# Patient Record
Sex: MALE | Race: WHITE | NOT HISPANIC OR LATINO | Employment: OTHER | URBAN - METROPOLITAN AREA
[De-identification: names, ages, dates, MRNs, and addresses within clinical notes are randomized per-mention and may not be internally consistent; named-entity substitution may affect disease eponyms.]

---

## 2017-01-03 ENCOUNTER — TELEPHONE (OUTPATIENT)
Dept: ENDOSCOPY | Facility: HOSPITAL | Age: 72
End: 2017-01-03

## 2017-01-25 DIAGNOSIS — E11.9 DIABETES MELLITUS TYPE II, NON INSULIN DEPENDENT: ICD-10-CM

## 2017-01-25 RX ORDER — METFORMIN HYDROCHLORIDE 1000 MG/1
TABLET ORAL
Qty: 180 TABLET | Refills: 0 | Status: SHIPPED | OUTPATIENT
Start: 2017-01-25 | End: 2017-04-25 | Stop reason: SDUPTHER

## 2017-01-25 NOTE — TELEPHONE ENCOUNTER
----- Message from Marcy CYDNEY Kent sent at 1/25/2017 11:56 AM CST -----  Contact: self  Medication Refill Request: 90 days supply. Please contact the pt whether the doctor be able to fill his medication or not at [phone]. Thanks!    metformin (GLUCOPHAGE) 1000 MG tablet    Thanks!

## 2017-02-01 ENCOUNTER — CLINICAL SUPPORT (OUTPATIENT)
Dept: CARDIOLOGY | Facility: CLINIC | Age: 72
End: 2017-02-01
Payer: MEDICARE

## 2017-02-01 DIAGNOSIS — I25.10 CORONARY ARTERY DISEASE INVOLVING NATIVE CORONARY ARTERY OF NATIVE HEART WITHOUT ANGINA PECTORIS: Chronic | ICD-10-CM

## 2017-02-01 DIAGNOSIS — Z95.1 S/P CABG X 4: Chronic | ICD-10-CM

## 2017-02-01 PROCEDURE — 93016 CV STRESS TEST SUPVJ ONLY: CPT | Mod: S$PBB,,, | Performed by: INTERNAL MEDICINE

## 2017-02-01 PROCEDURE — 93018 CV STRESS TEST I&R ONLY: CPT | Mod: S$PBB,,, | Performed by: INTERNAL MEDICINE

## 2017-02-01 PROCEDURE — 93017 CV STRESS TEST TRACING ONLY: CPT | Mod: PBBFAC | Performed by: INTERNAL MEDICINE

## 2017-02-01 PROCEDURE — 78492 MYOCRD IMG PET MLT RST&STRS: CPT | Mod: 26,S$PBB,, | Performed by: INTERNAL MEDICINE

## 2017-02-02 NOTE — PROGRESS NOTES
Please contact and inform pt that PET stress test showed a very small abnormality that I am not concerned about.  Looks very good for 10 years after CABG.  No need for further testing at this time.  maria esther mcdonald

## 2017-02-03 ENCOUNTER — TELEPHONE (OUTPATIENT)
Dept: CARDIOLOGY | Facility: CLINIC | Age: 72
End: 2017-02-03

## 2017-02-03 NOTE — TELEPHONE ENCOUNTER
----- Message from Max Oliveira MD sent at 2/2/2017  5:16 PM CST -----  Please contact and inform pt that PET stress test showed a very small abnormality that I am not concerned about.  Looks very good for 10 years after CABG.  No need for further testing at this time.  thx  rb

## 2017-02-20 ENCOUNTER — LAB VISIT (OUTPATIENT)
Dept: LAB | Facility: HOSPITAL | Age: 72
End: 2017-02-20
Attending: FAMILY MEDICINE
Payer: MEDICARE

## 2017-02-20 ENCOUNTER — OFFICE VISIT (OUTPATIENT)
Dept: FAMILY MEDICINE | Facility: CLINIC | Age: 72
End: 2017-02-20
Payer: MEDICARE

## 2017-02-20 VITALS
WEIGHT: 271.38 LBS | BODY MASS INDEX: 38.85 KG/M2 | OXYGEN SATURATION: 93 % | DIASTOLIC BLOOD PRESSURE: 100 MMHG | SYSTOLIC BLOOD PRESSURE: 130 MMHG | HEIGHT: 70 IN | TEMPERATURE: 98 F | HEART RATE: 76 BPM

## 2017-02-20 DIAGNOSIS — E11.9 DIABETES MELLITUS TYPE II, NON INSULIN DEPENDENT: Primary | ICD-10-CM

## 2017-02-20 DIAGNOSIS — E11.9 DIABETES MELLITUS TYPE II, NON INSULIN DEPENDENT: ICD-10-CM

## 2017-02-20 DIAGNOSIS — B35.6 TINEA CRURIS: ICD-10-CM

## 2017-02-20 DIAGNOSIS — I71.40 ABDOMINAL AORTIC ANEURYSM (AAA) WITHOUT RUPTURE: ICD-10-CM

## 2017-02-20 DIAGNOSIS — I10 ESSENTIAL HYPERTENSION: ICD-10-CM

## 2017-02-20 DIAGNOSIS — Z11.59 NEED FOR HEPATITIS C SCREENING TEST: ICD-10-CM

## 2017-02-20 DIAGNOSIS — I25.10 CORONARY ARTERY DISEASE INVOLVING NATIVE CORONARY ARTERY OF NATIVE HEART WITHOUT ANGINA PECTORIS: ICD-10-CM

## 2017-02-20 DIAGNOSIS — J06.9 UPPER RESPIRATORY TRACT INFECTION, UNSPECIFIED TYPE: ICD-10-CM

## 2017-02-20 DIAGNOSIS — Z23 NEED FOR PROPHYLACTIC VACCINATION WITH TETANUS-DIPHTHERIA (TD): ICD-10-CM

## 2017-02-20 DIAGNOSIS — E11.40 TYPE 2 DIABETES MELLITUS WITH DIABETIC NEUROPATHY, WITHOUT LONG-TERM CURRENT USE OF INSULIN: Chronic | ICD-10-CM

## 2017-02-20 DIAGNOSIS — K21.9 GASTROESOPHAGEAL REFLUX DISEASE, ESOPHAGITIS PRESENCE NOT SPECIFIED: ICD-10-CM

## 2017-02-20 DIAGNOSIS — D36.9 DERMOID CYST: ICD-10-CM

## 2017-02-20 LAB — HCV AB SERPL QL IA: NEGATIVE

## 2017-02-20 PROCEDURE — 36415 COLL VENOUS BLD VENIPUNCTURE: CPT | Mod: PO

## 2017-02-20 PROCEDURE — 99999 PR PBB SHADOW E&M-EST. PATIENT-LVL IV: CPT | Mod: PBBFAC,,, | Performed by: FAMILY MEDICINE

## 2017-02-20 PROCEDURE — 99214 OFFICE O/P EST MOD 30 MIN: CPT | Mod: S$PBB,,, | Performed by: FAMILY MEDICINE

## 2017-02-20 PROCEDURE — 86803 HEPATITIS C AB TEST: CPT

## 2017-02-20 PROCEDURE — 83036 HEMOGLOBIN GLYCOSYLATED A1C: CPT

## 2017-02-20 RX ORDER — PANTOPRAZOLE SODIUM 40 MG/1
40 TABLET, DELAYED RELEASE ORAL DAILY
Qty: 90 TABLET | Refills: 3 | Status: SHIPPED | OUTPATIENT
Start: 2017-02-20 | End: 2018-02-07 | Stop reason: SDUPTHER

## 2017-02-20 RX ORDER — ECONAZOLE NITRATE 10 MG/G
CREAM TOPICAL DAILY
Qty: 30 G | Refills: 1 | Status: SHIPPED | OUTPATIENT
Start: 2017-02-20 | End: 2017-11-27 | Stop reason: SDUPTHER

## 2017-02-20 RX ORDER — METOPROLOL SUCCINATE 25 MG/1
25 TABLET, EXTENDED RELEASE ORAL DAILY
Qty: 90 TABLET | Refills: 3 | Status: SHIPPED | OUTPATIENT
Start: 2017-02-20 | End: 2018-01-03 | Stop reason: ALTCHOICE

## 2017-02-20 RX ORDER — GLIPIZIDE 5 MG/1
5 TABLET, FILM COATED, EXTENDED RELEASE ORAL
Qty: 90 TABLET | Refills: 3 | Status: SHIPPED | OUTPATIENT
Start: 2017-02-20 | End: 2018-02-07 | Stop reason: SDUPTHER

## 2017-02-20 RX ORDER — LOSARTAN POTASSIUM 100 MG/1
100 TABLET ORAL DAILY
Qty: 90 TABLET | Refills: 3 | Status: SHIPPED | OUTPATIENT
Start: 2017-02-20 | End: 2018-02-07 | Stop reason: SDUPTHER

## 2017-02-20 RX ORDER — CODEINE PHOSPHATE AND GUAIFENESIN 10; 100 MG/5ML; MG/5ML
5 SOLUTION ORAL EVERY 6 HOURS PRN
Qty: 120 ML | Refills: 0 | Status: SHIPPED | OUTPATIENT
Start: 2017-02-20 | End: 2017-03-02

## 2017-02-20 NOTE — MR AVS SNAPSHOT
[facility]  [address]  [place] [address]  Phone: [phone]  Fax: [phone]                  Jani Cha   2017 8:30 AM   Office Visit    Description:  Male : 1945   Provider:  Laila Bains MD   Department:  [facility]           Reason for Visit     Cough     Sore Throat           Diagnoses this Visit        Comments    Need for prophylactic vaccination with tetanus-diphtheria (TD)    -  Primary     Gastroesophageal reflux disease, esophagitis presence not specified         Diabetes mellitus type II, non insulin dependent         Essential hypertension         Type 2 diabetes mellitus with diabetic neuropathy, without long-term current use of insulin         Dermoid cyst         Tinea cruris         Upper respiratory tract infection, unspecified type                To Do List           Future Appointments        Provider Department Dept Phone    3/7/2017 3:30 PM Max Marques OD [facility] [phone]      Goals (5 Years of Data)     None       These Medications        Disp Refills Start End    pantoprazole (PROTONIX) 40 MG tablet 90 tablet 3 2017    Take 1 tablet (40 mg total) by mouth once daily. - Oral    Pharmacy: [facility] - [place] ([place], [place] - [address] Ph #: [phone]       glipiZIDE (GLUCOTROL) 5 MG TR24 90 tablet 3 2017    Take 1 tablet (5 mg total) by mouth daily with breakfast. - Oral    Pharmacy: [facility] - [place] ([place] - [address] Ph #: [phone]       losartan (COZAAR) 100 MG tablet 90 tablet 3 2017     Take 1 tablet (100 mg total) by mouth once daily. - Oral    Pharmacy: [facility] - [place] ([place], [place] - [address] Ph #: [phone]       metoprolol succinate (TOPROL-XL) 25 MG 24 hr tablet 90 tablet 3 2017     Take 1 tablet (25 mg total) by mouth once daily. - Oral    Pharmacy: [facility]  Pharmacy [address] [place] [address] ([place], [place] - [address] [place] [address] Ph #: [phone]       econazole nitrate 1 % cream 30 g 1 2/20/2017     Apply topically once daily. - Topical (Top)    Pharmacy: [facility] - [place] ([place], [place] - [address] [place] [address] Ph #: [phone]       guaifenesin-codeine 100-10 mg/5 ml (TUSSI-ORGANIDIN NR)  mg/5 mL syrup 120 mL 0 2/20/2017 3/2/2017    Take 5 mLs by mouth every 6 (six) hours as needed for Cough. - Oral    Pharmacy: [facility] [address] [place] [address] ([facility] [address] [place] [address] Ph #: [phone]         [facility]     [facility] Care Line - 24/7 Assistance  Registered nurses in the [facility] provide clinical advisement, health education, appointment booking, and other advisory services.  Call for this free service at [phone].             Medications           Message regarding Medications     Verify the changes and/or additions to your medication regime listed below are the same as discussed with your clinician today.  If any of these changes or additions are incorrect, please notify your healthcare provider.        START taking these NEW medications        Refills    econazole nitrate 1 % cream 1    Sig: Apply topically once daily.    Class: Normal    Route: Topical (Top)    guaifenesin-codeine 100-10 mg/5 ml (TUSSI-ORGANIDIN NR)  mg/5 mL syrup 0    Sig: Take 5 mLs by mouth every 6 (six) hours as needed for Cough.    Class: Print    Route: Oral      STOP taking these medications     loratadine (CLARITIN) 10 mg tablet Take 1 tablet (10 mg total) by mouth daily as needed for Allergies (or runny nose).           Verify that the below list of medications is an accurate representation of the medications you are currently taking.  If none reported, the list may be blank. If incorrect, please contact your healthcare provider. Carry this list with you in case of emergency.           Current Medications     aspirin (ECOTRIN) 81  "MG EC tablet Take 81 mg by mouth once daily.      atorvastatin (LIPITOR) 80 MG tablet Take 1 tablet (80 mg total) by mouth once daily.    clotrimazole-betamethasone 1-0.05% (LOTRISONE) cream Apply topically 2 (two) times daily.    glipiZIDE (GLUCOTROL) 5 MG TR24 Take 1 tablet (5 mg total) by mouth daily with breakfast.    losartan (COZAAR) 100 MG tablet Take 1 tablet (100 mg total) by mouth once daily.    metformin (GLUCOPHAGE) 1000 MG tablet TAKE ONE TABLET BY MOUTH TWICE DAILY WITH MEALS    metoprolol succinate (TOPROL-XL) 25 MG 24 hr tablet Take 1 tablet (25 mg total) by mouth once daily.    nitroGLYCERIN (NITROSTAT) 0.4 MG SL tablet Place 1 tablet (0.4 mg total) under the tongue every 5 (five) minutes as needed.    pantoprazole (PROTONIX) 40 MG tablet Take 1 tablet (40 mg total) by mouth once daily.    econazole nitrate 1 % cream Apply topically once daily.    guaifenesin-codeine 100-10 mg/5 ml (TUSSI-ORGANIDIN NR)  mg/5 mL syrup Take 5 mLs by mouth every 6 (six) hours as needed for Cough.    hydrocodone-acetaminophen 7.5-325mg (NORCO) 7.5-325 mg per tablet Take 1 tablet by mouth every 4 (four) hours as needed for Pain.           Clinical Reference Information           Your Vitals Were     BP Pulse Temp Height    130/100 (BP Location: Right arm, Patient Position: Sitting, BP Method: Manual) 76 97.7 °F (36.5 °C) (Oral) 5' 10" (1.778 m)    Weight SpO2 BMI    123.1 kg (271 lb 6.2 oz) 93% 38.94 kg/m2      Blood Pressure          Most Recent Value    BP  (!)  130/100      Allergies as of 2/20/2017     Penicillins      Immunizations Administered on Date of Encounter - 2/20/2017     Name Date Dose VIS Date Route    TD  Incomplete 0.5 mL 2/24/2015 Intramuscular      Orders Placed During Today's Visit      Normal Orders This Visit    Ambulatory consult to Dermatology     Ambulatory referral to Podiatry     Td Vaccine (Adult) - Preservative Free     Future Labs/Procedures Expected by Expires    Hemoglobin A1c  " 2/20/2017 4/21/2018      Language Assistance Services     ATTENTION: Language assistance services are available, free of charge. Please call [phone].      ATENCIÓN: Si habla silas, tiene a denis disposición servicios gratuitos de asistencia lingüística. Llame al [phone].     CHÚ Ý: N?u b?n nói Ti?ng Vi?t, có các d?ch v? h? tr? ngôn ng? mi?n phí dành cho b?n. G?i s? [phone].         [facility] complies with applicable Federal civil rights laws and does not discriminate on the basis of race, color, national origin, age, disability, or sex.

## 2017-02-20 NOTE — MEDICAL/APP STUDENT
"Subjective:       Patient ID: Jani Cha is a 71 y.o. male.    Chief Complaint: Cough and Sore Throat    HPI     1. Pt c/o sore throat and cough x 4 days. Sore throat began 1 week ago, lasted one day, then recurred with cough, nasal congestion, and rhinorrhea 4 days ago.  He says cough "feels productive" but has not been able to cough up any phlegm.  Cough has been constant, both during day and nighttime. Reports his head feels "floaty," as though there is fluid throughout his sinuses.  Pt denies headache, fever, chills, or body aches.  Pt has not tried any OTC meds or other remedies except for a couple Tylenol last night.  Pt reports history of allergies and sinus issues, though was unable to identify specific allergic irritants.  Denies any sick contacts.  Says he received his flu shot this year.      Review of Systems   Constitutional: Negative for chills, fatigue and fever.   HENT: Positive for congestion, rhinorrhea and sore throat. Negative for ear pain, postnasal drip, sneezing and trouble swallowing.    Eyes: Negative for redness and itching.   Respiratory: Positive for cough. Negative for apnea and shortness of breath.    Cardiovascular: Negative for chest pain and palpitations.   Gastrointestinal: Negative.    Genitourinary: Negative.    Musculoskeletal: Negative for arthralgias and myalgias.   Skin: Negative.    Allergic/Immunologic: Positive for environmental allergies.   Neurological: Negative for dizziness and headaches.       Objective:      Physical Exam   Constitutional: He appears well-developed and well-nourished.   HENT:   Head: Normocephalic and atraumatic.   Nose: Mucosal edema (enlarged erythematous turbinates) present.   Mouth/Throat: Posterior oropharyngeal erythema present. No oropharyngeal exudate.   Eyes: Conjunctivae and EOM are normal. Pupils are equal, round, and reactive to light. Right eye exhibits no discharge. Left eye exhibits no discharge.   Neck: Normal range of motion. " Neck supple.   Cardiovascular: Normal rate, regular rhythm and normal heart sounds.    Pulmonary/Chest: Effort normal and breath sounds normal.   Abdominal: Soft. Bowel sounds are normal.   Feet:   Right Foot:   Protective Sensation: 5 sites tested. 0 sites sensed.   Skin Integrity: Positive for callus.   Left Foot:   Protective Sensation: 5 sites tested. 5 sites sensed.   Lymphadenopathy:     He has no cervical adenopathy.   Skin: Skin is warm and dry.       Assessment:   1. Viral URI vs. Allergic rhinosinusitis  2. Hypertension  3. Diabetic neuropathy of right foot  4. Dermoid cyst  Plan:        1. Viral URI vs. Allergic rhinosinusitis  -antihistamine (Claritin, Allegra)  -nasal saline, decongestant  -fluids  -guaifenacin with codeine  -RTC if not improved in 5-7 days or if sx's worsen and develop fever    2. Hypertension  -refill BP meds: Losartan, Metoprolol  -recommend home BP machine   -consider increasing dose if no change in next 2-3 months    3. Diabetic neuropathy of right foot  -referral to podiatry  -HbA1c check today    4. Dermoid cyst  -referral to dermatology    Nicola Rentschler [facility] MS4

## 2017-02-21 ENCOUNTER — TELEPHONE (OUTPATIENT)
Dept: FAMILY MEDICINE | Facility: CLINIC | Age: 72
End: 2017-02-21

## 2017-02-21 PROBLEM — I71.40 ABDOMINAL AORTIC ANEURYSM (AAA) WITHOUT RUPTURE: Status: ACTIVE | Noted: 2017-02-21

## 2017-02-21 LAB
ESTIMATED AVG GLUCOSE: 163 MG/DL
HBA1C MFR BLD HPLC: 7.3 %

## 2017-02-21 NOTE — PROGRESS NOTES
"Routine Office Visit    Patient Name: Jani Cha    : 1945  MRN: [id number]    Subjective:  Jani is a 71 y.o. male who presents today for     1.  1. Pt c/o sore throat and cough x 4 days. Sore throat began 1 week ago, lasted one day, then recurred with cough, nasal congestion, and rhinorrhea 4 days ago. He says cough "feels productive" but has not been able to cough up any phlegm. Cough has been constant, both during day and nighttime. Reports his head feels "floaty," as though there is fluid throughout his sinuses. Pt denies headache, fever, chills, or body aches. Pt has not tried any OTC meds or other remedies except for a couple Tylenol last night. Pt reports history of allergies and sinus issues, though was unable to identify specific allergic irritants. Denies any sick contacts. Says he received his flu shot this year.    2. Dermoid cyst - forehead - pt states it is bothersome to him especially when he is combing his hair- he would like this removed  3. diabetes - pt has neuropathy; he would like referral to see podiatry and needs his rx refilled     Review of Systems   Constitutional: Negative for chills and fever.   HENT: Positive for congestion and sore throat.    Eyes: Negative for blurred vision.   Respiratory: Positive for cough.    Cardiovascular: Negative for chest pain.   Gastrointestinal: Negative for abdominal pain, constipation, diarrhea, heartburn, nausea and vomiting.   Genitourinary: Negative for dysuria.   Musculoskeletal: Negative for myalgias.   Skin: Positive for itching and rash.   Neurological: Negative for dizziness and headaches.   Psychiatric/Behavioral: Negative for depression.       Active Problem List  Patient Active Problem List   Diagnosis    Hyperlipidemia    Hypertension    Coronary artery disease involving native coronary artery of native heart without angina pectoris    Sleep apnea    S/P CABG x 4    Diabetes mellitus, type II    GERD (gastroesophageal reflux " disease)    Personal history of colonic polyps    Obesity, Class II, BMI 35-39.9    Abdominal aortic aneurysm (AAA) without rupture       Past Surgical History  Past Surgical History   Procedure Laterality Date    Coronary artery bypass graft  05/26/2006      4 vessel    Appendectomy      Colonoscopy N/A 12/27/2016     Procedure: COLONOSCOPY;  Surgeon: Merritt García MD;  Location: [facility] ([address] [place] [address]);  Service: Endoscopy;  Laterality: N/A;       Family History  Family History   Problem Relation Age of Onset    Stroke Father     Colon cancer Brother     No Known Problems Mother     No Known Problems Sister     No Known Problems Maternal Aunt     No Known Problems Maternal Uncle     No Known Problems Paternal Aunt     No Known Problems Paternal Uncle     No Known Problems Maternal Grandmother     No Known Problems Maternal Grandfather     No Known Problems Paternal Grandmother     No Known Problems Paternal Grandfather     Amblyopia Neg Hx     Blindness Neg Hx     Cataracts Neg Hx     Diabetes Neg Hx     Glaucoma Neg Hx     Hypertension Neg Hx     Macular degeneration Neg Hx     Retinal detachment Neg Hx     Strabismus Neg Hx     Thyroid disease Neg Hx     Cancer Neg Hx        Social History  Social History     Social History    Marital status:      Spouse name: N/A    Number of children: N/A    Years of education: N/A     Occupational History    Not on file.     Social History Main Topics    Smoking status: Former Smoker     Types: Cigarettes    Smokeless tobacco: Never Used    Alcohol use 0.0 oz/week     0 Standard drinks or equivalent per week      Comment: once rarely    Drug use: No    Sexual activity: Not on file     Other Topics Concern    Not on file     Social History Narrative       Medications and Allergies  Reviewed and updated.       Physical Exam  Visit Vitals    BP (!) 130/100 (BP Location: Right arm, Patient Position: Sitting, BP Method: Manual)    Pulse 76  "   Temp 97.7 °F (36.5 °C) (Oral)    Ht 5' 10" (1.778 m)    Wt 123.1 kg (271 lb 6.2 oz)    SpO2 (!) 93%    BMI 38.94 kg/m2     Physical Exam   Constitutional: He is oriented to person, place, and time. He appears well-developed and well-nourished.   HENT:   Head: Normocephalic and atraumatic.   Eyes: Conjunctivae and EOM are normal. Pupils are equal, round, and reactive to light.   Neck: Normal range of motion. Neck supple. No JVD present. No thyromegaly present.   Cardiovascular: Normal rate, regular rhythm and normal heart sounds.    Pulmonary/Chest: Effort normal and breath sounds normal. He has no wheezes.   Abdominal: Soft. Bowel sounds are normal. He exhibits no distension. There is no tenderness. There is no guarding.   Genitourinary:   Genitourinary Comments: Dry red irritated scrotal skin   Musculoskeletal: Normal range of motion.   Lymphadenopathy:     He has no cervical adenopathy.   Neurological: He is alert and oriented to person, place, and time.   Skin: Skin is warm and dry.   Dermoid cyst on forehead   Psychiatric: He has a normal mood and affect. His behavior is normal.     Protective Sensation (w/ 10 gram monofilament):  Right: Decreased  Left: Decreased    Visual Inspection:  Normal -  Bilateral    Pedal Pulses:   Right: Present  Left: Present    Posterior tibialis:   Right:Present  Left: Present        Assessment/Plan:  Jani Cha is a 71 y.o. male who presents today for :    Diabetes mellitus type II, non insulin dependent / Type 2 diabetes mellitus with diabetic neuropathy, without long-term current use of insulin  -     glipiZIDE (GLUCOTROL) 5 MG TR24; Take 1 tablet (5 mg total) by mouth daily with breakfast.  Dispense: 90 tablet; Refill: 3  -     Hemoglobin A1c; Future; Expected date: 2/20/17  -     Ambulatory referral to Podiatry  Pt requesting referral to dermatology     Gastroesophageal reflux disease, esophagitis presence not specified  -     pantoprazole (PROTONIX) 40 MG " tablet; Take 1 tablet (40 mg total) by mouth once daily.  Dispense: 90 tablet; Refill: 3  The current medical regimen is effective;  continue present plan and medications.    Essential hypertension / Coronary artery disease involving native coronary artery of native heart without angina pectoris / abdominal aortic aneurysm without rupture  - Cardiology Dr. Oliveira  -     losartan (COZAAR) 100 MG tablet; Take 1 tablet (100 mg total) by mouth once daily.  Dispense: 90 tablet; Refill: 3  -     metoprolol succinate (TOPROL-XL) 25 MG 24 hr tablet; Take 1 tablet (25 mg total) by mouth once daily.  Dispense: 90 tablet; Refill: 3  The current medical regimen is effective;  continue present plan and medications.  Abdominal aneurysm noted on CT 3.5cm no rupture  Continue to monitor     Need for prophylactic vaccination with tetanus-diphtheria (TD)  -     Td Vaccine (Adult) - Preservative Free    Dermoid cyst  -     Ambulatory consult to Dermatology    Tinea cruris  -     econazole nitrate 1 % cream; Apply topically once daily.  Dispense: 30 g; Refill: 1    Upper respiratory tract infection, unspecified type  -     guaifenesin-codeine 100-10 mg/5 ml (TUSSI-ORGANIDIN NR)  mg/5 mL syrup; Take 5 mLs by mouth every 6 (six) hours as needed for Cough.  Dispense: 120 mL; Refill: 0  - Antibiotics are not needed at this time  - Usual course of cold symptom is 10-14 days  - Symptomatic treatment with over the counter Tylenol / Ibuprofen  - Use salt water gargle for sore throat  - Drink plenty of fluids          Return in about 6 months (around 8/20/2017). or as needed

## 2017-02-21 NOTE — TELEPHONE ENCOUNTER
Patient has an appointment 3/2/17 9:45am with Dr. Benavidez and 3/8/17 10am with Dr. Sprague at the [facility], patient was notified.

## 2017-03-07 ENCOUNTER — OFFICE VISIT (OUTPATIENT)
Dept: OPTOMETRY | Facility: CLINIC | Age: 72
End: 2017-03-07
Payer: MEDICARE

## 2017-03-07 DIAGNOSIS — H25.13 NUCLEAR SCLEROSIS, BILATERAL: ICD-10-CM

## 2017-03-07 DIAGNOSIS — Z13.5 GLAUCOMA SCREENING: ICD-10-CM

## 2017-03-07 DIAGNOSIS — E11.9 DIABETES MELLITUS TYPE 2 WITHOUT RETINOPATHY: Primary | ICD-10-CM

## 2017-03-07 PROCEDURE — 99212 OFFICE O/P EST SF 10 MIN: CPT | Mod: PBBFAC,PO | Performed by: OPTOMETRIST

## 2017-03-07 PROCEDURE — 92014 COMPRE OPH EXAM EST PT 1/>: CPT | Mod: S$PBB,,, | Performed by: OPTOMETRIST

## 2017-03-07 PROCEDURE — 99999 PR PBB SHADOW E&M-EST. PATIENT-LVL II: CPT | Mod: PBBFAC,,, | Performed by: OPTOMETRIST

## 2017-03-07 NOTE — PROGRESS NOTES
HPI     DLS:  3/2/16    Pt here for diabetic check of ocular health. Pt without visual complaints   OU.  Pt denies flashes or floaters.  Pt denies eye pain.     No eyedrops    Hemoglobin A1C       Date                     Value               Ref Range             Status                02/20/2017               7.3 (H)             4.5 - 6.2 %           Final             02/17/2016               7.3 (H)             4.5 - 6.2 %           Final                 02/16/2015               7.2 (H)             4.5 - 6.2 %           Final            ----------       Last edited by Max Marques, OD on 3/7/2017  3:53 PM.         Assessment /Plan     For exam results, see Encounter Report.    Diabetes mellitus type 2 without retinopathy  -No retinopathy noted today.  Continued control with primary care physician and annual comprehensive eye exam.    Nuclear sclerosis, bilateral  -Educated patient on presence of cataracts at today's exam, monitor at annual dilated fundus exam. 2-5 years surgical estimate.    Glaucoma screening  -Monitor with annual eye exam and IOP check      RTC 1 yr

## 2017-03-08 ENCOUNTER — OFFICE VISIT (OUTPATIENT)
Dept: PODIATRY | Facility: CLINIC | Age: 72
End: 2017-03-08
Payer: MEDICARE

## 2017-03-08 VITALS — WEIGHT: 271 LBS | HEIGHT: 70 IN | BODY MASS INDEX: 38.8 KG/M2

## 2017-03-08 DIAGNOSIS — L85.3 XEROSIS CUTIS: ICD-10-CM

## 2017-03-08 DIAGNOSIS — L84 CORN OR CALLUS: ICD-10-CM

## 2017-03-08 DIAGNOSIS — E66.9 OBESITY, CLASS II, BMI 35-39.9: Chronic | ICD-10-CM

## 2017-03-08 DIAGNOSIS — E11.49 TYPE II DIABETES MELLITUS WITH NEUROLOGICAL MANIFESTATIONS: Primary | ICD-10-CM

## 2017-03-08 DIAGNOSIS — B35.1 ONYCHOMYCOSIS DUE TO DERMATOPHYTE: ICD-10-CM

## 2017-03-08 PROCEDURE — 11055 PARING/CUTG B9 HYPRKER LES 1: CPT | Mod: Q9,S$PBB,, | Performed by: PODIATRIST

## 2017-03-08 PROCEDURE — 99203 OFFICE O/P NEW LOW 30 MIN: CPT | Mod: 25,S$PBB,, | Performed by: PODIATRIST

## 2017-03-08 PROCEDURE — 11721 DEBRIDE NAIL 6 OR MORE: CPT | Mod: Q9,PBBFAC,PO | Performed by: PODIATRIST

## 2017-03-08 PROCEDURE — 99212 OFFICE O/P EST SF 10 MIN: CPT | Mod: PBBFAC,PO | Performed by: PODIATRIST

## 2017-03-08 PROCEDURE — 11055 PARING/CUTG B9 HYPRKER LES 1: CPT | Mod: Q9,PBBFAC,PO | Performed by: PODIATRIST

## 2017-03-08 PROCEDURE — 99999 PR PBB SHADOW E&M-EST. PATIENT-LVL II: CPT | Mod: PBBFAC,,, | Performed by: PODIATRIST

## 2017-03-08 PROCEDURE — 11721 DEBRIDE NAIL 6 OR MORE: CPT | Mod: 59,Q9,S$PBB, | Performed by: PODIATRIST

## 2017-03-08 RX ORDER — AMMONIUM LACTATE 12 G/100G
2 CREAM TOPICAL DAILY
Qty: 140 G | Refills: 11 | Status: SHIPPED | OUTPATIENT
Start: 2017-03-08 | End: 2021-06-11

## 2017-03-08 NOTE — LETTER
March 8, 2017      Laila Bains MD  [address] [facility] [place] [address]           [facility]  [address] [facility] [place] [address]  Phone: [phone]          Patient: Jani Cha   MR Number: [id number]   YOB: 1945   Date of Visit: 3/8/2017       Dear Dr. Laila Bains:    Thank you for referring Jani Cha to me for evaluation. Attached you will find relevant portions of my assessment and plan of care.    If you have questions, please do not hesitate to call me. I look forward to following Jani Cha along with you.    Sincerely,    Kyle Sprague DPM    Enclosure  CC:  No Recipients    If you would like to receive this communication electronically, please contact [email or web address] or [phone] to request more information on [facility] access.    For providers and/or their staff who would like to refer a patient to [facility], please contact us through our one-stop-shop provider referral line, [facility] , at [phone].    If you feel you have received this communication in error or would no longer like to receive these types of communications, please e-mail [email or web address]
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
## 2017-03-08 NOTE — MR AVS SNAPSHOT
[facility]  [address]  [place] [address]  Phone: [phone]                  Jani Elizabethharoldo   3/8/2017 10:00 AM   Office Visit    Description:  Male : 1945   Provider:  Kyle Sprague DPM   Department:  [facility]           Reason for Visit     Diabetes Mellitus     Diabetic Foot Exam     Nail Care           Diagnoses this Visit        Comments    Type II diabetes mellitus with neurological manifestations    -  Primary     Obesity, Class II, BMI 35-39.9         Onychomycosis due to dermatophyte         Xerosis cutis         Corn or callus                To Do List           Future Appointments        Provider Department Dept Phone    2017 10:15 AM Kyle Sprague DPM [facility] [phone]      Goals (5 Years of Data)     None       These Medications        Disp Refills Start End    ammonium lactate 12 % Crea 140 g 11 3/8/2017     Apply 2 g topically once daily. - Topical (Top)    Pharmacy: [facility] [address] [place], [place] [address] [address] Ph #: [phone]         [facility] On Call     [facility] On Call Nurse Care Line -  Assistance  Registered nurses in the [facility] On Call Center provide clinical advisement, health education, appointment booking, and other advisory services.  Call for this free service at [phone].             Medications           Message regarding Medications     Verify the changes and/or additions to your medication regime listed below are the same as discussed with your clinician today.  If any of these changes or additions are incorrect, please notify your healthcare provider.        START taking these NEW medications        Refills    ammonium lactate 12 % Crea 11    Sig: Apply 2 g topically once daily.    Class: Normal    Route: Topical (Top)           Verify that the below list of medications is an accurate representation of the medications you are currently taking.  If none reported, the list may be  "blank. If incorrect, please contact your healthcare provider. Carry this list with you in case of emergency.           Current Medications     aspirin (ECOTRIN) 81 MG EC tablet Take 81 mg by mouth once daily.      atorvastatin (LIPITOR) 80 MG tablet Take 1 tablet (80 mg total) by mouth once daily.    clotrimazole-betamethasone 1-0.05% (LOTRISONE) cream Apply topically 2 (two) times daily.    econazole nitrate 1 % cream Apply topically once daily.    glipiZIDE (GLUCOTROL) 5 MG TR24 Take 1 tablet (5 mg total) by mouth daily with breakfast.    hydrocodone-acetaminophen 7.5-325mg (NORCO) 7.5-325 mg per tablet Take 1 tablet by mouth every 4 (four) hours as needed for Pain.    losartan (COZAAR) 100 MG tablet Take 1 tablet (100 mg total) by mouth once daily.    metformin (GLUCOPHAGE) 1000 MG tablet TAKE ONE TABLET BY MOUTH TWICE DAILY WITH MEALS    metoprolol succinate (TOPROL-XL) 25 MG 24 hr tablet Take 1 tablet (25 mg total) by mouth once daily.    nitroGLYCERIN (NITROSTAT) 0.4 MG SL tablet Place 1 tablet (0.4 mg total) under the tongue every 5 (five) minutes as needed.    pantoprazole (PROTONIX) 40 MG tablet Take 1 tablet (40 mg total) by mouth once daily.    ammonium lactate 12 % Crea Apply 2 g topically once daily.           Clinical Reference Information           Your Vitals Were     Height Weight BMI          5' 10" (1.778 m) 122.9 kg (271 lb) 38.88 kg/m2        Allergies as of 3/8/2017     Penicillins      Immunizations Administered on Date of Encounter - 3/8/2017     None      Language Assistance Services     ATTENTION: Language assistance services are available, free of charge. Please call [phone].      ATENCIÓN: Si habla español, tiene a denis disposición servicios gratuitos de asistencia lingüística. Llame al [phone].     CHÚ Ý: N?u b?n nói Ti?ng Vi?t, có các d?ch v? h? tr? ngôn ng? mi?n phí dành cho b?n. G?i s? [phone].         [facility] complies with applicable Federal civil rights " laws and does not discriminate on the basis of race, color, national origin, age, disability, or sex.

## 2017-04-21 RX ORDER — ATORVASTATIN CALCIUM 80 MG/1
80 TABLET, FILM COATED ORAL DAILY
Qty: 90 TABLET | Refills: 3 | Status: SHIPPED | OUTPATIENT
Start: 2017-04-21 | End: 2018-05-03 | Stop reason: SDUPTHER

## 2017-04-25 DIAGNOSIS — E11.9 DIABETES MELLITUS TYPE II, NON INSULIN DEPENDENT: ICD-10-CM

## 2017-04-25 RX ORDER — METFORMIN HYDROCHLORIDE 1000 MG/1
1000 TABLET ORAL 2 TIMES DAILY WITH MEALS
Qty: 180 TABLET | Refills: 1 | Status: SHIPPED | OUTPATIENT
Start: 2017-04-25 | End: 2017-10-29 | Stop reason: SDUPTHER

## 2017-04-25 NOTE — TELEPHONE ENCOUNTER
----- Message from Charisse Newton sent at 4/25/2017 12:22 PM CDT -----  Contact: self  Pt is calling to speak with Alka in regards to getting metformin (GLUCOPHAGE) 1000 MG tablet refilled. Please call [phone]. Thanks

## 2017-06-08 DIAGNOSIS — E11.9 TYPE 2 DIABETES MELLITUS WITHOUT COMPLICATION: ICD-10-CM

## 2017-06-23 ENCOUNTER — OFFICE VISIT (OUTPATIENT)
Dept: PODIATRY | Facility: CLINIC | Age: 72
End: 2017-06-23
Payer: MEDICARE

## 2017-06-23 ENCOUNTER — OFFICE VISIT (OUTPATIENT)
Dept: FAMILY MEDICINE | Facility: CLINIC | Age: 72
End: 2017-06-23
Payer: MEDICARE

## 2017-06-23 VITALS
HEIGHT: 70 IN | SYSTOLIC BLOOD PRESSURE: 124 MMHG | BODY MASS INDEX: 38.8 KG/M2 | WEIGHT: 271 LBS | DIASTOLIC BLOOD PRESSURE: 76 MMHG

## 2017-06-23 VITALS
HEART RATE: 66 BPM | DIASTOLIC BLOOD PRESSURE: 76 MMHG | OXYGEN SATURATION: 97 % | HEIGHT: 70 IN | SYSTOLIC BLOOD PRESSURE: 124 MMHG | BODY MASS INDEX: 39.65 KG/M2 | TEMPERATURE: 98 F | WEIGHT: 277 LBS

## 2017-06-23 DIAGNOSIS — E11.40 TYPE 2 DIABETES MELLITUS WITH DIABETIC NEUROPATHY, WITHOUT LONG-TERM CURRENT USE OF INSULIN: Chronic | ICD-10-CM

## 2017-06-23 DIAGNOSIS — B35.1 ONYCHOMYCOSIS DUE TO DERMATOPHYTE: ICD-10-CM

## 2017-06-23 DIAGNOSIS — I25.10 CORONARY ARTERY DISEASE INVOLVING NATIVE CORONARY ARTERY OF NATIVE HEART WITHOUT ANGINA PECTORIS: ICD-10-CM

## 2017-06-23 DIAGNOSIS — E66.01 SEVERE OBESITY (BMI 35.0-39.9) WITH COMORBIDITY: ICD-10-CM

## 2017-06-23 DIAGNOSIS — L84 CORN OR CALLUS: ICD-10-CM

## 2017-06-23 DIAGNOSIS — I70.0 THORACIC AORTA ATHEROSCLEROSIS: ICD-10-CM

## 2017-06-23 DIAGNOSIS — R20.2 PARESTHESIAS: ICD-10-CM

## 2017-06-23 DIAGNOSIS — L85.3 XEROSIS CUTIS: ICD-10-CM

## 2017-06-23 DIAGNOSIS — Z00.00 ENCOUNTER FOR PREVENTIVE HEALTH EXAMINATION: Primary | ICD-10-CM

## 2017-06-23 DIAGNOSIS — R60.0 BILATERAL LOWER EXTREMITY EDEMA: ICD-10-CM

## 2017-06-23 DIAGNOSIS — G47.30 SLEEP APNEA, UNSPECIFIED TYPE: ICD-10-CM

## 2017-06-23 DIAGNOSIS — K21.9 GASTROESOPHAGEAL REFLUX DISEASE, ESOPHAGITIS PRESENCE NOT SPECIFIED: ICD-10-CM

## 2017-06-23 DIAGNOSIS — E78.00 PURE HYPERCHOLESTEROLEMIA: Chronic | ICD-10-CM

## 2017-06-23 DIAGNOSIS — E11.49 TYPE II DIABETES MELLITUS WITH NEUROLOGICAL MANIFESTATIONS: Primary | ICD-10-CM

## 2017-06-23 DIAGNOSIS — I10 ESSENTIAL HYPERTENSION: Chronic | ICD-10-CM

## 2017-06-23 DIAGNOSIS — I71.40 ABDOMINAL AORTIC ANEURYSM (AAA) WITHOUT RUPTURE: ICD-10-CM

## 2017-06-23 PROCEDURE — 99499 UNLISTED E&M SERVICE: CPT | Mod: S$PBB,,, | Performed by: PODIATRIST

## 2017-06-23 PROCEDURE — 99999 PR PBB SHADOW E&M-EST. PATIENT-LVL III: CPT | Mod: PBBFAC,,, | Performed by: PODIATRIST

## 2017-06-23 PROCEDURE — 11055 PARING/CUTG B9 HYPRKER LES 1: CPT | Mod: Q9,S$PBB,, | Performed by: PODIATRIST

## 2017-06-23 PROCEDURE — 11721 DEBRIDE NAIL 6 OR MORE: CPT | Mod: 59,Q9,S$PBB, | Performed by: PODIATRIST

## 2017-06-23 PROCEDURE — 99214 OFFICE O/P EST MOD 30 MIN: CPT | Mod: PBBFAC,27,PO | Performed by: NURSE PRACTITIONER

## 2017-06-23 PROCEDURE — G0438 PPPS, INITIAL VISIT: HCPCS | Mod: ,,, | Performed by: NURSE PRACTITIONER

## 2017-06-23 PROCEDURE — 99999 PR PBB SHADOW E&M-EST. PATIENT-LVL IV: CPT | Mod: PBBFAC,,, | Performed by: NURSE PRACTITIONER

## 2017-06-23 RX ORDER — PANTOPRAZOLE SODIUM 40 MG
TABLET, DELAYED RELEASE (ENTERIC COATED) ORAL
COMMUNITY
Start: 2017-05-20 | End: 2018-01-03

## 2017-06-23 NOTE — PATIENT INSTRUCTIONS
Recommend lotions: eucerin, aquaphor, A&D ointment, gold bond for diabetics        Shoe recommendations: (try 6pm.com, zappos.com , nordstromrack.com, or shoes.com for discounted prices) you can visit DSW shoes in [place] as well    Asics (GT 1000 or gel foundations), new balance, saucony (stabil c3),  Akbar (transcend), vionic, propet (tennis shoe)    soft brand, clarks, crocs, aerosoles, naturalizers, SAS, ecco, morgan, cande quan, rockports (dress shoes)    Vionic, volitiles, burkenstocks, fitflops, propet (sandals)    Nike comfort thong sandals, crocs (house shoes)      Nail Home remedy:  Vicks Vapor rub to cuticles  Listerine and apple cider vinegar in a spray bottle to nails          Diabetes: Inspecting Your Feet    Diabetes increases your chances of developing foot problems. So inspect your feet every day. This helps you find small skin irritations before they become serious ulcers or infections. If you have trouble seeing the bottoms of your feet, use a mirror or ask a family member or friend to help.  How to check your feet  Below are tips to help you look for foot problems. Try to check your feet at the same time each day, such as when you get out of bed in the morning:  · Check the top of each foot. The tops of toes, back of the heel, and outer edge of the foot can get a lot of rubbing from poor-fitting shoes.  · Check the bottom of each foot. Daily wear and tear often leads to problems at pressure spots.  · Check the toes and nails. Fungal infections often occur between toes. Toenail problems can also be a sign of fungal infections or lead to breaks in the skin.  · Check your shoes, too. Loose objects inside a shoe can injure the foot. Use your hand to feel inside your shoes for things like pho[place], loose stitching, or rough areas that could irritate your skin.  Warning signs  Look for any color changes in the foot. Redness with streaks can signal a severe infection, which needs immediate medical  attention. Tell your healthcare provider right away if you have any of these problems:  · Swelling, sometimes with color changes, may be a sign of poor blood flow or infection. Symptoms include tenderness and an increase in the size of your foot.  · Warm or hot areas on your feet may be signs of infection. A foot that is cold may not be getting enough blood.  · Sensations such as burning, tingling, or pins and needles can be signs of a problem. Also check for areas that may be numb.  · Hot spots are caused by friction or pressure. Look for hot spots in areas that get a lot of rubbing. Hot spots can turn into blisters, calluses, or sores.  · Cracks and sores are caused by dry or irritated skin. They are a sign that the skin is breaking down, which can lead to infection.  · Toenail problems to watch for include nails growing into the skin (ingrown toenail) and causing redness or pain. Thick, yellow, or discolored nails can signal a fungal infection.  · Drainage and odor can develop from untreated sores and ulcers. Call your healthcare provider right away if you notice white or yellow drainage, bleeding, or unpleasant odor.   Date Last Reviewed: 6/1/2016  © 2138-7746 [employer]. [address] [place] [address], [place] [address]. All rights reserved. This information is not intended as a substitute for professional medical care. Always follow your healthcare professional's instructions.
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
## 2017-06-23 NOTE — PATIENT INSTRUCTIONS
Counseling and Referral of Other Preventative  (Italic type indicates deductible and co-insurance are waived)    Patient Name: Jani Cha  Today's Date: 6/23/2017      SERVICE LIMITATIONS RECOMMENDATION    Vaccines    · Pneumococcal (once after 65)    · Influenza (annually)    · Hepatitis B (if medium/high risk)    · Prevnar 13      Hepatitis B medium/high risk factors:       - End-stage renal disease       - Hemophiliacs who received Factor VII or         IX concentrates       - Clients of institutions for the mentally             retarded       - Persons who live in the same house as          a HepB carrier       - Homosexual men       - Illicit injectable drug abusers     Pneumococcal: Done, no repeat necessary     Influenza: Done, repeat in one year     Hepatitis B: N/A     Prevnar 13: Done, no repeat necessary    Prostate cancer screening (annually to age 75)     Prostate specific antigen (PSA) Shared decision making with Provider. Sometimes a co-pay may be required if the patient decides to have this test. The USPSTF no longer recommends prostate cancer screening routinely in medicine: completed PSA February 2013    Colorectal cancer screening (to age 75)    · Fecal occult blood test (annual)  · Flexible sigmoidoscopy (5y)  · Screening colonoscopy (10y)  · Barium enema   Last done 12/2016, recommend to repeat every 10  years    Diabetes self-management training (no USPSTF recommendations)  Requires referral by treating physician for patient with diabetes or renal disease. 10 hours of initial DSMT sessions of no less than 30 minutes each in a continuous 12-month period. 2 hours of follow-up DSMT in subsequent years. Requires referral by treating physician for patient with diabetes or renal disease    Glaucoma screening (no USPSTF recommendation)  Diabetes mellitus, family history   , age 50 or over    American, age 65 or over  Last done 3/2017, recommend to repeat every 1  years     Medical nutrition therapy for diabetes or renal disease (no recommended schedule)  Requires referral by treating physician for patient with diabetes or renal disease or kidney transplant within the past 3 years.  Can be provided in same year as diabetes self-management training (DSMT), and CMS recommends medical nutrition therapy take place after DSMT. Up to 3 hours for initial year and 2 hours in subsequent years.  Requires referral by treating physician for patient with diabetes or renal disease    Cardiovascular screening blood tests (every 5 years)  · Fasting lipid panel  Order as a panel if possible  Last done 5/2016, recommend to repeat every 1  years    Diabetes screening tests (at least every 3 years, Medicare covers annually or at 6-month intervals for prediabetic patients)  · Fasting blood sugar (FBS) or glucose tolerance test (GTT)  Patient must be diagnosed with one of the following:       - Hypertension       - Dyslipidemia       - Obesity (BMI 30kg/m2)       - Previous elevated impaired FBS or GTT       ... or any two of the following:       - Overweight (BMI 25 but <30)       - Family history of diabetes       - Age 65 or older       - History of gestational diabetes or birth of baby weighing more than 9 pounds  Done this year, repeat every 6 months     Abdominal aortic aneurysm screening (once)  · Sonogram   Limited to patients who meet one of the following criteria:       - Men who are 65-75 years old and have smoked more than 100 cigarette in their lifetime       - Anyone with a family history of abdominal aortic aneurysm       - Anyone recommended for screening by the USPSTF  discussed with patient      HIV screening (annually for increased risk patients)  · HIV-1 and HIV-2 by EIA, or MICKIE, rapid antibody test or oral mucosa transudate  Patients must be at increased risk for HIV infection per USPSTF guidelines or pregnant. Tests covered annually for patient at increased risk or as requested by  the patient. Pregnant patients may receive up to 3 tests during pregnancy.  no clinical risk factors      Smoking cessation counseling (up to 8 sessions per year)  Patients must be asymptomatic of tobacco-related conditions to receive as a preventative service.  Non-smoker    Subsequent annual wellness visit  At least 12 months since last AWV  Return in one year     The following information is provided to all patients.  This information is to help you find resources for any of the problems found today that may be affecting your health:                Living healthy guide: www.[facility].[place].[facility]      Understanding Diabetes: www.diabetes.org      Eating healthy: www.cdc.gov/healthyweight      [facility] home safety checklist: www.cdc.gov/steadi/patient.html      Agency on Aging: www.goea.[place].[facility]      Alcoholics anonymous (AA): www.aa.org      Physical Activity: www.wing.nih.gov/ky4vavu      Tobacco use: www.quitwithusla.org

## 2017-06-23 NOTE — Clinical Note
"Patient inquired about PSA.  He was evaluated by urology but deferred testing until he saw his PCP.  I'm unable to link the PSA to any diagnoses because he may "incur" a charge.  He would like a PSA ordered.  Additionally, the risk assessment wants to order an ultrasound. It is noted he has an aneurysm.   "

## 2017-06-24 NOTE — PROGRESS NOTES
"Jani Cha presented for a  Medicare AWV and comprehensive Health Risk Assessment today. The following components were reviewed and updated:    · Medical history  · Family History  · Social history  · Allergies and Current Medications  · Health Risk Assessment  · Health Maintenance  · Care Team     ** See Completed Assessments for Annual Wellness Visit within the encounter summary.**       The following assessments were completed:  · Living Situation  · CAGE  · Depression Screening  · Timed Get Up and Go  · Whisper Test  · Cognitive Function Screening  · Nutrition Screening  · ADL Screening  · PAQ Screening    Vitals:    06/23/17 1102   BP: 124/76   BP Location: Left arm   Patient Position: Sitting   BP Method: Manual   Pulse: 66   Temp: 98.4 °F (36.9 °C)   TempSrc: Oral   SpO2: 97%   Weight: 125.7 kg (277 lb 0.1 oz)   Height: 5' 10" (1.778 m)     Body mass index is 39.75 kg/m².  Physical Exam   Constitutional: He is oriented to person, place, and time.   Cardiovascular: Normal rate, regular rhythm and normal heart sounds.    Pulmonary/Chest: Effort normal and breath sounds normal. He has no wheezes.   Musculoskeletal: Normal range of motion.   Neurological: He is alert and oriented to person, place, and time.   Skin: Skin is warm. Capillary refill takes less than 2 seconds.   Psychiatric: He has a normal mood and affect. His behavior is normal. Thought content normal.   Vitals reviewed.        Diagnoses and health risks identified today and associated recommendations/orders:    1. Encounter for preventive health examination  Education provided about preventive health examinations and procedures; addressed and discussed patient's health concerns. Additionally, reviewed medical record for risk factors and documented the results during this encounter.    2. Severe obesity (BMI 35.0-39.9) with comorbidity  Reminded patient to engage in structured fitness activities. .   We discussed diet and exercise for weight " "loss.  Educated about diet, activities, and ways to avoid sedentary lifestyle.     3. Type 2 diabetes mellitus with diabetic neuropathy, without long-term current use of insulin  Education provided about diabetes, management of blood glucose with diet and activities, monitoring for worsening effects of diabetes.  Reviewed most recent Ha1c, complications associated with uncontrolled diabetes.     4. Abdominal aortic aneurysm (AAA) without rupture  Stable, asymptomatic; monitor.     5. Coronary artery disease involving native coronary artery of native heart without angina pectoris  Stable, asymptomatic; monitor.     6. Essential hypertension  At goal, continue as advised.     7. Thoracic aorta atherosclerosis  Stable, asymptomatic on ASA, cholesterol managing STATIN, and antihypertensive medication; monitor    8. Pure hypercholesterolemia  Stable, asymptomatic on ASA, cholesterol managing STATIN, and antihypertensive medication; monitor    9. Sleep apnea, unspecified type  Reports he's not using the cpap device due to "It's not comfortable."  Educated about health risks associated with sleep apnea.  Patient verbalized an understanding.     10. Gastroesophageal reflux disease, esophagitis presence not specified  Symptoms controlled. Education provided about reflux. Continue as advised.     Reviewed health maintenance with patient, educated about recommended examinations, procedures (labs & images), and immunizations.     Provided Jani with a 5-10 year written screening schedule and personal prevention plan. Recommendations were developed using the USPSTF age appropriate recommendations. Education, counseling, and referrals were provided as needed. After Visit Summary printed and given to patient which includes a list of additional screenings\tests needed.    No Follow-up on file.    Main Lopez Jr, NP  "

## 2017-07-24 ENCOUNTER — LAB VISIT (OUTPATIENT)
Dept: LAB | Facility: HOSPITAL | Age: 72
End: 2017-07-24
Attending: FAMILY MEDICINE
Payer: MEDICARE

## 2017-07-24 ENCOUNTER — OFFICE VISIT (OUTPATIENT)
Dept: FAMILY MEDICINE | Facility: CLINIC | Age: 72
End: 2017-07-24
Payer: MEDICARE

## 2017-07-24 VITALS
WEIGHT: 278.25 LBS | DIASTOLIC BLOOD PRESSURE: 82 MMHG | OXYGEN SATURATION: 95 % | HEART RATE: 66 BPM | SYSTOLIC BLOOD PRESSURE: 138 MMHG | HEIGHT: 70 IN | BODY MASS INDEX: 39.83 KG/M2 | TEMPERATURE: 98 F

## 2017-07-24 DIAGNOSIS — E11.40 TYPE 2 DIABETES MELLITUS WITH DIABETIC NEUROPATHY, WITHOUT LONG-TERM CURRENT USE OF INSULIN: Chronic | ICD-10-CM

## 2017-07-24 DIAGNOSIS — Z00.00 ANNUAL PHYSICAL EXAM: ICD-10-CM

## 2017-07-24 DIAGNOSIS — E66.01 SEVERE OBESITY (BMI 35.0-39.9) WITH COMORBIDITY: ICD-10-CM

## 2017-07-24 DIAGNOSIS — Z12.5 ENCOUNTER FOR SCREENING FOR MALIGNANT NEOPLASM OF PROSTATE: ICD-10-CM

## 2017-07-24 DIAGNOSIS — Z00.00 ANNUAL PHYSICAL EXAM: Primary | ICD-10-CM

## 2017-07-24 DIAGNOSIS — I71.40 ABDOMINAL AORTIC ANEURYSM (AAA) WITHOUT RUPTURE: ICD-10-CM

## 2017-07-24 DIAGNOSIS — E78.00 PURE HYPERCHOLESTEROLEMIA: Chronic | ICD-10-CM

## 2017-07-24 DIAGNOSIS — I10 ESSENTIAL HYPERTENSION: Chronic | ICD-10-CM

## 2017-07-24 DIAGNOSIS — I70.0 THORACIC AORTA ATHEROSCLEROSIS: ICD-10-CM

## 2017-07-24 LAB
ALBUMIN SERPL BCP-MCNC: 3.3 G/DL
ALP SERPL-CCNC: 78 U/L
ALT SERPL W/O P-5'-P-CCNC: 18 U/L
ANION GAP SERPL CALC-SCNC: 6 MMOL/L
AST SERPL-CCNC: 18 U/L
BASOPHILS # BLD AUTO: 0.02 K/UL
BASOPHILS NFR BLD: 0.2 %
BILIRUB SERPL-MCNC: 0.6 MG/DL
BUN SERPL-MCNC: 17 MG/DL
CALCIUM SERPL-MCNC: 9.9 MG/DL
CHLORIDE SERPL-SCNC: 107 MMOL/L
CHOLEST/HDLC SERPL: 3.4 {RATIO}
CO2 SERPL-SCNC: 29 MMOL/L
COMPLEXED PSA SERPL-MCNC: 1 NG/ML
CREAT SERPL-MCNC: 1.2 MG/DL
DIFFERENTIAL METHOD: ABNORMAL
EOSINOPHIL # BLD AUTO: 0.3 K/UL
EOSINOPHIL NFR BLD: 3.3 %
ERYTHROCYTE [DISTWIDTH] IN BLOOD BY AUTOMATED COUNT: 13.4 %
EST. GFR  (AFRICAN AMERICAN): >60 ML/MIN/1.73 M^2
EST. GFR  (NON AFRICAN AMERICAN): >60 ML/MIN/1.73 M^2
ESTIMATED AVG GLUCOSE: 166 MG/DL
GLUCOSE SERPL-MCNC: 167 MG/DL
HBA1C MFR BLD HPLC: 7.4 %
HCT VFR BLD AUTO: 47.8 %
HDL/CHOLESTEROL RATIO: 29.4 %
HDLC SERPL-MCNC: 119 MG/DL
HDLC SERPL-MCNC: 35 MG/DL
HGB BLD-MCNC: 15.9 G/DL
LDLC SERPL CALC-MCNC: 66 MG/DL
LYMPHOCYTES # BLD AUTO: 1.5 K/UL
LYMPHOCYTES NFR BLD: 16 %
MCH RBC QN AUTO: 30.1 PG
MCHC RBC AUTO-ENTMCNC: 33.3 G/DL
MCV RBC AUTO: 91 FL
MONOCYTES # BLD AUTO: 0.5 K/UL
MONOCYTES NFR BLD: 5.1 %
NEUTROPHILS # BLD AUTO: 7.2 K/UL
NEUTROPHILS NFR BLD: 75.1 %
NONHDLC SERPL-MCNC: 84 MG/DL
PLATELET # BLD AUTO: 215 K/UL
PMV BLD AUTO: 10.6 FL
POTASSIUM SERPL-SCNC: 5.2 MMOL/L
PROT SERPL-MCNC: 6.5 G/DL
RBC # BLD AUTO: 5.28 M/UL
SODIUM SERPL-SCNC: 142 MMOL/L
TRIGL SERPL-MCNC: 90 MG/DL
TSH SERPL DL<=0.005 MIU/L-ACNC: 0.98 UIU/ML
WBC # BLD AUTO: 9.63 K/UL

## 2017-07-24 PROCEDURE — 85025 COMPLETE CBC W/AUTO DIFF WBC: CPT

## 2017-07-24 PROCEDURE — 80061 LIPID PANEL: CPT

## 2017-07-24 PROCEDURE — 99999 PR PBB SHADOW E&M-EST. PATIENT-LVL IV: CPT | Mod: PBBFAC,,, | Performed by: FAMILY MEDICINE

## 2017-07-24 PROCEDURE — 80053 COMPREHEN METABOLIC PANEL: CPT

## 2017-07-24 PROCEDURE — 83036 HEMOGLOBIN GLYCOSYLATED A1C: CPT

## 2017-07-24 PROCEDURE — 99214 OFFICE O/P EST MOD 30 MIN: CPT | Mod: S$PBB,,, | Performed by: FAMILY MEDICINE

## 2017-07-24 PROCEDURE — 36415 COLL VENOUS BLD VENIPUNCTURE: CPT | Mod: PO

## 2017-07-24 PROCEDURE — 84443 ASSAY THYROID STIM HORMONE: CPT

## 2017-07-24 PROCEDURE — 84153 ASSAY OF PSA TOTAL: CPT

## 2017-07-24 NOTE — PATIENT INSTRUCTIONS
Healthy Meals for Diabetes     A healthcare provider will help you develop a meal plan that fits your needs.   Ask your healthcare team to help you make a meal plan that fits your needs. Your meal plan tells you when to eat your meals and snacks, what kinds of foods to eat, and how much of each food to eat. You dont have to give up all the foods you like. But you do need to follow some guidelines.  Choose healthy carbohydrates  Starches, sugars, and fiber are all types of carbohydrates. Fiber can help lower your cholesterol and triglycerides. Fiber is also healthy for your heart. You should have 20 to 35 grams of total fiber each day. Fiber-rich foods include:  · Whole-grain breads and cereals  · Bulgur wheat  · Brown rice     · Whole-wheat pasta  · Fruits and vegetables  · Dry beans, and peas   Keep track of the amount of carbohydrates you eat. This can help you keep the right balance of physical activity and medicine. The amount of carbohydrates needed will vary for each person. It depends on many things such as your health, the medicines you take, and how active you are. Your healthcare team will help you figure out the right amount of carbohydrates for you. You may start with around 45 to 60 grams of carbohydrates per meal, depending on your situation.   Here are some examples of foods containing about 15 grams of carbohydrates (1 serving of carbohydrates):  · 1/2 cup of canned or frozen fruit  · A small piece of fresh fruit (4 ounces)  · 1 slice of bread  · 1/2 cup of oatmeal  · 1/3 cup of rice  · 4 to 6 crackers  · 1/2 English muffin  · 1/2 cup of black beans  · 1/4 of a large baked potato (3 ounces)  · 2/3 cup of plain fat-free yogurt  · 1 cup of soup  · 1/2 cup of casserole  · 6 chicken nuggets  · 2-inch-square brownie or cake without frosting  · 2 small cookies  · 1/2 cup of ice cream or sherbet  Choose healthy protein foods  Eating protein that is low in fat can help you control your weight. It also  helps keep your heart healthy. Low-fat protein foods include:  · Fish  · Plant proteins, such as dry beans and peas, nuts, and soy products like tofu and soymilk  · Lean meat with all visible fat removed  · Poultry with the skin removed  · Low-fat or nonfat milk, cheese, and yogurt  Limit unhealthy fats and sugar  Saturated and trans fats are unhealthy for your heart. They raise LDL (bad) cholesterol. Fat is also high in calories, so it can make you gain weight. To cut down on unhealthy fats and sugar, limit these foods:  · Butter or margarine  · Palm and palm kernel oils and coconut oil  · Cream  · Cheese  · Luna  · Lunch meats     · Ice cream  · Sweet bakery goods such as pies, muffins, and donuts  · Jams and jellies  · Candy bars  · Regular sodas   How much to eat  The amount of food you eat affects your blood sugar. It also affects your weight. Your healthcare team will tell you how much of each type of food you should eat.  · Use measuring cups and spoons and a food scale to measure serving sizes.  · Learn what a correct serving size looks like on your plate. This will help when you are away from home and cant measure your servings.  · Eat only the number of servings given on your meal plan for each food. Dont take seconds.  · Learn to read food labels. Be sure to look at serving size, total carbohydrates, fiber, calories, sugar, and saturated and trans fats. Look for healthier alternatives to foods that have added sugar.  · Plan ahead for parties so you can still have a good time without going overboard with unhealthy food choices. Set a good example yourself by bringing a healthy dish to pot lucks.   Choose healthy snacks  When it comes to snacks, we usually think about foods with added sugar and fats. But there are many other options for healthier snack choices. Here are a few snack ideas to choose from:  Snacks with less than 5 grams of carbohydrates  · 1 piece of string cheese  · 3 celery sticks plus 1  tablespoon of peanut butter  · 5 cherry tomatoes plus 1 tablespoon of ranch dressing  · 1 hard-boiled egg  · 1/4 cup of fresh blueberries  ·  5 baby carrots  · 1 cup of light popcorn  · 1/2 cup of sugar-free gelatin  · 15 almonds  Snacks with about 10 to 20 grams of carbohydrates  · 1/3 cup of hummus plus 1 cup of fresh cut nonstarchy vegetables (carrots, green peppers, broccoli, celery, or a combination)  · 1/2 cup of fresh or canned fruit plus 1/4 cup of cottage cheese  · 1/2 cup of tuna salad with 4 crackers  · 2 rice cakes and a tablespoon of peanut butter  · 1 small apple or orange  · 3 cups light popcorn  · 1/2 of a turkey sandwich (1 slice of whole-wheat bread, 2 ounces of turkey, and mustard)  Portion sizes are important to controlling your blood sugar and staying at a healthy weight. Stock up on healthy snack items so you always have them on hand.  When to eat  Your meal plan will likely include breakfast, lunch, dinner, and some snacks.  · Try to eat your meals and snacks at about the same times each day.  · Eat all your meals and snacks. Skipping a meal or snack can make your blood sugar drop too low. It can also cause you to eat too much at the next meal or snack. Then your blood sugar could get too high.  Date Last Reviewed: 7/1/2016  © 0628-2905 [employer]. [address] [place] [address], [place] [address]. All rights reserved. This information is not intended as a substitute for professional medical care. Always follow your healthcare professional's instructions.

## 2017-07-24 NOTE — PROGRESS NOTES
Routine Office Visit    Patient Name: Jani Cha    : 1945  MRN: [id number]    Subjective:  Jani is a 72 y.o. male who presents today for     1. Annual physical   2. Left side low back pain - pain is described as intermittent. Pain is worse in some positions and alleviated with stretching or moving from position that causes him pain. He has no pain at this time. He remains active and does do yard work.     Review of Systems   Constitutional: Negative for chills and fever.   HENT: Negative for congestion.    Eyes: Negative for blurred vision.   Respiratory: Negative for cough.    Cardiovascular: Negative for chest pain.   Gastrointestinal: Negative for abdominal pain, constipation, diarrhea, heartburn, nausea and vomiting.   Genitourinary: Negative for dysuria.   Musculoskeletal: Negative for myalgias.   Skin: Negative for itching and rash.   Neurological: Negative for dizziness and headaches.   Psychiatric/Behavioral: Negative for depression.       Active Problem List  Patient Active Problem List   Diagnosis    Hyperlipidemia    Hypertension    Coronary artery disease involving native coronary artery of native heart without angina pectoris    Sleep apnea    S/P CABG x 4    Diabetes mellitus, type II    GERD (gastroesophageal reflux disease)    Personal history of colonic polyps    Severe obesity (BMI 35.0-39.9) with comorbidity    Abdominal aortic aneurysm (AAA) without rupture    Thoracic aorta atherosclerosis       Past Surgical History  Past Surgical History:   Procedure Laterality Date    APPENDECTOMY      COLONOSCOPY N/A 2016    Procedure: COLONOSCOPY;  Surgeon: Merritt García MD;  Location: [address];  Service: Endoscopy;  Laterality: N/A;    CORONARY ARTERY BYPASS GRAFT  2006     4 vessel       Family History  Family History   Problem Relation Age of Onset    Stroke Father     Colon cancer Brother     Cancer Brother      colon and skin CA    No Known Problems  Mother     Cancer Sister     No Known Problems Maternal Aunt     No Known Problems Maternal Uncle     No Known Problems Paternal Aunt     No Known Problems Paternal Uncle     No Known Problems Maternal Grandmother     No Known Problems Maternal Grandfather     No Known Problems Paternal Grandmother     No Known Problems Paternal Grandfather     Cancer Sister     Amblyopia Neg Hx     Blindness Neg Hx     Cataracts Neg Hx     Diabetes Neg Hx     Glaucoma Neg Hx     Hypertension Neg Hx     Macular degeneration Neg Hx     Retinal detachment Neg Hx     Strabismus Neg Hx     Thyroid disease Neg Hx        Social History  Social History     Social History    Marital status:      Spouse name: N/A    Number of children: N/A    Years of education: N/A     Occupational History    Not on file.     Social History Main Topics    Smoking status: Former Smoker     Types: Cigarettes    Smokeless tobacco: Never Used    Alcohol use 0.0 oz/week      Comment: once rarely    Drug use: No    Sexual activity: Not on file     Other Topics Concern    Not on file     Social History Narrative    No narrative on file       Medications and Allergies  Reviewed and updated.   Current Outpatient Prescriptions   Medication Sig    ammonium lactate 12 % Crea Apply 2 g topically once daily.    aspirin (ECOTRIN) 81 MG EC tablet Take 81 mg by mouth once daily.      atorvastatin (LIPITOR) 80 MG tablet Take 1 tablet (80 mg total) by mouth once daily.    clotrimazole-betamethasone 1-0.05% (LOTRISONE) cream Apply topically 2 (two) times daily.    econazole nitrate 1 % cream Apply topically once daily.    glipiZIDE (GLUCOTROL) 5 MG TR24 Take 1 tablet (5 mg total) by mouth daily with breakfast.    losartan (COZAAR) 100 MG tablet Take 1 tablet (100 mg total) by mouth once daily.    metformin (GLUCOPHAGE) 1000 MG tablet Take 1 tablet (1,000 mg total) by mouth 2 (two) times daily with meals.    metoprolol succinate  "(TOPROL-XL) 25 MG 24 hr tablet Take 1 tablet (25 mg total) by mouth once daily.    nitroGLYCERIN (NITROSTAT) 0.4 MG SL tablet Place 1 tablet (0.4 mg total) under the tongue every 5 (five) minutes as needed.    pantoprazole (PROTONIX) 40 MG tablet Take 1 tablet (40 mg total) by mouth once daily.    PROTONIX 40 mg tablet      No current facility-administered medications for this visit.        Physical Exam  /82 (BP Location: Right arm, Patient Position: Sitting)   Pulse 66   Temp 98 °F (36.7 °C) (Oral)   Ht 5' 10" (1.778 m)   Wt 126.2 kg (278 lb 3.5 oz)   SpO2 95%   BMI 39.92 kg/m²   Physical Exam   Constitutional: He is oriented to person, place, and time. He appears well-developed and well-nourished.   HENT:   Head: Normocephalic and atraumatic.   Eyes: Conjunctivae and EOM are normal. Pupils are equal, round, and reactive to light.   Neck: Normal range of motion. Neck supple. No JVD present. No thyromegaly present.   Cardiovascular: Normal rate, regular rhythm and normal heart sounds.    Pulmonary/Chest: Effort normal and breath sounds normal. He has no wheezes.   Abdominal: Soft. Bowel sounds are normal. He exhibits no distension. There is no tenderness. There is no guarding.   Musculoskeletal: Normal range of motion.   Lymphadenopathy:     He has no cervical adenopathy.   Neurological: He is alert and oriented to person, place, and time.   Skin: Skin is warm and dry.   Psychiatric: He has a normal mood and affect. His behavior is normal.         Assessment/Plan:  Jani Cha is a 72 y.o. male who presents today for :    Annual physical exam  -     Lipid panel; Future; Expected date: 07/24/2017  -     TSH; Future; Expected date: 07/24/2017  -     Hemoglobin A1c; Future; Expected date: 07/24/2017  -     Comprehensive metabolic panel; Future; Expected date: 07/24/2017  -     CBC auto differential; Future; Expected date: 07/24/2017  -     PSA, Screening; Future; Expected date: " 07/24/2017    Essential hypertension  -     Lipid panel; Future; Expected date: 07/24/2017  -     TSH; Future; Expected date: 07/24/2017  -     Comprehensive metabolic panel; Future; Expected date: 07/24/2017  -     CBC auto differential; Future; Expected date: 07/24/2017  The current medical regimen is effective;  continue present plan and medications.    Type 2 diabetes mellitus with diabetic neuropathy, without long-term current use of insulin  -     Hemoglobin A1c; Future; Expected date: 07/24/2017  The current medical regimen is effective;  continue present plan and medications.    Encounter for screening for malignant neoplasm of prostate   -     PSA, Screening; Future; Expected date: 07/24/2017    Abdominal aortic aneurysm (AAA) without rupture / Thoracic aorta atherosclerosis  Patient with Atherosclerosis of the Aorta.  Stable/asymptomatic. Currently stable on lipid lowering treatment and b/p monitoring.  On aspirin / blood pressure medication and statin     Severe obesity (BMI 35.0-39.9) with comorbidity  BMI noted    Pure hypercholesterolemia  The current medical regimen is effective;  continue present plan and medications.    Left side low back pain  Recommend conservative treatment  Recommend using heating pad to decrease pain   Recommend regular stretching exercises to do especially after yard work / mowing lawn.       Return in about 6 months (around 1/24/2018).

## 2017-10-29 DIAGNOSIS — E11.9 DIABETES MELLITUS TYPE II, NON INSULIN DEPENDENT: ICD-10-CM

## 2017-10-30 RX ORDER — METFORMIN HYDROCHLORIDE 1000 MG/1
1000 TABLET ORAL 2 TIMES DAILY WITH MEALS
Qty: 180 TABLET | Refills: 1 | Status: SHIPPED | OUTPATIENT
Start: 2017-10-30 | End: 2018-05-02 | Stop reason: SDUPTHER

## 2017-11-10 ENCOUNTER — OFFICE VISIT (OUTPATIENT)
Dept: PODIATRY | Facility: CLINIC | Age: 72
End: 2017-11-10
Payer: MEDICARE

## 2017-11-10 VITALS
DIASTOLIC BLOOD PRESSURE: 100 MMHG | WEIGHT: 278.25 LBS | HEIGHT: 70 IN | SYSTOLIC BLOOD PRESSURE: 162 MMHG | BODY MASS INDEX: 39.83 KG/M2

## 2017-11-10 DIAGNOSIS — R60.0 BILATERAL LOWER EXTREMITY EDEMA: ICD-10-CM

## 2017-11-10 DIAGNOSIS — B35.1 ONYCHOMYCOSIS DUE TO DERMATOPHYTE: ICD-10-CM

## 2017-11-10 DIAGNOSIS — L84 CORN OR CALLUS: ICD-10-CM

## 2017-11-10 DIAGNOSIS — E11.49 TYPE II DIABETES MELLITUS WITH NEUROLOGICAL MANIFESTATIONS: Primary | ICD-10-CM

## 2017-11-10 DIAGNOSIS — R20.2 PARESTHESIAS: ICD-10-CM

## 2017-11-10 DIAGNOSIS — L85.3 XEROSIS CUTIS: ICD-10-CM

## 2017-11-10 PROCEDURE — 11721 DEBRIDE NAIL 6 OR MORE: CPT | Mod: Q9,PBBFAC,PO,59 | Performed by: PODIATRIST

## 2017-11-10 PROCEDURE — 11056 PARNG/CUTG B9 HYPRKR LES 2-4: CPT | Mod: Q9,S$PBB,, | Performed by: PODIATRIST

## 2017-11-10 PROCEDURE — 11721 DEBRIDE NAIL 6 OR MORE: CPT | Mod: 59,Q9,S$PBB, | Performed by: PODIATRIST

## 2017-11-10 PROCEDURE — 99499 UNLISTED E&M SERVICE: CPT | Mod: S$PBB,,, | Performed by: PODIATRIST

## 2017-11-10 PROCEDURE — 99213 OFFICE O/P EST LOW 20 MIN: CPT | Mod: PBBFAC,PO | Performed by: PODIATRIST

## 2017-11-10 PROCEDURE — 11056 PARNG/CUTG B9 HYPRKR LES 2-4: CPT | Mod: Q9,PBBFAC,PO | Performed by: PODIATRIST

## 2017-11-10 PROCEDURE — 99999 PR PBB SHADOW E&M-EST. PATIENT-LVL III: CPT | Mod: PBBFAC,,, | Performed by: PODIATRIST
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
## 2017-11-14 ENCOUNTER — CLINICAL SUPPORT (OUTPATIENT)
Dept: FAMILY MEDICINE | Facility: CLINIC | Age: 72
End: 2017-11-14
Payer: MEDICARE

## 2017-11-14 DIAGNOSIS — Z23 NEED FOR PROPHYLACTIC VACCINATION AND INOCULATION AGAINST INFLUENZA: Primary | ICD-10-CM

## 2017-11-14 PROCEDURE — G0008 ADMIN INFLUENZA VIRUS VAC: HCPCS | Mod: PBBFAC,PO | Performed by: FAMILY MEDICINE

## 2017-11-14 PROCEDURE — 99499 UNLISTED E&M SERVICE: CPT | Mod: S$PBB,,, | Performed by: FAMILY MEDICINE

## 2017-11-14 NOTE — PROGRESS NOTES
Flu given &.VIS given. Tolerated injection well.Patient instructed to wait 15 minutes for monitoring.

## 2017-11-27 DIAGNOSIS — B35.6 TINEA CRURIS: ICD-10-CM

## 2017-11-27 RX ORDER — ECONAZOLE NITRATE 10 MG/G
CREAM TOPICAL DAILY
Qty: 30 G | Refills: 1 | Status: SHIPPED | OUTPATIENT
Start: 2017-11-27 | End: 2019-02-01 | Stop reason: SDUPTHER

## 2018-01-02 NOTE — PROGRESS NOTES
"Subjective:   Patient ID:  Jani Cha is a 72 y.o. male who presents for follow-up of Coronary Artery Disease (1 yr f/u ) and Leg Problem (left leg cramping)      HPI:   Pt has CAD s/p CABG in 5-2006, DM, HTN and dyslipidemia. Mr. Cha denies any CP, BUCK, palpitations, TIA's, syncope or presyncope. He has c/o is L hip pain that occurs when he stands on the hip. Also c/o neck pain that occurs when he lays down for sleep.  He also has JAYSON and did have ENT surgery.  Mr. Cha does not have a regimented exercise program, however does lead an active lifestyle and is not limited by any cardiac symptoms.  Echo from 11-10 was stable demonstrated preserved LV function (low normal) and diastolic dysfunction. PET from 2-2017 demonstrated a small amount of ischemia in the infbasilar wall.   He remains obese despite many conversations on the topic of weight loss BM39. No persistent weight loss.     CT scan for renal stones in  demonstrated Infrarenal abdominal aortic aneurysm measuring 3.5-cm and diffuse dilatation of the descending thoracic aorta measuring 3.9-cm.    Vitals:    01/03/18 0853   BP: (!) 167/84   BP Location: Left arm   Patient Position: Sitting   BP Method: Large (Automatic)   Pulse: 60   Weight: 124.6 kg (274 lb 11.1 oz)   Height: 5' 10" (1.778 m)     Body mass index is 39.41 kg/m².  CrCl cannot be calculated (Patient's most recent lab result is older than the maximum 7 days allowed.).    Lab Results   Component Value Date     07/24/2017    K 5.2 (H) 07/24/2017     07/24/2017    CO2 29 07/24/2017    BUN 17 07/24/2017    CREATININE 1.2 07/24/2017     (H) 07/24/2017    HGBA1C 7.4 (H) 07/24/2017    MG 2.3 06/18/2006    AST 18 07/24/2017    ALT 18 07/24/2017    ALBUMIN 3.3 (L) 07/24/2017    PROT 6.5 07/24/2017    BILITOT 0.6 07/24/2017    WBC 9.63 07/24/2017    HGB 15.9 07/24/2017    HCT 47.8 07/24/2017    MCV 91 07/24/2017     07/24/2017    INR 1.4 (H) 02/16/2009    " PSA 1.0 07/24/2017    TSH 0.983 07/24/2017    CHOL 119 (L) 07/24/2017    HDL 35 (L) 07/24/2017    LDLCALC 66.0 07/24/2017    TRIG 90 07/24/2017       Current Outpatient Prescriptions   Medication Sig    ammonium lactate 12 % Crea Apply 2 g topically once daily.    aspirin (ECOTRIN) 81 MG EC tablet Take 81 mg by mouth once daily.      atorvastatin (LIPITOR) 80 MG tablet Take 1 tablet (80 mg total) by mouth once daily.    clotrimazole-betamethasone 1-0.05% (LOTRISONE) cream Apply topically 2 (two) times daily.    econazole nitrate 1 % cream Apply topically once daily.    glipiZIDE (GLUCOTROL) 5 MG TR24 Take 1 tablet (5 mg total) by mouth daily with breakfast.    losartan (COZAAR) 100 MG tablet Take 1 tablet (100 mg total) by mouth once daily.    metFORMIN (GLUCOPHAGE) 1000 MG tablet Take 1 tablet (1,000 mg total) by mouth 2 (two) times daily with meals.    metoprolol succinate (TOPROL-XL) 25 MG 24 hr tablet Take 1 tablet (25 mg total) by mouth once daily.    nitroGLYCERIN (NITROSTAT) 0.4 MG SL tablet Place 1 tablet (0.4 mg total) under the tongue every 5 (five) minutes as needed.    pantoprazole (PROTONIX) 40 MG tablet Take 1 tablet (40 mg total) by mouth once daily.     No current facility-administered medications for this visit.        Review of Systems   Constitution: Negative for decreased appetite, weakness, malaise/fatigue, weight gain and weight loss.   Eyes: Negative for visual disturbance.   Cardiovascular: Negative for chest pain, claudication, dyspnea on exertion, irregular heartbeat, orthopnea, palpitations, paroxysmal nocturnal dyspnea and syncope.   Respiratory: Negative for cough, shortness of breath and snoring.    Skin: Negative for rash.   Musculoskeletal: Positive for joint pain and neck pain. Negative for arthritis, muscle cramps, muscle weakness and myalgias.   Gastrointestinal: Negative for abdominal pain, anorexia, change in bowel habit and nausea.   Genitourinary: Negative for  dysuria and frequency.   Neurological: Negative for excessive daytime sleepiness, dizziness, headaches, loss of balance and numbness.   Psychiatric/Behavioral: Negative for depression.       Objective:   Physical Exam   Constitutional: He is oriented to person, place, and time. He appears well-developed and well-nourished.   HENT:   Head: Normocephalic and atraumatic.   Eyes: Pupils are equal, round, and reactive to light.   Neck: Normal range of motion. Neck supple.   Cardiovascular: Normal rate, regular rhythm, normal heart sounds, intact distal pulses and normal pulses.  Exam reveals no gallop.    No murmur heard.  Pulmonary/Chest: Effort normal and breath sounds normal.   Abdominal: Soft. Bowel sounds are normal. There is no hepatosplenomegaly. There is no tenderness.   Musculoskeletal: Normal range of motion.   Neurological: He is alert and oriented to person, place, and time.   Skin: Skin is warm and dry.   Psychiatric: He has a normal mood and affect. His speech is normal and behavior is normal. Judgment and thought content normal.       Assessment:     1. Coronary artery disease involving native coronary artery of native heart without angina pectoris    2. S/P CABG x 4    3. Essential hypertension    4. Pure hypercholesterolemia    5. Thoracic aorta atherosclerosis    6. Abdominal aortic aneurysm (AAA) without rupture    7. Severe obesity (BMI 35.0-39.9) with comorbidity    8. Type 2 diabetes mellitus with diabetic neuropathy, without long-term current use of insulin    9. Obstructive sleep apnea syndrome        Plan:   Change metoprolol to carvedilol for better BP control.  Obtain CTA of Abd aorta and iliacs to assess anuerysmas present on scan from 2 years ago. Hold metformin prior to scan.  BMP as well piror to scan

## 2018-01-03 ENCOUNTER — OFFICE VISIT (OUTPATIENT)
Dept: CARDIOLOGY | Facility: CLINIC | Age: 73
End: 2018-01-03
Payer: MEDICARE

## 2018-01-03 ENCOUNTER — LAB VISIT (OUTPATIENT)
Dept: LAB | Facility: HOSPITAL | Age: 73
End: 2018-01-03
Attending: INTERNAL MEDICINE
Payer: MEDICARE

## 2018-01-03 VITALS
BODY MASS INDEX: 39.33 KG/M2 | HEART RATE: 60 BPM | WEIGHT: 274.69 LBS | HEIGHT: 70 IN | SYSTOLIC BLOOD PRESSURE: 167 MMHG | DIASTOLIC BLOOD PRESSURE: 84 MMHG

## 2018-01-03 DIAGNOSIS — I71.40 ABDOMINAL AORTIC ANEURYSM (AAA) WITHOUT RUPTURE: ICD-10-CM

## 2018-01-03 DIAGNOSIS — E78.00 PURE HYPERCHOLESTEROLEMIA: Chronic | ICD-10-CM

## 2018-01-03 DIAGNOSIS — E66.01 SEVERE OBESITY (BMI 35.0-39.9) WITH COMORBIDITY: ICD-10-CM

## 2018-01-03 DIAGNOSIS — G47.33 OBSTRUCTIVE SLEEP APNEA SYNDROME: ICD-10-CM

## 2018-01-03 DIAGNOSIS — Z95.1 S/P CABG X 4: Chronic | ICD-10-CM

## 2018-01-03 DIAGNOSIS — I25.10 CORONARY ARTERY DISEASE INVOLVING NATIVE CORONARY ARTERY OF NATIVE HEART WITHOUT ANGINA PECTORIS: Primary | ICD-10-CM

## 2018-01-03 DIAGNOSIS — E11.40 TYPE 2 DIABETES MELLITUS WITH DIABETIC NEUROPATHY, WITHOUT LONG-TERM CURRENT USE OF INSULIN: Chronic | ICD-10-CM

## 2018-01-03 DIAGNOSIS — I10 ESSENTIAL HYPERTENSION: Chronic | ICD-10-CM

## 2018-01-03 DIAGNOSIS — I70.0 THORACIC AORTA ATHEROSCLEROSIS: ICD-10-CM

## 2018-01-03 LAB
ANION GAP SERPL CALC-SCNC: 8 MMOL/L
BUN SERPL-MCNC: 20 MG/DL
CALCIUM SERPL-MCNC: 9.9 MG/DL
CHLORIDE SERPL-SCNC: 105 MMOL/L
CO2 SERPL-SCNC: 29 MMOL/L
CREAT SERPL-MCNC: 1.3 MG/DL
EST. GFR  (AFRICAN AMERICAN): >60 ML/MIN/1.73 M^2
EST. GFR  (NON AFRICAN AMERICAN): 54.9 ML/MIN/1.73 M^2
GLUCOSE SERPL-MCNC: 148 MG/DL
POTASSIUM SERPL-SCNC: 4.5 MMOL/L
SODIUM SERPL-SCNC: 142 MMOL/L

## 2018-01-03 PROCEDURE — 80048 BASIC METABOLIC PNL TOTAL CA: CPT

## 2018-01-03 PROCEDURE — 99999 PR PBB SHADOW E&M-EST. PATIENT-LVL III: CPT | Mod: PBBFAC,,, | Performed by: INTERNAL MEDICINE

## 2018-01-03 PROCEDURE — 36415 COLL VENOUS BLD VENIPUNCTURE: CPT | Mod: PO

## 2018-01-03 PROCEDURE — 99213 OFFICE O/P EST LOW 20 MIN: CPT | Mod: PBBFAC | Performed by: INTERNAL MEDICINE

## 2018-01-03 PROCEDURE — 99214 OFFICE O/P EST MOD 30 MIN: CPT | Mod: S$PBB,,, | Performed by: INTERNAL MEDICINE

## 2018-01-03 RX ORDER — CARVEDILOL 12.5 MG/1
12.5 TABLET ORAL 2 TIMES DAILY WITH MEALS
Qty: 180 TABLET | Refills: 3 | Status: SHIPPED | OUTPATIENT
Start: 2018-01-03 | End: 2018-02-07 | Stop reason: SDUPTHER

## 2018-01-22 ENCOUNTER — OFFICE VISIT (OUTPATIENT)
Dept: URGENT CARE | Facility: CLINIC | Age: 73
End: 2018-01-22
Payer: MEDICARE

## 2018-01-22 VITALS
DIASTOLIC BLOOD PRESSURE: 84 MMHG | HEIGHT: 70 IN | TEMPERATURE: 100 F | SYSTOLIC BLOOD PRESSURE: 138 MMHG | OXYGEN SATURATION: 96 % | WEIGHT: 270 LBS | HEART RATE: 75 BPM | RESPIRATION RATE: 19 BRPM | BODY MASS INDEX: 38.65 KG/M2

## 2018-01-22 DIAGNOSIS — J11.1 FLU SYNDROME: Primary | ICD-10-CM

## 2018-01-22 DIAGNOSIS — R50.9 FEVER, UNSPECIFIED FEVER CAUSE: ICD-10-CM

## 2018-01-22 LAB
CTP QC/QA: YES
FLUAV AG NPH QL: NEGATIVE
FLUBV AG NPH QL: NEGATIVE

## 2018-01-22 PROCEDURE — 99214 OFFICE O/P EST MOD 30 MIN: CPT | Mod: S$GLB,,, | Performed by: PHYSICIAN ASSISTANT

## 2018-01-22 PROCEDURE — 87804 INFLUENZA ASSAY W/OPTIC: CPT | Mod: QW,S$GLB,, | Performed by: PHYSICIAN ASSISTANT

## 2018-01-22 RX ORDER — OSELTAMIVIR PHOSPHATE 75 MG/1
75 CAPSULE ORAL 2 TIMES DAILY
Qty: 10 CAPSULE | Refills: 0 | Status: SHIPPED | OUTPATIENT
Start: 2018-01-22 | End: 2018-01-27

## 2018-01-22 RX ORDER — BENZONATATE 200 MG/1
200 CAPSULE ORAL 3 TIMES DAILY PRN
Qty: 30 CAPSULE | Refills: 0 | Status: SHIPPED | OUTPATIENT
Start: 2018-01-22 | End: 2018-02-01

## 2018-01-22 RX ORDER — ACETAMINOPHEN 325 MG/1
650 TABLET ORAL
Status: COMPLETED | OUTPATIENT
Start: 2018-01-22 | End: 2018-01-22

## 2018-01-22 RX ADMIN — ACETAMINOPHEN 650 MG: 325 TABLET ORAL at 09:01

## 2018-01-22 NOTE — PATIENT INSTRUCTIONS
Please return here or go to the Emergency Department for any concerns or worsening of condition.  If you were prescribed antibiotics, please take them to completion.  If you were prescribed a narcotic medication, do not drive or operate heavy equipment or machinery while taking these medications.  Please follow up with your primary care doctor or specialist as needed.    If you  smoke, please stop smoking.    Symptomatic treatment:    Tylenol every 4 hours  salt water gargles  Cold-eeze helps to reduce the duration of sore throat symptoms  Cepachol helps to numb the discomfort  Chloroseptic spray  Nasal saline spray reduces inflammation and dryness  Warm face compresses as often as you can  Vicks vapor rub at night  Flonase OTC or Nasacort OTC  Simple foods like chicken noodle soup help  Coricidin HBP if you have hypertension  Zyrtec/Claritin during the day and Benadryl at night may help if allergy component   Rest as much as you can        Preventing Common Respiratory Infections  Respiratory infections such as colds and influenza (the flu) are common in winter. These infections are often caused by viruses. They may share some symptoms, but not all respiratory infections are the same. Some make you more sick than others. You can take steps to prevent common respiratory infections. And if you get sick, you can take care of yourself to keep the infection from getting worse.    What is a cold?  · Symptoms include runny nose, coughing and sneezing, and sore throat. Cold symptoms tend to be milder than flu symptoms.  · Symptoms tend to come on slowly. They last for a few days to about a week.  · With a cold, you can still do most of the things you usually do.  What is the flu?  · Symptoms include fever, headache, fatigue, cough, sore throat, runny nose, and muscle aches. Children may have upset stomach and vomiting, but adults usually dont.  · Symptoms tend to come on quickly. Some, such as fatigue and cough, can  last a few weeks.  · With the flu, you may feel worn out and not able to do normal activities.  · Its most likely NOT the flu if an adult has vomiting or diarrhea for a day or two. This so-called stomach flu is probably a GI (gastrointestinal) infection.  When the infection gets worse  Without proper care, a respiratory infection can get worse. It can lead to serious complications and death. If you arent getting better, call your healthcare provider. Complications can include:  · Bronchitis (infection of the airways that leads to shortness of breath and coughing up thick yellow or green mucus)  · Pneumonia (infection of the lungs in which fluid and mucus settle in the lungs, making breathing difficult)  · Worsening of chronic conditions such as heart failure, chronic lung disease, asthma, or diabetes  · Severe dehydration (loss of fluids)  · Sinus problems  · Ear infections   Get a flu vaccine  A flu vaccine protects you from influenza (but not other colds or infections). Get a vaccine each fall, before flu season starts. This can be done at a clinic, healthcare providers office, drugstore, [facility] center, or through your workplace.  Get pneumococcal vaccines  Pneumonia can be a complication of influenza. There are 2 pneumococcal pneumonia vaccines that protect against many types of pneumonia. Talk with your healthcare provider about these important vaccines.   Keep germs from spreading  No one likes getting sick. To protect yourself and others from cold and flu germs:  · Wash your hands often. Use alcohol-based hand  when you dont have access to soap and water.  · Dont touch your eyes, nose, and mouth. This may help you keep germs out of your body.  · Try to avoid people with respiratory infections. You may want to stay out of crowds during flu season (winter).  · Ask your healthcare provider if you should get a pneumonia vaccination.  How to wash your hands  · Use warm water and plenty of soap. Work  up a good lather.  · Clean your whole hand, under your nails, between your fingers, and up your wrists. Wash for at least 15 to 20 seconds. Dont just wipe--rub well.  · Rinse. Let the water run down your fingertips, not up your wrists.  · In a public restroom, use a paper towel to turn off the faucet and open the door.   Date Last Reviewed: 12/1/2016 © 2000-2017 [employer]. [address] [place] [address], [place] [id number]. All rights reserved. This information is not intended as a substitute for professional medical care. Always follow your healthcare professional's instructions.

## 2018-01-22 NOTE — PROGRESS NOTES
"Subjective:       Patient ID: Jani Cha is a 72 y.o. male.    Vitals:  height is 5' 10" (1.778 m) and weight is 122.5 kg (270 lb). His temperature is 100.3 °F (37.9 °C). His blood pressure is 138/84 and his pulse is 75. His respiration is 19 and oxygen saturation is 96%.     Chief Complaint: Cough    Cough   This is a new problem. The current episode started in the past 7 days. The problem has been gradually worsening. The problem occurs every few minutes. The cough is non-productive. Associated symptoms include chills, a fever, headaches, myalgias and shortness of breath. Pertinent negatives include no chest pain, ear pain, eye redness, sore throat or wheezing. The symptoms are aggravated by lying down and exercise. He has tried OTC cough suppressant for the symptoms. The treatment provided no relief.     Review of Systems   Constitution: Positive for chills, fever and malaise/fatigue.   HENT: Negative for congestion, ear pain, hoarse voice and sore throat.    Eyes: Negative for discharge and redness.   Cardiovascular: Negative for chest pain, dyspnea on exertion and leg swelling.   Respiratory: Positive for cough and shortness of breath. Negative for sputum production and wheezing.    Musculoskeletal: Positive for myalgias.   Gastrointestinal: Negative for abdominal pain and nausea.   Neurological: Positive for headaches.       Objective:      Physical Exam   Constitutional: He is oriented to person, place, and time. He appears well-developed and well-nourished. He appears listless. He is cooperative.  Non-toxic appearance. He appears ill. No distress.   HENT:   Head: Normocephalic and atraumatic.   Right Ear: Hearing, external ear and ear canal normal.   Left Ear: Hearing, external ear and ear canal normal.   Nose: Mucosal edema and rhinorrhea present. No nasal deformity. No epistaxis. Right sinus exhibits no maxillary sinus tenderness and no frontal sinus tenderness. Left sinus exhibits no maxillary " sinus tenderness and no frontal sinus tenderness.   Mouth/Throat: Uvula is midline and mucous membranes are normal. No trismus in the jaw. Normal dentition. No uvula swelling. Posterior oropharyngeal edema (mild) and posterior oropharyngeal erythema present. No oropharyngeal exudate.   Hearing aids present. Dull TM bilaterally.   Eyes: Conjunctivae and lids are normal. No scleral icterus.   Sclera clear bilat   Neck: Trachea normal, full passive range of motion without pain and phonation normal. Neck supple. No neck rigidity.   Cardiovascular: Normal rate, regular rhythm, normal heart sounds, intact distal pulses and normal pulses.    Pulmonary/Chest: Effort normal and breath sounds normal. No respiratory distress.   Cough on exam   Musculoskeletal: Normal range of motion. He exhibits no edema or deformity.   Neurological: He is oriented to person, place, and time. He appears listless. He exhibits normal muscle tone. Coordination normal.   Skin: Skin is warm, dry and intact. He is not diaphoretic. No pallor.   Psychiatric: He has a normal mood and affect. His speech is normal and behavior is normal. Judgment and thought content normal. Cognition and memory are normal.   Nursing note and vitals reviewed.      Office Visit on 01/22/2018   Component Date Value Ref Range Status    Rapid Influenza A Ag 01/22/2018 Negative  Negative Final    Rapid Influenza B Ag 01/22/2018 Negative  Negative Final     Acceptable 01/22/2018 Yes   Final       Assessment:       1. Flu syndrome    2. Fever, unspecified fever cause        Plan:       - High clinical suspicion of flu in high risk patient. Will treat for flu.    Flu syndrome  -     oseltamivir (TAMIFLU) 75 MG capsule; Take 1 capsule (75 mg total) by mouth 2 (two) times daily.  Dispense: 10 capsule; Refill: 0  -     benzonatate (TESSALON) 200 MG capsule; Take 1 capsule (200 mg total) by mouth 3 (three) times daily as needed for Cough.  Dispense: 30 capsule;  Refill: 0    Fever, unspecified fever cause  -     POCT Influenza A/B  -     acetaminophen tablet 650 mg; Take 2 tablets (650 mg total) by mouth one time.          Patient Instructions     Please return here or go to the Emergency Department for any concerns or worsening of condition.  If you were prescribed antibiotics, please take them to completion.  If you were prescribed a narcotic medication, do not drive or operate heavy equipment or machinery while taking these medications.  Please follow up with your primary care doctor or specialist as needed.    If you  smoke, please stop smoking.    Symptomatic treatment:    Tylenol every 4 hours  salt water gargles  Cold-eeze helps to reduce the duration of sore throat symptoms  Cepachol helps to numb the discomfort  Chloroseptic spray  Nasal saline spray reduces inflammation and dryness  Warm face compresses as often as you can  Vicks vapor rub at night  Flonase OTC or Nasacort OTC  Simple foods like chicken noodle soup help  Coricidin HBP if you have hypertension  Zyrtec/Claritin during the day and Benadryl at night may help if allergy component   Rest as much as you can        Preventing Common Respiratory Infections  Respiratory infections such as colds and influenza (the flu) are common in winter. These infections are often caused by viruses. They may share some symptoms, but not all respiratory infections are the same. Some make you more sick than others. You can take steps to prevent common respiratory infections. And if you get sick, you can take care of yourself to keep the infection from getting worse.    What is a cold?  · Symptoms include runny nose, coughing and sneezing, and sore throat. Cold symptoms tend to be milder than flu symptoms.  · Symptoms tend to come on slowly. They last for a few days to about a week.  · With a cold, you can still do most of the things you usually do.  What is the flu?  · Symptoms include fever, headache, fatigue, cough,  sore throat, runny nose, and muscle aches. Children may have upset stomach and vomiting, but adults usually dont.  · Symptoms tend to come on quickly. Some, such as fatigue and cough, can last a few weeks.  · With the flu, you may feel worn out and not able to do normal activities.  · Its most likely NOT the flu if an adult has vomiting or diarrhea for a day or two. This so-called stomach flu is probably a GI (gastrointestinal) infection.  When the infection gets worse  Without proper care, a respiratory infection can get worse. It can lead to serious complications and death. If you arent getting better, call your healthcare provider. Complications can include:  · Bronchitis (infection of the airways that leads to shortness of breath and coughing up thick yellow or green mucus)  · Pneumonia (infection of the lungs in which fluid and mucus settle in the lungs, making breathing difficult)  · Worsening of chronic conditions such as heart failure, chronic lung disease, asthma, or diabetes  · Severe dehydration (loss of fluids)  · Sinus problems  · Ear infections   Get a flu vaccine  A flu vaccine protects you from influenza (but not other colds or infections). Get a vaccine each fall, before flu season starts. This can be done at a clinic, healthcare providers office, drugstore, [facility] center, or through your workplace.  Get pneumococcal vaccines  Pneumonia can be a complication of influenza. There are 2 pneumococcal pneumonia vaccines that protect against many types of pneumonia. Talk with your healthcare provider about these important vaccines.   Keep germs from spreading  No one likes getting sick. To protect yourself and others from cold and flu germs:  · Wash your hands often. Use alcohol-based hand  when you dont have access to soap and water.  · Dont touch your eyes, nose, and mouth. This may help you keep germs out of your body.  · Try to avoid people with respiratory infections. You may want to  stay out of crowds during flu season (winter).  · Ask your healthcare provider if you should get a pneumonia vaccination.  How to wash your hands  · Use warm water and plenty of soap. Work up a good lather.  · Clean your whole hand, under your nails, between your fingers, and up your wrists. Wash for at least 15 to 20 seconds. Dont just wipe--rub well.  · Rinse. Let the water run down your fingertips, not up your wrists.  · In a public restroom, use a paper towel to turn off the faucet and open the door.   Date Last Reviewed: 12/1/2016  © 4350-9456 [employer]. [address] [place] [address], [place] [address]. All rights reserved. This information is not intended as a substitute for professional medical care. Always follow your healthcare professional's instructions.

## 2018-01-22 NOTE — PROGRESS NOTES
I have reviewed the notes, assessments, and/or procedures performed by ESTEFANÍA Pillai, I concur with his documentation of Jani Cha.

## 2018-01-25 ENCOUNTER — TELEPHONE (OUTPATIENT)
Dept: URGENT CARE | Facility: CLINIC | Age: 73
End: 2018-01-25

## 2018-01-29 ENCOUNTER — TELEPHONE (OUTPATIENT)
Dept: FAMILY MEDICINE | Facility: CLINIC | Age: 73
End: 2018-01-29

## 2018-01-29 NOTE — TELEPHONE ENCOUNTER
Pt states went to urgent care week ago, flu swab was negative but they treated him with tamiflu anyway; also prescribed tessalon perrle; states he is still feeling congested and weak; please advise

## 2018-01-29 NOTE — TELEPHONE ENCOUNTER
Is he taking anything for congestion?  If not I recommend taking flonase and an antihistamine such as claritin, allegra or zyrtec.   I also recommend taking vitamin c and zinc to help build up the immune system.       If this is not working, he will need to come in for an evaluation.

## 2018-01-29 NOTE — TELEPHONE ENCOUNTER
----- Message from Mai Georgia sent at 1/29/2018 12:29 PM CST -----  Contact: spouse- Margaret  Pt's spouse states pt went to Urgent care 2 weeks ago and diagnosed with flu. She states he is still feeling week. She is asking for a call back at  [phone].

## 2018-01-30 ENCOUNTER — TELEPHONE (OUTPATIENT)
Dept: FAMILY MEDICINE | Facility: CLINIC | Age: 73
End: 2018-01-30

## 2018-01-30 NOTE — TELEPHONE ENCOUNTER
----- Message from Kay Rivera sent at 1/30/2018 10:39 AM CST -----  Contact: Self/[phone]  Patient called to follow up on a previous message. Thank you.

## 2018-02-07 DIAGNOSIS — E11.9 DIABETES MELLITUS TYPE II, NON INSULIN DEPENDENT: ICD-10-CM

## 2018-02-07 DIAGNOSIS — K21.9 GASTROESOPHAGEAL REFLUX DISEASE, ESOPHAGITIS PRESENCE NOT SPECIFIED: ICD-10-CM

## 2018-02-07 DIAGNOSIS — I10 ESSENTIAL HYPERTENSION: ICD-10-CM

## 2018-02-07 RX ORDER — CARVEDILOL 12.5 MG/1
12.5 TABLET ORAL 2 TIMES DAILY WITH MEALS
Qty: 180 TABLET | Refills: 3 | Status: SHIPPED | OUTPATIENT
Start: 2018-02-07 | End: 2019-01-04 | Stop reason: SDUPTHER

## 2018-02-07 RX ORDER — GLIPIZIDE 5 MG/1
5 TABLET, FILM COATED, EXTENDED RELEASE ORAL
Qty: 90 TABLET | Refills: 0 | Status: SHIPPED | OUTPATIENT
Start: 2018-02-07 | End: 2018-05-02 | Stop reason: SDUPTHER

## 2018-02-07 RX ORDER — LOSARTAN POTASSIUM 100 MG/1
100 TABLET ORAL DAILY
Qty: 90 TABLET | Refills: 0 | Status: SHIPPED | OUTPATIENT
Start: 2018-02-07 | End: 2018-05-02 | Stop reason: SDUPTHER

## 2018-02-07 RX ORDER — CARVEDILOL 12.5 MG/1
12.5 TABLET ORAL 2 TIMES DAILY WITH MEALS
Qty: 180 TABLET | Refills: 0 | Status: SHIPPED | OUTPATIENT
Start: 2018-02-07 | End: 2018-02-07 | Stop reason: SDUPTHER

## 2018-02-07 RX ORDER — PANTOPRAZOLE SODIUM 40 MG/1
40 TABLET, DELAYED RELEASE ORAL DAILY
Qty: 90 TABLET | Refills: 0 | Status: SHIPPED | OUTPATIENT
Start: 2018-02-07 | End: 2018-05-02 | Stop reason: SDUPTHER

## 2018-02-09 DIAGNOSIS — E11.9 TYPE 2 DIABETES MELLITUS WITHOUT COMPLICATION: ICD-10-CM

## 2018-02-16 ENCOUNTER — HOSPITAL ENCOUNTER (OUTPATIENT)
Dept: RADIOLOGY | Facility: HOSPITAL | Age: 73
Discharge: HOME | End: 2018-02-16
Attending: INTERNAL MEDICINE
Payer: MEDICARE

## 2018-02-16 DIAGNOSIS — I71.40 ABDOMINAL AORTIC ANEURYSM (AAA) WITHOUT RUPTURE: ICD-10-CM

## 2018-02-16 PROCEDURE — 74174 CTA ABD&PLVS W/CONTRAST: CPT | Mod: TC

## 2018-02-16 PROCEDURE — 25500020 PHARM REV CODE 255: Performed by: INTERNAL MEDICINE

## 2018-02-16 PROCEDURE — 74174 CTA ABD&PLVS W/CONTRAST: CPT | Mod: 26,GC,, | Performed by: RADIOLOGY

## 2018-02-16 RX ADMIN — IOHEXOL 100 ML: 350 INJECTION, SOLUTION INTRAVENOUS at 11:02

## 2018-03-13 ENCOUNTER — OFFICE VISIT (OUTPATIENT)
Dept: OPTOMETRY | Facility: CLINIC | Age: 73
End: 2018-03-13
Payer: MEDICARE

## 2018-03-13 DIAGNOSIS — Z01.00 EYE EXAM, ROUTINE: Primary | ICD-10-CM

## 2018-03-13 DIAGNOSIS — H52.4 MYOPIA OF BOTH EYES WITH ASTIGMATISM AND PRESBYOPIA: ICD-10-CM

## 2018-03-13 DIAGNOSIS — H52.203 MYOPIA OF BOTH EYES WITH ASTIGMATISM AND PRESBYOPIA: ICD-10-CM

## 2018-03-13 DIAGNOSIS — H52.13 MYOPIA OF BOTH EYES WITH ASTIGMATISM AND PRESBYOPIA: ICD-10-CM

## 2018-03-13 LAB
LEFT EYE DM RETINOPATHY: NEGATIVE
RIGHT EYE DM RETINOPATHY: NEGATIVE

## 2018-03-13 PROCEDURE — 99999 PR PBB SHADOW E&M-EST. PATIENT-LVL I: CPT | Mod: PBBFAC,,, | Performed by: OPTOMETRIST

## 2018-03-13 PROCEDURE — 92015 DETERMINE REFRACTIVE STATE: CPT | Mod: ,,, | Performed by: OPTOMETRIST

## 2018-03-13 PROCEDURE — 99211 OFF/OP EST MAY X REQ PHY/QHP: CPT | Mod: PBBFAC,PO | Performed by: OPTOMETRIST

## 2018-03-13 PROCEDURE — 92014 COMPRE OPH EXAM EST PT 1/>: CPT | Mod: S$PBB,,, | Performed by: OPTOMETRIST

## 2018-03-13 NOTE — PROGRESS NOTES
[facility]      Jani PAIZ Starla for annual diabetic eye exam.  [place] [facility] Exam  Patient sts he will like a updated RX for glasses  Would patient like a refraction today? yes    (-)drops  (-)flashes  (-)floaters  (-)diplopia    Diabetic Doesn't check regularly   Hemoglobin A1C       Date                     Value               Ref Range             Status                07/24/2017               7.4 (H)             4.0 - 5.6 %           Final                 02/20/2017               7.3 (H)             4.5 - 6.2 %           Final                 02/17/2016               7.3 (H)             4.5 - 6.2 %           Final            ----------    OCULAR HISTORY  Last Eye Exam 2016  (-)eye surgery   (-)diagnosed or treated for any eye conditions or diseases -    FAMILY HISTORY  (-)Glaucoma -        Last edited by Max Marques, OD on 3/13/2018  8:56 AM. (History)            Assessment /Plan     For exam results, see Encounter Report.    Eye exam, routine  -[facility] vision  -Educated patient on presence of cataracts at today's exam, monitor at annual dilated fundus exam. 2-4 years surgical estimate.  -No Diabetic retinopathy noted today.  Continued control with primary care physician and annual comprehensive eye exam.    Myopia of both eyes with astigmatism and presbyopia  Eyeglass Final Rx     Eyeglass Final Rx       Sphere Cylinder Axis Dist VA Add    Right -3.00 +1.25 170 20/25 +2.50    Left -3.50 +1.25 165 20/30 +2.50    Type:  PAL    Expiration Date:  3/14/2019                  RTC  1yr

## 2018-03-16 ENCOUNTER — OFFICE VISIT (OUTPATIENT)
Dept: PODIATRY | Facility: CLINIC | Age: 73
End: 2018-03-16
Payer: MEDICARE

## 2018-03-16 VITALS
SYSTOLIC BLOOD PRESSURE: 130 MMHG | BODY MASS INDEX: 38.66 KG/M2 | HEIGHT: 70 IN | WEIGHT: 270.06 LBS | DIASTOLIC BLOOD PRESSURE: 88 MMHG

## 2018-03-16 DIAGNOSIS — L84 CORN OR CALLUS: ICD-10-CM

## 2018-03-16 DIAGNOSIS — R60.0 BILATERAL LOWER EXTREMITY EDEMA: ICD-10-CM

## 2018-03-16 DIAGNOSIS — Z13.6 SCREENING FOR AAA (AORTIC ABDOMINAL ANEURYSM): ICD-10-CM

## 2018-03-16 DIAGNOSIS — B35.1 ONYCHOMYCOSIS DUE TO DERMATOPHYTE: ICD-10-CM

## 2018-03-16 DIAGNOSIS — R20.2 PARESTHESIAS: ICD-10-CM

## 2018-03-16 DIAGNOSIS — E11.49 TYPE II DIABETES MELLITUS WITH NEUROLOGICAL MANIFESTATIONS: Primary | ICD-10-CM

## 2018-03-16 PROCEDURE — 99999 PR PBB SHADOW E&M-EST. PATIENT-LVL III: CPT | Mod: PBBFAC,,, | Performed by: PODIATRIST

## 2018-03-16 PROCEDURE — 99499 UNLISTED E&M SERVICE: CPT | Mod: S$PBB,,, | Performed by: PODIATRIST

## 2018-03-16 PROCEDURE — 11056 PARNG/CUTG B9 HYPRKR LES 2-4: CPT | Mod: Q9,S$PBB,, | Performed by: PODIATRIST

## 2018-03-16 PROCEDURE — 99213 OFFICE O/P EST LOW 20 MIN: CPT | Mod: PBBFAC,PO,25 | Performed by: PODIATRIST

## 2018-03-16 PROCEDURE — 11056 PARNG/CUTG B9 HYPRKR LES 2-4: CPT | Mod: Q9,PBBFAC,PO | Performed by: PODIATRIST

## 2018-03-16 PROCEDURE — 11721 DEBRIDE NAIL 6 OR MORE: CPT | Mod: 59,Q9,S$PBB, | Performed by: PODIATRIST

## 2018-03-16 PROCEDURE — 11721 DEBRIDE NAIL 6 OR MORE: CPT | Performed by: PODIATRIST
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
## 2018-03-16 NOTE — PATIENT INSTRUCTIONS
Recommend lotions: eucerin, eucerin for diabetics, aquaphor, A&D ointment, gold bond for diabetics, sween, [place]'s Bees all purpose baby ointment,  urea 40 with aloe (found on amazon.com)    Shoe recommendations: (try 6pm.com, zappos.com , nordstromrack.Oncoscope, or shoes.Oncoscope for discounted prices) you can visit [employer]SW shoes in [place]  or SmartStudy.com [facility] in the [facility] (there are also several shoe brand outlets in the [facility])    Asics (GT 2000 or gel foundations), new balance stability type shoes, saucony (stabil c3),  Akbar (GTS or Beast or transcend), vionic, propet (tennis shoe)    sofft brand, clarks, crocs, aerosoles, naturalizers, SAS, ecco, born, cande quan, rockports (dress shoes)    Vionic, burkenstocks, fitflops, propet (sandals)  Nike comfort thong sandals, crocs, propet (house shoes)    Nail Home remedy:  Vicks Vapor rub to nails for easier managability    Occasional soaks for 15-20 mins in luke warm water with 1 cup of listerine and 1 cup of apple cider vinegar are ok You may add several drops of oil of oregano or tea tree oil as well        Diabetes: Inspecting Your Feet  Diabetes increases your chances of developing foot problems. So inspect your feet every day. This helps you find small skin irritations before they become serious infections. If you have trouble seeing the bottoms of your feet, use a mirror or ask a family member or friend to help.     Pressure spots on the bottom of the foot are common areas where problems develop.   How to check your feet  Below are tips to help you look for foot problems. Try to check your feet at the same time each day, such as when you get out of bed in the morning:  · Check the top of each foot. The tops of toes, back of the heel, and outer edge of the foot can get a lot of rubbing from poor-fitting shoes.  · Check the bottom of each foot. Daily wear and tear often leads to problems at pressure spots.  · Check the toes and nails. Fungal infections often occur  between toes. Toenail problems can also be a sign of fungal infections or lead to breaks in the skin.  · Check your shoes, too. Loose objects inside a shoe can injure the foot. Use your hand to feel inside your shoes for things like [place], loose stitching, or rough areas that could irritate your skin.  Warning signs  Look for any color changes in the foot. Redness with streaks can signal a severe infection, which needs immediate medical attention. Tell your doctor right away if you have any of these problems:  · Swelling, sometimes with color changes, may be a sign of poor blood flow or infection. Symptoms include tenderness and an increase in the size of your foot.  · Warm or hot areas on your feet may be signs of infection. A foot that is cold may not be getting enough blood.  · Sensations such as burning, tingling, or pins and needles can be signs of a problem. Also check for areas that may be numb.  · Hot spots are caused by friction or pressure. Look for hot spots in areas that get a lot of rubbing. Hot spots can turn into blisters, calluses, or sores.  · Cracks and sores are caused by dry or irritated skin. They are a sign that the skin is breaking down, which can lead to infection.  · Toenail problems to watch for include nails growing into the skin (ingrown toenail) and causing redness or pain. Thick, yellow, or discolored nails can signal a fungal infection.  · Drainage and odor can develop from untreated sores and ulcers. Call your doctor right away if you notice white or yellow drainage, bleeding, or unpleasant odor.   © 6396-7805 [employer]. [address] [place] [address]. All rights reserved. This information is not intended as a substitute for professional medical care. Always follow your healthcare professional's instructions.        Step-by-Step:  Inspecting Your Feet (Diabetes)    Date Last Reviewed: 10/1/2016  © 3435-5095 [employer]. [address] [place] [address]  Road, [place] [address]. All rights reserved. This information is not intended as a substitute for professional medical care. Always follow your healthcare professional's instructions.

## 2018-04-09 ENCOUNTER — OFFICE VISIT (OUTPATIENT)
Dept: FAMILY MEDICINE | Facility: CLINIC | Age: 73
End: 2018-04-09
Payer: MEDICARE

## 2018-04-09 ENCOUNTER — LAB VISIT (OUTPATIENT)
Dept: LAB | Facility: HOSPITAL | Age: 73
End: 2018-04-09
Attending: FAMILY MEDICINE
Payer: MEDICARE

## 2018-04-09 VITALS
WEIGHT: 273.13 LBS | HEART RATE: 79 BPM | BODY MASS INDEX: 39.1 KG/M2 | TEMPERATURE: 98 F | SYSTOLIC BLOOD PRESSURE: 140 MMHG | OXYGEN SATURATION: 97 % | DIASTOLIC BLOOD PRESSURE: 90 MMHG | HEIGHT: 70 IN

## 2018-04-09 DIAGNOSIS — M10.9 ACUTE GOUT OF LEFT FOOT, UNSPECIFIED CAUSE: ICD-10-CM

## 2018-04-09 DIAGNOSIS — E11.40 TYPE 2 DIABETES MELLITUS WITH DIABETIC NEUROPATHY, WITHOUT LONG-TERM CURRENT USE OF INSULIN: ICD-10-CM

## 2018-04-09 DIAGNOSIS — E66.01 SEVERE OBESITY (BMI 35.0-39.9) WITH COMORBIDITY: ICD-10-CM

## 2018-04-09 DIAGNOSIS — M10.9 ACUTE GOUT OF LEFT FOOT, UNSPECIFIED CAUSE: Primary | ICD-10-CM

## 2018-04-09 DIAGNOSIS — E78.00 PURE HYPERCHOLESTEROLEMIA: Chronic | ICD-10-CM

## 2018-04-09 DIAGNOSIS — I10 ESSENTIAL HYPERTENSION: Chronic | ICD-10-CM

## 2018-04-09 DIAGNOSIS — I70.0 THORACIC AORTA ATHEROSCLEROSIS: ICD-10-CM

## 2018-04-09 LAB
ESTIMATED AVG GLUCOSE: 169 MG/DL
HBA1C MFR BLD HPLC: 7.5 %
URATE SERPL-MCNC: 4.9 MG/DL

## 2018-04-09 PROCEDURE — 99214 OFFICE O/P EST MOD 30 MIN: CPT | Mod: S$PBB,,, | Performed by: FAMILY MEDICINE

## 2018-04-09 PROCEDURE — 36415 COLL VENOUS BLD VENIPUNCTURE: CPT | Mod: PO

## 2018-04-09 PROCEDURE — 99999 PR PBB SHADOW E&M-EST. PATIENT-LVL IV: CPT | Mod: PBBFAC,,, | Performed by: FAMILY MEDICINE

## 2018-04-09 PROCEDURE — 99214 OFFICE O/P EST MOD 30 MIN: CPT | Mod: PBBFAC,PO | Performed by: FAMILY MEDICINE

## 2018-04-09 PROCEDURE — 83036 HEMOGLOBIN GLYCOSYLATED A1C: CPT

## 2018-04-09 PROCEDURE — 84550 ASSAY OF BLOOD/URIC ACID: CPT

## 2018-04-09 RX ORDER — INDOMETHACIN 50 MG/1
50 CAPSULE ORAL
Qty: 40 CAPSULE | Refills: 0 | Status: SHIPPED | OUTPATIENT
Start: 2018-04-09 | End: 2018-06-21

## 2018-04-09 NOTE — PATIENT INSTRUCTIONS

## 2018-04-09 NOTE — PROGRESS NOTES
Routine Office Visit    Patient Name: Jani Cha    : 1945  MRN: [id number]    Subjective:  Jani is a 72 y.o. male who presents today for     1.  Left foot and ankle swelling - started last week with swelling - pain started 5 days ago. Pain is described as severe stabbing pain that is non-radiating. Pt has not tried any otc medications for his pain. He did not eat anything out of the ordinary but does state he eats red meats and shellfish. No history of gout in the past. No trauma / injury. Pain is located on dorsal aspect of his foot. It is associated with swelling and warmth.     Review of Systems   Constitutional: Negative for chills and fever.   HENT: Negative for congestion.    Eyes: Negative for blurred vision.   Respiratory: Negative for cough.    Cardiovascular: Positive for leg swelling. Negative for chest pain.   Gastrointestinal: Negative for abdominal pain, constipation, diarrhea, heartburn, nausea and vomiting.   Genitourinary: Negative for dysuria.   Musculoskeletal: Positive for joint pain. Negative for myalgias.   Skin: Negative for itching and rash.   Neurological: Negative for dizziness and headaches.   Psychiatric/Behavioral: Negative for depression.       Active Problem List  Patient Active Problem List   Diagnosis    Hyperlipidemia    Hypertension    Coronary artery disease involving native coronary artery of native heart without angina pectoris    Sleep apnea    S/P CABG x 4    Type 2 diabetes mellitus with diabetic neuropathy, without long-term current use of insulin    GERD (gastroesophageal reflux disease)    Personal history of colonic polyps    Severe obesity (BMI 35.0-39.9) with comorbidity    Abdominal aortic aneurysm (AAA) without rupture    Thoracic aorta atherosclerosis       Past Surgical History  Past Surgical History:   Procedure Laterality Date    APPENDECTOMY      COLONOSCOPY N/A 2016    Procedure: COLONOSCOPY;  Surgeon: Merritt García MD;  Location:  [facility] (4TH FLR);  Service: Endoscopy;  Laterality: N/A;    CORONARY ARTERY BYPASS GRAFT  05/26/2006     4 vessel       Family History  Family History   Problem Relation Age of Onset    Stroke Father     Colon cancer Brother     Cancer Brother      colon and skin CA    No Known Problems Mother     Cancer Sister     No Known Problems Maternal Aunt     No Known Problems Maternal Uncle     No Known Problems Paternal Aunt     No Known Problems Paternal Uncle     No Known Problems Maternal Grandmother     No Known Problems Maternal Grandfather     No Known Problems Paternal Grandmother     No Known Problems Paternal Grandfather     Cancer Sister     Amblyopia Neg Hx     Blindness Neg Hx     Cataracts Neg Hx     Diabetes Neg Hx     Glaucoma Neg Hx     Hypertension Neg Hx     Macular degeneration Neg Hx     Retinal detachment Neg Hx     Strabismus Neg Hx     Thyroid disease Neg Hx        Social History  Social History     Social History    Marital status:      Spouse name: N/A    Number of children: N/A    Years of education: N/A     Occupational History    Not on file.     Social History Main Topics    Smoking status: Former Smoker     Types: Cigarettes    Smokeless tobacco: Never Used    Alcohol use 0.0 oz/week      Comment: once rarely    Drug use: No    Sexual activity: Not on file     Other Topics Concern    Not on file     Social History Narrative    No narrative on file       Medications and Allergies  Reviewed and updated.   Current Outpatient Prescriptions   Medication Sig    ammonium lactate 12 % Crea Apply 2 g topically once daily.    aspirin (ECOTRIN) 81 MG EC tablet Take 81 mg by mouth once daily.      atorvastatin (LIPITOR) 80 MG tablet Take 1 tablet (80 mg total) by mouth once daily.    carvedilol (COREG) 12.5 MG tablet Take 1 tablet (12.5 mg total) by mouth 2 (two) times daily with meals. Due for an appt with PCP    clotrimazole-betamethasone 1-0.05%  "(LOTRISONE) cream Apply topically 2 (two) times daily.    econazole nitrate 1 % cream Apply topically once daily.    glipiZIDE (GLUCOTROL) 5 MG TR24 Take 1 tablet (5 mg total) by mouth daily with breakfast. Due for an appt with PCP    losartan (COZAAR) 100 MG tablet Take 1 tablet (100 mg total) by mouth once daily. Due for an appt with PCP    metFORMIN (GLUCOPHAGE) 1000 MG tablet Take 1 tablet (1,000 mg total) by mouth 2 (two) times daily with meals.    nitroGLYCERIN (NITROSTAT) 0.4 MG SL tablet Place 1 tablet (0.4 mg total) under the tongue every 5 (five) minutes as needed.    pantoprazole (PROTONIX) 40 MG tablet Take 1 tablet (40 mg total) by mouth once daily. Due for an appt with PCP    indomethacin (INDOCIN) 50 MG capsule Take 1 capsule (50 mg total) by mouth 3 (three) times daily with meals.     No current facility-administered medications for this visit.        Physical Exam  BP (!) 140/90 (BP Location: Left arm, Patient Position: Sitting, BP Method: Large (Manual))   Pulse 79   Temp 98.3 °F (36.8 °C) (Oral)   Ht 5' 10" (1.778 m)   Wt 123.9 kg (273 lb 2.4 oz)   SpO2 97%   BMI 39.19 kg/m²   Physical Exam   Constitutional: He is oriented to person, place, and time. He appears well-developed and well-nourished.   HENT:   Head: Normocephalic and atraumatic.   Eyes: Conjunctivae and EOM are normal. Pupils are equal, round, and reactive to light.   Neck: Normal range of motion. Neck supple. No JVD present. No thyromegaly present.   Cardiovascular: Normal rate, regular rhythm and normal heart sounds.    Pulmonary/Chest: Effort normal and breath sounds normal. He has no wheezes.   Abdominal: Soft. Bowel sounds are normal. He exhibits no distension. There is no tenderness. There is no guarding.   Musculoskeletal: Normal range of motion.        Feet:    Lymphadenopathy:     He has no cervical adenopathy.   Neurological: He is alert and oriented to person, place, and time.   Skin: Skin is warm and dry. " "  Psychiatric: He has a normal mood and affect. His behavior is normal.         Assessment/Plan:  Jani Cha is a 72 y.o. male who presents today for :    Problem List Items Addressed This Visit        Cardiac/Vascular    Hyperlipidemia (Chronic)  The current medical regimen is effective;  continue present plan and medications.      Hypertension (Chronic)  The current medical regimen is effective;  continue present plan and medications.      Thoracic aorta atherosclerosis    Overview     "The thoracic aorta maintains normal caliber, contour, and course with moderate atherosclerotic calcification" - CT 12/19/2011  Patient with Atherosclerosis of the Aorta.  Stable/asymptomatic. Currently stable on lipid lowering treatment and b/p monitoring.              Endocrine    Severe obesity (BMI 35.0-39.9) with comorbidity (Chronic)  BMI notd      Type 2 diabetes mellitus with diabetic neuropathy, without long-term current use of insulin (Chronic)    Relevant Orders    Hemoglobin A1c (Completed)  The current medical regimen is effective;  continue present plan and medications.        Other Visit Diagnoses     Acute gout of left foot, unspecified cause    -  Primary    Relevant Medications    indomethacin (INDOCIN) 50 MG capsule  Common side effects of this medication were discussed with the patient. Questions regarding medications were discussed during this visit.   Recommend diet to decrease red meats, wine, shellfish. Printout given   RICE      Other Relevant Orders    Uric acid (Completed)            Follow-up in about 6 months (around 10/9/2018), or if symptoms worsen or fail to improve.    "

## 2018-04-10 ENCOUNTER — TELEPHONE (OUTPATIENT)
Dept: FAMILY MEDICINE | Facility: CLINIC | Age: 73
End: 2018-04-10

## 2018-04-10 NOTE — TELEPHONE ENCOUNTER
----- Message from Laila Bains MD sent at 4/10/2018 11:14 AM CDT -----  Please contact patient.  a1c is in good range at 7.5. Pt needs to continue his current medication regimen   Number for gout is not high. How is pain after taking the indocin? Has this improved?

## 2018-04-13 DIAGNOSIS — E11.9 DIABETES MELLITUS WITHOUT COMPLICATION: ICD-10-CM

## 2018-05-02 DIAGNOSIS — K21.9 GASTROESOPHAGEAL REFLUX DISEASE, ESOPHAGITIS PRESENCE NOT SPECIFIED: ICD-10-CM

## 2018-05-02 DIAGNOSIS — E11.9 DIABETES MELLITUS TYPE II, NON INSULIN DEPENDENT: ICD-10-CM

## 2018-05-02 DIAGNOSIS — I10 ESSENTIAL HYPERTENSION: ICD-10-CM

## 2018-05-02 RX ORDER — LOSARTAN POTASSIUM 100 MG/1
100 TABLET ORAL DAILY
Qty: 90 TABLET | Refills: 0 | Status: SHIPPED | OUTPATIENT
Start: 2018-05-02 | End: 2018-07-30 | Stop reason: SDUPTHER

## 2018-05-02 RX ORDER — METFORMIN HYDROCHLORIDE 1000 MG/1
1000 TABLET ORAL 2 TIMES DAILY WITH MEALS
Qty: 180 TABLET | Refills: 1 | Status: SHIPPED | OUTPATIENT
Start: 2018-05-02 | End: 2018-10-17 | Stop reason: SDUPTHER

## 2018-05-02 RX ORDER — PANTOPRAZOLE SODIUM 40 MG/1
40 TABLET, DELAYED RELEASE ORAL DAILY
Qty: 90 TABLET | Refills: 0 | Status: SHIPPED | OUTPATIENT
Start: 2018-05-02 | End: 2018-07-30 | Stop reason: SDUPTHER

## 2018-05-02 RX ORDER — GLIPIZIDE 5 MG/1
5 TABLET, FILM COATED, EXTENDED RELEASE ORAL
Qty: 90 TABLET | Refills: 0 | Status: SHIPPED | OUTPATIENT
Start: 2018-05-02 | End: 2018-07-30 | Stop reason: SDUPTHER

## 2018-05-03 RX ORDER — ATORVASTATIN CALCIUM 80 MG/1
80 TABLET, FILM COATED ORAL DAILY
Qty: 90 TABLET | Refills: 3 | Status: SHIPPED | OUTPATIENT
Start: 2018-05-03 | End: 2019-05-14 | Stop reason: SDUPTHER

## 2018-05-04 ENCOUNTER — PES CALL (OUTPATIENT)
Dept: ADMINISTRATIVE | Facility: CLINIC | Age: 73
End: 2018-05-04

## 2018-06-21 ENCOUNTER — OFFICE VISIT (OUTPATIENT)
Dept: FAMILY MEDICINE | Facility: CLINIC | Age: 73
End: 2018-06-21
Payer: MEDICARE

## 2018-06-21 ENCOUNTER — TELEPHONE (OUTPATIENT)
Dept: FAMILY MEDICINE | Facility: CLINIC | Age: 73
End: 2018-06-21

## 2018-06-21 VITALS
WEIGHT: 268.75 LBS | HEIGHT: 70 IN | BODY MASS INDEX: 38.47 KG/M2 | SYSTOLIC BLOOD PRESSURE: 138 MMHG | HEART RATE: 68 BPM | TEMPERATURE: 99 F | DIASTOLIC BLOOD PRESSURE: 90 MMHG | OXYGEN SATURATION: 96 %

## 2018-06-21 DIAGNOSIS — E66.01 SEVERE OBESITY (BMI 35.0-39.9) WITH COMORBIDITY: Chronic | ICD-10-CM

## 2018-06-21 DIAGNOSIS — E11.40 TYPE 2 DIABETES MELLITUS WITH DIABETIC NEUROPATHY, WITHOUT LONG-TERM CURRENT USE OF INSULIN: Primary | Chronic | ICD-10-CM

## 2018-06-21 DIAGNOSIS — I71.40 ABDOMINAL AORTIC ANEURYSM (AAA) WITHOUT RUPTURE: ICD-10-CM

## 2018-06-21 DIAGNOSIS — I70.0 THORACIC AORTA ATHEROSCLEROSIS: ICD-10-CM

## 2018-06-21 DIAGNOSIS — Z00.00 ENCOUNTER FOR PREVENTIVE HEALTH EXAMINATION: ICD-10-CM

## 2018-06-21 PROCEDURE — 99214 OFFICE O/P EST MOD 30 MIN: CPT | Mod: PBBFAC,PO | Performed by: NURSE PRACTITIONER

## 2018-06-21 PROCEDURE — 99214 OFFICE O/P EST MOD 30 MIN: CPT | Mod: ,,, | Performed by: NURSE PRACTITIONER

## 2018-06-21 PROCEDURE — 99999 PR PBB SHADOW E&M-EST. PATIENT-LVL IV: CPT | Mod: PBBFAC,,, | Performed by: NURSE PRACTITIONER

## 2018-06-21 NOTE — TELEPHONE ENCOUNTER
Pt was here for HRA.  He had a letter stating due for follow up in July. You did his physical last July, but saw him in April. His A1c was 7.5. You indicated 6 month follow up in your note.   So, he is confused now. Do you want July or October.

## 2018-06-21 NOTE — PROGRESS NOTES
"Jani Cha presented for a  Medicare AWV and comprehensive Health Risk Assessment today. The following components were reviewed and updated:    · Medical history  · Family History  · Social history  · Allergies and Current Medications  · Health Risk Assessment  · Health Maintenance  · Care Team     ** See Completed Assessments for Annual Wellness Visit within the encounter summary.**       The following assessments were completed:  · Living Situation  · CAGE  · Depression Screening  · Timed Get Up and Go  · Whisper Test  · Cognitive Function Screening  · Nutrition Screening  · ADL Screening  · PAQ Screening    Vitals:    06/21/18 0856   BP: (!) 138/90   BP Location: Left arm   Patient Position: Sitting   BP Method: Large (Manual)   Pulse: 68   Temp: 98.6 °F (37 °C)   TempSrc: Oral   SpO2: 96%   Weight: 121.9 kg (268 lb 11.9 oz)   Height: 5' 10" (1.778 m)     Body mass index is 38.56 kg/m².  Physical Exam   Constitutional: He is oriented to person, place, and time. He appears well-developed and well-nourished.   HENT:   Head: Normocephalic.   Neck: Normal range of motion. Neck supple.   Cardiovascular: Normal rate.    Pulmonary/Chest: Effort normal.   Abdominal: Soft.   Musculoskeletal: Normal range of motion.        Left hip: He exhibits normal range of motion, no tenderness, no swelling, no crepitus and no deformity.   Neurological: He is alert and oriented to person, place, and time.   Skin: Skin is warm and dry.   Psychiatric: He has a normal mood and affect. His behavior is normal. Judgment and thought content normal.   Vitals reviewed.            Diagnoses and health risks identified today and associated recommendations/orders:    1. Type 2 diabetes mellitus with diabetic neuropathy, without long-term current use of insulin  Stable and controlled. Continue current treatment plan as previously prescribed with your PCP.        2. Severe obesity (BMI 35.0-39.9) with comorbidity  Stable and controlled. " Continue current treatment plan as previously prescribed with your PCP.    discussed diet and exercise    3. Abdominal aortic aneurysm (AAA) without rupture  Stable and controlled. Continue current treatment plan as previously prescribed with your PCP.    on meds to control risk factors. Recent ct unchanged.     4. Thoracic aorta atherosclerosis  Stable and controlled. Continue current treatment plan as previously prescribed with your PCP.        5. Encounter for preventive health examination  Provided Jani with a 5-10 year written screening schedule and personal prevention plan. Recommendations were developed using the USPSTF age appropriate recommendations. Education, counseling, and referrals were provided as needed. After Visit Summary printed and given to patient which includes a list of additional screenings\tests needed.    Follow-up if symptoms worsen or fail to improve.    Fabiano Suarez, NAMRATA

## 2018-06-21 NOTE — PATIENT INSTRUCTIONS
Counseling and Referral of Other Preventative  (Italic type indicates deductible and co-insurance are waived)    Patient Name: Jani Cha  Today's Date: 6/21/2018    Health Maintenance       Date Due Completion Date    Abdominal Aortic Aneurysm Screening See CT of abd ---    Lipid Panel 07/24/2018 7/24/2017    Influenza Vaccine 08/01/2018 11/14/2017    Hemoglobin A1c 10/09/2018 4/9/2018    Eye Exam 03/13/2019 3/13/2018 (Done)    Override on 3/13/2018: Done    Override on 3/13/2018: Done    Override on 3/7/2017: Done    Foot Exam 03/16/2019 3/16/2018 (Done)    Override on 3/16/2018: Done    Override on 11/10/2017: Done    Override on 6/23/2017: Done    Override on 3/8/2017: Done    High Dose Statin 06/21/2019 6/21/2018    Colonoscopy 12/27/2026 12/27/2016    TETANUS VACCINE 02/20/2027 2/20/2017        No orders of the defined types were placed in this encounter.    The following information is provided to all patients.  This information is to help you find resources for any of the problems found today that may be affecting your health:                Living healthy guide: www.[facility].[place].gov      Understanding Diabetes: www.diabetes.org      Eating healthy: www.cdc.gov/healthyweight      CDC home safety checklist: www.cdc.gov/steadi/patient.html      Agency on Aging: www.goea.[place].[facility]      Alcoholics anonymous (AA): www.aa.org      Physical Activity: www.wing.nih.gov/qd1lwyh      Tobacco use: www.quitwithusla.org

## 2018-07-20 ENCOUNTER — OFFICE VISIT (OUTPATIENT)
Dept: PODIATRY | Facility: CLINIC | Age: 73
End: 2018-07-20
Payer: MEDICARE

## 2018-07-20 VITALS
DIASTOLIC BLOOD PRESSURE: 88 MMHG | BODY MASS INDEX: 38.37 KG/M2 | SYSTOLIC BLOOD PRESSURE: 148 MMHG | HEIGHT: 70 IN | WEIGHT: 268 LBS

## 2018-07-20 DIAGNOSIS — R60.0 BILATERAL LOWER EXTREMITY EDEMA: ICD-10-CM

## 2018-07-20 DIAGNOSIS — E11.49 TYPE II DIABETES MELLITUS WITH NEUROLOGICAL MANIFESTATIONS: Primary | ICD-10-CM

## 2018-07-20 DIAGNOSIS — R20.2 PARESTHESIA OF BOTH LOWER EXTREMITIES: ICD-10-CM

## 2018-07-20 DIAGNOSIS — B35.1 ONYCHOMYCOSIS DUE TO DERMATOPHYTE: ICD-10-CM

## 2018-07-20 DIAGNOSIS — L84 CORN OR CALLUS: ICD-10-CM

## 2018-07-20 PROCEDURE — 99213 OFFICE O/P EST LOW 20 MIN: CPT | Mod: PBBFAC,PO | Performed by: PODIATRIST

## 2018-07-20 PROCEDURE — 99999 PR PBB SHADOW E&M-EST. PATIENT-LVL III: CPT | Mod: PBBFAC,,, | Performed by: PODIATRIST

## 2018-07-20 PROCEDURE — 11056 PARNG/CUTG B9 HYPRKR LES 2-4: CPT | Mod: Q9,S$PBB,, | Performed by: PODIATRIST

## 2018-07-20 PROCEDURE — 11721 DEBRIDE NAIL 6 OR MORE: CPT | Mod: 59,Q9,S$PBB, | Performed by: PODIATRIST

## 2018-07-20 PROCEDURE — 11056 PARNG/CUTG B9 HYPRKR LES 2-4: CPT | Mod: Q9,PBBFAC,PO | Performed by: PODIATRIST

## 2018-07-20 PROCEDURE — 11721 DEBRIDE NAIL 6 OR MORE: CPT | Mod: Q9,PBBFAC,PO | Performed by: PODIATRIST

## 2018-07-20 PROCEDURE — 99499 UNLISTED E&M SERVICE: CPT | Mod: S$PBB,,, | Performed by: PODIATRIST
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
## 2018-07-30 DIAGNOSIS — I10 ESSENTIAL HYPERTENSION: ICD-10-CM

## 2018-07-30 DIAGNOSIS — E11.9 DIABETES MELLITUS TYPE II, NON INSULIN DEPENDENT: ICD-10-CM

## 2018-07-30 DIAGNOSIS — K21.9 GASTROESOPHAGEAL REFLUX DISEASE, ESOPHAGITIS PRESENCE NOT SPECIFIED: ICD-10-CM

## 2018-07-30 RX ORDER — LOSARTAN POTASSIUM 100 MG/1
100 TABLET ORAL DAILY
Qty: 90 TABLET | Refills: 0 | Status: SHIPPED | OUTPATIENT
Start: 2018-07-30 | End: 2018-10-17 | Stop reason: SDUPTHER

## 2018-07-30 RX ORDER — GLIPIZIDE 5 MG/1
5 TABLET, FILM COATED, EXTENDED RELEASE ORAL
Qty: 90 TABLET | Refills: 0 | Status: SHIPPED | OUTPATIENT
Start: 2018-07-30 | End: 2018-10-17 | Stop reason: SDUPTHER

## 2018-07-30 RX ORDER — PANTOPRAZOLE SODIUM 40 MG/1
40 TABLET, DELAYED RELEASE ORAL DAILY
Qty: 90 TABLET | Refills: 0 | Status: SHIPPED | OUTPATIENT
Start: 2018-07-30 | End: 2018-10-17 | Stop reason: SDUPTHER

## 2018-08-03 DIAGNOSIS — E11.9 TYPE 2 DIABETES MELLITUS WITHOUT COMPLICATION: ICD-10-CM

## 2018-08-23 ENCOUNTER — TELEPHONE (OUTPATIENT)
Dept: FAMILY MEDICINE | Facility: CLINIC | Age: 73
End: 2018-08-23

## 2018-08-23 DIAGNOSIS — I10 HYPERTENSION, ESSENTIAL: Primary | ICD-10-CM

## 2018-08-23 DIAGNOSIS — E11.9 DIABETES MELLITUS WITHOUT COMPLICATION: ICD-10-CM

## 2018-08-23 NOTE — TELEPHONE ENCOUNTER
Left message for patient in regards to overdue Health Maintenance.    Fasting Labs w/ Urine-Please schedule the patient for fasting blood work w/ urine prior to office visit.      PS: Dr. Bains I have ordered a cmp, urine microalbumin, and lipid panel. Please order any additional labs and I will link them to the appointment.    Thanks,    Veronica

## 2018-08-28 ENCOUNTER — LAB VISIT (OUTPATIENT)
Dept: LAB | Facility: HOSPITAL | Age: 73
End: 2018-08-28
Attending: FAMILY MEDICINE
Payer: MEDICARE

## 2018-08-28 DIAGNOSIS — E11.9 TYPE 2 DIABETES MELLITUS WITHOUT COMPLICATION: ICD-10-CM

## 2018-08-28 DIAGNOSIS — I10 HYPERTENSION, ESSENTIAL: ICD-10-CM

## 2018-08-28 LAB
ALBUMIN SERPL BCP-MCNC: 3.5 G/DL
ALP SERPL-CCNC: 84 U/L
ALT SERPL W/O P-5'-P-CCNC: 17 U/L
ANION GAP SERPL CALC-SCNC: 8 MMOL/L
AST SERPL-CCNC: 14 U/L
BILIRUB SERPL-MCNC: 0.5 MG/DL
BUN SERPL-MCNC: 20 MG/DL
CALCIUM SERPL-MCNC: 10.1 MG/DL
CHLORIDE SERPL-SCNC: 106 MMOL/L
CHOLEST SERPL-MCNC: 130 MG/DL
CHOLEST/HDLC SERPL: 3.9 {RATIO}
CO2 SERPL-SCNC: 28 MMOL/L
CREAT SERPL-MCNC: 1.4 MG/DL
EST. GFR  (AFRICAN AMERICAN): 57.6 ML/MIN/1.73 M^2
EST. GFR  (NON AFRICAN AMERICAN): 49.8 ML/MIN/1.73 M^2
ESTIMATED AVG GLUCOSE: 174 MG/DL
GLUCOSE SERPL-MCNC: 175 MG/DL
HBA1C MFR BLD HPLC: 7.7 %
HDLC SERPL-MCNC: 33 MG/DL
HDLC SERPL: 25.4 %
LDLC SERPL CALC-MCNC: 73.2 MG/DL
NONHDLC SERPL-MCNC: 97 MG/DL
POTASSIUM SERPL-SCNC: 4.2 MMOL/L
PROT SERPL-MCNC: 6.4 G/DL
SODIUM SERPL-SCNC: 142 MMOL/L
TRIGL SERPL-MCNC: 119 MG/DL

## 2018-08-28 PROCEDURE — 83036 HEMOGLOBIN GLYCOSYLATED A1C: CPT

## 2018-08-28 PROCEDURE — 36415 COLL VENOUS BLD VENIPUNCTURE: CPT | Mod: PO

## 2018-08-28 PROCEDURE — 80053 COMPREHEN METABOLIC PANEL: CPT

## 2018-08-28 PROCEDURE — 80061 LIPID PANEL: CPT

## 2018-09-06 ENCOUNTER — OFFICE VISIT (OUTPATIENT)
Dept: FAMILY MEDICINE | Facility: CLINIC | Age: 73
End: 2018-09-06
Payer: MEDICARE

## 2018-09-06 VITALS
DIASTOLIC BLOOD PRESSURE: 80 MMHG | SYSTOLIC BLOOD PRESSURE: 140 MMHG | OXYGEN SATURATION: 93 % | HEIGHT: 70 IN | BODY MASS INDEX: 39.34 KG/M2 | HEART RATE: 72 BPM | WEIGHT: 274.81 LBS | TEMPERATURE: 98 F

## 2018-09-06 DIAGNOSIS — E11.40 TYPE 2 DIABETES MELLITUS WITH DIABETIC NEUROPATHY, WITHOUT LONG-TERM CURRENT USE OF INSULIN: Chronic | ICD-10-CM

## 2018-09-06 DIAGNOSIS — K21.9 GASTROESOPHAGEAL REFLUX DISEASE, ESOPHAGITIS PRESENCE NOT SPECIFIED: ICD-10-CM

## 2018-09-06 DIAGNOSIS — I25.10 CORONARY ARTERY DISEASE INVOLVING NATIVE CORONARY ARTERY OF NATIVE HEART WITHOUT ANGINA PECTORIS: ICD-10-CM

## 2018-09-06 DIAGNOSIS — Z95.1 S/P CABG X 4: Chronic | ICD-10-CM

## 2018-09-06 DIAGNOSIS — I10 ESSENTIAL HYPERTENSION: Chronic | ICD-10-CM

## 2018-09-06 DIAGNOSIS — Z12.5 SCREENING PSA (PROSTATE SPECIFIC ANTIGEN): ICD-10-CM

## 2018-09-06 DIAGNOSIS — E66.01 SEVERE OBESITY (BMI 35.0-39.9) WITH COMORBIDITY: Primary | Chronic | ICD-10-CM

## 2018-09-06 DIAGNOSIS — M10.9 GOUT, UNSPECIFIED CAUSE, UNSPECIFIED CHRONICITY, UNSPECIFIED SITE: ICD-10-CM

## 2018-09-06 DIAGNOSIS — E78.00 PURE HYPERCHOLESTEROLEMIA: Chronic | ICD-10-CM

## 2018-09-06 PROCEDURE — 99999 PR PBB SHADOW E&M-EST. PATIENT-LVL III: CPT | Mod: PBBFAC,,, | Performed by: FAMILY MEDICINE

## 2018-09-06 PROCEDURE — 99214 OFFICE O/P EST MOD 30 MIN: CPT | Mod: S$PBB,,, | Performed by: FAMILY MEDICINE

## 2018-09-06 PROCEDURE — 99213 OFFICE O/P EST LOW 20 MIN: CPT | Mod: PBBFAC,PO | Performed by: FAMILY MEDICINE

## 2018-09-06 NOTE — PATIENT INSTRUCTIONS
Diet: Diabetes  Food is an important tool that you can use to control diabetes and stay healthy. Eating well-balanced meals in the correct amounts will help you control your blood glucose levels and prevent low blood sugar reactions. It will also help you reduce the health risks of diabetes. There is no one specific diet that is right for everyone with diabetes. But there are general guidelines to follow. A registered dietitian (RD) will create a tailored diet approach thats just right for you. He or she will also help you plan healthy meals and snacks. If you have any questions, call your dietitian for advice.     Guidelines for success  Talk with your healthcare provider before starting a diabetes diet or weight loss program. If you haven't talked with a dietitian yet, ask your provider for a referral. The following guidelines can help you succeed:  · Select foods from the 6 food groups below. Your dietitian will help you find food choices within each group. He or she will also show you serving sizes and how many servings you can have at each meal.  ¨ Grains, beans, and starchy vegetables  ¨ Vegetables  ¨ Fruit  ¨ Milk or yogurt  ¨ Meat, poultry, fish, or tofu  ¨ Healthy fats  · Check your blood sugar levels as directed by your provider. Take any medicine as prescribed by your provider.  · Learn to read food labels and pick the right portion sizes.  · Eat only the amount of food in your meal plan. Eat about the same amount of food at regular times each day. Dont skip meals. Eat meals 4 to 5 hours apart, with snacks in between.  · Limit alcohol. It raises blood sugar levels. Drink water or calorie-free diet drinks that use safe sweeteners.  · Eat less fat to help lower your risk of heart disease. Use nonfat or low-fat dairy products and lean meats. Avoid fried foods. Use cooking oils that are unsaturated, such as olive, canola, or peanut oil.  · Talk with your dietitian about safe sugar substitutes.  · Avoid  added salt. It can contribute to high blood pressure, which can cause heart disease. People with diabetes already have a risk of high blood pressure and heart disease.  · Stay at a healthy weight. If you need to lose weight, cut down on your portion sizes. But dont skip meals. Exercise is an important part of any weight management program. Talk with your provider about an exercise program thats right for you.  · For more information about the best diet plan for you, talk with a registered dietitian (RD). To find an RD in your area, contact:  ¨ Academy of Nutrition and Dietetics www.eatright.org  ¨ The American Diabetes Association 303-197-1577 www.diabetes.org  Date Last Reviewed: 8/1/2016 © 2000-2017 [employer] [address] [place] [address], [place] [address]. All rights reserved. This information is not intended as a substitute for professional medical care. Always follow your healthcare professional's instructions.

## 2018-09-06 NOTE — PROGRESS NOTES
Routine Office Visit    Patient Name: Jani Cha    : 1945  MRN: [id number]    Subjective:  Jani is a 73 y.o. male who presents today for     1. GERD - pt has noticed a flare up of symptoms. He has had epigastric pain. Pain improved after taking pantoprazole. Symptoms have improved. Pt is trying to improve his eating habits.   2. Left foot swelling from gout - pt had gout flare up the last time he came in. He states pain has improved, but he has residual swelling of his left foot. There is no pain, recent injuries or redness. Pain has resolved. He is concerned he has some swelling. He does mention he has some left hip arthritis.   3. Right ear - feels like there is a bump in his ear. He states it feels like a pimple. There is no pain unless pressure is applied. He feels the pain when he first inserts his hearing aid. It does not impede his hearing. No drainage.   4. Lab results    Review of Systems   Constitutional: Negative for chills and fever.   HENT: Positive for ear pain. Negative for congestion.    Eyes: Negative for blurred vision.   Respiratory: Negative for cough.    Cardiovascular: Negative for chest pain.   Gastrointestinal: Negative for abdominal pain, constipation, diarrhea, heartburn, nausea and vomiting.   Genitourinary: Negative for dysuria.   Musculoskeletal: Positive for joint pain. Negative for myalgias.   Skin: Negative for itching and rash.   Neurological: Negative for dizziness and headaches.   Psychiatric/Behavioral: Negative for depression.       Active Problem List  Patient Active Problem List   Diagnosis    Hyperlipidemia    Hypertension    Coronary artery disease involving native coronary artery of native heart without angina pectoris    Sleep apnea    S/P CABG x 4    Type 2 diabetes mellitus with diabetic neuropathy, without long-term current use of insulin    GERD (gastroesophageal reflux disease)    Personal history of colonic polyps    Severe obesity (BMI  35.0-39.9) with comorbidity    Abdominal aortic aneurysm (AAA) without rupture    Thoracic aorta atherosclerosis       Past Surgical History  Past Surgical History:   Procedure Laterality Date    APPENDECTOMY      COLONOSCOPY N/A 12/27/2016    Procedure: COLONOSCOPY;  Surgeon: Merritt García MD;  Location: [facility] (4TH FLR);  Service: Endoscopy;  Laterality: N/A;    COLONOSCOPY N/A 12/27/2016    Performed by Merritt García MD at [facility] (4TH FLR)    COLONOSCOPY N/A 8/26/2013    Performed by Merritt García MD at [facility] (4TH FLR)    CORONARY ARTERY BYPASS GRAFT  05/26/2006     4 vessel       Family History  Family History   Problem Relation Age of Onset    Stroke Father     Colon cancer Brother     Cancer Brother         colon and skin CA    No Known Problems Mother     Cancer Sister     No Known Problems Maternal Aunt     No Known Problems Maternal Uncle     No Known Problems Paternal Aunt     No Known Problems Paternal Uncle     No Known Problems Maternal Grandmother     No Known Problems Maternal Grandfather     No Known Problems Paternal Grandmother     No Known Problems Paternal Grandfather     Cancer Sister     Amblyopia Neg Hx     Blindness Neg Hx     Cataracts Neg Hx     Diabetes Neg Hx     Glaucoma Neg Hx     Hypertension Neg Hx     Macular degeneration Neg Hx     Retinal detachment Neg Hx     Strabismus Neg Hx     Thyroid disease Neg Hx        Social History  Social History     Socioeconomic History    Marital status:      Spouse name: Not on file    Number of children: Not on file    Years of education: Not on file    Highest education level: Not on file   Social Needs    Financial resource strain: Not on file    Food insecurity - worry: Not on file    Food insecurity - inability: Not on file    Transportation needs - medical: Not on file    Transportation needs - non-medical: Not on file   Occupational History    Not on file   Tobacco Use    Smoking status:  "Former Smoker     Types: Cigarettes    Smokeless tobacco: Never Used   Substance and Sexual Activity    Alcohol use: Yes     Alcohol/week: 0.0 oz     Comment: once rarely    Drug use: No    Sexual activity: Not on file   Other Topics Concern    Not on file   Social History Narrative    Not on file       Medications and Allergies  Reviewed and updated.   Current Outpatient Medications   Medication Sig    ammonium lactate 12 % Crea Apply 2 g topically once daily.    aspirin (ECOTRIN) 81 MG EC tablet Take 81 mg by mouth once daily.      atorvastatin (LIPITOR) 80 MG tablet Take 1 tablet (80 mg total) by mouth once daily.    carvedilol (COREG) 12.5 MG tablet Take 1 tablet (12.5 mg total) by mouth 2 (two) times daily with meals. Due for an appt with PCP    clotrimazole-betamethasone 1-0.05% (LOTRISONE) cream Apply topically 2 (two) times daily.    econazole nitrate 1 % cream Apply topically once daily.    glipiZIDE (GLUCOTROL) 5 MG TR24 Take 1 tablet (5 mg total) by mouth daily with breakfast. Due for an appt with PCP    losartan (COZAAR) 100 MG tablet Take 1 tablet (100 mg total) by mouth once daily. Due for an appt with PCP    metFORMIN (GLUCOPHAGE) 1000 MG tablet Take 1 tablet (1,000 mg total) by mouth 2 (two) times daily with meals.    nitroGLYCERIN (NITROSTAT) 0.4 MG SL tablet Place 1 tablet (0.4 mg total) under the tongue every 5 (five) minutes as needed.    pantoprazole (PROTONIX) 40 MG tablet Take 1 tablet (40 mg total) by mouth once daily. Due for an appt with PCP     No current facility-administered medications for this visit.        Physical Exam  BP (!) 140/80 (BP Location: Left arm, Patient Position: Sitting, BP Method: Large (Manual))   Pulse 72   Temp 97.7 °F (36.5 °C) (Oral)   Ht 5' 10" (1.778 m)   Wt 124.6 kg (274 lb 12.9 oz)   SpO2 (!) 93%   BMI 39.43 kg/m²   Physical Exam   Constitutional: He is oriented to person, place, and time. He appears well-developed and well-nourished. "   HENT:   Head: Normocephalic and atraumatic.   Right Ear: Hearing, tympanic membrane, external ear and ear canal normal.   Left Ear: Hearing, tympanic membrane, external ear and ear canal normal.   Small bump on posterior ear canal. No drainage/redness   Eyes: Conjunctivae and EOM are normal. Pupils are equal, round, and reactive to light.   Neck: Normal range of motion. Neck supple. No JVD present. No thyromegaly present.   Cardiovascular: Normal rate, regular rhythm and normal heart sounds.   Pulmonary/Chest: Effort normal and breath sounds normal. He has no wheezes.   Abdominal: Soft. Bowel sounds are normal. He exhibits no distension. There is no tenderness. There is no guarding.   Musculoskeletal: Normal range of motion.   Lymphadenopathy:     He has no cervical adenopathy.   Neurological: He is alert and oriented to person, place, and time.   Skin: Skin is warm and dry.   Psychiatric: He has a normal mood and affect. His behavior is normal.         Assessment/Plan:  Jani Cha is a 73 y.o. male who presents today for :    Problem List Items Addressed This Visit        Cardiac/Vascular    Coronary artery disease involving native coronary artery of native heart without angina pectoris / S/P CABG x 4 (Chronic)    Overview     s/p 4 V CABG  Cardiologist - Dr. Oliveira  The current medical regimen is effective;  continue present plan and medications.           Hyperlipidemia (Chronic)  The current medical regimen is effective;  continue present plan and medications.      Hypertension (Chronic)    The current medical regimen is effective;  continue present plan and medications.         Endocrine    Severe obesity (BMI 35.0-39.9) with comorbidity - Primary (Chronic)  BMI noted      Type 2 diabetes mellitus with diabetic neuropathy, without long-term current use of insulin (Chronic)    Relevant Orders    CBC auto differential    Comprehensive metabolic panel    Lipid panel    Hemoglobin A1c    TSH  Order labs for  recheck in 6-12 months       GI    GERD (gastroesophageal reflux disease)  The current medical regimen is effective;  continue present plan and medications.          Other Visit Diagnoses     Gout, unspecified cause, unspecified chronicity, unspecified site        Relevant Orders    Uric acid  Avoid foods high in purines       Screening PSA (prostate specific antigen)        Relevant Orders    PSA, Screening            Follow-up in about 6 months (around 3/6/2019).

## 2018-10-03 ENCOUNTER — CLINICAL SUPPORT (OUTPATIENT)
Dept: FAMILY MEDICINE | Facility: CLINIC | Age: 73
End: 2018-10-03
Payer: MEDICARE

## 2018-10-03 DIAGNOSIS — Z23 NEED FOR PROPHYLACTIC VACCINATION AND INOCULATION AGAINST INFLUENZA: Primary | ICD-10-CM

## 2018-10-03 PROCEDURE — 99499 UNLISTED E&M SERVICE: CPT | Mod: S$PBB,,, | Performed by: FAMILY MEDICINE

## 2018-10-03 PROCEDURE — 90662 IIV NO PRSV INCREASED AG IM: CPT | Mod: PBBFAC,PO

## 2018-10-03 PROCEDURE — G0008 ADMIN INFLUENZA VIRUS VAC: HCPCS | Mod: PBBFAC

## 2018-10-17 DIAGNOSIS — E11.9 DIABETES MELLITUS TYPE II, NON INSULIN DEPENDENT: ICD-10-CM

## 2018-10-17 DIAGNOSIS — K21.9 GASTROESOPHAGEAL REFLUX DISEASE, ESOPHAGITIS PRESENCE NOT SPECIFIED: ICD-10-CM

## 2018-10-17 DIAGNOSIS — I25.10 CORONARY ARTERY DISEASE: Chronic | ICD-10-CM

## 2018-10-17 DIAGNOSIS — I10 ESSENTIAL HYPERTENSION: ICD-10-CM

## 2018-10-17 RX ORDER — LOSARTAN POTASSIUM 100 MG/1
100 TABLET ORAL DAILY
Qty: 90 TABLET | Refills: 0 | Status: SHIPPED | OUTPATIENT
Start: 2018-10-17 | End: 2019-01-04 | Stop reason: SDUPTHER

## 2018-10-17 RX ORDER — PANTOPRAZOLE SODIUM 40 MG/1
40 TABLET, DELAYED RELEASE ORAL DAILY
Qty: 90 TABLET | Refills: 0 | Status: SHIPPED | OUTPATIENT
Start: 2018-10-17 | End: 2019-01-04 | Stop reason: SDUPTHER

## 2018-10-17 RX ORDER — NITROGLYCERIN 0.4 MG/1
0.4 TABLET SUBLINGUAL EVERY 5 MIN PRN
Qty: 30 TABLET | Refills: 0 | Status: SHIPPED | OUTPATIENT
Start: 2018-10-17 | End: 2021-06-11

## 2018-10-17 RX ORDER — GLIPIZIDE 5 MG/1
5 TABLET, FILM COATED, EXTENDED RELEASE ORAL
Qty: 90 TABLET | Refills: 0 | Status: SHIPPED | OUTPATIENT
Start: 2018-10-17 | End: 2019-01-04 | Stop reason: SDUPTHER

## 2018-10-17 RX ORDER — NITROGLYCERIN 0.4 MG/1
0.4 TABLET SUBLINGUAL EVERY 5 MIN PRN
Qty: 30 TABLET | Refills: 11 | Status: SHIPPED | OUTPATIENT
Start: 2018-10-17 | End: 2019-01-31

## 2018-10-17 RX ORDER — METFORMIN HYDROCHLORIDE 1000 MG/1
1000 TABLET ORAL 2 TIMES DAILY WITH MEALS
Qty: 180 TABLET | Refills: 0 | Status: SHIPPED | OUTPATIENT
Start: 2018-10-17 | End: 2019-01-04 | Stop reason: SDUPTHER

## 2018-11-23 ENCOUNTER — OFFICE VISIT (OUTPATIENT)
Dept: PODIATRY | Facility: CLINIC | Age: 73
End: 2018-11-23
Payer: MEDICARE

## 2018-11-23 VITALS
DIASTOLIC BLOOD PRESSURE: 100 MMHG | HEIGHT: 70 IN | SYSTOLIC BLOOD PRESSURE: 160 MMHG | WEIGHT: 274 LBS | BODY MASS INDEX: 39.22 KG/M2

## 2018-11-23 DIAGNOSIS — B35.1 ONYCHOMYCOSIS DUE TO DERMATOPHYTE: ICD-10-CM

## 2018-11-23 DIAGNOSIS — E11.49 TYPE II DIABETES MELLITUS WITH NEUROLOGICAL MANIFESTATIONS: Primary | ICD-10-CM

## 2018-11-23 PROCEDURE — 11721 DEBRIDE NAIL 6 OR MORE: CPT | Mod: PBBFAC,PO | Performed by: PODIATRIST

## 2018-11-23 PROCEDURE — 11721 DEBRIDE NAIL 6 OR MORE: CPT | Mod: S$PBB,Q9,, | Performed by: PODIATRIST

## 2018-11-23 PROCEDURE — 99213 OFFICE O/P EST LOW 20 MIN: CPT | Mod: PBBFAC,PO | Performed by: PODIATRIST

## 2018-11-23 PROCEDURE — 99999 PR PBB SHADOW E&M-EST. PATIENT-LVL III: CPT | Mod: PBBFAC,,, | Performed by: PODIATRIST

## 2018-11-23 PROCEDURE — 99499 UNLISTED E&M SERVICE: CPT | Mod: S$PBB,,, | Performed by: PODIATRIST
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
## 2019-01-04 ENCOUNTER — OFFICE VISIT (OUTPATIENT)
Dept: FAMILY MEDICINE | Facility: CLINIC | Age: 74
End: 2019-01-04
Payer: MEDICARE

## 2019-01-04 VITALS
SYSTOLIC BLOOD PRESSURE: 142 MMHG | BODY MASS INDEX: 39.04 KG/M2 | OXYGEN SATURATION: 97 % | TEMPERATURE: 98 F | DIASTOLIC BLOOD PRESSURE: 86 MMHG | HEART RATE: 65 BPM | HEIGHT: 70 IN | WEIGHT: 272.69 LBS

## 2019-01-04 DIAGNOSIS — Z13.6 SCREENING FOR AAA (ABDOMINAL AORTIC ANEURYSM): ICD-10-CM

## 2019-01-04 DIAGNOSIS — E11.9 DIABETES MELLITUS TYPE II, NON INSULIN DEPENDENT: ICD-10-CM

## 2019-01-04 DIAGNOSIS — I70.0 THORACIC AORTA ATHEROSCLEROSIS: ICD-10-CM

## 2019-01-04 DIAGNOSIS — I25.10 CORONARY ARTERY DISEASE INVOLVING NATIVE CORONARY ARTERY OF NATIVE HEART WITHOUT ANGINA PECTORIS: Chronic | ICD-10-CM

## 2019-01-04 DIAGNOSIS — K21.9 GASTROESOPHAGEAL REFLUX DISEASE, ESOPHAGITIS PRESENCE NOT SPECIFIED: ICD-10-CM

## 2019-01-04 DIAGNOSIS — M10.9 ACUTE GOUT OF LEFT FOOT, UNSPECIFIED CAUSE: ICD-10-CM

## 2019-01-04 DIAGNOSIS — E11.40 TYPE 2 DIABETES MELLITUS WITH DIABETIC NEUROPATHY, WITHOUT LONG-TERM CURRENT USE OF INSULIN: Chronic | ICD-10-CM

## 2019-01-04 DIAGNOSIS — Z95.1 S/P CABG X 4: Chronic | ICD-10-CM

## 2019-01-04 DIAGNOSIS — E78.00 PURE HYPERCHOLESTEROLEMIA: Chronic | ICD-10-CM

## 2019-01-04 DIAGNOSIS — I10 ESSENTIAL HYPERTENSION: Primary | ICD-10-CM

## 2019-01-04 DIAGNOSIS — E66.01 SEVERE OBESITY (BMI 35.0-39.9) WITH COMORBIDITY: Chronic | ICD-10-CM

## 2019-01-04 PROCEDURE — 99214 OFFICE O/P EST MOD 30 MIN: CPT | Mod: PBBFAC,PO | Performed by: FAMILY MEDICINE

## 2019-01-04 PROCEDURE — 99214 PR OFFICE/OUTPT VISIT, EST, LEVL IV, 30-39 MIN: ICD-10-PCS | Mod: S$PBB,,, | Performed by: FAMILY MEDICINE

## 2019-01-04 PROCEDURE — 99999 PR PBB SHADOW E&M-EST. PATIENT-LVL IV: CPT | Mod: PBBFAC,,, | Performed by: FAMILY MEDICINE

## 2019-01-04 PROCEDURE — 99999 PR PBB SHADOW E&M-EST. PATIENT-LVL IV: ICD-10-PCS | Mod: PBBFAC,,, | Performed by: FAMILY MEDICINE

## 2019-01-04 PROCEDURE — 99214 OFFICE O/P EST MOD 30 MIN: CPT | Mod: S$PBB,,, | Performed by: FAMILY MEDICINE

## 2019-01-04 RX ORDER — LOSARTAN POTASSIUM 100 MG/1
100 TABLET ORAL DAILY
Qty: 90 TABLET | Refills: 3 | Status: SHIPPED | OUTPATIENT
Start: 2019-01-04 | End: 2020-01-24 | Stop reason: ALTCHOICE

## 2019-01-04 RX ORDER — CARVEDILOL 12.5 MG/1
12.5 TABLET ORAL 2 TIMES DAILY WITH MEALS
Qty: 180 TABLET | Refills: 3 | Status: SHIPPED | OUTPATIENT
Start: 2019-01-04 | End: 2019-01-24

## 2019-01-04 RX ORDER — METFORMIN HYDROCHLORIDE 1000 MG/1
1000 TABLET ORAL 2 TIMES DAILY WITH MEALS
Qty: 180 TABLET | Refills: 1 | Status: SHIPPED | OUTPATIENT
Start: 2019-01-04 | End: 2019-08-14 | Stop reason: SDUPTHER

## 2019-01-04 RX ORDER — GLIPIZIDE 5 MG/1
5 TABLET, FILM COATED, EXTENDED RELEASE ORAL
Qty: 90 TABLET | Refills: 1 | Status: SHIPPED | OUTPATIENT
Start: 2019-01-04 | End: 2019-08-14 | Stop reason: SDUPTHER

## 2019-01-04 RX ORDER — INDOMETHACIN 50 MG/1
50 CAPSULE ORAL
Qty: 40 CAPSULE | Refills: 0 | Status: SHIPPED | OUTPATIENT
Start: 2019-01-04 | End: 2020-10-29

## 2019-01-04 RX ORDER — PANTOPRAZOLE SODIUM 40 MG/1
40 TABLET, DELAYED RELEASE ORAL DAILY
Qty: 90 TABLET | Refills: 3 | Status: SHIPPED | OUTPATIENT
Start: 2019-01-04 | End: 2019-08-15 | Stop reason: SDUPTHER

## 2019-01-04 NOTE — PATIENT INSTRUCTIONS
Eating to Prevent Gout  Gout is a painful form of arthritis caused by an excess of uric acid. This is a waste product made by the body. It builds up in the body and forms crystals that collect in the joints, bringing on a gout attack. Alcohol and certain foods can trigger a gout attack. Below are some guidelines for changing your diet to help you manage gout. Your healthcare provider can work with you to determine the best eating plan for you. Know that diet is only one part of managing gout. Take your medicines as prescribed and follow the other guidelines your healthcare provider has given you.  Foods to limit  Eating too many foods containing purines may increase the levels of uric acid in your body and increase your risk for a gout attack. It may be best to limit these high-purine foods:  · Alcohol (beer, red wine). You may be told to avoid alcohol completely.  · Certain fish (anchovies, sardines, fish roes, herring, tuna, mussels, codfish, scallops, trout, and bran)  · Certain meats (red meat, processed meat, simon, turkey, wild game, and goose)  · Sauces and gravies made with meat  · Organ meats (such as liver, kidneys, sweetbreads, and tripe)  · Legumes (such as dried beans, peas)  · Mushrooms, spinach, asparagus, and cauliflower  · Yeast and yeast extract supplements  Foods to try  Some foods may be helpful for people with gout. You may want to try adding some of the following foods to your diet:  · Dark berries: These include blueberries, blackberries, and cherries. These berries contain chemicals that may lower uric acid.  · Tofu: Tofu, which is made from soy, is a good source of protein. Studies have shown that it may be a better choice than meat for people with gout.  · Omega fatty acids: These acids are found in fatty fish (such as salmon), certain oils (such as flax, olive, or nut oils), or nuts. They may help prevent inflammation due to gout.  The following guidelines are recommended by the  American Medical Association for people with gout. Your diet should be:  · High in fiber, whole grains, fruits, and vegetables.  · Low in protein (15% of calories should come from protein. Choose lean sources such as soy, lean meats, and poultry).  · Low in fat (no more than 30% of calories should come from fat, with only 10% coming from animal fat).   Date Last Reviewed: 6/17/2015  © 4387-4213 [employer]. [address] [place] [address], [place] [address]. All rights reserved. This information is not intended as a substitute for professional medical care. Always follow your healthcare professional's instructions.

## 2019-01-04 NOTE — PROGRESS NOTES
Routine Office Visit    Patient Name: Jani Cha    : 1945  MRN: [id number]    Subjective:  Jani is a 73 y.o. male who presents today for     1. Left wrist pain, left hip and left ankle pain - from gout - started approximately 1-2 weeks ago - symptoms have progressed. Pt feels that the recent weather changes have caused his joints to act up, swell and cause pain. Pt states pain is severe. He does not eat a lot of red meats / shellfish but does state that he lives here and does eat those types of food from time to time. He does not recall any other triggers. He is requesting indomethacin to be prescribed as it did help him with his pain on last visit. He does not take this daily. He only takes as needed for severe pain.   2. Diabetes - pt is requesting medication refill. He is doing well on current regimen. No reported side effects.     Review of Systems   Constitutional: Negative for chills and fever.   HENT: Negative for congestion.    Eyes: Negative for blurred vision.   Respiratory: Negative for cough.    Cardiovascular: Negative for chest pain.   Gastrointestinal: Negative for abdominal pain, constipation, diarrhea, heartburn, nausea and vomiting.   Genitourinary: Negative for dysuria.   Musculoskeletal: Negative for myalgias.   Skin: Negative for itching and rash.   Neurological: Negative for dizziness and headaches.   Psychiatric/Behavioral: Negative for depression.       Active Problem List  Patient Active Problem List   Diagnosis    Hyperlipidemia    Hypertension    Coronary artery disease involving native coronary artery of native heart without angina pectoris    Sleep apnea    S/P CABG x 4    Type 2 diabetes mellitus with diabetic neuropathy, without long-term current use of insulin    GERD (gastroesophageal reflux disease)    Personal history of colonic polyps    Severe obesity (BMI 35.0-39.9) with comorbidity    Abdominal aortic aneurysm (AAA) without rupture    Thoracic aorta  atherosclerosis       Past Surgical History  Past Surgical History:   Procedure Laterality Date    APPENDECTOMY      COLONOSCOPY N/A 12/27/2016    Performed by Merritt [facility] at [facility] (4TH FLR)    COLONOSCOPY N/A 8/26/2013    Performed by Merritt García [facility] at [facility] (4TH FLR)    CORONARY ARTERY BYPASS GRAFT  05/26/2006     4 vessel       Family History  Family History   Problem Relation Age of Onset    Stroke Father     Colon cancer Brother     Cancer Brother         colon and skin CA    No Known Problems Mother     Cancer Sister     No Known Problems Maternal Aunt     No Known Problems Maternal Uncle     No Known Problems Paternal Aunt     No Known Problems Paternal Uncle     No Known Problems Maternal Grandmother     No Known Problems Maternal Grandfather     No Known Problems Paternal Grandmother     No Known Problems Paternal Grandfather     Cancer Sister     Amblyopia Neg Hx     Blindness Neg Hx     Cataracts Neg Hx     Diabetes Neg Hx     Glaucoma Neg Hx     Hypertension Neg Hx     Macular degeneration Neg Hx     Retinal detachment Neg Hx     Strabismus Neg Hx     Thyroid disease Neg Hx        Social History  Social History     Socioeconomic History    Marital status:      Spouse name: Not on file    Number of children: Not on file    Years of education: Not on file    Highest education level: Not on file   Social Needs    Financial resource strain: Not on file    Food insecurity - worry: Not on file    Food insecurity - inability: Not on file    Transportation needs - medical: Not on file    Transportation needs - non-medical: Not on file   Occupational History    Not on file   Tobacco Use    Smoking status: Former Smoker     Types: Cigarettes    Smokeless tobacco: Never Used   Substance and Sexual Activity    Alcohol use: Yes     Alcohol/week: 0.0 oz     Comment: once rarely    Drug use: No    Sexual activity: Not on file   Other Topics Concern    Not on  "file   Social History Narrative    Not on file       Medications and Allergies  Reviewed and updated.   Current Outpatient Medications   Medication Sig    ammonium lactate 12 % Crea Apply 2 g topically once daily.    aspirin (ECOTRIN) 81 MG EC tablet Take 81 mg by mouth once daily.      atorvastatin (LIPITOR) 80 MG tablet Take 1 tablet (80 mg total) by mouth once daily.    carvedilol (COREG) 12.5 MG tablet Take 1 tablet (12.5 mg total) by mouth 2 (two) times daily with meals.    clotrimazole-betamethasone 1-0.05% (LOTRISONE) cream Apply topically 2 (two) times daily.    econazole nitrate 1 % cream Apply topically once daily.    glipiZIDE (GLUCOTROL) 5 MG TR24 Take 1 tablet (5 mg total) by mouth daily with breakfast.    losartan (COZAAR) 100 MG tablet Take 1 tablet (100 mg total) by mouth once daily.    metFORMIN (GLUCOPHAGE) 1000 MG tablet Take 1 tablet (1,000 mg total) by mouth 2 (two) times daily with meals.    nitroGLYCERIN (NITROSTAT) 0.4 MG SL tablet Place 1 tablet (0.4 mg total) under the tongue every 5 (five) minutes as needed.    nitroGLYCERIN (NITROSTAT) 0.4 MG SL tablet Place 1 tablet (0.4 mg total) under the tongue every 5 (five) minutes as needed.    pantoprazole (PROTONIX) 40 MG tablet Take 1 tablet (40 mg total) by mouth once daily.    indomethacin (INDOCIN) 50 MG capsule Take 1 capsule (50 mg total) by mouth 3 (three) times daily with meals.     No current facility-administered medications for this visit.        Physical Exam  BP (!) 142/86   Pulse 65   Temp 98 °F (36.7 °C) (Oral)   Ht 5' 10" (1.778 m)   Wt 123.7 kg (272 lb 11.3 oz)   SpO2 97%   BMI 39.13 kg/m²   Physical Exam   Constitutional: He is oriented to person, place, and time. He appears well-developed and well-nourished.   HENT:   Head: Normocephalic and atraumatic.   Eyes: Conjunctivae and EOM are normal. Pupils are equal, round, and reactive to light.   Neck: Normal range of motion. Neck supple. No JVD present. No " "thyromegaly present.   Cardiovascular: Normal rate, regular rhythm and normal heart sounds.   Pulmonary/Chest: Effort normal and breath sounds normal. He has no wheezes.   Abdominal: Soft. Bowel sounds are normal. He exhibits no distension. There is no tenderness. There is no guarding.   Musculoskeletal: Normal range of motion.        Left wrist: He exhibits tenderness and swelling.   Lymphadenopathy:     He has no cervical adenopathy.   Neurological: He is alert and oriented to person, place, and time.   Skin: Skin is warm and dry.   Psychiatric: He has a normal mood and affect. His behavior is normal.         Assessment/Plan:  Jani Cha is a 73 y.o. male who presents today for :    Problem List Items Addressed This Visit        Cardiac/Vascular    Coronary artery disease involving native coronary artery of native heart without angina pectoris (Chronic) / S/P CABG x 4 (Chronic)    Overview     s/p 4 V CABG  Cardiologist - Dr. Oliveira         Relevant Orders    Ambulatory referral to Cardiology  Recommend f/u with Cardiology yearly   The current medical regimen is effective;  continue present plan and medications.      Hyperlipidemia (Chronic)  The current medical regimen is effective;  continue present plan and medications.      Hypertension - Primary (Chronic)    Relevant Medications    carvedilol (COREG) 12.5 MG tablet    losartan (COZAAR) 100 MG tablet  The current medical regimen is effective;  continue present plan and medications.      Thoracic aorta atherosclerosis    Overview     "The thoracic aorta maintains normal caliber, contour, and course with moderate atherosclerotic calcification" - CT 12/19/2011  Patient with Atherosclerosis of the Aorta.  Stable/asymptomatic. Currently stable on lipid lowering treatment and b/p monitoring.              Endocrine    Severe obesity (BMI 35.0-39.9) with comorbidity (Chronic)  The patient's BMI has been recorded in the chart. The patient has been provided " educational materials regarding the benefits of attaining and maintaining a normal weight. We will continue to address and follow this issue during follow up visits.      Type 2 diabetes mellitus with diabetic neuropathy, without long-term current use of insulin (Chronic)    Relevant Medications    metFORMIN (GLUCOPHAGE) 1000 MG tablet    glipiZIDE (GLUCOTROL) 5 MG TR24  The current medical regimen is effective;  continue present plan and medications.         GI    GERD (gastroesophageal reflux disease)    Relevant Medications    pantoprazole (PROTONIX) 40 MG tablet  The current medical regimen is effective;  continue present plan and medications.        Other Visit Diagnoses     Diabetes mellitus type II, non insulin dependent        Relevant Medications    metFORMIN (GLUCOPHAGE) 1000 MG tablet    glipiZIDE (GLUCOTROL) 5 MG TR24  The current medical regimen is effective;  continue present plan and medications.      Screening for AAA (abdominal aortic aneurysm)        Relevant Orders    US Abdomen Complete    Acute gout of left foot, unspecified cause        Relevant Medications    indomethacin (INDOCIN) 50 MG capsule  Continue to monitor kidney fx  Recommend not to take daily  Recommend to decrease foods high in purines.             Follow-up in about 6 months (around 7/4/2019), or if symptoms worsen or fail to improve.

## 2019-01-12 ENCOUNTER — HOSPITAL ENCOUNTER (OUTPATIENT)
Dept: RADIOLOGY | Facility: HOSPITAL | Age: 74
Discharge: HOME | End: 2019-01-12
Attending: FAMILY MEDICINE
Payer: MEDICARE

## 2019-01-12 DIAGNOSIS — Z13.6 SCREENING FOR AAA (AORTIC ABDOMINAL ANEURYSM): ICD-10-CM

## 2019-01-12 PROCEDURE — 76775 US ABDOMINAL AORTA: ICD-10-PCS | Mod: 26,,, | Performed by: RADIOLOGY

## 2019-01-12 PROCEDURE — 76775 US EXAM ABDO BACK WALL LIM: CPT | Mod: TC

## 2019-01-12 PROCEDURE — 76775 US EXAM ABDO BACK WALL LIM: CPT | Mod: 26,,, | Performed by: RADIOLOGY

## 2019-01-14 DIAGNOSIS — I71.40 ABDOMINAL ANEURYSM: Primary | ICD-10-CM

## 2019-01-21 ENCOUNTER — PES CALL (OUTPATIENT)
Dept: ADMINISTRATIVE | Facility: CLINIC | Age: 74
End: 2019-01-21

## 2019-01-24 ENCOUNTER — OFFICE VISIT (OUTPATIENT)
Dept: CARDIOLOGY | Facility: CLINIC | Age: 74
End: 2019-01-24
Payer: MEDICARE

## 2019-01-24 VITALS
OXYGEN SATURATION: 93 % | HEIGHT: 70 IN | DIASTOLIC BLOOD PRESSURE: 98 MMHG | WEIGHT: 271.19 LBS | SYSTOLIC BLOOD PRESSURE: 138 MMHG | BODY MASS INDEX: 38.82 KG/M2 | HEART RATE: 68 BPM

## 2019-01-24 DIAGNOSIS — Z95.1 S/P CABG X 4: Chronic | ICD-10-CM

## 2019-01-24 DIAGNOSIS — E11.40 TYPE 2 DIABETES MELLITUS WITH DIABETIC NEUROPATHY, WITHOUT LONG-TERM CURRENT USE OF INSULIN: Chronic | ICD-10-CM

## 2019-01-24 DIAGNOSIS — I71.40 ABDOMINAL AORTIC ANEURYSM (AAA) WITHOUT RUPTURE: ICD-10-CM

## 2019-01-24 DIAGNOSIS — E78.00 PURE HYPERCHOLESTEROLEMIA: Chronic | ICD-10-CM

## 2019-01-24 DIAGNOSIS — I25.10 CORONARY ARTERY DISEASE INVOLVING NATIVE CORONARY ARTERY OF NATIVE HEART WITHOUT ANGINA PECTORIS: Primary | Chronic | ICD-10-CM

## 2019-01-24 DIAGNOSIS — E66.01 SEVERE OBESITY (BMI 35.0-39.9) WITH COMORBIDITY: Chronic | ICD-10-CM

## 2019-01-24 DIAGNOSIS — I10 ESSENTIAL HYPERTENSION: Chronic | ICD-10-CM

## 2019-01-24 PROCEDURE — 99214 OFFICE O/P EST MOD 30 MIN: CPT | Mod: PBBFAC | Performed by: INTERNAL MEDICINE

## 2019-01-24 PROCEDURE — 99999 PR PBB SHADOW E&M-EST. PATIENT-LVL IV: ICD-10-PCS | Mod: PBBFAC,,, | Performed by: INTERNAL MEDICINE

## 2019-01-24 PROCEDURE — 99214 OFFICE O/P EST MOD 30 MIN: CPT | Mod: S$PBB,,, | Performed by: INTERNAL MEDICINE

## 2019-01-24 PROCEDURE — 99999 PR PBB SHADOW E&M-EST. PATIENT-LVL IV: CPT | Mod: PBBFAC,,, | Performed by: INTERNAL MEDICINE

## 2019-01-24 PROCEDURE — 99214 PR OFFICE/OUTPT VISIT, EST, LEVL IV, 30-39 MIN: ICD-10-PCS | Mod: S$PBB,,, | Performed by: INTERNAL MEDICINE

## 2019-01-24 RX ORDER — CARVEDILOL 25 MG/1
25 TABLET ORAL 2 TIMES DAILY WITH MEALS
Qty: 180 TABLET | Refills: 3 | Status: SHIPPED | OUTPATIENT
Start: 2019-01-24 | End: 2020-02-06

## 2019-01-24 NOTE — LETTER
January 24, 2019      Laila Bains MD  [address]  [place] [address]           [facility] - Cardiology  [address]  [place] [address]  Phone: [phone]          Patient: Jani Cha   MR Number: [id number]   YOB: 1945   Date of Visit: 1/24/2019       Dear Dr. Laila Bains:    Thank you for referring Jani Cha to me for evaluation. Attached you will find relevant portions of my assessment and plan of care.    If you have questions, please do not hesitate to call me. I look forward to following Jani Cha along with you.    Sincerely,    Max Oliveira MD    Enclosure  CC:  No Recipients    If you would like to receive this communication electronically, please contact [email or web address] or [phone] to request more information on [facility] access.    For providers and/or their staff who would like to refer a patient to [facility], please contact us through our one-stop-shop provider referral line, [facility] , at [phone].    If you feel you have received this communication in error or would no longer like to receive these types of communications, please e-mail [email or web address]

## 2019-01-24 NOTE — PROGRESS NOTES
"Subjective:   Patient ID:  Jani Cha is a 73 y.o. male who presents for follow-up of Coronary Artery Disease      HPI:   Pt has CAD s/p CABG in 5-2006, abd AAA, DM, HTN and dyslipidemia.     Mr. Cha denies any CP, BUCK, palpitations, TIA's, syncope or presyncope. He has c/o is L hip pain that occurs when he stands on the hip. He also has JAYSON and did have ENT surgery.      Mr. Cha does not have a regimented exercise program, however does lead an active lifestyle and is not limited by any cardiac symptoms.      Echo from 11-10 was stable demonstrated preserved LV function (low normal) and diastolic dysfunction.   PET from 2-2017 demonstrated a small amount of ischemia in the inferobasilar wall.     He remains obese despite many conversations on the topic of weight loss BM39. No persistent weight loss.     CT scan for renal stones in  demonstrated Infrarenal abdominal aortic aneurysm measuring 3.5-cm and diffuse dilatation of the descending thoracic aorta measuring 3.9-cm.  CTA 2018 The distal descending thoracic aorta remains slightly dilated at 4.3 cm, previously 3.9 cm. There is a persistent, infrarenal abdominal aortic aneurysm measuring approximately 3.6 cm, partially thrombosed, previously 3.5 cm. There is significant extensive atherosclerotic plaque throughout the aorta. Celiac, SMA, and ROXANNE are patent.        Vitals:    01/24/19 0755   BP: (!) 138/98   Pulse: 68   SpO2: (!) 93%   Weight: 123 kg (271 lb 2.7 oz)   Height: 5' 10" (1.778 m)     Body mass index is 38.91 kg/m².  CrCl cannot be calculated (Patient's most recent lab result is older than the maximum 7 days allowed.).    Lab Results   Component Value Date     08/28/2018    K 4.2 08/28/2018     08/28/2018    CO2 28 08/28/2018    BUN 20 08/28/2018    CREATININE 1.4 08/28/2018     (H) 08/28/2018    HGBA1C 7.7 (H) 08/28/2018    MG 2.3 06/18/2006    AST 14 08/28/2018    ALT 17 08/28/2018    ALBUMIN 3.5 08/28/2018 "    PROT 6.4 08/28/2018    BILITOT 0.5 08/28/2018    WBC 9.63 07/24/2017    HGB 15.9 07/24/2017    HCT 47.8 07/24/2017    MCV 91 07/24/2017     07/24/2017    INR 1.4 (H) 02/16/2009    PSA 1.0 07/24/2017    TSH 0.983 07/24/2017    CHOL 130 08/28/2018    HDL 33 (L) 08/28/2018    LDLCALC 73.2 08/28/2018    TRIG 119 08/28/2018       Current Outpatient Medications   Medication Sig    ammonium lactate 12 % Crea Apply 2 g topically once daily.    aspirin (ECOTRIN) 81 MG EC tablet Take 81 mg by mouth once daily.      atorvastatin (LIPITOR) 80 MG tablet Take 1 tablet (80 mg total) by mouth once daily.    carvedilol (COREG) 25 MG tablet Take 1 tablet (25 mg total) by mouth 2 (two) times daily with meals.    clotrimazole-betamethasone 1-0.05% (LOTRISONE) cream Apply topically 2 (two) times daily.    glipiZIDE (GLUCOTROL) 5 MG TR24 Take 1 tablet (5 mg total) by mouth daily with breakfast.    losartan (COZAAR) 100 MG tablet Take 1 tablet (100 mg total) by mouth once daily.    metFORMIN (GLUCOPHAGE) 1000 MG tablet Take 1 tablet (1,000 mg total) by mouth 2 (two) times daily with meals.    nitroGLYCERIN (NITROSTAT) 0.4 MG SL tablet Place 1 tablet (0.4 mg total) under the tongue every 5 (five) minutes as needed.    pantoprazole (PROTONIX) 40 MG tablet Take 1 tablet (40 mg total) by mouth once daily.    econazole nitrate 1 % cream Apply topically once daily.    indomethacin (INDOCIN) 50 MG capsule Take 1 capsule (50 mg total) by mouth 3 (three) times daily with meals.    nitroGLYCERIN (NITROSTAT) 0.4 MG SL tablet Place 1 tablet (0.4 mg total) under the tongue every 5 (five) minutes as needed.     No current facility-administered medications for this visit.        Review of Systems   Constitution: Negative for decreased appetite, weakness, malaise/fatigue, weight gain and weight loss.   Eyes: Negative for visual disturbance.   Cardiovascular: Negative for chest pain, claudication, dyspnea on exertion, irregular  heartbeat, orthopnea, palpitations, paroxysmal nocturnal dyspnea and syncope.   Respiratory: Negative for cough, shortness of breath and snoring.    Skin: Negative for rash.   Musculoskeletal: Positive for arthritis and gout. Negative for muscle cramps, muscle weakness and myalgias.   Gastrointestinal: Negative for abdominal pain, anorexia, change in bowel habit and nausea.   Genitourinary: Negative for dysuria and frequency.   Neurological: Negative for excessive daytime sleepiness, dizziness, headaches, loss of balance and numbness.   Psychiatric/Behavioral: Negative for depression.       Objective:   Physical Exam   Constitutional: He is oriented to person, place, and time. He appears well-developed and well-nourished.   HENT:   Head: Normocephalic and atraumatic.   Eyes: Pupils are equal, round, and reactive to light.   Neck: Normal range of motion. Neck supple.   Cardiovascular: Normal rate, regular rhythm, normal heart sounds, intact distal pulses and normal pulses. Exam reveals no gallop.   No murmur heard.  Pulmonary/Chest: Effort normal and breath sounds normal.   Abdominal: Soft. Bowel sounds are normal. There is no hepatosplenomegaly. There is no tenderness.   Musculoskeletal: Normal range of motion.   Neurological: He is alert and oriented to person, place, and time.   Skin: Skin is warm and dry.   Psychiatric: He has a normal mood and affect. His speech is normal and behavior is normal. Judgment and thought content normal.       Assessment:     1. Coronary artery disease involving native coronary artery of native heart without angina pectoris    2. S/P CABG x 4    3. Essential hypertension    4. Pure hypercholesterolemia    5. Abdominal aortic aneurysm (AAA) without rupture    6. Type 2 diabetes mellitus with diabetic neuropathy, without long-term current use of insulin    7. Severe obesity (BMI 35.0-39.9) with comorbidity        Plan:   BPs suboptimal.  Increase carvediolol to 25mg BID  CTA of  abd/pelvis to assess AAA.  Pt has appt with Vasc surgery but likely aneurysm too small to pursue treatment at this time.  However, if expanding, pt is ok from a CV standpoint. to pursue surgery/repair if needed.      Orders Placed This Encounter   Procedures    CTA Abdomen and Pelvis

## 2019-01-28 ENCOUNTER — HOSPITAL ENCOUNTER (OUTPATIENT)
Dept: RADIOLOGY | Facility: HOSPITAL | Age: 74
Discharge: HOME | End: 2019-01-28
Attending: INTERNAL MEDICINE
Payer: MEDICARE

## 2019-01-28 DIAGNOSIS — I71.40 ABDOMINAL AORTIC ANEURYSM (AAA) WITHOUT RUPTURE: ICD-10-CM

## 2019-01-28 LAB
CREAT SERPL-MCNC: 1.2 MG/DL (ref 0.5–1.4)
SAMPLE: NORMAL

## 2019-01-28 PROCEDURE — 74174 CTA ABDOMEN AND PELVIS: ICD-10-PCS | Mod: 26,,, | Performed by: RADIOLOGY

## 2019-01-28 PROCEDURE — 25500020 PHARM REV CODE 255: Performed by: INTERNAL MEDICINE

## 2019-01-28 PROCEDURE — 74174 CTA ABD&PLVS W/CONTRAST: CPT | Mod: 26,,, | Performed by: RADIOLOGY

## 2019-01-28 PROCEDURE — 74174 CTA ABD&PLVS W/CONTRAST: CPT | Mod: TC

## 2019-01-28 RX ADMIN — IOHEXOL 100 ML: 350 INJECTION, SOLUTION INTRAVENOUS at 08:01

## 2019-01-31 ENCOUNTER — OFFICE VISIT (OUTPATIENT)
Dept: VASCULAR SURGERY | Facility: CLINIC | Age: 74
End: 2019-01-31
Payer: MEDICARE

## 2019-01-31 VITALS
SYSTOLIC BLOOD PRESSURE: 140 MMHG | DIASTOLIC BLOOD PRESSURE: 84 MMHG | WEIGHT: 271.69 LBS | BODY MASS INDEX: 38.9 KG/M2 | HEIGHT: 70 IN

## 2019-01-31 DIAGNOSIS — I77.9 AORTIC DISEASE: Primary | ICD-10-CM

## 2019-01-31 PROCEDURE — 99204 PR OFFICE/OUTPT VISIT, NEW, LEVL IV, 45-59 MIN: ICD-10-PCS | Mod: S$PBB,,, | Performed by: SURGERY

## 2019-01-31 PROCEDURE — 99999 PR PBB SHADOW E&M-EST. PATIENT-LVL III: ICD-10-PCS | Mod: PBBFAC,,, | Performed by: SURGERY

## 2019-01-31 PROCEDURE — 99213 OFFICE O/P EST LOW 20 MIN: CPT | Mod: PBBFAC | Performed by: SURGERY

## 2019-01-31 PROCEDURE — 99999 PR PBB SHADOW E&M-EST. PATIENT-LVL III: CPT | Mod: PBBFAC,,, | Performed by: SURGERY

## 2019-01-31 PROCEDURE — 99204 OFFICE O/P NEW MOD 45 MIN: CPT | Mod: S$PBB,,, | Performed by: SURGERY

## 2019-01-31 NOTE — PATIENT INSTRUCTIONS
Abdominal Aortic Aneurysm (Stable)    The aorta is the bodys main artery that carries oxygen-rich blood from the heart. It travels from the heart down to the lower abdomen, where it divides into smaller blood vessels. An aneurysm is a bulge or dilatation in the wall of an artery, in this example, the aorta. This happens because there is a weakened spot in the artery wall causing that area to begin to deteriorate. This allows the artery to bulge or balloon out creating the aneurysm. It may remain stable and cause no problems, or it can expand and lengthen. If this happens, it can affect the blood flow to different organs. It may also leak or rupture (break open) and cause internal bleeding and even death.    A number of things can cause aortic aneurysms including:  · Atherosclerosis  · Hypertension  · Injury  · Infection  · Marfan syndrome (An inherited condition that most commonly affects the heart, eyes, blood vessels, and skeleton.)  Risk factors that have been associated with aortic aneurysms include:  · Atherosclerosis  · Hypertension  · Older age  · Family history  · Elevated cholesterol  · Obesity  · Tobacco use  · Men more than women  Most aortic aneurysms do not cause any symptoms until they begin to expand rapidly or rupture. Therefore, most aneurysms are discovered on exams or tests done for other reasons (like an X-ray, ultrasound or CT scan). When there are symptoms, they may be vague, or they can include:  · Deep, steady pain in the abdomen and back  · Pulsating feeling in your abdomen  · Weakness  · Dizziness, fainting  · Low blood pressure  Once there are symptoms, it is important that it be taken care of. An expanding aneurysm causes symptoms of abdomen, back, flank or groin pain, which may come and go at first, or become constant. When an aneurysm ruptures, there can be sudden abdominal, back or groin pain, followed by weakness, dizziness and loss of consciousness as blood pressure drops and a  shock state occurs. This is a fatal condition unless immediate surgery is performed.  Small aneurysms rarely rupture and can often be treated with medicines to lower blood pressure and reduce stress on the aortic wall. Routine ultrasound or CT scans can determine if the aneurysm is growing. Larger or expanding aneurysms will require surgery. Surgical treatment involves removing the section of aorta where the aneurysm is and replacing it with an aortic graft (artificial blood vessel). A newer alternative to surgery, which can be used in certain cases, involves the placement of a stent (tubular wire mesh) inside the aorta to support the wall and reduce stress on the aneurysm. Rarely, a blood clot can form inside of an aortic aneurysm with no symptoms. A piece of the clot can break off and pass to smaller blood vessels in the intestines or legs and cause pain and loss of blood flow to that part.  If a small aneurysm has been identified, which does not require surgery, you should still change any lifestyle factors that may improve your overall cardiovascular health. This includes such things as following a healthy diet, losing weight, stopping smoking, and lowering your cholesterol.  Home care  · Your aneurysm is small and does not require surgery; you will be followed along as an outpatient with routine ultrasound screening exams to measure the size of the aneurysm every 6 months.  · You may return to your usual level of activity.  · Follow these guidelines to improve your cardiac health:  ¨ If you are overweight, begin a weight loss program.  ¨ If you have hypertension, reduce your salt intake. Avoid high salt foods and dont add salt when cooking.  ¨ Begin an exercise program. Discuss with your doctor what type of exercise program would be best for you. It doesn't have to be difficult. Even brisk walking for 20 minutes three times a week is a good form of exercise.  ¨ Avoid medicines containing stimulants. This  includes many cold and sinus decongestant pills and sprays as well as diet pills. Check the warnings about hypertension on the label. Stimulants such as amphetamine or cocaine could be lethal for someone with hypertension. Never take these.  ¨ Limit your caffeine intake or switch to caffeine-free products.  ¨ Stop smoking. No matter how long you've smoked, quitting can be hard. Enroll in a stop-smoking program to improve your chance of success.  · Learning how to handle stress better is an important part of any program to lower blood pressure. Learn about relaxation methods such as meditation, yoga, or biofeedback.  · If medicines were prescribed for hypertension, take them exactly as directed. Missing doses may cause your blood pressure get out of control.  · Consider buying an automatic blood pressure machine (available at most pharmacies). Use this to monitor your blood pressure at home and report the results to your doctor.  Follow up care  Regular visits to your healthcare provider for blood pressure checks and periodic ultrasounds of the aorta are an important part of your care. Make a follow-up appointment with your doctor, or as directed by our staff.  When to seek medical advice   Call your healthcare provider if any of the following occur:  · Sudden severe abdominal, back, flank or groin pain  · Blood in your stools  · Weakness or dizziness  · Weakness, numbness, pain or coolness of one leg  Call 911  The symptoms of a heart attack or stroke can be life threatening. If you see or have any of the following symptoms, call 911 right away:   · Trouble breathing  · Confused or difficulty arousing  · Fainting or loss of consciousness  · Rapid heart rate  · New chest, arm, shoulder, neck or upper back pain  · Difficulty with speech or vision, weakness of an arm or leg  · Difficulty walking or talking, loss of balance, numbness or weakness in one side of you body, facial droop  Date Last Reviewed: 9/25/2015  ©  5295-7647 [employer]. [address] [place] [address]. All rights reserved. This information is not intended as a substitute for professional medical care. Always follow your healthcare professional's instructions.        Understanding Abdominal Aortic Aneurysm  You may have been told that you have an aneurysm. This is when a weakened part of a blood vessel expands like a balloon. An aneurysm in the main blood vessel in your stomach area is called an abdominal aortic aneurysm (AAA).  What is AAA?     An aneurysm happens when a weakened part of the aorta wall stretches and expands.     The aorta is the large artery that carries blood from the heart to the rest of the body. With AAA, part of the aorta weakens and expands. If an aneurysm gets large enough, it may burst. This is very serious, and usually fatal.  How is an aneurysm found?  AAA usually causes no symptoms. It is often found when tests (such as an X-ray, MRI, or CT scan) are done for an unrelated problem. Or your healthcare provider may find it while feeling your stomach during a routine exam.  Who develops AAA?  These things increase your chances of having AAA:  · AAA runs in your family  · Your age--AAA is more likely as you get older  · Men are more likely than women to have AAA  · Smoking  · High blood pressure  · High cholesterol level (a buildup of fat and other materials in the blood)  · Injury (such as a car accident  What can be done?  Surgery can be done to remove an aneurysm. Your healthcare provider will weigh the chances that the aneurysm will burst against the risks of treatment. Because a small aneurysm is not likely to burst, it may be watched for a while. When it reaches a certain size, you may have surgery to replace that section of your aorta.  Date Last Reviewed: 4/26/2016  © 2577-0511 [employer]. [address] [place] [address]. All rights reserved. This information is not intended as a  substitute for professional medical care. Always follow your healthcare professional's instructions.
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
## 2019-02-01 DIAGNOSIS — B35.6 TINEA CRURIS: ICD-10-CM

## 2019-02-01 RX ORDER — ECONAZOLE NITRATE 10 MG/G
CREAM TOPICAL DAILY
Qty: 30 G | Refills: 1 | Status: SHIPPED | OUTPATIENT
Start: 2019-02-01 | End: 2021-06-14

## 2019-02-01 NOTE — TELEPHONE ENCOUNTER
----- Message from Mai kayeiris sent at 2/1/2019  8:51 AM CST -----  Contact: self - [phone]  Refill request for ---econazole nitrate 1 % cream    ...  [facility] - [place] ([place], [place] - [address]  [address] ([place] [address]  Phone: [phone] Fax: [phone]

## 2019-02-04 ENCOUNTER — TELEPHONE (OUTPATIENT)
Dept: FAMILY MEDICINE | Facility: CLINIC | Age: 74
End: 2019-02-04

## 2019-02-04 RX ORDER — CLOTRIMAZOLE 1 %
CREAM (GRAM) TOPICAL 2 TIMES DAILY
Qty: 45 G | Refills: 1 | Status: SHIPPED | OUTPATIENT
Start: 2019-02-04 | End: 2019-06-04 | Stop reason: SDUPTHER

## 2019-02-04 NOTE — TELEPHONE ENCOUNTER
----- Message from Milton Cristobal sent at 2/4/2019  1:34 PM CST -----  Contact: Self/[phone]  The patient would like to speak to the staff regarding the medication econazole nitrate 1 % cream.        Thank you

## 2019-02-04 NOTE — TELEPHONE ENCOUNTER
Patient states fax sent over from pharmacy to select different medication, patient informed once fax received and reviewed by provider he will be notified of  Of orders.

## 2019-02-04 NOTE — TELEPHONE ENCOUNTER
The  Prescription for Econazole is not covered on the insurance. Pharmacy is stating the alternative is;  Clotimazole or ketoconazole.

## 2019-02-04 NOTE — TELEPHONE ENCOUNTER
----- Message from Anna Roper sent at 2/4/2019  2:55 PM CST -----  Contact: Jani [phone]  The patient is calling to give a substitute medication. The name of the medication is: clotrimazole. The patient reports that his insurance will cover it. Please call at your earliest convenience.

## 2019-02-15 ENCOUNTER — HOSPITAL ENCOUNTER (OUTPATIENT)
Dept: CARDIOLOGY | Facility: HOSPITAL | Age: 74
Discharge: HOME | End: 2019-02-15
Attending: SURGERY
Payer: MEDICARE

## 2019-02-15 DIAGNOSIS — I77.9 AORTIC DISEASE: ICD-10-CM

## 2019-02-15 LAB
LEFT ANT TIBIAL SYS PSV: 99 CM/S
LEFT CFA PSV: 66 CM/S
LEFT EXTERNAL ILIAC PSV: 92 CM/S
LEFT PERONEAL SYS PSV: 82 CM/S
LEFT POPLITEAL PSV: 47 CM/S
LEFT POST TIBIAL SYS PSV: 122 CM/S
LEFT PROFUNDA SYS PSV: 75 CM/S
LEFT SUPER FEMORAL DIST SYS PSV: 52 CM/S
LEFT SUPER FEMORAL MID SYS PSV: 82 CM/S
LEFT SUPER FEMORAL OSTIAL SYS PSV: 109 CM/S
LEFT SUPER FEMORAL PROX SYS PSV: 87 CM/S
LEFT TIB/PER TRUNK SYS PSV: 48 CM/S
OHS CV LEFT LOWER EXTREMITY ABI (NO CALC): 1.19
OHS CV RIGHT ABI LOWER EXTREMITY (NO CALC): 1.37
RIGHT ANT TIBIAL SYS PSV: 58 CM/S
RIGHT CFA PSV: 78 CM/S
RIGHT EXTERNAL ILLIAC PSV: 76 CM/S
RIGHT PERONEAL SYS PSV: 65 CM/S
RIGHT POPLITEAL PSV: 39 CM/S
RIGHT POST TIBIAL SYS PSV: 102 CM/S
RIGHT PROFUNDA SYS PSV: 109 CM/S
RIGHT SUPER FEMORAL DIST SYS PSV: 79 CM/S
RIGHT SUPER FEMORAL MID SYS PSV: 74 CM/S
RIGHT SUPER FEMORAL OSTIAL SYS PSV: 121 CM/S
RIGHT SUPER FEMORAL PROX SYS PSV: 74 CM/S
RIGHT TIB/PER TRUNK SYS PSV: 53 CM/S

## 2019-02-15 PROCEDURE — 93925 CV US DOPPLER ARTERIAL LEGS BILATERAL (CUPID ONLY): ICD-10-PCS | Mod: 26,,, | Performed by: SURGERY

## 2019-02-15 PROCEDURE — 93925 LOWER EXTREMITY STUDY: CPT | Mod: 50

## 2019-02-15 PROCEDURE — 93925 LOWER EXTREMITY STUDY: CPT | Mod: 26,,, | Performed by: SURGERY

## 2019-03-19 ENCOUNTER — OFFICE VISIT (OUTPATIENT)
Dept: OPTOMETRY | Facility: CLINIC | Age: 74
End: 2019-03-19
Payer: MEDICARE

## 2019-03-19 DIAGNOSIS — E11.9 DIABETES MELLITUS TYPE 2 WITHOUT RETINOPATHY: Primary | ICD-10-CM

## 2019-03-19 DIAGNOSIS — Z13.5 GLAUCOMA SCREENING: ICD-10-CM

## 2019-03-19 DIAGNOSIS — H25.13 NUCLEAR SCLEROSIS, BILATERAL: ICD-10-CM

## 2019-03-19 LAB
LEFT EYE DM RETINOPATHY: NEGATIVE
RIGHT EYE DM RETINOPATHY: NEGATIVE

## 2019-03-19 PROCEDURE — 92014 COMPRE OPH EXAM EST PT 1/>: CPT | Mod: S$PBB,,, | Performed by: OPTOMETRIST

## 2019-03-19 PROCEDURE — 99999 PR PBB SHADOW E&M-EST. PATIENT-LVL III: ICD-10-PCS | Mod: PBBFAC,,, | Performed by: OPTOMETRIST

## 2019-03-19 PROCEDURE — 99999 PR PBB SHADOW E&M-EST. PATIENT-LVL III: CPT | Mod: PBBFAC,,, | Performed by: OPTOMETRIST

## 2019-03-19 PROCEDURE — 99213 OFFICE O/P EST LOW 20 MIN: CPT | Mod: PBBFAC,PO | Performed by: OPTOMETRIST

## 2019-03-19 PROCEDURE — 92014 PR EYE EXAM, EST PATIENT,COMPREHESV: ICD-10-PCS | Mod: S$PBB,,, | Performed by: OPTOMETRIST

## 2019-03-19 NOTE — PROGRESS NOTES
HPI     Patient is here for annual eye exam.  Hemoglobin A1C       Date                     Value               Ref Range             Status                08/28/2018               7.7 (H)             4.0 - 5.6 %           Final                 04/09/2018               7.5 (H)             4.0 - 5.6 %           Final                 07/24/2017               7.4 (H)             4.0 - 5.6 %           Final            t:    (-)Flashes (-)Floaters  (-)Itch, (+)tear, (+)burn, (-)Dryness. (-) OTC Drops   (-)Photophobia  (-)Glare (-)diplopia (-) headaches          Last edited by ROSITA Hartmann on 3/19/2019  8:33 AM. (History)            Assessment /Plan     For exam results, see Encounter Report.    Diabetes mellitus type 2 without retinopathy  -No retinopathy noted today.  Continued control with primary care physician and annual comprehensive eye exam.    Nuclear sclerosis, bilateral  -Educated patient on presence of cataracts at today's exam, monitor at annual dilated fundus exam. 5+ years surgical estimate.    Glaucoma screening  -Monitor with annual eye exam and IOP check      RTC 1 yr

## 2019-03-25 ENCOUNTER — OFFICE VISIT (OUTPATIENT)
Dept: PODIATRY | Facility: CLINIC | Age: 74
End: 2019-03-25
Payer: MEDICARE

## 2019-03-25 ENCOUNTER — OFFICE VISIT (OUTPATIENT)
Dept: FAMILY MEDICINE | Facility: CLINIC | Age: 74
End: 2019-03-25
Payer: MEDICARE

## 2019-03-25 VITALS — WEIGHT: 271 LBS | BODY MASS INDEX: 38.8 KG/M2 | HEIGHT: 70 IN

## 2019-03-25 DIAGNOSIS — M21.619 BUNION: ICD-10-CM

## 2019-03-25 DIAGNOSIS — Z00.00 ENCOUNTER FOR PREVENTIVE HEALTH EXAMINATION: Primary | ICD-10-CM

## 2019-03-25 DIAGNOSIS — I10 ESSENTIAL HYPERTENSION: Chronic | ICD-10-CM

## 2019-03-25 DIAGNOSIS — I70.0 THORACIC AORTA ATHEROSCLEROSIS: ICD-10-CM

## 2019-03-25 DIAGNOSIS — M20.40 HAMMER TOE, UNSPECIFIED LATERALITY: ICD-10-CM

## 2019-03-25 DIAGNOSIS — B35.1 ONYCHOMYCOSIS DUE TO DERMATOPHYTE: ICD-10-CM

## 2019-03-25 DIAGNOSIS — E11.49 TYPE II DIABETES MELLITUS WITH NEUROLOGICAL MANIFESTATIONS: Primary | ICD-10-CM

## 2019-03-25 DIAGNOSIS — E66.01 SEVERE OBESITY (BMI 35.0-39.9) WITH COMORBIDITY: Chronic | ICD-10-CM

## 2019-03-25 DIAGNOSIS — E11.40 TYPE 2 DIABETES MELLITUS WITH DIABETIC NEUROPATHY, WITHOUT LONG-TERM CURRENT USE OF INSULIN: Chronic | ICD-10-CM

## 2019-03-25 DIAGNOSIS — I71.40 ABDOMINAL AORTIC ANEURYSM (AAA) WITHOUT RUPTURE: ICD-10-CM

## 2019-03-25 DIAGNOSIS — G47.33 OBSTRUCTIVE SLEEP APNEA SYNDROME: ICD-10-CM

## 2019-03-25 PROCEDURE — 99999 PR PBB SHADOW E&M-EST. PATIENT-LVL IV: CPT | Mod: PBBFAC,,, | Performed by: NURSE PRACTITIONER

## 2019-03-25 PROCEDURE — G0439 PR MEDICARE ANNUAL WELLNESS SUBSEQUENT VISIT: ICD-10-PCS | Mod: ,,, | Performed by: NURSE PRACTITIONER

## 2019-03-25 PROCEDURE — 99214 OFFICE O/P EST MOD 30 MIN: CPT | Mod: PBBFAC,PO,25 | Performed by: NURSE PRACTITIONER

## 2019-03-25 PROCEDURE — 99999 PR PBB SHADOW E&M-EST. PATIENT-LVL IV: ICD-10-PCS | Mod: PBBFAC,,, | Performed by: NURSE PRACTITIONER

## 2019-03-25 PROCEDURE — G0439 PPPS, SUBSEQ VISIT: HCPCS | Mod: ,,, | Performed by: NURSE PRACTITIONER

## 2019-03-25 PROCEDURE — 99499 UNLISTED E&M SERVICE: CPT | Mod: S$PBB,,, | Performed by: PODIATRIST

## 2019-03-25 PROCEDURE — 11721 DEBRIDE NAIL 6 OR MORE: CPT | Mod: PBBFAC,PO | Performed by: PODIATRIST

## 2019-03-25 PROCEDURE — 99999 PR PBB SHADOW E&M-EST. PATIENT-LVL III: ICD-10-PCS | Mod: PBBFAC,,, | Performed by: PODIATRIST

## 2019-03-25 PROCEDURE — 99499 NO LOS: ICD-10-PCS | Mod: S$PBB,,, | Performed by: PODIATRIST

## 2019-03-25 PROCEDURE — 99213 OFFICE O/P EST LOW 20 MIN: CPT | Mod: PBBFAC,27,PO,25 | Performed by: PODIATRIST

## 2019-03-25 PROCEDURE — 11721 ROUTINE FOOT CARE: ICD-10-PCS | Mod: S$PBB,Q9,, | Performed by: PODIATRIST

## 2019-03-25 PROCEDURE — 99999 PR PBB SHADOW E&M-EST. PATIENT-LVL III: CPT | Mod: PBBFAC,,, | Performed by: PODIATRIST

## 2019-03-26 VITALS
OXYGEN SATURATION: 98 % | WEIGHT: 271.25 LBS | SYSTOLIC BLOOD PRESSURE: 136 MMHG | BODY MASS INDEX: 38.83 KG/M2 | DIASTOLIC BLOOD PRESSURE: 78 MMHG | HEIGHT: 70 IN | HEART RATE: 67 BPM

## 2019-03-26 NOTE — PROGRESS NOTES
"  I offered to discuss end of life issues, including information on how to make advance directives that the patient could use to name someone who would make medical decisions on their behalf if they became too ill to make themselves.    ___Patient declined  _X_Patient is interested, I provided paper work and offered to discuss.  Jani Cha presented for a  Medicare AWV and comprehensive Health Risk Assessment today. The following components were reviewed and updated:    · Medical history  · Family History  · Social history  · Allergies and Current Medications  · Health Risk Assessment  · Health Maintenance  · Care Team     ** See Completed Assessments for Annual Wellness Visit within the encounter summary.**       The following assessments were completed:  · Living Situation  · CAGE  · Depression Screening  · Timed Get Up and Go  · Whisper Test  · Cognitive Function Screening  ·   ·   · Nutrition Screening  · ADL Screening  · PAQ Screening    Vitals:    03/25/19 1146   BP: 136/78   BP Location: Left arm   Patient Position: Sitting   BP Method: Large (Manual)   Pulse: 67   SpO2: 98%   Weight: 123.1 kg (271 lb 4.4 oz)   Height: 5' 10" (1.778 m)     Body mass index is 38.92 kg/m².  Physical Exam   Constitutional: He is oriented to person, place, and time.   Cardiovascular: Normal rate.   Pulmonary/Chest: Effort normal.   Musculoskeletal: Normal range of motion.   Neurological: He is alert and oriented to person, place, and time.   Skin: Skin is warm. Capillary refill takes less than 2 seconds.   Psychiatric: He has a normal mood and affect. His behavior is normal. Thought content normal.   Vitals reviewed.        Diagnoses and health risks identified today and associated recommendations/orders:    1. Encounter for preventive health examination  Education provided about preventive health examinations and procedures; addressed and discussed patient's health concerns. Additionally, reviewed medical record for risk " factors and documented the results during this encounter.    2. Type 2 diabetes mellitus with diabetic neuropathy, without long-term current use of insulin  Education provided about diabetes, management of blood glucose with diet and activities, monitoring for worsening effects of diabetes.  Reviewed most recent Ha1c and informed patient of complications associated with uncontrolled diabetes.     3. Severe obesity (BMI 35.0-39.9) with comorbidity  Reminded patient to review medical insurance benefits which may include access to fitness centers and health classes.   We discussed diet and exercise for weight loss. Educated about diet, activities, and ways to avoid sedentary lifestyle.     4. Thoracic aorta atherosclerosis  Stable, patient evaluated/monitored by [facility] cardiology dept; continue as advised.     5. Abdominal aortic aneurysm (AAA) without rupture  Stable, patient evaluated/monitored by [facility] vascular surgery dept; continue as advised.     6. Essential hypertension  Presently at goal. Continue as advised regarding dietary and lifestyle modifications.     7. Obstructive sleep apnea syndrome  Stable and controlled. Continue current treatment plan as advised by your provider.     Reviewed health maintenance, educated about recommended examinations, procedures (labs & images), and immunizations. Patient aware of recommendation for updated Ha1c.     Provided Jani with a 5-10 year written screening schedule and personal prevention plan. Recommendations were developed using the USPSTF age appropriate recommendations. Education, counseling, and referrals were provided as needed. After Visit Summary printed and given to patient which includes a list of additional screenings\tests needed.    Follow up in about 1 year (around 3/25/2020) for assessment .    Main Lopez Jr, NP

## 2019-03-26 NOTE — PATIENT INSTRUCTIONS
Counseling and Referral of Other Preventative  (Italic type indicates deductible and co-insurance are waived)    Patient Name: Jani Cha  Today's Date: 3/26/2019    Health Maintenance       Date Due Completion Date    Hemoglobin A1c 02/28/2019 8/28/2018, Patient aware of recommendation for updated Ha1c     Lipid Panel 08/28/2019 8/28/2018    Foot Exam 11/23/2019 11/23/2018 (Done)    Override on 11/23/2018: Done (Karina Vicente, Podiatrist Visit)    Override on 3/16/2018: Done    Override on 11/10/2017: Done    Override on 6/23/2017: Done    Override on 3/8/2017: Done    Eye Exam 03/19/2020 3/19/2019    Override on 3/19/2019: Done    Override on 3/13/2018: Done    Override on 3/13/2018: Done    Override on 3/7/2017: Done    High Dose Statin 03/25/2020 3/25/2019    Aspirin/Antiplatelet Therapy 03/25/2020 3/25/2019    Colonoscopy 12/27/2026 12/27/2016    TETANUS VACCINE 02/20/2027 2/20/2017        No orders of the defined types were placed in this encounter.    The following information is provided to all patients.  This information is to help you find resources for any of the problems found today that may be affecting your health:                Living healthy guide: www.[facility].[place].gov      Understanding Diabetes: www.diabetes.org      Eating healthy: www.cdc.gov/healthyweight      CDC home safety checklist: www.cdc.gov/steadi/patient.html      Agency on Aging: www.goea.[place].gov      Alcoholics anonymous (AA): www.aa.org      Physical Activity: www.wing.nih.gov/tz5wrte      Tobacco use: www.quitwithusla.org

## 2019-03-27 NOTE — PROCEDURES
Routine Foot Care  Date/Time: 3/25/2019 11:00 AM  Performed by: Karina Vicente DPM  Authorized by: Karina Vicente DPM     Consent Done?:  Yes (Verbal)  Hyperkeratotic Skin Lesions?: No      Nail Care Type:  Debride  Location(s): All  (Left 1st Toe, Left 3rd Toe, Left 2nd Toe, Left 4th Toe, Left 5th Toe, Right 1st Toe, Right 2nd Toe, Right 3rd Toe, Right 4th Toe and Right 5th Toe)  Patient tolerance:  Patient tolerated the procedure well with no immediate complications
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
## 2019-03-28 ENCOUNTER — LAB VISIT (OUTPATIENT)
Dept: LAB | Facility: HOSPITAL | Age: 74
End: 2019-03-28
Attending: FAMILY MEDICINE
Payer: MEDICARE

## 2019-03-28 DIAGNOSIS — M10.9 GOUT, UNSPECIFIED CAUSE, UNSPECIFIED CHRONICITY, UNSPECIFIED SITE: ICD-10-CM

## 2019-03-28 DIAGNOSIS — E11.40 TYPE 2 DIABETES MELLITUS WITH DIABETIC NEUROPATHY, WITHOUT LONG-TERM CURRENT USE OF INSULIN: Chronic | ICD-10-CM

## 2019-03-28 DIAGNOSIS — Z12.5 SCREENING PSA (PROSTATE SPECIFIC ANTIGEN): ICD-10-CM

## 2019-03-28 LAB
ALBUMIN SERPL BCP-MCNC: 3.3 G/DL (ref 3.5–5.2)
ALP SERPL-CCNC: 84 U/L (ref 55–135)
ALT SERPL W/O P-5'-P-CCNC: 14 U/L (ref 10–44)
ANION GAP SERPL CALC-SCNC: 7 MMOL/L (ref 8–16)
AST SERPL-CCNC: 12 U/L (ref 10–40)
BASOPHILS # BLD AUTO: 0.06 K/UL (ref 0–0.2)
BASOPHILS NFR BLD: 0.6 % (ref 0–1.9)
BILIRUB SERPL-MCNC: 0.5 MG/DL (ref 0.1–1)
BUN SERPL-MCNC: 27 MG/DL (ref 8–23)
CALCIUM SERPL-MCNC: 10.2 MG/DL (ref 8.7–10.5)
CHLORIDE SERPL-SCNC: 104 MMOL/L (ref 95–110)
CHOLEST SERPL-MCNC: 114 MG/DL (ref 120–199)
CHOLEST/HDLC SERPL: 3.7 {RATIO} (ref 2–5)
CO2 SERPL-SCNC: 29 MMOL/L (ref 23–29)
COMPLEXED PSA SERPL-MCNC: 1.3 NG/ML (ref 0–4)
CREAT SERPL-MCNC: 1.5 MG/DL (ref 0.5–1.4)
DIFFERENTIAL METHOD: ABNORMAL
EOSINOPHIL # BLD AUTO: 0.4 K/UL (ref 0–0.5)
EOSINOPHIL NFR BLD: 4.1 % (ref 0–8)
ERYTHROCYTE [DISTWIDTH] IN BLOOD BY AUTOMATED COUNT: 12.9 % (ref 11.5–14.5)
EST. GFR  (AFRICAN AMERICAN): 53 ML/MIN/1.73 M^2
EST. GFR  (NON AFRICAN AMERICAN): 45.9 ML/MIN/1.73 M^2
ESTIMATED AVG GLUCOSE: 180 MG/DL (ref 68–131)
GLUCOSE SERPL-MCNC: 190 MG/DL (ref 70–110)
HBA1C MFR BLD HPLC: 7.9 % (ref 4–5.6)
HCT VFR BLD AUTO: 47.9 % (ref 40–54)
HDLC SERPL-MCNC: 31 MG/DL (ref 40–75)
HDLC SERPL: 27.2 % (ref 20–50)
HGB BLD-MCNC: 15.6 G/DL (ref 14–18)
IMM GRANULOCYTES # BLD AUTO: 0.03 K/UL (ref 0–0.04)
IMM GRANULOCYTES NFR BLD AUTO: 0.3 % (ref 0–0.5)
LDLC SERPL CALC-MCNC: 51.4 MG/DL (ref 63–159)
LYMPHOCYTES # BLD AUTO: 1.5 K/UL (ref 1–4.8)
LYMPHOCYTES NFR BLD: 15 % (ref 18–48)
MCH RBC QN AUTO: 29.9 PG (ref 27–31)
MCHC RBC AUTO-ENTMCNC: 32.6 G/DL (ref 32–36)
MCV RBC AUTO: 92 FL (ref 82–98)
MONOCYTES # BLD AUTO: 0.7 K/UL (ref 0.3–1)
MONOCYTES NFR BLD: 7 % (ref 4–15)
NEUTROPHILS # BLD AUTO: 7.5 K/UL (ref 1.8–7.7)
NEUTROPHILS NFR BLD: 73 % (ref 38–73)
NONHDLC SERPL-MCNC: 83 MG/DL
NRBC BLD-RTO: 0 /100 WBC
PLATELET # BLD AUTO: 265 K/UL (ref 150–350)
PMV BLD AUTO: 10.3 FL (ref 9.2–12.9)
POTASSIUM SERPL-SCNC: 4.9 MMOL/L (ref 3.5–5.1)
PROT SERPL-MCNC: 6.5 G/DL (ref 6–8.4)
RBC # BLD AUTO: 5.21 M/UL (ref 4.6–6.2)
SODIUM SERPL-SCNC: 140 MMOL/L (ref 136–145)
TRIGL SERPL-MCNC: 158 MG/DL (ref 30–150)
TSH SERPL DL<=0.005 MIU/L-ACNC: 0.92 UIU/ML (ref 0.4–4)
URATE SERPL-MCNC: 6 MG/DL (ref 3.4–7)
WBC # BLD AUTO: 10.3 K/UL (ref 3.9–12.7)

## 2019-03-28 PROCEDURE — 84443 ASSAY THYROID STIM HORMONE: CPT

## 2019-03-28 PROCEDURE — 83036 HEMOGLOBIN GLYCOSYLATED A1C: CPT

## 2019-03-28 PROCEDURE — 84550 ASSAY OF BLOOD/URIC ACID: CPT

## 2019-03-28 PROCEDURE — 85025 COMPLETE CBC W/AUTO DIFF WBC: CPT

## 2019-03-28 PROCEDURE — 84153 ASSAY OF PSA TOTAL: CPT

## 2019-03-28 PROCEDURE — 80061 LIPID PANEL: CPT

## 2019-03-28 PROCEDURE — 80053 COMPREHEN METABOLIC PANEL: CPT

## 2019-03-28 PROCEDURE — 36415 COLL VENOUS BLD VENIPUNCTURE: CPT | Mod: PO

## 2019-03-31 ENCOUNTER — EXTERNAL CHRONIC CARE MANAGEMENT (OUTPATIENT)
Dept: PRIMARY CARE CLINIC | Facility: CLINIC | Age: 74
End: 2019-03-31
Payer: MEDICARE

## 2019-03-31 PROCEDURE — 99490 CHRNC CARE MGMT STAFF 1ST 20: CPT | Mod: PBBFAC,PO | Performed by: FAMILY MEDICINE

## 2019-03-31 PROCEDURE — 99490 CHRNC CARE MGMT STAFF 1ST 20: CPT | Mod: S$PBB,,, | Performed by: FAMILY MEDICINE

## 2019-03-31 PROCEDURE — 99490 PR CHRONIC CARE MGMT, 1ST 20 MIN: ICD-10-PCS | Mod: S$PBB,,, | Performed by: FAMILY MEDICINE

## 2019-04-10 ENCOUNTER — TELEPHONE (OUTPATIENT)
Dept: FAMILY MEDICINE | Facility: CLINIC | Age: 74
End: 2019-04-10

## 2019-04-10 NOTE — PROGRESS NOTES
Please contact patient. Check to see if he still is using [facility], if not, please deactivate. Results were released through [facility].   His a1c increased from 7.7 to 7.9. Goal is to be at 7.5. He needs to continue his current medication regimen and to make changes to his diet to decreased processed carbohydrates to help this number go down. If it continues to go up, I will need to add another medication to his regimen.   Kidney function is decreased but stable   Triglyceride levels have gone up from last visit. Please continue on his statin. I also recommend a low fat diet, regular exercise and counter omega 3 fish oil daily (~1gm/day)

## 2019-04-10 NOTE — TELEPHONE ENCOUNTER
----- Message from Mai kayeiris sent at 4/10/2019 11:51 AM CDT -----  Contact: self  Type:  Patient Returning Call    Who Called: pt    Who Left Message for Patient: Freda    Does the patient know what this is regarding?: No    Would the patient rather a call back or a response via [facility]? Call back     Best Call Back Number:[phone]

## 2019-04-10 NOTE — TELEPHONE ENCOUNTER
----- Message from Laila Bains MD sent at 4/10/2019  9:08 AM CDT -----  Please contact patient. Check to see if he still is using [facility], if not, please deactivate. Results were released through [facility].   His a1c increased from 7.7 to 7.9. Goal is to be at 7.5. He needs to continue his current medication regimen and to make changes to his diet to decreased processed carbohydrates to help this number go down. If it continues to go up, I will need to add another medication to his regimen.   Kidney function is decreased but stable   Triglyceride levels have gone up from last visit. Please continue on his statin. I also recommend a low fat diet, regular exercise and counter omega 3 fish oil daily (~1gm/day)

## 2019-04-30 ENCOUNTER — EXTERNAL CHRONIC CARE MANAGEMENT (OUTPATIENT)
Dept: PRIMARY CARE CLINIC | Facility: CLINIC | Age: 74
End: 2019-04-30
Payer: MEDICARE

## 2019-04-30 PROCEDURE — 99490 PR CHRONIC CARE MGMT, 1ST 20 MIN: ICD-10-PCS | Mod: S$PBB,,, | Performed by: FAMILY MEDICINE

## 2019-04-30 PROCEDURE — 99490 CHRNC CARE MGMT STAFF 1ST 20: CPT | Mod: S$PBB,,, | Performed by: FAMILY MEDICINE

## 2019-04-30 PROCEDURE — 99490 CHRNC CARE MGMT STAFF 1ST 20: CPT | Mod: PBBFAC,PO | Performed by: FAMILY MEDICINE

## 2019-05-14 RX ORDER — ATORVASTATIN CALCIUM 80 MG/1
TABLET, FILM COATED ORAL
Qty: 90 TABLET | Refills: 3 | Status: SHIPPED | OUTPATIENT
Start: 2019-05-14 | End: 2020-05-05

## 2019-05-31 ENCOUNTER — EXTERNAL CHRONIC CARE MANAGEMENT (OUTPATIENT)
Dept: PRIMARY CARE CLINIC | Facility: CLINIC | Age: 74
End: 2019-05-31
Payer: MEDICARE

## 2019-05-31 PROCEDURE — 99490 CHRNC CARE MGMT STAFF 1ST 20: CPT | Mod: S$PBB,,, | Performed by: FAMILY MEDICINE

## 2019-05-31 PROCEDURE — 99490 CHRNC CARE MGMT STAFF 1ST 20: CPT | Mod: PBBFAC,PO | Performed by: FAMILY MEDICINE

## 2019-05-31 PROCEDURE — 99490 PR CHRONIC CARE MGMT, 1ST 20 MIN: ICD-10-PCS | Mod: S$PBB,,, | Performed by: FAMILY MEDICINE

## 2019-06-04 RX ORDER — CLOTRIMAZOLE 1 %
CREAM (GRAM) TOPICAL
Qty: 45 G | Refills: 1 | Status: SHIPPED | OUTPATIENT
Start: 2019-06-04 | End: 2020-06-11 | Stop reason: SDUPTHER

## 2019-06-25 ENCOUNTER — OFFICE VISIT (OUTPATIENT)
Dept: PODIATRY | Facility: CLINIC | Age: 74
End: 2019-06-25
Payer: MEDICARE

## 2019-06-25 VITALS — WEIGHT: 271 LBS | HEIGHT: 70 IN | BODY MASS INDEX: 38.8 KG/M2

## 2019-06-25 DIAGNOSIS — E11.49 TYPE II DIABETES MELLITUS WITH NEUROLOGICAL MANIFESTATIONS: Primary | ICD-10-CM

## 2019-06-25 DIAGNOSIS — M21.619 BUNION: ICD-10-CM

## 2019-06-25 DIAGNOSIS — M20.40 HAMMER TOE, UNSPECIFIED LATERALITY: ICD-10-CM

## 2019-06-25 DIAGNOSIS — B35.1 ONYCHOMYCOSIS DUE TO DERMATOPHYTE: ICD-10-CM

## 2019-06-25 PROCEDURE — 99999 PR PBB SHADOW E&M-EST. PATIENT-LVL III: ICD-10-PCS | Mod: PBBFAC,,, | Performed by: PODIATRIST

## 2019-06-25 PROCEDURE — 99213 OFFICE O/P EST LOW 20 MIN: CPT | Mod: PBBFAC,PO,25 | Performed by: PODIATRIST

## 2019-06-25 PROCEDURE — 11721 DEBRIDE NAIL 6 OR MORE: CPT | Mod: PBBFAC,PO | Performed by: PODIATRIST

## 2019-06-25 PROCEDURE — 99999 PR PBB SHADOW E&M-EST. PATIENT-LVL III: CPT | Mod: PBBFAC,,, | Performed by: PODIATRIST

## 2019-06-25 PROCEDURE — 99499 NO LOS: ICD-10-PCS | Mod: S$PBB,,, | Performed by: PODIATRIST

## 2019-06-25 PROCEDURE — 99499 UNLISTED E&M SERVICE: CPT | Mod: S$PBB,,, | Performed by: PODIATRIST

## 2019-06-25 PROCEDURE — 11721 ROUTINE FOOT CARE: ICD-10-PCS | Mod: Q9,S$PBB,, | Performed by: PODIATRIST

## 2019-06-28 NOTE — PROCEDURES
Routine Foot Care  Date/Time: 6/25/2019 11:30 AM  Performed by: Karina Vicente DPM  Authorized by: Karina Vicente DPM     Consent Done?:  Yes (Verbal)  Hyperkeratotic Skin Lesions?: No      Nail Care Type:  Debride  Location(s): All  (Left 1st Toe, Left 3rd Toe, Left 2nd Toe, Left 4th Toe, Left 5th Toe, Right 1st Toe, Right 2nd Toe, Right 3rd Toe, Right 4th Toe and Right 5th Toe)  Patient tolerance:  Patient tolerated the procedure well with no immediate complications
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
## 2019-06-30 ENCOUNTER — EXTERNAL CHRONIC CARE MANAGEMENT (OUTPATIENT)
Dept: PRIMARY CARE CLINIC | Facility: CLINIC | Age: 74
End: 2019-06-30
Payer: MEDICARE

## 2019-06-30 PROCEDURE — 99490 CHRNC CARE MGMT STAFF 1ST 20: CPT | Mod: PBBFAC,PO | Performed by: FAMILY MEDICINE

## 2019-07-31 ENCOUNTER — EXTERNAL CHRONIC CARE MANAGEMENT (OUTPATIENT)
Dept: PRIMARY CARE CLINIC | Facility: CLINIC | Age: 74
End: 2019-07-31
Payer: MEDICARE

## 2019-07-31 PROCEDURE — 99490 PR CHRONIC CARE MGMT, 1ST 20 MIN: ICD-10-PCS | Mod: S$PBB,,, | Performed by: FAMILY MEDICINE

## 2019-07-31 PROCEDURE — 99490 CHRNC CARE MGMT STAFF 1ST 20: CPT | Mod: PBBFAC,PO | Performed by: FAMILY MEDICINE

## 2019-07-31 PROCEDURE — 99490 CHRNC CARE MGMT STAFF 1ST 20: CPT | Mod: S$PBB,,, | Performed by: FAMILY MEDICINE

## 2019-08-14 DIAGNOSIS — E11.9 DIABETES MELLITUS TYPE II, NON INSULIN DEPENDENT: ICD-10-CM

## 2019-08-14 RX ORDER — GLIPIZIDE 5 MG/1
TABLET, FILM COATED, EXTENDED RELEASE ORAL
Qty: 90 TABLET | Refills: 1 | Status: SHIPPED | OUTPATIENT
Start: 2019-08-14 | End: 2020-02-06

## 2019-08-14 RX ORDER — METFORMIN HYDROCHLORIDE 1000 MG/1
TABLET ORAL
Qty: 180 TABLET | Refills: 1 | Status: SHIPPED | OUTPATIENT
Start: 2019-08-14 | End: 2020-02-06

## 2019-08-15 ENCOUNTER — TELEPHONE (OUTPATIENT)
Dept: FAMILY MEDICINE | Facility: CLINIC | Age: 74
End: 2019-08-15

## 2019-08-15 DIAGNOSIS — K21.9 GASTROESOPHAGEAL REFLUX DISEASE, ESOPHAGITIS PRESENCE NOT SPECIFIED: ICD-10-CM

## 2019-08-15 RX ORDER — PANTOPRAZOLE SODIUM 20 MG/1
40 TABLET, DELAYED RELEASE ORAL DAILY
Qty: 180 TABLET | Refills: 3 | Status: SHIPPED | OUTPATIENT
Start: 2019-08-15 | End: 2020-08-09

## 2019-08-15 NOTE — TELEPHONE ENCOUNTER
Patients pharmacy sent a fax stating that the PANTOPRAZOLE SOD 40MG is on back order and they would like you to send in a new prescription for the 20mg instead.

## 2019-08-31 ENCOUNTER — EXTERNAL CHRONIC CARE MANAGEMENT (OUTPATIENT)
Dept: PRIMARY CARE CLINIC | Facility: CLINIC | Age: 74
End: 2019-08-31
Payer: MEDICARE

## 2019-08-31 PROCEDURE — 99490 PR CHRONIC CARE MGMT, 1ST 20 MIN: ICD-10-PCS | Mod: S$PBB,,, | Performed by: FAMILY MEDICINE

## 2019-08-31 PROCEDURE — 99490 CHRNC CARE MGMT STAFF 1ST 20: CPT | Mod: S$PBB,,, | Performed by: FAMILY MEDICINE

## 2019-08-31 PROCEDURE — 99490 CHRNC CARE MGMT STAFF 1ST 20: CPT | Mod: PBBFAC,PO | Performed by: FAMILY MEDICINE

## 2019-09-27 ENCOUNTER — PATIENT OUTREACH (OUTPATIENT)
Dept: ADMINISTRATIVE | Facility: OTHER | Age: 74
End: 2019-09-27

## 2019-09-27 DIAGNOSIS — E11.9 TYPE 2 DIABETES MELLITUS WITHOUT COMPLICATION, UNSPECIFIED WHETHER LONG TERM INSULIN USE: Primary | ICD-10-CM

## 2019-10-01 ENCOUNTER — OFFICE VISIT (OUTPATIENT)
Dept: PODIATRY | Facility: CLINIC | Age: 74
End: 2019-10-01
Payer: MEDICARE

## 2019-10-01 VITALS
SYSTOLIC BLOOD PRESSURE: 157 MMHG | HEIGHT: 70 IN | HEART RATE: 58 BPM | BODY MASS INDEX: 37.08 KG/M2 | WEIGHT: 259 LBS | DIASTOLIC BLOOD PRESSURE: 91 MMHG

## 2019-10-01 DIAGNOSIS — B35.1 ONYCHOMYCOSIS DUE TO DERMATOPHYTE: Primary | ICD-10-CM

## 2019-10-01 DIAGNOSIS — E11.49 TYPE II DIABETES MELLITUS WITH NEUROLOGICAL MANIFESTATIONS: ICD-10-CM

## 2019-10-01 PROCEDURE — 11721 ROUTINE FOOT CARE: ICD-10-PCS | Mod: S$PBB,Q9,, | Performed by: PODIATRIST

## 2019-10-01 PROCEDURE — 99999 PR PBB SHADOW E&M-EST. PATIENT-LVL II: ICD-10-PCS | Mod: PBBFAC,,, | Performed by: PODIATRIST

## 2019-10-01 PROCEDURE — 99499 NO LOS: ICD-10-PCS | Mod: S$PBB,,, | Performed by: PODIATRIST

## 2019-10-01 PROCEDURE — 99212 OFFICE O/P EST SF 10 MIN: CPT | Mod: PBBFAC,PO,25 | Performed by: PODIATRIST

## 2019-10-01 PROCEDURE — 99999 PR PBB SHADOW E&M-EST. PATIENT-LVL II: CPT | Mod: PBBFAC,,, | Performed by: PODIATRIST

## 2019-10-01 PROCEDURE — 11721 DEBRIDE NAIL 6 OR MORE: CPT | Mod: PBBFAC,PO | Performed by: PODIATRIST

## 2019-10-01 PROCEDURE — 99499 UNLISTED E&M SERVICE: CPT | Mod: S$PBB,,, | Performed by: PODIATRIST
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
## 2019-10-01 NOTE — PROCEDURES
Routine Foot Care  Date/Time: 10/1/2019 9:15 AM  Performed by: Karnia Vicente DPM  Authorized by: Karina Vicente DPM     Consent Done?:  Yes (Verbal)  Hyperkeratotic Skin Lesions?: No      Nail Care Type:  Debride  Location(s): All  (Left 1st Toe, Left 3rd Toe, Left 2nd Toe, Left 4th Toe, Left 5th Toe, Right 1st Toe, Right 2nd Toe, Right 3rd Toe, Right 4th Toe and Right 5th Toe)  Patient tolerance:  Patient tolerated the procedure well with no immediate complications

## 2019-12-09 NOTE — LETTER
January 31, 2019      Laila Bains MD  [address] [facility]  [place] [address]           [facility]  [address] [facility] [address]  [place] [address]  Phone: [phone]  Fax: [phone]          Patient: Jani Cha   MR Number: [id number]   YOB: 1945   Date of Visit: 1/31/2019       Dear Dr. Laila Bains:    Thank you for referring Jani Cha to me for evaluation. Attached you will find relevant portions of my assessment and plan of care.    If you have questions, please do not hesitate to call me. I look forward to following Jani Cha along with you.    Sincerely,    Vladimir Echavarria MD    Enclosure  CC:  No Recipients    If you would like to receive this communication electronically, please contact [email or web address] or [phone] to request more information on [employer] Link access.    For providers and/or their staff who would like to refer a patient to [facility], please contact us through our one-stop-shop provider referral line, [facility] [place], at [phone].    If you feel you have received this communication in error or would no longer like to receive these types of communications, please e-mail [email or web address]         
PAST MEDICAL HISTORY:  Diverticulitis     Dyslipidemia     Endometriosis     H/O basal cell carcinoma excision nose    Migraine headache     Mitral valve prolapse

## 2019-12-16 ENCOUNTER — TELEPHONE (OUTPATIENT)
Dept: VASCULAR SURGERY | Facility: CLINIC | Age: 74
End: 2019-12-16

## 2019-12-16 DIAGNOSIS — I77.9 AORTIC DISEASE: ICD-10-CM

## 2019-12-16 NOTE — TELEPHONE ENCOUNTER
----- Message from Vladimir Clemente sent at 12/16/2019  8:34 AM CST -----  Contact: Pt      The Pt states that he received a letter telling him to schedule an appt with [facility].  Please contact the Pt to schedule.    Phone # [phone] or [phone]

## 2019-12-16 NOTE — TELEPHONE ENCOUNTER
Call and scheduled appointments. Gave patient date and time of appointments. Will mail out appointment slips.

## 2019-12-31 ENCOUNTER — EXTERNAL CHRONIC CARE MANAGEMENT (OUTPATIENT)
Dept: PRIMARY CARE CLINIC | Facility: CLINIC | Age: 74
End: 2019-12-31
Payer: MEDICARE

## 2019-12-31 PROCEDURE — 99490 PR CHRONIC CARE MGMT, 1ST 20 MIN: ICD-10-PCS | Mod: S$PBB,,, | Performed by: FAMILY MEDICINE

## 2019-12-31 PROCEDURE — 99490 CHRNC CARE MGMT STAFF 1ST 20: CPT | Mod: S$PBB,,, | Performed by: FAMILY MEDICINE

## 2019-12-31 PROCEDURE — 99490 CHRNC CARE MGMT STAFF 1ST 20: CPT | Mod: PBBFAC,PO | Performed by: FAMILY MEDICINE

## 2020-01-03 ENCOUNTER — PATIENT OUTREACH (OUTPATIENT)
Dept: ADMINISTRATIVE | Facility: OTHER | Age: 75
End: 2020-01-03

## 2020-01-07 ENCOUNTER — OFFICE VISIT (OUTPATIENT)
Dept: PODIATRY | Facility: CLINIC | Age: 75
End: 2020-01-07
Payer: MEDICARE

## 2020-01-07 VITALS
BODY MASS INDEX: 37.09 KG/M2 | SYSTOLIC BLOOD PRESSURE: 136 MMHG | WEIGHT: 259.06 LBS | HEIGHT: 70 IN | DIASTOLIC BLOOD PRESSURE: 86 MMHG

## 2020-01-07 DIAGNOSIS — B35.1 ONYCHOMYCOSIS DUE TO DERMATOPHYTE: Primary | ICD-10-CM

## 2020-01-07 DIAGNOSIS — L84 CORN OR CALLUS: ICD-10-CM

## 2020-01-07 DIAGNOSIS — E11.49 TYPE II DIABETES MELLITUS WITH NEUROLOGICAL MANIFESTATIONS: ICD-10-CM

## 2020-01-07 PROCEDURE — 99999 PR PBB SHADOW E&M-EST. PATIENT-LVL III: CPT | Mod: PBBFAC,,, | Performed by: PODIATRIST

## 2020-01-07 PROCEDURE — 99499 NO LOS: ICD-10-PCS | Mod: S$PBB,,, | Performed by: PODIATRIST

## 2020-01-07 PROCEDURE — 11721 PR DEBRIDEMENT OF NAILS, 6 OR MORE: ICD-10-PCS | Mod: 59,Q9,S$PBB, | Performed by: PODIATRIST

## 2020-01-07 PROCEDURE — 11056 PR TRIM BENIGN HYPERKERATOTIC SKIN LESION,2-4: ICD-10-PCS | Mod: Q9,S$PBB,, | Performed by: PODIATRIST

## 2020-01-07 PROCEDURE — 11721 DEBRIDE NAIL 6 OR MORE: CPT | Mod: 59,Q9,S$PBB, | Performed by: PODIATRIST

## 2020-01-07 PROCEDURE — 11721 DEBRIDE NAIL 6 OR MORE: CPT | Mod: PBBFAC,PO | Performed by: PODIATRIST

## 2020-01-07 PROCEDURE — 99499 UNLISTED E&M SERVICE: CPT | Mod: S$PBB,,, | Performed by: PODIATRIST

## 2020-01-07 PROCEDURE — 99999 PR PBB SHADOW E&M-EST. PATIENT-LVL III: ICD-10-PCS | Mod: PBBFAC,,, | Performed by: PODIATRIST

## 2020-01-07 PROCEDURE — 11056 PARNG/CUTG B9 HYPRKR LES 2-4: CPT | Mod: PBBFAC,PO | Performed by: PODIATRIST

## 2020-01-07 PROCEDURE — 11056 PARNG/CUTG B9 HYPRKR LES 2-4: CPT | Mod: Q9,S$PBB,, | Performed by: PODIATRIST

## 2020-01-07 PROCEDURE — 99213 OFFICE O/P EST LOW 20 MIN: CPT | Mod: PBBFAC,PO | Performed by: PODIATRIST
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
## 2020-01-21 ENCOUNTER — PATIENT OUTREACH (OUTPATIENT)
Dept: ADMINISTRATIVE | Facility: OTHER | Age: 75
End: 2020-01-21

## 2020-01-24 ENCOUNTER — OFFICE VISIT (OUTPATIENT)
Dept: CARDIOLOGY | Facility: CLINIC | Age: 75
End: 2020-01-24
Payer: MEDICARE

## 2020-01-24 VITALS
DIASTOLIC BLOOD PRESSURE: 80 MMHG | HEART RATE: 65 BPM | BODY MASS INDEX: 37.15 KG/M2 | HEIGHT: 70 IN | SYSTOLIC BLOOD PRESSURE: 154 MMHG | WEIGHT: 259.5 LBS

## 2020-01-24 DIAGNOSIS — N18.30 CKD (CHRONIC KIDNEY DISEASE) STAGE 3, GFR 30-59 ML/MIN: ICD-10-CM

## 2020-01-24 DIAGNOSIS — E78.00 PURE HYPERCHOLESTEROLEMIA: Chronic | ICD-10-CM

## 2020-01-24 DIAGNOSIS — I71.40 ABDOMINAL AORTIC ANEURYSM (AAA) WITHOUT RUPTURE: ICD-10-CM

## 2020-01-24 DIAGNOSIS — I25.10 CORONARY ARTERY DISEASE INVOLVING NATIVE CORONARY ARTERY OF NATIVE HEART WITHOUT ANGINA PECTORIS: Primary | Chronic | ICD-10-CM

## 2020-01-24 DIAGNOSIS — I10 ESSENTIAL HYPERTENSION: Chronic | ICD-10-CM

## 2020-01-24 DIAGNOSIS — E11.40 TYPE 2 DIABETES MELLITUS WITH DIABETIC NEUROPATHY, WITHOUT LONG-TERM CURRENT USE OF INSULIN: Chronic | ICD-10-CM

## 2020-01-24 DIAGNOSIS — E66.01 SEVERE OBESITY (BMI 35.0-39.9) WITH COMORBIDITY: Chronic | ICD-10-CM

## 2020-01-24 DIAGNOSIS — Z95.1 S/P CABG X 4: Chronic | ICD-10-CM

## 2020-01-24 PROCEDURE — 1126F PR PAIN SEVERITY QUANTIFIED, NO PAIN PRESENT: ICD-10-PCS | Mod: ,,, | Performed by: INTERNAL MEDICINE

## 2020-01-24 PROCEDURE — 1126F AMNT PAIN NOTED NONE PRSNT: CPT | Mod: ,,, | Performed by: INTERNAL MEDICINE

## 2020-01-24 PROCEDURE — 1159F PR MEDICATION LIST DOCUMENTED IN MEDICAL RECORD: ICD-10-PCS | Mod: ,,, | Performed by: INTERNAL MEDICINE

## 2020-01-24 PROCEDURE — 99999 PR PBB SHADOW E&M-EST. PATIENT-LVL III: ICD-10-PCS | Mod: PBBFAC,,, | Performed by: INTERNAL MEDICINE

## 2020-01-24 PROCEDURE — 1159F MED LIST DOCD IN RCRD: CPT | Mod: ,,, | Performed by: INTERNAL MEDICINE

## 2020-01-24 PROCEDURE — 99214 PR OFFICE/OUTPT VISIT, EST, LEVL IV, 30-39 MIN: ICD-10-PCS | Mod: S$PBB,,, | Performed by: INTERNAL MEDICINE

## 2020-01-24 PROCEDURE — 99213 OFFICE O/P EST LOW 20 MIN: CPT | Mod: PBBFAC | Performed by: INTERNAL MEDICINE

## 2020-01-24 PROCEDURE — 99999 PR PBB SHADOW E&M-EST. PATIENT-LVL III: CPT | Mod: PBBFAC,,, | Performed by: INTERNAL MEDICINE

## 2020-01-24 PROCEDURE — 99214 OFFICE O/P EST MOD 30 MIN: CPT | Mod: S$PBB,,, | Performed by: INTERNAL MEDICINE

## 2020-01-24 RX ORDER — VALSARTAN AND HYDROCHLOROTHIAZIDE 320; 12.5 MG/1; MG/1
1 TABLET, FILM COATED ORAL DAILY
Qty: 90 TABLET | Refills: 3 | Status: SHIPPED | OUTPATIENT
Start: 2020-01-24 | End: 2021-01-14

## 2020-01-24 NOTE — PROGRESS NOTES
"Subjective:   Patient ID:  Jani Cha is a 74 y.o. male who presents for follow-up of Coronary Artery Disease      HPI:   Pt has CAD s/p CABG in 5-2006, abd AAA, JAYSON on CPAP, DM, HTN and dyslipidemia.     Mr. Cha denies any CP, BUCK, palpitations, TIA's, syncope or presyncope. He has c/o is L hip pain that occurs when he stands on the hip. He also has JAYSON and did have ENT surgery.      Mr. Cha does not have a regimented exercise program, however does lead an active lifestyle and is not limited by any cardiac symptoms.  Doesn't check BPs or BG at home.     Echo from 11-10 was stable demonstrated preserved LV function (low normal) and diastolic dysfunction.  PET from 2-2017 demonstrated a small amount of ischemia in the inferobasilar wall.     He has lost ~ 15 lbs intentionally.     CT scan for renal stones in  demonstrated Infrarenal abdominal aortic aneurysm measuring 3.5-cm and diffuse dilatation of the descending thoracic aorta measuring 3.9-cm.    CTA 2018 The distal descending thoracic aorta remains slightly dilated at 4.3 cm, previously 3.9 cm. There is a persistent, infrarenal abdominal aortic aneurysm measuring approximately 3.6 cm, partially thrombosed, previously 3.5 cm. There is significant extensive atherosclerotic plaque throughout the aorta. Celiac, SMA, and ROXANNE are patent.  Now followed by vasc Surgery.  Next CT and f/u within the next month.         Vitals:    01/24/20 0916   BP: (!) 154/80   Pulse: 65   Weight: 117.7 kg (259 lb 7.7 oz)   Height: 5' 10" (1.778 m)     Body mass index is 37.23 kg/m².  CrCl cannot be calculated (Patient's most recent lab result is older than the maximum 7 days allowed.).    Lab Results   Component Value Date     03/28/2019    K 4.9 03/28/2019     03/28/2019    CO2 29 03/28/2019    BUN 27 (H) 03/28/2019    CREATININE 1.5 (H) 03/28/2019     (H) 03/28/2019    HGBA1C 7.9 (H) 03/28/2019    MG 2.3 06/18/2006    AST 12 03/28/2019    " ALT 14 03/28/2019    ALBUMIN 3.3 (L) 03/28/2019    PROT 6.5 03/28/2019    BILITOT 0.5 03/28/2019    WBC 10.30 03/28/2019    HGB 15.6 03/28/2019    HCT 47.9 03/28/2019    MCV 92 03/28/2019     03/28/2019    INR 1.4 (H) 02/16/2009    PSA 1.3 03/28/2019    TSH 0.918 03/28/2019    CHOL 114 (L) 03/28/2019    HDL 31 (L) 03/28/2019    LDLCALC 51.4 (L) 03/28/2019    TRIG 158 (H) 03/28/2019       Current Outpatient Medications   Medication Sig    ammonium lactate 12 % Crea Apply 2 g topically once daily.    aspirin (ECOTRIN) 81 MG EC tablet Take 81 mg by mouth once daily.      atorvastatin (LIPITOR) 80 MG tablet TAKE 1 TABLET BY MOUTH ONCE DAILY    carvedilol (COREG) 25 MG tablet Take 1 tablet (25 mg total) by mouth 2 (two) times daily with meals.    clotrimazole (LOTRIMIN) 1 % cream APPLY  CREAM TOPICALLY TO AFFECTED AREA TWICE DAILY    clotrimazole-betamethasone 1-0.05% (LOTRISONE) cream Apply topically 2 (two) times daily.    econazole nitrate 1 % cream Apply topically once daily.    glipiZIDE (GLUCOTROL) 5 MG TR24 TAKE 1 TABLET BY MOUTH ONCE DAILY WITH  BREAKFAST    indomethacin (INDOCIN) 50 MG capsule Take 1 capsule (50 mg total) by mouth 3 (three) times daily with meals.    metFORMIN (GLUCOPHAGE) 1000 MG tablet TAKE 1 TABLET BY MOUTH TWICE DAILY WITH MEALS    nitroGLYCERIN (NITROSTAT) 0.4 MG SL tablet Place 1 tablet (0.4 mg total) under the tongue every 5 (five) minutes as needed.    pantoprazole (PROTONIX) 20 MG tablet Take 2 tablets (40 mg total) by mouth once daily.    valsartan-hydrochlorothiazide (DIOVAN HCT) 320-12.5 mg per tablet Take 1 tablet by mouth once daily.     No current facility-administered medications for this visit.        Review of Systems   Constitution: Negative for decreased appetite, malaise/fatigue, weight gain and weight loss.   Eyes: Negative for visual disturbance.   Cardiovascular: Negative for chest pain, claudication, dyspnea on exertion, irregular heartbeat,  orthopnea, palpitations, paroxysmal nocturnal dyspnea and syncope.   Respiratory: Negative for cough, shortness of breath and snoring.    Skin: Negative for rash.   Musculoskeletal: Positive for arthritis. Negative for muscle cramps, muscle weakness and myalgias.   Gastrointestinal: Negative for abdominal pain, anorexia, change in bowel habit and nausea.   Genitourinary: Negative for dysuria and frequency.   Neurological: Negative for excessive daytime sleepiness, dizziness, headaches, loss of balance, numbness and weakness.   Psychiatric/Behavioral: Negative for depression.       Objective:   Physical Exam   Constitutional: He is oriented to person, place, and time. He appears well-developed and well-nourished.   HENT:   Head: Normocephalic and atraumatic.   Cardiovascular: Normal rate, regular rhythm, normal heart sounds and intact distal pulses. Exam reveals no gallop and no friction rub.   No murmur heard.  Pulmonary/Chest: Effort normal and breath sounds normal.   Neurological: He is alert and oriented to person, place, and time.   Psychiatric: He has a normal mood and affect. His behavior is normal. Judgment and thought content normal.       Assessment:     1. Coronary artery disease involving native coronary artery of native heart without angina pectoris    2. S/P CABG x 4    3. Abdominal aortic aneurysm (AAA) without rupture    4. Essential hypertension    5. Pure hypercholesterolemia    6. Type 2 diabetes mellitus with diabetic neuropathy, without long-term current use of insulin    7. Severe obesity (BMI 35.0-39.9) with comorbidity    8. CKD (chronic kidney disease) stage 3, GFR 30-59 ml/min        Plan:   From a cardiac standpoint, pt is doing well and is clinically stable. Pts lipid profile at goal. Pt does not require any cardiac testing at this time.   BPs elevated in clinic persistently (doesn't check at home).  Change losartan to diovan-hct - 320-12.5.  BMP in 1 week  AAA - followed by vascular with  appt next month.      Orders Placed This Encounter   Procedures    Basic metabolic panel

## 2020-01-31 ENCOUNTER — EXTERNAL CHRONIC CARE MANAGEMENT (OUTPATIENT)
Dept: PRIMARY CARE CLINIC | Facility: CLINIC | Age: 75
End: 2020-01-31
Payer: MEDICARE

## 2020-01-31 ENCOUNTER — PATIENT MESSAGE (OUTPATIENT)
Dept: CARDIOLOGY | Facility: CLINIC | Age: 75
End: 2020-01-31

## 2020-01-31 ENCOUNTER — LAB VISIT (OUTPATIENT)
Dept: LAB | Facility: HOSPITAL | Age: 75
End: 2020-01-31
Attending: INTERNAL MEDICINE
Payer: MEDICARE

## 2020-01-31 DIAGNOSIS — I10 ESSENTIAL HYPERTENSION: Chronic | ICD-10-CM

## 2020-01-31 LAB
ANION GAP SERPL CALC-SCNC: 8 MMOL/L (ref 8–16)
BUN SERPL-MCNC: 23 MG/DL (ref 8–23)
CALCIUM SERPL-MCNC: 9.8 MG/DL (ref 8.7–10.5)
CHLORIDE SERPL-SCNC: 104 MMOL/L (ref 95–110)
CO2 SERPL-SCNC: 29 MMOL/L (ref 23–29)
CREAT SERPL-MCNC: 1.5 MG/DL (ref 0.5–1.4)
EST. GFR  (AFRICAN AMERICAN): 52.6 ML/MIN/1.73 M^2
EST. GFR  (NON AFRICAN AMERICAN): 45.5 ML/MIN/1.73 M^2
GLUCOSE SERPL-MCNC: 168 MG/DL (ref 70–110)
POTASSIUM SERPL-SCNC: 4.6 MMOL/L (ref 3.5–5.1)
SODIUM SERPL-SCNC: 141 MMOL/L (ref 136–145)

## 2020-01-31 PROCEDURE — 99490 CHRNC CARE MGMT STAFF 1ST 20: CPT | Mod: S$PBB,,, | Performed by: FAMILY MEDICINE

## 2020-01-31 PROCEDURE — 99490 PR CHRONIC CARE MGMT, 1ST 20 MIN: ICD-10-PCS | Mod: S$PBB,,, | Performed by: FAMILY MEDICINE

## 2020-01-31 PROCEDURE — 99490 CHRNC CARE MGMT STAFF 1ST 20: CPT | Mod: PBBFAC,PO | Performed by: FAMILY MEDICINE

## 2020-01-31 PROCEDURE — 80048 BASIC METABOLIC PNL TOTAL CA: CPT

## 2020-02-04 ENCOUNTER — PATIENT OUTREACH (OUTPATIENT)
Dept: ADMINISTRATIVE | Facility: OTHER | Age: 75
End: 2020-02-04

## 2020-02-04 ENCOUNTER — HOSPITAL ENCOUNTER (OUTPATIENT)
Dept: RADIOLOGY | Facility: HOSPITAL | Age: 75
Discharge: HOME | End: 2020-02-04
Attending: SURGERY
Payer: MEDICARE

## 2020-02-04 DIAGNOSIS — I77.9 AORTIC DISEASE: ICD-10-CM

## 2020-02-04 PROCEDURE — 71250 CT THORAX DX C-: CPT | Mod: TC

## 2020-02-04 PROCEDURE — 74150 CT ABDOMEN W/O CONTRAST: CPT | Mod: 26,,, | Performed by: RADIOLOGY

## 2020-02-04 PROCEDURE — 71250 CT THORAX DX C-: CPT | Mod: 26,,, | Performed by: RADIOLOGY

## 2020-02-04 PROCEDURE — 74150 CT CHEST ABDOMEN WITHOUT CONTRAST (XPD): ICD-10-PCS | Mod: 26,,, | Performed by: RADIOLOGY

## 2020-02-04 PROCEDURE — 71250 CT CHEST ABDOMEN WITHOUT CONTRAST (XPD): ICD-10-PCS | Mod: 26,,, | Performed by: RADIOLOGY

## 2020-02-05 ENCOUNTER — TELEPHONE (OUTPATIENT)
Dept: VASCULAR SURGERY | Facility: CLINIC | Age: 75
End: 2020-02-05

## 2020-02-06 ENCOUNTER — OFFICE VISIT (OUTPATIENT)
Dept: VASCULAR SURGERY | Facility: CLINIC | Age: 75
End: 2020-02-06
Payer: MEDICARE

## 2020-02-06 VITALS
HEIGHT: 70 IN | WEIGHT: 257.69 LBS | SYSTOLIC BLOOD PRESSURE: 134 MMHG | BODY MASS INDEX: 36.89 KG/M2 | HEART RATE: 60 BPM | DIASTOLIC BLOOD PRESSURE: 72 MMHG

## 2020-02-06 DIAGNOSIS — I10 ESSENTIAL HYPERTENSION: Chronic | ICD-10-CM

## 2020-02-06 DIAGNOSIS — I71.40 ABDOMINAL AORTIC ANEURYSM (AAA) WITHOUT RUPTURE: ICD-10-CM

## 2020-02-06 DIAGNOSIS — E11.9 DIABETES MELLITUS TYPE II, NON INSULIN DEPENDENT: ICD-10-CM

## 2020-02-06 PROCEDURE — 99214 PR OFFICE/OUTPT VISIT, EST, LEVL IV, 30-39 MIN: ICD-10-PCS | Mod: S$PBB,,, | Performed by: SURGERY

## 2020-02-06 PROCEDURE — 99999 PR PBB SHADOW E&M-EST. PATIENT-LVL III: CPT | Mod: PBBFAC,,, | Performed by: SURGERY

## 2020-02-06 PROCEDURE — 99999 PR PBB SHADOW E&M-EST. PATIENT-LVL III: ICD-10-PCS | Mod: PBBFAC,,, | Performed by: SURGERY

## 2020-02-06 PROCEDURE — 99214 OFFICE O/P EST MOD 30 MIN: CPT | Mod: S$PBB,,, | Performed by: SURGERY

## 2020-02-06 PROCEDURE — 99213 OFFICE O/P EST LOW 20 MIN: CPT | Mod: PBBFAC | Performed by: SURGERY

## 2020-02-06 RX ORDER — GLIPIZIDE 5 MG/1
TABLET, FILM COATED, EXTENDED RELEASE ORAL
Qty: 90 TABLET | Refills: 0 | Status: SHIPPED | OUTPATIENT
Start: 2020-02-06 | End: 2020-05-05

## 2020-02-06 RX ORDER — METFORMIN HYDROCHLORIDE 1000 MG/1
TABLET ORAL
Qty: 180 TABLET | Refills: 0 | Status: SHIPPED | OUTPATIENT
Start: 2020-02-06 | End: 2020-05-05

## 2020-02-06 RX ORDER — CARVEDILOL 25 MG/1
TABLET ORAL
Qty: 60 TABLET | Refills: 6 | Status: SHIPPED | OUTPATIENT
Start: 2020-02-06 | End: 2020-08-21 | Stop reason: SDUPTHER

## 2020-02-06 NOTE — LETTER
February 6, 2020        Laila Bains MD  [address] [facility]  [place] [address]             [facility]  [address]  [facility] [address]  Phone: [phone]  Fax: [phone]   Patient: Jani Cha   MR Number: [id number]   YOB: 1945   Date of Visit: 2/6/2020       Dear Dr. Bains:    Thank you for referring Jani Cha to me for evaluation. Below are the relevant portions of my assessment and plan of care.            If you have questions, please do not hesitate to call me. I look forward to following Jani along with you.    Sincerely,      Vladimir Echavarria MD           CC  No Recipients
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
## 2020-02-29 ENCOUNTER — EXTERNAL CHRONIC CARE MANAGEMENT (OUTPATIENT)
Dept: PRIMARY CARE CLINIC | Facility: CLINIC | Age: 75
End: 2020-02-29
Payer: MEDICARE

## 2020-02-29 PROCEDURE — 99490 CHRNC CARE MGMT STAFF 1ST 20: CPT | Mod: PBBFAC,PO | Performed by: FAMILY MEDICINE

## 2020-02-29 PROCEDURE — 99490 PR CHRONIC CARE MGMT, 1ST 20 MIN: ICD-10-PCS | Mod: S$PBB,,, | Performed by: FAMILY MEDICINE

## 2020-02-29 PROCEDURE — 99490 CHRNC CARE MGMT STAFF 1ST 20: CPT | Mod: S$PBB,,, | Performed by: FAMILY MEDICINE

## 2020-03-31 ENCOUNTER — EXTERNAL CHRONIC CARE MANAGEMENT (OUTPATIENT)
Dept: PRIMARY CARE CLINIC | Facility: CLINIC | Age: 75
End: 2020-03-31
Payer: MEDICARE

## 2020-03-31 PROCEDURE — 99490 CHRNC CARE MGMT STAFF 1ST 20: CPT | Mod: PBBFAC,PO | Performed by: FAMILY MEDICINE

## 2020-03-31 PROCEDURE — 99490 CHRNC CARE MGMT STAFF 1ST 20: CPT | Mod: S$PBB,,, | Performed by: FAMILY MEDICINE

## 2020-03-31 PROCEDURE — 99490 PR CHRONIC CARE MGMT, 1ST 20 MIN: ICD-10-PCS | Mod: S$PBB,,, | Performed by: FAMILY MEDICINE

## 2020-04-30 ENCOUNTER — EXTERNAL CHRONIC CARE MANAGEMENT (OUTPATIENT)
Dept: PRIMARY CARE CLINIC | Facility: CLINIC | Age: 75
End: 2020-04-30
Payer: MEDICARE

## 2020-04-30 PROCEDURE — 99490 PR CHRONIC CARE MGMT, 1ST 20 MIN: ICD-10-PCS | Mod: S$PBB,,, | Performed by: FAMILY MEDICINE

## 2020-04-30 PROCEDURE — 99490 CHRNC CARE MGMT STAFF 1ST 20: CPT | Mod: S$PBB,,, | Performed by: FAMILY MEDICINE

## 2020-04-30 PROCEDURE — 99490 CHRNC CARE MGMT STAFF 1ST 20: CPT | Mod: PBBFAC,PO | Performed by: FAMILY MEDICINE

## 2020-05-04 DIAGNOSIS — E11.9 DIABETES MELLITUS TYPE II, NON INSULIN DEPENDENT: ICD-10-CM

## 2020-05-05 RX ORDER — ATORVASTATIN CALCIUM 80 MG/1
TABLET, FILM COATED ORAL
Qty: 90 TABLET | Refills: 3 | Status: SHIPPED | OUTPATIENT
Start: 2020-05-05 | End: 2021-06-10

## 2020-05-05 RX ORDER — GLIPIZIDE 5 MG/1
TABLET, FILM COATED, EXTENDED RELEASE ORAL
Qty: 30 TABLET | Refills: 0 | Status: SHIPPED | OUTPATIENT
Start: 2020-05-05 | End: 2020-06-11 | Stop reason: SDUPTHER

## 2020-05-05 RX ORDER — METFORMIN HYDROCHLORIDE 1000 MG/1
TABLET ORAL
Qty: 60 TABLET | Refills: 0 | Status: SHIPPED | OUTPATIENT
Start: 2020-05-05 | End: 2020-06-11 | Stop reason: SDUPTHER

## 2020-05-05 NOTE — TELEPHONE ENCOUNTER
Please contact patient. Patient will need to schedule an appointment for future refills.     30 days prescribed  Please schedule  appt

## 2020-05-28 ENCOUNTER — TELEPHONE (OUTPATIENT)
Dept: FAMILY MEDICINE | Facility: CLINIC | Age: 75
End: 2020-05-28

## 2020-05-28 ENCOUNTER — PATIENT OUTREACH (OUTPATIENT)
Dept: ADMINISTRATIVE | Facility: HOSPITAL | Age: 75
End: 2020-05-28

## 2020-05-28 DIAGNOSIS — E11.40 TYPE 2 DIABETES MELLITUS WITH DIABETIC NEUROPATHY, WITHOUT LONG-TERM CURRENT USE OF INSULIN: Primary | ICD-10-CM

## 2020-05-28 DIAGNOSIS — Z12.11 SPECIAL SCREENING FOR MALIGNANT NEOPLASM OF COLON: ICD-10-CM

## 2020-05-28 DIAGNOSIS — Z12.5 SCREENING PSA (PROSTATE SPECIFIC ANTIGEN): ICD-10-CM

## 2020-05-28 DIAGNOSIS — E11.40 TYPE 2 DIABETES MELLITUS WITH DIABETIC NEUROPATHY, WITHOUT LONG-TERM CURRENT USE OF INSULIN: Chronic | ICD-10-CM

## 2020-05-28 DIAGNOSIS — E78.00 PURE HYPERCHOLESTEROLEMIA: Primary | Chronic | ICD-10-CM

## 2020-05-28 DIAGNOSIS — I10 ESSENTIAL HYPERTENSION: Chronic | ICD-10-CM

## 2020-05-28 NOTE — TELEPHONE ENCOUNTER
----- Message from Veronica Garnett LPN sent at 5/28/2020  8:12 AM CDT -----  Regarding: Additional Labs  Good morning Dr. Bains,    The patient has an appointment scheduled for 06/11/20. I have ordered a cmp and lipid profile. Please order any additional labs and I will link them.    Thanks,    Veronica

## 2020-05-31 ENCOUNTER — EXTERNAL CHRONIC CARE MANAGEMENT (OUTPATIENT)
Dept: PRIMARY CARE CLINIC | Facility: CLINIC | Age: 75
End: 2020-05-31
Payer: MEDICARE

## 2020-05-31 PROCEDURE — 99490 CHRNC CARE MGMT STAFF 1ST 20: CPT | Mod: S$PBB,,, | Performed by: FAMILY MEDICINE

## 2020-05-31 PROCEDURE — 99490 CHRNC CARE MGMT STAFF 1ST 20: CPT | Mod: PBBFAC,PO | Performed by: FAMILY MEDICINE

## 2020-05-31 PROCEDURE — 99490 PR CHRONIC CARE MGMT, 1ST 20 MIN: ICD-10-PCS | Mod: S$PBB,,, | Performed by: FAMILY MEDICINE

## 2020-06-08 ENCOUNTER — PATIENT OUTREACH (OUTPATIENT)
Dept: ADMINISTRATIVE | Facility: OTHER | Age: 75
End: 2020-06-08

## 2020-06-09 ENCOUNTER — LAB VISIT (OUTPATIENT)
Dept: LAB | Facility: HOSPITAL | Age: 75
End: 2020-06-09
Attending: FAMILY MEDICINE
Payer: MEDICARE

## 2020-06-09 ENCOUNTER — OFFICE VISIT (OUTPATIENT)
Dept: PODIATRY | Facility: CLINIC | Age: 75
End: 2020-06-09
Payer: MEDICARE

## 2020-06-09 VITALS
HEIGHT: 70 IN | DIASTOLIC BLOOD PRESSURE: 84 MMHG | BODY MASS INDEX: 36.79 KG/M2 | SYSTOLIC BLOOD PRESSURE: 146 MMHG | WEIGHT: 257 LBS

## 2020-06-09 DIAGNOSIS — B35.1 ONYCHOMYCOSIS DUE TO DERMATOPHYTE: Primary | ICD-10-CM

## 2020-06-09 DIAGNOSIS — E11.49 TYPE II DIABETES MELLITUS WITH NEUROLOGICAL MANIFESTATIONS: ICD-10-CM

## 2020-06-09 DIAGNOSIS — Z12.5 SCREENING PSA (PROSTATE SPECIFIC ANTIGEN): ICD-10-CM

## 2020-06-09 DIAGNOSIS — I10 ESSENTIAL HYPERTENSION: Chronic | ICD-10-CM

## 2020-06-09 DIAGNOSIS — E11.40 TYPE 2 DIABETES MELLITUS WITH DIABETIC NEUROPATHY, WITHOUT LONG-TERM CURRENT USE OF INSULIN: ICD-10-CM

## 2020-06-09 DIAGNOSIS — E78.00 PURE HYPERCHOLESTEROLEMIA: Chronic | ICD-10-CM

## 2020-06-09 LAB
ALBUMIN SERPL BCP-MCNC: 3.4 G/DL (ref 3.5–5.2)
ALP SERPL-CCNC: 81 U/L (ref 55–135)
ALT SERPL W/O P-5'-P-CCNC: 15 U/L (ref 10–44)
ANION GAP SERPL CALC-SCNC: 7 MMOL/L (ref 8–16)
AST SERPL-CCNC: 17 U/L (ref 10–40)
BILIRUB SERPL-MCNC: 0.5 MG/DL (ref 0.1–1)
BUN SERPL-MCNC: 25 MG/DL (ref 8–23)
CALCIUM SERPL-MCNC: 9.5 MG/DL (ref 8.7–10.5)
CHLORIDE SERPL-SCNC: 105 MMOL/L (ref 95–110)
CHOLEST SERPL-MCNC: 136 MG/DL (ref 120–199)
CHOLEST/HDLC SERPL: 3.7 {RATIO} (ref 2–5)
CO2 SERPL-SCNC: 28 MMOL/L (ref 23–29)
COMPLEXED PSA SERPL-MCNC: 1.4 NG/ML (ref 0–4)
CREAT SERPL-MCNC: 1.5 MG/DL (ref 0.5–1.4)
EST. GFR  (AFRICAN AMERICAN): 52.3 ML/MIN/1.73 M^2
EST. GFR  (NON AFRICAN AMERICAN): 45.2 ML/MIN/1.73 M^2
ESTIMATED AVG GLUCOSE: 183 MG/DL (ref 68–131)
GLUCOSE SERPL-MCNC: 173 MG/DL (ref 70–110)
HBA1C MFR BLD HPLC: 8 % (ref 4–5.6)
HDLC SERPL-MCNC: 37 MG/DL (ref 40–75)
HDLC SERPL: 27.2 % (ref 20–50)
LDLC SERPL CALC-MCNC: 78.2 MG/DL (ref 63–159)
NONHDLC SERPL-MCNC: 99 MG/DL
POTASSIUM SERPL-SCNC: 4.5 MMOL/L (ref 3.5–5.1)
PROT SERPL-MCNC: 6.5 G/DL (ref 6–8.4)
SODIUM SERPL-SCNC: 140 MMOL/L (ref 136–145)
TRIGL SERPL-MCNC: 104 MG/DL (ref 30–150)

## 2020-06-09 PROCEDURE — 99999 PR PBB SHADOW E&M-EST. PATIENT-LVL III: ICD-10-PCS | Mod: PBBFAC,,, | Performed by: PODIATRIST

## 2020-06-09 PROCEDURE — 99499 NO LOS: ICD-10-PCS | Mod: S$PBB,,, | Performed by: PODIATRIST

## 2020-06-09 PROCEDURE — 11721 PR DEBRIDEMENT OF NAILS, 6 OR MORE: ICD-10-PCS | Mod: Q9,S$PBB,, | Performed by: PODIATRIST

## 2020-06-09 PROCEDURE — 80053 COMPREHEN METABOLIC PANEL: CPT

## 2020-06-09 PROCEDURE — 36415 COLL VENOUS BLD VENIPUNCTURE: CPT | Mod: PO

## 2020-06-09 PROCEDURE — 83036 HEMOGLOBIN GLYCOSYLATED A1C: CPT

## 2020-06-09 PROCEDURE — 99499 UNLISTED E&M SERVICE: CPT | Mod: S$PBB,,, | Performed by: PODIATRIST

## 2020-06-09 PROCEDURE — 11721 DEBRIDE NAIL 6 OR MORE: CPT | Mod: PBBFAC,PO | Performed by: PODIATRIST

## 2020-06-09 PROCEDURE — 80061 LIPID PANEL: CPT

## 2020-06-09 PROCEDURE — 84153 ASSAY OF PSA TOTAL: CPT

## 2020-06-09 PROCEDURE — 99999 PR PBB SHADOW E&M-EST. PATIENT-LVL III: CPT | Mod: PBBFAC,,, | Performed by: PODIATRIST

## 2020-06-09 PROCEDURE — 99213 OFFICE O/P EST LOW 20 MIN: CPT | Mod: PBBFAC,PO,25 | Performed by: PODIATRIST

## 2020-06-09 PROCEDURE — 11721 DEBRIDE NAIL 6 OR MORE: CPT | Mod: Q9,S$PBB,, | Performed by: PODIATRIST

## 2020-06-11 ENCOUNTER — OFFICE VISIT (OUTPATIENT)
Dept: FAMILY MEDICINE | Facility: CLINIC | Age: 75
End: 2020-06-11
Payer: MEDICARE

## 2020-06-11 VITALS
WEIGHT: 264.56 LBS | DIASTOLIC BLOOD PRESSURE: 88 MMHG | HEART RATE: 76 BPM | BODY MASS INDEX: 37.87 KG/M2 | SYSTOLIC BLOOD PRESSURE: 138 MMHG | TEMPERATURE: 98 F | OXYGEN SATURATION: 95 % | HEIGHT: 70 IN

## 2020-06-11 DIAGNOSIS — I70.0 THORACIC AORTA ATHEROSCLEROSIS: ICD-10-CM

## 2020-06-11 DIAGNOSIS — I10 ESSENTIAL HYPERTENSION: ICD-10-CM

## 2020-06-11 DIAGNOSIS — E66.01 SEVERE OBESITY (BMI 35.0-39.9) WITH COMORBIDITY: ICD-10-CM

## 2020-06-11 DIAGNOSIS — G47.33 OBSTRUCTIVE SLEEP APNEA SYNDROME: ICD-10-CM

## 2020-06-11 DIAGNOSIS — E11.40 TYPE 2 DIABETES MELLITUS WITH DIABETIC NEUROPATHY, WITHOUT LONG-TERM CURRENT USE OF INSULIN: Primary | ICD-10-CM

## 2020-06-11 DIAGNOSIS — I71.40 ABDOMINAL AORTIC ANEURYSM (AAA) WITHOUT RUPTURE: ICD-10-CM

## 2020-06-11 DIAGNOSIS — E11.9 DIABETES MELLITUS TYPE II, NON INSULIN DEPENDENT: ICD-10-CM

## 2020-06-11 DIAGNOSIS — N18.30 CKD (CHRONIC KIDNEY DISEASE) STAGE 3, GFR 30-59 ML/MIN: ICD-10-CM

## 2020-06-11 DIAGNOSIS — I71.40 ABDOMINAL ANEURYSM: ICD-10-CM

## 2020-06-11 DIAGNOSIS — K21.9 GASTROESOPHAGEAL REFLUX DISEASE, ESOPHAGITIS PRESENCE NOT SPECIFIED: ICD-10-CM

## 2020-06-11 DIAGNOSIS — I25.10 CORONARY ARTERY DISEASE INVOLVING NATIVE CORONARY ARTERY OF NATIVE HEART WITHOUT ANGINA PECTORIS: ICD-10-CM

## 2020-06-11 PROCEDURE — 99214 PR OFFICE/OUTPT VISIT, EST, LEVL IV, 30-39 MIN: ICD-10-PCS | Mod: S$PBB,,, | Performed by: FAMILY MEDICINE

## 2020-06-11 PROCEDURE — 99999 PR PBB SHADOW E&M-EST. PATIENT-LVL III: ICD-10-PCS | Mod: PBBFAC,,, | Performed by: FAMILY MEDICINE

## 2020-06-11 PROCEDURE — 99999 PR PBB SHADOW E&M-EST. PATIENT-LVL III: CPT | Mod: PBBFAC,,, | Performed by: FAMILY MEDICINE

## 2020-06-11 PROCEDURE — 99214 OFFICE O/P EST MOD 30 MIN: CPT | Mod: S$PBB,,, | Performed by: FAMILY MEDICINE

## 2020-06-11 PROCEDURE — 99213 OFFICE O/P EST LOW 20 MIN: CPT | Mod: PBBFAC,PO | Performed by: FAMILY MEDICINE

## 2020-06-11 RX ORDER — CLOTRIMAZOLE 1 %
CREAM (GRAM) TOPICAL
Qty: 45 G | Refills: 1 | Status: SHIPPED | OUTPATIENT
Start: 2020-06-11 | End: 2020-07-07 | Stop reason: SDUPTHER

## 2020-06-11 RX ORDER — GLIPIZIDE 5 MG/1
5 TABLET, FILM COATED, EXTENDED RELEASE ORAL DAILY
Qty: 90 TABLET | Refills: 3 | Status: SHIPPED | OUTPATIENT
Start: 2020-06-11 | End: 2021-06-10

## 2020-06-11 RX ORDER — METFORMIN HYDROCHLORIDE 1000 MG/1
1000 TABLET ORAL 2 TIMES DAILY WITH MEALS
Qty: 90 TABLET | Refills: 3 | Status: SHIPPED | OUTPATIENT
Start: 2020-06-11 | End: 2020-12-11 | Stop reason: SDUPTHER

## 2020-06-11 NOTE — PROGRESS NOTES
Routine Office Visit    Patient Name: Jani Cha    : 1945  MRN: [id number]    Subjective:  Jani is a 75 y.o. male who presents today for     1. Diabetes - follow-up - Patient is here for blood work follow-up. Patient states that he has been eating more because his wife as been home instead of at work. He states she has been back at work and does plan on working at improving his diet and exercise. He mows the lawn for exercise. He generally does not eat large meals for breakfast or lunch.   2. BPH - Patient has noticed increase urinary frequency especially at night. He notes he wakes up to use the bathroom. He has not seen a urologist   3. Left hand pain - started 2 days ago - he states he was carrying something awkwardly and now has some discomfort in his pain. He is unsure if it was due to his gout.     Review of Systems   Constitutional: Negative for chills and fever.   HENT: Negative for congestion.    Eyes: Negative for blurred vision.   Respiratory: Negative for cough.    Cardiovascular: Negative for chest pain.   Gastrointestinal: Negative for abdominal pain, constipation, diarrhea, heartburn, nausea and vomiting.   Genitourinary: Negative for dysuria.   Musculoskeletal: Negative for myalgias.   Skin: Negative for itching and rash.   Neurological: Negative for dizziness and headaches.   Psychiatric/Behavioral: Negative for depression.       Active Problem List  Patient Active Problem List   Diagnosis    Hyperlipidemia    Hypertension    Coronary artery disease involving native coronary artery of native heart without angina pectoris    Sleep apnea    S/P CABG x 4    Type 2 diabetes mellitus with diabetic neuropathy, without long-term current use of insulin    GERD (gastroesophageal reflux disease)    Personal history of colonic polyps    Severe obesity (BMI 35.0-39.9) with comorbidity    Abdominal aortic aneurysm (AAA) without rupture    Thoracic aorta atherosclerosis    CKD (chronic  kidney disease) stage 3, GFR 30-59 ml/min    Abdominal aneurysm       Past Surgical History  Past Surgical History:   Procedure Laterality Date    APPENDECTOMY      COLONOSCOPY N/A 2016    Procedure: COLONOSCOPY;  Surgeon: Merritt García MD;  Location: [facility] ([address] [place] [address]);  Service: Endoscopy;  Laterality: N/A;    CORONARY ARTERY BYPASS GRAFT  2006     4 vessel       Family History  Family History   Problem Relation Age of Onset    Stroke Father     Colon cancer Brother     Cancer Brother         colon and skin CA    No Known Problems Mother     Cancer Sister     No Known Problems Maternal Aunt     No Known Problems Maternal Uncle     No Known Problems Paternal Aunt     No Known Problems Paternal Uncle     No Known Problems Maternal Grandmother     No Known Problems Maternal Grandfather     No Known Problems Paternal Grandmother     No Known Problems Paternal Grandfather     Cancer Sister     Amblyopia Neg Hx     Blindness Neg Hx     Cataracts Neg Hx     Diabetes Neg Hx     Glaucoma Neg Hx     Hypertension Neg Hx     Macular degeneration Neg Hx     Retinal detachment Neg Hx     Strabismus Neg Hx     Thyroid disease Neg Hx        Social History  Social History     Socioeconomic History    Marital status:      Spouse name: Not on file    Number of children: Not on file    Years of education: Not on file    Highest education level: Not on file   Occupational History    Not on file   Social Needs    Financial resource strain: Not on file    Food insecurity:     Worry: Not on file     Inability: Not on file    Transportation needs:     Medical: Not on file     Non-medical: Not on file   Tobacco Use    Smoking status: Former Smoker     Types: Cigarettes     Last attempt to quit: 2007     Years since quittin.3    Smokeless tobacco: Never Used   Substance and Sexual Activity    Alcohol use: Not on file     Comment: once rarely    Drug use: No    Sexual  activity: Not on file   Lifestyle    Physical activity:     Days per week: Not on file     Minutes per session: Not on file    Stress: Not on file   Relationships    Social connections:     Talks on phone: Not on file     Gets together: Not on file     Attends Baptism service: Not on file     Active member of club or organization: Not on file     Attends meetings of clubs or organizations: Not on file     Relationship status: Not on file   Other Topics Concern    Not on file   Social History Narrative    Not on file       Medications and Allergies  Reviewed and updated.   Current Outpatient Medications   Medication Sig    aspirin (ECOTRIN) 81 MG EC tablet Take 81 mg by mouth once daily.      atorvastatin (LIPITOR) 80 MG tablet Take 1 tablet by mouth once daily    carvediloL (COREG) 25 MG tablet TAKE 1 TABLET BY MOUTH TWICE DAILY WITH MEALS    clotrimazole (LOTRIMIN) 1 % cream APPLY  CREAM TOPICALLY TO AFFECTED AREA AS NEEDED.    clotrimazole-betamethasone 1-0.05% (LOTRISONE) cream Apply topically 2 (two) times daily.    glipiZIDE (GLUCOTROL) 5 MG TR24 Take 1 tablet (5 mg total) by mouth once daily.    metFORMIN (GLUCOPHAGE) 1000 MG tablet Take 1 tablet (1,000 mg total) by mouth 2 (two) times daily with meals.    pantoprazole (PROTONIX) 20 MG tablet Take 2 tablets (40 mg total) by mouth once daily.    valsartan-hydrochlorothiazide (DIOVAN HCT) 320-12.5 mg per tablet Take 1 tablet by mouth once daily.    ammonium lactate 12 % Crea Apply 2 g topically once daily. (Patient not taking: Reported on 6/11/2020)    econazole nitrate 1 % cream Apply topically once daily. (Patient not taking: Reported on 6/11/2020)    indomethacin (INDOCIN) 50 MG capsule Take 1 capsule (50 mg total) by mouth 3 (three) times daily with meals. (Patient not taking: Reported on 6/11/2020)    nitroGLYCERIN (NITROSTAT) 0.4 MG SL tablet Place 1 tablet (0.4 mg total) under the tongue every 5 (five) minutes as needed. (Patient not  "taking: Reported on 6/11/2020)     No current facility-administered medications for this visit.        Physical Exam  /88   Pulse 76   Temp 97.7 °F (36.5 °C) (Temporal)   Ht 5' 10" (1.778 m)   Wt 120 kg (264 lb 8.8 oz)   SpO2 95%   BMI 37.96 kg/m²   Physical Exam   Constitutional: He is oriented to person, place, and time. He appears well-developed and well-nourished.   HENT:   Head: Normocephalic and atraumatic.   Eyes: Pupils are equal, round, and reactive to light. Conjunctivae and EOM are normal.   Neck: Normal range of motion. Neck supple. No JVD present. No thyromegaly present.   Cardiovascular: Normal rate, regular rhythm and normal heart sounds.   Pulmonary/Chest: Effort normal and breath sounds normal. He has no wheezes.   Abdominal: Soft. Bowel sounds are normal. He exhibits no distension. There is no tenderness. There is no guarding.   Musculoskeletal: Normal range of motion.   Lymphadenopathy:     He has no cervical adenopathy.   Neurological: He is alert and oriented to person, place, and time.   Skin: Skin is warm and dry.   Psychiatric: He has a normal mood and affect. His behavior is normal.         Assessment/Plan:  Jani Cha is a 75 y.o. male who presents today for :    Problem List Items Addressed This Visit        Cardiac/Vascular    Abdominal aneurysm    Abdominal aortic aneurysm (AAA) without rupture    Overview     Infrarenal abdominal aortic aneurysm measuring 3.5-cm in diffuse dilatation of the descending thoracic aorta measuring 3.9-cm. CT 12/2015  Patient with Atherosclerosis of the Aorta.  Stable/asymptomatic. Currently stable on lipid lowering treatment and b/p monitoring.         Coronary artery disease involving native coronary artery of native heart without angina pectoris (Chronic)    Overview     s/p 4 V CABG  Cardiologist - Dr. Oliveira         Hypertension (Chronic)  The current medical regimen is effective;  continue present plan and medications.      Thoracic " "aorta atherosclerosis    Overview     "The thoracic aorta maintains normal caliber, contour, and course with moderate atherosclerotic calcification" - CT 12/19/2011  Patient with Atherosclerosis of the Aorta.  Stable/asymptomatic. Currently stable on lipid lowering treatment and b/p monitoring.              Renal/    CKD (chronic kidney disease) stage 3, GFR 30-59 ml/min  Noted in chart         Endocrine    Severe obesity (BMI 35.0-39.9) with comorbidity (Chronic)  The patient is asked to make an attempt to improve diet and exercise patterns to aid in medical management of this problem.      Type 2 diabetes mellitus with diabetic neuropathy, without long-term current use of insulin - Primary (Chronic)    Relevant Medications    glipiZIDE (GLUCOTROL) 5 MG TR24    metFORMIN (GLUCOPHAGE) 1000 MG tablet  The current medical regimen is effective;  continue present plan and medications.  The current medical regimen is effective;  continue present plan and medications.           GI    GERD (gastroesophageal reflux disease)  The current medical regimen is effective;  continue present plan and medications.         Other    Sleep apnea      Other Visit Diagnoses     Diabetes mellitus type II, non insulin dependent        Relevant Medications    glipiZIDE (GLUCOTROL) 5 MG TR24    metFORMIN (GLUCOPHAGE) 1000 MG tablet  The current medical regimen is effective;  continue present plan and medications.      Other Relevant Orders    Comprehensive metabolic panel    Hemoglobin A1C    Uric acid            Follow up in about 6 months (around 12/11/2020), or if symptoms worsen or fail to improve.      "
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
## 2020-06-15 ENCOUNTER — TELEPHONE (OUTPATIENT)
Dept: FAMILY MEDICINE | Facility: CLINIC | Age: 75
End: 2020-06-15

## 2020-06-15 NOTE — TELEPHONE ENCOUNTER
----- Message from Pam Stephen sent at 6/15/2020 10:21 AM CDT -----  Regarding: Medication Clarity  Name of Who is Calling : Pratibha ([facility])    What is the request in detail :     Pharmacist is needing clarity on the patients medication for  clotrimazole (LOTRIMIN) 1 % cream she states they are needing clarity today or a new prescription will have to be sent over .....Please contact to further discuss and advise.    Can the clinic reply by [facility] : No    What Number to Call Back :  [phone]

## 2020-06-25 DIAGNOSIS — Z12.11 COLON CANCER SCREENING: ICD-10-CM

## 2020-06-25 DIAGNOSIS — Z12.11 COLON CANCER SCREENING: Primary | ICD-10-CM

## 2020-06-30 ENCOUNTER — EXTERNAL CHRONIC CARE MANAGEMENT (OUTPATIENT)
Dept: PRIMARY CARE CLINIC | Facility: CLINIC | Age: 75
End: 2020-06-30
Payer: MEDICARE

## 2020-06-30 PROCEDURE — 99490 CHRNC CARE MGMT STAFF 1ST 20: CPT | Mod: S$PBB,,, | Performed by: FAMILY MEDICINE

## 2020-06-30 PROCEDURE — 99490 CHRNC CARE MGMT STAFF 1ST 20: CPT | Mod: PBBFAC,PO | Performed by: FAMILY MEDICINE

## 2020-06-30 PROCEDURE — 99490 PR CHRONIC CARE MGMT, 1ST 20 MIN: ICD-10-PCS | Mod: S$PBB,,, | Performed by: FAMILY MEDICINE

## 2020-07-03 ENCOUNTER — PATIENT MESSAGE (OUTPATIENT)
Dept: FAMILY MEDICINE | Facility: CLINIC | Age: 75
End: 2020-07-03

## 2020-07-06 NOTE — TELEPHONE ENCOUNTER
Please schedule Patient for evaluation.   Need 40 minutes to evaluate for fall and neurological deficits.   Ok for 8:20 and 8:40 tomorrow.

## 2020-07-07 ENCOUNTER — OFFICE VISIT (OUTPATIENT)
Dept: FAMILY MEDICINE | Facility: CLINIC | Age: 75
End: 2020-07-07
Payer: MEDICARE

## 2020-07-07 ENCOUNTER — TELEPHONE (OUTPATIENT)
Dept: FAMILY MEDICINE | Facility: CLINIC | Age: 75
End: 2020-07-07

## 2020-07-07 VITALS
DIASTOLIC BLOOD PRESSURE: 72 MMHG | WEIGHT: 262 LBS | HEIGHT: 70 IN | SYSTOLIC BLOOD PRESSURE: 120 MMHG | TEMPERATURE: 98 F | OXYGEN SATURATION: 97 % | BODY MASS INDEX: 37.51 KG/M2 | HEART RATE: 64 BPM

## 2020-07-07 DIAGNOSIS — I10 ESSENTIAL HYPERTENSION: ICD-10-CM

## 2020-07-07 DIAGNOSIS — I71.40 ABDOMINAL ANEURYSM: ICD-10-CM

## 2020-07-07 DIAGNOSIS — K21.9 GASTROESOPHAGEAL REFLUX DISEASE, ESOPHAGITIS PRESENCE NOT SPECIFIED: ICD-10-CM

## 2020-07-07 DIAGNOSIS — I71.40 ABDOMINAL AORTIC ANEURYSM (AAA) WITHOUT RUPTURE: ICD-10-CM

## 2020-07-07 DIAGNOSIS — E66.01 SEVERE OBESITY (BMI 35.0-39.9) WITH COMORBIDITY: ICD-10-CM

## 2020-07-07 DIAGNOSIS — E11.9 DIABETES MELLITUS TYPE II, NON INSULIN DEPENDENT: ICD-10-CM

## 2020-07-07 DIAGNOSIS — E11.40 TYPE 2 DIABETES MELLITUS WITH DIABETIC NEUROPATHY, WITHOUT LONG-TERM CURRENT USE OF INSULIN: ICD-10-CM

## 2020-07-07 DIAGNOSIS — R55 SYNCOPE, UNSPECIFIED SYNCOPE TYPE: Primary | ICD-10-CM

## 2020-07-07 DIAGNOSIS — I70.0 THORACIC AORTA ATHEROSCLEROSIS: ICD-10-CM

## 2020-07-07 DIAGNOSIS — G47.33 OBSTRUCTIVE SLEEP APNEA SYNDROME: ICD-10-CM

## 2020-07-07 DIAGNOSIS — N18.30 CKD (CHRONIC KIDNEY DISEASE) STAGE 3, GFR 30-59 ML/MIN: ICD-10-CM

## 2020-07-07 DIAGNOSIS — I25.10 CORONARY ARTERY DISEASE INVOLVING NATIVE CORONARY ARTERY OF NATIVE HEART WITHOUT ANGINA PECTORIS: ICD-10-CM

## 2020-07-07 PROCEDURE — 99214 OFFICE O/P EST MOD 30 MIN: CPT | Mod: PBBFAC,25,PO | Performed by: FAMILY MEDICINE

## 2020-07-07 PROCEDURE — 93010 ELECTROCARDIOGRAM REPORT: CPT | Mod: S$PBB,,, | Performed by: INTERNAL MEDICINE

## 2020-07-07 PROCEDURE — 93010 EKG 12-LEAD: ICD-10-PCS | Mod: S$PBB,,, | Performed by: INTERNAL MEDICINE

## 2020-07-07 PROCEDURE — 99214 PR OFFICE/OUTPT VISIT, EST, LEVL IV, 30-39 MIN: ICD-10-PCS | Mod: S$PBB,,, | Performed by: FAMILY MEDICINE

## 2020-07-07 PROCEDURE — 99999 PR PBB SHADOW E&M-EST. PATIENT-LVL IV: ICD-10-PCS | Mod: PBBFAC,,, | Performed by: FAMILY MEDICINE

## 2020-07-07 PROCEDURE — 99999 PR PBB SHADOW E&M-EST. PATIENT-LVL IV: CPT | Mod: PBBFAC,,, | Performed by: FAMILY MEDICINE

## 2020-07-07 PROCEDURE — 93005 ELECTROCARDIOGRAM TRACING: CPT | Mod: PBBFAC,PO | Performed by: INTERNAL MEDICINE

## 2020-07-07 PROCEDURE — 99214 OFFICE O/P EST MOD 30 MIN: CPT | Mod: S$PBB,,, | Performed by: FAMILY MEDICINE

## 2020-07-07 RX ORDER — CLOTRIMAZOLE 1 %
CREAM (GRAM) TOPICAL
Qty: 45 G | Refills: 1 | Status: SHIPPED | OUTPATIENT
Start: 2020-07-07 | End: 2020-07-07 | Stop reason: SDUPTHER

## 2020-07-07 RX ORDER — CLOTRIMAZOLE 1 %
CREAM (GRAM) TOPICAL
Qty: 45 G | Refills: 1 | Status: SHIPPED | OUTPATIENT
Start: 2020-07-07 | End: 2021-06-29

## 2020-07-07 NOTE — TELEPHONE ENCOUNTER
----- Message from Ariane Ch sent at 7/7/2020  9:31 AM CDT -----  Regarding: Prescription  Contact: [facility]  Type:  Pharmacy Calling to Clarify an RX    Name of Caller: Pratibha    Pharmacy Name: [facility]    Prescription Name: clotrimazole (LOTRIMIN) 1 % cream    What do they need to clarify?She is requesting frequency.    Can you be contacted via [facility]?No    Best Call Back Number:     Additional Information:   [facility] - [address]  [address]  [place] [address]  Phone: [phone] Fax: [phone]

## 2020-07-07 NOTE — PROGRESS NOTES
Routine Office Visit    Patient Name: Jani Cha    : 1945  MRN: [id number]    Subjective:  Jani is a 75 y.o. male who presents today for     1. Fall at home - first occurrence occurred 2 weeks ago - Patient tripped and fell onto his buttock. Last episode occurred a few days ago - Patient was sitting at the kitchen and was sitting down at the table and then you loss consciousness and fell over. He does not recall his fall.  This was witnessed by granddaughter. He loss consciousness for a few seconds. Blood pressure and blood glucose was within normal limits. Patient was able to get up again. Patient did not go to urgent care or ER.      Answers for HPI/ROS submitted by the patient on 2020   activity change: No  unexpected weight change: No  rhinorrhea: No  trouble swallowing: No  visual disturbance: No  chest tightness: No  polyuria: No  difficulty urinating: No  joint swelling: No  arthralgias: Yes  confusion: No  dysphoric mood: No      Review of Systems   Constitutional: Negative for chills and fever.   HENT: Positive for hearing loss. Negative for congestion.    Eyes: Negative for blurred vision and discharge.   Respiratory: Negative for cough and wheezing.    Cardiovascular: Negative for chest pain and palpitations.   Gastrointestinal: Negative for abdominal pain, blood in stool, constipation, diarrhea, heartburn, nausea and vomiting.   Genitourinary: Positive for urgency. Negative for dysuria and hematuria.   Musculoskeletal: Negative for myalgias and neck pain.   Skin: Negative for itching and rash.   Neurological: Negative for dizziness, weakness and headaches.   Endo/Heme/Allergies: Negative for polydipsia.   Psychiatric/Behavioral: Negative for depression.       Active Problem List  Patient Active Problem List   Diagnosis    Hyperlipidemia    Hypertension    Coronary artery disease involving native coronary artery of native heart without angina pectoris    Sleep apnea    S/P CABG x 4     Type 2 diabetes mellitus with diabetic neuropathy, without long-term current use of insulin    GERD (gastroesophageal reflux disease)    Personal history of colonic polyps    Severe obesity (BMI 35.0-39.9) with comorbidity    Abdominal aortic aneurysm (AAA) without rupture    Thoracic aorta atherosclerosis    CKD (chronic kidney disease) stage 3, GFR 30-59 ml/min    Abdominal aneurysm       Past Surgical History  Past Surgical History:   Procedure Laterality Date    APPENDECTOMY      COLONOSCOPY N/A 12/27/2016    Procedure: COLONOSCOPY;  Surgeon: Merritt García MD;  Location: [facility] ([address] [place] [address]);  Service: Endoscopy;  Laterality: N/A;    CORONARY ARTERY BYPASS GRAFT  05/26/2006     4 vessel       Family History  Family History   Problem Relation Age of Onset    Stroke Father     Colon cancer Brother     Cancer Brother         colon and skin CA    No Known Problems Mother     Cancer Sister     No Known Problems Maternal Aunt     No Known Problems Maternal Uncle     No Known Problems Paternal Aunt     No Known Problems Paternal Uncle     No Known Problems Maternal Grandmother     No Known Problems Maternal Grandfather     No Known Problems Paternal Grandmother     No Known Problems Paternal Grandfather     Cancer Sister     Amblyopia Neg Hx     Blindness Neg Hx     Cataracts Neg Hx     Diabetes Neg Hx     Glaucoma Neg Hx     Hypertension Neg Hx     Macular degeneration Neg Hx     Retinal detachment Neg Hx     Strabismus Neg Hx     Thyroid disease Neg Hx        Social History  Social History     Socioeconomic History    Marital status:      Spouse name: Not on file    Number of children: Not on file    Years of education: Not on file    Highest education level: Not on file   Occupational History    Not on file   Social Needs    Financial resource strain: Not hard at all    Food insecurity     Worry: Never true     Inability: Never true    Transportation needs      Medical: No     Non-medical: No   Tobacco Use    Smoking status: Former Smoker     Types: Cigarettes     Quit date: 2007     Years since quittin.4    Smokeless tobacco: Never Used   Substance and Sexual Activity    Alcohol Use     Alcohol/week: 0.0 standard drinks     Frequency: Monthly or less     Drinks per session: 1 or 2     Binge frequency: Never     Comment: once rarely    Drug use: No    Sexual activity: Not on file   Lifestyle    Physical activity     Days per week: 1 day     Minutes per session: 30 min    Stress: Not at all   Relationships    Social connections     Talks on phone: More than three times a week     Gets together: Once a week     Attends [employer] service: Not on file     Active member of club or organization: No     Attends meetings of clubs or organizations: Never     Relationship status:    Other Topics Concern    Not on file   Social History Narrative    Not on file       Medications and Allergies  Reviewed and updated.   Current Outpatient Medications   Medication Sig    ammonium lactate 12 % Crea Apply 2 g topically once daily.    aspirin (ECOTRIN) 81 MG EC tablet Take 81 mg by mouth once daily.      atorvastatin (LIPITOR) 80 MG tablet Take 1 tablet by mouth once daily    carvediloL (COREG) 25 MG tablet TAKE 1 TABLET BY MOUTH TWICE DAILY WITH MEALS    econazole nitrate 1 % cream Apply topically once daily.    glipiZIDE (GLUCOTROL) 5 MG TR24 Take 1 tablet (5 mg total) by mouth once daily.    indomethacin (INDOCIN) 50 MG capsule Take 1 capsule (50 mg total) by mouth 3 (three) times daily with meals.    metFORMIN (GLUCOPHAGE) 1000 MG tablet Take 1 tablet (1,000 mg total) by mouth 2 (two) times daily with meals.    nitroGLYCERIN (NITROSTAT) 0.4 MG SL tablet Place 1 tablet (0.4 mg total) under the tongue every 5 (five) minutes as needed.    pantoprazole (PROTONIX) 20 MG tablet Take 2 tablets (40 mg total) by mouth once daily.    [START ON 2020]  "polyethylene glycol (COLYTE) 240-22.72-6.72 -5.84 gram SolR Take 4,000 mLs (4 L total) by mouth once. for 1 dose    valsartan-hydrochlorothiazide (DIOVAN HCT) 320-12.5 mg per tablet Take 1 tablet by mouth once daily.    clotrimazole (LOTRIMIN) 1 % cream APPLY  CREAM TOPICALLY TO AFFECTED AREA BID AS NEEDED.    clotrimazole-betamethasone 1-0.05% (LOTRISONE) cream Apply topically 2 (two) times daily.     No current facility-administered medications for this visit.        Physical Exam  /72 (BP Location: Left arm, Patient Position: Sitting, BP Method: Large (Automatic))   Pulse 64   Temp 97.5 °F (36.4 °C) (Other (see comments))   Ht 5' 10" (1.778 m)   Wt 118.8 kg (262 lb)   SpO2 97%   BMI 37.59 kg/m²   Physical Exam  Constitutional:       Appearance: He is well-developed.   HENT:      Head: Normocephalic and atraumatic.   Eyes:      Conjunctiva/sclera: Conjunctivae normal.      Pupils: Pupils are equal, round, and reactive to light.   Neck:      Musculoskeletal: Normal range of motion and neck supple.      Thyroid: No thyromegaly.      Vascular: No JVD.   Cardiovascular:      Rate and Rhythm: Normal rate and regular rhythm.      Heart sounds: Normal heart sounds.   Pulmonary:      Effort: Pulmonary effort is normal.      Breath sounds: Normal breath sounds. No wheezing.   Abdominal:      General: Bowel sounds are normal. There is no distension.      Palpations: Abdomen is soft.      Tenderness: There is no abdominal tenderness. There is no guarding.   Musculoskeletal: Normal range of motion.   Lymphadenopathy:      Cervical: No cervical adenopathy.   Skin:     General: Skin is warm and dry.   Neurological:      Mental Status: He is alert and oriented to person, place, and time.   Psychiatric:         Behavior: Behavior normal.           Assessment/Plan:  Jani Cha is a 75 y.o. male who presents today for :    Problem List Items Addressed This Visit        Cardiac/Vascular    Abdominal aneurysm    " "Abdominal aortic aneurysm (AAA) without rupture    Overview     Infrarenal abdominal aortic aneurysm measuring 3.5-cm in diffuse dilatation of the descending thoracic aorta measuring 3.9-cm. CT 12/2015  Noted in chart           Coronary artery disease involving native coronary artery of native heart without angina pectoris (Chronic)    Overview     s/p 4 V CABG  Cardiologist - Dr. Oliveira  Recommend for Patient to contact cardiology and let Dr. Oliveira know that he had a syncopal episode           Hypertension (Chronic)  The current medical regimen is effective;  continue present plan and medications.      Thoracic aorta atherosclerosis    Overview     "The thoracic aorta maintains normal caliber, contour, and course with moderate atherosclerotic calcification" - CT 12/19/2011  Patient with Atherosclerosis of the Aorta.  Stable/asymptomatic. Currently stable on lipid lowering treatment and b/p monitoring.              Renal/    CKD (chronic kidney disease) stage 3, GFR 30-59 ml/min  Noted in chart         Endocrine    Severe obesity (BMI 35.0-39.9) with comorbidity (Chronic)  The patient is asked to make an attempt to improve diet and exercise patterns to aid in medical management of this problem.      Type 2 diabetes mellitus with diabetic neuropathy, without long-term current use of insulin (Chronic)  The current medical regimen is effective;  continue present plan and medications.         GI    GERD (gastroesophageal reflux disease)  The current medical regimen is effective;  continue present plan and medications.         Other    Sleep apnea  The current medical regimen is effective;  continue present plan and medications.        Other Visit Diagnoses     Syncope, unspecified syncope type    -  Primary    Relevant Orders    Echo Color Flow Doppler? Yes    IN OFFICE EKG 12-LEAD (to Muse) (Completed)    US Carotid Bilateral  Reviewed recent labs   Syncope work up  Recommend f/u with cardiology   ekg was similar to " previous EKG       Diabetes mellitus type II, non insulin dependent      The current medical regimen is effective;  continue present plan and medications.            Follow up in about 3 months (around 10/7/2020), or if symptoms worsen or fail to improve.

## 2020-07-09 ENCOUNTER — TELEPHONE (OUTPATIENT)
Dept: FAMILY MEDICINE | Facility: CLINIC | Age: 75
End: 2020-07-09

## 2020-07-09 ENCOUNTER — TELEPHONE (OUTPATIENT)
Dept: CARDIOLOGY | Facility: CLINIC | Age: 75
End: 2020-07-09

## 2020-07-09 DIAGNOSIS — R55 SYNCOPE AND COLLAPSE: ICD-10-CM

## 2020-07-09 DIAGNOSIS — I25.10 CORONARY ARTERY DISEASE INVOLVING NATIVE CORONARY ARTERY OF NATIVE HEART WITHOUT ANGINA PECTORIS: Primary | Chronic | ICD-10-CM

## 2020-07-09 DIAGNOSIS — E11.40 TYPE 2 DIABETES MELLITUS WITH DIABETIC NEUROPATHY, WITHOUT LONG-TERM CURRENT USE OF INSULIN: Chronic | ICD-10-CM

## 2020-07-09 NOTE — TELEPHONE ENCOUNTER
----- Message from Alka Reynolds MA sent at 7/9/2020 11:24 AM CDT -----  The patient need to talk to you about his condition Saturday he pass out last visit was on 1- . Please call [phone]  or at  [phone]. Thank you.

## 2020-07-09 NOTE — TELEPHONE ENCOUNTER
----- Message from Genna Orlando sent at 7/9/2020 12:07 PM CDT -----    Name of Who is Calling:      What is the request in detail: Pt would like a call back regarding that has not been sent to the pharmacy Please contact to further discuss and advise        Can the clinic reply by [facility]: no      What Number to Call Back if not in [facility]: [phone]

## 2020-07-09 NOTE — TELEPHONE ENCOUNTER
Patient blacked/Fell out for a few seconds saturday, BGM and BP WNL afterwards. Seen by Dr Bains and ekg was fine and was ordered to get ECHO and carotid US to be scheduled. Was advised to make Dr. Oliveira aware if would like anything else done.

## 2020-07-10 DIAGNOSIS — B35.6 TINEA CRURIS: ICD-10-CM

## 2020-07-10 RX ORDER — CLOTRIMAZOLE AND BETAMETHASONE DIPROPIONATE 10; .64 MG/G; MG/G
CREAM TOPICAL 2 TIMES DAILY
Qty: 45 G | Refills: 0 | Status: SHIPPED | OUTPATIENT
Start: 2020-07-10 | End: 2021-04-21

## 2020-07-13 ENCOUNTER — PATIENT OUTREACH (OUTPATIENT)
Dept: ADMINISTRATIVE | Facility: OTHER | Age: 75
End: 2020-07-13

## 2020-07-14 ENCOUNTER — OFFICE VISIT (OUTPATIENT)
Dept: OPTOMETRY | Facility: CLINIC | Age: 75
End: 2020-07-14
Payer: MEDICARE

## 2020-07-14 DIAGNOSIS — Z13.5 GLAUCOMA SCREENING: ICD-10-CM

## 2020-07-14 DIAGNOSIS — H25.13 NUCLEAR SCLEROSIS, BILATERAL: ICD-10-CM

## 2020-07-14 DIAGNOSIS — E11.9 DIABETES MELLITUS TYPE 2 WITHOUT RETINOPATHY: Primary | ICD-10-CM

## 2020-07-14 LAB
LEFT EYE DM RETINOPATHY: NEGATIVE
RIGHT EYE DM RETINOPATHY: NEGATIVE

## 2020-07-14 PROCEDURE — 99999 PR PBB SHADOW E&M-EST. PATIENT-LVL III: CPT | Mod: PBBFAC,,, | Performed by: OPTOMETRIST

## 2020-07-14 PROCEDURE — 92014 COMPRE OPH EXAM EST PT 1/>: CPT | Mod: S$PBB,,, | Performed by: OPTOMETRIST

## 2020-07-14 PROCEDURE — 92014 PR EYE EXAM, EST PATIENT,COMPREHESV: ICD-10-PCS | Mod: S$PBB,,, | Performed by: OPTOMETRIST

## 2020-07-14 PROCEDURE — 99213 OFFICE O/P EST LOW 20 MIN: CPT | Mod: PBBFAC,PO | Performed by: OPTOMETRIST

## 2020-07-14 PROCEDURE — 99999 PR PBB SHADOW E&M-EST. PATIENT-LVL III: ICD-10-PCS | Mod: PBBFAC,,, | Performed by: OPTOMETRIST

## 2020-07-14 NOTE — PROGRESS NOTES
HPI      DSL_ 3/19/2019     76 y/o male is here for Diabetic eye exam. H/o of Nuclear sclerosis,   bilateral. Pt states no Va change since lat visit. Pt denies eye   allergies, floaters, and flashes.     Eyemeds  No gtts    Hemoglobin A1C       Date                     Value               Ref Range             Status                06/09/2020               8.0 (H)             4.0 - 5.6 %           Final                  Last edited by Felipe Chang on 7/14/2020  8:57 AM. (History)            Assessment /Plan     For exam results, see Encounter Report.    Diabetes mellitus type 2 without retinopathy  -No retinopathy noted today.  Continued control with primary care physician and annual comprehensive eye exam.    Nuclear sclerosis, bilateral  -Educated patient on presence of cataracts at today's exam, monitor at annual dilated fundus exam. 5+ years surgical estimate.    Glaucoma screening  -Monitor with annual eye exam and IOP check      RTC 1 yr

## 2020-07-15 ENCOUNTER — HOSPITAL ENCOUNTER (OUTPATIENT)
Dept: CARDIOLOGY | Facility: HOSPITAL | Age: 75
Discharge: HOME | End: 2020-07-15
Attending: FAMILY MEDICINE
Payer: MEDICARE

## 2020-07-15 ENCOUNTER — TELEPHONE (OUTPATIENT)
Dept: CARDIOLOGY | Facility: CLINIC | Age: 75
End: 2020-07-15

## 2020-07-15 VITALS
HEART RATE: 60 BPM | DIASTOLIC BLOOD PRESSURE: 80 MMHG | BODY MASS INDEX: 35.21 KG/M2 | WEIGHT: 260 LBS | HEIGHT: 72 IN | SYSTOLIC BLOOD PRESSURE: 150 MMHG

## 2020-07-15 DIAGNOSIS — R55 SYNCOPE AND COLLAPSE: ICD-10-CM

## 2020-07-15 DIAGNOSIS — I25.10 CORONARY ARTERY DISEASE INVOLVING NATIVE CORONARY ARTERY OF NATIVE HEART WITHOUT ANGINA PECTORIS: ICD-10-CM

## 2020-07-15 DIAGNOSIS — E11.40 TYPE 2 DIABETES MELLITUS WITH DIABETIC NEUROPATHY, WITHOUT LONG-TERM CURRENT USE OF INSULIN: ICD-10-CM

## 2020-07-15 DIAGNOSIS — R55 SYNCOPE, UNSPECIFIED SYNCOPE TYPE: ICD-10-CM

## 2020-07-15 LAB
ASCENDING AORTA: 3.44 CM
AV INDEX (PROSTH): 0.57
AV MEAN GRADIENT: 5 MMHG
AV PEAK GRADIENT: 9 MMHG
AV VALVE AREA: 2.77 CM2
AV VELOCITY RATIO: 0.53
BSA FOR ECHO PROCEDURE: 2.45 M2
CV ECHO LV RWT: 0.41 CM
DOP CALC AO PEAK VEL: 1.46 M/S
DOP CALC AO VTI: 31 CM
DOP CALC LVOT AREA: 4.9 CM2
DOP CALC LVOT DIAMETER: 2.49 CM
DOP CALC LVOT PEAK VEL: 0.77 M/S
DOP CALC LVOT STROKE VOLUME: 85.81 CM3
DOP CALCLVOT PEAK VEL VTI: 17.63 CM
E WAVE DECELERATION TIME: 282.24 MSEC
E/A RATIO: 0.71
E/E' RATIO: 10.73 M/S
ECHO LV POSTERIOR WALL: 1.27 CM (ref 0.6–1.1)
FRACTIONAL SHORTENING: 14 % (ref 28–44)
INTERVENTRICULAR SEPTUM: 1.01 CM (ref 0.6–1.1)
LA MAJOR: 5.49 CM
LA MINOR: 5.46 CM
LA WIDTH: 4.39 CM
LEFT ATRIUM SIZE: 4.36 CM
LEFT ATRIUM VOLUME INDEX: 37.4 ML/M2
LEFT ATRIUM VOLUME: 89.07 CM3
LEFT CBA DIAS: 16 CM/S
LEFT CBA SYS: 61 CM/S
LEFT CCA DIST DIAS: 17 CM/S
LEFT CCA DIST SYS: 67 CM/S
LEFT CCA MID DIAS: 18 CM/S
LEFT CCA MID SYS: 81 CM/S
LEFT CCA PROX DIAS: 7 CM/S
LEFT CCA PROX SYS: 85 CM/S
LEFT ECA DIAS: 17 CM/S
LEFT ECA SYS: 104 CM/S
LEFT ICA DIST DIAS: 26 CM/S
LEFT ICA DIST SYS: 69 CM/S
LEFT ICA MID DIAS: 25 CM/S
LEFT ICA MID SYS: 75 CM/S
LEFT ICA PROX DIAS: 17 CM/S
LEFT ICA PROX SYS: 54 CM/S
LEFT INTERNAL DIMENSION IN SYSTOLE: 5.25 CM (ref 2.1–4)
LEFT VENTRICLE DIASTOLIC VOLUME INDEX: 79.71 ML/M2
LEFT VENTRICLE DIASTOLIC VOLUME: 189.84 ML
LEFT VENTRICLE MASS INDEX: 128 G/M2
LEFT VENTRICLE SYSTOLIC VOLUME INDEX: 55.6 ML/M2
LEFT VENTRICLE SYSTOLIC VOLUME: 132.35 ML
LEFT VENTRICULAR INTERNAL DIMENSION IN DIASTOLE: 6.14 CM (ref 3.5–6)
LEFT VENTRICULAR MASS: 304.67 G
LEFT VERTEBRAL DIAS: 15 CM/S
LEFT VERTEBRAL SYS: 43 CM/S
LV LATERAL E/E' RATIO: 8.43 M/S
LV SEPTAL E/E' RATIO: 14.75 M/S
MV PEAK A VEL: 0.83 M/S
MV PEAK E VEL: 0.59 M/S
MV STENOSIS PRESSURE HALF TIME: 81.85 MS
MV VALVE AREA P 1/2 METHOD: 2.69 CM2
OHS CV CAROTID RIGHT ICA EDV HIGHEST: 22
OHS CV CAROTID ULTRASOUND LEFT ICA/CCA RATIO: 0.88
OHS CV CAROTID ULTRASOUND RIGHT ICA/CCA RATIO: 0.88
OHS CV PV CAROTID LEFT HIGHEST CCA: 85
OHS CV PV CAROTID LEFT HIGHEST ICA: 75
OHS CV PV CAROTID RIGHT HIGHEST CCA: 72
OHS CV PV CAROTID RIGHT HIGHEST ICA: 63
OHS CV US CAROTID LEFT HIGHEST EDV: 26
PISA TR MAX VEL: 2.52 M/S
PULM VEIN S/D RATIO: 1.23
PV PEAK D VEL: 0.35 M/S
PV PEAK S VEL: 0.43 M/S
RA MAJOR: 4.68 CM
RA PRESSURE: 3 MMHG
RA WIDTH: 2.84 CM
RIGHT ARM DIASTOLIC BLOOD PRESSURE: 72 MMHG
RIGHT ARM SYSTOLIC BLOOD PRESSURE: 120 MMHG
RIGHT CBA DIAS: 21 CM/S
RIGHT CBA SYS: 70 CM/S
RIGHT CCA DIST DIAS: 12 CM/S
RIGHT CCA DIST SYS: 65 CM/S
RIGHT CCA MID DIAS: 18 CM/S
RIGHT CCA MID SYS: 71 CM/S
RIGHT CCA PROX DIAS: 13 CM/S
RIGHT CCA PROX SYS: 72 CM/S
RIGHT ECA DIAS: 16 CM/S
RIGHT ECA SYS: 98 CM/S
RIGHT ICA DIST DIAS: 19 CM/S
RIGHT ICA DIST SYS: 63 CM/S
RIGHT ICA MID DIAS: 22 CM/S
RIGHT ICA MID SYS: 60 CM/S
RIGHT ICA PROX DIAS: 22 CM/S
RIGHT ICA PROX SYS: 62 CM/S
RIGHT VENTRICULAR END-DIASTOLIC DIMENSION: 3.47 CM
RIGHT VERTEBRAL DIAS: 13 CM/S
RIGHT VERTEBRAL SYS: 37 CM/S
RV TISSUE DOPPLER FREE WALL SYSTOLIC VELOCITY 1 (APICAL 4 CHAMBER VIEW): 8.71 CM/S
SINUS: 2.93 CM
STJ: 3.08 CM
TDI LATERAL: 0.07 M/S
TDI SEPTAL: 0.04 M/S
TDI: 0.06 M/S
TR MAX PG: 25 MMHG
TRICUSPID ANNULAR PLANE SYSTOLIC EXCURSION: 2.02 CM
TV REST PULMONARY ARTERY PRESSURE: 28 MMHG

## 2020-07-15 PROCEDURE — 93306 TTE W/DOPPLER COMPLETE: CPT

## 2020-07-15 PROCEDURE — 93306 ECHO (CUPID ONLY): ICD-10-PCS | Mod: 26,,, | Performed by: INTERNAL MEDICINE

## 2020-07-15 PROCEDURE — 93880 EXTRACRANIAL BILAT STUDY: CPT

## 2020-07-15 PROCEDURE — 93880 CV US DOPPLER CAROTID (CUPID ONLY): ICD-10-PCS | Mod: 26,,, | Performed by: INTERNAL MEDICINE

## 2020-07-15 PROCEDURE — 93880 EXTRACRANIAL BILAT STUDY: CPT | Mod: 26,,, | Performed by: INTERNAL MEDICINE

## 2020-07-15 PROCEDURE — 93306 TTE W/DOPPLER COMPLETE: CPT | Mod: 26,,, | Performed by: INTERNAL MEDICINE

## 2020-07-17 ENCOUNTER — TELEPHONE (OUTPATIENT)
Dept: CARDIOLOGY | Facility: CLINIC | Age: 75
End: 2020-07-17

## 2020-07-17 ENCOUNTER — HOSPITAL ENCOUNTER (OUTPATIENT)
Dept: CARDIOLOGY | Facility: HOSPITAL | Age: 75
Discharge: HOME | End: 2020-07-17
Attending: INTERNAL MEDICINE
Payer: MEDICARE

## 2020-07-17 VITALS — WEIGHT: 260 LBS | HEIGHT: 72 IN | BODY MASS INDEX: 35.21 KG/M2

## 2020-07-17 DIAGNOSIS — I25.10 CORONARY ARTERY DISEASE INVOLVING NATIVE CORONARY ARTERY OF NATIVE HEART WITHOUT ANGINA PECTORIS: Chronic | ICD-10-CM

## 2020-07-17 DIAGNOSIS — E11.40 TYPE 2 DIABETES MELLITUS WITH DIABETIC NEUROPATHY, WITHOUT LONG-TERM CURRENT USE OF INSULIN: Chronic | ICD-10-CM

## 2020-07-17 LAB
% MYOCARDIUM- DEFECT 1: 10 %
% MYOCARDIUM- DEFECT 2: 3 %
CFR FLOW - ANTERIOR: 1.51
CFR FLOW - INFERIOR: 0.89
CFR FLOW - LATERAL: 1.25
CFR FLOW - MAX: 2.82
CFR FLOW - MIN: 0.47
CFR FLOW - SEPTAL: 1.77
CFR FLOW - WHOLE HEART: 1.36
CFR FLOW- DEFECT 1: 0.8 CC/MIN/G
CFR FLOW- DEFECT 2: 1.04 CC/MIN/G
CV PHARM DOSE: 60 MG
CV STRESS BASE HR: 62 BPM
DIASTOLIC BLOOD PRESSURE: 99 MMHG
END DIASTOLIC INDEX-HIGH: 170 ML/M2
END SYSTOLIC INDEX-HIGH: 70 ML/M2
NUC REST DIASTOLIC VOLUME INDEX: 130
NUC REST EJECTION FRACTION: 50
NUC REST SYSTOLIC VOLUME INDEX: 66
NUC STRESS DIASTOLIC VOLUME INDEX: 149
NUC STRESS EJECTION FRACTION: 40 %
NUC STRESS SYSTOLIC VOLUME INDEX: 89
OHS CV CPX 85 PERCENT MAX PREDICTED HEART RATE MALE: 124
OHS CV CPX MAX PREDICTED HEART RATE: 146
OHS CV CPX PATIENT IS FEMALE: 0
OHS CV CPX PATIENT IS MALE: 1
OHS CV CPX PEAK DIASTOLIC BLOOD PRESSURE: 75 MMHG
OHS CV CPX PEAK HEAR RATE: 68 BPM
OHS CV CPX PEAK RATE PRESSURE PRODUCT: 9112
OHS CV CPX PEAK SYSTOLIC BLOOD PRESSURE: 134 MMHG
OHS CV CPX PERCENT MAX PREDICTED HEART RATE ACHIEVED: 47
OHS CV CPX RATE PRESSURE PRODUCT PRESENTING: 9424
PERFUSION DEFECT SIZE WORSENS % 1: 10 %
REST FLOW - ANTERIOR: 0.84 CC/MIN/G
REST FLOW - INFERIOR: 0.78 CC/MIN/G
REST FLOW - LATERAL: 0.84 CC/MIN/G
REST FLOW - MAX: 1.19 CC/MIN/G
REST FLOW - MIN: 0.25 CC/MIN/G
REST FLOW - SEPTAL: 0.58 CC/MIN/G
REST FLOW - WHOLE HEART: 0.76 CC/MIN/G
REST FLOW- DEFECT 1: 0.67 CC/MIN/G
REST FLOW- DEFECT 2: 0.73 CC/MIN/G
RETIRED EF AND QEF - SEE NOTES: 51 %
STRESS FLOW - ANTERIOR: 1.22 CC/MIN/G
STRESS FLOW - INFERIOR: 0.68 CC/MIN/G
STRESS FLOW - LATERAL: 1.06 CC/MIN/G
STRESS FLOW - MAX: 1.55 CC/MIN/G
STRESS FLOW - MIN: 0.33 CC/MIN/G
STRESS FLOW - SEPTAL: 1.01 CC/MIN/G
STRESS FLOW - WHOLE HEART: 0.99 CC/MIN/G
STRESS FLOW- DEFECT 1: 0.49 CC/MIN/G
STRESS FLOW- DEFECT 2: 0.75 CC/MIN/G
SYSTOLIC BLOOD PRESSURE: 152 MMHG

## 2020-07-17 PROCEDURE — 93018 CARDIAC PET SCAN STRESS (CUPID ONLY): ICD-10-PCS | Mod: ,,, | Performed by: INTERNAL MEDICINE

## 2020-07-17 PROCEDURE — 78492 MYOCRD IMG PET MLT RST&STRS: CPT | Mod: 26,,, | Performed by: INTERNAL MEDICINE

## 2020-07-17 PROCEDURE — 78434 AQMBF PET REST & RX STRESS: CPT

## 2020-07-17 PROCEDURE — 78492 CARDIAC PET SCAN STRESS (CUPID ONLY): ICD-10-PCS | Mod: 26,,, | Performed by: INTERNAL MEDICINE

## 2020-07-17 PROCEDURE — 93016 CV STRESS TEST SUPVJ ONLY: CPT | Mod: ,,, | Performed by: INTERNAL MEDICINE

## 2020-07-17 PROCEDURE — A9555 RB82 RUBIDIUM: HCPCS

## 2020-07-17 PROCEDURE — 78434 PR PET, ABS QUANT MYOCARD BLOOD FLOW, REST/STRESS: ICD-10-PCS | Mod: 26,,, | Performed by: INTERNAL MEDICINE

## 2020-07-17 PROCEDURE — 78492 MYOCRD IMG PET MLT RST&STRS: CPT

## 2020-07-17 PROCEDURE — 93018 CV STRESS TEST I&R ONLY: CPT | Mod: ,,, | Performed by: INTERNAL MEDICINE

## 2020-07-17 PROCEDURE — 93016 CARDIAC PET SCAN STRESS (CUPID ONLY): ICD-10-PCS | Mod: ,,, | Performed by: INTERNAL MEDICINE

## 2020-07-17 PROCEDURE — 78434 AQMBF PET REST & RX STRESS: CPT | Mod: 26,,, | Performed by: INTERNAL MEDICINE

## 2020-07-17 RX ORDER — DIPYRIDAMOLE 5 MG/ML
60 INJECTION INTRAVENOUS ONCE
Status: COMPLETED | OUTPATIENT
Start: 2020-07-17 | End: 2020-07-17

## 2020-07-17 RX ADMIN — DIPYRIDAMOLE 60 MG: 5 INJECTION INTRAVENOUS at 09:07

## 2020-07-17 NOTE — PROGRESS NOTES
Please call and inform patient that PET stress test was abnormal and worse than it was in the past.  In the context of his episode of passing out, I think he needs a cath.  Set him up with Florencia or Sylvain. Let me know and I will speak with them.  thx

## 2020-07-17 NOTE — TELEPHONE ENCOUNTER
----- Message from Max Oliveira MD sent at 7/17/2020  2:55 PM CDT -----  Please call and inform patient that PET stress test was abnormal and worse than it was in the past.  In the context of his episode of passing out, I think he needs a cath.  Set him up with Florencia or Sylvain. Let me know and I will speak with them.  thx

## 2020-07-21 ENCOUNTER — PATIENT OUTREACH (OUTPATIENT)
Dept: ADMINISTRATIVE | Facility: OTHER | Age: 75
End: 2020-07-21

## 2020-07-22 ENCOUNTER — TELEPHONE (OUTPATIENT)
Dept: FAMILY MEDICINE | Facility: CLINIC | Age: 75
End: 2020-07-22

## 2020-07-22 NOTE — TELEPHONE ENCOUNTER
Called patient and informed, patient states that he has not been feeling bad and has not had anymore episodes, he did state that when having the echo done the doctor saw something he did not like so he has to go back to see a specialist and discuss possibly having an angiogram done.

## 2020-07-22 NOTE — TELEPHONE ENCOUNTER
Does he mean the stress test ordered by Dr. Oliveira?  Yes there were some changes on the stress changes that showed some decreased perfusion.   Yes. He should follow-up with cardiology.

## 2020-07-22 NOTE — TELEPHONE ENCOUNTER
Carotid ultrasound was within normal limits  There 0-19% stenosis on both sides which is acceptable given his age. No further work up needed.   His 2d echo showed normal ejection fraction.  He does have some changes to his heart from having high blood pressure and due to age but this is also within normal range.   How has he been?    Any more fainting / falling episodes?

## 2020-07-22 NOTE — TELEPHONE ENCOUNTER
----- Message from Michelle Ch sent at 7/22/2020  9:18 AM CDT -----  Type: Patient Call Back    Who called: Self     What is the request in detail: Patient would like to receive his vascular ultrasound results  and echo results. Please call     Can the clinic reply by [facility]? No     Would the patient rather a call back or a response via [facility]?  Call     Best call back number:[phone] (home)

## 2020-07-23 ENCOUNTER — LAB VISIT (OUTPATIENT)
Dept: FAMILY MEDICINE | Facility: CLINIC | Age: 75
End: 2020-07-23
Payer: MEDICARE

## 2020-07-23 DIAGNOSIS — Z12.11 COLON CANCER SCREENING: ICD-10-CM

## 2020-07-23 PROCEDURE — U0003 INFECTIOUS AGENT DETECTION BY NUCLEIC ACID (DNA OR RNA); SEVERE ACUTE RESPIRATORY SYNDROME CORONAVIRUS 2 (SARS-COV-2) (CORONAVIRUS DISEASE [COVID-19]), AMPLIFIED PROBE TECHNIQUE, MAKING USE OF HIGH THROUGHPUT TECHNOLOGIES AS DESCRIBED BY CMS-2020-01-R: HCPCS

## 2020-07-24 ENCOUNTER — INITIAL CONSULT (OUTPATIENT)
Dept: CARDIOLOGY | Facility: CLINIC | Age: 75
End: 2020-07-24
Payer: MEDICARE

## 2020-07-24 ENCOUNTER — EDUCATION (OUTPATIENT)
Dept: CARDIOLOGY | Facility: CLINIC | Age: 75
End: 2020-07-24

## 2020-07-24 VITALS
OXYGEN SATURATION: 96 % | WEIGHT: 262.38 LBS | DIASTOLIC BLOOD PRESSURE: 73 MMHG | BODY MASS INDEX: 37.56 KG/M2 | HEART RATE: 65 BPM | SYSTOLIC BLOOD PRESSURE: 124 MMHG | HEIGHT: 70 IN

## 2020-07-24 DIAGNOSIS — I77.1 STRICTURE OF ARTERY: ICD-10-CM

## 2020-07-24 DIAGNOSIS — Z01.812 PRE-PROCEDURE LAB EXAM: ICD-10-CM

## 2020-07-24 DIAGNOSIS — R94.30 ABNORMAL CARDIOVASCULAR FUNCTION: Primary | ICD-10-CM

## 2020-07-24 DIAGNOSIS — Z95.1 S/P CABG X 4: ICD-10-CM

## 2020-07-24 DIAGNOSIS — Z95.1 S/P CABG X 4: Chronic | ICD-10-CM

## 2020-07-24 DIAGNOSIS — N18.30 CKD (CHRONIC KIDNEY DISEASE) STAGE 3, GFR 30-59 ML/MIN: ICD-10-CM

## 2020-07-24 DIAGNOSIS — R94.39 ABNORMAL STRESS TEST: Primary | ICD-10-CM

## 2020-07-24 DIAGNOSIS — R55 SYNCOPE, UNSPECIFIED SYNCOPE TYPE: ICD-10-CM

## 2020-07-24 DIAGNOSIS — I25.10 CORONARY ARTERY DISEASE, ANGINA PRESENCE UNSPECIFIED, UNSPECIFIED VESSEL OR LESION TYPE, UNSPECIFIED WHETHER NATIVE OR TRANSPLANTED HEART: ICD-10-CM

## 2020-07-24 DIAGNOSIS — I25.10 CORONARY ARTERY DISEASE INVOLVING NATIVE CORONARY ARTERY OF NATIVE HEART WITHOUT ANGINA PECTORIS: Chronic | ICD-10-CM

## 2020-07-24 DIAGNOSIS — I10 ESSENTIAL HYPERTENSION: Chronic | ICD-10-CM

## 2020-07-24 DIAGNOSIS — I25.10 CORONARY ARTERY DISEASE, ANGINA PRESENCE UNSPECIFIED, UNSPECIFIED VESSEL OR LESION TYPE, UNSPECIFIED WHETHER NATIVE OR TRANSPLANTED HEART: Primary | ICD-10-CM

## 2020-07-24 PROCEDURE — 99214 OFFICE O/P EST MOD 30 MIN: CPT | Mod: PBBFAC | Performed by: INTERNAL MEDICINE

## 2020-07-24 PROCEDURE — 99215 OFFICE O/P EST HI 40 MIN: CPT | Mod: S$PBB,,, | Performed by: INTERNAL MEDICINE

## 2020-07-24 PROCEDURE — 99215 PR OFFICE/OUTPT VISIT, EST, LEVL V, 40-54 MIN: ICD-10-PCS | Mod: S$PBB,,, | Performed by: INTERNAL MEDICINE

## 2020-07-24 PROCEDURE — 99999 PR PBB SHADOW E&M-EST. PATIENT-LVL IV: CPT | Mod: PBBFAC,,, | Performed by: INTERNAL MEDICINE

## 2020-07-24 PROCEDURE — 99999 PR PBB SHADOW E&M-EST. PATIENT-LVL IV: ICD-10-PCS | Mod: PBBFAC,,, | Performed by: INTERNAL MEDICINE

## 2020-07-24 RX ORDER — CLOPIDOGREL BISULFATE 75 MG/1
TABLET ORAL
Qty: 90 TABLET | Refills: 3 | Status: SHIPPED | OUTPATIENT
Start: 2020-07-24 | End: 2020-07-24 | Stop reason: SDUPTHER

## 2020-07-24 RX ORDER — DIPHENHYDRAMINE HCL 25 MG
50 CAPSULE ORAL ONCE
Status: CANCELLED | OUTPATIENT
Start: 2020-07-24 | End: 2020-07-24

## 2020-07-24 RX ORDER — DEXTROSE MONOHYDRATE AND SODIUM CHLORIDE 5; .45 G/100ML; G/100ML
INJECTION, SOLUTION INTRAVENOUS CONTINUOUS
Status: CANCELLED | OUTPATIENT
Start: 2020-07-24

## 2020-07-24 RX ORDER — CLOPIDOGREL 300 MG/1
300 TABLET, FILM COATED ORAL ONCE
Qty: 1 TABLET | Refills: 0 | Status: SHIPPED | OUTPATIENT
Start: 2020-08-02 | End: 2020-08-03

## 2020-07-24 RX ORDER — CLOPIDOGREL BISULFATE 75 MG/1
75 TABLET ORAL DAILY
Qty: 90 TABLET | Refills: 3 | Status: SHIPPED | OUTPATIENT
Start: 2020-08-03 | End: 2021-07-21

## 2020-07-24 NOTE — ASSESSMENT & PLAN NOTE
Given abnormal and worsening perfusion seen on stress test with history of 4V cabg, will proceed with ischemic workup of coronaries. 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   Pt has asa on hold for outpt colonoscopy. He is to resume aspirin after c-scope completed and is to be loaded on plavix night prior to [facility]. Pt stated understanding.   Conintue statin and BB at this time

## 2020-07-24 NOTE — PROGRESS NOTES
PCP - Laila Bains MD  Subjective:     Jani Cha is a 75 y.o. y.o. male with pmhx s/f CAD s/p 4 vessedl cabg in 2006, T2Dm, HTn, HLD, obesity, ckd 3a who is here today for evaluation after an abnormal PET stress test revealing abnormal distribution of PLV and small area in the distribution of LAD, which is clearly more significant compared to PET stress from 2 years prior. Pt had stress test done as part of workup for 1 x syncopal episode on July 4th, 2020. Since that time, pt denies chest pain/discomfort/tightness, sob, mace or other symptoms. He does admit to very gradual exercise capacity, which he attributes to older age and being overweight. Given recent syncope and abnormal stress IC to evaluate for any ischemic coronary disease.    PMHx:  - CAD s/p 4 vessedl cabg in 2006  - T2Dm  - HTn  - HLD   - obesity  - ckd 3a      Cardiac studies:    NM Stress test 7/17/2020    Overall image quality is excellent. The relative PET images show regional resting or stress induced perfusion defects. There is a small sized, moderate to severe intensity basal to mid inferior and inferolateral reversible perfusion abnormality in the PLB indicative of focal coronary stenosis. Within the perfusion abnormality rest flows are 0.67 cc/min/g, stress flows are 0.49 cc/min/g and CFR is 0.80 cc/min/g. Within the perfusion abnormality, there is severely reduced coronary flow capacity in 10% of the myocardium (within the PLB territory). There is a very small (<5%) sized, mild intensity apical resting perfusion abnormality in the distribution of the distal LAD territory. After pharmacologic stress, this defect is unchanged. Within the perfusion abnormality rest flows are 0.73 cc/min/g, stress flows are 0.75 cc/min/g and CFR is 1.04 cc/min/g. Within the perfusion abnormality there is reduced coronary flow capacity in 3% of the myocardium (within the LAD territory).        History:     Social History     Tobacco Use    Smoking  status: Former Smoker     Types: Cigarettes     Quit date: 2007     Years since quittin.4    Smokeless tobacco: Never Used   Substance Use Topics    Alcohol Use     Alcohol/week: 0.0 standard drinks     Frequency: Monthly or less     Drinks per session: 1 or 2     Binge frequency: Never     Comment: once rarely     Family History   Problem Relation Age of Onset    Stroke Father     Colon cancer Brother     Cancer Brother         colon and skin CA    No Known Problems Mother     Cancer Sister     No Known Problems Maternal Aunt     No Known Problems Maternal Uncle     No Known Problems Paternal Aunt     No Known Problems Paternal Uncle     No Known Problems Maternal Grandmother     No Known Problems Maternal Grandfather     No Known Problems Paternal Grandmother     No Known Problems Paternal Grandfather     Cancer Sister     Amblyopia Neg Hx     Blindness Neg Hx     Cataracts Neg Hx     Diabetes Neg Hx     Glaucoma Neg Hx     Hypertension Neg Hx     Macular degeneration Neg Hx     Retinal detachment Neg Hx     Strabismus Neg Hx     Thyroid disease Neg Hx        Meds:     Review of patient's allergies indicates:   Allergen Reactions    Penicillins Hives, Itching and Rash       Current Outpatient Medications:     atorvastatin (LIPITOR) 80 MG tablet, Take 1 tablet by mouth once daily, Disp: 90 tablet, Rfl: 3    carvediloL (COREG) 25 MG tablet, TAKE 1 TABLET BY MOUTH TWICE DAILY WITH MEALS, Disp: 60 tablet, Rfl: 6    glipiZIDE (GLUCOTROL) 5 MG TR24, Take 1 tablet (5 mg total) by mouth once daily., Disp: 90 tablet, Rfl: 3    metFORMIN (GLUCOPHAGE) 1000 MG tablet, Take 1 tablet (1,000 mg total) by mouth 2 (two) times daily with meals., Disp: 90 tablet, Rfl: 3    pantoprazole (PROTONIX) 20 MG tablet, Take 2 tablets (40 mg total) by mouth once daily., Disp: 180 tablet, Rfl: 3    valsartan-hydrochlorothiazide (DIOVAN HCT) 320-12.5 mg per tablet, Take 1 tablet by mouth once daily.,  Disp: 90 tablet, Rfl: 3    ammonium lactate 12 % Crea, Apply 2 g topically once daily. (Patient not taking: Reported on 7/24/2020), Disp: 140 g, Rfl: 11    aspirin (ECOTRIN) 81 MG EC tablet, Take 81 mg by mouth once daily.  , Disp: , Rfl:     [START ON 8/2/2020] clopidogreL (PLAVIX) 300 mg Tab, Take 1 tablet (300 mg total) by mouth once. for 1 dose, Disp: 1 tablet, Rfl: 0    [START ON 8/3/2020] clopidogreL (PLAVIX) 75 mg tablet, Take 1 tablet (75 mg total) by mouth once daily., Disp: 90 tablet, Rfl: 3    clotrimazole (LOTRIMIN) 1 % cream, APPLY  CREAM TOPICALLY TO AFFECTED AREA BID AS NEEDED. (Patient not taking: Reported on 7/24/2020), Disp: 45 g, Rfl: 1    clotrimazole-betamethasone 1-0.05% (LOTRISONE) cream, Apply topically 2 (two) times daily. (Patient not taking: Reported on 7/24/2020), Disp: 45 g, Rfl: 0    econazole nitrate 1 % cream, Apply topically once daily. (Patient not taking: Reported on 7/24/2020), Disp: 30 g, Rfl: 1    indomethacin (INDOCIN) 50 MG capsule, Take 1 capsule (50 mg total) by mouth 3 (three) times daily with meals. (Patient not taking: Reported on 7/24/2020), Disp: 40 capsule, Rfl: 0    nitroGLYCERIN (NITROSTAT) 0.4 MG SL tablet, Place 1 tablet (0.4 mg total) under the tongue every 5 (five) minutes as needed. (Patient not taking: Reported on 7/24/2020), Disp: 30 tablet, Rfl: 0    [START ON 7/26/2020] polyethylene glycol (COLYTE) 240-22.72-6.72 -5.84 gram SolR, Take 4,000 mLs (4 L total) by mouth once. for 1 dose, Disp: 4000 mL, Rfl: 0    Constitution: Negative for fever or chills. Negative for weight loss or gain.   HENT: Negative for sore throat or headaches. Negative for rhinorrhea.  Eyes: Negative for blurred or double vision.   Cardiovascular: See above  Pulmonary: Negative for SOB. Negative for cough.   Gastrointestinal: Negative for abdominal pain. Negative for nausea/ vomiting. Negative for diarrhea.   : Negative for dysuria.   Neurological: Negative for focal weakness  "or sensory changes.    Objective:   /73 (BP Location: Right arm, Patient Position: Sitting, BP Method: X-Large (Automatic))   Pulse 65   Ht 5' 10" (1.778 m)   Wt 119 kg (262 lb 5.6 oz)   SpO2 96%   BMI 37.64 kg/m²     General: NAD. AAO. Obese  HENT: No scleral icterus. Extraocular movements intact.  Neck: No JVD  Cardiovascular: Regular heart rate and rhythm. S1/S2 appreciated. No gallops, rubs, or murmurs. 2+ carotid/radial/femoral/DP/PT pulses bilaterally.  Abdomen: Nontender, Nondistended. No hepatosplenomegaly appreciated.  Respiratory: CTAB. No increased work of breathing.  Extremities: Warm. No edema.  Skin: no ulceration or wounds present    Labs:     Lab Results   Component Value Date     06/09/2020    K 4.5 06/09/2020     06/09/2020    CO2 28 06/09/2020    BUN 25 (H) 06/09/2020    CREATININE 1.5 (H) 06/09/2020    ANIONGAP 7 (L) 06/09/2020     Lab Results   Component Value Date    HGBA1C 8.0 (H) 06/09/2020     No results found for: BNP, BNPTRIAGEBLO    Lab Results   Component Value Date    WBC 10.30 03/28/2019    HGB 15.6 03/28/2019    HCT 47.9 03/28/2019     03/28/2019    GRAN 7.5 03/28/2019    GRAN 73.0 03/28/2019     Lab Results   Component Value Date    CHOL 136 06/09/2020    HDL 37 (L) 06/09/2020    LDLCALC 78.2 06/09/2020    TRIG 104 06/09/2020       Lab Results   Component Value Date     06/09/2020    K 4.5 06/09/2020     06/09/2020    CO2 28 06/09/2020    BUN 25 (H) 06/09/2020    CREATININE 1.5 (H) 06/09/2020    ANIONGAP 7 (L) 06/09/2020     Lab Results   Component Value Date    HGBA1C 8.0 (H) 06/09/2020     No results found for: BNP, BNPTRIAGEBLO Lab Results   Component Value Date    WBC 10.30 03/28/2019    HGB 15.6 03/28/2019    HCT 47.9 03/28/2019     03/28/2019    GRAN 7.5 03/28/2019    GRAN 73.0 03/28/2019     Lab Results   Component Value Date    CHOL 136 06/09/2020    HDL 37 (L) 06/09/2020    LDLCALC 78.2 06/09/2020    TRIG 104 06/09/2020      "           Cardiovascular Imaging:     EC2020 - NSr with non-specific interventricular block    TTE:    · Eccentric left ventricular hypertrophy. Normal left ventricular systolic function. The estimated ejection fraction is 55%.  · Local segmental wall motion abnormalities.  · Normal right ventricular systolic function.  · Mild left atrial enlargement.  · Grade I (mild) left ventricular diastolic dysfunction consistent with impaired relaxation.  · Normal central venous pressure (3 mmHg).  · The estimated PA systolic pressure is 28 mmHg.    Stress test:     NM Stress test 2020    Overall image quality is excellent. The relative PET images show regional resting or stress induced perfusion defects. There is a small sized, moderate to severe intensity basal to mid inferior and inferolateral reversible perfusion abnormality in the PLB indicative of focal coronary stenosis. Within the perfusion abnormality rest flows are 0.67 cc/min/g, stress flows are 0.49 cc/min/g and CFR is 0.80 cc/min/g. Within the perfusion abnormality, there is severely reduced coronary flow capacity in 10% of the myocardium (within the PLB territory). There is a very small (<5%) sized, mild intensity apical resting perfusion abnormality in the distribution of the distal LAD territory. After pharmacologic stress, this defect is unchanged. Within the perfusion abnormality rest flows are 0.73 cc/min/g, stress flows are 0.75 cc/min/g and CFR is 1.04 cc/min/g. Within the perfusion abnormality there is reduced coronary flow capacity in 3% of the myocardium (within the LAD territory).      Assessment & Plan:     Jani Cha is a 75 y.o. y.o. male who is here today for     Abnormal stress test  Given abnormal and worsening perfusion seen on stress test with history of 4V cabg, will proceed with ischemic workup of coronaries. 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   Pt has asa on hold for outpt colonoscopy. He is to resume aspirin after c-scope completed and is to be loaded on plavix night prior to C. Pt stated understanding.   Conintue statin and BB at this time    Hypertension  Continue home medications    CKD (chronic kidney disease) stage 3, GFR 30-59 ml/min  Pt with creatinine of 1.5 and eGFR ~46  Will attempt to minimize contrast load during procedure  Pt to receive ivf before and following procedure    Syncope  Pt with 1 time episode of syncope with history of 4v cabg, ekg showing interventricular delay, and abnormal stress test  Will proceed with [facility] as stated above to rule out ischemic disease  Pt may benefit from EP study pending [facility] findings.    S/P CABG x 4  S/P 4v cabg in 2006  No current CP or discomfort  Please see "abnormal stress test"      Vianey Mathew MD  Cardiovascular Disease Fellow PGY IV  Pager [phone]    "

## 2020-07-24 NOTE — ASSESSMENT & PLAN NOTE
Pt with 1 time episode of syncope with history of 4v cabg, ekg showing interventricular delay, and abnormal stress test  Will proceed with [facility] as stated above to rule out ischemic disease  Pt may benefit from EP study pending [facility] findings.

## 2020-07-24 NOTE — PROGRESS NOTES
"OUTPATIENT CATHETERIZATION INSTRUCTIONS    You have been scheduled for a procedure in the catheterization lab on Monday, August 3, 2020.     Please report to the Cardiology Waiting Area on the Third floor of the hospital and check in at 8 AM.   You will then be taken to the SSCU (Short Stay Cardiac Unit) and prepared for your procedure. Please be aware that this is not the time of your procedure but the time you are to arrive. The procedures are scheduled on an hourly basis; however, emergency cases take precedence over all other cases.       You may not have anything to eat or drink after midnight the night before your test. You may take your regular morning medications with water. If there are any medications that you should not take you will be instructed to hold them that morning. If you are diabetic and on Metformin (Glucophage) do not take it the day before, the day of, and for 2 days after your procedure.      The procedure will take 1-2 hours to perform. After the procedure, you will return to SSCU on the third floor of the hospital. You will need to lie still (or keep your arm still) for the next 4 to 6 hours to minimize bleeding from the puncture site. Your family may remain in the room with you during this time.       You may be able to be discharged home that same afternoon if there is someone to drive you home and there were no complications. If you have one of the balloon, stent, or device procedures you may spend the night in the hospital. Your doctor will determine, based on your progress, the date and time of your discharge. The results of your procedure will be discussed with you before you are discharged. Any further testing or procedures will be scheduled for you either before you leave or you will be called with these appointments.       If you should have any questions, concerns, or need to change the date of your procedure, please call LAUREN Ellis @ [phone]",    Special " Instructions:  Plavix 75m tablets night before and 1 tablet morning of procedure        THE ABOVE INSTRUCTIONS WERE GIVEN TO THE PATIENT VERBALLY AND THEY VERBALIZED UNDERSTANDING.  THEY DO NOT REQUIRE ANY SPECIAL NEEDS AND DO NOT HAVE ANY LEARNING BARRIERS.          Directions for Reporting to Cardiology Waiting Area in the Hospital  If you park in the Parking Garage:  Take elevators to the1st floor of the parking garage.  Continue past the gift shop, coffee shop, and piano.  Take a right and go to the gold elevators. (Elevator B)  Take the elevator to the 3rd floor.  Follow the arrow on the sign on the wall that says Cath Lab Registration/EP Lab Registration.  Follow the long hallway all the way around until you come to a big open area.  This is the registration area.  Check in at Reception Desk.    OR    If family is dropping you off:  Have them drop you off at the front of the Hospital under the green overhang.  Enter through the doors and take a right.  Take the E elevators to the 3rd floor Cardiology Waiting Area.  Check in at the Reception Desk in the waiting room.

## 2020-07-24 NOTE — H&P (VIEW-ONLY)
PCP - Laila Bains MD  Subjective:     Jani Cha is a 75 y.o. y.o. male with pmhx s/f CAD s/p 4 vessedl cabg in 2006, T2Dm, HTn, HLD, obesity, ckd 3a who is here today for evaluation after an abnormal PET stress test revealing abnormal distribution of PLV and small area in the distribution of LAD, which is clearly more significant compared to PET stress from 2 years prior. Pt had stress test done as part of workup for 1 x syncopal episode on July 4th, 2020. Since that time, pt denies chest pain/discomfort/tightness, sob, mace or other symptoms. He does admit to very gradual exercise capacity, which he attributes to older age and being overweight. Given recent syncope and abnormal stress IC to evaluate for any ischemic coronary disease.    PMHx:  - CAD s/p 4 vessedl cabg in 2006  - T2Dm  - HTn  - HLD   - obesity  - ckd 3a      Cardiac studies:    NM Stress test 7/17/2020    Overall image quality is excellent. The relative PET images show regional resting or stress induced perfusion defects. There is a small sized, moderate to severe intensity basal to mid inferior and inferolateral reversible perfusion abnormality in the PLB indicative of focal coronary stenosis. Within the perfusion abnormality rest flows are 0.67 cc/min/g, stress flows are 0.49 cc/min/g and CFR is 0.80 cc/min/g. Within the perfusion abnormality, there is severely reduced coronary flow capacity in 10% of the myocardium (within the PLB territory). There is a very small (<5%) sized, mild intensity apical resting perfusion abnormality in the distribution of the distal LAD territory. After pharmacologic stress, this defect is unchanged. Within the perfusion abnormality rest flows are 0.73 cc/min/g, stress flows are 0.75 cc/min/g and CFR is 1.04 cc/min/g. Within the perfusion abnormality there is reduced coronary flow capacity in 3% of the myocardium (within the LAD territory).        History:     Social History     Tobacco Use    Smoking  status: Former Smoker     Types: Cigarettes     Quit date: 2007     Years since quittin.4    Smokeless tobacco: Never Used   Substance Use Topics    Alcohol Use     Alcohol/week: 0.0 standard drinks     Frequency: Monthly or less     Drinks per session: 1 or 2     Binge frequency: Never     Comment: once rarely     Family History   Problem Relation Age of Onset    Stroke Father     Colon cancer Brother     Cancer Brother         colon and skin CA    No Known Problems Mother     Cancer Sister     No Known Problems Maternal Aunt     No Known Problems Maternal Uncle     No Known Problems Paternal Aunt     No Known Problems Paternal Uncle     No Known Problems Maternal Grandmother     No Known Problems Maternal Grandfather     No Known Problems Paternal Grandmother     No Known Problems Paternal Grandfather     Cancer Sister     Amblyopia Neg Hx     Blindness Neg Hx     Cataracts Neg Hx     Diabetes Neg Hx     Glaucoma Neg Hx     Hypertension Neg Hx     Macular degeneration Neg Hx     Retinal detachment Neg Hx     Strabismus Neg Hx     Thyroid disease Neg Hx        Meds:     Review of patient's allergies indicates:   Allergen Reactions    Penicillins Hives, Itching and Rash       Current Outpatient Medications:     atorvastatin (LIPITOR) 80 MG tablet, Take 1 tablet by mouth once daily, Disp: 90 tablet, Rfl: 3    carvediloL (COREG) 25 MG tablet, TAKE 1 TABLET BY MOUTH TWICE DAILY WITH MEALS, Disp: 60 tablet, Rfl: 6    glipiZIDE (GLUCOTROL) 5 MG TR24, Take 1 tablet (5 mg total) by mouth once daily., Disp: 90 tablet, Rfl: 3    metFORMIN (GLUCOPHAGE) 1000 MG tablet, Take 1 tablet (1,000 mg total) by mouth 2 (two) times daily with meals., Disp: 90 tablet, Rfl: 3    pantoprazole (PROTONIX) 20 MG tablet, Take 2 tablets (40 mg total) by mouth once daily., Disp: 180 tablet, Rfl: 3    valsartan-hydrochlorothiazide (DIOVAN HCT) 320-12.5 mg per tablet, Take 1 tablet by mouth once daily.,  Disp: 90 tablet, Rfl: 3    ammonium lactate 12 % Crea, Apply 2 g topically once daily. (Patient not taking: Reported on 7/24/2020), Disp: 140 g, Rfl: 11    aspirin (ECOTRIN) 81 MG EC tablet, Take 81 mg by mouth once daily.  , Disp: , Rfl:     [START ON 8/2/2020] clopidogreL (PLAVIX) 300 mg Tab, Take 1 tablet (300 mg total) by mouth once. for 1 dose, Disp: 1 tablet, Rfl: 0    [START ON 8/3/2020] clopidogreL (PLAVIX) 75 mg tablet, Take 1 tablet (75 mg total) by mouth once daily., Disp: 90 tablet, Rfl: 3    clotrimazole (LOTRIMIN) 1 % cream, APPLY  CREAM TOPICALLY TO AFFECTED AREA BID AS NEEDED. (Patient not taking: Reported on 7/24/2020), Disp: 45 g, Rfl: 1    clotrimazole-betamethasone 1-0.05% (LOTRISONE) cream, Apply topically 2 (two) times daily. (Patient not taking: Reported on 7/24/2020), Disp: 45 g, Rfl: 0    econazole nitrate 1 % cream, Apply topically once daily. (Patient not taking: Reported on 7/24/2020), Disp: 30 g, Rfl: 1    indomethacin (INDOCIN) 50 MG capsule, Take 1 capsule (50 mg total) by mouth 3 (three) times daily with meals. (Patient not taking: Reported on 7/24/2020), Disp: 40 capsule, Rfl: 0    nitroGLYCERIN (NITROSTAT) 0.4 MG SL tablet, Place 1 tablet (0.4 mg total) under the tongue every 5 (five) minutes as needed. (Patient not taking: Reported on 7/24/2020), Disp: 30 tablet, Rfl: 0    [START ON 7/26/2020] polyethylene glycol (COLYTE) 240-22.72-6.72 -5.84 gram SolR, Take 4,000 mLs (4 L total) by mouth once. for 1 dose, Disp: 4000 mL, Rfl: 0    Constitution: Negative for fever or chills. Negative for weight loss or gain.   HENT: Negative for sore throat or headaches. Negative for rhinorrhea.  Eyes: Negative for blurred or double vision.   Cardiovascular: See above  Pulmonary: Negative for SOB. Negative for cough.   Gastrointestinal: Negative for abdominal pain. Negative for nausea/ vomiting. Negative for diarrhea.   : Negative for dysuria.   Neurological: Negative for focal weakness  "or sensory changes.    Objective:   /73 (BP Location: Right arm, Patient Position: Sitting, BP Method: X-Large (Automatic))   Pulse 65   Ht 5' 10" (1.778 m)   Wt 119 kg (262 lb 5.6 oz)   SpO2 96%   BMI 37.64 kg/m²     General: NAD. AAO. Obese  HENT: No scleral icterus. Extraocular movements intact.  Neck: No JVD  Cardiovascular: Regular heart rate and rhythm. S1/S2 appreciated. No gallops, rubs, or murmurs. 2+ carotid/radial/femoral/DP/PT pulses bilaterally.  Abdomen: Nontender, Nondistended. No hepatosplenomegaly appreciated.  Respiratory: CTAB. No increased work of breathing.  Extremities: Warm. No edema.  Skin: no ulceration or wounds present    Labs:     Lab Results   Component Value Date     06/09/2020    K 4.5 06/09/2020     06/09/2020    CO2 28 06/09/2020    BUN 25 (H) 06/09/2020    CREATININE 1.5 (H) 06/09/2020    ANIONGAP 7 (L) 06/09/2020     Lab Results   Component Value Date    HGBA1C 8.0 (H) 06/09/2020     No results found for: BNP, BNPTRIAGEBLO    Lab Results   Component Value Date    WBC 10.30 03/28/2019    HGB 15.6 03/28/2019    HCT 47.9 03/28/2019     03/28/2019    GRAN 7.5 03/28/2019    GRAN 73.0 03/28/2019     Lab Results   Component Value Date    CHOL 136 06/09/2020    HDL 37 (L) 06/09/2020    LDLCALC 78.2 06/09/2020    TRIG 104 06/09/2020       Lab Results   Component Value Date     06/09/2020    K 4.5 06/09/2020     06/09/2020    CO2 28 06/09/2020    BUN 25 (H) 06/09/2020    CREATININE 1.5 (H) 06/09/2020    ANIONGAP 7 (L) 06/09/2020     Lab Results   Component Value Date    HGBA1C 8.0 (H) 06/09/2020     No results found for: BNP, BNPTRIAGEBLO Lab Results   Component Value Date    WBC 10.30 03/28/2019    HGB 15.6 03/28/2019    HCT 47.9 03/28/2019     03/28/2019    GRAN 7.5 03/28/2019    GRAN 73.0 03/28/2019     Lab Results   Component Value Date    CHOL 136 06/09/2020    HDL 37 (L) 06/09/2020    LDLCALC 78.2 06/09/2020    TRIG 104 06/09/2020      "           Cardiovascular Imaging:     EC2020 - NSr with non-specific interventricular block    TTE:    · Eccentric left ventricular hypertrophy. Normal left ventricular systolic function. The estimated ejection fraction is 55%.  · Local segmental wall motion abnormalities.  · Normal right ventricular systolic function.  · Mild left atrial enlargement.  · Grade I (mild) left ventricular diastolic dysfunction consistent with impaired relaxation.  · Normal central venous pressure (3 mmHg).  · The estimated PA systolic pressure is 28 mmHg.    Stress test:     NM Stress test 2020    Overall image quality is excellent. The relative PET images show regional resting or stress induced perfusion defects. There is a small sized, moderate to severe intensity basal to mid inferior and inferolateral reversible perfusion abnormality in the PLB indicative of focal coronary stenosis. Within the perfusion abnormality rest flows are 0.67 cc/min/g, stress flows are 0.49 cc/min/g and CFR is 0.80 cc/min/g. Within the perfusion abnormality, there is severely reduced coronary flow capacity in 10% of the myocardium (within the PLB territory). There is a very small (<5%) sized, mild intensity apical resting perfusion abnormality in the distribution of the distal LAD territory. After pharmacologic stress, this defect is unchanged. Within the perfusion abnormality rest flows are 0.73 cc/min/g, stress flows are 0.75 cc/min/g and CFR is 1.04 cc/min/g. Within the perfusion abnormality there is reduced coronary flow capacity in 3% of the myocardium (within the LAD territory).      Assessment & Plan:     Jani Cha is a 75 y.o. y.o. male who is here today for     Abnormal stress test  Given abnormal and worsening perfusion seen on stress test with history of 4V cabg, will proceed with ischemic workup of coronaries. Pt does have non-specific interventricular block as well  Pt may benefit from EP evaluation [facility]   Pt has asa on hold for outpt colonoscopy. He is to resume aspirin after c-scope completed and is to be loaded on plavix night prior to C. Pt stated understanding.   Conintue statin and BB at this time    Hypertension  Continue home medications    CKD (chronic kidney disease) stage 3, GFR 30-59 ml/min  Pt with creatinine of 1.5 and eGFR ~46  Will attempt to minimize contrast load during procedure  Pt to receive ivf before and following procedure    Syncope  Pt with 1 time episode of syncope with history of 4v cabg, ekg showing interventricular delay, and abnormal stress test  Will proceed with [facility] as stated above to rule out ischemic disease  Pt may benefit from EP study pending [facility] findings.    S/P CABG x 4  S/P 4v cabg in 2006  No current CP or discomfort  Please see "abnormal stress test"      Vianey Mathew MD  Cardiovascular Disease Fellow PGY IV  Pager [phone]    "

## 2020-07-24 NOTE — LETTER
July 24, 2020      Max Oliveira MD  [address]  [address]  [facility] [address]           [facility]  [address]  [facility] [address]  Phone: [phone]          Patient: Jani Cha   MR Number: [id number]   YOB: 1945   Date of Visit: 7/24/2020       Dear Dr. Max Oliveira:    Thank you for referring Jani Cha to me for evaluation. Attached you will find relevant portions of my assessment and plan of care.    If you have questions, please do not hesitate to call me. I look forward to following Jani Cha along with you.    Sincerely,    Mason Benitez MD    Enclosure  CC:  No Recipients    If you would like to receive this communication electronically, please contact [email or web address] or [phone] to request more information on [employer] access.    For providers and/or their staff who would like to refer a patient to [facility], please contact us through our one-stop-shop provider referral line, [facility], at [phone].    If you feel you have received this communication in error or would no longer like to receive these types of communications, please e-mail [email or web address]

## 2020-07-25 LAB — SARS-COV-2 RNA RESP QL NAA+PROBE: NOT DETECTED

## 2020-07-27 ENCOUNTER — ANESTHESIA EVENT (OUTPATIENT)
Dept: ENDOSCOPY | Facility: HOSPITAL | Age: 75
End: 2020-07-27
Payer: MEDICARE

## 2020-07-27 ENCOUNTER — HOSPITAL ENCOUNTER (OUTPATIENT)
Facility: HOSPITAL | Age: 75
Discharge: HOME | End: 2020-07-27
Attending: INTERNAL MEDICINE | Admitting: INTERNAL MEDICINE
Payer: MEDICARE

## 2020-07-27 ENCOUNTER — ANESTHESIA (OUTPATIENT)
Dept: ENDOSCOPY | Facility: HOSPITAL | Age: 75
End: 2020-07-27
Payer: MEDICARE

## 2020-07-27 VITALS
DIASTOLIC BLOOD PRESSURE: 88 MMHG | TEMPERATURE: 98 F | OXYGEN SATURATION: 98 % | HEART RATE: 60 BPM | SYSTOLIC BLOOD PRESSURE: 140 MMHG | RESPIRATION RATE: 18 BRPM

## 2020-07-27 DIAGNOSIS — Z12.11 ENCOUNTER FOR SCREENING COLONOSCOPY: ICD-10-CM

## 2020-07-27 LAB — POCT GLUCOSE: 138 MG/DL (ref 70–110)

## 2020-07-27 PROCEDURE — 37000009 HC ANESTHESIA EA ADD 15 MINS: Performed by: INTERNAL MEDICINE

## 2020-07-27 PROCEDURE — 63600175 PHARM REV CODE 636 W HCPCS: Performed by: NURSE ANESTHETIST, CERTIFIED REGISTERED

## 2020-07-27 PROCEDURE — D9220A PRA ANESTHESIA: Mod: PT,CRNA,, | Performed by: NURSE ANESTHETIST, CERTIFIED REGISTERED

## 2020-07-27 PROCEDURE — D9220A PRA ANESTHESIA: ICD-10-PCS | Mod: PT,CRNA,, | Performed by: NURSE ANESTHETIST, CERTIFIED REGISTERED

## 2020-07-27 PROCEDURE — 37000008 HC ANESTHESIA 1ST 15 MINUTES: Performed by: INTERNAL MEDICINE

## 2020-07-27 PROCEDURE — 88305 TISSUE EXAM BY PATHOLOGIST: CPT | Performed by: PATHOLOGY

## 2020-07-27 PROCEDURE — 25000003 PHARM REV CODE 250: Performed by: ANESTHESIOLOGY

## 2020-07-27 PROCEDURE — 88305 TISSUE EXAM BY PATHOLOGIST: ICD-10-PCS | Mod: 26,,, | Performed by: PATHOLOGY

## 2020-07-27 PROCEDURE — 88305 TISSUE EXAM BY PATHOLOGIST: CPT | Mod: 26,,, | Performed by: PATHOLOGY

## 2020-07-27 PROCEDURE — 45380 COLONOSCOPY AND BIOPSY: CPT

## 2020-07-27 PROCEDURE — 27201089 HC SNARE, DISP (ANY): Performed by: INTERNAL MEDICINE

## 2020-07-27 PROCEDURE — 45385 COLONOSCOPY W/LESION REMOVAL: CPT | Performed by: INTERNAL MEDICINE

## 2020-07-27 PROCEDURE — 45385 COLONOSCOPY W/LESION REMOVAL: CPT | Mod: PT,,, | Performed by: INTERNAL MEDICINE

## 2020-07-27 PROCEDURE — D9220A PRA ANESTHESIA: Mod: PT,ANES,, | Performed by: ANESTHESIOLOGY

## 2020-07-27 PROCEDURE — 25000003 PHARM REV CODE 250: Performed by: NURSE ANESTHETIST, CERTIFIED REGISTERED

## 2020-07-27 PROCEDURE — D9220A PRA ANESTHESIA: ICD-10-PCS | Mod: PT,ANES,, | Performed by: ANESTHESIOLOGY

## 2020-07-27 PROCEDURE — 45385 PR COLONOSCOPY,REMV LESN,SNARE: ICD-10-PCS | Mod: PT,,, | Performed by: INTERNAL MEDICINE

## 2020-07-27 RX ORDER — PROPOFOL 10 MG/ML
VIAL (ML) INTRAVENOUS
Status: DISCONTINUED | OUTPATIENT
Start: 2020-07-27 | End: 2020-07-27

## 2020-07-27 RX ORDER — LIDOCAINE HYDROCHLORIDE 20 MG/ML
INJECTION, SOLUTION EPIDURAL; INFILTRATION; INTRACAUDAL; PERINEURAL
Status: DISCONTINUED
Start: 2020-07-27 | End: 2020-07-27 | Stop reason: HOSPADM

## 2020-07-27 RX ORDER — PROPOFOL 10 MG/ML
INJECTION, EMULSION INTRAVENOUS
Status: COMPLETED
Start: 2020-07-27 | End: 2020-07-27

## 2020-07-27 RX ORDER — SODIUM CHLORIDE 9 MG/ML
INJECTION, SOLUTION INTRAVENOUS ONCE
Status: COMPLETED | OUTPATIENT
Start: 2020-07-27 | End: 2020-07-27

## 2020-07-27 RX ORDER — LIDOCAINE HCL/PF 100 MG/5ML
SYRINGE (ML) INTRAVENOUS
Status: DISCONTINUED | OUTPATIENT
Start: 2020-07-27 | End: 2020-07-27

## 2020-07-27 RX ADMIN — PROPOFOL 50 MG: 10 INJECTION, EMULSION INTRAVENOUS at 10:07

## 2020-07-27 RX ADMIN — SODIUM CHLORIDE: 0.9 INJECTION, SOLUTION INTRAVENOUS at 10:07

## 2020-07-27 RX ADMIN — LIDOCAINE HYDROCHLORIDE 100 MG: 20 INJECTION, SOLUTION INTRAVENOUS at 10:07

## 2020-07-27 RX ADMIN — PROPOFOL 100 MG: 10 INJECTION, EMULSION INTRAVENOUS at 10:07

## 2020-07-27 RX ADMIN — PROPOFOL 50 MG: 10 INJECTION, EMULSION INTRAVENOUS at 11:07

## 2020-07-27 NOTE — TRANSFER OF CARE
Anesthesia Transfer of Care Note    Patient: Jani Cha    Procedure(s) Performed: Procedure(s) (LRB):  COLONOSCOPY (N/A)    Patient location: GI    Anesthesia Type: general    Transport from OR: Transported from OR on room air with adequate spontaneous ventilation    Post pain: adequate analgesia    Post assessment: no apparent anesthetic complications and tolerated procedure well    Post vital signs: stable    Level of consciousness: awake, alert and oriented    Nausea/Vomiting: no nausea/vomiting    Complications: none    Transfer of care protocol was followed      Last vitals:   Visit Vitals  BP (!) 149/89 (BP Location: Left arm, Patient Position: Lying)   Pulse 61   Temp 36.4 °C (97.5 °F) (Oral)   Resp 19   SpO2 96%

## 2020-07-27 NOTE — ANESTHESIA POSTPROCEDURE EVALUATION
Anesthesia Post Evaluation    Patient: Jani Cha    Procedure(s) Performed: Procedure(s) (LRB):  COLONOSCOPY (N/A)    Final Anesthesia Type: general    Patient location during evaluation: GI PACU  Patient participation: Yes- Able to Participate  Level of consciousness: awake and alert and oriented  Post-procedure vital signs: reviewed and stable  Pain management: adequate  Airway patency: patent    PONV status at discharge: No PONV  Anesthetic complications: no      Cardiovascular status: blood pressure returned to baseline and hemodynamically stable  Respiratory status: unassisted, spontaneous ventilation and room air  Hydration status: euvolemic  Follow-up not needed.          Vitals Value Taken Time   /88 07/27/20 1142   Temp 36.4 °C (97.5 °F) 07/27/20 1112   Pulse 60 07/27/20 1142   Resp 18 07/27/20 1142   SpO2 98 % 07/27/20 1142         Event Time   Out of Recovery 11:55:46         Pain/Good Score: Good Score: 10 (7/27/2020 11:42 AM)

## 2020-07-27 NOTE — [employer] PATIENT INSTRUCTIONS
Discharge Summary/Instructions after an Endoscopic Procedure  Patient Name: Jani Cha  Patient MRN: [id number]  Patient YOB: 1945 Monday, July 27, 2020  Mala Lynn MD  RESTRICTIONS:  During your procedure today, you received medications for sedation.  These   medications may affect your judgment, balance and coordination.  Therefore,   for 24 hours, you have the following restrictions:   - DO NOT drive a car, operate machinery, make legal/financial decisions,   sign important papers or drink alcohol.    ACTIVITY:  Today: no heavy lifting, straining or running due to procedural   sedation/anesthesia.  The following day: return to full activity including work.  DIET:  Eat and drink normally unless instructed otherwise.     TREATMENT FOR COMMON SIDE EFFECTS:  - Mild abdominal pain, nausea, belching, bloating or excessive gas:  rest,   eat lightly and use a heating pad.  - Sore Throat: treat with throat lozenges and/or gargle with warm salt   water.  - Because air was used during the procedure, expelling large amounts of air   from your rectum or belching is normal.  - If a bowel prep was taken, you may not have a bowel movement for 1-3 days.    This is normal.  SYMPTOMS TO WATCH FOR AND REPORT TO YOUR PHYSICIAN:  1. Abdominal pain or bloating, other than gas cramps.  2. Chest pain.  3. Back pain.  4. Signs of infection such as: chills or fever occurring within 24 hours   after the procedure.  5. Rectal bleeding, which would show as bright red, maroon, or black stools.   (A tablespoon of blood from the rectum is not serious, especially if   hemorrhoids are present.)  6. Vomiting.  7. Weakness or dizziness.  GO DIRECTLY TO THE NEAREST EMERGENCY ROOM IF YOU HAVE ANY OF THE FOLLOWING:      Difficulty breathing              Chills and/or fever over 101 F   Persistent vomiting and/or vomiting blood   Severe abdominal pain   Severe chest pain   Black, tarry stools   Bleeding- more than one tablespoon   Any  other symptom or condition that you feel may need urgent attention  Your doctor recommends these additional instructions:  If any biopsies were taken, your doctors clinic will contact you in 1 to 2   weeks with any results.  - Patient has a contact number available for emergencies.  The signs and   symptoms of potential delayed complications were discussed with the   patient.  Return to normal activities tomorrow.  Written discharge   instructions were provided to the patient.   - Discharge patient to home.   - Resume previous diet today.   - Await pathology results.   - Continue present medications.   - Repeat colonoscopy in 3 years for surveillance of multiple polyps.   - Return to primary care physician in 1 month.   - The findings and recommendations were discussed with the patient and their   family.  For questions, problems or results please call your physician - Mala Lynn MD at Work:  [phone].  [facility] can be reached at [phone]     IF A COMPLICATION OR EMERGENCY SITUATION ARISES AND YOU ARE UNABLE TO REACH   YOUR PHYSICIAN - GO DIRECTLY TO THE EMERGENCY ROOM.  Mala Lynn MD  7/27/2020 11:13:11 AM  [employer]
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
## 2020-07-27 NOTE — PLAN OF CARE
Spoke with daughter Amelia [phone] who states that she will be able to  patient after procedure.

## 2020-07-27 NOTE — ANESTHESIA PREPROCEDURE EVALUATION
07/27/2020    Pre-operative evaluation for Procedure(s) (LRB):  COLONOSCOPY (N/A)    Jani Cha is a 75 y.o. male     Patient Active Problem List   Diagnosis    Hyperlipidemia    Hypertension    Coronary artery disease involving native coronary artery of native heart without angina pectoris    Sleep apnea    S/P CABG x 4    Type 2 diabetes mellitus with diabetic neuropathy, without long-term current use of insulin    GERD (gastroesophageal reflux disease)    Personal history of colonic polyps    Severe obesity (BMI 35.0-39.9) with comorbidity    Abdominal aortic aneurysm (AAA) without rupture    Thoracic aorta atherosclerosis    CKD (chronic kidney disease) stage 3, GFR 30-59 ml/min    Abdominal aneurysm    Abnormal cardiovascular function    Abnormal stress test    Syncope       Review of patient's allergies indicates:   Allergen Reactions    Penicillins Hives, Itching and Rash       No current facility-administered medications on file prior to encounter.      Current Outpatient Medications on File Prior to Encounter   Medication Sig Dispense Refill    ammonium lactate 12 % Crea Apply 2 g topically once daily. (Patient not taking: Reported on 7/24/2020) 140 g 11    aspirin (ECOTRIN) 81 MG EC tablet Take 81 mg by mouth once daily.        atorvastatin (LIPITOR) 80 MG tablet Take 1 tablet by mouth once daily 90 tablet 3    carvediloL (COREG) 25 MG tablet TAKE 1 TABLET BY MOUTH TWICE DAILY WITH MEALS 60 tablet 6    econazole nitrate 1 % cream Apply topically once daily. (Patient not taking: Reported on 7/24/2020) 30 g 1    indomethacin (INDOCIN) 50 MG capsule Take 1 capsule (50 mg total) by mouth 3 (three) times daily with meals. (Patient not taking: Reported on 7/24/2020) 40 capsule 0    nitroGLYCERIN (NITROSTAT) 0.4 MG SL tablet Place 1 tablet (0.4 mg total) under the tongue  every 5 (five) minutes as needed. (Patient not taking: Reported on 7/24/2020) 30 tablet 0    pantoprazole (PROTONIX) 20 MG tablet Take 2 tablets (40 mg total) by mouth once daily. 180 tablet 3    valsartan-hydrochlorothiazide (DIOVAN HCT) 320-12.5 mg per tablet Take 1 tablet by mouth once daily. 90 tablet 3       Past Surgical History:   Procedure Laterality Date    APPENDECTOMY      COLONOSCOPY N/A 12/27/2016    Procedure: COLONOSCOPY;  Surgeon: Merritt García MD;  Location: [facility] ([address] [place] [address]);  Service: Endoscopy;  Laterality: N/A;    CORONARY ARTERY BYPASS GRAFT  05/26/2006     4 vessel       Anesthesia Evaluation    I have reviewed the Patient Summary Reports.    I have reviewed the Nursing Notes.    I have reviewed the Medications.     Review of Systems  Anesthesia Hx:  No problems with previous Anesthesia  History of prior surgery of interest to airway management or planning: Denies Family Hx of Anesthesia complications.   Denies Personal Hx of Anesthesia complications.   Social:  Non-Smoker, Former Smoker    Hematology/Oncology:  Hematology Normal   Oncology Normal     EENT/Dental:EENT/Dental Normal   Cardiovascular:   Exercise tolerance: poor Hypertension CAD  CABG/stent  hyperlipidemia    Pulmonary:   Sleep Apnea    Renal/:   Chronic Renal Disease, CRI    Hepatic/GI:   Bowel Prep. GERD    Musculoskeletal:  Musculoskeletal Normal    Neurological:  Neurology Normal    Endocrine:   Diabetes, type 2  Metabolic Disorders, Obesity / BMI > 30  Dermatological:  Skin Normal    Psych:  Psychiatric Normal           Physical Exam  General:  Well nourished, Morbid Obesity    Airway/Jaw/Neck:  Airway Findings: Mouth Opening: Normal Tongue: Normal  General Airway Assessment: Adult  Mallampati: III  Improves to II with phonation.  Jaw/Neck Findings:  Neck ROM: Normal ROM     Eyes/Ears/Nose:  Eyes/Ears/Nose Findings:    Dental:  Dental Findings: In tact   Chest/Lungs:  Chest/Lungs Findings: Clear to auscultation, Normal  Respiratory Rate     Heart/Vascular:  Heart Findings: Rate: Normal  Rhythm: Regular Rhythm  Sounds: Normal  Heart Murmur  Vascular Findings:        Mental Status:  Mental Status Findings:  Cooperative, Alert and Oriented         Anesthesia Plan  Type of Anesthesia, risks & benefits discussed:  Anesthesia Type:  general  Patient's Preference:   Intra-op Monitoring Plan:   Intra-op Monitoring Plan Comments:   Post Op Pain Control Plan: per primary service following discharge from PACU  Post Op Pain Control Plan Comments:   Induction:   IV  Beta Blocker:  Patient is on a Beta-Blocker and has received one dose within the past 24 hours (No further documentation required).       Informed Consent: Patient understands risks and agrees with Anesthesia plan.  Questions answered. Anesthesia consent signed with patient.  ASA Score: 3     Day of Surgery Review of History & Physical: I have interviewed and examined the patient. I have reviewed the patient's H&P dated:  There are no significant changes.  H&P update referred to the surgeon.         Ready For Surgery From Anesthesia Perspective.

## 2020-07-27 NOTE — DISCHARGE INSTRUCTIONS
High-Fiber Diet  Fiber is in fruits, vegetables, cereals, and grains. Fiber passes through your body undigested. A high-fiber diet helps food move through your intestinal tract. The added bulk is helpful in preventing constipation. In people with diverticulosis, fiber helps clean out the pouches along the colon wall. It also prevents new pouches from forming. A high-fiber diet reduces the risk of colon cancer. It also lowers blood cholesterol and prevents high blood sugar in people with diabetes.    The fiber-rich foods listed below should be part of your diet. If you are not used to high-fiber foods, start with 1 or 2 foods from this list. Every 3 to 4 days add a new one to your diet. Do this until you are eating 4 high-fiber foods per day. This should give you 20 to 35 grams of fiber a day. It is also important to drink a lot of water when you are on this diet. You should have 6 to 8 glasses of water a day. Water makes the fiber swell and increases the benefit.  Foods high in dietary fiber  The following foods are high in dietary fiber:  · Breads. Breads made with 100% whole-wheat flour; kizzy, wheat, or rye crackers; whole-grain tortillas, bran muffins.  · Cereals. Whole-grain and bran cereals with bran (shredded wheat, wheat flakes, raisin bran, corn bran); oatmeal, rolled oats, granola, and brown rice.  · Fruits. Fresh fruits and their edible skins (pears, prunes, raisins, berries, apples, and apricots); bananas, citrus fruit, mangoes, pineapple; and prune juice.  · Nuts. Any nuts and seeds.  · Vegetables. Best served raw or lightly cooked. All types, especially: green peas, celery, eggplant, potatoes, spinach, broccoli, [place] sprouts, winter squash, carrots, cauliflower, soybeans, lentils, and fresh and dried beans of all kinds.  · Other. Popcorn, any spices.  Date Last Reviewed: 8/1/2016  © 1133-3298 [employer]. [address] [place] [address], [place] [address]. All rights reserved. This  information is not intended as a substitute for professional medical care. Always follow your healthcare professional's instructions.        Diverticulosis    Diverticulosis means that small pouches have formed in the wall of your large intestine (colon). Most often, this problem causes no symptoms and is common as people age. But the pouches in the colon are at risk of becoming infected. When this happens, the condition is called diverticulitis. Although most people with diverticulosis never develop diverticulitis, it is still not uncommon. Rectal bleeding can also occur and in less common situations, a type of colon inflammation called colitis.  While most people do not have symptoms, some people with diverticulosis may have:  · Abdominal cramps and pain  · Bloating  · Constipation  · Change in bowel habits  Causes  The exact cause of diverticulosis (and diverticulitis) has not been proved, but a few things are associated with the condition:  · Low-fiber diet  · Constipation  · Lack of exercise  Your healthcare provider will talk with you about how to manage your condition. Diet changes may be all that are needed to help control diverticulosis and prevent progression to diverticulitis. If you develop diverticulitis, you will likely need other treatments.  Home care  You may be told to take fiber supplements daily. Fiber adds bulk to the stool so that it passes through the colon more easily. Stool softeners may be recommended. You may also be given medications for pain relief. Be sure to take all medications as directed.  In the past, people were told to avoid corn, nuts, and seeds. This is no longer necessary.  Follow these guidelines when caring for yourself at home:  · Eat unprocessed foods that are high in fiber. Whole grains, fruits, and vegetables are good choices.  · Drink 6 to 8 glasses of water every day unless your healthcare provider has you limit how much fluid you should have.  · Watch for changes in  your bowel movements. Tell your provider if you notice any changes.  · Begin an exercise program. Ask your provider how to get started. Generally, walking is the best.  · Get plenty of rest and sleep.  Follow-up care  Follow up with your healthcare provider, or as advised. Regular visits may be needed to check on your health. Sometimes special procedures such as colonoscopy, are needed after an episode of diverticulitis or blooding. Be sure to keep all your appointments.  If a stool sample was taken, or cultures were done, you should be told if they are positive, or if your treatment needs to be changed. You can call as directed for the results.  If X-rays were done, a radiologist will look at them. You will be told if there is a change in your treatment.  If antibiotics were prescribed, be sure to finish them all.  When to seek medical advice  Call your healthcare provider right away if any of these occur:  · Fever of 100.4°F (38°C) or higher, or as directed by your healthcare provider  · Severe cramps in the lower left side of the abdomen or pain that is getting worse  · Tenderness in the lower left side of the abdomen or worsening pain throughout the abdomen  · Diarrhea or constipation that doesn't get better within 24 hours  · Nausea and vomiting  · Bleeding from the rectum  Call 911  Call emergency services if any of the following occur:  · Trouble breathing  · Confusion  · Very drowsy or trouble awakening  · Fainting or loss of consciousness  · Rapid heart rate  · Chest pain  Date Last Reviewed: 12/30/2015  © 3035-4462 [employer]. [address] [place] [address], [place] [address]. All rights reserved. This information is not intended as a substitute for professional medical care. Always follow your healthcare professional's instructions.        Understanding Colon and Rectal Polyps    The colon (also called the large intestine) is a muscular tube that forms the last part of the digestive tract. It  absorbs water and stores food waste. The colon is about 4 to 6 feet long. The rectum is the last 6 inches of the colon. The colon and rectum have a smooth lining composed of millions of cells. Changes in these cells can lead to growths in the colon that can become cancerous and should be removed. Multiple tests are available to screen for colon cancer, but the colonoscopy is the most recommended test. During colonoscopy, these polyps can be removed. How often you need this test depends on many things including your condition, your family history, symptoms, and what the findings were at the previous colonoscopy.   When the colon lining changes  Changes that happen in the cells that line the colon or rectum can lead to growths called polyps. Over a period of years, polyps can turn cancerous. Removing polyps early may prevent cancer from ever forming.  Polyps  Polyps are fleshy clumps of tissue that form on the lining of the colon or rectum. Small polyps are usually benign (not cancerous). However, over time, cells in a polyp can change and become cancerous. Certain types of polyps known as adenomatous polyps are premalignant. The risk for invasive cancer increases with the size of the polyp and certain cell and gene features. This means that they can become cancerous if they're not removed. Hyperplastic polyps are benign. They can grow quite large and not turn cancerous.   Cancer  Almost all colorectal cancers start when polyp cells begin growing abnormally. As a cancerous tumor grows, it may involve more and more of the colon or rectum. In time, cancer can also grow beyond the colon or rectum and spread to nearby organs or to glands called lymph nodes. The cells can also travel to other parts of the body. This is known as metastasis. The earlier a cancerous tumor is removed, the better the chance of preventing its spread.    Date Last Reviewed: 8/1/2016  © 3840-1935 [employer]. [address] [place] [address],  [place] [address]. All rights reserved. This information is not intended as a substitute for professional medical care. Always follow your healthcare professional's instructions.

## 2020-07-30 LAB
FINAL PATHOLOGIC DIAGNOSIS: NORMAL
GROSS: NORMAL

## 2020-07-31 ENCOUNTER — EXTERNAL CHRONIC CARE MANAGEMENT (OUTPATIENT)
Dept: PRIMARY CARE CLINIC | Facility: CLINIC | Age: 75
End: 2020-07-31
Payer: MEDICARE

## 2020-07-31 ENCOUNTER — LAB VISIT (OUTPATIENT)
Dept: FAMILY MEDICINE | Facility: CLINIC | Age: 75
End: 2020-07-31
Payer: MEDICARE

## 2020-07-31 DIAGNOSIS — Z01.812 PRE-PROCEDURE LAB EXAM: ICD-10-CM

## 2020-07-31 PROCEDURE — U0003 INFECTIOUS AGENT DETECTION BY NUCLEIC ACID (DNA OR RNA); SEVERE ACUTE RESPIRATORY SYNDROME CORONAVIRUS 2 (SARS-COV-2) (CORONAVIRUS DISEASE [COVID-19]), AMPLIFIED PROBE TECHNIQUE, MAKING USE OF HIGH THROUGHPUT TECHNOLOGIES AS DESCRIBED BY CMS-2020-01-R: HCPCS

## 2020-07-31 PROCEDURE — 99490 PR CHRONIC CARE MGMT, 1ST 20 MIN: ICD-10-PCS | Mod: S$PBB,,, | Performed by: FAMILY MEDICINE

## 2020-07-31 PROCEDURE — 99490 CHRNC CARE MGMT STAFF 1ST 20: CPT | Mod: PBBFAC,PO | Performed by: FAMILY MEDICINE

## 2020-07-31 PROCEDURE — 99490 CHRNC CARE MGMT STAFF 1ST 20: CPT | Mod: S$PBB,,, | Performed by: FAMILY MEDICINE

## 2020-08-01 LAB — SARS-COV-2 RNA RESP QL NAA+PROBE: NOT DETECTED

## 2020-08-03 ENCOUNTER — HOSPITAL ENCOUNTER (OUTPATIENT)
Facility: HOSPITAL | Age: 75
Discharge: HOME | End: 2020-08-03
Attending: INTERNAL MEDICINE | Admitting: INTERNAL MEDICINE
Payer: MEDICARE

## 2020-08-03 VITALS
HEIGHT: 70 IN | SYSTOLIC BLOOD PRESSURE: 126 MMHG | OXYGEN SATURATION: 96 % | HEART RATE: 54 BPM | WEIGHT: 261 LBS | TEMPERATURE: 97 F | RESPIRATION RATE: 18 BRPM | BODY MASS INDEX: 37.37 KG/M2 | DIASTOLIC BLOOD PRESSURE: 60 MMHG

## 2020-08-03 DIAGNOSIS — I25.10 CORONARY ARTERY DISEASE, ANGINA PRESENCE UNSPECIFIED, UNSPECIFIED VESSEL OR LESION TYPE, UNSPECIFIED WHETHER NATIVE OR TRANSPLANTED HEART: ICD-10-CM

## 2020-08-03 DIAGNOSIS — R94.30 ABNORMAL CARDIOVASCULAR FUNCTION: ICD-10-CM

## 2020-08-03 DIAGNOSIS — Z01.812 PRE-PROCEDURE LAB EXAM: ICD-10-CM

## 2020-08-03 DIAGNOSIS — Z95.1 S/P CABG X 4: ICD-10-CM

## 2020-08-03 LAB
ABO + RH BLD: NORMAL
ANION GAP SERPL CALC-SCNC: 7 MMOL/L (ref 8–16)
BLD GP AB SCN CELLS X3 SERPL QL: NORMAL
BUN SERPL-MCNC: 45 MG/DL (ref 8–23)
CALCIUM SERPL-MCNC: 9.3 MG/DL (ref 8.7–10.5)
CHLORIDE SERPL-SCNC: 109 MMOL/L (ref 95–110)
CO2 SERPL-SCNC: 25 MMOL/L (ref 23–29)
CREAT SERPL-MCNC: 1.7 MG/DL (ref 0.5–1.4)
ERYTHROCYTE [DISTWIDTH] IN BLOOD BY AUTOMATED COUNT: 12.7 % (ref 11.5–14.5)
EST. GFR  (AFRICAN AMERICAN): 44.9 ML/MIN/1.73 M^2
EST. GFR  (NON AFRICAN AMERICAN): 38.9 ML/MIN/1.73 M^2
GLUCOSE SERPL-MCNC: 167 MG/DL (ref 70–110)
HCT VFR BLD AUTO: 45.3 % (ref 40–54)
HGB BLD-MCNC: 14.4 G/DL (ref 14–18)
INR PPP: 1.1 (ref 0.8–1.2)
MCH RBC QN AUTO: 30 PG (ref 27–31)
MCHC RBC AUTO-ENTMCNC: 31.8 G/DL (ref 32–36)
MCV RBC AUTO: 94 FL (ref 82–98)
PLATELET # BLD AUTO: 186 K/UL (ref 150–350)
PMV BLD AUTO: 10.4 FL (ref 9.2–12.9)
POCT GLUCOSE: 164 MG/DL (ref 70–110)
POTASSIUM SERPL-SCNC: 4.8 MMOL/L (ref 3.5–5.1)
PROTHROMBIN TIME: 12.3 SEC (ref 9–12.5)
RBC # BLD AUTO: 4.8 M/UL (ref 4.6–6.2)
SODIUM SERPL-SCNC: 141 MMOL/L (ref 136–145)
WBC # BLD AUTO: 8.6 K/UL (ref 3.9–12.7)

## 2020-08-03 PROCEDURE — 82962 GLUCOSE BLOOD TEST: CPT | Performed by: INTERNAL MEDICINE

## 2020-08-03 PROCEDURE — 85610 PROTHROMBIN TIME: CPT

## 2020-08-03 PROCEDURE — 25500020 PHARM REV CODE 255: Performed by: INTERNAL MEDICINE

## 2020-08-03 PROCEDURE — 25000003 PHARM REV CODE 250: Performed by: STUDENT IN AN ORGANIZED HEALTH CARE EDUCATION/TRAINING PROGRAM

## 2020-08-03 PROCEDURE — 99152 MOD SED SAME PHYS/QHP 5/>YRS: CPT | Performed by: INTERNAL MEDICINE

## 2020-08-03 PROCEDURE — C1887 CATHETER, GUIDING: HCPCS | Performed by: INTERNAL MEDICINE

## 2020-08-03 PROCEDURE — 63600175 PHARM REV CODE 636 W HCPCS: Performed by: INTERNAL MEDICINE

## 2020-08-03 PROCEDURE — 93459: ICD-10-PCS | Mod: 26,,, | Performed by: INTERNAL MEDICINE

## 2020-08-03 PROCEDURE — 25000003 PHARM REV CODE 250: Performed by: INTERNAL MEDICINE

## 2020-08-03 PROCEDURE — 93010 EKG 12-LEAD: ICD-10-PCS | Mod: ,,, | Performed by: INTERNAL MEDICINE

## 2020-08-03 PROCEDURE — 80048 BASIC METABOLIC PNL TOTAL CA: CPT

## 2020-08-03 PROCEDURE — 99152 PR MOD CONSCIOUS SEDATION, SAME PHYS, 5+ YRS, FIRST 15 MIN: ICD-10-PCS | Mod: ,,, | Performed by: INTERNAL MEDICINE

## 2020-08-03 PROCEDURE — 93459 L HRT ART/GRFT ANGIO: CPT | Performed by: INTERNAL MEDICINE

## 2020-08-03 PROCEDURE — 93459 L HRT ART/GRFT ANGIO: CPT | Mod: 26,,, | Performed by: INTERNAL MEDICINE

## 2020-08-03 PROCEDURE — C1769 GUIDE WIRE: HCPCS | Performed by: INTERNAL MEDICINE

## 2020-08-03 PROCEDURE — 93010 ELECTROCARDIOGRAM REPORT: CPT | Mod: ,,, | Performed by: INTERNAL MEDICINE

## 2020-08-03 PROCEDURE — S5010 5% DEXTROSE AND 0.45% SALINE: HCPCS | Performed by: INTERNAL MEDICINE

## 2020-08-03 PROCEDURE — 86850 RBC ANTIBODY SCREEN: CPT

## 2020-08-03 PROCEDURE — 99153 MOD SED SAME PHYS/QHP EA: CPT | Performed by: INTERNAL MEDICINE

## 2020-08-03 PROCEDURE — C1894 INTRO/SHEATH, NON-LASER: HCPCS | Performed by: INTERNAL MEDICINE

## 2020-08-03 PROCEDURE — 93005 ELECTROCARDIOGRAM TRACING: CPT

## 2020-08-03 PROCEDURE — 99152 MOD SED SAME PHYS/QHP 5/>YRS: CPT | Mod: ,,, | Performed by: INTERNAL MEDICINE

## 2020-08-03 PROCEDURE — 85027 COMPLETE CBC AUTOMATED: CPT

## 2020-08-03 RX ORDER — HEPARIN SODIUM 1000 [USP'U]/ML
INJECTION, SOLUTION INTRAVENOUS; SUBCUTANEOUS
Status: DISCONTINUED | OUTPATIENT
Start: 2020-08-03 | End: 2020-08-03 | Stop reason: HOSPADM

## 2020-08-03 RX ORDER — DIPHENHYDRAMINE HCL 50 MG
50 CAPSULE ORAL ONCE
Status: COMPLETED | OUTPATIENT
Start: 2020-08-03 | End: 2020-08-03

## 2020-08-03 RX ORDER — FENTANYL CITRATE 50 UG/ML
INJECTION, SOLUTION INTRAMUSCULAR; INTRAVENOUS
Status: DISCONTINUED | OUTPATIENT
Start: 2020-08-03 | End: 2020-08-03 | Stop reason: HOSPADM

## 2020-08-03 RX ORDER — ASPIRIN 325 MG
325 TABLET ORAL ONCE
Status: COMPLETED | OUTPATIENT
Start: 2020-08-03 | End: 2020-08-03

## 2020-08-03 RX ORDER — LIDOCAINE HYDROCHLORIDE 20 MG/ML
INJECTION, SOLUTION EPIDURAL; INFILTRATION; INTRACAUDAL; PERINEURAL
Status: DISCONTINUED | OUTPATIENT
Start: 2020-08-03 | End: 2020-08-03 | Stop reason: HOSPADM

## 2020-08-03 RX ORDER — NITROGLYCERIN 5 MG/ML
INJECTION, SOLUTION INTRAVENOUS
Status: DISCONTINUED | OUTPATIENT
Start: 2020-08-03 | End: 2020-08-03 | Stop reason: HOSPADM

## 2020-08-03 RX ORDER — HEPARIN SODIUM 200 [USP'U]/100ML
INJECTION, SOLUTION INTRAVENOUS
Status: DISCONTINUED | OUTPATIENT
Start: 2020-08-03 | End: 2020-08-03 | Stop reason: HOSPADM

## 2020-08-03 RX ORDER — DEXTROSE MONOHYDRATE AND SODIUM CHLORIDE 5; .45 G/100ML; G/100ML
INJECTION, SOLUTION INTRAVENOUS CONTINUOUS
Status: DISCONTINUED | OUTPATIENT
Start: 2020-08-03 | End: 2020-08-03 | Stop reason: HOSPADM

## 2020-08-03 RX ORDER — MIDAZOLAM HYDROCHLORIDE 1 MG/ML
INJECTION, SOLUTION INTRAMUSCULAR; INTRAVENOUS
Status: DISCONTINUED | OUTPATIENT
Start: 2020-08-03 | End: 2020-08-03 | Stop reason: HOSPADM

## 2020-08-03 RX ADMIN — ASPIRIN 325 MG ORAL TABLET 325 MG: 325 PILL ORAL at 08:08

## 2020-08-03 RX ADMIN — DEXTROSE AND SODIUM CHLORIDE: 5; .45 INJECTION, SOLUTION INTRAVENOUS at 08:08

## 2020-08-03 RX ADMIN — DIPHENHYDRAMINE HYDROCHLORIDE 50 MG: 50 CAPSULE ORAL at 08:08

## 2020-08-03 NOTE — PROCEDURES
POST CATH NOTE  Procedure:  [facility]  Referring MD:  MD Tee   Indication:  Positive PET   Access:  L Radial    Findings:  RCA   Patent LIMA to LAD  LAD with flow to PLB (which is a large vessel)  Large D1 with non obstructive dz  Occluded venous grafts    LVEDP:  20 mmHg      Intervention:  None    Post Cath Exam:  Vitals:    08/03/20 0816   BP: 130/76   Pulse:    Resp:    Temp:        Patient tolerated the procedure well, no complications  No unusual pain, hematoma or thrill at vascular access site.  Distal pulse present without signs of ischemia.  Post-procedure orders as per [facility]    Recommendation:  - Will consent for  intervention and bring him back for it  -Routine Post cath care  -Resume Cardiac diet  - IVFs NS @ 1.5 cc/kg/hr for 4 hrs   -continue dual antiplatelet therapy daily uninterrupted for at least one year  -high-potency statin, ACE-I and BB therapy      Kadeem Choudhury MD (Josué)  Cardiology Fellow  PGY 6  [facility]

## 2020-08-03 NOTE — INTERVAL H&P NOTE
The patient has been examined and the H&P has been reviewed:    LHC +/- PCI  Took Plavix Load yesterday  I gave him 325 ASA this morning  Access L Radial  Known CKD 3 - baseline Cr around 1.5    I concur with the findings and no changes have occurred since H&P was written.    Anesthesia/Surgery risks, benefits and alternative options discussed and understood by patient/family.          Active Hospital Problems    Diagnosis  POA    Abnormal cardiovascular function [R94.30]  Yes      Resolved Hospital Problems   No resolved problems to display.

## 2020-08-03 NOTE — NURSING
Vasc band removed per protocol over 1 hour period without complication. Patient tolerated well. Gauze/tegaderm dressing placed. Will monitor.

## 2020-08-03 NOTE — DISCHARGE SUMMARY
[facility]  Interventional Cardiology  Discharge Summary      Patient Name: Jani Cha  MRN: [id number]  Admission Date: 8/3/2020  Hospital Length of Stay: 0 days  Discharge Date and Time:  08/03/2020 12:02 PM  Attending Physician: Mason Benitez MD  Discharging Provider: Kadeem Solomon MD  Primary Care Physician: Laila Bains MD    HPI: Jani Cha is a 75 y.o. y.o. male with pmhx s/f CAD s/p 4 vessedl cabg in 2006, T2Dm, HTn, HLD, obesity, ckd 3a who is here today for evaluation after an abnormal PET stress test revealing abnormal distribution of PLV and small area in the distribution of LAD, which is clearly more significant compared to PET stress from 2 years prior. Pt had stress test done as part of workup for 1 x syncopal episode on July 4th, 2020. Since that time, pt denies chest pain/discomfort/tightness, sob, mace or other symptoms. He does admit to very gradual exercise capacity, which he attributes to older age and being overweight. Given recent syncope and abnormal stress IC to evaluate for any ischemic coronary disease.     PMHx:  - CAD s/p 4 vessedl cabg in 2006  - T2Dm  - HTn  - HLD   - obesity  - ckd 3a    Procedure(s) (LRB):  ANGIOGRAM, CORONARY, INCLUDING BYPASS GRAFT, WITH LEFT HEART CATHETERIZATION (N/A)  Aortogram (N/A)     Indwelling Lines/Drains at time of discharge:  Lines/Drains/Airways     None                 Hospital Course (synopsis of major diagnoses, care, treatment, and services provided during the course of the hospital stay):   We perfromed diagnostic angiogram via L Radial and found RCA   Patent LIMA to LAD  LAD with flow to PLB (which is a large vessel)  Large D1 with non obstructive dz  Occluded venous grafts  We decided to consent him for  and bring him back for procedure due to borderline renal function CKD 3      Significant Diagnostic Studies: Labs:   BMP:   Recent Labs   Lab 08/03/20  0758   *      K 4.8       CO2 25   BUN 45*   CREATININE 1.7*   CALCIUM 9.3       Pending Diagnostic Studies:     None        Final Active Diagnoses:    Diagnosis Date Noted POA    Abnormal cardiovascular function [R94.30] 07/24/2020 Yes      Problems Resolved During this Admission:       Discharged Condition: good    Follow Up:    Patient Instructions:   No discharge procedures on file.  Medications:  Reconciled Home Medications:      Medication List      ASK your doctor about these medications    ammonium lactate 12 % Crea  Apply 2 g topically once daily.     aspirin 81 MG EC tablet  Commonly known as: ECOTRIN  Take 81 mg by mouth once daily.     atorvastatin 80 MG tablet  Commonly known as: LIPITOR  Take 1 tablet by mouth once daily     carvediloL 25 MG tablet  Commonly known as: COREG  TAKE 1 TABLET BY MOUTH TWICE DAILY WITH MEALS     * clopidogreL 300 mg Tab  Commonly known as: PLAVIX  Take 1 tablet (300 mg total) by mouth once. for 1 dose     * clopidogreL 75 mg tablet  Commonly known as: PLAVIX  Take 1 tablet (75 mg total) by mouth once daily.     clotrimazole 1 % cream  Commonly known as: LOTRIMIN  APPLY  CREAM TOPICALLY TO AFFECTED AREA BID AS NEEDED.     clotrimazole-betamethasone 1-0.05% cream  Commonly known as: LOTRISONE  Apply topically 2 (two) times daily.     econazole nitrate 1 % cream  Apply topically once daily.     glipiZIDE 5 MG Tr24  Commonly known as: GLUCOTROL  Take 1 tablet (5 mg total) by mouth once daily.     indomethacin 50 MG capsule  Commonly known as: INDOCIN  Take 1 capsule (50 mg total) by mouth 3 (three) times daily with meals.     metFORMIN 1000 MG tablet  Commonly known as: GLUCOPHAGE  Take 1 tablet (1,000 mg total) by mouth 2 (two) times daily with meals.     nitroGLYCERIN 0.4 MG SL tablet  Commonly known as: NITROSTAT  Place 1 tablet (0.4 mg total) under the tongue every 5 (five) minutes as needed.     pantoprazole 20 MG tablet  Commonly known as: PROTONIX  Take 2 tablets (40 mg total) by mouth once  daily.     valsartan-hydrochlorothiazide 320-12.5 mg per tablet  Commonly known as: DIOVAN HCT  Take 1 tablet by mouth once daily.         * This list has 2 medication(s) that are the same as other medications prescribed for you. Read the directions carefully, and ask your doctor or other care provider to review them with you.                Time spent on the discharge of patient: 30 minutes    Kadeem Solomon MD  Interventional Cardiology  [facility]

## 2020-08-03 NOTE — PLAN OF CARE
Received report from german krueger. Patient s/p [facility], AAOx3. VSS, no c/o pain or discomfort at this time, resp even and unlabored. Gauze/tegaderm dressing to left wrist is CDI. No active bleeding. No hematoma noted. Post procedure protocol reviewed with patient and patient's family. Understanding verbalized. Family members at bedside. Nurse call bell within reach. Will continue to monitor per post procedure protocol.

## 2020-08-03 NOTE — DISCHARGE INSTRUCTIONS
Discharge Instructions and Care of Your Wrist After a Cardiac Catheterization Procedure Performed via the Radial Artery    For 1 week following the procedure:  Do not subject your affected hand/arm to any forceful movements (i.e. supporting weight when rising from a chair or bed)  Avoid excessive (extension/flexion) wrist movement (i.e. supporting weight when rising from a chair or bed, push-ups, lifting garage doors, etc.)  Do not drive a car for 24 hours  The dressing on the puncture site may be removed after 24 hours and left open to air. If there is minor oozing, you may apply a Band-aid and remove after 12 hours  You may shower on the day following your procedure. Do not take a tub bath or submerge the puncture site in water for 3 days following the procedure  Do not lift anything heavier than 5 pounds with the affected hand/arm  Do not operate a lawnmower, motorcycle, chainsaw, or all-terrain vehicle   Do not engage in vigorous exercise (i.e. Tennis, Golf, Bowling) using the affected arm    If bleeding should occur following discharge:  Sit down and apply firm pressure to the puncture site with your fingers for 10 minutes   If the bleeding stops, continue to sit quietly, keeping your wrist straight for 2 hours. Notify your physician as soon as possible   If bleeding does not stop after 10 minutes or if there is a large amount of bleeding or spurting, call 911 immediately. Do not drive yourself to the hospital.     You should expect mild tingling in your hand and tenderness at the puncture site for up to 3 days.     Notify your physician if these symptoms persist or if you experience:  Change in color or temperature of the hand or arm  Redness, heat, or pus at the puncture site  Chills or fever greater than 101.0 F

## 2020-08-04 ENCOUNTER — EDUCATION (OUTPATIENT)
Dept: CARDIOLOGY | Facility: CLINIC | Age: 75
End: 2020-08-04

## 2020-08-04 DIAGNOSIS — N18.30 CKD (CHRONIC KIDNEY DISEASE) STAGE 3, GFR 30-59 ML/MIN: ICD-10-CM

## 2020-08-04 DIAGNOSIS — I25.10 CORONARY ARTERY DISEASE INVOLVING NATIVE CORONARY ARTERY OF NATIVE HEART, ANGINA PRESENCE UNSPECIFIED: Primary | ICD-10-CM

## 2020-08-04 DIAGNOSIS — Z01.812 PRE-PROCEDURE LAB EXAM: ICD-10-CM

## 2020-08-04 DIAGNOSIS — Z01.812 PRE-PROCEDURE LAB EXAM: Primary | ICD-10-CM

## 2020-08-04 RX ORDER — DIPHENHYDRAMINE HCL 25 MG
50 CAPSULE ORAL ONCE
Status: CANCELLED | OUTPATIENT
Start: 2020-08-04 | End: 2020-08-04

## 2020-08-04 RX ORDER — DEXTROSE MONOHYDRATE AND SODIUM CHLORIDE 5; .45 G/100ML; G/100ML
INJECTION, SOLUTION INTRAVENOUS CONTINUOUS
Status: CANCELLED | OUTPATIENT
Start: 2020-08-04

## 2020-08-04 NOTE — PROGRESS NOTES
"OUTPATIENT CATHETERIZATION INSTRUCTIONS    You have been scheduled for a procedure in the catheterization lab on Monday, August 17, 2020.     Please report to the Cardiology Waiting Area on the Third floor of the hospital and check in at 6 AM.   You will then be taken to the SSCU (Short Stay Cardiac Unit) and prepared for your procedure. Please be aware that this is not the time of your procedure but the time you are to arrive. The procedures are scheduled on an hourly basis; however, emergency cases take precedence over all other cases.       You may not have anything to eat or drink after midnight the night before your test. You may take your regular morning medications with water. If there are any medications that you should not take you will be instructed to hold them that morning. If you are diabetic and on Metformin (Glucophage) do not take it the day before, the day of, and for 2 days after your procedure.      The procedure will take 1-2 hours to perform. After the procedure, you will return to SSCU on the third floor of the hospital. You will need to lie still (or keep your arm still) for the next 4 to 6 hours to minimize bleeding from the puncture site. Your family may remain in the room with you during this time.       You may be able to be discharged home that same afternoon if there is someone to drive you home and there were no complications. If you have one of the balloon, stent, or device procedures you may spend the night in the hospital. Your doctor will determine, based on your progress, the date and time of your discharge. The results of your procedure will be discussed with you before you are discharged. Any further testing or procedures will be scheduled for you either before you leave or you will be called with these appointments.       If you should have any questions, concerns, or need to change the date of your procedure, please call LAUREN Ellis @ [phone]",    Special " Instructions:  Covid test:  Friday Aug 14 @ 9am  Remain on Plavix 75mg  Daily.   Hold Metformin day before and day of (see above )        THE ABOVE INSTRUCTIONS WERE GIVEN TO THE PATIENT VERBALLY AND THEY VERBALIZED UNDERSTANDING.  THEY DO NOT REQUIRE ANY SPECIAL NEEDS AND DO NOT HAVE ANY LEARNING BARRIERS.          Directions for Reporting to Cardiology Waiting Area in the Hospital  If you park in the Parking Garage:  Take elevators to the1st floor of the parking garage.  Continue past the gift shop, coffee shop, and piano.  Take a right and go to the gold elevators. (Elevator B)  Take the elevator to the 3rd floor.  Follow the arrow on the sign on the wall that says Cath Lab Registration/EP Lab Registration.  Follow the long hallway all the way around until you come to a big open area.  This is the registration area.  Check in at Reception Desk.    OR    If family is dropping you off:  Have them drop you off at the front of the Hospital under the green overhang.  Enter through the doors and take a right.  Take the E elevators to the 3rd floor Cardiology Waiting Area.  Check in at the Reception Desk in the waiting room.

## 2020-08-14 ENCOUNTER — LAB VISIT (OUTPATIENT)
Dept: FAMILY MEDICINE | Facility: CLINIC | Age: 75
End: 2020-08-14
Payer: MEDICARE

## 2020-08-14 DIAGNOSIS — Z01.812 PRE-PROCEDURE LAB EXAM: ICD-10-CM

## 2020-08-14 PROCEDURE — U0003 INFECTIOUS AGENT DETECTION BY NUCLEIC ACID (DNA OR RNA); SEVERE ACUTE RESPIRATORY SYNDROME CORONAVIRUS 2 (SARS-COV-2) (CORONAVIRUS DISEASE [COVID-19]), AMPLIFIED PROBE TECHNIQUE, MAKING USE OF HIGH THROUGHPUT TECHNOLOGIES AS DESCRIBED BY CMS-2020-01-R: HCPCS

## 2020-08-15 LAB — SARS-COV-2 RNA RESP QL NAA+PROBE: NOT DETECTED

## 2020-08-17 ENCOUNTER — HOSPITAL ENCOUNTER (OUTPATIENT)
Facility: HOSPITAL | Age: 75
Discharge: HOME | End: 2020-08-18
Attending: INTERNAL MEDICINE | Admitting: INTERNAL MEDICINE
Payer: MEDICARE

## 2020-08-17 DIAGNOSIS — I25.10 CAD (CORONARY ARTERY DISEASE): ICD-10-CM

## 2020-08-17 DIAGNOSIS — I25.10 CORONARY ARTERY DISEASE INVOLVING NATIVE CORONARY ARTERY OF NATIVE HEART, ANGINA PRESENCE UNSPECIFIED: ICD-10-CM

## 2020-08-17 DIAGNOSIS — Z98.61 POSTSURGICAL PERCUTANEOUS TRANSLUMINAL CORONARY ANGIOPLASTY STATUS: Primary | ICD-10-CM

## 2020-08-17 DIAGNOSIS — Z01.812 PRE-PROCEDURE LAB EXAM: ICD-10-CM

## 2020-08-17 DIAGNOSIS — I25.10 CORONARY ARTERY DISEASE INVOLVING NATIVE CORONARY ARTERY OF NATIVE HEART WITHOUT ANGINA PECTORIS: ICD-10-CM

## 2020-08-17 LAB
ABO + RH BLD: NORMAL
ANION GAP SERPL CALC-SCNC: 11 MMOL/L (ref 8–16)
BLD GP AB SCN CELLS X3 SERPL QL: NORMAL
BUN SERPL-MCNC: 38 MG/DL (ref 8–23)
CALCIUM SERPL-MCNC: 9.1 MG/DL (ref 8.7–10.5)
CHLORIDE SERPL-SCNC: 106 MMOL/L (ref 95–110)
CO2 SERPL-SCNC: 23 MMOL/L (ref 23–29)
CREAT SERPL-MCNC: 1.7 MG/DL (ref 0.5–1.4)
ERYTHROCYTE [DISTWIDTH] IN BLOOD BY AUTOMATED COUNT: 12.8 % (ref 11.5–14.5)
EST. GFR  (AFRICAN AMERICAN): 44.9 ML/MIN/1.73 M^2
EST. GFR  (NON AFRICAN AMERICAN): 38.9 ML/MIN/1.73 M^2
GLUCOSE SERPL-MCNC: 184 MG/DL (ref 70–110)
HCT VFR BLD AUTO: 42.5 % (ref 40–54)
HGB BLD-MCNC: 13.7 G/DL (ref 14–18)
INR PPP: 1.1 (ref 0.8–1.2)
MCH RBC QN AUTO: 30.6 PG (ref 27–31)
MCHC RBC AUTO-ENTMCNC: 32.2 G/DL (ref 32–36)
MCV RBC AUTO: 95 FL (ref 82–98)
PLATELET # BLD AUTO: 204 K/UL (ref 150–350)
PMV BLD AUTO: 10.4 FL (ref 9.2–12.9)
POCT GLUCOSE: 197 MG/DL (ref 70–110)
POCT GLUCOSE: 197 MG/DL (ref 70–110)
POCT GLUCOSE: 203 MG/DL (ref 70–110)
POCT GLUCOSE: 249 MG/DL (ref 70–110)
POTASSIUM SERPL-SCNC: 4.3 MMOL/L (ref 3.5–5.1)
PROTHROMBIN TIME: 12.2 SEC (ref 9–12.5)
RBC # BLD AUTO: 4.48 M/UL (ref 4.6–6.2)
SODIUM SERPL-SCNC: 140 MMOL/L (ref 136–145)
WBC # BLD AUTO: 8.17 K/UL (ref 3.9–12.7)

## 2020-08-17 PROCEDURE — C1725 CATH, TRANSLUMIN NON-LASER: HCPCS | Performed by: INTERNAL MEDICINE

## 2020-08-17 PROCEDURE — C1894 INTRO/SHEATH, NON-LASER: HCPCS | Performed by: INTERNAL MEDICINE

## 2020-08-17 PROCEDURE — C9607 PERC D-E COR REVASC CHRO SIN: HCPCS | Mod: RC,GC | Performed by: INTERNAL MEDICINE

## 2020-08-17 PROCEDURE — 25000003 PHARM REV CODE 250: Performed by: INTERNAL MEDICINE

## 2020-08-17 PROCEDURE — 93010 EKG 12-LEAD: ICD-10-PCS | Mod: 77,,, | Performed by: INTERNAL MEDICINE

## 2020-08-17 PROCEDURE — 99152 PR MOD CONSCIOUS SEDATION, SAME PHYS, 5+ YRS, FIRST 15 MIN: ICD-10-PCS | Mod: GC,,, | Performed by: INTERNAL MEDICINE

## 2020-08-17 PROCEDURE — 92943 PRQ TRLUML REVSC CH OCC ANT: CPT | Performed by: INTERNAL MEDICINE

## 2020-08-17 PROCEDURE — 99153 MOD SED SAME PHYS/QHP EA: CPT | Performed by: INTERNAL MEDICINE

## 2020-08-17 PROCEDURE — 92943 PR CTO: ICD-10-PCS | Mod: RC,GC,, | Performed by: INTERNAL MEDICINE

## 2020-08-17 PROCEDURE — C1887 CATHETER, GUIDING: HCPCS | Performed by: INTERNAL MEDICINE

## 2020-08-17 PROCEDURE — 99152 MOD SED SAME PHYS/QHP 5/>YRS: CPT | Performed by: INTERNAL MEDICINE

## 2020-08-17 PROCEDURE — 80048 BASIC METABOLIC PNL TOTAL CA: CPT

## 2020-08-17 PROCEDURE — 92943 PRQ TRLUML REVSC CH OCC ANT: CPT | Mod: RC,GC,, | Performed by: INTERNAL MEDICINE

## 2020-08-17 PROCEDURE — 85347 COAGULATION TIME ACTIVATED: CPT | Performed by: INTERNAL MEDICINE

## 2020-08-17 PROCEDURE — 82962 GLUCOSE BLOOD TEST: CPT | Performed by: INTERNAL MEDICINE

## 2020-08-17 PROCEDURE — 93005 ELECTROCARDIOGRAM TRACING: CPT

## 2020-08-17 PROCEDURE — 63600175 PHARM REV CODE 636 W HCPCS: Performed by: INTERNAL MEDICINE

## 2020-08-17 PROCEDURE — 85610 PROTHROMBIN TIME: CPT

## 2020-08-17 PROCEDURE — 93010 ELECTROCARDIOGRAM REPORT: CPT | Mod: 77,,, | Performed by: INTERNAL MEDICINE

## 2020-08-17 PROCEDURE — 25500020 PHARM REV CODE 255: Performed by: INTERNAL MEDICINE

## 2020-08-17 PROCEDURE — 93010 ELECTROCARDIOGRAM REPORT: CPT | Mod: ,,, | Performed by: INTERNAL MEDICINE

## 2020-08-17 PROCEDURE — 85027 COMPLETE CBC AUTOMATED: CPT

## 2020-08-17 PROCEDURE — S5010 5% DEXTROSE AND 0.45% SALINE: HCPCS | Performed by: INTERNAL MEDICINE

## 2020-08-17 PROCEDURE — 86850 RBC ANTIBODY SCREEN: CPT

## 2020-08-17 PROCEDURE — 93005 ELECTROCARDIOGRAM TRACING: CPT | Mod: 59

## 2020-08-17 PROCEDURE — 94761 N-INVAS EAR/PLS OXIMETRY MLT: CPT | Mod: 59

## 2020-08-17 PROCEDURE — 27201423 OPTIME MED/SURG SUP & DEVICES STERILE SUPPLY: Performed by: INTERNAL MEDICINE

## 2020-08-17 PROCEDURE — 93010 EKG 12-LEAD: ICD-10-PCS | Mod: ,,, | Performed by: INTERNAL MEDICINE

## 2020-08-17 PROCEDURE — 99152 MOD SED SAME PHYS/QHP 5/>YRS: CPT | Mod: GC,,, | Performed by: INTERNAL MEDICINE

## 2020-08-17 PROCEDURE — C1769 GUIDE WIRE: HCPCS | Performed by: INTERNAL MEDICINE

## 2020-08-17 RX ORDER — ATORVASTATIN CALCIUM 20 MG/1
80 TABLET, FILM COATED ORAL DAILY
Status: DISCONTINUED | OUTPATIENT
Start: 2020-08-18 | End: 2020-08-18 | Stop reason: HOSPADM

## 2020-08-17 RX ORDER — NALOXONE HYDROCHLORIDE 0.4 MG/ML
INJECTION, SOLUTION INTRAMUSCULAR; INTRAVENOUS; SUBCUTANEOUS
Status: DISCONTINUED | OUTPATIENT
Start: 2020-08-17 | End: 2020-08-17 | Stop reason: HOSPADM

## 2020-08-17 RX ORDER — CLOPIDOGREL BISULFATE 75 MG/1
75 TABLET ORAL DAILY
Status: DISCONTINUED | OUTPATIENT
Start: 2020-08-18 | End: 2020-08-18 | Stop reason: HOSPADM

## 2020-08-17 RX ORDER — HEPARIN SODIUM 1000 [USP'U]/ML
INJECTION, SOLUTION INTRAVENOUS; SUBCUTANEOUS
Status: DISCONTINUED | OUTPATIENT
Start: 2020-08-17 | End: 2020-08-17 | Stop reason: HOSPADM

## 2020-08-17 RX ORDER — MIDAZOLAM HYDROCHLORIDE 1 MG/ML
INJECTION, SOLUTION INTRAMUSCULAR; INTRAVENOUS
Status: DISCONTINUED | OUTPATIENT
Start: 2020-08-17 | End: 2020-08-17 | Stop reason: HOSPADM

## 2020-08-17 RX ORDER — HEPARIN SODIUM 200 [USP'U]/100ML
INJECTION, SOLUTION INTRAVENOUS
Status: DISCONTINUED | OUTPATIENT
Start: 2020-08-17 | End: 2020-08-18 | Stop reason: HOSPADM

## 2020-08-17 RX ORDER — DIPHENHYDRAMINE HCL 50 MG
50 CAPSULE ORAL ONCE
Status: COMPLETED | OUTPATIENT
Start: 2020-08-17 | End: 2020-08-17

## 2020-08-17 RX ORDER — IBUPROFEN 200 MG
24 TABLET ORAL
Status: DISCONTINUED | OUTPATIENT
Start: 2020-08-17 | End: 2020-08-18 | Stop reason: HOSPADM

## 2020-08-17 RX ORDER — FLUMAZENIL 0.1 MG/ML
INJECTION INTRAVENOUS
Status: DISCONTINUED | OUTPATIENT
Start: 2020-08-17 | End: 2020-08-17 | Stop reason: HOSPADM

## 2020-08-17 RX ORDER — LIDOCAINE HYDROCHLORIDE 20 MG/ML
INJECTION, SOLUTION INFILTRATION; PERINEURAL
Status: DISCONTINUED | OUTPATIENT
Start: 2020-08-17 | End: 2020-08-17 | Stop reason: HOSPADM

## 2020-08-17 RX ORDER — CARVEDILOL 25 MG/1
25 TABLET ORAL 2 TIMES DAILY WITH MEALS
Status: DISCONTINUED | OUTPATIENT
Start: 2020-08-17 | End: 2020-08-18 | Stop reason: HOSPADM

## 2020-08-17 RX ORDER — IBUPROFEN 200 MG
16 TABLET ORAL
Status: DISCONTINUED | OUTPATIENT
Start: 2020-08-17 | End: 2020-08-18 | Stop reason: HOSPADM

## 2020-08-17 RX ORDER — ASPIRIN 81 MG/1
81 TABLET ORAL NIGHTLY
Status: DISCONTINUED | OUTPATIENT
Start: 2020-08-17 | End: 2020-08-18 | Stop reason: HOSPADM

## 2020-08-17 RX ORDER — INSULIN ASPART 100 [IU]/ML
1-10 INJECTION, SOLUTION INTRAVENOUS; SUBCUTANEOUS
Status: DISCONTINUED | OUTPATIENT
Start: 2020-08-17 | End: 2020-08-18 | Stop reason: HOSPADM

## 2020-08-17 RX ORDER — DEXTROSE MONOHYDRATE AND SODIUM CHLORIDE 5; .45 G/100ML; G/100ML
INJECTION, SOLUTION INTRAVENOUS CONTINUOUS
Status: DISCONTINUED | OUTPATIENT
Start: 2020-08-17 | End: 2020-08-17

## 2020-08-17 RX ORDER — NITROGLYCERIN 5 MG/ML
INJECTION, SOLUTION INTRAVENOUS
Status: DISCONTINUED | OUTPATIENT
Start: 2020-08-17 | End: 2020-08-17 | Stop reason: HOSPADM

## 2020-08-17 RX ORDER — PANTOPRAZOLE SODIUM 40 MG/1
40 TABLET, DELAYED RELEASE ORAL DAILY
Status: DISCONTINUED | OUTPATIENT
Start: 2020-08-18 | End: 2020-08-18 | Stop reason: HOSPADM

## 2020-08-17 RX ORDER — GLUCAGON 1 MG
1 KIT INJECTION
Status: DISCONTINUED | OUTPATIENT
Start: 2020-08-17 | End: 2020-08-18 | Stop reason: HOSPADM

## 2020-08-17 RX ORDER — LOSARTAN POTASSIUM AND HYDROCHLOROTHIAZIDE 12.5; 1 MG/1; MG/1
1 TABLET ORAL DAILY
Status: DISCONTINUED | OUTPATIENT
Start: 2020-08-18 | End: 2020-08-18 | Stop reason: HOSPADM

## 2020-08-17 RX ORDER — FENTANYL CITRATE 50 UG/ML
INJECTION, SOLUTION INTRAMUSCULAR; INTRAVENOUS
Status: DISCONTINUED | OUTPATIENT
Start: 2020-08-17 | End: 2020-08-17 | Stop reason: HOSPADM

## 2020-08-17 RX ADMIN — CARVEDILOL 25 MG: 25 TABLET, FILM COATED ORAL at 04:08

## 2020-08-17 RX ADMIN — DEXTROSE AND SODIUM CHLORIDE: 5; .45 INJECTION, SOLUTION INTRAVENOUS at 06:08

## 2020-08-17 RX ADMIN — INSULIN ASPART 4 UNITS: 100 INJECTION, SOLUTION INTRAVENOUS; SUBCUTANEOUS at 02:08

## 2020-08-17 RX ADMIN — ASPIRIN 81 MG: 81 TABLET, DELAYED RELEASE ORAL at 09:08

## 2020-08-17 RX ADMIN — DIPHENHYDRAMINE HYDROCHLORIDE 50 MG: 50 CAPSULE ORAL at 06:08

## 2020-08-17 NOTE — HPI
MrJani is a 75 y.o. male with a PMHx of CAD s/p 4v-CABG in 2006, HTN, HLD, DM2, CKD3, and obesity. He recently underwent a PET stress test after having a syncopal episode which demonstrated ischemia in the PLB territory comprising 10% of the myocardium. He underwent a coronary angiogram on 8/3/2020 which demonstrated a patent LIMA-D1 but occluded SVG's, occluded native LAD and LCx with a patent Ramus. He also has an occluded RCA which fills retrograde from the distal LAD that has flow supplied from the LIMA. He presents today for RCA  PCI.    Patient otherwise has been feeling well since his last admission, no new complaints. Denies chest pain or SOB. Still has limited exercise tolerance.    NPO since yesterday.  Took ASA last night, clopidogrel this AM.  COVID test on 8/14/2020 was negative.  Cr pending this AM.

## 2020-08-17 NOTE — Clinical Note
210 ml injected throughout the case. 290 mL total wasted during the case. 500 mL total used in the case.

## 2020-08-17 NOTE — BRIEF OP NOTE
"    Post Cath Note  Referring Physician: Mason Benitez MD  Procedure: Repair, Chronic Total Occlusion, Coronary (N/A), ANGIOGRAM, CORONARY ARTERY (N/A), Angioplasty-coronary       Access: Right radial and Left radial    RCA  with L-to-R collaterals from LIMA-D1-LAD graft    See full report for further details    Intervention:     Attempted RCA  PCI, unable to access true lumen beyond distal occlusion  POBA with 2.5 x 40 mm balloon  Flow restored antegrade to PLB, dissected over PDA (still fills retrograde from LIMA-D1)    Closure device: Radial band    Post Cath Exam:   /79 (BP Location: Right arm, Patient Position: Lying)   Pulse 60   Temp 97.1 °F (36.2 °C) (Oral)   Resp 18   Ht 5' 10" (1.778 m)   Wt 122.5 kg (270 lb)   SpO2 95%   BMI 38.74 kg/m²   No unusual pain, hematoma, thrill or bruit at vascular access site.  Distal pulse present without signs of ischemia.    Recommendations:   - Routine post-cath care  - IVF at 1.5 cc/kg/hr for 4 hrs  - Monitor overnight  - Continue medical management  - Risk factor reduction  - Follow-up with 1 month in clinic with Dr. Don to re-assess RCA     Signed:  Bryson Florez MD  Advanced Interventional Cardiology Fellow, PGY-8  8/17/2020 11:24 AM  "
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
## 2020-08-17 NOTE — NURSING TRANSFER
Nursing Transfer Note      8/17/2020     Transfer To: room 324    Transfer via wheelchair    Transfer with cardiac monitoring    Transported by PCT    Chart send with patient: Yes    Notified: spouse    Report called to LAUREN Faust. Pt in stable condition upon transfer. No needs or complaints.

## 2020-08-17 NOTE — CARE UPDATE
Received report from Angelica AGUILAR. Patient s/p PCI, AAOx4. VSS, no c/o pain or discomfort at this time, resp even and unlabored. Bilateral wrist vascbands CDI. No active bleeding. No hematoma noted. Post procedure protocol reviewed with patient and patient's family. Understanding verbalized. Spouse at bedside. Nurse call bell within reach. Will continue to monitor per post procedure protocol.

## 2020-08-17 NOTE — ASSESSMENT & PLAN NOTE
- Plan for RCA  PCI today  - Anti-platelet Therapy: ASA 81 mg Daily and Clopidogrel 75 mg Daily  - Access: R Radial and L Radial (B/L access)  - Creatinine/CrCl: 1.7 on 8/3/2020, pending this AM   - Allergies: No shellfish/Iodine allergy  - Pre-Hydration: 3 cc/kg/hr IV, continuous, for 1 hour, Pre-Procedure  - Pre-Op Med: Diphenhydramine (Benadryl) 50 mg, Oral, Once, Pre-Procedure   - All patient's questions were answered  - The risks, benefits & alternatives of the procedure were explained to the patient  - The risks of coronary angiography include but are not limited to: Bleeding, infection, heart rhythm abnormalities, allergic reactions, kidney injury requiring dilaysis, limb loss, stroke and death  - Should stenting be indicated, the patient has agreed to dual anti-platelet therapy for at least 6 months with a drug-eluting stent and a minimum of 1-month with the use of a bare metal stent  - We also discussed safety thresholds for the procedure regarding contrast use and radiation dose, and will abort the procedure if these thresholds are met  - Additionally, pt is aware that non-compliance is likely to result in stent clotting with heart attack, heart failure, and/or death  - The risks of moderate sedation include hypotension, respiratory depression, arrhythmias, bronchospasm, & death  - Informed consent was obtained & the patient is agreeable to proceed with the procedure
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
## 2020-08-18 VITALS
TEMPERATURE: 99 F | BODY MASS INDEX: 38.65 KG/M2 | HEIGHT: 70 IN | RESPIRATION RATE: 18 BRPM | SYSTOLIC BLOOD PRESSURE: 115 MMHG | WEIGHT: 270 LBS | DIASTOLIC BLOOD PRESSURE: 68 MMHG | OXYGEN SATURATION: 97 % | HEART RATE: 65 BPM

## 2020-08-18 LAB
ANION GAP SERPL CALC-SCNC: 9 MMOL/L (ref 8–16)
BASOPHILS # BLD AUTO: 0.05 K/UL (ref 0–0.2)
BASOPHILS NFR BLD: 0.5 % (ref 0–1.9)
BUN SERPL-MCNC: 29 MG/DL (ref 8–23)
CALCIUM SERPL-MCNC: 9.4 MG/DL (ref 8.7–10.5)
CHLORIDE SERPL-SCNC: 107 MMOL/L (ref 95–110)
CO2 SERPL-SCNC: 23 MMOL/L (ref 23–29)
CREAT SERPL-MCNC: 1.5 MG/DL (ref 0.5–1.4)
DIFFERENTIAL METHOD: ABNORMAL
EOSINOPHIL # BLD AUTO: 0.3 K/UL (ref 0–0.5)
EOSINOPHIL NFR BLD: 3.5 % (ref 0–8)
ERYTHROCYTE [DISTWIDTH] IN BLOOD BY AUTOMATED COUNT: 13.1 % (ref 11.5–14.5)
EST. GFR  (AFRICAN AMERICAN): 52.3 ML/MIN/1.73 M^2
EST. GFR  (NON AFRICAN AMERICAN): 45.2 ML/MIN/1.73 M^2
GLUCOSE SERPL-MCNC: 243 MG/DL (ref 70–110)
HCT VFR BLD AUTO: 43 % (ref 40–54)
HGB BLD-MCNC: 13.6 G/DL (ref 14–18)
IMM GRANULOCYTES # BLD AUTO: 0.04 K/UL (ref 0–0.04)
IMM GRANULOCYTES NFR BLD AUTO: 0.4 % (ref 0–0.5)
LYMPHOCYTES # BLD AUTO: 1.5 K/UL (ref 1–4.8)
LYMPHOCYTES NFR BLD: 15.1 % (ref 18–48)
MCH RBC QN AUTO: 30.1 PG (ref 27–31)
MCHC RBC AUTO-ENTMCNC: 31.6 G/DL (ref 32–36)
MCV RBC AUTO: 95 FL (ref 82–98)
MONOCYTES # BLD AUTO: 0.7 K/UL (ref 0.3–1)
MONOCYTES NFR BLD: 7.6 % (ref 4–15)
NEUTROPHILS # BLD AUTO: 7.1 K/UL (ref 1.8–7.7)
NEUTROPHILS NFR BLD: 72.9 % (ref 38–73)
NRBC BLD-RTO: 0 /100 WBC
PLATELET # BLD AUTO: 195 K/UL (ref 150–350)
PMV BLD AUTO: 10.2 FL (ref 9.2–12.9)
POC ACTIVATED CLOTTING TIME K: 208 SEC (ref 74–137)
POC ACTIVATED CLOTTING TIME K: 241 SEC (ref 74–137)
POC ACTIVATED CLOTTING TIME K: 263 SEC (ref 74–137)
POCT GLUCOSE: 169 MG/DL (ref 70–110)
POTASSIUM SERPL-SCNC: 4.4 MMOL/L (ref 3.5–5.1)
RBC # BLD AUTO: 4.52 M/UL (ref 4.6–6.2)
SAMPLE: ABNORMAL
SODIUM SERPL-SCNC: 139 MMOL/L (ref 136–145)
WBC # BLD AUTO: 9.71 K/UL (ref 3.9–12.7)

## 2020-08-18 PROCEDURE — 80048 BASIC METABOLIC PNL TOTAL CA: CPT

## 2020-08-18 PROCEDURE — 25000003 PHARM REV CODE 250: Performed by: INTERNAL MEDICINE

## 2020-08-18 PROCEDURE — 85025 COMPLETE CBC W/AUTO DIFF WBC: CPT

## 2020-08-18 PROCEDURE — 36415 COLL VENOUS BLD VENIPUNCTURE: CPT

## 2020-08-18 RX ADMIN — LOSARTAN POTASSIUM AND HYDROCHLOROTHIAZIDE 1 TABLET: 12.5; 1 TABLET ORAL at 10:08

## 2020-08-18 RX ADMIN — PANTOPRAZOLE SODIUM 40 MG: 40 TABLET, DELAYED RELEASE ORAL at 10:08

## 2020-08-18 RX ADMIN — CARVEDILOL 25 MG: 25 TABLET, FILM COATED ORAL at 10:08

## 2020-08-18 RX ADMIN — CLOPIDOGREL 75 MG: 75 TABLET, FILM COATED ORAL at 10:08

## 2020-08-18 RX ADMIN — ATORVASTATIN CALCIUM 80 MG: 20 TABLET, FILM COATED ORAL at 10:08

## 2020-08-18 NOTE — PLAN OF CARE
Patient alert and responsive to nursing care. Vital signs WNL.  No c/o of pain discomfort . No cardiopulmonary distress. No SOB. No chest pain. IV patent and no s/s of infiltration, Saline locked.   Medication given per MD order.  Comfort/Safety measures maintain. Call light and table within reach.  Will continue to monitor for changes of condition.

## 2020-08-18 NOTE — PLAN OF CARE
Plan of care discussed with patient. Patient is free of fall/trauma/injury. Denies CP, SOB, or pain/discomfort. Patient received left heart cath today. Both pressure bands on the wrist have been removed. No signs of bleeding noted. Patient finished ordered IV fluids. Supplemental insulin has been given for meals. Scheduled D/C for tomorrow.All questions addressed. Will continue to monitor

## 2020-08-18 NOTE — DISCHARGE SUMMARY
Discharge Summary  Interventional Cardiology      Admit Date: 8/17/2020    Discharge Date:  8/18/2020    Attending Physician: Mason Benitez MD    Discharge Physician: Bryson Florez MD    Principal Diagnoses: Coronary artery disease involving native coronary artery of native heart  Indication for Admission: Repair, Chronic Total Occlusion, Coronary (N/A), ANGIOGRAM, CORONARY ARTERY (N/A), Angioplasty-coronary    Discharged Condition: Good    Hospital Course:   Patient presented for outpatient coronary angiogram which went without complication. Coronary angiogram revealed  of his RCA with collaterals from his left system supplied by his [facility].. He underwent ballon dilation of his proximal to distal RCA. Antegrade wire escalation and dissection with re-entry was attempted of his RCA , however re-entry into the true distal lumen was not successful as safety thresholds of contrast use and radiation dose were met. See full cath report in Epic for details. Hemostasis of patient's R Radial and L Radial access sites was achieved with VascBands. Patient was monitored overnight per post-cath protocol, and his B/L radial access sites were c/d/i with no hematoma. Patient was able to ambulate without difficulty. He was feeling well and anticipating discharge home today.     Outpatient Plan:  - There were no medication changes   - Follow up with Dr. Don in 4 weeks to bring back for stenting of his RCA    Diet: Cardiac diet    Activity: Ad sridevi, wound care instructions provided    Disposition: Home or Self Care    Discharge Medications:      Medication List      CONTINUE taking these medications    ammonium lactate 12 % Crea  Apply 2 g topically once daily.     aspirin 81 MG EC tablet  Commonly known as: ECOTRIN     atorvastatin 80 MG tablet  Commonly known as: LIPITOR  Take 1 tablet by mouth once daily     carvediloL 25 MG tablet  Commonly known as: COREG  TAKE 1 TABLET BY MOUTH TWICE DAILY WITH MEALS      clopidogreL 75 mg tablet  Commonly known as: PLAVIX  Take 1 tablet (75 mg total) by mouth once daily.     clotrimazole 1 % cream  Commonly known as: LOTRIMIN  APPLY  CREAM TOPICALLY TO AFFECTED AREA BID AS NEEDED.     clotrimazole-betamethasone 1-0.05% cream  Commonly known as: LOTRISONE  Apply topically 2 (two) times daily.     econazole nitrate 1 % cream  Apply topically once daily.     glipiZIDE 5 MG Tr24  Commonly known as: GLUCOTROL  Take 1 tablet (5 mg total) by mouth once daily.     indomethacin 50 MG capsule  Commonly known as: INDOCIN  Take 1 capsule (50 mg total) by mouth 3 (three) times daily with meals.     metFORMIN 1000 MG tablet  Commonly known as: GLUCOPHAGE  Take 1 tablet (1,000 mg total) by mouth 2 (two) times daily with meals.     nitroGLYCERIN 0.4 MG SL tablet  Commonly known as: NITROSTAT  Place 1 tablet (0.4 mg total) under the tongue every 5 (five) minutes as needed.     pantoprazole 20 MG tablet  Commonly known as: PROTONIX  Take 2 tablets by mouth once daily     valsartan-hydrochlorothiazide 320-12.5 mg per tablet  Commonly known as: DIOVAN HCT  Take 1 tablet by mouth once daily.          Follow Up:  Follow-up Information     Mason Don MD In 4 weeks.    Specialties: INTERVENTIONAL CARDIOLOGY, Cardiology  Contact information:  [address]  [facility] [id number] [phone]

## 2020-08-26 ENCOUNTER — TELEPHONE (OUTPATIENT)
Dept: CARDIAC REHAB | Facility: CLINIC | Age: 75
End: 2020-08-26

## 2020-08-31 ENCOUNTER — EXTERNAL CHRONIC CARE MANAGEMENT (OUTPATIENT)
Dept: PRIMARY CARE CLINIC | Facility: CLINIC | Age: 75
End: 2020-08-31
Payer: MEDICARE

## 2020-08-31 PROCEDURE — 99490 CHRNC CARE MGMT STAFF 1ST 20: CPT | Mod: S$PBB,,, | Performed by: FAMILY MEDICINE

## 2020-08-31 PROCEDURE — 99490 CHRNC CARE MGMT STAFF 1ST 20: CPT | Mod: PBBFAC,PO | Performed by: FAMILY MEDICINE

## 2020-08-31 PROCEDURE — 99490 PR CHRONIC CARE MGMT, 1ST 20 MIN: ICD-10-PCS | Mod: S$PBB,,, | Performed by: FAMILY MEDICINE

## 2020-09-01 ENCOUNTER — TELEPHONE (OUTPATIENT)
Dept: CARDIAC REHAB | Facility: CLINIC | Age: 75
End: 2020-09-01

## 2020-09-04 ENCOUNTER — PATIENT OUTREACH (OUTPATIENT)
Dept: ADMINISTRATIVE | Facility: OTHER | Age: 75
End: 2020-09-04

## 2020-09-04 NOTE — PROGRESS NOTES
Health Maintenance Due   Topic Date Due    Shingles Vaccine (1 of 2) 05/27/1996    Influenza Vaccine (1) 08/01/2020    Hemoglobin A1c  09/09/2020     Updates were requested from care everywhere.  Chart was reviewed for overdue Proactive [facility] Encounters (MATHEW) topics (CRS, Breast Cancer Screening, Eye exam)  Health Maintenance has been updated.  LINKS immunization registry triggered.  Immunizations were reconciled.

## 2020-09-08 ENCOUNTER — OFFICE VISIT (OUTPATIENT)
Dept: PODIATRY | Facility: CLINIC | Age: 75
End: 2020-09-08
Payer: MEDICARE

## 2020-09-08 VITALS — BODY MASS INDEX: 38.66 KG/M2 | HEIGHT: 70 IN | WEIGHT: 270.06 LBS

## 2020-09-08 DIAGNOSIS — E11.49 TYPE II DIABETES MELLITUS WITH NEUROLOGICAL MANIFESTATIONS: Primary | ICD-10-CM

## 2020-09-08 DIAGNOSIS — B35.1 ONYCHOMYCOSIS DUE TO DERMATOPHYTE: ICD-10-CM

## 2020-09-08 PROCEDURE — 99999 PR PBB SHADOW E&M-EST. PATIENT-LVL III: CPT | Mod: PBBFAC,,, | Performed by: PODIATRIST

## 2020-09-08 PROCEDURE — 99213 OFFICE O/P EST LOW 20 MIN: CPT | Mod: PBBFAC,PO,25 | Performed by: PODIATRIST

## 2020-09-08 PROCEDURE — 11721 PR DEBRIDEMENT OF NAILS, 6 OR MORE: ICD-10-PCS | Mod: Q9,S$PBB,, | Performed by: PODIATRIST

## 2020-09-08 PROCEDURE — 11721 DEBRIDE NAIL 6 OR MORE: CPT | Mod: Q9,PBBFAC,PO | Performed by: PODIATRIST

## 2020-09-08 PROCEDURE — 11721 DEBRIDE NAIL 6 OR MORE: CPT | Mod: Q9,S$PBB,, | Performed by: PODIATRIST

## 2020-09-08 PROCEDURE — 99499 UNLISTED E&M SERVICE: CPT | Mod: S$PBB,,, | Performed by: PODIATRIST

## 2020-09-08 PROCEDURE — 99999 PR PBB SHADOW E&M-EST. PATIENT-LVL III: ICD-10-PCS | Mod: PBBFAC,,, | Performed by: PODIATRIST

## 2020-09-08 PROCEDURE — 99499 NO LOS: ICD-10-PCS | Mod: S$PBB,,, | Performed by: PODIATRIST
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
## 2020-09-11 ENCOUNTER — OFFICE VISIT (OUTPATIENT)
Dept: FAMILY MEDICINE | Facility: CLINIC | Age: 75
End: 2020-09-11
Payer: MEDICARE

## 2020-09-11 VITALS
DIASTOLIC BLOOD PRESSURE: 82 MMHG | TEMPERATURE: 98 F | HEIGHT: 70 IN | OXYGEN SATURATION: 95 % | HEART RATE: 66 BPM | WEIGHT: 260.13 LBS | BODY MASS INDEX: 37.24 KG/M2 | SYSTOLIC BLOOD PRESSURE: 132 MMHG

## 2020-09-11 DIAGNOSIS — E11.40 TYPE 2 DIABETES MELLITUS WITH DIABETIC NEUROPATHY, WITHOUT LONG-TERM CURRENT USE OF INSULIN: Chronic | ICD-10-CM

## 2020-09-11 DIAGNOSIS — I70.0 THORACIC AORTA ATHEROSCLEROSIS: ICD-10-CM

## 2020-09-11 DIAGNOSIS — I71.40 ABDOMINAL AORTIC ANEURYSM (AAA) WITHOUT RUPTURE: ICD-10-CM

## 2020-09-11 DIAGNOSIS — I10 ESSENTIAL HYPERTENSION: Chronic | ICD-10-CM

## 2020-09-11 DIAGNOSIS — Z00.00 ENCOUNTER FOR PREVENTIVE HEALTH EXAMINATION: Primary | ICD-10-CM

## 2020-09-11 DIAGNOSIS — E66.01 SEVERE OBESITY (BMI 35.0-39.9) WITH COMORBIDITY: Chronic | ICD-10-CM

## 2020-09-11 DIAGNOSIS — N18.30 CKD (CHRONIC KIDNEY DISEASE) STAGE 3, GFR 30-59 ML/MIN: ICD-10-CM

## 2020-09-11 DIAGNOSIS — I71.40 ABDOMINAL ANEURYSM: ICD-10-CM

## 2020-09-11 DIAGNOSIS — I25.10 CORONARY ARTERY DISEASE INVOLVING NATIVE CORONARY ARTERY OF NATIVE HEART WITHOUT ANGINA PECTORIS: Chronic | ICD-10-CM

## 2020-09-11 PROCEDURE — 99999 PR PBB SHADOW E&M-EST. PATIENT-LVL IV: ICD-10-PCS | Mod: PBBFAC,,, | Performed by: NURSE PRACTITIONER

## 2020-09-11 PROCEDURE — 99999 PR PBB SHADOW E&M-EST. PATIENT-LVL IV: CPT | Mod: PBBFAC,,, | Performed by: NURSE PRACTITIONER

## 2020-09-11 PROCEDURE — G0439 PPPS, SUBSEQ VISIT: HCPCS | Mod: S$GLB,,, | Performed by: NURSE PRACTITIONER

## 2020-09-11 PROCEDURE — G0439 PR MEDICARE ANNUAL WELLNESS SUBSEQUENT VISIT: ICD-10-PCS | Mod: S$GLB,,, | Performed by: NURSE PRACTITIONER

## 2020-09-11 PROCEDURE — 99214 OFFICE O/P EST MOD 30 MIN: CPT | Mod: PBBFAC,PO | Performed by: NURSE PRACTITIONER

## 2020-09-11 NOTE — PATIENT INSTRUCTIONS
Counseling and Referral of Other Preventative  (Italic type indicates deductible and co-insurance are waived)    Patient Name: Jani Cha  Today's Date: 9/11/2020    Health Maintenance       Date Due Completion Date    Shingles Vaccine (1 of 2) 05/27/1996 Patient aware of recommendation for shingles vaccine.     Influenza Vaccine (1) 08/01/2020 10/3/2018, Patient aware of recommendation for updated influenza vaccine.     Hemoglobin A1c 09/09/2020 6/9/2020    Foot Exam 01/07/2021 1/7/2020 (Done)    Override on 1/7/2020: Done (Dr. Karina Vicente ( Podiatry ))    Override on 11/23/2018: Done (Karina Vicente, Podiatrist Visit)    Override on 3/16/2018: Done    Override on 11/10/2017: Done    Override on 6/23/2017: Done    Override on 3/8/2017: Done    Lipid Panel 06/09/2021 6/9/2020    Eye Exam 07/14/2021 7/14/2020    High Dose Statin 09/08/2021 9/8/2020    Colorectal Cancer Screening 07/27/2023 7/27/2020    TETANUS VACCINE 02/20/2027 2/20/2017        No orders of the defined types were placed in this encounter.    The following information is provided to all patients.  This information is to help you find resources for any of the problems found today that may be affecting your health:                Living healthy guide: www.[facility].[place].gov      Understanding Diabetes: www.diabetes.org      Eating healthy: www.cdc.gov/healthyweight      CDC home safety checklist: www.cdc.gov/steadi/patient.html      Agency on Aging: www.goea.[place].[facility]      Alcoholics anonymous (AA): www.aa.org      Physical Activity: www.wing.nih.gov/ie1sxeg      Tobacco use: www.quitwithusla.org

## 2020-09-11 NOTE — PROGRESS NOTES
"  Jani Cha presented for a  Medicare AWV and comprehensive Health Risk Assessment today. The following components were reviewed and updated:    · Medical history  · Family History  · Social history  · Allergies and Current Medications  · Health Risk Assessment  · Health Maintenance  · Care Team     ** See Completed Assessments for Annual Wellness Visit within the encounter summary.**         The following assessments were completed:  · Living Situation  · CAGE  · Depression Screening  · Timed Get Up and Go  · Whisper Test  · Cognitive Function Screening  ·   ·   · Nutrition Screening  · ADL Screening  · PAQ Screening        Vitals:    09/11/20 0834   BP: 132/82   BP Location: Left arm   Patient Position: Sitting   BP Method: Medium (Manual)   Pulse: 66   Temp: 97.9 °F (36.6 °C)   TempSrc: Temporal   SpO2: 95%   Weight: 118 kg (260 lb 2.3 oz)   Height: 5' 10" (1.778 m)     Body mass index is 37.33 kg/m².  Physical Exam  Constitutional:       Appearance: He is obese.   Neck:      Musculoskeletal: Normal range of motion.   Cardiovascular:      Rate and Rhythm: Normal rate.   Pulmonary:      Effort: Pulmonary effort is normal. No respiratory distress.   Musculoskeletal: Normal range of motion.   Neurological:      Mental Status: He is alert.   Psychiatric:         Mood and Affect: Mood normal.         Thought Content: Thought content normal.       Diagnoses and health risks identified today and associated recommendations/orders:    1. Encounter for preventive health examination  Education provided about preventive health examinations and procedures; addressed and discussed patient's health concerns. Additionally, reviewed medical record for risk factors and documented the results during this encounter.    2. Abdominal aneurysm  Stable, patient evaluated/monitored by [facility] vascular surgery dept; continue as advised.     3. Abdominal aortic aneurysm (AAA) without rupture  Stable, patient evaluated/monitored by " [facility] vascular surgery dept; continue as advised.     4. Thoracic aorta atherosclerosis  Stable and monitored. Continue current treatment plan as previously prescribed.     5. CKD (chronic kidney disease) stage 3, GFR 30-59 ml/min  Stable and monitored. Continue current treatment plan as previously prescribed.     6. Severe obesity (BMI 35.0-39.9) with comorbidity  We discussed diet and exercise for weight loss.  Educated about diet, activities, and ways to minimize a sedentary lifestyle.     7. Type 2 diabetes mellitus with diabetic neuropathy, without long-term current use of insulin  Education provided about diabetes, management of blood glucose with diet and activities, monitoring for worsening effects of diabetes.      8. Essential hypertension  Presently at goal. Continue as advised regarding dietary and lifestyle modifications.     9. Coronary artery disease involving native coronary artery of native heart without angina pectoris  Stable, patient evaluated/monitored by [facility] cardiology dept; continue as advised.     Reviewed health maintenance, educated about recommended examinations, procedures (labs & images), and immunizations.     Provided Jani with a 5-10 year written screening schedule and personal prevention plan. Recommendations were developed using the USPSTF age appropriate recommendations. Education, counseling, and referrals were provided as needed. After Visit Summary printed and given to patient which includes a list of additional screenings\tests needed.    Follow up in about 1 year (around 9/11/2021) for assessment .    Main Lopez Jr, NP  I offered to discuss end of life issues, including information on how to make advance directives that the patient could use to name someone who would make medical decisions on their behalf if they became too ill to make themselves.    ___Patient declined  _X_Patient is interested, I provided paper work and offered to discuss.

## 2020-09-18 ENCOUNTER — EDUCATION (OUTPATIENT)
Dept: CARDIOLOGY | Facility: CLINIC | Age: 75
End: 2020-09-18

## 2020-09-18 ENCOUNTER — OFFICE VISIT (OUTPATIENT)
Dept: CARDIOLOGY | Facility: CLINIC | Age: 75
End: 2020-09-18
Payer: MEDICARE

## 2020-09-18 VITALS
WEIGHT: 260.13 LBS | SYSTOLIC BLOOD PRESSURE: 135 MMHG | HEART RATE: 73 BPM | OXYGEN SATURATION: 95 % | BODY MASS INDEX: 37.24 KG/M2 | HEIGHT: 70 IN | DIASTOLIC BLOOD PRESSURE: 72 MMHG

## 2020-09-18 DIAGNOSIS — I10 ESSENTIAL HYPERTENSION: Chronic | ICD-10-CM

## 2020-09-18 DIAGNOSIS — K21.9 GASTROESOPHAGEAL REFLUX DISEASE, ESOPHAGITIS PRESENCE NOT SPECIFIED: ICD-10-CM

## 2020-09-18 DIAGNOSIS — I25.10 CORONARY ARTERY DISEASE INVOLVING NATIVE CORONARY ARTERY OF NATIVE HEART WITHOUT ANGINA PECTORIS: Primary | Chronic | ICD-10-CM

## 2020-09-18 DIAGNOSIS — E11.40 TYPE 2 DIABETES MELLITUS WITH DIABETIC NEUROPATHY, WITHOUT LONG-TERM CURRENT USE OF INSULIN: Chronic | ICD-10-CM

## 2020-09-18 DIAGNOSIS — N18.30 CKD (CHRONIC KIDNEY DISEASE) STAGE 3, GFR 30-59 ML/MIN: ICD-10-CM

## 2020-09-18 DIAGNOSIS — Z01.812 PRE-PROCEDURE LAB EXAM: Primary | ICD-10-CM

## 2020-09-18 DIAGNOSIS — I25.82 CHRONIC TOTAL OCCLUSION OF CORONARY ARTERY: ICD-10-CM

## 2020-09-18 DIAGNOSIS — E78.00 PURE HYPERCHOLESTEROLEMIA: Chronic | ICD-10-CM

## 2020-09-18 DIAGNOSIS — Z95.1 S/P CABG X 4: Chronic | ICD-10-CM

## 2020-09-18 PROCEDURE — 99999 PR PBB SHADOW E&M-EST. PATIENT-LVL III: CPT | Mod: PBBFAC,,, | Performed by: INTERNAL MEDICINE

## 2020-09-18 PROCEDURE — 99213 OFFICE O/P EST LOW 20 MIN: CPT | Mod: PBBFAC | Performed by: INTERNAL MEDICINE

## 2020-09-18 PROCEDURE — 99214 PR OFFICE/OUTPT VISIT, EST, LEVL IV, 30-39 MIN: ICD-10-PCS | Mod: S$PBB,,, | Performed by: INTERNAL MEDICINE

## 2020-09-18 PROCEDURE — 99214 OFFICE O/P EST MOD 30 MIN: CPT | Mod: S$PBB,,, | Performed by: INTERNAL MEDICINE

## 2020-09-18 PROCEDURE — 99999 PR PBB SHADOW E&M-EST. PATIENT-LVL III: ICD-10-PCS | Mod: PBBFAC,,, | Performed by: INTERNAL MEDICINE

## 2020-09-18 RX ORDER — SODIUM CHLORIDE 9 MG/ML
INJECTION, SOLUTION INTRAVENOUS CONTINUOUS
Status: CANCELLED | OUTPATIENT
Start: 2020-09-18
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
## 2020-09-18 NOTE — PROGRESS NOTES
"OUTPATIENT CATHETERIZATION INSTRUCTIONS    You have been scheduled for a procedure in the catheterization lab on Monday, September 28, 2020.     Please report to the Cardiology Waiting Area on the Third floor of the hospital and check in at 6 AM.   You will then be taken to the SSCU (Short Stay Cardiac Unit) and prepared for your procedure. Please be aware that this is not the time of your procedure but the time you are to arrive. The procedures are scheduled on an hourly basis; however, emergency cases take precedence over all other cases.       You may not have anything to eat or drink after midnight the night before your test. You may take your regular morning medications with water. If there are any medications that you should not take you will be instructed to hold them that morning. If you are diabetic and on Metformin (Glucophage) do not take it the day before, the day of, and for 2 days after your procedure.      The procedure will take 1-2 hours to perform. After the procedure, you will return to SSCU on the third floor of the hospital. You will need to lie still (or keep your arm still) for the next 4 to 6 hours to minimize bleeding from the puncture site. Your family may remain in the room with you during this time.       You may be able to be discharged home that same afternoon if there is someone to drive you home and there were no complications. If you have one of the balloon, stent, or device procedures you may spend the night in the hospital. Your doctor will determine, based on your progress, the date and time of your discharge. The results of your procedure will be discussed with you before you are discharged. Any further testing or procedures will be scheduled for you either before you leave or you will be called with these appointments.       If you should have any questions, concerns, or need to change the date of your procedure, please call LAUREN Ellis @ [phone]",    Special " Instructions:  Covid Test on 9/25 @ [facility]        THE ABOVE INSTRUCTIONS WERE GIVEN TO THE PATIENT VERBALLY AND THEY VERBALIZED UNDERSTANDING.  THEY DO NOT REQUIRE ANY SPECIAL NEEDS AND DO NOT HAVE ANY LEARNING BARRIERS.          Directions for Reporting to Cardiology Waiting Area in the Hospital  If you park in the Parking Garage:  Take elevators to the1st floor of the parking garage.  Continue past the gift shop, coffee shop, and piano.  Take a right and go to the gold elevators. (Elevator B)  Take the elevator to the 3rd floor.  Follow the arrow on the sign on the wall that says Cath Lab Registration/EP Lab Registration.  Follow the long hallway all the way around until you come to a big open area.  This is the registration area.  Check in at Reception Desk.    OR    If family is dropping you off:  Have them drop you off at the front of the Hospital under the green overhang.  Enter through the doors and take a right.  Take the E elevators to the 3rd floor Cardiology Waiting Area.  Check in at the Reception Desk in the waiting room.
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
## 2020-09-25 ENCOUNTER — LAB VISIT (OUTPATIENT)
Dept: FAMILY MEDICINE | Facility: CLINIC | Age: 75
End: 2020-09-25
Payer: MEDICARE

## 2020-09-25 DIAGNOSIS — Z01.812 PRE-PROCEDURE LAB EXAM: ICD-10-CM

## 2020-09-25 PROCEDURE — U0003 INFECTIOUS AGENT DETECTION BY NUCLEIC ACID (DNA OR RNA); SEVERE ACUTE RESPIRATORY SYNDROME CORONAVIRUS 2 (SARS-COV-2) (CORONAVIRUS DISEASE [COVID-19]), AMPLIFIED PROBE TECHNIQUE, MAKING USE OF HIGH THROUGHPUT TECHNOLOGIES AS DESCRIBED BY CMS-2020-01-R: HCPCS

## 2020-09-26 LAB — SARS-COV-2 RNA RESP QL NAA+PROBE: NOT DETECTED

## 2020-09-28 ENCOUNTER — HOSPITAL ENCOUNTER (OUTPATIENT)
Facility: HOSPITAL | Age: 75
Discharge: HOME | End: 2020-09-28
Attending: INTERNAL MEDICINE | Admitting: INTERNAL MEDICINE
Payer: MEDICARE

## 2020-09-28 VITALS
BODY MASS INDEX: 37.22 KG/M2 | TEMPERATURE: 99 F | RESPIRATION RATE: 18 BRPM | SYSTOLIC BLOOD PRESSURE: 131 MMHG | WEIGHT: 260 LBS | DIASTOLIC BLOOD PRESSURE: 71 MMHG | HEIGHT: 70 IN | HEART RATE: 59 BPM | OXYGEN SATURATION: 93 %

## 2020-09-28 DIAGNOSIS — I25.10 CORONARY ARTERY DISEASE INVOLVING NATIVE CORONARY ARTERY OF NATIVE HEART WITHOUT ANGINA PECTORIS: ICD-10-CM

## 2020-09-28 DIAGNOSIS — I25.82 CHRONIC TOTAL OCCLUSION OF CORONARY ARTERY: ICD-10-CM

## 2020-09-28 DIAGNOSIS — I25.10 CAD (CORONARY ARTERY DISEASE): ICD-10-CM

## 2020-09-28 DIAGNOSIS — Z95.1 S/P CABG X 4: ICD-10-CM

## 2020-09-28 LAB
ABO + RH BLD: NORMAL
ANION GAP SERPL CALC-SCNC: 9 MMOL/L (ref 8–16)
BASOPHILS # BLD AUTO: 0.06 K/UL (ref 0–0.2)
BASOPHILS NFR BLD: 0.7 % (ref 0–1.9)
BLD GP AB SCN CELLS X3 SERPL QL: NORMAL
BUN SERPL-MCNC: 31 MG/DL (ref 8–23)
CALCIUM SERPL-MCNC: 9.3 MG/DL (ref 8.7–10.5)
CHLORIDE SERPL-SCNC: 107 MMOL/L (ref 95–110)
CO2 SERPL-SCNC: 26 MMOL/L (ref 23–29)
CREAT SERPL-MCNC: 1.6 MG/DL (ref 0.5–1.4)
DIFFERENTIAL METHOD: ABNORMAL
EOSINOPHIL # BLD AUTO: 0.6 K/UL (ref 0–0.5)
EOSINOPHIL NFR BLD: 7.2 % (ref 0–8)
ERYTHROCYTE [DISTWIDTH] IN BLOOD BY AUTOMATED COUNT: 12.9 % (ref 11.5–14.5)
EST. GFR  (AFRICAN AMERICAN): 48.3 ML/MIN/1.73 M^2
EST. GFR  (NON AFRICAN AMERICAN): 41.8 ML/MIN/1.73 M^2
GLUCOSE SERPL-MCNC: 182 MG/DL (ref 70–110)
HCT VFR BLD AUTO: 42.4 % (ref 40–54)
HGB BLD-MCNC: 13.5 G/DL (ref 14–18)
IMM GRANULOCYTES # BLD AUTO: 0.03 K/UL (ref 0–0.04)
IMM GRANULOCYTES NFR BLD AUTO: 0.4 % (ref 0–0.5)
LYMPHOCYTES # BLD AUTO: 1.7 K/UL (ref 1–4.8)
LYMPHOCYTES NFR BLD: 20.1 % (ref 18–48)
MCH RBC QN AUTO: 29.8 PG (ref 27–31)
MCHC RBC AUTO-ENTMCNC: 31.8 G/DL (ref 32–36)
MCV RBC AUTO: 94 FL (ref 82–98)
MONOCYTES # BLD AUTO: 0.7 K/UL (ref 0.3–1)
MONOCYTES NFR BLD: 8 % (ref 4–15)
NEUTROPHILS # BLD AUTO: 5.4 K/UL (ref 1.8–7.7)
NEUTROPHILS NFR BLD: 63.6 % (ref 38–73)
NRBC BLD-RTO: 0 /100 WBC
PLATELET # BLD AUTO: 203 K/UL (ref 150–350)
PLATELET RESPONSE PLAVIX: 211 PRU (ref 194–418)
PMV BLD AUTO: 10.1 FL (ref 9.2–12.9)
POCT GLUCOSE: 171 MG/DL (ref 70–110)
POTASSIUM SERPL-SCNC: 4.3 MMOL/L (ref 3.5–5.1)
RBC # BLD AUTO: 4.53 M/UL (ref 4.6–6.2)
SODIUM SERPL-SCNC: 142 MMOL/L (ref 136–145)
WBC # BLD AUTO: 8.47 K/UL (ref 3.9–12.7)

## 2020-09-28 PROCEDURE — 99152 MOD SED SAME PHYS/QHP 5/>YRS: CPT | Mod: ,,, | Performed by: INTERNAL MEDICINE

## 2020-09-28 PROCEDURE — 93010 ELECTROCARDIOGRAM REPORT: CPT | Mod: 77,ICN,, | Performed by: INTERNAL MEDICINE

## 2020-09-28 PROCEDURE — 25500020 PHARM REV CODE 255: Performed by: INTERNAL MEDICINE

## 2020-09-28 PROCEDURE — C1887 CATHETER, GUIDING: HCPCS | Performed by: INTERNAL MEDICINE

## 2020-09-28 PROCEDURE — 36415 COLL VENOUS BLD VENIPUNCTURE: CPT

## 2020-09-28 PROCEDURE — 93454 CORONARY ARTERY ANGIO S&I: CPT | Mod: 26,59,, | Performed by: INTERNAL MEDICINE

## 2020-09-28 PROCEDURE — C1769 GUIDE WIRE: HCPCS | Performed by: INTERNAL MEDICINE

## 2020-09-28 PROCEDURE — 93010 EKG 12-LEAD: ICD-10-PCS | Mod: 77,ICN,, | Performed by: INTERNAL MEDICINE

## 2020-09-28 PROCEDURE — 99152 PR MOD CONSCIOUS SEDATION, SAME PHYS, 5+ YRS, FIRST 15 MIN: ICD-10-PCS | Mod: ,,, | Performed by: INTERNAL MEDICINE

## 2020-09-28 PROCEDURE — 99152 MOD SED SAME PHYS/QHP 5/>YRS: CPT | Performed by: INTERNAL MEDICINE

## 2020-09-28 PROCEDURE — 27201423 OPTIME MED/SURG SUP & DEVICES STERILE SUPPLY: Performed by: INTERNAL MEDICINE

## 2020-09-28 PROCEDURE — 25000003 PHARM REV CODE 250: Performed by: NURSE PRACTITIONER

## 2020-09-28 PROCEDURE — C1874 STENT, COATED/COV W/DEL SYS: HCPCS | Performed by: INTERNAL MEDICINE

## 2020-09-28 PROCEDURE — C9600 PERC DRUG-EL COR STENT SING: HCPCS | Mod: RC | Performed by: INTERNAL MEDICINE

## 2020-09-28 PROCEDURE — 25000003 PHARM REV CODE 250: Performed by: INTERNAL MEDICINE

## 2020-09-28 PROCEDURE — 99153 MOD SED SAME PHYS/QHP EA: CPT | Performed by: INTERNAL MEDICINE

## 2020-09-28 PROCEDURE — 93454 CORONARY ARTERY ANGIO S&I: CPT | Mod: 59 | Performed by: INTERNAL MEDICINE

## 2020-09-28 PROCEDURE — 85347 COAGULATION TIME ACTIVATED: CPT | Performed by: INTERNAL MEDICINE

## 2020-09-28 PROCEDURE — C1894 INTRO/SHEATH, NON-LASER: HCPCS | Performed by: INTERNAL MEDICINE

## 2020-09-28 PROCEDURE — 80048 BASIC METABOLIC PNL TOTAL CA: CPT

## 2020-09-28 PROCEDURE — 93010 EKG 12-LEAD: ICD-10-PCS | Mod: ,,, | Performed by: INTERNAL MEDICINE

## 2020-09-28 PROCEDURE — 93005 ELECTROCARDIOGRAM TRACING: CPT

## 2020-09-28 PROCEDURE — 92978 ENDOLUMINL IVUS OCT C 1ST: CPT | Mod: 26,,, | Performed by: INTERNAL MEDICINE

## 2020-09-28 PROCEDURE — C1753 CATH, INTRAVAS ULTRASOUND: HCPCS | Performed by: INTERNAL MEDICINE

## 2020-09-28 PROCEDURE — 92928 PR STENT: ICD-10-PCS | Mod: RC,,, | Performed by: INTERNAL MEDICINE

## 2020-09-28 PROCEDURE — 63600175 PHARM REV CODE 636 W HCPCS: Performed by: INTERNAL MEDICINE

## 2020-09-28 PROCEDURE — 92928 PRQ TCAT PLMT NTRAC ST 1 LES: CPT | Mod: RC,,, | Performed by: INTERNAL MEDICINE

## 2020-09-28 PROCEDURE — 93454 PR CATH PLACE/CORONARY ANGIO, IMG SUPER/INTERP: ICD-10-PCS | Mod: 26,59,, | Performed by: INTERNAL MEDICINE

## 2020-09-28 PROCEDURE — C1725 CATH, TRANSLUMIN NON-LASER: HCPCS | Performed by: INTERNAL MEDICINE

## 2020-09-28 PROCEDURE — 85576 BLOOD PLATELET AGGREGATION: CPT

## 2020-09-28 PROCEDURE — 93010 ELECTROCARDIOGRAM REPORT: CPT | Mod: ,,, | Performed by: INTERNAL MEDICINE

## 2020-09-28 PROCEDURE — 82962 GLUCOSE BLOOD TEST: CPT | Performed by: INTERNAL MEDICINE

## 2020-09-28 PROCEDURE — 85025 COMPLETE CBC W/AUTO DIFF WBC: CPT

## 2020-09-28 PROCEDURE — C1760 CLOSURE DEV, VASC: HCPCS | Performed by: INTERNAL MEDICINE

## 2020-09-28 PROCEDURE — 86901 BLOOD TYPING SEROLOGIC RH(D): CPT

## 2020-09-28 PROCEDURE — 92978 PR IVUS, CORONARY, 1ST VESSEL: ICD-10-PCS | Mod: 26,,, | Performed by: INTERNAL MEDICINE

## 2020-09-28 PROCEDURE — 92978 ENDOLUMINL IVUS OCT C 1ST: CPT | Performed by: INTERNAL MEDICINE

## 2020-09-28 DEVICE — STENT RONYX40038UX RESOLUTE ONYX 4.00X38
Type: IMPLANTABLE DEVICE | Site: CORONARY | Status: FUNCTIONAL
Brand: RESOLUTE ONYX™

## 2020-09-28 RX ORDER — CLINDAMYCIN PHOSPHATE 900 MG/50ML
INJECTION, SOLUTION INTRAVENOUS
Status: DISCONTINUED | OUTPATIENT
Start: 2020-09-28 | End: 2020-09-28 | Stop reason: HOSPADM

## 2020-09-28 RX ORDER — SODIUM CHLORIDE 9 MG/ML
INJECTION, SOLUTION INTRAVENOUS CONTINUOUS
Status: ACTIVE | OUTPATIENT
Start: 2020-09-28 | End: 2020-09-28

## 2020-09-28 RX ORDER — FENTANYL CITRATE 50 UG/ML
INJECTION, SOLUTION INTRAMUSCULAR; INTRAVENOUS
Status: DISCONTINUED | OUTPATIENT
Start: 2020-09-28 | End: 2020-09-28 | Stop reason: HOSPADM

## 2020-09-28 RX ORDER — HEPARIN SODIUM 200 [USP'U]/100ML
INJECTION, SOLUTION INTRAVENOUS
Status: DISCONTINUED | OUTPATIENT
Start: 2020-09-28 | End: 2020-09-28 | Stop reason: HOSPADM

## 2020-09-28 RX ORDER — SODIUM CHLORIDE 9 MG/ML
INJECTION, SOLUTION INTRAVENOUS CONTINUOUS
Status: DISCONTINUED | OUTPATIENT
Start: 2020-09-28 | End: 2020-09-28

## 2020-09-28 RX ORDER — SODIUM CHLORIDE 9 MG/ML
INJECTION, SOLUTION INTRAVENOUS
Status: DISCONTINUED | OUTPATIENT
Start: 2020-09-28 | End: 2020-09-28 | Stop reason: HOSPADM

## 2020-09-28 RX ORDER — DIPHENHYDRAMINE HCL 50 MG
50 CAPSULE ORAL ONCE
Status: COMPLETED | OUTPATIENT
Start: 2020-09-28 | End: 2020-09-28

## 2020-09-28 RX ORDER — MIDAZOLAM HYDROCHLORIDE 1 MG/ML
INJECTION, SOLUTION INTRAMUSCULAR; INTRAVENOUS
Status: DISCONTINUED | OUTPATIENT
Start: 2020-09-28 | End: 2020-09-28 | Stop reason: HOSPADM

## 2020-09-28 RX ORDER — LIDOCAINE HYDROCHLORIDE 20 MG/ML
INJECTION, SOLUTION EPIDURAL; INFILTRATION; INTRACAUDAL; PERINEURAL
Status: DISCONTINUED | OUTPATIENT
Start: 2020-09-28 | End: 2020-09-28 | Stop reason: HOSPADM

## 2020-09-28 RX ORDER — ONDANSETRON 8 MG/1
8 TABLET, ORALLY DISINTEGRATING ORAL EVERY 8 HOURS PRN
Status: DISCONTINUED | OUTPATIENT
Start: 2020-09-28 | End: 2020-09-28 | Stop reason: HOSPADM

## 2020-09-28 RX ORDER — ACETAMINOPHEN 325 MG/1
650 TABLET ORAL EVERY 4 HOURS PRN
Status: DISCONTINUED | OUTPATIENT
Start: 2020-09-28 | End: 2020-09-28 | Stop reason: HOSPADM

## 2020-09-28 RX ORDER — HEPARIN SODIUM 1000 [USP'U]/ML
INJECTION, SOLUTION INTRAVENOUS; SUBCUTANEOUS
Status: DISCONTINUED | OUTPATIENT
Start: 2020-09-28 | End: 2020-09-28 | Stop reason: HOSPADM

## 2020-09-28 RX ORDER — PROTAMINE SULFATE 10 MG/ML
INJECTION, SOLUTION INTRAVENOUS
Status: DISCONTINUED | OUTPATIENT
Start: 2020-09-28 | End: 2020-09-28 | Stop reason: HOSPADM

## 2020-09-28 RX ADMIN — SODIUM CHLORIDE: 0.9 INJECTION, SOLUTION INTRAVENOUS at 06:09

## 2020-09-28 RX ADMIN — DIPHENHYDRAMINE HYDROCHLORIDE 50 MG: 50 CAPSULE ORAL at 07:09

## 2020-09-28 NOTE — HPI
Jani Cha is a 75 y.o. male who presents for follow-up of CAD s/p PCI.      CCS: 0     HPI: Mr. Cha is a 75 YOM with PMHx of CAD s/p 4 vessel CABG in 2006 and s/p balloon dilation of proximal to distal RCA, T2Dm, HTN, HLD, obesity, and CKD3.     He recently underwent a PET stress test in July after having a syncopal episode which demonstrated ischemia in the PLB territory comprising 10% of the myocardium. He underwent [facility] 8/3/2020 which demonstrated a patent LIMA-D1 but occluded SVG's, occluded native LAD and LCx with a patent Ramus. He also has an occluded RCA which fills retrograde from the distal LAD that has flow supplied from the LIMA.     He again underwent [facility] 08/17/2020 which noted  of his RCA with collaterals from his left system supplied by his LIMA-LAD. He underwent balloon dilation of his proximal to distal RCA. Antegrade wire escalation and dissection with re-entry was attempted of his RCA , however re-entry into the true distal lumen was not successful as safety thresholds of contrast use and radiation dose were met.     He presents today for follow up and consideration of RCA stenting. He reports that he has continued fatigue and low stamina. He is unable to mow his own yard and that is a frustration for him. He is able to complete inside chores and take out the trash without complaint other than he runs out of energy quickly. He denies chest pain, palpitations, SOB, BUCK, or LE edema. He does have occasional heartburn and requires two pillows at night to sleep if GERD symptoms are noted.

## 2020-09-28 NOTE — NURSING
Left vasc band removed per protocol.  Tolerated well without bleed or hematoma.  Will continue to monitor.  Ambulated in hallway without difficulty.  No bleed or hematoma to rt groin site.

## 2020-09-28 NOTE — Clinical Note
120 ml injected throughout the case. 30 mL total wasted during the case. 150 mL total used in the case.

## 2020-09-28 NOTE — BRIEF OP NOTE
Brief Operative Note:    : Mason Benitez MD     Referring Physician: Laila Bains Operators: Surgeon(s):  MD Garett Villanueva MD Jayna Kelly, MD Aashish Gupta, MD     Preoperative Diagnosis: Coronary artery disease involving native coronary artery of native heart without angina pectoris [I25.10]  S/P CABG x 4 [Z95.1]  Chronic total occlusion of coronary artery [I25.82]     Postop Diagnosis: Coronary artery disease involving native coronary artery of native heart without angina pectoris [I25.10]  S/P CABG x 4 [Z95.1]  Chronic total occlusion of coronary artery [I25.82]    Treatments/Procedures: Procedure(s) (LRB):  Left heart cath (Left)  IVUS, Coronary  ANGIOGRAM, CORONARY ARTERY (N/A)  Stent, Drug Eluting, Single Vessel, Coronary    Findings:Severe coronary disease is present. See catheterization report for full details.    Estimated Blood loss: 20 cc    Specimens removed: No    Garett Jerez

## 2020-09-28 NOTE — DISCHARGE SUMMARY
[facility] - Short Stay Cardiac Unit  Interventional Cardiology  Discharge Summary      Patient Name: Jani Cha  MRN: [id number]  Admission Date: 9/28/2020  Hospital Length of Stay: 0 days  Discharge Date and Time:  09/28/2020 2:17 PM  Attending Physician: Mason Benitez MD  Discharging Provider: Garett Jerez MD  Primary Care Physician: Laila Bains MD    HPI:  Jani Cha is a 75 y.o. male who presents for follow-up of CAD s/p PCI.      CCS: 0     HPI: Mr. Cha is a 75 YOM with PMHx of CAD s/p 4 vessel CABG in 2006 and s/p balloon dilation of proximal to distal RCA, T2Dm, HTN, HLD, obesity, and CKD3.     He recently underwent a PET stress test in July after having a syncopal episode which demonstrated ischemia in the PLB territory comprising 10% of the myocardium. He underwent C 8/3/2020 which demonstrated a patent LIMA-D1 but occluded SVG's, occluded native LAD and LCx with a patent Ramus. He also has an occluded RCA which fills retrograde from the distal LAD that has flow supplied from the LIMA.     He again underwent LHC 08/17/2020 which noted  of his RCA with collaterals from his left system supplied by his LIMA-LAD. He underwent balloon dilation of his proximal to distal RCA. Antegrade wire escalation and dissection with re-entry was attempted of his RCA , however re-entry into the true distal lumen was not successful as safety thresholds of contrast use and radiation dose were met.     He presents today for follow up and consideration of RCA stenting. He reports that he has continued fatigue and low stamina. He is unable to mow his own yard and that is a frustration for him. He is able to complete inside chores and take out the trash without complaint other than he runs out of energy quickly. He denies chest pain, palpitations, SOB, BUCK, or LE edema. He does have occasional heartburn and requires two pillows at night to sleep if GERD symptoms are noted.      Procedure(s) (LRB):  Left heart cath (Left)  IVUS, Coronary  ANGIOGRAM, CORONARY ARTERY (N/A)  Stent, Drug Eluting, Single Vessel, Coronary     Indwelling Lines/Drains at time of discharge:  Lines/Drains/Airways     None                 Hospital Course:  He underwent successful coronary angiography and HUMA placement x 2 in the ostial-mid RCA. He tolerated the procedure well and without complications and was discharged home in stable condition.        Significant Diagnostic Studies:     Recent Results (from the past 24 hour(s))   Basic metabolic panel    Collection Time: 09/28/20  6:26 AM   Result Value Ref Range    Sodium 142 136 - 145 mmol/L    Potassium 4.3 3.5 - 5.1 mmol/L    Chloride 107 95 - 110 mmol/L    CO2 26 23 - 29 mmol/L    Glucose 182 (H) 70 - 110 mg/dL    BUN, Bld 31 (H) 8 - 23 mg/dL    Creatinine 1.6 (H) 0.5 - 1.4 mg/dL    Calcium 9.3 8.7 - 10.5 mg/dL    Anion Gap 9 8 - 16 mmol/L    eGFR if African American 48.3 (A) >60 mL/min/1.73 m^2    eGFR if non  41.8 (A) >60 mL/min/1.73 m^2   CBC auto differential    Collection Time: 09/28/20  6:26 AM   Result Value Ref Range    WBC 8.47 3.90 - 12.70 K/uL    RBC 4.53 (L) 4.60 - 6.20 M/uL    Hemoglobin 13.5 (L) 14.0 - 18.0 g/dL    Hematocrit 42.4 40.0 - 54.0 %    Mean Corpuscular Volume 94 82 - 98 fL    Mean Corpuscular Hemoglobin 29.8 27.0 - 31.0 pg    Mean Corpuscular Hemoglobin Conc 31.8 (L) 32.0 - 36.0 g/dL    RDW 12.9 11.5 - 14.5 %    Platelets 203 150 - 350 K/uL    MPV 10.1 9.2 - 12.9 fL    Immature Granulocytes 0.4 0.0 - 0.5 %    Gran # (ANC) 5.4 1.8 - 7.7 K/uL    Immature Grans (Abs) 0.03 0.00 - 0.04 K/uL    Lymph # 1.7 1.0 - 4.8 K/uL    Mono # 0.7 0.3 - 1.0 K/uL    Eos # 0.6 (H) 0.0 - 0.5 K/uL    Baso # 0.06 0.00 - 0.20 K/uL    nRBC 0 0 /100 WBC    Gran% 63.6 38.0 - 73.0 %    Lymph% 20.1 18.0 - 48.0 %    Mono% 8.0 4.0 - 15.0 %    Eosinophil% 7.2 0.0 - 8.0 %    Basophil% 0.7 0.0 - 1.9 %    Differential Method Automated    Platelet  Response Plavix    Collection Time: 09/28/20  6:37 AM   Result Value Ref Range    Platelet Response Plavix 211 194 - 418 PRU   Type & Screen    Collection Time: 09/28/20  6:37 AM   Result Value Ref Range    Group & Rh A POS     Indirect Eufemia NEG    POCT glucose    Collection Time: 09/28/20  7:39 AM   Result Value Ref Range    POCT Glucose 171 (H) 70 - 110 mg/dL         Pending Diagnostic Studies:     Procedure Component Value Units Date/Time    Basic metabolic panel [[id number]]     Order Status: Sent Lab Status: No result     Specimen: Blood           Discharged Condition: fair    Follow Up: Mason Don MD    Medications:  Reconciled Home Medications:      Medication List      CONTINUE taking these medications    ammonium lactate 12 % Crea  Apply 2 g topically once daily.     aspirin 81 MG EC tablet  Commonly known as: ECOTRIN  Take 81 mg by mouth once daily.     atorvastatin 80 MG tablet  Commonly known as: LIPITOR  Take 1 tablet by mouth once daily     carvediloL 25 MG tablet  Commonly known as: COREG  Take 1 tablet (25 mg total) by mouth 2 (two) times daily with meals.     clopidogreL 75 mg tablet  Commonly known as: PLAVIX  Take 1 tablet (75 mg total) by mouth once daily.     clotrimazole 1 % cream  Commonly known as: LOTRIMIN  APPLY  CREAM TOPICALLY TO AFFECTED AREA BID AS NEEDED.     clotrimazole-betamethasone 1-0.05% cream  Commonly known as: LOTRISONE  Apply topically 2 (two) times daily.     econazole nitrate 1 % cream  Apply topically once daily.     glipiZIDE 5 MG Tr24  Commonly known as: GLUCOTROL  Take 1 tablet (5 mg total) by mouth once daily.     indomethacin 50 MG capsule  Commonly known as: INDOCIN  Take 1 capsule (50 mg total) by mouth 3 (three) times daily with meals.     metFORMIN 1000 MG tablet  Commonly known as: GLUCOPHAGE  Take 1 tablet (1,000 mg total) by mouth 2 (two) times daily with meals.     nitroGLYCERIN 0.4 MG SL tablet  Commonly known as: NITROSTAT  Place 1 tablet (0.4 mg total)  under the tongue every 5 (five) minutes as needed.     pantoprazole 20 MG tablet  Commonly known as: PROTONIX  Take 2 tablets by mouth once daily     valsartan-hydrochlorothiazide 320-12.5 mg per tablet  Commonly known as: DIOVAN HCT  Take 1 tablet by mouth once daily.            Time spent on the discharge of patient: 15 minutes    Garett Jerez MD  Interventional Cardiology  [facility] - Short Stay Cardiac Unit

## 2020-09-28 NOTE — NURSING
Discharge instructions and medlist given.  Patient and spouse verbalized understanding.  Escort requested with wheelchair for discharge.

## 2020-09-28 NOTE — INTERVAL H&P NOTE
The patient has been examined and the H&P has been reviewed:    I concur with the findings and no changes have occurred since H&P was written.    Anesthesia/Surgery risks, benefits and alternative options discussed and understood by patient/family.          Active Hospital Problems    Diagnosis  POA    Coronary artery disease involving native coronary artery of native heart without angina pectoris [I25.10]  Yes     Chronic     s/p 4 V CABG  Cardiologist - Dr. Oliveira        Resolved Hospital Problems   No resolved problems to display.

## 2020-09-28 NOTE — HOSPITAL COURSE
He underwent successful coronary angiography and HUMA placement x 2 in the ostial-mid RCA. He tolerated the procedure well and without complications and was discharged home in stable condition.

## 2020-09-29 ENCOUNTER — PATIENT MESSAGE (OUTPATIENT)
Dept: OTHER | Facility: OTHER | Age: 75
End: 2020-09-29

## 2020-09-29 LAB
POC ACTIVATED CLOTTING TIME K: 224 SEC (ref 74–137)
SAMPLE: ABNORMAL

## 2020-09-30 ENCOUNTER — EXTERNAL CHRONIC CARE MANAGEMENT (OUTPATIENT)
Dept: PRIMARY CARE CLINIC | Facility: CLINIC | Age: 75
End: 2020-09-30
Payer: MEDICARE

## 2020-09-30 DIAGNOSIS — I25.10 CORONARY ARTERY DISEASE INVOLVING NATIVE CORONARY ARTERY OF NATIVE HEART WITHOUT ANGINA PECTORIS: ICD-10-CM

## 2020-09-30 DIAGNOSIS — Z98.61 POSTSURGICAL PERCUTANEOUS TRANSLUMINAL CORONARY ANGIOPLASTY STATUS: Primary | ICD-10-CM

## 2020-09-30 DIAGNOSIS — R73.9 BLOOD GLUCOSE ELEVATED: ICD-10-CM

## 2020-09-30 PROCEDURE — 99490 CHRNC CARE MGMT STAFF 1ST 20: CPT | Mod: PBBFAC,PO | Performed by: FAMILY MEDICINE

## 2020-09-30 PROCEDURE — 99490 PR CHRONIC CARE MGMT, 1ST 20 MIN: ICD-10-PCS | Mod: S$PBB,,, | Performed by: FAMILY MEDICINE

## 2020-09-30 PROCEDURE — 99490 CHRNC CARE MGMT STAFF 1ST 20: CPT | Mod: S$PBB,,, | Performed by: FAMILY MEDICINE

## 2020-10-02 ENCOUNTER — CLINICAL SUPPORT (OUTPATIENT)
Dept: CARDIOLOGY | Facility: CLINIC | Age: 75
End: 2020-10-02
Attending: INTERNAL MEDICINE
Payer: MEDICARE

## 2020-10-02 VITALS — WEIGHT: 261 LBS | HEIGHT: 70 IN | BODY MASS INDEX: 37.37 KG/M2

## 2020-10-02 DIAGNOSIS — Z98.61 POSTSURGICAL PERCUTANEOUS TRANSLUMINAL CORONARY ANGIOPLASTY STATUS: ICD-10-CM

## 2020-10-02 DIAGNOSIS — I25.10 CORONARY ARTERY DISEASE INVOLVING NATIVE CORONARY ARTERY OF NATIVE HEART WITHOUT ANGINA PECTORIS: ICD-10-CM

## 2020-10-02 LAB
CV STRESS BASE HR: 65 BPM
DIASTOLIC BLOOD PRESSURE: 82 MMHG
OHS CV CPX 1 MINUTE RECOVERY HEART RATE: 84 BPM
OHS CV CPX 85 PERCENT MAX PREDICTED HEART RATE MALE: 124
OHS CV CPX ESTIMATED METS: 4
OHS CV CPX MAX PREDICTED HEART RATE: 146
OHS CV CPX PATIENT IS FEMALE: 0
OHS CV CPX PATIENT IS MALE: 1
OHS CV CPX PEAK DIASTOLIC BLOOD PRESSURE: 89 MMHG
OHS CV CPX PEAK HEAR RATE: 88 BPM
OHS CV CPX PEAK RATE PRESSURE PRODUCT: NORMAL
OHS CV CPX PEAK SYSTOLIC BLOOD PRESSURE: 161 MMHG
OHS CV CPX PERCENT MAX PREDICTED HEART RATE ACHIEVED: 60
OHS CV CPX RATE PRESSURE PRODUCT PRESENTING: 8515
STRESS ECHO POST EXERCISE DUR MIN: 2 MINUTES
STRESS ECHO POST EXERCISE DUR SEC: 35 SECONDS
SYSTOLIC BLOOD PRESSURE: 131 MMHG

## 2020-10-02 PROCEDURE — 93018 EXERCISE STRESS - EKG (CUPID ONLY): ICD-10-PCS | Mod: S$PBB,,, | Performed by: INTERNAL MEDICINE

## 2020-10-02 PROCEDURE — 93016 CV STRESS TEST SUPVJ ONLY: CPT | Mod: S$PBB,,, | Performed by: INTERNAL MEDICINE

## 2020-10-02 PROCEDURE — 99999 PR PBB SHADOW E&M-EST. PATIENT-LVL II: ICD-10-PCS | Mod: PBBFAC,,,

## 2020-10-02 PROCEDURE — 93017 CV STRESS TEST TRACING ONLY: CPT | Mod: PBBFAC,PO | Performed by: INTERNAL MEDICINE

## 2020-10-02 PROCEDURE — 93016 EXERCISE STRESS - EKG (CUPID ONLY): ICD-10-PCS | Mod: S$PBB,,, | Performed by: INTERNAL MEDICINE

## 2020-10-02 PROCEDURE — 93018 CV STRESS TEST I&R ONLY: CPT | Mod: S$PBB,,, | Performed by: INTERNAL MEDICINE

## 2020-10-02 PROCEDURE — 99212 OFFICE O/P EST SF 10 MIN: CPT | Mod: PBBFAC,PO,25

## 2020-10-02 PROCEDURE — 99999 PR PBB SHADOW E&M-EST. PATIENT-LVL II: CPT | Mod: PBBFAC,,,

## 2020-10-05 ENCOUNTER — PATIENT OUTREACH (OUTPATIENT)
Dept: ADMINISTRATIVE | Facility: HOSPITAL | Age: 75
End: 2020-10-05

## 2020-10-05 NOTE — PROGRESS NOTES
HISTORY: S/P PTCA/STENT (9-), CAD, AAA, HX OF CABG, HLP, HTN, CKD III, DM, SYNCOPE, EF=55% (7-)    ANTHROPOMETRICS:     PRE   Abdominal girth (in) 51.5   Height (in) 70   Weight (lbs) 261   BMI 37.45   % Body Fat 28%       EXERCISE RESULTS:     PRE   Peak VO2 (CPX only) 0   Actual METS (CPX only) 0   Estimated METS 4.0       LAB RESULTS:    Lab Results   Component Value Date    MPV 10.1 10/08/2020       Lab Results   Component Value Date    CHOL 132 10/08/2020     Lab Results   Component Value Date    HDL 31 (L) 10/08/2020     Lab Results   Component Value Date    LDLCALC 76.8 10/08/2020     Lab Results   Component Value Date    TRIG 121 10/08/2020     Lab Results   Component Value Date    CHOLHDL 23.5 10/08/2020       Lab Results   Component Value Date    GLUF 185 (H) 10/08/2020     Lab Results   Component Value Date    HGBA1C 7.6 (H) 10/08/2020        Lab Results   Component Value Date    HSCRP 3.68 (H) 10/08/2020         GABBIE SCORES:     PRE   Anxiety 0   Depression 0   Somatic 3   Hostility 0     SF-36 SCORES:     PRE   Physical Function 24   Social Function 11   Mental Health 28   Pain 10   Change in Health 3   Physical Role Limitation 1   Mental Role Limitation 3   Energy/Fatigue 14   Health Perceptions 19   Total Score 113     PHQ-9:     PRE   PHQ-9 1       EDUCATION SCORES:     PRE   Education Score 30

## 2020-10-08 ENCOUNTER — LAB VISIT (OUTPATIENT)
Dept: LAB | Facility: HOSPITAL | Age: 75
End: 2020-10-08
Attending: INTERNAL MEDICINE
Payer: MEDICARE

## 2020-10-08 ENCOUNTER — TELEPHONE (OUTPATIENT)
Dept: FAMILY MEDICINE | Facility: CLINIC | Age: 75
End: 2020-10-08

## 2020-10-08 DIAGNOSIS — Z98.61 POSTSURGICAL PERCUTANEOUS TRANSLUMINAL CORONARY ANGIOPLASTY STATUS: ICD-10-CM

## 2020-10-08 DIAGNOSIS — I25.10 CORONARY ARTERY DISEASE INVOLVING NATIVE CORONARY ARTERY OF NATIVE HEART WITHOUT ANGINA PECTORIS: ICD-10-CM

## 2020-10-08 DIAGNOSIS — R73.9 BLOOD GLUCOSE ELEVATED: ICD-10-CM

## 2020-10-08 LAB
BASOPHILS # BLD AUTO: 0.06 K/UL (ref 0–0.2)
BASOPHILS NFR BLD: 0.7 % (ref 0–1.9)
CHOLEST SERPL-MCNC: 132 MG/DL (ref 120–199)
CHOLEST/HDLC SERPL: 4.3 {RATIO} (ref 2–5)
CRP SERPL-MCNC: 3.68 MG/L (ref 0–3.19)
DIFFERENTIAL METHOD: ABNORMAL
EOSINOPHIL # BLD AUTO: 0.6 K/UL (ref 0–0.5)
EOSINOPHIL NFR BLD: 7.2 % (ref 0–8)
ERYTHROCYTE [DISTWIDTH] IN BLOOD BY AUTOMATED COUNT: 13.1 % (ref 11.5–14.5)
ESTIMATED AVG GLUCOSE: 171 MG/DL (ref 68–131)
GLUCOSE SERPL-MCNC: 185 MG/DL (ref 70–110)
HBA1C MFR BLD HPLC: 7.6 % (ref 4–5.6)
HCT VFR BLD AUTO: 47.3 % (ref 40–54)
HDLC SERPL-MCNC: 31 MG/DL (ref 40–75)
HDLC SERPL: 23.5 % (ref 20–50)
HGB BLD-MCNC: 14.7 G/DL (ref 14–18)
IMM GRANULOCYTES # BLD AUTO: 0.02 K/UL (ref 0–0.04)
IMM GRANULOCYTES NFR BLD AUTO: 0.2 % (ref 0–0.5)
LDLC SERPL CALC-MCNC: 76.8 MG/DL (ref 63–159)
LYMPHOCYTES # BLD AUTO: 1.6 K/UL (ref 1–4.8)
LYMPHOCYTES NFR BLD: 18.6 % (ref 18–48)
MCH RBC QN AUTO: 30 PG (ref 27–31)
MCHC RBC AUTO-ENTMCNC: 31.1 G/DL (ref 32–36)
MCV RBC AUTO: 97 FL (ref 82–98)
MONOCYTES # BLD AUTO: 0.6 K/UL (ref 0.3–1)
MONOCYTES NFR BLD: 6.7 % (ref 4–15)
NEUTROPHILS # BLD AUTO: 5.7 K/UL (ref 1.8–7.7)
NEUTROPHILS NFR BLD: 66.6 % (ref 38–73)
NONHDLC SERPL-MCNC: 101 MG/DL
NRBC BLD-RTO: 0 /100 WBC
PLATELET # BLD AUTO: 243 K/UL (ref 150–350)
PMV BLD AUTO: 10.1 FL (ref 9.2–12.9)
RBC # BLD AUTO: 4.9 M/UL (ref 4.6–6.2)
TRIGL SERPL-MCNC: 121 MG/DL (ref 30–150)
WBC # BLD AUTO: 8.56 K/UL (ref 3.9–12.7)

## 2020-10-08 PROCEDURE — 86141 C-REACTIVE PROTEIN HS: CPT

## 2020-10-08 PROCEDURE — 83036 HEMOGLOBIN GLYCOSYLATED A1C: CPT

## 2020-10-08 PROCEDURE — 85025 COMPLETE CBC W/AUTO DIFF WBC: CPT

## 2020-10-08 PROCEDURE — 82947 ASSAY GLUCOSE BLOOD QUANT: CPT

## 2020-10-08 PROCEDURE — 80061 LIPID PANEL: CPT

## 2020-10-08 PROCEDURE — 36415 COLL VENOUS BLD VENIPUNCTURE: CPT | Mod: PO

## 2020-10-08 NOTE — TELEPHONE ENCOUNTER
Did antiinflammatory help?  I am out of the office this next week  Patient can be schedule in urgent spot on 10/19 if needed

## 2020-10-08 NOTE — TELEPHONE ENCOUNTER
----- Message from Lexa Whaley sent at 10/8/2020 10:29 AM CDT -----  Regarding: Pt Advice  Contact: ELLEN STRATTON [[id number]]  Type:  Patient Returning Call    Who Called: ELLEN STRATTON [[id number]]    Who Left Message for Patient: Eden    Does the patient know what this is regarding?: yes    Would the patient rather a call back or a response via [facility]? call    Best Call Back Number: [phone]    Additional Information:

## 2020-10-08 NOTE — TELEPHONE ENCOUNTER
----- Message from Tana Self sent at 10/8/2020  9:26 AM CDT -----  Regarding: pt  Type: Patient Call Back    Who called:pt    What is the request in detail:pt wants to speak to nurse in regards to inflamed hip. Call pt    Can the clinic reply by DENINER?    Would the patient rather a call back or a response via [facility]? call    Best call back number:[phone] (ptte) [phone]      Additional Information:

## 2020-10-08 NOTE — TELEPHONE ENCOUNTER
Patient stated he is having hip pain only when in bed sleeping. Patient stated that Dr. Bains gave him a Anti inflammatory. Patient stated he wanted to see Dr. Bains but there were no appt until December. Please advise

## 2020-10-12 ENCOUNTER — TELEPHONE (OUTPATIENT)
Dept: CARDIAC REHAB | Facility: CLINIC | Age: 75
End: 2020-10-12

## 2020-10-13 ENCOUNTER — TELEPHONE (OUTPATIENT)
Dept: FAMILY MEDICINE | Facility: CLINIC | Age: 75
End: 2020-10-13

## 2020-10-13 ENCOUNTER — CLINICAL SUPPORT (OUTPATIENT)
Dept: CARDIAC REHAB | Facility: CLINIC | Age: 75
End: 2020-10-13
Payer: MEDICARE

## 2020-10-13 VITALS
OXYGEN SATURATION: 96 % | RESPIRATION RATE: 16 BRPM | HEART RATE: 70 BPM | SYSTOLIC BLOOD PRESSURE: 138 MMHG | DIASTOLIC BLOOD PRESSURE: 86 MMHG

## 2020-10-13 DIAGNOSIS — I25.10 CORONARY ARTERY DISEASE INVOLVING NATIVE CORONARY ARTERY OF NATIVE HEART WITHOUT ANGINA PECTORIS: ICD-10-CM

## 2020-10-13 DIAGNOSIS — E11.40 TYPE 2 DIABETES MELLITUS WITH DIABETIC NEUROPATHY, WITHOUT LONG-TERM CURRENT USE OF INSULIN: Primary | ICD-10-CM

## 2020-10-13 DIAGNOSIS — Z98.61 POSTSURGICAL PERCUTANEOUS TRANSLUMINAL CORONARY ANGIOPLASTY STATUS: ICD-10-CM

## 2020-10-13 DIAGNOSIS — I25.10 ATHEROSCLEROSIS OF NATIVE CORONARY ARTERY OF NATIVE HEART WITHOUT ANGINA PECTORIS: ICD-10-CM

## 2020-10-13 PROCEDURE — 99213 OFFICE O/P EST LOW 20 MIN: CPT | Mod: PBBFAC,PO,25

## 2020-10-13 PROCEDURE — 93798 PHYS/QHP OP CAR RHAB W/ECG: CPT | Mod: PBBFAC,PO

## 2020-10-13 PROCEDURE — 99999 PR PBB SHADOW E&M-EST. PATIENT-LVL III: CPT | Mod: PBBFAC,,,

## 2020-10-13 PROCEDURE — 93798 PHYS/QHP OP CAR RHAB W/ECG: CPT | Mod: S$PBB,,, | Performed by: INTERNAL MEDICINE

## 2020-10-13 PROCEDURE — 93798 PR CARDIAC REHAB/MONITOR: ICD-10-PCS | Mod: S$PBB,,, | Performed by: INTERNAL MEDICINE

## 2020-10-13 PROCEDURE — 99999 PR PBB SHADOW E&M-EST. PATIENT-LVL III: ICD-10-PCS | Mod: PBBFAC,,,

## 2020-10-13 RX ORDER — INSULIN PUMP SYRINGE, 3 ML
EACH MISCELLANEOUS
Qty: 1 EACH | Refills: 0 | Status: SHIPPED | OUTPATIENT
Start: 2020-10-13 | End: 2021-06-11

## 2020-10-13 RX ORDER — LANCING DEVICE
1 EACH MISCELLANEOUS 2 TIMES DAILY WITH MEALS
Qty: 1 EACH | Refills: 0 | Status: SHIPPED | OUTPATIENT
Start: 2020-10-13 | End: 2021-06-11
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
## 2020-10-13 NOTE — PROGRESS NOTES
Patient here for Phase II Cardiac Rehab orientation.     /86 (BP Location: Left arm, Patient Position: Standing, BP Method: Large (Manual))   Pulse 70   Resp 16   SpO2 96%     ASSESSMENT:  Heart Sounds: regular rate and rhythm  Prosthetic Valve: No  Lung Sounds: clear to auscultation bilaterally  Capillary Refill: normal  Left Radial Pulse: Normal (+2)  Right Radial Pulse: Normal (+2)  Left Pedal Pulse: Normal (+2)  Right Pedal Pulse: Normal (+2)  Right Edema: none  Left Edema none  Strength: normal  Range of Motion: full range of motion  Existing Limitations:    Site   [x] Arthritis, bursitis Left hip pain   [] Amputation, atrophy    [] Other:    []     Diabetic patient's foot examination comments: Normal -  Bilateral  Incisional site: N/A  Special needs: N/A    QOL:  Discussed Adrienne, SF36, and PHQ-9 Questionnaire scores with patient.  Patient denies any overwhelming stress or anxiety.  Patient has been instructed to notify staff in the event that circumstances worsen.  Patient verbalizes understanding.    RISK FACTORS:  The following risk factors are present:  diabetes, nutrition, hyperlipidemia, hypertension, obesity    Tobacco Use:  [x] Non-smoker   [] Tobacco use in last 6 months - cigarettes, cigars, cigarillos, chew tobacco, and e-cigarettes   [] Education and counseling   [] Referral to smoking cessation   [] MD notified   [] [employer]      GOALS:  Patient has set the following goals:  Decrease cholesterol level  Increase exercise tolerance  Decrease blood pressure  Weight loss  Control diabetes by adjusting diet and exercise  Learn more about healthy eating    Discussed Cardiac Rehab program in depth with patient.  Medication list updated per patient & marked as reviewed.  Patient has been instructed to notify staff of any problems while attending rehab (ie: chest pain, shortness of breath, lightheadedness, dizziness).  Patient has been instructed to monitor blood pressure readings outside  of rehab & to keep a daily log of the readings.  Patient verbalizes understanding.    Jose Shankar, RN  Cardiac Rehab Nurse

## 2020-10-13 NOTE — TELEPHONE ENCOUNTER
----- Message from Tabitha Bravo LPN sent at 10/13/2020 10:52 AM CDT -----    ----- Message -----  From: Jose Shankar RN  Sent: 10/13/2020  10:38 AM CDT  To: [facility] Staff    Dr. Bains,    Mr. Cha will be attending cardiac rehab at our [facility] location.  He is being followed by you for his type 2 diabetes. Per our protocol he will need a glucometer to monitor his blood sugars MWF in cardiac rehab before and after exercise. Can you please order the glucometer for him so that we can get him started as soon as possible? If you have any questions or concerns please contact me.    Jose Shankar RN  [facility]  [phone]

## 2020-10-13 NOTE — PROGRESS NOTES
Exercise:  I met with Mr. Gunter for orientation to Phase II cardiac rehab.  The consent forms were signed, entry treadmill stress test results were discussed, proper attire and shoes were discussed, and strength assessment was performed.  The patient's weight (26 lb), abdominal girth (51.5 in), BMI (37.45), and body composition (28%) was measured.      Entry treadmill stress test showed an estimated MET Level of 4.0.  This places the patient in the high risk category.  At exit, an estimated MET Level of 5.2 will be desired to achieve the goal of a 30% improvement.      Based on entry treadmill stress test, the target heart rate range for Mr. Gunter is 79 to 85 bpm.    Mr. Gunter stated he is currently not exercising aerobically.  Participation in an aerobic exercise program was encouraged at least two additional days per week for at least 30 minutes per day outside of attending cardiac rehab class 3 days per week.  He stated understanding.      There were no limitations to exercise noted by the patient other than deconditioning.     Mr. Gunter will attend cardiac rehab class 3 times per week.  Each class will include aerobic exercise, resistance training, and stretching which will be modified to fit limitations.  Aerobic exercise will be 30 to 60 minutes per day with a goal intensity of 12 to 15 on the RPE scale.  Resistance training will incorporate 1 to 2 sets of 10 to 15 repetitions with free weights.  He will begin resistance exercises with a weight of 3 to 5 pounds based on strength assessment.    Mr. Gunter will begin Cardiac Rehab sometime next week when there is availability at 10:00am.    Exercise prescription will be adjusted based on tolerance of exercise intensity by the patient.    Maria Elena Sam., CEP

## 2020-10-21 ENCOUNTER — CLINICAL SUPPORT (OUTPATIENT)
Dept: FAMILY MEDICINE | Facility: CLINIC | Age: 75
End: 2020-10-21
Payer: MEDICARE

## 2020-10-21 DIAGNOSIS — Z23 NEED FOR PROPHYLACTIC VACCINATION AND INOCULATION AGAINST INFLUENZA: Primary | ICD-10-CM

## 2020-10-21 PROCEDURE — 90694 VACC AIIV4 NO PRSRV 0.5ML IM: CPT | Mod: PBBFAC,PO | Performed by: FAMILY MEDICINE

## 2020-10-21 PROCEDURE — G0008 ADMIN INFLUENZA VIRUS VAC: HCPCS | Mod: PBBFAC,PO | Performed by: FAMILY MEDICINE

## 2020-10-21 PROCEDURE — 99499 NO LOS: ICD-10-PCS | Mod: S$PBB,,, | Performed by: FAMILY MEDICINE

## 2020-10-21 PROCEDURE — 99499 UNLISTED E&M SERVICE: CPT | Mod: S$PBB,,, | Performed by: FAMILY MEDICINE

## 2020-10-23 ENCOUNTER — CLINICAL SUPPORT (OUTPATIENT)
Dept: CARDIAC REHAB | Facility: CLINIC | Age: 75
End: 2020-10-23
Payer: MEDICARE

## 2020-10-23 DIAGNOSIS — I25.10 CORONARY ATHEROSCLEROSIS OF NATIVE CORONARY ARTERY: ICD-10-CM

## 2020-10-23 DIAGNOSIS — Z98.61 POSTSURGICAL PERCUTANEOUS TRANSLUMINAL CORONARY ANGIOPLASTY STATUS: ICD-10-CM

## 2020-10-23 PROCEDURE — 93798 PHYS/QHP OP CAR RHAB W/ECG: CPT | Mod: PBBFAC,PO

## 2020-10-23 PROCEDURE — 93798 PR CARDIAC REHAB/MONITOR: ICD-10-PCS | Mod: S$PBB,,, | Performed by: INTERNAL MEDICINE

## 2020-10-23 PROCEDURE — 93798 PHYS/QHP OP CAR RHAB W/ECG: CPT | Mod: S$PBB,,, | Performed by: INTERNAL MEDICINE

## 2020-10-26 ENCOUNTER — CLINICAL SUPPORT (OUTPATIENT)
Dept: CARDIAC REHAB | Facility: CLINIC | Age: 75
End: 2020-10-26
Payer: MEDICARE

## 2020-10-26 DIAGNOSIS — Z98.61 POSTSURGICAL PERCUTANEOUS TRANSLUMINAL CORONARY ANGIOPLASTY STATUS: ICD-10-CM

## 2020-10-26 DIAGNOSIS — I25.10 CORONARY ATHEROSCLEROSIS OF NATIVE CORONARY ARTERY: ICD-10-CM

## 2020-10-26 PROCEDURE — 93798 PHYS/QHP OP CAR RHAB W/ECG: CPT | Mod: S$PBB,,, | Performed by: INTERNAL MEDICINE

## 2020-10-26 PROCEDURE — 93798 PHYS/QHP OP CAR RHAB W/ECG: CPT | Mod: PBBFAC,PO

## 2020-10-26 PROCEDURE — 93798 PR CARDIAC REHAB/MONITOR: ICD-10-PCS | Mod: S$PBB,,, | Performed by: INTERNAL MEDICINE

## 2020-10-28 ENCOUNTER — CLINICAL SUPPORT (OUTPATIENT)
Dept: CARDIAC REHAB | Facility: CLINIC | Age: 75
End: 2020-10-28
Payer: MEDICARE

## 2020-10-28 DIAGNOSIS — I25.10 CORONARY ATHEROSCLEROSIS OF NATIVE CORONARY ARTERY: ICD-10-CM

## 2020-10-28 DIAGNOSIS — Z98.61 POSTSURGICAL PERCUTANEOUS TRANSLUMINAL CORONARY ANGIOPLASTY STATUS: ICD-10-CM

## 2020-10-28 PROCEDURE — 93798 PR CARDIAC REHAB/MONITOR: ICD-10-PCS | Mod: S$PBB,,, | Performed by: INTERNAL MEDICINE

## 2020-10-28 PROCEDURE — 93798 PHYS/QHP OP CAR RHAB W/ECG: CPT | Mod: S$PBB,,, | Performed by: INTERNAL MEDICINE

## 2020-10-28 PROCEDURE — 93798 PHYS/QHP OP CAR RHAB W/ECG: CPT | Mod: PBBFAC,PO

## 2020-10-29 ENCOUNTER — OFFICE VISIT (OUTPATIENT)
Dept: CARDIOLOGY | Facility: CLINIC | Age: 75
End: 2020-10-29
Payer: MEDICARE

## 2020-10-29 VITALS
DIASTOLIC BLOOD PRESSURE: 82 MMHG | WEIGHT: 257.5 LBS | SYSTOLIC BLOOD PRESSURE: 171 MMHG | HEIGHT: 70 IN | BODY MASS INDEX: 36.86 KG/M2 | HEART RATE: 77 BPM

## 2020-10-29 DIAGNOSIS — E78.00 PURE HYPERCHOLESTEROLEMIA: Chronic | ICD-10-CM

## 2020-10-29 DIAGNOSIS — E66.01 SEVERE OBESITY (BMI 35.0-39.9) WITH COMORBIDITY: Chronic | ICD-10-CM

## 2020-10-29 DIAGNOSIS — I10 ESSENTIAL HYPERTENSION: Chronic | ICD-10-CM

## 2020-10-29 DIAGNOSIS — I25.10 CORONARY ARTERY DISEASE INVOLVING NATIVE CORONARY ARTERY OF NATIVE HEART WITHOUT ANGINA PECTORIS: Primary | Chronic | ICD-10-CM

## 2020-10-29 DIAGNOSIS — R94.39 ABNORMAL STRESS TEST: ICD-10-CM

## 2020-10-29 DIAGNOSIS — N18.32 STAGE 3B CHRONIC KIDNEY DISEASE: ICD-10-CM

## 2020-10-29 DIAGNOSIS — Z95.1 S/P CABG X 4: Chronic | ICD-10-CM

## 2020-10-29 DIAGNOSIS — E11.40 TYPE 2 DIABETES MELLITUS WITH DIABETIC NEUROPATHY, WITHOUT LONG-TERM CURRENT USE OF INSULIN: Chronic | ICD-10-CM

## 2020-10-29 DIAGNOSIS — I25.82 CHRONIC TOTAL OCCLUSION OF CORONARY ARTERY: ICD-10-CM

## 2020-10-29 DIAGNOSIS — I71.40 ABDOMINAL AORTIC ANEURYSM (AAA) WITHOUT RUPTURE: ICD-10-CM

## 2020-10-29 PROBLEM — R94.30 ABNORMAL CARDIOVASCULAR FUNCTION: Status: RESOLVED | Noted: 2020-07-24 | Resolved: 2020-10-29

## 2020-10-29 PROBLEM — R55 SYNCOPE: Status: RESOLVED | Noted: 2020-07-24 | Resolved: 2020-10-29

## 2020-10-29 PROCEDURE — 99214 PR OFFICE/OUTPT VISIT, EST, LEVL IV, 30-39 MIN: ICD-10-PCS | Mod: S$PBB,,, | Performed by: INTERNAL MEDICINE

## 2020-10-29 PROCEDURE — 99999 PR PBB SHADOW E&M-EST. PATIENT-LVL V: CPT | Mod: PBBFAC,,, | Performed by: INTERNAL MEDICINE

## 2020-10-29 PROCEDURE — 99999 PR PBB SHADOW E&M-EST. PATIENT-LVL V: ICD-10-PCS | Mod: PBBFAC,,, | Performed by: INTERNAL MEDICINE

## 2020-10-29 PROCEDURE — 99215 OFFICE O/P EST HI 40 MIN: CPT | Mod: PBBFAC | Performed by: INTERNAL MEDICINE

## 2020-10-29 PROCEDURE — 99214 OFFICE O/P EST MOD 30 MIN: CPT | Mod: S$PBB,,, | Performed by: INTERNAL MEDICINE

## 2020-10-29 NOTE — PROGRESS NOTES
Subjective:   Patient ID:  Jani Cha is a 75 y.o. male who presents for follow-up of Coronary artery disease involving native coronary artery of       Assessment:     1. Coronary artery disease involving native coronary artery of native heart without angina pectoris    2. Chronic total occlusion of coronary artery    3. Abnormal stress test    4. Abdominal aortic aneurysm (AAA) without rupture    5. S/P CABG x 4    6. Essential hypertension    7. Pure hypercholesterolemia    8. Stage 3b chronic kidney disease    9. Type 2 diabetes mellitus with diabetic neuropathy, without long-term current use of insulin    10. Severe obesity (BMI 35.0-39.9) with comorbidity        Plan:   Pt s/p PCI of RCA in the context of syncope with inferior ischemia on PET.  BPs and lipids adequately controlled. Currently asymptomatic and in cardiac rehab.  Continue current meds and increase exercise capacity.  F/u in 6 months.  Followed by Vascular for AAA          No orders of the defined types were placed in this encounter.        ___________________________________________________________________________________________    HPI:   Pt is here s/p PCI of RCA. Pt has CAD s/p CABG in 5-2006, PCI of RCA in 9-2020, abd AAA, JAYSON on CPAP, DM, HTN and dyslipidemia.  He has a syncopal episode in 7-2020. PET stress test in July after having the syncopal episode which demonstrated ischemia in the PLB territory comprising 10% of the myocardium. ECHO with preserved LV function.  He underwent LHC 8/3/2020 which demonstrated a patent LIMA-D1 but occluded SVG's, occluded native LAD and LCx with a patent Ramus. He also has an occluded RCA which fills retrograde from the distal LAD that has flow supplied from the LIMA.  First attempt to open RCA was unsuccessful.  He was brought back to the lab and underwent successful HUMA placement x 2 in the ostial-mid RCA. He tolerated the procedure well and without complications and was discharged home in stable  condition.  He states that he doesn't feel too much different since PCI.  He is now in cardiac rehab.  Pre-rehab stress test lasted 2 minutes 35 seconds and was stopped due to fatigue.    Mr. Cha denies any CP, BUCK, palpitations, TIA's, syncope or presyncope. He has c/o is L hip pain that occurs when he stands on the hip. He also has JAYSON and did have ENT surgery.      He did not take his meds yet today.  BPs in rehab running ~130/80    CT scan for renal stones in  demonstrated Infrarenal abdominal aortic aneurysm measuring 3.5-cm and diffuse dilatation of the descending thoracic aorta measuring 3.9-cm.  CTA 2018 The distal descending thoracic aorta remains slightly dilated at 4.3 cm, previously 3.9 cm. There is a persistent, infrarenal abdominal aortic aneurysm measuring approximately 3.6 cm, partially thrombosed, previously 3.5 cm. There is significant extensive atherosclerotic plaque throughout the aorta. Celiac, SMA, and ROXANNE are patent.  Now followed by [facility].               9-  · Three vessel coronary artery disease.  · A STENT RESOLUTE COOKIE [id number] stent was successfully placed.  · A STENT RESOLUTE COOKIE [id number] stent was successfully placed.  · Ost RCA to Prox RCA lesion , 75% stenosed reduced to 0%..  · Prox RCA to Mid RCA lesion , 75% stenosed reduced to 0%..  · RPDA lesion , 100% occluded, fills with collaterals from LIMA graft.  · Successful PCI with HUMA of RCA. Vessel is widely open into a large PL.  · Estimated blood loss: <50 mL        Results for orders placed during the hospital encounter of 07/15/20   Echo Color Flow Doppler? Yes    Narrative · Eccentric left ventricular hypertrophy. Normal left ventricular systolic   function. The estimated ejection fraction is 55%.  · Local segmental wall motion abnormalities.  · Normal right ventricular systolic function.  · Mild left atrial enlargement.  · Grade I (mild) left ventricular diastolic dysfunction consistent with   impaired  relaxation.  · Normal central venous pressure (3 mmHg).  · The estimated PA systolic pressure is 28 mmHg.         Results for orders placed during the hospital encounter of 07/17/20   Cardiac PET Scan Stress    Narrative   Perfusion defect #1 - There is a small sized, moderate to severe   intensity, basal to mid inferior and inferolateral wall reversible   perfusion abnormality in the distribution of the PLB involving 10% of the   LV myocardium.    Within perfusion abnormality #1, absolute myocardial perfusion   (cc/min/gm) averaged 0.67 cc/min/g at rest, 0.49 cc/min/g at stress and   CFR was 0.80 cc/min/g, which equates to severely reduced coronary flow   capacity in 10% of the myocardium (within the PLB territory) .    Perfusion defect #2 - There is a very small (<5%) sized, mild intensity   apical resting perfusion abnormality in the distribution of the distal LAD   territory. This defect is unchanged with stress.    Within perfusion abnormality #2, absolute myocardial perfusion   (cc/min/gm) averaged 0.73 cc/min/g at rest, 0.75 cc/min/g at stress and   CFR was 1.04 cc/min/g, in 3% of the myocardium (within the LAD territory).    Whole heart absolute myocardial perfusion (cc/min/g) averaged 0.76   cc/min/g at rest, which is normal, 0.99 cc/min/g at stress, which is   moderately reduced, and 1.36  CFR, which is moderately reduced.    Gated perfusion images showed an ejection fraction of 50% at rest and   40% during stress. Normal ejection fraction is greater than 51%.    There is basal to mid inferolateral wall hypokinesis at rest and basal   to mid inferolateral wall akinesis at stress.    LV cavity size is normal at rest and stress.    The EKG portion of this study is negative for ischemia.    There were no arrhythmias during stress.    The patient reported no chest pain during the stress test.    When compared to the previous study from 2/1/2017, there are   significant changes.  The  "inferior/inferolateral defect appears more   intense in severity. Coronary flow capacity is now severely reduced in   this region.          Last 5 Patient Entered Readings                                      Current 30 Day Average:      There is no flowsheet data to display.           Vitals:    10/29/20 0939   BP: (!) 171/82   Pulse: 77   Weight: 116.8 kg (257 lb 8 oz)   Height: 5' 10" (1.778 m)     Body mass index is 36.95 kg/m².  CrCl cannot be calculated (Patient's most recent lab result is older than the maximum 7 days allowed.).    Lab Results   Component Value Date     09/28/2020    K 4.3 09/28/2020     09/28/2020    CO2 26 09/28/2020    BUN 31 (H) 09/28/2020    CREATININE 1.6 (H) 09/28/2020     (H) 09/28/2020    HGBA1C 7.6 (H) 10/08/2020    MG 2.3 06/18/2006    AST 17 06/09/2020    ALT 15 06/09/2020    ALBUMIN 3.4 (L) 06/09/2020    PROT 6.5 06/09/2020    BILITOT 0.5 06/09/2020    WBC 8.56 10/08/2020    HGB 14.7 10/08/2020    HCT 47.3 10/08/2020    MCV 97 10/08/2020     10/08/2020    INR 1.1 08/17/2020    PSA 1.4 06/09/2020    TSH 0.918 03/28/2019    CHOL 132 10/08/2020    HDL 31 (L) 10/08/2020    LDLCALC 76.8 10/08/2020    TRIG 121 10/08/2020       Current Outpatient Medications   Medication Sig    ammonium lactate 12 % Crea Apply 2 g topically once daily. (Patient taking differently: Apply 2 g topically as needed. )    aspirin (ECOTRIN) 81 MG EC tablet Take 81 mg by mouth once daily.      atorvastatin (LIPITOR) 80 MG tablet Take 1 tablet by mouth once daily    blood sugar diagnostic Strp 1 strip by Misc.(Non-Drug; Combo Route) route 2 (two) times daily with meals.    blood-glucose meter kit Use as instructed    carvediloL (COREG) 25 MG tablet Take 1 tablet (25 mg total) by mouth 2 (two) times daily with meals.    clopidogreL (PLAVIX) 75 mg tablet Take 1 tablet (75 mg total) by mouth once daily.    clotrimazole (LOTRIMIN) 1 % cream APPLY  CREAM TOPICALLY TO AFFECTED AREA " BID AS NEEDED.    clotrimazole-betamethasone 1-0.05% (LOTRISONE) cream Apply topically 2 (two) times daily.    econazole nitrate 1 % cream Apply topically once daily.    glipiZIDE (GLUCOTROL) 5 MG TR24 Take 1 tablet (5 mg total) by mouth once daily.    lancing device Misc 1 Device by Misc.(Non-Drug; Combo Route) route 2 (two) times daily with meals.    metFORMIN (GLUCOPHAGE) 1000 MG tablet Take 1 tablet (1,000 mg total) by mouth 2 (two) times daily with meals.    nitroGLYCERIN (NITROSTAT) 0.4 MG SL tablet Place 1 tablet (0.4 mg total) under the tongue every 5 (five) minutes as needed.    pantoprazole (PROTONIX) 20 MG tablet Take 2 tablets by mouth once daily    valsartan-hydrochlorothiazide (DIOVAN HCT) 320-12.5 mg per tablet Take 1 tablet by mouth once daily.     No current facility-administered medications for this visit.        Review of Systems   Constitution: Positive for malaise/fatigue. Negative for decreased appetite, weight gain and weight loss.   Eyes: Negative for visual disturbance.   Cardiovascular: Negative for chest pain, claudication, dyspnea on exertion, irregular heartbeat, orthopnea, palpitations, paroxysmal nocturnal dyspnea and syncope.   Respiratory: Negative for cough, shortness of breath and snoring.    Skin: Negative for rash.   Musculoskeletal: Negative for arthritis, muscle cramps, muscle weakness and myalgias.   Gastrointestinal: Negative for abdominal pain, anorexia, change in bowel habit and nausea.   Genitourinary: Negative for dysuria and frequency.   Neurological: Negative for excessive daytime sleepiness, dizziness, headaches, loss of balance, numbness and weakness.   Psychiatric/Behavioral: Negative for depression.       Objective:   Physical Exam   Constitutional: He is oriented to person, place, and time. He appears well-developed and well-nourished.   HENT:   Head: Normocephalic and atraumatic.   Pulmonary/Chest: Effort normal. No accessory muscle usage. No respiratory  distress.   Abdominal: Normal appearance.   Neurological: He is alert and oriented to person, place, and time.   Skin: Skin is dry.   Psychiatric: He has a normal mood and affect. His speech is normal and behavior is normal. Judgment and thought content normal. Cognition and memory are normal.

## 2020-10-31 ENCOUNTER — EXTERNAL CHRONIC CARE MANAGEMENT (OUTPATIENT)
Dept: PRIMARY CARE CLINIC | Facility: CLINIC | Age: 75
End: 2020-10-31
Payer: MEDICARE

## 2020-10-31 PROCEDURE — 99490 PR CHRONIC CARE MGMT, 1ST 20 MIN: ICD-10-PCS | Mod: S$PBB,,, | Performed by: FAMILY MEDICINE

## 2020-10-31 PROCEDURE — 99490 CHRNC CARE MGMT STAFF 1ST 20: CPT | Mod: PBBFAC,PO | Performed by: FAMILY MEDICINE

## 2020-10-31 PROCEDURE — 99490 CHRNC CARE MGMT STAFF 1ST 20: CPT | Mod: S$PBB,,, | Performed by: FAMILY MEDICINE

## 2020-11-02 ENCOUNTER — CLINICAL SUPPORT (OUTPATIENT)
Dept: CARDIAC REHAB | Facility: CLINIC | Age: 75
End: 2020-11-02
Payer: MEDICARE

## 2020-11-02 DIAGNOSIS — Z98.61 POSTSURGICAL PERCUTANEOUS TRANSLUMINAL CORONARY ANGIOPLASTY STATUS: ICD-10-CM

## 2020-11-02 DIAGNOSIS — I25.10 CORONARY ATHEROSCLEROSIS OF NATIVE CORONARY ARTERY: ICD-10-CM

## 2020-11-02 PROCEDURE — 93798 PHYS/QHP OP CAR RHAB W/ECG: CPT | Mod: S$PBB,,, | Performed by: INTERNAL MEDICINE

## 2020-11-02 PROCEDURE — 93798 PHYS/QHP OP CAR RHAB W/ECG: CPT | Mod: PBBFAC,PO

## 2020-11-02 PROCEDURE — 93798 PR CARDIAC REHAB/MONITOR: ICD-10-PCS | Mod: S$PBB,,, | Performed by: INTERNAL MEDICINE

## 2020-11-04 ENCOUNTER — CLINICAL SUPPORT (OUTPATIENT)
Dept: CARDIAC REHAB | Facility: CLINIC | Age: 75
End: 2020-11-04
Payer: MEDICARE

## 2020-11-04 DIAGNOSIS — Z98.61 POSTSURGICAL PERCUTANEOUS TRANSLUMINAL CORONARY ANGIOPLASTY STATUS: ICD-10-CM

## 2020-11-04 DIAGNOSIS — I25.10 ATHEROSCLEROSIS OF NATIVE CORONARY ARTERY OF NATIVE HEART WITHOUT ANGINA PECTORIS: ICD-10-CM

## 2020-11-04 PROCEDURE — 93798 PR CARDIAC REHAB/MONITOR: ICD-10-PCS | Mod: S$PBB,,, | Performed by: INTERNAL MEDICINE

## 2020-11-04 PROCEDURE — 93798 PHYS/QHP OP CAR RHAB W/ECG: CPT | Mod: PBBFAC,PO

## 2020-11-04 PROCEDURE — 93798 PHYS/QHP OP CAR RHAB W/ECG: CPT | Mod: S$PBB,,, | Performed by: INTERNAL MEDICINE

## 2020-11-06 ENCOUNTER — CLINICAL SUPPORT (OUTPATIENT)
Dept: CARDIAC REHAB | Facility: CLINIC | Age: 75
End: 2020-11-06
Payer: MEDICARE

## 2020-11-06 DIAGNOSIS — I25.10 ATHEROSCLEROSIS OF NATIVE CORONARY ARTERY OF NATIVE HEART WITHOUT ANGINA PECTORIS: ICD-10-CM

## 2020-11-06 DIAGNOSIS — Z98.61 POSTSURGICAL PERCUTANEOUS TRANSLUMINAL CORONARY ANGIOPLASTY STATUS: ICD-10-CM

## 2020-11-06 PROCEDURE — 93798 PHYS/QHP OP CAR RHAB W/ECG: CPT | Mod: PBBFAC,PO

## 2020-11-06 PROCEDURE — 93798 PR CARDIAC REHAB/MONITOR: ICD-10-PCS | Mod: S$PBB,,, | Performed by: INTERNAL MEDICINE

## 2020-11-06 PROCEDURE — 93798 PHYS/QHP OP CAR RHAB W/ECG: CPT | Mod: S$PBB,,, | Performed by: INTERNAL MEDICINE

## 2020-11-09 ENCOUNTER — CLINICAL SUPPORT (OUTPATIENT)
Dept: CARDIAC REHAB | Facility: CLINIC | Age: 75
End: 2020-11-09
Payer: MEDICARE

## 2020-11-09 DIAGNOSIS — Z98.61 POSTSURGICAL PERCUTANEOUS TRANSLUMINAL CORONARY ANGIOPLASTY STATUS: ICD-10-CM

## 2020-11-09 DIAGNOSIS — I25.10 CORONARY ATHEROSCLEROSIS OF NATIVE CORONARY ARTERY: ICD-10-CM

## 2020-11-09 PROCEDURE — 93798 PHYS/QHP OP CAR RHAB W/ECG: CPT | Mod: S$PBB,,, | Performed by: INTERNAL MEDICINE

## 2020-11-09 PROCEDURE — 93798 PR CARDIAC REHAB/MONITOR: ICD-10-PCS | Mod: S$PBB,,, | Performed by: INTERNAL MEDICINE

## 2020-11-09 PROCEDURE — 93798 PHYS/QHP OP CAR RHAB W/ECG: CPT | Mod: PBBFAC,PO

## 2020-11-11 ENCOUNTER — CLINICAL SUPPORT (OUTPATIENT)
Dept: CARDIAC REHAB | Facility: CLINIC | Age: 75
End: 2020-11-11
Payer: MEDICARE

## 2020-11-11 ENCOUNTER — PATIENT MESSAGE (OUTPATIENT)
Dept: FAMILY MEDICINE | Facility: CLINIC | Age: 75
End: 2020-11-11

## 2020-11-11 DIAGNOSIS — Z98.61 POSTSURGICAL PERCUTANEOUS TRANSLUMINAL CORONARY ANGIOPLASTY STATUS: ICD-10-CM

## 2020-11-11 DIAGNOSIS — I25.10 CORONARY ATHEROSCLEROSIS OF NATIVE CORONARY ARTERY: ICD-10-CM

## 2020-11-11 PROCEDURE — 93798 PR CARDIAC REHAB/MONITOR: ICD-10-PCS | Mod: S$PBB,,, | Performed by: INTERNAL MEDICINE

## 2020-11-11 PROCEDURE — 93798 PHYS/QHP OP CAR RHAB W/ECG: CPT | Mod: S$PBB,,, | Performed by: INTERNAL MEDICINE

## 2020-11-11 PROCEDURE — 93798 PHYS/QHP OP CAR RHAB W/ECG: CPT | Mod: PBBFAC,PO

## 2020-11-13 ENCOUNTER — CLINICAL SUPPORT (OUTPATIENT)
Dept: CARDIAC REHAB | Facility: CLINIC | Age: 75
End: 2020-11-13
Payer: MEDICARE

## 2020-11-13 DIAGNOSIS — I25.10 ATHEROSCLEROSIS OF NATIVE CORONARY ARTERY OF NATIVE HEART WITHOUT ANGINA PECTORIS: ICD-10-CM

## 2020-11-13 DIAGNOSIS — Z98.61 POSTSURGICAL PERCUTANEOUS TRANSLUMINAL CORONARY ANGIOPLASTY STATUS: ICD-10-CM

## 2020-11-13 PROCEDURE — 93798 PHYS/QHP OP CAR RHAB W/ECG: CPT | Mod: S$PBB,,, | Performed by: INTERNAL MEDICINE

## 2020-11-13 PROCEDURE — 93798 PHYS/QHP OP CAR RHAB W/ECG: CPT | Mod: PBBFAC,PO

## 2020-11-13 PROCEDURE — 93798 PR CARDIAC REHAB/MONITOR: ICD-10-PCS | Mod: S$PBB,,, | Performed by: INTERNAL MEDICINE

## 2020-11-16 ENCOUNTER — CLINICAL SUPPORT (OUTPATIENT)
Dept: CARDIAC REHAB | Facility: CLINIC | Age: 75
End: 2020-11-16
Payer: MEDICARE

## 2020-11-16 DIAGNOSIS — Z98.61 POSTSURGICAL PERCUTANEOUS TRANSLUMINAL CORONARY ANGIOPLASTY STATUS: ICD-10-CM

## 2020-11-16 DIAGNOSIS — I25.10 CORONARY ATHEROSCLEROSIS OF NATIVE CORONARY ARTERY: ICD-10-CM

## 2020-11-16 PROCEDURE — 93798 PHYS/QHP OP CAR RHAB W/ECG: CPT | Mod: S$PBB,,, | Performed by: INTERNAL MEDICINE

## 2020-11-16 PROCEDURE — 93798 PHYS/QHP OP CAR RHAB W/ECG: CPT | Mod: PBBFAC,PO

## 2020-11-16 PROCEDURE — 93798 PR CARDIAC REHAB/MONITOR: ICD-10-PCS | Mod: S$PBB,,, | Performed by: INTERNAL MEDICINE

## 2020-11-18 ENCOUNTER — CLINICAL SUPPORT (OUTPATIENT)
Dept: CARDIAC REHAB | Facility: CLINIC | Age: 75
End: 2020-11-18
Payer: MEDICARE

## 2020-11-18 DIAGNOSIS — I25.10 ATHEROSCLEROSIS OF NATIVE CORONARY ARTERY OF NATIVE HEART WITHOUT ANGINA PECTORIS: ICD-10-CM

## 2020-11-18 DIAGNOSIS — Z98.61 POSTSURGICAL PERCUTANEOUS TRANSLUMINAL CORONARY ANGIOPLASTY STATUS: ICD-10-CM

## 2020-11-18 PROCEDURE — 93798 PR CARDIAC REHAB/MONITOR: ICD-10-PCS | Mod: S$PBB,,, | Performed by: INTERNAL MEDICINE

## 2020-11-18 PROCEDURE — 93798 PHYS/QHP OP CAR RHAB W/ECG: CPT | Mod: S$PBB,,, | Performed by: INTERNAL MEDICINE

## 2020-11-18 PROCEDURE — 93798 PHYS/QHP OP CAR RHAB W/ECG: CPT | Mod: PBBFAC,PO

## 2020-11-20 ENCOUNTER — CLINICAL SUPPORT (OUTPATIENT)
Dept: CARDIAC REHAB | Facility: CLINIC | Age: 75
End: 2020-11-20
Payer: MEDICARE

## 2020-11-20 DIAGNOSIS — I25.10 ATHEROSCLEROSIS OF NATIVE CORONARY ARTERY OF NATIVE HEART WITHOUT ANGINA PECTORIS: ICD-10-CM

## 2020-11-20 DIAGNOSIS — Z98.61 POSTSURGICAL PERCUTANEOUS TRANSLUMINAL CORONARY ANGIOPLASTY STATUS: ICD-10-CM

## 2020-11-20 PROCEDURE — 93798 PHYS/QHP OP CAR RHAB W/ECG: CPT | Mod: S$PBB,,, | Performed by: INTERNAL MEDICINE

## 2020-11-20 PROCEDURE — 93798 PR CARDIAC REHAB/MONITOR: ICD-10-PCS | Mod: S$PBB,,, | Performed by: INTERNAL MEDICINE

## 2020-11-20 PROCEDURE — 93798 PHYS/QHP OP CAR RHAB W/ECG: CPT | Mod: PBBFAC,PO

## 2020-11-23 ENCOUNTER — CLINICAL SUPPORT (OUTPATIENT)
Dept: CARDIAC REHAB | Facility: CLINIC | Age: 75
End: 2020-11-23
Payer: MEDICARE

## 2020-11-23 DIAGNOSIS — I25.10 ATHEROSCLEROSIS OF NATIVE CORONARY ARTERY OF NATIVE HEART WITHOUT ANGINA PECTORIS: ICD-10-CM

## 2020-11-23 DIAGNOSIS — Z98.61 POSTSURGICAL PERCUTANEOUS TRANSLUMINAL CORONARY ANGIOPLASTY STATUS: ICD-10-CM

## 2020-11-23 PROCEDURE — 93798 PHYS/QHP OP CAR RHAB W/ECG: CPT | Mod: PBBFAC,PO

## 2020-11-23 PROCEDURE — 93798 PR CARDIAC REHAB/MONITOR: ICD-10-PCS | Mod: S$PBB,,, | Performed by: INTERNAL MEDICINE

## 2020-11-23 PROCEDURE — 93798 PHYS/QHP OP CAR RHAB W/ECG: CPT | Mod: S$PBB,,, | Performed by: INTERNAL MEDICINE

## 2020-11-25 ENCOUNTER — CLINICAL SUPPORT (OUTPATIENT)
Dept: CARDIAC REHAB | Facility: CLINIC | Age: 75
End: 2020-11-25
Payer: MEDICARE

## 2020-11-25 DIAGNOSIS — Z98.61 POSTSURGICAL PERCUTANEOUS TRANSLUMINAL CORONARY ANGIOPLASTY STATUS: ICD-10-CM

## 2020-11-25 DIAGNOSIS — I25.10 ATHEROSCLEROSIS OF NATIVE CORONARY ARTERY OF NATIVE HEART WITHOUT ANGINA PECTORIS: ICD-10-CM

## 2020-11-25 PROCEDURE — 93798 PHYS/QHP OP CAR RHAB W/ECG: CPT | Mod: S$PBB,,, | Performed by: INTERNAL MEDICINE

## 2020-11-25 PROCEDURE — 93798 PR CARDIAC REHAB/MONITOR: ICD-10-PCS | Mod: S$PBB,,, | Performed by: INTERNAL MEDICINE

## 2020-11-25 PROCEDURE — 93798 PHYS/QHP OP CAR RHAB W/ECG: CPT | Mod: PBBFAC,PO

## 2020-11-30 ENCOUNTER — CLINICAL SUPPORT (OUTPATIENT)
Dept: CARDIAC REHAB | Facility: CLINIC | Age: 75
End: 2020-11-30
Payer: MEDICARE

## 2020-11-30 ENCOUNTER — EXTERNAL CHRONIC CARE MANAGEMENT (OUTPATIENT)
Dept: PRIMARY CARE CLINIC | Facility: CLINIC | Age: 75
End: 2020-11-30
Payer: MEDICARE

## 2020-11-30 DIAGNOSIS — Z98.61 POSTSURGICAL PERCUTANEOUS TRANSLUMINAL CORONARY ANGIOPLASTY STATUS: ICD-10-CM

## 2020-11-30 DIAGNOSIS — I25.10 ATHEROSCLEROSIS OF NATIVE CORONARY ARTERY OF NATIVE HEART WITHOUT ANGINA PECTORIS: ICD-10-CM

## 2020-11-30 PROCEDURE — 93798 PR CARDIAC REHAB/MONITOR: ICD-10-PCS | Mod: S$PBB,,, | Performed by: INTERNAL MEDICINE

## 2020-11-30 PROCEDURE — 99490 CHRNC CARE MGMT STAFF 1ST 20: CPT | Mod: S$PBB,,, | Performed by: FAMILY MEDICINE

## 2020-11-30 PROCEDURE — 99490 PR CHRONIC CARE MGMT, 1ST 20 MIN: ICD-10-PCS | Mod: S$PBB,,, | Performed by: FAMILY MEDICINE

## 2020-11-30 PROCEDURE — 93798 PHYS/QHP OP CAR RHAB W/ECG: CPT | Mod: S$PBB,,, | Performed by: INTERNAL MEDICINE

## 2020-11-30 PROCEDURE — 99490 CHRNC CARE MGMT STAFF 1ST 20: CPT | Mod: PBBFAC,PO | Performed by: FAMILY MEDICINE

## 2020-11-30 PROCEDURE — 93798 PHYS/QHP OP CAR RHAB W/ECG: CPT | Mod: PBBFAC,PO

## 2020-12-02 ENCOUNTER — CLINICAL SUPPORT (OUTPATIENT)
Dept: CARDIAC REHAB | Facility: CLINIC | Age: 75
End: 2020-12-02
Payer: MEDICARE

## 2020-12-02 DIAGNOSIS — Z98.61 POSTSURGICAL PERCUTANEOUS TRANSLUMINAL CORONARY ANGIOPLASTY STATUS: ICD-10-CM

## 2020-12-02 DIAGNOSIS — I25.10 CORONARY ATHEROSCLEROSIS OF NATIVE CORONARY ARTERY: ICD-10-CM

## 2020-12-02 PROCEDURE — 93798 PHYS/QHP OP CAR RHAB W/ECG: CPT | Mod: PBBFAC,PO

## 2020-12-02 PROCEDURE — 93798 PR CARDIAC REHAB/MONITOR: ICD-10-PCS | Mod: S$PBB,,, | Performed by: INTERNAL MEDICINE

## 2020-12-02 PROCEDURE — 93798 PHYS/QHP OP CAR RHAB W/ECG: CPT | Mod: S$PBB,,, | Performed by: INTERNAL MEDICINE

## 2020-12-04 ENCOUNTER — CLINICAL SUPPORT (OUTPATIENT)
Dept: CARDIAC REHAB | Facility: CLINIC | Age: 75
End: 2020-12-04
Payer: MEDICARE

## 2020-12-04 DIAGNOSIS — I25.10 CORONARY ATHEROSCLEROSIS OF NATIVE CORONARY ARTERY: ICD-10-CM

## 2020-12-04 DIAGNOSIS — Z98.61 POSTSURGICAL PERCUTANEOUS TRANSLUMINAL CORONARY ANGIOPLASTY STATUS: ICD-10-CM

## 2020-12-04 PROCEDURE — 93798 PHYS/QHP OP CAR RHAB W/ECG: CPT | Mod: S$PBB,,, | Performed by: INTERNAL MEDICINE

## 2020-12-04 PROCEDURE — 93798 PR CARDIAC REHAB/MONITOR: ICD-10-PCS | Mod: S$PBB,,, | Performed by: INTERNAL MEDICINE

## 2020-12-04 PROCEDURE — 93798 PHYS/QHP OP CAR RHAB W/ECG: CPT | Mod: PBBFAC,PO

## 2020-12-07 ENCOUNTER — CLINICAL SUPPORT (OUTPATIENT)
Dept: CARDIAC REHAB | Facility: CLINIC | Age: 75
End: 2020-12-07
Payer: MEDICARE

## 2020-12-07 DIAGNOSIS — Z98.61 POSTSURGICAL PERCUTANEOUS TRANSLUMINAL CORONARY ANGIOPLASTY STATUS: ICD-10-CM

## 2020-12-07 DIAGNOSIS — I25.10 ATHEROSCLEROSIS OF NATIVE CORONARY ARTERY OF NATIVE HEART WITHOUT ANGINA PECTORIS: ICD-10-CM

## 2020-12-07 PROCEDURE — 93798 PHYS/QHP OP CAR RHAB W/ECG: CPT | Mod: S$PBB,,, | Performed by: INTERNAL MEDICINE

## 2020-12-07 PROCEDURE — 93798 PHYS/QHP OP CAR RHAB W/ECG: CPT | Mod: PBBFAC,PO

## 2020-12-07 PROCEDURE — 93798 PR CARDIAC REHAB/MONITOR: ICD-10-PCS | Mod: S$PBB,,, | Performed by: INTERNAL MEDICINE

## 2020-12-08 ENCOUNTER — LAB VISIT (OUTPATIENT)
Dept: LAB | Facility: HOSPITAL | Age: 75
End: 2020-12-08
Attending: FAMILY MEDICINE
Payer: MEDICARE

## 2020-12-08 DIAGNOSIS — E11.9 DIABETES MELLITUS TYPE II, NON INSULIN DEPENDENT: ICD-10-CM

## 2020-12-08 LAB
ALBUMIN SERPL BCP-MCNC: 3.5 G/DL (ref 3.5–5.2)
ALP SERPL-CCNC: 88 U/L (ref 55–135)
ALT SERPL W/O P-5'-P-CCNC: 13 U/L (ref 10–44)
ANION GAP SERPL CALC-SCNC: 11 MMOL/L (ref 8–16)
AST SERPL-CCNC: 14 U/L (ref 10–40)
BILIRUB SERPL-MCNC: 0.5 MG/DL (ref 0.1–1)
BUN SERPL-MCNC: 27 MG/DL (ref 8–23)
CALCIUM SERPL-MCNC: 9.5 MG/DL (ref 8.7–10.5)
CHLORIDE SERPL-SCNC: 104 MMOL/L (ref 95–110)
CO2 SERPL-SCNC: 26 MMOL/L (ref 23–29)
CREAT SERPL-MCNC: 1.5 MG/DL (ref 0.5–1.4)
EST. GFR  (AFRICAN AMERICAN): 52.3 ML/MIN/1.73 M^2
EST. GFR  (NON AFRICAN AMERICAN): 45.2 ML/MIN/1.73 M^2
GLUCOSE SERPL-MCNC: 162 MG/DL (ref 70–110)
POTASSIUM SERPL-SCNC: 4.2 MMOL/L (ref 3.5–5.1)
PROT SERPL-MCNC: 6.5 G/DL (ref 6–8.4)
SODIUM SERPL-SCNC: 141 MMOL/L (ref 136–145)
URATE SERPL-MCNC: 8.1 MG/DL (ref 3.4–7)

## 2020-12-08 PROCEDURE — 83036 HEMOGLOBIN GLYCOSYLATED A1C: CPT

## 2020-12-08 PROCEDURE — 80053 COMPREHEN METABOLIC PANEL: CPT

## 2020-12-08 PROCEDURE — 36415 COLL VENOUS BLD VENIPUNCTURE: CPT | Mod: PO

## 2020-12-08 PROCEDURE — 84550 ASSAY OF BLOOD/URIC ACID: CPT

## 2020-12-09 ENCOUNTER — CLINICAL SUPPORT (OUTPATIENT)
Dept: CARDIAC REHAB | Facility: CLINIC | Age: 75
End: 2020-12-09
Payer: MEDICARE

## 2020-12-09 DIAGNOSIS — I25.10 ATHEROSCLEROSIS OF NATIVE CORONARY ARTERY OF NATIVE HEART WITHOUT ANGINA PECTORIS: ICD-10-CM

## 2020-12-09 DIAGNOSIS — Z98.61 POSTSURGICAL PERCUTANEOUS TRANSLUMINAL CORONARY ANGIOPLASTY STATUS: ICD-10-CM

## 2020-12-09 LAB
ESTIMATED AVG GLUCOSE: 166 MG/DL (ref 68–131)
HBA1C MFR BLD HPLC: 7.4 % (ref 4–5.6)

## 2020-12-09 PROCEDURE — 93798 PHYS/QHP OP CAR RHAB W/ECG: CPT | Mod: S$PBB,,, | Performed by: INTERNAL MEDICINE

## 2020-12-09 PROCEDURE — 93798 PR CARDIAC REHAB/MONITOR: ICD-10-PCS | Mod: S$PBB,,, | Performed by: INTERNAL MEDICINE

## 2020-12-09 PROCEDURE — 93798 PHYS/QHP OP CAR RHAB W/ECG: CPT | Mod: PBBFAC,PO

## 2020-12-11 ENCOUNTER — CLINICAL SUPPORT (OUTPATIENT)
Dept: CARDIAC REHAB | Facility: CLINIC | Age: 75
End: 2020-12-11
Payer: MEDICARE

## 2020-12-11 ENCOUNTER — OFFICE VISIT (OUTPATIENT)
Dept: FAMILY MEDICINE | Facility: CLINIC | Age: 75
End: 2020-12-11
Payer: MEDICARE

## 2020-12-11 ENCOUNTER — PATIENT MESSAGE (OUTPATIENT)
Dept: OTHER | Facility: OTHER | Age: 75
End: 2020-12-11

## 2020-12-11 VITALS
WEIGHT: 254 LBS | HEART RATE: 66 BPM | HEIGHT: 70 IN | BODY MASS INDEX: 36.36 KG/M2 | OXYGEN SATURATION: 95 % | SYSTOLIC BLOOD PRESSURE: 120 MMHG | TEMPERATURE: 97 F | DIASTOLIC BLOOD PRESSURE: 70 MMHG

## 2020-12-11 DIAGNOSIS — N18.32 STAGE 3B CHRONIC KIDNEY DISEASE: ICD-10-CM

## 2020-12-11 DIAGNOSIS — M10.9 ACUTE GOUT OF LEFT FOOT, UNSPECIFIED CAUSE: ICD-10-CM

## 2020-12-11 DIAGNOSIS — K21.9 GASTROESOPHAGEAL REFLUX DISEASE, UNSPECIFIED WHETHER ESOPHAGITIS PRESENT: ICD-10-CM

## 2020-12-11 DIAGNOSIS — E78.00 PURE HYPERCHOLESTEROLEMIA: Chronic | ICD-10-CM

## 2020-12-11 DIAGNOSIS — I25.10 ATHEROSCLEROSIS OF NATIVE CORONARY ARTERY OF NATIVE HEART WITHOUT ANGINA PECTORIS: ICD-10-CM

## 2020-12-11 DIAGNOSIS — E11.9 DIABETES MELLITUS TYPE II, NON INSULIN DEPENDENT: ICD-10-CM

## 2020-12-11 DIAGNOSIS — I71.40 ABDOMINAL ANEURYSM: ICD-10-CM

## 2020-12-11 DIAGNOSIS — I10 ESSENTIAL HYPERTENSION: ICD-10-CM

## 2020-12-11 DIAGNOSIS — Z98.61 POSTSURGICAL PERCUTANEOUS TRANSLUMINAL CORONARY ANGIOPLASTY STATUS: ICD-10-CM

## 2020-12-11 DIAGNOSIS — I25.10 CORONARY ARTERY DISEASE INVOLVING NATIVE CORONARY ARTERY OF NATIVE HEART WITHOUT ANGINA PECTORIS: ICD-10-CM

## 2020-12-11 DIAGNOSIS — E11.40 TYPE 2 DIABETES MELLITUS WITH DIABETIC NEUROPATHY, WITHOUT LONG-TERM CURRENT USE OF INSULIN: Primary | ICD-10-CM

## 2020-12-11 DIAGNOSIS — E66.01 SEVERE OBESITY (BMI 35.0-39.9) WITH COMORBIDITY: ICD-10-CM

## 2020-12-11 DIAGNOSIS — Z95.1 S/P CABG X 4: Chronic | ICD-10-CM

## 2020-12-11 DIAGNOSIS — I70.0 THORACIC AORTA ATHEROSCLEROSIS: ICD-10-CM

## 2020-12-11 PROCEDURE — 99214 OFFICE O/P EST MOD 30 MIN: CPT | Mod: PBBFAC,25,PO | Performed by: FAMILY MEDICINE

## 2020-12-11 PROCEDURE — 99214 OFFICE O/P EST MOD 30 MIN: CPT | Mod: S$PBB,,, | Performed by: FAMILY MEDICINE

## 2020-12-11 PROCEDURE — 93798 PHYS/QHP OP CAR RHAB W/ECG: CPT | Mod: PBBFAC,PO

## 2020-12-11 PROCEDURE — 99999 PR PBB SHADOW E&M-EST. PATIENT-LVL IV: CPT | Mod: PBBFAC,,, | Performed by: FAMILY MEDICINE

## 2020-12-11 PROCEDURE — 93798 PR CARDIAC REHAB/MONITOR: ICD-10-PCS | Mod: S$PBB,,, | Performed by: INTERNAL MEDICINE

## 2020-12-11 PROCEDURE — 93798 PHYS/QHP OP CAR RHAB W/ECG: CPT | Mod: S$PBB,,, | Performed by: INTERNAL MEDICINE

## 2020-12-11 PROCEDURE — 99999 PR PBB SHADOW E&M-EST. PATIENT-LVL IV: ICD-10-PCS | Mod: PBBFAC,,, | Performed by: FAMILY MEDICINE

## 2020-12-11 PROCEDURE — 99214 PR OFFICE/OUTPT VISIT, EST, LEVL IV, 30-39 MIN: ICD-10-PCS | Mod: S$PBB,,, | Performed by: FAMILY MEDICINE

## 2020-12-11 RX ORDER — METFORMIN HYDROCHLORIDE 1000 MG/1
1000 TABLET ORAL 2 TIMES DAILY WITH MEALS
Qty: 180 TABLET | Refills: 3 | Status: SHIPPED | OUTPATIENT
Start: 2020-12-11 | End: 2021-06-29

## 2020-12-11 NOTE — PROGRESS NOTES
Routine Office Visit    Patient Name: Jani Cha    : 1945  MRN: [id number]    Subjective:  Jani is a 75 y.o. male who presents today for     1.  Diabetes - follow-up - Patient is here for blood work follow-up. Patient states that he has been eating more because his wife as been home instead of at work. He states she has been back at work and does plan on working at improving his diet and exercise. He mows the lawn for exercise. He generally does not eat large meals for breakfast or lunch.   2. Lower right back pain - started a few weeks ago - seems to be clearing up   Patient does have heartburn symptoms are alelviated with tums.   Patient walk on treadmill, right shoulder bothers him   Patient goes on elliptical and he has no pain       Review of Systems   Constitutional: Negative for chills and fever.   HENT: Negative for congestion.    Eyes: Negative for blurred vision.   Respiratory: Negative for cough.    Cardiovascular: Negative for chest pain.   Gastrointestinal: Negative for abdominal pain, constipation, diarrhea, heartburn, nausea and vomiting.   Genitourinary: Negative for dysuria.   Musculoskeletal: Negative for myalgias.   Skin: Negative for itching and rash.   Neurological: Negative for dizziness and headaches.   Psychiatric/Behavioral: Negative for depression.       Active Problem List  Patient Active Problem List   Diagnosis    Hyperlipidemia    Hypertension    Coronary artery disease involving native coronary artery of native heart without angina pectoris    Sleep apnea    S/P CABG x 4    Type 2 diabetes mellitus with diabetic neuropathy, without long-term current use of insulin    GERD (gastroesophageal reflux disease)    Personal history of colonic polyps    Severe obesity (BMI 35.0-39.9) with comorbidity    Abdominal aortic aneurysm (AAA) without rupture    Thoracic aorta atherosclerosis    CKD (chronic kidney disease) stage 3, GFR 30-59 ml/min    Abnormal stress test     Coronary artery disease involving native coronary artery of native heart    Chronic total occlusion of coronary artery       Past Surgical History  Past Surgical History:   Procedure Laterality Date    AORTOGRAPHY N/A 8/3/2020    Procedure: Aortogram;  Surgeon: Mason Benitez MD;  Location: [facility] CATH LAB;  Service: Cardiology;  Laterality: N/A;    APPENDECTOMY      COLONOSCOPY N/A 12/27/2016    Procedure: COLONOSCOPY;  Surgeon: Merritt García MD;  [facility]  Service: Endoscopy;  Laterality: N/A;    COLONOSCOPY N/A 7/27/2020    Procedure: COLONOSCOPY;  Surgeon: Mala Lynn MD;  Location: [facility] ENDO;  Service: Endoscopy;  Laterality: N/A;    CORONARY ANGIOGRAPHY N/A 8/17/2020    Procedure: ANGIOGRAM, CORONARY ARTERY;  Surgeon: Mason Benitez MD;  Location: [facility] CATH LAB;  Service: Cardiology;  Laterality: N/A;    CORONARY ANGIOGRAPHY N/A 9/28/2020    Procedure: ANGIOGRAM, CORONARY ARTERY;  Surgeon: Mason Benitez MD;  Location: [facility] CATH LAB;  Service: Cardiology;  Laterality: N/A;    CORONARY ANGIOGRAPHY INCLUDING BYPASS GRAFTS WITH CATHETERIZATION OF LEFT HEART N/A 8/3/2020    Procedure: ANGIOGRAM, CORONARY, INCLUDING BYPASS GRAFT, WITH LEFT HEART CATHETERIZATION;  Surgeon: Mason Benitez MD;  Location: [facility] CATH LAB;  Service: Cardiology;  Laterality: N/A;    CORONARY ARTERY BYPASS GRAFT  05/26/2006     4 vessel    CORONARY BYPASS GRAFT ANGIOGRAPHY  9/28/2020    Procedure: Bypass graft study;  Surgeon: Mason Benitez MD;  Location: [facility] CATH LAB;  Service: Cardiology;;    LEFT HEART CATHETERIZATION Left 9/28/2020    Procedure: Left heart cath;  Surgeon: Mason Benitez MD;  Location: [facility] CATH LAB;  Service: Cardiology;  Laterality: Left;    PERCUTANEOUS TRANSLUMINAL BALLOON ANGIOPLASTY OF CORONARY ARTERY  8/17/2020    Procedure: Angioplasty-coronary;  Surgeon: Mason Benitez MD;  Location: [facility] CATH LAB;  Service: Cardiology;;       Family  History  Family History   Problem Relation Age of Onset    Stroke Father     Colon cancer Brother     Cancer Brother         colon and skin CA    No Known Problems Mother     Cancer Sister     No Known Problems Maternal Aunt     No Known Problems Maternal Uncle     No Known Problems Paternal Aunt     No Known Problems Paternal Uncle     No Known Problems Maternal Grandmother     No Known Problems Maternal Grandfather     No Known Problems Paternal Grandmother     No Known Problems Paternal Grandfather     Cancer Sister     Amblyopia Neg Hx     Blindness Neg Hx     Cataracts Neg Hx     Diabetes Neg Hx     Glaucoma Neg Hx     Hypertension Neg Hx     Macular degeneration Neg Hx     Retinal detachment Neg Hx     Strabismus Neg Hx     Thyroid disease Neg Hx        Social History  Social History     Socioeconomic History    Marital status:      Spouse name: Not on file    Number of children: Not on file    Years of education: Not on file    Highest education level: Not on file   Occupational History    Not on file   Social Needs    Financial resource strain: Not hard at all    Food insecurity     Worry: Never true     Inability: Never true    Transportation needs     Medical: No     Non-medical: No   Tobacco Use    Smoking status: Former Smoker     Types: Cigarettes     Quit date: 2007     Years since quittin.8    Smokeless tobacco: Never Used   Substance and Sexual Activity    Alcohol use: Not Currently     Alcohol/week: 0.0 standard drinks     Frequency: Monthly or less     Drinks per session: 1 or 2     Binge frequency: Never     Comment: once rarely    Drug use: No    Sexual activity: Not on file   Lifestyle    Physical activity     Days per week: 1 day     Minutes per session: 30 min    Stress: Not at all   Relationships    Social connections     Talks on phone: More than three times a week     Gets together: Once a week     Attends Gnosticism service: Not on  file     Active member of club or organization: No     Attends meetings of clubs or organizations: Never     Relationship status:    Other Topics Concern    Not on file   Social History Narrative    Not on file       Medications and Allergies  Reviewed and updated.   Current Outpatient Medications   Medication Sig    ammonium lactate 12 % Crea Apply 2 g topically once daily. (Patient taking differently: Apply 2 g topically as needed. )    aspirin (ECOTRIN) 81 MG EC tablet Take 81 mg by mouth once daily.      atorvastatin (LIPITOR) 80 MG tablet Take 1 tablet by mouth once daily    blood sugar diagnostic Strp 1 strip by Misc.(Non-Drug; Combo Route) route 2 (two) times daily with meals.    blood-glucose meter kit Use as instructed    carvediloL (COREG) 25 MG tablet Take 1 tablet (25 mg total) by mouth 2 (two) times daily with meals.    clopidogreL (PLAVIX) 75 mg tablet Take 1 tablet (75 mg total) by mouth once daily.    clotrimazole (LOTRIMIN) 1 % cream APPLY  CREAM TOPICALLY TO AFFECTED AREA BID AS NEEDED.    clotrimazole-betamethasone 1-0.05% (LOTRISONE) cream Apply topically 2 (two) times daily.    econazole nitrate 1 % cream Apply topically once daily.    glipiZIDE (GLUCOTROL) 5 MG TR24 Take 1 tablet (5 mg total) by mouth once daily.    lancing device Misc 1 Device by Misc.(Non-Drug; Combo Route) route 2 (two) times daily with meals.    metFORMIN (GLUCOPHAGE) 1000 MG tablet Take 1 tablet (1,000 mg total) by mouth 2 (two) times daily with meals.    nitroGLYCERIN (NITROSTAT) 0.4 MG SL tablet Place 1 tablet (0.4 mg total) under the tongue every 5 (five) minutes as needed.    pantoprazole (PROTONIX) 20 MG tablet Take 2 tablets by mouth once daily    valsartan-hydrochlorothiazide (DIOVAN HCT) 320-12.5 mg per tablet Take 1 tablet by mouth once daily.     No current facility-administered medications for this visit.        Physical Exam  /70 (BP Location: Right arm, Patient Position: Sitting,  "BP Method: Large (Manual))   Pulse 66   Temp 97.3 °F (36.3 °C) (Temporal)   Ht 5' 10" (1.778 m)   Wt 115.2 kg (253 lb 15.5 oz)   SpO2 95%   BMI 36.44 kg/m²   Physical Exam  Constitutional:       Appearance: He is well-developed.   HENT:      Head: Normocephalic and atraumatic.   Eyes:      Conjunctiva/sclera: Conjunctivae normal.      Pupils: Pupils are equal, round, and reactive to light.   Neck:      Musculoskeletal: Normal range of motion and neck supple.      Thyroid: No thyromegaly.      Vascular: No JVD.   Cardiovascular:      Rate and Rhythm: Normal rate and regular rhythm.      Heart sounds: Normal heart sounds.   Pulmonary:      Effort: Pulmonary effort is normal.      Breath sounds: Normal breath sounds. No wheezing.   Abdominal:      General: Bowel sounds are normal. There is no distension.      Palpations: Abdomen is soft.      Tenderness: There is no abdominal tenderness. There is no guarding.   Musculoskeletal: Normal range of motion.   Lymphadenopathy:      Cervical: No cervical adenopathy.   Skin:     General: Skin is warm and dry.   Neurological:      Mental Status: He is alert and oriented to person, place, and time.   Psychiatric:         Behavior: Behavior normal.           Assessment/Plan:  Jani Cha is a 75 y.o. male who presents today for :    Problem List Items Addressed This Visit        Cardiac/Vascular    Coronary artery disease involving native coronary artery of native heart without angina pectoris (Chronic)    Overview     s/p 4 V CABG  Cardiologist - Dr. Oliveira         Hyperlipidemia (Chronic)  The current medical regimen is effective;  continue present plan and medications.      Hypertension (Chronic)  The current medical regimen is effective;  continue present plan and medications.      S/P CABG x 4 (Chronic)    Thoracic aorta atherosclerosis    Overview     "The thoracic aorta maintains normal caliber, contour, and course with moderate atherosclerotic calcification" - " CT 12/19/2011  Patient with Atherosclerosis of the Aorta.  Stable/asymptomatic. Currently stable on lipid lowering treatment and b/p monitoring.              Renal/    CKD (chronic kidney disease) stage 3, GFR 30-59 ml/min  Noted in chart         Endocrine    Severe obesity (BMI 35.0-39.9) with comorbidity (Chronic)  The patient is asked to make an attempt to improve diet and exercise patterns to aid in medical management of this problem.      Type 2 diabetes mellitus with diabetic neuropathy, without long-term current use of insulin - Primary (Chronic)    Relevant Medications    metFORMIN (GLUCOPHAGE) 1000 MG tablet       GI    GERD (gastroesophageal reflux disease)  The current medical regimen is effective;  continue present plan and medications.        Other Visit Diagnoses     Diabetes mellitus type II, non insulin dependent        Relevant Medications    metFORMIN (GLUCOPHAGE) 1000 MG tablet    Other Relevant Orders    Comprehensive Metabolic Panel    Hemoglobin A1C    TSH    Abdominal aneurysm      Noted in chart      Acute gout of left foot, unspecified cause        Relevant Orders    Uric Acid  Diet changes           Health Maintenance       Date Due Completion Date    Shingles Vaccine (1 of 2) 05/27/1996 ---    Foot Exam 01/07/2021 1/7/2020 (Done)    Override on 1/7/2020: Done (Dr. Karina Vicente ( Podiatry ))    Override on 11/23/2018: Done (Karina Vicente, Podiatrist Visit)    Override on 3/16/2018: Done    Override on 11/10/2017: Done    Override on 6/23/2017: Done    Override on 3/8/2017: Done    Hemoglobin A1c 03/08/2021 12/8/2020    Eye Exam 07/14/2021 7/14/2020    Lipid Panel 10/08/2021 10/8/2020    High Dose Statin 12/11/2021 12/11/2020    Colorectal Cancer Screening 07/27/2023 7/27/2020    TETANUS VACCINE 02/20/2027 2/20/2017        I addressed all major concerns as it related to health maintenance.  All were ordered and scheduled based on the patients wishes.  Any additional health maintenance will  be readdressed at the next physical if declined or deferred by the patient.    Follow up in about 6 months (around 6/11/2021), or if symptoms worsen or fail to improve.

## 2020-12-14 ENCOUNTER — CLINICAL SUPPORT (OUTPATIENT)
Dept: CARDIAC REHAB | Facility: CLINIC | Age: 75
End: 2020-12-14
Payer: MEDICARE

## 2020-12-14 DIAGNOSIS — Z98.61 POSTSURGICAL PERCUTANEOUS TRANSLUMINAL CORONARY ANGIOPLASTY STATUS: ICD-10-CM

## 2020-12-14 DIAGNOSIS — I25.10 ATHEROSCLEROSIS OF NATIVE CORONARY ARTERY OF NATIVE HEART WITHOUT ANGINA PECTORIS: ICD-10-CM

## 2020-12-14 PROCEDURE — 93798 PHYS/QHP OP CAR RHAB W/ECG: CPT | Mod: S$PBB,,, | Performed by: INTERNAL MEDICINE

## 2020-12-14 PROCEDURE — 93798 PR CARDIAC REHAB/MONITOR: ICD-10-PCS | Mod: S$PBB,,, | Performed by: INTERNAL MEDICINE

## 2020-12-14 PROCEDURE — 93798 PHYS/QHP OP CAR RHAB W/ECG: CPT | Mod: PBBFAC,PO

## 2020-12-16 ENCOUNTER — CLINICAL SUPPORT (OUTPATIENT)
Dept: CARDIAC REHAB | Facility: CLINIC | Age: 75
End: 2020-12-16
Payer: MEDICARE

## 2020-12-16 DIAGNOSIS — Z98.61 POSTSURGICAL PERCUTANEOUS TRANSLUMINAL CORONARY ANGIOPLASTY STATUS: ICD-10-CM

## 2020-12-16 DIAGNOSIS — I25.10 ATHEROSCLEROSIS OF NATIVE CORONARY ARTERY OF NATIVE HEART WITHOUT ANGINA PECTORIS: ICD-10-CM

## 2020-12-16 PROCEDURE — 93798 PR CARDIAC REHAB/MONITOR: ICD-10-PCS | Mod: S$PBB,,, | Performed by: INTERNAL MEDICINE

## 2020-12-16 PROCEDURE — 93798 PHYS/QHP OP CAR RHAB W/ECG: CPT | Mod: PBBFAC,PO

## 2020-12-16 PROCEDURE — 93798 PHYS/QHP OP CAR RHAB W/ECG: CPT | Mod: S$PBB,,, | Performed by: INTERNAL MEDICINE

## 2020-12-18 ENCOUNTER — CLINICAL SUPPORT (OUTPATIENT)
Dept: CARDIAC REHAB | Facility: CLINIC | Age: 75
End: 2020-12-18
Payer: MEDICARE

## 2020-12-18 DIAGNOSIS — I25.10 ATHEROSCLEROSIS OF NATIVE CORONARY ARTERY OF NATIVE HEART WITHOUT ANGINA PECTORIS: ICD-10-CM

## 2020-12-18 DIAGNOSIS — Z98.61 POSTSURGICAL PERCUTANEOUS TRANSLUMINAL CORONARY ANGIOPLASTY STATUS: ICD-10-CM

## 2020-12-18 PROCEDURE — 93798 PHYS/QHP OP CAR RHAB W/ECG: CPT | Mod: PBBFAC,PO

## 2020-12-18 PROCEDURE — 93798 PHYS/QHP OP CAR RHAB W/ECG: CPT | Mod: S$PBB,,, | Performed by: INTERNAL MEDICINE

## 2020-12-18 PROCEDURE — 93798 PR CARDIAC REHAB/MONITOR: ICD-10-PCS | Mod: S$PBB,,, | Performed by: INTERNAL MEDICINE

## 2020-12-21 ENCOUNTER — CLINICAL SUPPORT (OUTPATIENT)
Dept: CARDIAC REHAB | Facility: CLINIC | Age: 75
End: 2020-12-21
Payer: MEDICARE

## 2020-12-21 DIAGNOSIS — Z98.61 POSTSURGICAL PERCUTANEOUS TRANSLUMINAL CORONARY ANGIOPLASTY STATUS: ICD-10-CM

## 2020-12-21 DIAGNOSIS — I25.10 ATHEROSCLEROSIS OF NATIVE CORONARY ARTERY OF NATIVE HEART WITHOUT ANGINA PECTORIS: ICD-10-CM

## 2020-12-21 PROCEDURE — 93798 PR CARDIAC REHAB/MONITOR: ICD-10-PCS | Mod: S$PBB,,, | Performed by: INTERNAL MEDICINE

## 2020-12-21 PROCEDURE — 93798 PHYS/QHP OP CAR RHAB W/ECG: CPT | Mod: PBBFAC,PO

## 2020-12-21 PROCEDURE — 93798 PHYS/QHP OP CAR RHAB W/ECG: CPT | Mod: S$PBB,,, | Performed by: INTERNAL MEDICINE

## 2020-12-28 ENCOUNTER — CLINICAL SUPPORT (OUTPATIENT)
Dept: CARDIAC REHAB | Facility: CLINIC | Age: 75
End: 2020-12-28
Payer: MEDICARE

## 2020-12-28 DIAGNOSIS — Z98.61 POSTSURGICAL PERCUTANEOUS TRANSLUMINAL CORONARY ANGIOPLASTY STATUS: ICD-10-CM

## 2020-12-28 DIAGNOSIS — I25.10 ATHEROSCLEROSIS OF NATIVE CORONARY ARTERY OF NATIVE HEART WITHOUT ANGINA PECTORIS: ICD-10-CM

## 2020-12-28 PROCEDURE — 93798 PR CARDIAC REHAB/MONITOR: ICD-10-PCS | Mod: S$PBB,,, | Performed by: INTERNAL MEDICINE

## 2020-12-28 PROCEDURE — 93798 PHYS/QHP OP CAR RHAB W/ECG: CPT | Mod: S$PBB,,, | Performed by: INTERNAL MEDICINE

## 2020-12-28 PROCEDURE — 93798 PHYS/QHP OP CAR RHAB W/ECG: CPT | Mod: PBBFAC,PO

## 2020-12-29 ENCOUNTER — OFFICE VISIT (OUTPATIENT)
Dept: PODIATRY | Facility: CLINIC | Age: 75
End: 2020-12-29
Payer: MEDICARE

## 2020-12-29 VITALS
DIASTOLIC BLOOD PRESSURE: 78 MMHG | HEIGHT: 70 IN | WEIGHT: 254 LBS | BODY MASS INDEX: 36.36 KG/M2 | SYSTOLIC BLOOD PRESSURE: 120 MMHG

## 2020-12-29 DIAGNOSIS — E11.49 TYPE II DIABETES MELLITUS WITH NEUROLOGICAL MANIFESTATIONS: Primary | ICD-10-CM

## 2020-12-29 DIAGNOSIS — B35.1 ONYCHOMYCOSIS DUE TO DERMATOPHYTE: ICD-10-CM

## 2020-12-29 PROCEDURE — 11721 PR DEBRIDEMENT OF NAILS, 6 OR MORE: ICD-10-PCS | Mod: Q9,S$PBB,, | Performed by: PODIATRIST

## 2020-12-29 PROCEDURE — 99213 OFFICE O/P EST LOW 20 MIN: CPT | Mod: PBBFAC,PO,25 | Performed by: PODIATRIST

## 2020-12-29 PROCEDURE — 11721 DEBRIDE NAIL 6 OR MORE: CPT | Mod: Q9,PBBFAC,PO | Performed by: PODIATRIST

## 2020-12-29 PROCEDURE — 99999 PR PBB SHADOW E&M-EST. PATIENT-LVL III: ICD-10-PCS | Mod: PBBFAC,,, | Performed by: PODIATRIST

## 2020-12-29 PROCEDURE — 99999 PR PBB SHADOW E&M-EST. PATIENT-LVL III: CPT | Mod: PBBFAC,,, | Performed by: PODIATRIST

## 2020-12-29 PROCEDURE — 11721 DEBRIDE NAIL 6 OR MORE: CPT | Mod: Q9,S$PBB,, | Performed by: PODIATRIST

## 2020-12-29 PROCEDURE — 99499 NO LOS: ICD-10-PCS | Mod: S$PBB,,, | Performed by: PODIATRIST

## 2020-12-29 PROCEDURE — 99499 UNLISTED E&M SERVICE: CPT | Mod: S$PBB,,, | Performed by: PODIATRIST

## 2020-12-30 ENCOUNTER — CLINICAL SUPPORT (OUTPATIENT)
Dept: CARDIAC REHAB | Facility: CLINIC | Age: 75
End: 2020-12-30
Payer: MEDICARE

## 2020-12-30 DIAGNOSIS — Z98.61 POSTSURGICAL PERCUTANEOUS TRANSLUMINAL CORONARY ANGIOPLASTY STATUS: ICD-10-CM

## 2020-12-30 DIAGNOSIS — I25.10 CORONARY ARTERY DISEASE INVOLVING NATIVE CORONARY ARTERY OF NATIVE HEART WITHOUT ANGINA PECTORIS: Chronic | ICD-10-CM

## 2020-12-30 PROCEDURE — 93798 PHYS/QHP OP CAR RHAB W/ECG: CPT | Mod: S$PBB,,, | Performed by: INTERNAL MEDICINE

## 2020-12-30 PROCEDURE — 93798 PR CARDIAC REHAB/MONITOR: ICD-10-PCS | Mod: S$PBB,,, | Performed by: INTERNAL MEDICINE

## 2020-12-30 PROCEDURE — 93798 PHYS/QHP OP CAR RHAB W/ECG: CPT | Mod: PBBFAC,PO

## 2020-12-31 ENCOUNTER — EXTERNAL CHRONIC CARE MANAGEMENT (OUTPATIENT)
Dept: PRIMARY CARE CLINIC | Facility: CLINIC | Age: 75
End: 2020-12-31
Payer: MEDICARE

## 2020-12-31 PROCEDURE — 99490 CHRNC CARE MGMT STAFF 1ST 20: CPT | Mod: PBBFAC,PO | Performed by: FAMILY MEDICINE

## 2020-12-31 PROCEDURE — 99490 PR CHRONIC CARE MGMT, 1ST 20 MIN: ICD-10-PCS | Mod: S$PBB,,, | Performed by: FAMILY MEDICINE

## 2020-12-31 PROCEDURE — 99490 CHRNC CARE MGMT STAFF 1ST 20: CPT | Mod: S$PBB,,, | Performed by: FAMILY MEDICINE

## 2021-01-04 ENCOUNTER — CLINICAL SUPPORT (OUTPATIENT)
Dept: CARDIAC REHAB | Facility: CLINIC | Age: 76
End: 2021-01-04
Payer: MEDICARE

## 2021-01-04 DIAGNOSIS — I25.10 CORONARY ATHEROSCLEROSIS OF NATIVE CORONARY ARTERY: ICD-10-CM

## 2021-01-04 DIAGNOSIS — Z98.61 POSTSURGICAL PERCUTANEOUS TRANSLUMINAL CORONARY ANGIOPLASTY STATUS: ICD-10-CM

## 2021-01-04 PROCEDURE — 93798 PHYS/QHP OP CAR RHAB W/ECG: CPT | Mod: S$PBB,,, | Performed by: INTERNAL MEDICINE

## 2021-01-04 PROCEDURE — 93798 PHYS/QHP OP CAR RHAB W/ECG: CPT | Mod: PBBFAC,PO

## 2021-01-04 PROCEDURE — 93798 PR CARDIAC REHAB/MONITOR: ICD-10-PCS | Mod: S$PBB,,, | Performed by: INTERNAL MEDICINE

## 2021-01-06 ENCOUNTER — CLINICAL SUPPORT (OUTPATIENT)
Dept: CARDIAC REHAB | Facility: CLINIC | Age: 76
End: 2021-01-06
Payer: MEDICARE

## 2021-01-06 DIAGNOSIS — Z98.61 POSTSURGICAL PERCUTANEOUS TRANSLUMINAL CORONARY ANGIOPLASTY STATUS: ICD-10-CM

## 2021-01-06 DIAGNOSIS — I25.10 CORONARY ATHEROSCLEROSIS OF NATIVE CORONARY ARTERY: ICD-10-CM

## 2021-01-06 PROCEDURE — 93798 PR CARDIAC REHAB/MONITOR: ICD-10-PCS | Mod: S$PBB,,, | Performed by: INTERNAL MEDICINE

## 2021-01-06 PROCEDURE — 93798 PHYS/QHP OP CAR RHAB W/ECG: CPT | Mod: PBBFAC,PO

## 2021-01-06 PROCEDURE — 93798 PHYS/QHP OP CAR RHAB W/ECG: CPT | Mod: S$PBB,,, | Performed by: INTERNAL MEDICINE

## 2021-01-08 ENCOUNTER — CLINICAL SUPPORT (OUTPATIENT)
Dept: CARDIAC REHAB | Facility: CLINIC | Age: 76
End: 2021-01-08
Payer: MEDICARE

## 2021-01-08 DIAGNOSIS — I25.10 ATHEROSCLEROSIS OF NATIVE CORONARY ARTERY OF NATIVE HEART WITHOUT ANGINA PECTORIS: ICD-10-CM

## 2021-01-08 DIAGNOSIS — Z98.61 POSTSURGICAL PERCUTANEOUS TRANSLUMINAL CORONARY ANGIOPLASTY STATUS: ICD-10-CM

## 2021-01-08 PROCEDURE — 93798 PHYS/QHP OP CAR RHAB W/ECG: CPT | Mod: S$PBB,,, | Performed by: INTERNAL MEDICINE

## 2021-01-08 PROCEDURE — 93798 PR CARDIAC REHAB/MONITOR: ICD-10-PCS | Mod: S$PBB,,, | Performed by: INTERNAL MEDICINE

## 2021-01-08 PROCEDURE — 93798 PHYS/QHP OP CAR RHAB W/ECG: CPT | Mod: PBBFAC,PO

## 2021-01-11 ENCOUNTER — CLINICAL SUPPORT (OUTPATIENT)
Dept: CARDIAC REHAB | Facility: CLINIC | Age: 76
End: 2021-01-11
Payer: MEDICARE

## 2021-01-11 DIAGNOSIS — I25.10 CORONARY ATHEROSCLEROSIS OF NATIVE CORONARY ARTERY: ICD-10-CM

## 2021-01-11 DIAGNOSIS — E11.9 DIABETES MELLITUS WITHOUT COMPLICATION: ICD-10-CM

## 2021-01-11 DIAGNOSIS — Z98.61 POSTSURGICAL PERCUTANEOUS TRANSLUMINAL CORONARY ANGIOPLASTY STATUS: Primary | ICD-10-CM

## 2021-01-11 DIAGNOSIS — Z98.61 POSTSURGICAL PERCUTANEOUS TRANSLUMINAL CORONARY ANGIOPLASTY STATUS: ICD-10-CM

## 2021-01-11 PROCEDURE — 93798 PHYS/QHP OP CAR RHAB W/ECG: CPT | Mod: PBBFAC,PO

## 2021-01-11 PROCEDURE — 93798 PR CARDIAC REHAB/MONITOR: ICD-10-PCS | Mod: S$PBB,,, | Performed by: INTERNAL MEDICINE

## 2021-01-11 PROCEDURE — 93798 PHYS/QHP OP CAR RHAB W/ECG: CPT | Mod: S$PBB,,, | Performed by: INTERNAL MEDICINE

## 2021-01-13 ENCOUNTER — CLINICAL SUPPORT (OUTPATIENT)
Dept: CARDIAC REHAB | Facility: CLINIC | Age: 76
End: 2021-01-13
Payer: MEDICARE

## 2021-01-13 DIAGNOSIS — Z98.61 POSTSURGICAL PERCUTANEOUS TRANSLUMINAL CORONARY ANGIOPLASTY STATUS: ICD-10-CM

## 2021-01-13 DIAGNOSIS — I25.10 ATHEROSCLEROSIS OF NATIVE CORONARY ARTERY OF NATIVE HEART WITHOUT ANGINA PECTORIS: ICD-10-CM

## 2021-01-13 PROCEDURE — 93798 PHYS/QHP OP CAR RHAB W/ECG: CPT | Mod: S$PBB,,, | Performed by: INTERNAL MEDICINE

## 2021-01-13 PROCEDURE — 93798 PR CARDIAC REHAB/MONITOR: ICD-10-PCS | Mod: S$PBB,,, | Performed by: INTERNAL MEDICINE

## 2021-01-13 PROCEDURE — 93798 PHYS/QHP OP CAR RHAB W/ECG: CPT | Mod: PBBFAC,PO

## 2021-01-14 ENCOUNTER — IMMUNIZATION (OUTPATIENT)
Dept: OBSTETRICS AND GYNECOLOGY | Facility: CLINIC | Age: 76
End: 2021-01-14
Payer: MEDICARE

## 2021-01-14 DIAGNOSIS — Z23 NEED FOR VACCINATION: ICD-10-CM

## 2021-01-14 PROCEDURE — 91300 COVID-19, MRNA, LNP-S, PF, 30 MCG/0.3 ML DOSE VACCINE: CPT | Mod: PBBFAC | Performed by: FAMILY MEDICINE

## 2021-01-15 ENCOUNTER — CLINICAL SUPPORT (OUTPATIENT)
Dept: CARDIAC REHAB | Facility: CLINIC | Age: 76
End: 2021-01-15
Payer: MEDICARE

## 2021-01-15 DIAGNOSIS — Z98.61 POSTSURGICAL PERCUTANEOUS TRANSLUMINAL CORONARY ANGIOPLASTY STATUS: ICD-10-CM

## 2021-01-15 DIAGNOSIS — I25.10 CORONARY ATHEROSCLEROSIS OF NATIVE CORONARY ARTERY: ICD-10-CM

## 2021-01-15 PROCEDURE — 93798 PR CARDIAC REHAB/MONITOR: ICD-10-PCS | Mod: S$PBB,,, | Performed by: INTERNAL MEDICINE

## 2021-01-15 PROCEDURE — 93798 PHYS/QHP OP CAR RHAB W/ECG: CPT | Mod: PBBFAC,PO

## 2021-01-15 PROCEDURE — 93798 PHYS/QHP OP CAR RHAB W/ECG: CPT | Mod: S$PBB,,, | Performed by: INTERNAL MEDICINE

## 2021-01-20 ENCOUNTER — CLINICAL SUPPORT (OUTPATIENT)
Dept: CARDIAC REHAB | Facility: CLINIC | Age: 76
End: 2021-01-20
Payer: MEDICARE

## 2021-01-20 DIAGNOSIS — Z98.61 POSTSURGICAL PERCUTANEOUS TRANSLUMINAL CORONARY ANGIOPLASTY STATUS: ICD-10-CM

## 2021-01-20 DIAGNOSIS — I25.10 CORONARY ATHEROSCLEROSIS OF NATIVE CORONARY ARTERY: ICD-10-CM

## 2021-01-20 PROCEDURE — 93798 PHYS/QHP OP CAR RHAB W/ECG: CPT | Mod: PBBFAC,PO

## 2021-01-20 PROCEDURE — 93798 PHYS/QHP OP CAR RHAB W/ECG: CPT | Mod: S$PBB,,, | Performed by: INTERNAL MEDICINE

## 2021-01-20 PROCEDURE — 93798 PR CARDIAC REHAB/MONITOR: ICD-10-PCS | Mod: S$PBB,,, | Performed by: INTERNAL MEDICINE

## 2021-01-22 ENCOUNTER — CLINICAL SUPPORT (OUTPATIENT)
Dept: CARDIAC REHAB | Facility: CLINIC | Age: 76
End: 2021-01-22
Payer: MEDICARE

## 2021-01-22 DIAGNOSIS — I25.10 CORONARY ARTERY DISEASE INVOLVING NATIVE CORONARY ARTERY OF NATIVE HEART WITHOUT ANGINA PECTORIS: Chronic | ICD-10-CM

## 2021-01-22 DIAGNOSIS — Z98.61 POSTSURGICAL PERCUTANEOUS TRANSLUMINAL CORONARY ANGIOPLASTY STATUS: ICD-10-CM

## 2021-01-22 PROCEDURE — 93798 PHYS/QHP OP CAR RHAB W/ECG: CPT | Mod: S$PBB,,, | Performed by: INTERNAL MEDICINE

## 2021-01-22 PROCEDURE — 93798 PHYS/QHP OP CAR RHAB W/ECG: CPT | Mod: PBBFAC,PO

## 2021-01-22 PROCEDURE — 93798 PR CARDIAC REHAB/MONITOR: ICD-10-PCS | Mod: S$PBB,,, | Performed by: INTERNAL MEDICINE

## 2021-01-25 ENCOUNTER — LAB VISIT (OUTPATIENT)
Dept: LAB | Facility: HOSPITAL | Age: 76
End: 2021-01-25
Attending: INTERNAL MEDICINE
Payer: MEDICARE

## 2021-01-25 DIAGNOSIS — E11.9 DIABETES MELLITUS WITHOUT COMPLICATION: ICD-10-CM

## 2021-01-25 DIAGNOSIS — I25.10 CORONARY ATHEROSCLEROSIS OF NATIVE CORONARY ARTERY: ICD-10-CM

## 2021-01-25 DIAGNOSIS — Z98.61 POSTSURGICAL PERCUTANEOUS TRANSLUMINAL CORONARY ANGIOPLASTY STATUS: ICD-10-CM

## 2021-01-25 LAB
BASOPHILS # BLD AUTO: 0.06 K/UL (ref 0–0.2)
BASOPHILS NFR BLD: 0.7 % (ref 0–1.9)
CHOLEST SERPL-MCNC: 119 MG/DL (ref 120–199)
CHOLEST/HDLC SERPL: 3.1 {RATIO} (ref 2–5)
CRP SERPL-MCNC: 2.22 MG/L (ref 0–3.19)
DIFFERENTIAL METHOD: ABNORMAL
EOSINOPHIL # BLD AUTO: 0.4 K/UL (ref 0–0.5)
EOSINOPHIL NFR BLD: 5.1 % (ref 0–8)
ERYTHROCYTE [DISTWIDTH] IN BLOOD BY AUTOMATED COUNT: 13.3 % (ref 11.5–14.5)
ESTIMATED AVG GLUCOSE: 169 MG/DL (ref 68–131)
GLUCOSE SERPL-MCNC: 167 MG/DL (ref 70–110)
HBA1C MFR BLD HPLC: 7.5 % (ref 4–5.6)
HCT VFR BLD AUTO: 43.6 % (ref 40–54)
HDLC SERPL-MCNC: 39 MG/DL (ref 40–75)
HDLC SERPL: 32.8 % (ref 20–50)
HGB BLD-MCNC: 13.9 G/DL (ref 14–18)
IMM GRANULOCYTES # BLD AUTO: 0.03 K/UL (ref 0–0.04)
IMM GRANULOCYTES NFR BLD AUTO: 0.4 % (ref 0–0.5)
LDLC SERPL CALC-MCNC: 60.2 MG/DL (ref 63–159)
LYMPHOCYTES # BLD AUTO: 1.3 K/UL (ref 1–4.8)
LYMPHOCYTES NFR BLD: 16 % (ref 18–48)
MCH RBC QN AUTO: 29.9 PG (ref 27–31)
MCHC RBC AUTO-ENTMCNC: 31.9 G/DL (ref 32–36)
MCV RBC AUTO: 94 FL (ref 82–98)
MONOCYTES # BLD AUTO: 0.5 K/UL (ref 0.3–1)
MONOCYTES NFR BLD: 6.4 % (ref 4–15)
NEUTROPHILS # BLD AUTO: 5.9 K/UL (ref 1.8–7.7)
NEUTROPHILS NFR BLD: 71.4 % (ref 38–73)
NONHDLC SERPL-MCNC: 80 MG/DL
NRBC BLD-RTO: 0 /100 WBC
PLATELET # BLD AUTO: 225 K/UL (ref 150–350)
PMV BLD AUTO: 10.3 FL (ref 9.2–12.9)
RBC # BLD AUTO: 4.65 M/UL (ref 4.6–6.2)
TRIGL SERPL-MCNC: 99 MG/DL (ref 30–150)
WBC # BLD AUTO: 8.31 K/UL (ref 3.9–12.7)

## 2021-01-25 PROCEDURE — 82947 ASSAY GLUCOSE BLOOD QUANT: CPT

## 2021-01-25 PROCEDURE — 83036 HEMOGLOBIN GLYCOSYLATED A1C: CPT

## 2021-01-25 PROCEDURE — 80061 LIPID PANEL: CPT

## 2021-01-25 PROCEDURE — 85025 COMPLETE CBC W/AUTO DIFF WBC: CPT

## 2021-01-25 PROCEDURE — 36415 COLL VENOUS BLD VENIPUNCTURE: CPT | Mod: PO

## 2021-01-25 PROCEDURE — 86141 C-REACTIVE PROTEIN HS: CPT

## 2021-01-26 ENCOUNTER — OFFICE VISIT (OUTPATIENT)
Dept: FAMILY MEDICINE | Facility: CLINIC | Age: 76
End: 2021-01-26
Payer: MEDICARE

## 2021-01-26 VITALS
HEIGHT: 70 IN | BODY MASS INDEX: 37.4 KG/M2 | OXYGEN SATURATION: 97 % | TEMPERATURE: 98 F | SYSTOLIC BLOOD PRESSURE: 138 MMHG | WEIGHT: 261.25 LBS | DIASTOLIC BLOOD PRESSURE: 74 MMHG | RESPIRATION RATE: 16 BRPM | HEART RATE: 64 BPM

## 2021-01-26 DIAGNOSIS — M10.9 ACUTE GOUT, UNSPECIFIED CAUSE, UNSPECIFIED SITE: Primary | ICD-10-CM

## 2021-01-26 DIAGNOSIS — E11.40 TYPE 2 DIABETES MELLITUS WITH DIABETIC NEUROPATHY, WITHOUT LONG-TERM CURRENT USE OF INSULIN: ICD-10-CM

## 2021-01-26 DIAGNOSIS — I70.0 THORACIC AORTA ATHEROSCLEROSIS: ICD-10-CM

## 2021-01-26 DIAGNOSIS — I10 ESSENTIAL HYPERTENSION: ICD-10-CM

## 2021-01-26 DIAGNOSIS — E66.01 SEVERE OBESITY (BMI 35.0-39.9) WITH COMORBIDITY: ICD-10-CM

## 2021-01-26 DIAGNOSIS — N18.32 STAGE 3B CHRONIC KIDNEY DISEASE: ICD-10-CM

## 2021-01-26 DIAGNOSIS — Z95.1 S/P CABG X 4: ICD-10-CM

## 2021-01-26 DIAGNOSIS — I25.10 CORONARY ARTERY DISEASE INVOLVING NATIVE CORONARY ARTERY OF NATIVE HEART WITHOUT ANGINA PECTORIS: ICD-10-CM

## 2021-01-26 PROCEDURE — 99214 PR OFFICE/OUTPT VISIT, EST, LEVL IV, 30-39 MIN: ICD-10-PCS | Mod: S$PBB,,, | Performed by: NURSE PRACTITIONER

## 2021-01-26 PROCEDURE — 99215 OFFICE O/P EST HI 40 MIN: CPT | Mod: PBBFAC,PO | Performed by: NURSE PRACTITIONER

## 2021-01-26 PROCEDURE — 99999 PR PBB SHADOW E&M-EST. PATIENT-LVL V: CPT | Mod: PBBFAC,,, | Performed by: NURSE PRACTITIONER

## 2021-01-26 PROCEDURE — 99999 PR PBB SHADOW E&M-EST. PATIENT-LVL V: ICD-10-PCS | Mod: PBBFAC,,, | Performed by: NURSE PRACTITIONER

## 2021-01-26 PROCEDURE — 99214 OFFICE O/P EST MOD 30 MIN: CPT | Mod: S$PBB,,, | Performed by: NURSE PRACTITIONER

## 2021-01-26 RX ORDER — COLCHICINE 0.6 MG/1
0.6 TABLET ORAL 2 TIMES DAILY
Qty: 20 TABLET | Refills: 0 | Status: SHIPPED | OUTPATIENT
Start: 2021-01-26 | End: 2021-06-11

## 2021-01-27 ENCOUNTER — TELEPHONE (OUTPATIENT)
Dept: FAMILY MEDICINE | Facility: CLINIC | Age: 76
End: 2021-01-27

## 2021-01-27 DIAGNOSIS — M10.9 ACUTE GOUT, UNSPECIFIED CAUSE, UNSPECIFIED SITE: Primary | ICD-10-CM

## 2021-01-27 RX ORDER — PREDNISONE 20 MG/1
20 TABLET ORAL 2 TIMES DAILY
Qty: 10 TABLET | Refills: 0 | Status: SHIPPED | OUTPATIENT
Start: 2021-01-27 | End: 2021-02-01

## 2021-01-31 ENCOUNTER — EXTERNAL CHRONIC CARE MANAGEMENT (OUTPATIENT)
Dept: PRIMARY CARE CLINIC | Facility: CLINIC | Age: 76
End: 2021-01-31
Payer: MEDICARE

## 2021-01-31 PROCEDURE — 99490 CHRNC CARE MGMT STAFF 1ST 20: CPT | Mod: S$PBB,,, | Performed by: FAMILY MEDICINE

## 2021-01-31 PROCEDURE — 99490 CHRNC CARE MGMT STAFF 1ST 20: CPT | Mod: PBBFAC,PO | Performed by: FAMILY MEDICINE

## 2021-01-31 PROCEDURE — 99490 PR CHRONIC CARE MGMT, 1ST 20 MIN: ICD-10-PCS | Mod: S$PBB,,, | Performed by: FAMILY MEDICINE

## 2021-02-02 ENCOUNTER — TELEPHONE (OUTPATIENT)
Dept: FAMILY MEDICINE | Facility: CLINIC | Age: 76
End: 2021-02-02

## 2021-02-04 ENCOUNTER — IMMUNIZATION (OUTPATIENT)
Dept: OBSTETRICS AND GYNECOLOGY | Facility: CLINIC | Age: 76
End: 2021-02-04
Payer: MEDICARE

## 2021-02-04 DIAGNOSIS — Z23 NEED FOR VACCINATION: Primary | ICD-10-CM

## 2021-02-04 PROCEDURE — 0002A COVID-19, MRNA, LNP-S, PF, 30 MCG/0.3 ML DOSE VACCINE: CPT | Mod: PBBFAC | Performed by: FAMILY MEDICINE

## 2021-02-04 PROCEDURE — 91300 COVID-19, MRNA, LNP-S, PF, 30 MCG/0.3 ML DOSE VACCINE: CPT | Mod: PBBFAC | Performed by: FAMILY MEDICINE

## 2021-02-18 ENCOUNTER — HOSPITAL ENCOUNTER (OUTPATIENT)
Dept: CARDIOLOGY | Facility: HOSPITAL | Age: 76
Discharge: HOME | End: 2021-02-18
Attending: INTERNAL MEDICINE
Payer: MEDICARE

## 2021-02-18 VITALS — BODY MASS INDEX: 36.96 KG/M2 | HEIGHT: 70 IN | WEIGHT: 258.19 LBS

## 2021-02-18 DIAGNOSIS — Z98.61 POSTSURGICAL PERCUTANEOUS TRANSLUMINAL CORONARY ANGIOPLASTY STATUS: ICD-10-CM

## 2021-02-18 DIAGNOSIS — I25.10 CORONARY ATHEROSCLEROSIS OF NATIVE CORONARY ARTERY: ICD-10-CM

## 2021-02-18 LAB
CV STRESS BASE HR: 70 BPM
DIASTOLIC BLOOD PRESSURE: 98 MMHG
OHS CV CPX 1 MINUTE RECOVERY HEART RATE: 114 BPM
OHS CV CPX 85 PERCENT MAX PREDICTED HEART RATE MALE: 124
OHS CV CPX ESTIMATED METS: 5
OHS CV CPX MAX PREDICTED HEART RATE: 146
OHS CV CPX PATIENT IS FEMALE: 0
OHS CV CPX PATIENT IS MALE: 1
OHS CV CPX PEAK DIASTOLIC BLOOD PRESSURE: 92 MMHG
OHS CV CPX PEAK HEAR RATE: 114 BPM
OHS CV CPX PEAK RATE PRESSURE PRODUCT: NORMAL
OHS CV CPX PEAK SYSTOLIC BLOOD PRESSURE: 163 MMHG
OHS CV CPX PERCENT MAX PREDICTED HEART RATE ACHIEVED: 78
OHS CV CPX RATE PRESSURE PRODUCT PRESENTING: NORMAL
STRESS ECHO POST EXERCISE DUR MIN: 3 MINUTES
STRESS ECHO POST EXERCISE DUR SEC: 16 SECONDS
STRESS ST DEPRESSION: 1 MM
SYSTOLIC BLOOD PRESSURE: 167 MMHG

## 2021-02-18 PROCEDURE — 93016 CV STRESS TEST SUPVJ ONLY: CPT | Mod: ,,, | Performed by: INTERNAL MEDICINE

## 2021-02-18 PROCEDURE — 93016 EXERCISE STRESS - EKG (CUPID ONLY): ICD-10-PCS | Mod: ,,, | Performed by: INTERNAL MEDICINE

## 2021-02-18 PROCEDURE — 93018 CV STRESS TEST I&R ONLY: CPT | Mod: ,,, | Performed by: INTERNAL MEDICINE

## 2021-02-18 PROCEDURE — 93017 CV STRESS TEST TRACING ONLY: CPT

## 2021-02-18 PROCEDURE — 93018 EXERCISE STRESS - EKG (CUPID ONLY): ICD-10-PCS | Mod: ,,, | Performed by: INTERNAL MEDICINE

## 2021-02-25 ENCOUNTER — CLINICAL SUPPORT (OUTPATIENT)
Dept: CARDIAC REHAB | Facility: CLINIC | Age: 76
End: 2021-02-25
Payer: MEDICARE

## 2021-02-25 PROCEDURE — 99999 PR PBB SHADOW E&M-EST. PATIENT-LVL I: ICD-10-PCS | Mod: PBBFAC,,,

## 2021-02-25 PROCEDURE — 99211 OFF/OP EST MAY X REQ PHY/QHP: CPT | Mod: PBBFAC,PO

## 2021-02-25 PROCEDURE — 99999 PR PBB SHADOW E&M-EST. PATIENT-LVL I: CPT | Mod: PBBFAC,,,

## 2021-02-28 ENCOUNTER — EXTERNAL CHRONIC CARE MANAGEMENT (OUTPATIENT)
Dept: PRIMARY CARE CLINIC | Facility: CLINIC | Age: 76
End: 2021-02-28
Payer: MEDICARE

## 2021-02-28 PROCEDURE — 99490 CHRNC CARE MGMT STAFF 1ST 20: CPT | Mod: PBBFAC,PO | Performed by: FAMILY MEDICINE

## 2021-02-28 PROCEDURE — 99490 CHRNC CARE MGMT STAFF 1ST 20: CPT | Mod: S$PBB,,, | Performed by: FAMILY MEDICINE

## 2021-02-28 PROCEDURE — 99490 PR CHRONIC CARE MGMT, 1ST 20 MIN: ICD-10-PCS | Mod: S$PBB,,, | Performed by: FAMILY MEDICINE

## 2021-03-09 DIAGNOSIS — I10 ESSENTIAL HYPERTENSION: Chronic | ICD-10-CM

## 2021-03-09 RX ORDER — CARVEDILOL 25 MG/1
25 TABLET ORAL 2 TIMES DAILY WITH MEALS
Qty: 60 TABLET | Refills: 11 | Status: SHIPPED | OUTPATIENT
Start: 2021-03-09 | End: 2022-03-12 | Stop reason: SDUPTHER

## 2021-03-12 ENCOUNTER — TELEPHONE (OUTPATIENT)
Dept: VASCULAR SURGERY | Facility: CLINIC | Age: 76
End: 2021-03-12

## 2021-03-12 ENCOUNTER — OFFICE VISIT (OUTPATIENT)
Dept: URGENT CARE | Facility: CLINIC | Age: 76
End: 2021-03-12
Payer: MEDICARE

## 2021-03-12 ENCOUNTER — TELEPHONE (OUTPATIENT)
Dept: UROLOGY | Facility: CLINIC | Age: 76
End: 2021-03-12

## 2021-03-12 VITALS
WEIGHT: 258 LBS | BODY MASS INDEX: 36.94 KG/M2 | DIASTOLIC BLOOD PRESSURE: 85 MMHG | SYSTOLIC BLOOD PRESSURE: 150 MMHG | TEMPERATURE: 98 F | OXYGEN SATURATION: 96 % | HEART RATE: 81 BPM | HEIGHT: 70 IN

## 2021-03-12 DIAGNOSIS — N20.0 KIDNEY STONE: Primary | ICD-10-CM

## 2021-03-12 DIAGNOSIS — R31.9 HEMATURIA, UNSPECIFIED TYPE: ICD-10-CM

## 2021-03-12 DIAGNOSIS — I71.60 THORACOABDOMINAL AORTIC ANEURYSM, WITHOUT RUPTURE: ICD-10-CM

## 2021-03-12 DIAGNOSIS — R10.9 RIGHT FLANK PAIN: ICD-10-CM

## 2021-03-12 DIAGNOSIS — R81 GLUCOSURIA: ICD-10-CM

## 2021-03-12 LAB
BILIRUB UR QL STRIP: NEGATIVE
GLUCOSE SERPL-MCNC: 173 MG/DL (ref 70–110)
GLUCOSE UR QL STRIP: POSITIVE
KETONES UR QL STRIP: NEGATIVE
LEUKOCYTE ESTERASE UR QL STRIP: NEGATIVE
PH, POC UA: 6 (ref 5–8)
POC BLOOD, URINE: POSITIVE
POC NITRATES, URINE: NEGATIVE
PROT UR QL STRIP: POSITIVE
SP GR UR STRIP: 1.02 (ref 1–1.03)
UROBILINOGEN UR STRIP-ACNC: ABNORMAL (ref 0.3–2.2)

## 2021-03-12 PROCEDURE — 99214 PR OFFICE/OUTPT VISIT, EST, LEVL IV, 30-39 MIN: ICD-10-PCS | Mod: 25,S$GLB,, | Performed by: NURSE PRACTITIONER

## 2021-03-12 PROCEDURE — 82962 GLUCOSE BLOOD TEST: CPT | Mod: S$GLB,,, | Performed by: NURSE PRACTITIONER

## 2021-03-12 PROCEDURE — 81003 URINALYSIS AUTO W/O SCOPE: CPT | Mod: QW,S$GLB,, | Performed by: NURSE PRACTITIONER

## 2021-03-12 PROCEDURE — 99214 OFFICE O/P EST MOD 30 MIN: CPT | Mod: 25,S$GLB,, | Performed by: NURSE PRACTITIONER

## 2021-03-12 PROCEDURE — 82962 POCT GLUCOSE, HAND-HELD DEVICE: ICD-10-PCS | Mod: S$GLB,,, | Performed by: NURSE PRACTITIONER

## 2021-03-12 PROCEDURE — 81003 POCT URINALYSIS, DIPSTICK, AUTOMATED, W/O SCOPE: ICD-10-PCS | Mod: QW,S$GLB,, | Performed by: NURSE PRACTITIONER

## 2021-03-12 RX ORDER — KETOROLAC TROMETHAMINE 30 MG/ML
15 INJECTION, SOLUTION INTRAMUSCULAR; INTRAVENOUS
Status: DISCONTINUED | OUTPATIENT
Start: 2021-03-12 | End: 2021-03-12

## 2021-03-12 RX ORDER — TAMSULOSIN HYDROCHLORIDE 0.4 MG/1
0.4 CAPSULE ORAL DAILY
Qty: 30 CAPSULE | Refills: 0 | Status: SHIPPED | OUTPATIENT
Start: 2021-03-12 | End: 2021-06-11

## 2021-03-12 RX ORDER — TRAMADOL HYDROCHLORIDE 50 MG/1
50 TABLET ORAL EVERY 6 HOURS PRN
Qty: 15 EACH | Refills: 0 | Status: SHIPPED | OUTPATIENT
Start: 2021-03-12 | End: 2021-03-17

## 2021-03-17 ENCOUNTER — TELEPHONE (OUTPATIENT)
Dept: URGENT CARE | Facility: CLINIC | Age: 76
End: 2021-03-17

## 2021-03-17 LAB
BACTERIA UR CULT: NORMAL
BACTERIA UR CULT: NORMAL

## 2021-03-18 ENCOUNTER — TELEPHONE (OUTPATIENT)
Dept: URGENT CARE | Facility: CLINIC | Age: 76
End: 2021-03-18

## 2021-03-19 ENCOUNTER — TELEPHONE (OUTPATIENT)
Dept: FAMILY MEDICINE | Facility: CLINIC | Age: 76
End: 2021-03-19

## 2021-03-19 DIAGNOSIS — M25.569 ARTHRALGIA OF KNEE, UNSPECIFIED LATERALITY: Primary | ICD-10-CM

## 2021-03-19 RX ORDER — DICLOFENAC SODIUM 10 MG/G
GEL TOPICAL 4 TIMES DAILY
Qty: 100 G | Refills: 5 | Status: SHIPPED | OUTPATIENT
Start: 2021-03-19 | End: 2021-06-11

## 2021-03-19 RX ORDER — MELOXICAM 7.5 MG/1
7.5 TABLET ORAL DAILY
Qty: 30 TABLET | Refills: 1 | Status: SHIPPED | OUTPATIENT
Start: 2021-03-19 | End: 2021-06-11

## 2021-03-19 RX ORDER — DICLOFENAC SODIUM 10 MG/G
GEL TOPICAL 4 TIMES DAILY
Qty: 100 G | Refills: 5 | Status: SHIPPED | OUTPATIENT
Start: 2021-03-19 | End: 2021-03-19 | Stop reason: SDUPTHER

## 2021-03-25 ENCOUNTER — OFFICE VISIT (OUTPATIENT)
Dept: UROLOGY | Facility: CLINIC | Age: 76
End: 2021-03-25
Payer: MEDICARE

## 2021-03-25 VITALS — HEIGHT: 70 IN | WEIGHT: 255.75 LBS | BODY MASS INDEX: 36.61 KG/M2

## 2021-03-25 DIAGNOSIS — N40.0 BPH WITHOUT OBSTRUCTION/LOWER URINARY TRACT SYMPTOMS: ICD-10-CM

## 2021-03-25 DIAGNOSIS — N20.0 KIDNEY STONE: Primary | ICD-10-CM

## 2021-03-25 DIAGNOSIS — R10.9 FLANK PAIN: ICD-10-CM

## 2021-03-25 LAB
BILIRUB SERPL-MCNC: NORMAL MG/DL
BLOOD URINE, POC: NORMAL
COLOR, POC UA: YELLOW
GLUCOSE UR QL STRIP: POSITIVE
KETONES UR QL STRIP: NORMAL
LEUKOCYTE ESTERASE URINE, POC: NORMAL
NITRITE, POC UA: NORMAL
PH, POC UA: 5
PROTEIN, POC: NORMAL
SPECIFIC GRAVITY, POC UA: 1000
UROBILINOGEN, POC UA: NORMAL

## 2021-03-25 PROCEDURE — 87086 URINE CULTURE/COLONY COUNT: CPT | Performed by: UROLOGY

## 2021-03-25 PROCEDURE — 99214 OFFICE O/P EST MOD 30 MIN: CPT | Mod: PBBFAC | Performed by: UROLOGY

## 2021-03-25 PROCEDURE — 81001 URINALYSIS AUTO W/SCOPE: CPT | Mod: PBBFAC | Performed by: UROLOGY

## 2021-03-25 PROCEDURE — 99204 PR OFFICE/OUTPT VISIT, NEW, LEVL IV, 45-59 MIN: ICD-10-PCS | Mod: S$PBB,,, | Performed by: UROLOGY

## 2021-03-25 PROCEDURE — 99999 PR PBB SHADOW E&M-EST. PATIENT-LVL IV: ICD-10-PCS | Mod: PBBFAC,,, | Performed by: UROLOGY

## 2021-03-25 PROCEDURE — 99999 PR PBB SHADOW E&M-EST. PATIENT-LVL IV: CPT | Mod: PBBFAC,,, | Performed by: UROLOGY

## 2021-03-25 PROCEDURE — 99204 OFFICE O/P NEW MOD 45 MIN: CPT | Mod: S$PBB,,, | Performed by: UROLOGY

## 2021-03-27 LAB — BACTERIA UR CULT: NORMAL

## 2021-03-30 ENCOUNTER — OFFICE VISIT (OUTPATIENT)
Dept: PODIATRY | Facility: CLINIC | Age: 76
End: 2021-03-30
Payer: MEDICARE

## 2021-03-30 VITALS — HEIGHT: 70 IN | BODY MASS INDEX: 36.61 KG/M2 | WEIGHT: 255.75 LBS

## 2021-03-30 DIAGNOSIS — E11.49 TYPE II DIABETES MELLITUS WITH NEUROLOGICAL MANIFESTATIONS: Primary | ICD-10-CM

## 2021-03-30 DIAGNOSIS — B35.1 ONYCHOMYCOSIS DUE TO DERMATOPHYTE: ICD-10-CM

## 2021-03-30 PROCEDURE — 11721 DEBRIDE NAIL 6 OR MORE: CPT | Mod: Q9,S$PBB,, | Performed by: PODIATRIST

## 2021-03-30 PROCEDURE — 99499 NO LOS: ICD-10-PCS | Mod: S$PBB,,, | Performed by: PODIATRIST

## 2021-03-30 PROCEDURE — 99999 PR PBB SHADOW E&M-EST. PATIENT-LVL III: ICD-10-PCS | Mod: PBBFAC,,, | Performed by: PODIATRIST

## 2021-03-30 PROCEDURE — 99999 PR PBB SHADOW E&M-EST. PATIENT-LVL III: CPT | Mod: PBBFAC,,, | Performed by: PODIATRIST

## 2021-03-30 PROCEDURE — 11721 DEBRIDE NAIL 6 OR MORE: CPT | Mod: Q9,PBBFAC,PO | Performed by: PODIATRIST

## 2021-03-30 PROCEDURE — 11721 PR DEBRIDEMENT OF NAILS, 6 OR MORE: ICD-10-PCS | Mod: Q9,S$PBB,, | Performed by: PODIATRIST

## 2021-03-30 PROCEDURE — 99213 OFFICE O/P EST LOW 20 MIN: CPT | Mod: PBBFAC,PO,25 | Performed by: PODIATRIST

## 2021-03-30 PROCEDURE — 99499 UNLISTED E&M SERVICE: CPT | Mod: S$PBB,,, | Performed by: PODIATRIST

## 2021-03-31 ENCOUNTER — EXTERNAL CHRONIC CARE MANAGEMENT (OUTPATIENT)
Dept: PRIMARY CARE CLINIC | Facility: CLINIC | Age: 76
End: 2021-03-31
Payer: MEDICARE

## 2021-03-31 PROCEDURE — 99490 PR CHRONIC CARE MGMT, 1ST 20 MIN: ICD-10-PCS | Mod: S$PBB,,, | Performed by: FAMILY MEDICINE

## 2021-03-31 PROCEDURE — 99490 CHRNC CARE MGMT STAFF 1ST 20: CPT | Mod: S$PBB,,, | Performed by: FAMILY MEDICINE

## 2021-03-31 PROCEDURE — 99490 CHRNC CARE MGMT STAFF 1ST 20: CPT | Mod: PBBFAC,PO | Performed by: FAMILY MEDICINE

## 2021-04-07 DIAGNOSIS — M25.562 ACUTE PAIN OF LEFT KNEE: Primary | ICD-10-CM

## 2021-04-08 ENCOUNTER — OFFICE VISIT (OUTPATIENT)
Dept: ORTHOPEDICS | Facility: CLINIC | Age: 76
End: 2021-04-08
Payer: MEDICARE

## 2021-04-08 VITALS
DIASTOLIC BLOOD PRESSURE: 82 MMHG | HEART RATE: 67 BPM | OXYGEN SATURATION: 95 % | SYSTOLIC BLOOD PRESSURE: 142 MMHG | TEMPERATURE: 98 F | RESPIRATION RATE: 18 BRPM | HEIGHT: 70 IN | BODY MASS INDEX: 36.96 KG/M2 | WEIGHT: 258.19 LBS

## 2021-04-08 DIAGNOSIS — M25.569 ARTHRALGIA OF KNEE, UNSPECIFIED LATERALITY: ICD-10-CM

## 2021-04-08 DIAGNOSIS — M17.12 PRIMARY OSTEOARTHRITIS OF LEFT KNEE: Primary | ICD-10-CM

## 2021-04-08 PROCEDURE — 99999 PR PBB SHADOW E&M-EST. PATIENT-LVL V: CPT | Mod: PBBFAC,,, | Performed by: PHYSICIAN ASSISTANT

## 2021-04-08 PROCEDURE — 20610 DRAIN/INJ JOINT/BURSA W/O US: CPT | Mod: S$PBB,LT,, | Performed by: PHYSICIAN ASSISTANT

## 2021-04-08 PROCEDURE — 99203 OFFICE O/P NEW LOW 30 MIN: CPT | Mod: S$PBB,25,, | Performed by: PHYSICIAN ASSISTANT

## 2021-04-08 PROCEDURE — 20610 LARGE JOINT ASPIRATION/INJECTION: L KNEE: ICD-10-PCS | Mod: S$PBB,LT,, | Performed by: PHYSICIAN ASSISTANT

## 2021-04-08 PROCEDURE — 20610 DRAIN/INJ JOINT/BURSA W/O US: CPT | Mod: PBBFAC,PN | Performed by: PHYSICIAN ASSISTANT

## 2021-04-08 PROCEDURE — 99215 OFFICE O/P EST HI 40 MIN: CPT | Mod: PBBFAC,25,PN | Performed by: PHYSICIAN ASSISTANT

## 2021-04-08 PROCEDURE — 99203 PR OFFICE/OUTPT VISIT, NEW, LEVL III, 30-44 MIN: ICD-10-PCS | Mod: S$PBB,25,, | Performed by: PHYSICIAN ASSISTANT

## 2021-04-08 PROCEDURE — 99999 PR PBB SHADOW E&M-EST. PATIENT-LVL V: ICD-10-PCS | Mod: PBBFAC,,, | Performed by: PHYSICIAN ASSISTANT

## 2021-04-08 RX ORDER — TRIAMCINOLONE ACETONIDE 40 MG/ML
40 INJECTION, SUSPENSION INTRA-ARTICULAR; INTRAMUSCULAR
Status: DISCONTINUED | OUTPATIENT
Start: 2021-04-08 | End: 2021-04-08 | Stop reason: HOSPADM

## 2021-04-08 RX ADMIN — TRIAMCINOLONE ACETONIDE 40 MG: 40 INJECTION, SUSPENSION INTRA-ARTICULAR; INTRAMUSCULAR at 11:04

## 2021-04-09 ENCOUNTER — HOSPITAL ENCOUNTER (OUTPATIENT)
Dept: RADIOLOGY | Facility: HOSPITAL | Age: 76
Discharge: HOME | End: 2021-04-09
Attending: UROLOGY
Payer: MEDICARE

## 2021-04-09 DIAGNOSIS — N20.0 KIDNEY STONE: ICD-10-CM

## 2021-04-09 PROCEDURE — 74176 CT RENAL STONE STUDY ABD PELVIS WO: ICD-10-PCS | Mod: 26,,, | Performed by: RADIOLOGY

## 2021-04-09 PROCEDURE — 74176 CT ABD & PELVIS W/O CONTRAST: CPT | Mod: 26,,, | Performed by: RADIOLOGY

## 2021-04-09 PROCEDURE — 74176 CT ABD & PELVIS W/O CONTRAST: CPT | Mod: TC

## 2021-04-12 ENCOUNTER — HOSPITAL ENCOUNTER (OUTPATIENT)
Dept: RADIOLOGY | Facility: HOSPITAL | Age: 76
Discharge: HOME | End: 2021-04-12
Attending: SURGERY
Payer: MEDICARE

## 2021-04-12 DIAGNOSIS — I71.60 THORACOABDOMINAL AORTIC ANEURYSM, WITHOUT RUPTURE: ICD-10-CM

## 2021-04-12 PROCEDURE — 71250 CT THORAX DX C-: CPT | Mod: 26,,, | Performed by: RADIOLOGY

## 2021-04-12 PROCEDURE — 74150 CT ABDOMEN W/O CONTRAST: CPT | Mod: 26,,, | Performed by: RADIOLOGY

## 2021-04-12 PROCEDURE — 74150 CT ABDOMEN W/O CONTRAST: CPT | Mod: TC

## 2021-04-12 PROCEDURE — 74150 CT CHEST ABDOMEN WITHOUT CONTRAST (XPD): ICD-10-PCS | Mod: 26,,, | Performed by: RADIOLOGY

## 2021-04-12 PROCEDURE — 71250 CT CHEST ABDOMEN WITHOUT CONTRAST (XPD): ICD-10-PCS | Mod: 26,,, | Performed by: RADIOLOGY

## 2021-04-19 ENCOUNTER — OFFICE VISIT (OUTPATIENT)
Dept: VASCULAR SURGERY | Facility: CLINIC | Age: 76
End: 2021-04-19
Payer: MEDICARE

## 2021-04-19 VITALS
WEIGHT: 255.31 LBS | SYSTOLIC BLOOD PRESSURE: 132 MMHG | HEIGHT: 70 IN | DIASTOLIC BLOOD PRESSURE: 76 MMHG | BODY MASS INDEX: 36.55 KG/M2

## 2021-04-19 DIAGNOSIS — I71.40 ABDOMINAL AORTIC ANEURYSM (AAA) WITHOUT RUPTURE: Primary | ICD-10-CM

## 2021-04-19 DIAGNOSIS — I71.60 THORACOABDOMINAL AORTIC ANEURYSM, WITHOUT RUPTURE: ICD-10-CM

## 2021-04-19 PROCEDURE — 99999 PR PBB SHADOW E&M-EST. PATIENT-LVL IV: CPT | Mod: PBBFAC,,, | Performed by: SURGERY

## 2021-04-19 PROCEDURE — 99213 OFFICE O/P EST LOW 20 MIN: CPT | Mod: S$PBB,,, | Performed by: SURGERY

## 2021-04-19 PROCEDURE — 99214 OFFICE O/P EST MOD 30 MIN: CPT | Mod: PBBFAC | Performed by: SURGERY

## 2021-04-19 PROCEDURE — 99999 PR PBB SHADOW E&M-EST. PATIENT-LVL IV: ICD-10-PCS | Mod: PBBFAC,,, | Performed by: SURGERY

## 2021-04-19 PROCEDURE — 99213 PR OFFICE/OUTPT VISIT, EST, LEVL III, 20-29 MIN: ICD-10-PCS | Mod: S$PBB,,, | Performed by: SURGERY

## 2021-04-29 ENCOUNTER — OFFICE VISIT (OUTPATIENT)
Dept: UROLOGY | Facility: CLINIC | Age: 76
End: 2021-04-29
Payer: MEDICARE

## 2021-04-29 VITALS — HEIGHT: 70 IN | WEIGHT: 256.19 LBS | BODY MASS INDEX: 36.68 KG/M2

## 2021-04-29 DIAGNOSIS — N28.1 KIDNEY CYST, ACQUIRED: ICD-10-CM

## 2021-04-29 DIAGNOSIS — N20.0 KIDNEY STONE: Primary | ICD-10-CM

## 2021-04-29 DIAGNOSIS — N40.0 BPH WITHOUT OBSTRUCTION/LOWER URINARY TRACT SYMPTOMS: ICD-10-CM

## 2021-04-29 LAB
BILIRUB SERPL-MCNC: NORMAL MG/DL
BLOOD URINE, POC: NORMAL
COLOR, POC UA: YELLOW
GLUCOSE UR QL STRIP: POSITIVE
KETONES UR QL STRIP: NORMAL
LEUKOCYTE ESTERASE URINE, POC: NORMAL
NITRITE, POC UA: NORMAL
PH, POC UA: 6
PROTEIN, POC: NORMAL
SPECIFIC GRAVITY, POC UA: 1000
UROBILINOGEN, POC UA: NORMAL

## 2021-04-29 PROCEDURE — 81001 URINALYSIS AUTO W/SCOPE: CPT | Mod: PBBFAC | Performed by: UROLOGY

## 2021-04-29 PROCEDURE — 99999 PR PBB SHADOW E&M-EST. PATIENT-LVL IV: ICD-10-PCS | Mod: PBBFAC,,, | Performed by: UROLOGY

## 2021-04-29 PROCEDURE — 99214 PR OFFICE/OUTPT VISIT, EST, LEVL IV, 30-39 MIN: ICD-10-PCS | Mod: S$PBB,,, | Performed by: UROLOGY

## 2021-04-29 PROCEDURE — 99214 OFFICE O/P EST MOD 30 MIN: CPT | Mod: S$PBB,,, | Performed by: UROLOGY

## 2021-04-29 PROCEDURE — 99999 PR PBB SHADOW E&M-EST. PATIENT-LVL IV: CPT | Mod: PBBFAC,,, | Performed by: UROLOGY

## 2021-04-29 PROCEDURE — 99214 OFFICE O/P EST MOD 30 MIN: CPT | Mod: PBBFAC | Performed by: UROLOGY

## 2021-04-30 ENCOUNTER — EXTERNAL CHRONIC CARE MANAGEMENT (OUTPATIENT)
Dept: PRIMARY CARE CLINIC | Facility: CLINIC | Age: 76
End: 2021-04-30
Payer: MEDICARE

## 2021-04-30 PROCEDURE — 99490 CHRNC CARE MGMT STAFF 1ST 20: CPT | Mod: S$PBB,,, | Performed by: FAMILY MEDICINE

## 2021-04-30 PROCEDURE — 99490 PR CHRONIC CARE MGMT, 1ST 20 MIN: ICD-10-PCS | Mod: S$PBB,,, | Performed by: FAMILY MEDICINE

## 2021-04-30 PROCEDURE — 99490 CHRNC CARE MGMT STAFF 1ST 20: CPT | Mod: PBBFAC,PO | Performed by: FAMILY MEDICINE

## 2021-05-31 ENCOUNTER — EXTERNAL CHRONIC CARE MANAGEMENT (OUTPATIENT)
Dept: PRIMARY CARE CLINIC | Facility: CLINIC | Age: 76
End: 2021-05-31
Payer: MEDICARE

## 2021-05-31 PROCEDURE — 99490 CHRNC CARE MGMT STAFF 1ST 20: CPT | Mod: S$PBB,,, | Performed by: FAMILY MEDICINE

## 2021-05-31 PROCEDURE — 99490 PR CHRONIC CARE MGMT, 1ST 20 MIN: ICD-10-PCS | Mod: S$PBB,,, | Performed by: FAMILY MEDICINE

## 2021-05-31 PROCEDURE — 99490 CHRNC CARE MGMT STAFF 1ST 20: CPT | Mod: PBBFAC,PO | Performed by: FAMILY MEDICINE

## 2021-06-02 ENCOUNTER — HOSPITAL ENCOUNTER (OUTPATIENT)
Dept: RADIOLOGY | Facility: HOSPITAL | Age: 76
Discharge: HOME | End: 2021-06-02
Attending: FAMILY MEDICINE
Payer: MEDICARE

## 2021-06-02 DIAGNOSIS — R55 SYNCOPE, UNSPECIFIED SYNCOPE TYPE: ICD-10-CM

## 2021-06-02 PROCEDURE — 93880 EXTRACRANIAL BILAT STUDY: CPT | Mod: TC

## 2021-06-02 PROCEDURE — 93880 EXTRACRANIAL BILAT STUDY: CPT | Mod: 26,,, | Performed by: RADIOLOGY

## 2021-06-02 PROCEDURE — 93880 US CAROTID BILATERAL: ICD-10-PCS | Mod: 26,,, | Performed by: RADIOLOGY

## 2021-06-04 ENCOUNTER — LAB VISIT (OUTPATIENT)
Dept: LAB | Facility: HOSPITAL | Age: 76
End: 2021-06-04
Attending: FAMILY MEDICINE
Payer: MEDICARE

## 2021-06-04 DIAGNOSIS — M10.9 ACUTE GOUT OF LEFT FOOT, UNSPECIFIED CAUSE: ICD-10-CM

## 2021-06-04 DIAGNOSIS — E11.9 DIABETES MELLITUS TYPE II, NON INSULIN DEPENDENT: ICD-10-CM

## 2021-06-04 LAB
ALBUMIN SERPL BCP-MCNC: 3.3 G/DL (ref 3.5–5.2)
ALP SERPL-CCNC: 88 U/L (ref 55–135)
ALT SERPL W/O P-5'-P-CCNC: 17 U/L (ref 10–44)
ANION GAP SERPL CALC-SCNC: 10 MMOL/L (ref 8–16)
AST SERPL-CCNC: 14 U/L (ref 10–40)
BILIRUB SERPL-MCNC: 0.5 MG/DL (ref 0.1–1)
BUN SERPL-MCNC: 31 MG/DL (ref 8–23)
CALCIUM SERPL-MCNC: 9.8 MG/DL (ref 8.7–10.5)
CHLORIDE SERPL-SCNC: 105 MMOL/L (ref 95–110)
CO2 SERPL-SCNC: 27 MMOL/L (ref 23–29)
CREAT SERPL-MCNC: 1.7 MG/DL (ref 0.5–1.4)
EST. GFR  (AFRICAN AMERICAN): 44.6 ML/MIN/1.73 M^2
EST. GFR  (NON AFRICAN AMERICAN): 38.6 ML/MIN/1.73 M^2
ESTIMATED AVG GLUCOSE: 177 MG/DL (ref 68–131)
GLUCOSE SERPL-MCNC: 171 MG/DL (ref 70–110)
HBA1C MFR BLD: 7.8 % (ref 4–5.6)
POTASSIUM SERPL-SCNC: 5 MMOL/L (ref 3.5–5.1)
PROT SERPL-MCNC: 6.2 G/DL (ref 6–8.4)
SODIUM SERPL-SCNC: 142 MMOL/L (ref 136–145)
TSH SERPL DL<=0.005 MIU/L-ACNC: 1.41 UIU/ML (ref 0.4–4)
URATE SERPL-MCNC: 7.7 MG/DL (ref 3.4–7)

## 2021-06-04 PROCEDURE — 80053 COMPREHEN METABOLIC PANEL: CPT | Performed by: FAMILY MEDICINE

## 2021-06-04 PROCEDURE — 84443 ASSAY THYROID STIM HORMONE: CPT | Performed by: FAMILY MEDICINE

## 2021-06-04 PROCEDURE — 83036 HEMOGLOBIN GLYCOSYLATED A1C: CPT | Performed by: FAMILY MEDICINE

## 2021-06-04 PROCEDURE — 36415 COLL VENOUS BLD VENIPUNCTURE: CPT | Mod: PO | Performed by: FAMILY MEDICINE

## 2021-06-04 PROCEDURE — 84550 ASSAY OF BLOOD/URIC ACID: CPT | Performed by: FAMILY MEDICINE

## 2021-06-10 RX ORDER — ATORVASTATIN CALCIUM 80 MG/1
TABLET, FILM COATED ORAL
Qty: 90 TABLET | Refills: 3 | Status: SHIPPED | OUTPATIENT
Start: 2021-06-10 | End: 2022-07-06

## 2021-06-11 ENCOUNTER — OFFICE VISIT (OUTPATIENT)
Dept: FAMILY MEDICINE | Facility: CLINIC | Age: 76
End: 2021-06-11
Payer: MEDICARE

## 2021-06-11 VITALS
OXYGEN SATURATION: 96 % | TEMPERATURE: 98 F | HEIGHT: 70 IN | DIASTOLIC BLOOD PRESSURE: 68 MMHG | SYSTOLIC BLOOD PRESSURE: 110 MMHG | WEIGHT: 257.5 LBS | BODY MASS INDEX: 36.86 KG/M2 | HEART RATE: 65 BPM

## 2021-06-11 DIAGNOSIS — R91.1 PULMONARY NODULE: ICD-10-CM

## 2021-06-11 DIAGNOSIS — I71.20 THORACIC AORTIC ANEURYSM WITHOUT RUPTURE: ICD-10-CM

## 2021-06-11 DIAGNOSIS — I65.23 BILATERAL CAROTID ARTERY STENOSIS: ICD-10-CM

## 2021-06-11 DIAGNOSIS — I10 ESSENTIAL HYPERTENSION: ICD-10-CM

## 2021-06-11 DIAGNOSIS — E11.40 TYPE 2 DIABETES MELLITUS WITH DIABETIC NEUROPATHY, WITHOUT LONG-TERM CURRENT USE OF INSULIN: ICD-10-CM

## 2021-06-11 DIAGNOSIS — E66.01 SEVERE OBESITY (BMI 35.0-39.9) WITH COMORBIDITY: ICD-10-CM

## 2021-06-11 DIAGNOSIS — N18.32 STAGE 3B CHRONIC KIDNEY DISEASE: ICD-10-CM

## 2021-06-11 DIAGNOSIS — M10.9 ACUTE GOUT, UNSPECIFIED CAUSE, UNSPECIFIED SITE: Primary | ICD-10-CM

## 2021-06-11 DIAGNOSIS — N28.1 BILATERAL RENAL CYSTS: ICD-10-CM

## 2021-06-11 DIAGNOSIS — D35.00 ADRENAL ADENOMA, UNSPECIFIED LATERALITY: ICD-10-CM

## 2021-06-11 DIAGNOSIS — I70.0 THORACIC AORTA ATHEROSCLEROSIS: ICD-10-CM

## 2021-06-11 DIAGNOSIS — I25.10 CORONARY ARTERY DISEASE INVOLVING NATIVE CORONARY ARTERY OF NATIVE HEART WITHOUT ANGINA PECTORIS: ICD-10-CM

## 2021-06-11 PROCEDURE — 99213 OFFICE O/P EST LOW 20 MIN: CPT | Mod: PBBFAC,PO | Performed by: FAMILY MEDICINE

## 2021-06-11 PROCEDURE — 99215 PR OFFICE/OUTPT VISIT, EST, LEVL V, 40-54 MIN: ICD-10-PCS | Mod: S$PBB,,, | Performed by: FAMILY MEDICINE

## 2021-06-11 PROCEDURE — 99215 OFFICE O/P EST HI 40 MIN: CPT | Mod: S$PBB,,, | Performed by: FAMILY MEDICINE

## 2021-06-11 PROCEDURE — 99999 PR PBB SHADOW E&M-EST. PATIENT-LVL III: ICD-10-PCS | Mod: PBBFAC,,, | Performed by: FAMILY MEDICINE

## 2021-06-11 PROCEDURE — 99999 PR PBB SHADOW E&M-EST. PATIENT-LVL III: CPT | Mod: PBBFAC,,, | Performed by: FAMILY MEDICINE

## 2021-06-14 PROBLEM — D35.00 ADRENAL ADENOMA: Chronic | Status: ACTIVE | Noted: 2021-06-14

## 2021-06-14 PROBLEM — R91.1 PULMONARY NODULE: Status: ACTIVE | Noted: 2021-06-14

## 2021-06-14 PROBLEM — D35.00 ADRENAL ADENOMA: Status: ACTIVE | Noted: 2021-06-14

## 2021-06-14 PROBLEM — N28.1 BILATERAL RENAL CYSTS: Status: ACTIVE | Noted: 2021-06-14

## 2021-06-14 PROBLEM — I71.20 THORACIC AORTIC ANEURYSM WITHOUT RUPTURE: Status: ACTIVE | Noted: 2021-06-14

## 2021-06-14 PROBLEM — I65.23 BILATERAL CAROTID ARTERY STENOSIS: Status: ACTIVE | Noted: 2021-06-14

## 2021-06-28 ENCOUNTER — OFFICE VISIT (OUTPATIENT)
Dept: FAMILY MEDICINE | Facility: CLINIC | Age: 76
End: 2021-06-28
Payer: MEDICARE

## 2021-06-28 VITALS
DIASTOLIC BLOOD PRESSURE: 68 MMHG | BODY MASS INDEX: 49.3 KG/M2 | SYSTOLIC BLOOD PRESSURE: 118 MMHG | TEMPERATURE: 98 F | HEART RATE: 66 BPM | HEIGHT: 61 IN | WEIGHT: 261.13 LBS | OXYGEN SATURATION: 97 %

## 2021-06-28 DIAGNOSIS — M17.12 OSTEOARTHRITIS OF LEFT KNEE, UNSPECIFIED OSTEOARTHRITIS TYPE: Primary | ICD-10-CM

## 2021-06-28 DIAGNOSIS — E11.40 TYPE 2 DIABETES MELLITUS WITH DIABETIC NEUROPATHY, WITHOUT LONG-TERM CURRENT USE OF INSULIN: Chronic | ICD-10-CM

## 2021-06-28 PROCEDURE — 99999 PR PBB SHADOW E&M-EST. PATIENT-LVL III: CPT | Mod: PBBFAC,,, | Performed by: FAMILY MEDICINE

## 2021-06-28 PROCEDURE — 99214 PR OFFICE/OUTPT VISIT, EST, LEVL IV, 30-39 MIN: ICD-10-PCS | Mod: S$PBB,,, | Performed by: FAMILY MEDICINE

## 2021-06-28 PROCEDURE — 99213 OFFICE O/P EST LOW 20 MIN: CPT | Mod: PBBFAC,PO | Performed by: FAMILY MEDICINE

## 2021-06-28 PROCEDURE — 99999 PR PBB SHADOW E&M-EST. PATIENT-LVL III: ICD-10-PCS | Mod: PBBFAC,,, | Performed by: FAMILY MEDICINE

## 2021-06-28 PROCEDURE — 99214 OFFICE O/P EST MOD 30 MIN: CPT | Mod: S$PBB,,, | Performed by: FAMILY MEDICINE

## 2021-06-28 RX ORDER — TRAMADOL HYDROCHLORIDE 50 MG/1
50 TABLET ORAL EVERY 12 HOURS PRN
Qty: 14 TABLET | Refills: 0 | Status: SHIPPED | OUTPATIENT
Start: 2021-06-28 | End: 2021-07-16

## 2021-06-29 ENCOUNTER — OFFICE VISIT (OUTPATIENT)
Dept: ENDOCRINOLOGY | Facility: CLINIC | Age: 76
End: 2021-06-29
Payer: MEDICARE

## 2021-06-29 VITALS
BODY MASS INDEX: 48.75 KG/M2 | WEIGHT: 259.69 LBS | HEART RATE: 65 BPM | SYSTOLIC BLOOD PRESSURE: 126 MMHG | DIASTOLIC BLOOD PRESSURE: 88 MMHG | TEMPERATURE: 99 F

## 2021-06-29 DIAGNOSIS — N18.32 STAGE 3B CHRONIC KIDNEY DISEASE: ICD-10-CM

## 2021-06-29 DIAGNOSIS — Z95.1 S/P CABG X 4: Chronic | ICD-10-CM

## 2021-06-29 DIAGNOSIS — I10 ESSENTIAL HYPERTENSION: Chronic | ICD-10-CM

## 2021-06-29 DIAGNOSIS — I25.10 CORONARY ARTERY DISEASE INVOLVING NATIVE CORONARY ARTERY OF NATIVE HEART WITHOUT ANGINA PECTORIS: Chronic | ICD-10-CM

## 2021-06-29 DIAGNOSIS — D35.00 ADRENAL ADENOMA, UNSPECIFIED LATERALITY: Primary | ICD-10-CM

## 2021-06-29 DIAGNOSIS — E11.40 TYPE 2 DIABETES MELLITUS WITH DIABETIC NEUROPATHY, WITHOUT LONG-TERM CURRENT USE OF INSULIN: Chronic | ICD-10-CM

## 2021-06-29 DIAGNOSIS — E11.9 DIABETES MELLITUS TYPE II, NON INSULIN DEPENDENT: ICD-10-CM

## 2021-06-29 PROCEDURE — 99204 PR OFFICE/OUTPT VISIT, NEW, LEVL IV, 45-59 MIN: ICD-10-PCS | Mod: S$PBB,,, | Performed by: HOSPITALIST

## 2021-06-29 PROCEDURE — 99214 OFFICE O/P EST MOD 30 MIN: CPT | Mod: PBBFAC,PN | Performed by: HOSPITALIST

## 2021-06-29 PROCEDURE — 99999 PR PBB SHADOW E&M-EST. PATIENT-LVL IV: ICD-10-PCS | Mod: PBBFAC,,, | Performed by: HOSPITALIST

## 2021-06-29 PROCEDURE — 99999 PR PBB SHADOW E&M-EST. PATIENT-LVL IV: CPT | Mod: PBBFAC,,, | Performed by: HOSPITALIST

## 2021-06-29 PROCEDURE — 99204 OFFICE O/P NEW MOD 45 MIN: CPT | Mod: S$PBB,,, | Performed by: HOSPITALIST

## 2021-06-29 RX ORDER — DEXAMETHASONE 1 MG/1
1 TABLET ORAL NIGHTLY
Qty: 1 TABLET | Refills: 0 | Status: SHIPPED | OUTPATIENT
Start: 2021-06-29 | End: 2021-06-30

## 2021-06-29 RX ORDER — METFORMIN HYDROCHLORIDE 1000 MG/1
500 TABLET ORAL 2 TIMES DAILY WITH MEALS
Qty: 180 TABLET | Refills: 3
Start: 2021-06-29 | End: 2021-11-12

## 2021-06-30 ENCOUNTER — EXTERNAL CHRONIC CARE MANAGEMENT (OUTPATIENT)
Dept: PRIMARY CARE CLINIC | Facility: CLINIC | Age: 76
End: 2021-06-30
Payer: MEDICARE

## 2021-06-30 PROCEDURE — 99490 CHRNC CARE MGMT STAFF 1ST 20: CPT | Mod: PBBFAC,PO | Performed by: FAMILY MEDICINE

## 2021-06-30 PROCEDURE — 99490 CHRNC CARE MGMT STAFF 1ST 20: CPT | Mod: S$PBB,,, | Performed by: FAMILY MEDICINE

## 2021-06-30 PROCEDURE — 99490 PR CHRONIC CARE MGMT, 1ST 20 MIN: ICD-10-PCS | Mod: S$PBB,,, | Performed by: FAMILY MEDICINE

## 2021-07-06 ENCOUNTER — OFFICE VISIT (OUTPATIENT)
Dept: PODIATRY | Facility: CLINIC | Age: 76
End: 2021-07-06
Payer: MEDICARE

## 2021-07-06 VITALS — HEIGHT: 61 IN | BODY MASS INDEX: 49.03 KG/M2 | WEIGHT: 259.69 LBS

## 2021-07-06 DIAGNOSIS — E11.49 TYPE II DIABETES MELLITUS WITH NEUROLOGICAL MANIFESTATIONS: Primary | ICD-10-CM

## 2021-07-06 DIAGNOSIS — B35.1 ONYCHOMYCOSIS DUE TO DERMATOPHYTE: ICD-10-CM

## 2021-07-06 PROCEDURE — 99213 OFFICE O/P EST LOW 20 MIN: CPT | Mod: PBBFAC,PO,25 | Performed by: PODIATRIST

## 2021-07-06 PROCEDURE — 11721 DEBRIDE NAIL 6 OR MORE: CPT | Mod: Q9,S$PBB,, | Performed by: PODIATRIST

## 2021-07-06 PROCEDURE — 99499 NO LOS: ICD-10-PCS | Mod: S$PBB,,, | Performed by: PODIATRIST

## 2021-07-06 PROCEDURE — 99999 PR PBB SHADOW E&M-EST. PATIENT-LVL III: CPT | Mod: PBBFAC,,, | Performed by: PODIATRIST

## 2021-07-06 PROCEDURE — 99999 PR PBB SHADOW E&M-EST. PATIENT-LVL III: ICD-10-PCS | Mod: PBBFAC,,, | Performed by: PODIATRIST

## 2021-07-06 PROCEDURE — 99499 UNLISTED E&M SERVICE: CPT | Mod: S$PBB,,, | Performed by: PODIATRIST

## 2021-07-06 PROCEDURE — 11721 DEBRIDE NAIL 6 OR MORE: CPT | Mod: Q9,PBBFAC,PO | Performed by: PODIATRIST

## 2021-07-06 PROCEDURE — 11721 PR DEBRIDEMENT OF NAILS, 6 OR MORE: ICD-10-PCS | Mod: Q9,S$PBB,, | Performed by: PODIATRIST

## 2021-07-07 ENCOUNTER — OFFICE VISIT (OUTPATIENT)
Dept: FAMILY MEDICINE | Facility: CLINIC | Age: 76
End: 2021-07-07
Payer: MEDICARE

## 2021-07-07 VITALS
HEART RATE: 64 BPM | WEIGHT: 257.81 LBS | SYSTOLIC BLOOD PRESSURE: 132 MMHG | OXYGEN SATURATION: 95 % | HEIGHT: 61 IN | DIASTOLIC BLOOD PRESSURE: 72 MMHG | TEMPERATURE: 98 F | BODY MASS INDEX: 48.67 KG/M2

## 2021-07-07 DIAGNOSIS — M17.12 OSTEOARTHRITIS OF LEFT KNEE, UNSPECIFIED OSTEOARTHRITIS TYPE: Primary | ICD-10-CM

## 2021-07-07 PROCEDURE — 20610 DRAIN/INJ JOINT/BURSA W/O US: CPT | Mod: PBBFAC,PO | Performed by: FAMILY MEDICINE

## 2021-07-07 PROCEDURE — 99214 PR OFFICE/OUTPT VISIT, EST, LEVL IV, 30-39 MIN: ICD-10-PCS | Mod: 25,S$PBB,, | Performed by: FAMILY MEDICINE

## 2021-07-07 PROCEDURE — 99213 OFFICE O/P EST LOW 20 MIN: CPT | Mod: PBBFAC,PO | Performed by: FAMILY MEDICINE

## 2021-07-07 PROCEDURE — 99999 PR PBB SHADOW E&M-EST. PATIENT-LVL III: ICD-10-PCS | Mod: PBBFAC,,, | Performed by: FAMILY MEDICINE

## 2021-07-07 PROCEDURE — 20610 LARGE JOINT ASPIRATION/INJECTION: L KNEE: ICD-10-PCS | Mod: S$PBB,LT,, | Performed by: FAMILY MEDICINE

## 2021-07-07 PROCEDURE — 99214 OFFICE O/P EST MOD 30 MIN: CPT | Mod: 25,S$PBB,, | Performed by: FAMILY MEDICINE

## 2021-07-07 PROCEDURE — 99999 PR PBB SHADOW E&M-EST. PATIENT-LVL III: CPT | Mod: PBBFAC,,, | Performed by: FAMILY MEDICINE

## 2021-07-07 RX ORDER — TRIAMCINOLONE ACETONIDE 40 MG/ML
40 INJECTION, SUSPENSION INTRA-ARTICULAR; INTRAMUSCULAR
Status: DISCONTINUED | OUTPATIENT
Start: 2021-07-07 | End: 2021-07-07 | Stop reason: HOSPADM

## 2021-07-07 RX ADMIN — TRIAMCINOLONE ACETONIDE 40 MG: 40 INJECTION, SUSPENSION INTRA-ARTICULAR; INTRAMUSCULAR at 10:07

## 2021-07-14 ENCOUNTER — PATIENT OUTREACH (OUTPATIENT)
Dept: ADMINISTRATIVE | Facility: OTHER | Age: 76
End: 2021-07-14

## 2021-07-14 DIAGNOSIS — E11.40 TYPE 2 DIABETES MELLITUS WITH DIABETIC NEUROPATHY, WITHOUT LONG-TERM CURRENT USE OF INSULIN: Primary | ICD-10-CM

## 2021-07-16 ENCOUNTER — OFFICE VISIT (OUTPATIENT)
Dept: CARDIOLOGY | Facility: CLINIC | Age: 76
End: 2021-07-16
Payer: MEDICARE

## 2021-07-16 VITALS
HEART RATE: 68 BPM | WEIGHT: 255.75 LBS | HEIGHT: 70 IN | SYSTOLIC BLOOD PRESSURE: 132 MMHG | BODY MASS INDEX: 36.61 KG/M2 | DIASTOLIC BLOOD PRESSURE: 70 MMHG

## 2021-07-16 DIAGNOSIS — I71.40 ABDOMINAL AORTIC ANEURYSM (AAA) WITHOUT RUPTURE: ICD-10-CM

## 2021-07-16 DIAGNOSIS — N18.32 STAGE 3B CHRONIC KIDNEY DISEASE: ICD-10-CM

## 2021-07-16 DIAGNOSIS — I10 ESSENTIAL HYPERTENSION: Chronic | ICD-10-CM

## 2021-07-16 DIAGNOSIS — Z98.61 HISTORY OF PERCUTANEOUS CORONARY INTERVENTION: ICD-10-CM

## 2021-07-16 DIAGNOSIS — E66.01 SEVERE OBESITY (BMI 35.0-39.9) WITH COMORBIDITY: Chronic | ICD-10-CM

## 2021-07-16 DIAGNOSIS — Z95.1 S/P CABG X 4: Chronic | ICD-10-CM

## 2021-07-16 DIAGNOSIS — I25.10 CORONARY ARTERY DISEASE INVOLVING NATIVE CORONARY ARTERY OF NATIVE HEART WITHOUT ANGINA PECTORIS: Primary | Chronic | ICD-10-CM

## 2021-07-16 DIAGNOSIS — E78.00 PURE HYPERCHOLESTEROLEMIA: Chronic | ICD-10-CM

## 2021-07-16 DIAGNOSIS — I71.20 THORACIC AORTIC ANEURYSM WITHOUT RUPTURE: ICD-10-CM

## 2021-07-16 DIAGNOSIS — I25.82 TOTAL OCCLUSION OF CORONARY ARTERY, CHRONIC: ICD-10-CM

## 2021-07-16 DIAGNOSIS — E11.40 TYPE 2 DIABETES MELLITUS WITH DIABETIC NEUROPATHY, WITHOUT LONG-TERM CURRENT USE OF INSULIN: Chronic | ICD-10-CM

## 2021-07-16 PROBLEM — R94.39 ABNORMAL STRESS TEST: Status: RESOLVED | Noted: 2020-07-24 | Resolved: 2021-07-16

## 2021-07-16 PROBLEM — I70.0 THORACIC AORTA ATHEROSCLEROSIS: Status: RESOLVED | Noted: 2017-06-23 | Resolved: 2021-07-16

## 2021-07-16 PROCEDURE — 99214 PR OFFICE/OUTPT VISIT, EST, LEVL IV, 30-39 MIN: ICD-10-PCS | Mod: S$PBB,,, | Performed by: INTERNAL MEDICINE

## 2021-07-16 PROCEDURE — 99999 PR PBB SHADOW E&M-EST. PATIENT-LVL III: ICD-10-PCS | Mod: PBBFAC,,, | Performed by: INTERNAL MEDICINE

## 2021-07-16 PROCEDURE — 99999 PR PBB SHADOW E&M-EST. PATIENT-LVL III: CPT | Mod: PBBFAC,,, | Performed by: INTERNAL MEDICINE

## 2021-07-16 PROCEDURE — 99214 OFFICE O/P EST MOD 30 MIN: CPT | Mod: S$PBB,,, | Performed by: INTERNAL MEDICINE

## 2021-07-16 PROCEDURE — 99213 OFFICE O/P EST LOW 20 MIN: CPT | Mod: PBBFAC | Performed by: INTERNAL MEDICINE

## 2021-07-21 RX ORDER — CLOPIDOGREL BISULFATE 75 MG/1
75 TABLET ORAL DAILY
Qty: 90 TABLET | Refills: 0 | Status: SHIPPED | OUTPATIENT
Start: 2021-07-21 | End: 2021-07-23

## 2021-07-21 RX ORDER — CLOPIDOGREL BISULFATE 75 MG/1
TABLET ORAL
Qty: 90 TABLET | Refills: 0 | Status: SHIPPED | OUTPATIENT
Start: 2021-07-21 | End: 2021-07-21 | Stop reason: SDUPTHER

## 2021-07-31 ENCOUNTER — EXTERNAL CHRONIC CARE MANAGEMENT (OUTPATIENT)
Dept: PRIMARY CARE CLINIC | Facility: CLINIC | Age: 76
End: 2021-07-31
Payer: MEDICARE

## 2021-07-31 PROCEDURE — 99490 PR CHRONIC CARE MGMT, 1ST 20 MIN: ICD-10-PCS | Mod: S$PBB,,, | Performed by: FAMILY MEDICINE

## 2021-07-31 PROCEDURE — 99490 CHRNC CARE MGMT STAFF 1ST 20: CPT | Mod: PBBFAC,PO | Performed by: FAMILY MEDICINE

## 2021-07-31 PROCEDURE — 99490 CHRNC CARE MGMT STAFF 1ST 20: CPT | Mod: S$PBB,,, | Performed by: FAMILY MEDICINE

## 2021-08-05 DIAGNOSIS — E11.40 TYPE 2 DIABETES MELLITUS WITH DIABETIC NEUROPATHY, WITHOUT LONG-TERM CURRENT USE OF INSULIN: ICD-10-CM

## 2021-08-05 RX ORDER — LANCING DEVICE
1 EACH MISCELLANEOUS 2 TIMES DAILY WITH MEALS
Qty: 1 EACH | Refills: 0 | Status: SHIPPED | OUTPATIENT
Start: 2021-08-05 | End: 2021-08-06 | Stop reason: SDUPTHER

## 2021-08-05 RX ORDER — LANCETS
1 EACH MISCELLANEOUS 2 TIMES DAILY
Qty: 200 EACH | Refills: 3 | Status: SHIPPED | OUTPATIENT
Start: 2021-08-05 | End: 2021-08-06 | Stop reason: SDUPTHER

## 2021-08-06 DIAGNOSIS — E11.40 TYPE 2 DIABETES MELLITUS WITH DIABETIC NEUROPATHY, WITHOUT LONG-TERM CURRENT USE OF INSULIN: ICD-10-CM

## 2021-08-06 RX ORDER — LANCETS
1 EACH MISCELLANEOUS 2 TIMES DAILY
Qty: 200 EACH | Refills: 3 | Status: CANCELLED | OUTPATIENT
Start: 2021-08-06

## 2021-08-06 RX ORDER — BLOOD-GLUCOSE CONTROL, NORMAL
1 EACH MISCELLANEOUS 2 TIMES DAILY
Qty: 200 EACH | Refills: 1 | Status: SHIPPED | OUTPATIENT
Start: 2021-08-06 | End: 2021-08-09 | Stop reason: SDUPTHER

## 2021-08-06 RX ORDER — LANCETS
1 EACH MISCELLANEOUS 2 TIMES DAILY
Qty: 200 EACH | Refills: 3 | Status: SHIPPED | OUTPATIENT
Start: 2021-08-06 | End: 2021-08-06

## 2021-08-06 RX ORDER — LANCING DEVICE
1 EACH MISCELLANEOUS 2 TIMES DAILY WITH MEALS
Qty: 1 EACH | Refills: 0 | Status: ON HOLD | OUTPATIENT
Start: 2021-08-06 | End: 2022-10-25 | Stop reason: HOSPADM

## 2021-08-09 ENCOUNTER — TELEPHONE (OUTPATIENT)
Dept: FAMILY MEDICINE | Facility: CLINIC | Age: 76
End: 2021-08-09

## 2021-08-09 DIAGNOSIS — E11.40 TYPE 2 DIABETES MELLITUS WITH DIABETIC NEUROPATHY, WITHOUT LONG-TERM CURRENT USE OF INSULIN: Primary | ICD-10-CM

## 2021-08-09 RX ORDER — BLOOD-GLUCOSE CONTROL, NORMAL
1 EACH MISCELLANEOUS 2 TIMES DAILY
Qty: 200 EACH | Refills: 1 | Status: SHIPPED | OUTPATIENT
Start: 2021-08-09 | End: 2021-08-09 | Stop reason: SDUPTHER

## 2021-08-09 RX ORDER — BLOOD-GLUCOSE CONTROL, NORMAL
1 EACH MISCELLANEOUS 2 TIMES DAILY
Qty: 200 EACH | Refills: 1 | Status: SHIPPED | OUTPATIENT
Start: 2021-08-09 | End: 2022-10-04

## 2021-08-17 ENCOUNTER — PES CALL (OUTPATIENT)
Dept: ADMINISTRATIVE | Facility: CLINIC | Age: 76
End: 2021-08-17

## 2021-08-18 ENCOUNTER — TELEPHONE (OUTPATIENT)
Dept: FAMILY MEDICINE | Facility: CLINIC | Age: 76
End: 2021-08-18

## 2021-08-18 RX ORDER — LANCETS 33 GAUGE
EACH MISCELLANEOUS DAILY
COMMUNITY
Start: 2021-08-11 | End: 2022-11-22 | Stop reason: DRUGHIGH

## 2021-08-24 ENCOUNTER — LAB VISIT (OUTPATIENT)
Dept: LAB | Facility: HOSPITAL | Age: 76
End: 2021-08-24
Attending: HOSPITALIST
Payer: MEDICARE

## 2021-08-24 DIAGNOSIS — B35.6 TINEA CRURIS: ICD-10-CM

## 2021-08-24 DIAGNOSIS — D35.00 ADRENAL ADENOMA, UNSPECIFIED LATERALITY: ICD-10-CM

## 2021-08-24 DIAGNOSIS — E11.9 DIABETES MELLITUS TYPE II, NON INSULIN DEPENDENT: ICD-10-CM

## 2021-08-24 LAB
ALBUMIN SERPL BCP-MCNC: 3.4 G/DL (ref 3.5–5.2)
ALP SERPL-CCNC: 75 U/L (ref 55–135)
ALT SERPL W/O P-5'-P-CCNC: 17 U/L (ref 10–44)
ANION GAP SERPL CALC-SCNC: 8 MMOL/L (ref 8–16)
AST SERPL-CCNC: 12 U/L (ref 10–40)
BILIRUB SERPL-MCNC: 0.5 MG/DL (ref 0.1–1)
BUN SERPL-MCNC: 39 MG/DL (ref 8–23)
CALCIUM SERPL-MCNC: 10.2 MG/DL (ref 8.7–10.5)
CHLORIDE SERPL-SCNC: 105 MMOL/L (ref 95–110)
CO2 SERPL-SCNC: 25 MMOL/L (ref 23–29)
CORTIS SERPL-MCNC: 2 UG/DL (ref 4.3–22.4)
CREAT SERPL-MCNC: 1.7 MG/DL (ref 0.5–1.4)
EST. GFR  (AFRICAN AMERICAN): 44.6 ML/MIN/1.73 M^2
EST. GFR  (NON AFRICAN AMERICAN): 38.6 ML/MIN/1.73 M^2
ESTIMATED AVG GLUCOSE: 203 MG/DL (ref 68–131)
GLUCOSE SERPL-MCNC: 189 MG/DL (ref 70–110)
HBA1C MFR BLD: 8.7 % (ref 4–5.6)
POTASSIUM SERPL-SCNC: 4.7 MMOL/L (ref 3.5–5.1)
PROT SERPL-MCNC: 6.1 G/DL (ref 6–8.4)
SODIUM SERPL-SCNC: 138 MMOL/L (ref 136–145)

## 2021-08-24 PROCEDURE — 83036 HEMOGLOBIN GLYCOSYLATED A1C: CPT | Performed by: HOSPITALIST

## 2021-08-24 PROCEDURE — 82024 ASSAY OF ACTH: CPT | Performed by: HOSPITALIST

## 2021-08-24 PROCEDURE — 80053 COMPREHEN METABOLIC PANEL: CPT | Performed by: HOSPITALIST

## 2021-08-24 PROCEDURE — 83835 ASSAY OF METANEPHRINES: CPT | Performed by: HOSPITALIST

## 2021-08-24 PROCEDURE — 82088 ASSAY OF ALDOSTERONE: CPT | Performed by: HOSPITALIST

## 2021-08-24 PROCEDURE — 36415 COLL VENOUS BLD VENIPUNCTURE: CPT | Mod: PO | Performed by: HOSPITALIST

## 2021-08-24 PROCEDURE — 82533 TOTAL CORTISOL: CPT | Performed by: HOSPITALIST

## 2021-08-24 RX ORDER — CLOTRIMAZOLE AND BETAMETHASONE DIPROPIONATE 10; .64 MG/G; MG/G
CREAM TOPICAL
Qty: 45 G | Refills: 0 | Status: SHIPPED | OUTPATIENT
Start: 2021-08-24 | End: 2022-05-03

## 2021-08-27 LAB
ACTH PLAS-MCNC: <5 PG/ML (ref 0–46)
ALDOST SERPL-MCNC: 16.9 NG/DL

## 2021-08-29 LAB — RENIN PLAS-CCNC: 0.7 NG/ML/H

## 2021-08-30 LAB
METANEPH FREE SERPL-MCNC: 80 PG/ML
METANEPHS SERPL-MCNC: 139 PG/ML
NORMETANEPH FREE SERPL-MCNC: 59 PG/ML

## 2021-08-31 ENCOUNTER — EXTERNAL CHRONIC CARE MANAGEMENT (OUTPATIENT)
Dept: PRIMARY CARE CLINIC | Facility: CLINIC | Age: 76
End: 2021-08-31
Payer: MEDICARE

## 2021-08-31 PROCEDURE — 99490 CHRNC CARE MGMT STAFF 1ST 20: CPT | Mod: S$PBB,,, | Performed by: FAMILY MEDICINE

## 2021-08-31 PROCEDURE — 99490 PR CHRONIC CARE MGMT, 1ST 20 MIN: ICD-10-PCS | Mod: S$PBB,,, | Performed by: FAMILY MEDICINE

## 2021-08-31 PROCEDURE — 99490 CHRNC CARE MGMT STAFF 1ST 20: CPT | Mod: PBBFAC,PO | Performed by: FAMILY MEDICINE

## 2021-09-30 ENCOUNTER — EXTERNAL CHRONIC CARE MANAGEMENT (OUTPATIENT)
Dept: PRIMARY CARE CLINIC | Facility: CLINIC | Age: 76
End: 2021-09-30
Payer: MEDICARE

## 2021-09-30 ENCOUNTER — IMMUNIZATION (OUTPATIENT)
Dept: OBSTETRICS AND GYNECOLOGY | Facility: CLINIC | Age: 76
End: 2021-09-30
Payer: MEDICARE

## 2021-09-30 DIAGNOSIS — Z23 NEED FOR VACCINATION: Primary | ICD-10-CM

## 2021-09-30 PROCEDURE — 99490 CHRNC CARE MGMT STAFF 1ST 20: CPT | Mod: S$PBB,,, | Performed by: FAMILY MEDICINE

## 2021-09-30 PROCEDURE — 99490 CHRNC CARE MGMT STAFF 1ST 20: CPT | Mod: PBBFAC,PO | Performed by: FAMILY MEDICINE

## 2021-09-30 PROCEDURE — 91300 COVID-19, MRNA, LNP-S, PF, 30 MCG/0.3 ML DOSE VACCINE: CPT | Mod: PBBFAC

## 2021-09-30 PROCEDURE — 0003A COVID-19, MRNA, LNP-S, PF, 30 MCG/0.3 ML DOSE VACCINE: CPT | Mod: PBBFAC

## 2021-09-30 PROCEDURE — 99490 PR CHRONIC CARE MGMT, 1ST 20 MIN: ICD-10-PCS | Mod: S$PBB,,, | Performed by: FAMILY MEDICINE

## 2021-10-05 ENCOUNTER — OFFICE VISIT (OUTPATIENT)
Dept: PODIATRY | Facility: CLINIC | Age: 76
End: 2021-10-05
Payer: MEDICARE

## 2021-10-05 VITALS — BODY MASS INDEX: 36.61 KG/M2 | WEIGHT: 255.75 LBS | HEIGHT: 70 IN

## 2021-10-05 DIAGNOSIS — E11.49 TYPE II DIABETES MELLITUS WITH NEUROLOGICAL MANIFESTATIONS: Primary | ICD-10-CM

## 2021-10-05 DIAGNOSIS — B35.1 ONYCHOMYCOSIS DUE TO DERMATOPHYTE: ICD-10-CM

## 2021-10-05 PROCEDURE — 11721 DEBRIDE NAIL 6 OR MORE: CPT | Mod: Q9,S$PBB,, | Performed by: PODIATRIST

## 2021-10-05 PROCEDURE — 11721 DEBRIDE NAIL 6 OR MORE: CPT | Mod: PBBFAC,PO | Performed by: PODIATRIST

## 2021-10-05 PROCEDURE — 99213 OFFICE O/P EST LOW 20 MIN: CPT | Mod: 25,PBBFAC,PO | Performed by: PODIATRIST

## 2021-10-05 PROCEDURE — 99999 PR PBB SHADOW E&M-EST. PATIENT-LVL III: CPT | Mod: PBBFAC,,, | Performed by: PODIATRIST

## 2021-10-05 PROCEDURE — 99499 UNLISTED E&M SERVICE: CPT | Mod: S$PBB,,, | Performed by: PODIATRIST

## 2021-10-05 PROCEDURE — 99499 NO LOS: ICD-10-PCS | Mod: S$PBB,,, | Performed by: PODIATRIST

## 2021-10-05 PROCEDURE — 11721 PR DEBRIDEMENT OF NAILS, 6 OR MORE: ICD-10-PCS | Mod: Q9,S$PBB,, | Performed by: PODIATRIST

## 2021-10-05 PROCEDURE — 99999 PR PBB SHADOW E&M-EST. PATIENT-LVL III: ICD-10-PCS | Mod: PBBFAC,,, | Performed by: PODIATRIST

## 2021-10-07 ENCOUNTER — OFFICE VISIT (OUTPATIENT)
Dept: FAMILY MEDICINE | Facility: CLINIC | Age: 76
End: 2021-10-07
Payer: MEDICARE

## 2021-10-07 VITALS
DIASTOLIC BLOOD PRESSURE: 60 MMHG | TEMPERATURE: 98 F | HEART RATE: 74 BPM | SYSTOLIC BLOOD PRESSURE: 96 MMHG | OXYGEN SATURATION: 95 % | BODY MASS INDEX: 36.7 KG/M2 | WEIGHT: 247.81 LBS | HEIGHT: 69 IN

## 2021-10-07 DIAGNOSIS — N18.32 STAGE 3B CHRONIC KIDNEY DISEASE: ICD-10-CM

## 2021-10-07 DIAGNOSIS — N28.1 BILATERAL RENAL CYSTS: ICD-10-CM

## 2021-10-07 DIAGNOSIS — I71.40 ABDOMINAL AORTIC ANEURYSM (AAA) WITHOUT RUPTURE: ICD-10-CM

## 2021-10-07 DIAGNOSIS — I25.10 CORONARY ARTERY DISEASE INVOLVING NATIVE CORONARY ARTERY OF NATIVE HEART WITHOUT ANGINA PECTORIS: Chronic | ICD-10-CM

## 2021-10-07 DIAGNOSIS — G47.33 OBSTRUCTIVE SLEEP APNEA SYNDROME: ICD-10-CM

## 2021-10-07 DIAGNOSIS — Z23 NEEDS FLU SHOT: ICD-10-CM

## 2021-10-07 DIAGNOSIS — I25.82 TOTAL OCCLUSION OF CORONARY ARTERY, CHRONIC: ICD-10-CM

## 2021-10-07 DIAGNOSIS — E78.00 PURE HYPERCHOLESTEROLEMIA: Chronic | ICD-10-CM

## 2021-10-07 DIAGNOSIS — D35.00 ADRENAL ADENOMA, UNSPECIFIED LATERALITY: Chronic | ICD-10-CM

## 2021-10-07 DIAGNOSIS — Z98.61 HISTORY OF PERCUTANEOUS CORONARY INTERVENTION: ICD-10-CM

## 2021-10-07 DIAGNOSIS — K21.9 GASTROESOPHAGEAL REFLUX DISEASE, UNSPECIFIED WHETHER ESOPHAGITIS PRESENT: ICD-10-CM

## 2021-10-07 DIAGNOSIS — Z00.00 ENCOUNTER FOR PREVENTIVE HEALTH EXAMINATION: Primary | ICD-10-CM

## 2021-10-07 DIAGNOSIS — Z95.1 S/P CABG X 4: Chronic | ICD-10-CM

## 2021-10-07 DIAGNOSIS — R91.1 PULMONARY NODULE: ICD-10-CM

## 2021-10-07 DIAGNOSIS — I71.20 THORACIC AORTIC ANEURYSM WITHOUT RUPTURE: ICD-10-CM

## 2021-10-07 DIAGNOSIS — E66.01 SEVERE OBESITY (BMI 35.0-39.9) WITH COMORBIDITY: Chronic | ICD-10-CM

## 2021-10-07 DIAGNOSIS — E11.40 TYPE 2 DIABETES MELLITUS WITH DIABETIC NEUROPATHY, WITHOUT LONG-TERM CURRENT USE OF INSULIN: Chronic | ICD-10-CM

## 2021-10-07 DIAGNOSIS — I10 PRIMARY HYPERTENSION: Chronic | ICD-10-CM

## 2021-10-07 PROCEDURE — 99215 OFFICE O/P EST HI 40 MIN: CPT | Mod: PBBFAC,PO | Performed by: NURSE PRACTITIONER

## 2021-10-07 PROCEDURE — 99999 PR PBB SHADOW E&M-EST. PATIENT-LVL V: ICD-10-PCS | Mod: PBBFAC,,, | Performed by: NURSE PRACTITIONER

## 2021-10-07 PROCEDURE — G0439 PPPS, SUBSEQ VISIT: HCPCS | Mod: ,,, | Performed by: NURSE PRACTITIONER

## 2021-10-07 PROCEDURE — 99999 PR PBB SHADOW E&M-EST. PATIENT-LVL V: CPT | Mod: PBBFAC,,, | Performed by: NURSE PRACTITIONER

## 2021-10-07 PROCEDURE — G0439 PR MEDICARE ANNUAL WELLNESS SUBSEQUENT VISIT: ICD-10-PCS | Mod: ,,, | Performed by: NURSE PRACTITIONER

## 2021-10-08 ENCOUNTER — CLINICAL SUPPORT (OUTPATIENT)
Dept: FAMILY MEDICINE | Facility: CLINIC | Age: 76
End: 2021-10-08
Payer: MEDICARE

## 2021-10-08 VITALS — TEMPERATURE: 98 F

## 2021-10-08 DIAGNOSIS — Z23 NEEDS FLU SHOT: Primary | ICD-10-CM

## 2021-10-08 PROCEDURE — 99999 PR PBB SHADOW E&M-EST. PATIENT-LVL II: ICD-10-PCS | Mod: PBBFAC,,,

## 2021-10-08 PROCEDURE — 99999 PR PBB SHADOW E&M-EST. PATIENT-LVL II: CPT | Mod: PBBFAC,,,

## 2021-10-08 PROCEDURE — G0008 ADMIN INFLUENZA VIRUS VAC: HCPCS | Mod: PBBFAC,PO

## 2021-10-08 PROCEDURE — 90662 IIV NO PRSV INCREASED AG IM: CPT | Mod: PBBFAC,PO

## 2021-10-18 ENCOUNTER — PATIENT OUTREACH (OUTPATIENT)
Dept: ADMINISTRATIVE | Facility: OTHER | Age: 76
End: 2021-10-18

## 2021-10-19 ENCOUNTER — OFFICE VISIT (OUTPATIENT)
Dept: OPTOMETRY | Facility: CLINIC | Age: 76
End: 2021-10-19
Payer: MEDICARE

## 2021-10-19 DIAGNOSIS — H52.223 MYOPIA OF BOTH EYES WITH REGULAR ASTIGMATISM AND PRESBYOPIA: ICD-10-CM

## 2021-10-19 DIAGNOSIS — H52.13 MYOPIA OF BOTH EYES WITH REGULAR ASTIGMATISM AND PRESBYOPIA: ICD-10-CM

## 2021-10-19 DIAGNOSIS — H25.13 NUCLEAR SCLEROSIS, BILATERAL: ICD-10-CM

## 2021-10-19 DIAGNOSIS — Z13.5 GLAUCOMA SCREENING: ICD-10-CM

## 2021-10-19 DIAGNOSIS — H52.4 MYOPIA OF BOTH EYES WITH REGULAR ASTIGMATISM AND PRESBYOPIA: ICD-10-CM

## 2021-10-19 DIAGNOSIS — E11.9 DIABETES MELLITUS TYPE 2 WITHOUT RETINOPATHY: Primary | ICD-10-CM

## 2021-10-19 LAB
LEFT EYE DM RETINOPATHY: NEGATIVE
RIGHT EYE DM RETINOPATHY: NEGATIVE

## 2021-10-19 PROCEDURE — 92015 DETERMINE REFRACTIVE STATE: CPT | Mod: ,,, | Performed by: OPTOMETRIST

## 2021-10-19 PROCEDURE — 99999 PR PBB SHADOW E&M-EST. PATIENT-LVL III: ICD-10-PCS | Mod: PBBFAC,,, | Performed by: OPTOMETRIST

## 2021-10-19 PROCEDURE — 92014 PR EYE EXAM, EST PATIENT,COMPREHESV: ICD-10-PCS | Mod: S$PBB,,, | Performed by: OPTOMETRIST

## 2021-10-19 PROCEDURE — 92015 PR REFRACTION: ICD-10-PCS | Mod: ,,, | Performed by: OPTOMETRIST

## 2021-10-19 PROCEDURE — 99213 OFFICE O/P EST LOW 20 MIN: CPT | Mod: PBBFAC,PO | Performed by: OPTOMETRIST

## 2021-10-19 PROCEDURE — 99999 PR PBB SHADOW E&M-EST. PATIENT-LVL III: CPT | Mod: PBBFAC,,, | Performed by: OPTOMETRIST

## 2021-10-19 PROCEDURE — 92014 COMPRE OPH EXAM EST PT 1/>: CPT | Mod: S$PBB,,, | Performed by: OPTOMETRIST

## 2021-10-20 RX ORDER — CLOPIDOGREL BISULFATE 75 MG/1
75 TABLET ORAL DAILY
Qty: 90 TABLET | Refills: 0 | Status: SHIPPED | OUTPATIENT
Start: 2021-10-20 | End: 2022-01-13

## 2021-10-25 DIAGNOSIS — E11.40 TYPE 2 DIABETES MELLITUS WITH DIABETIC NEUROPATHY, WITHOUT LONG-TERM CURRENT USE OF INSULIN: ICD-10-CM

## 2021-10-31 ENCOUNTER — EXTERNAL CHRONIC CARE MANAGEMENT (OUTPATIENT)
Dept: PRIMARY CARE CLINIC | Facility: CLINIC | Age: 76
End: 2021-10-31
Payer: MEDICARE

## 2021-10-31 PROCEDURE — 99490 CHRNC CARE MGMT STAFF 1ST 20: CPT | Mod: PBBFAC,25,PO | Performed by: FAMILY MEDICINE

## 2021-10-31 PROCEDURE — 99439 CHRNC CARE MGMT STAF EA ADDL: CPT | Mod: PBBFAC,PO | Performed by: FAMILY MEDICINE

## 2021-10-31 PROCEDURE — 99490 CHRNC CARE MGMT STAFF 1ST 20: CPT | Mod: S$PBB,,, | Performed by: FAMILY MEDICINE

## 2021-10-31 PROCEDURE — 99439 PR CHRONIC CARE MGMT, EA ADDTL 20 MIN: ICD-10-PCS | Mod: S$PBB,,, | Performed by: FAMILY MEDICINE

## 2021-10-31 PROCEDURE — 99439 CHRNC CARE MGMT STAF EA ADDL: CPT | Mod: S$PBB,,, | Performed by: FAMILY MEDICINE

## 2021-10-31 PROCEDURE — 99490 PR CHRONIC CARE MGMT, 1ST 20 MIN: ICD-10-PCS | Mod: S$PBB,,, | Performed by: FAMILY MEDICINE

## 2021-11-02 NOTE — Clinical Note
110 ml injected throughout the case. 90 mL total wasted during the case. 200 mL total used in the case. 
A pre-sedation assessment was completed by the physician immediately prior to sedation start.   
Airway assessment complete  
Angiography performed of the graft. LIMA GRAFT
Angiography performed of the left coronary arteries in multiple views.
Catheter is inserted into the LIMA graft. 
Catheter is inserted into the and hemodynamics recorded left ventricle. 
Catheter is inserted into the ostium   left main. 
Catheter is inserted into the ostium   right coronary artery. Angiography performed of the right coronary arteries.
Catheter is removed from the LIMA graft. 
Catheter is removed from the aorta. 
Catheter is removed from the ostium   left main. 
Catheter is removed from the ostium   right coronary artery. 
Catheter is repositioned to the aorta. Angiography performed of the aorta.
Defib pads placed on patient's chest and back.  
ID band present and verified. Family is in the lobby. 
Phone report given to LAUREN Fajardo  
Prepped: groin and left radial. Prepped with: ChloraPrep. The patient was draped. 
Pulse oximeter placed on patient's right index finger.
Radial band applied to the left radial artery. 
The sheath is inserted into the left radial artery. 
The sheath is removed from the left radial artery. 
The wire is inserted in the  aorta.
The wire is inserted in the  aorta.
The wire is removed from the  aorta.
The wire is removed from the  aorta.
Verified procedural consent signed  blood consent signed and complete H&P in chart. 
dry, intact, no bleeding and no hematoma. 
patient/relative

## 2021-11-12 ENCOUNTER — OFFICE VISIT (OUTPATIENT)
Dept: ENDOCRINOLOGY | Facility: CLINIC | Age: 76
End: 2021-11-12
Payer: MEDICARE

## 2021-11-12 VITALS
WEIGHT: 248.88 LBS | TEMPERATURE: 98 F | HEART RATE: 50 BPM | DIASTOLIC BLOOD PRESSURE: 76 MMHG | SYSTOLIC BLOOD PRESSURE: 126 MMHG | BODY MASS INDEX: 37.29 KG/M2

## 2021-11-12 DIAGNOSIS — E11.40 TYPE 2 DIABETES MELLITUS WITH DIABETIC NEUROPATHY, WITHOUT LONG-TERM CURRENT USE OF INSULIN: Primary | Chronic | ICD-10-CM

## 2021-11-12 DIAGNOSIS — E78.00 PURE HYPERCHOLESTEROLEMIA: Chronic | ICD-10-CM

## 2021-11-12 DIAGNOSIS — E11.9 DIABETES MELLITUS TYPE II, NON INSULIN DEPENDENT: ICD-10-CM

## 2021-11-12 DIAGNOSIS — N18.32 STAGE 3B CHRONIC KIDNEY DISEASE: ICD-10-CM

## 2021-11-12 DIAGNOSIS — D35.02 ADENOMA OF LEFT ADRENAL GLAND: Chronic | ICD-10-CM

## 2021-11-12 DIAGNOSIS — E66.01 CLASS 2 SEVERE OBESITY DUE TO EXCESS CALORIES WITH SERIOUS COMORBIDITY AND BODY MASS INDEX (BMI) OF 37.0 TO 37.9 IN ADULT: ICD-10-CM

## 2021-11-12 DIAGNOSIS — I25.10 CORONARY ARTERY DISEASE INVOLVING NATIVE CORONARY ARTERY OF NATIVE HEART WITHOUT ANGINA PECTORIS: Chronic | ICD-10-CM

## 2021-11-12 PROBLEM — E66.812 CLASS 2 SEVERE OBESITY DUE TO EXCESS CALORIES WITH SERIOUS COMORBIDITY AND BODY MASS INDEX (BMI) OF 37.0 TO 37.9 IN ADULT: Status: ACTIVE | Noted: 2021-11-12

## 2021-11-12 PROCEDURE — 99214 PR OFFICE/OUTPT VISIT, EST, LEVL IV, 30-39 MIN: ICD-10-PCS | Mod: S$PBB,,, | Performed by: HOSPITALIST

## 2021-11-12 PROCEDURE — 99214 OFFICE O/P EST MOD 30 MIN: CPT | Mod: S$PBB,,, | Performed by: HOSPITALIST

## 2021-11-12 PROCEDURE — 99999 PR PBB SHADOW E&M-EST. PATIENT-LVL III: ICD-10-PCS | Mod: PBBFAC,,, | Performed by: HOSPITALIST

## 2021-11-12 PROCEDURE — 99213 OFFICE O/P EST LOW 20 MIN: CPT | Mod: PBBFAC | Performed by: HOSPITALIST

## 2021-11-12 PROCEDURE — 99999 PR PBB SHADOW E&M-EST. PATIENT-LVL III: CPT | Mod: PBBFAC,,, | Performed by: HOSPITALIST

## 2021-11-12 RX ORDER — METFORMIN HYDROCHLORIDE 500 MG/1
500 TABLET ORAL 2 TIMES DAILY WITH MEALS
Qty: 180 TABLET | Refills: 3 | Status: SHIPPED | OUTPATIENT
Start: 2021-11-12 | End: 2022-05-17 | Stop reason: SDUPTHER

## 2021-11-12 RX ORDER — GLIPIZIDE 10 MG/1
10 TABLET, FILM COATED, EXTENDED RELEASE ORAL
Qty: 90 TABLET | Refills: 3 | Status: SHIPPED | OUTPATIENT
Start: 2021-11-12 | End: 2022-05-17 | Stop reason: SDUPTHER

## 2021-11-30 ENCOUNTER — EXTERNAL CHRONIC CARE MANAGEMENT (OUTPATIENT)
Dept: PRIMARY CARE CLINIC | Facility: CLINIC | Age: 76
End: 2021-11-30
Payer: MEDICARE

## 2021-11-30 PROCEDURE — 99490 PR CHRONIC CARE MGMT, 1ST 20 MIN: ICD-10-PCS | Mod: S$PBB,,, | Performed by: FAMILY MEDICINE

## 2021-11-30 PROCEDURE — 99490 CHRNC CARE MGMT STAFF 1ST 20: CPT | Mod: S$PBB,,, | Performed by: FAMILY MEDICINE

## 2021-11-30 PROCEDURE — 99490 CHRNC CARE MGMT STAFF 1ST 20: CPT | Mod: PBBFAC,PO | Performed by: FAMILY MEDICINE

## 2021-12-06 ENCOUNTER — LAB VISIT (OUTPATIENT)
Dept: LAB | Facility: HOSPITAL | Age: 76
End: 2021-12-06
Attending: FAMILY MEDICINE
Payer: MEDICARE

## 2021-12-06 DIAGNOSIS — M10.9 ACUTE GOUT, UNSPECIFIED CAUSE, UNSPECIFIED SITE: ICD-10-CM

## 2021-12-06 DIAGNOSIS — E11.40 TYPE 2 DIABETES MELLITUS WITH DIABETIC NEUROPATHY, WITHOUT LONG-TERM CURRENT USE OF INSULIN: ICD-10-CM

## 2021-12-06 LAB
ALBUMIN SERPL BCP-MCNC: 3.3 G/DL (ref 3.5–5.2)
ALP SERPL-CCNC: 80 U/L (ref 55–135)
ALT SERPL W/O P-5'-P-CCNC: 15 U/L (ref 10–44)
ANION GAP SERPL CALC-SCNC: 16 MMOL/L (ref 8–16)
AST SERPL-CCNC: 14 U/L (ref 10–40)
BASOPHILS # BLD AUTO: 0.06 K/UL (ref 0–0.2)
BASOPHILS NFR BLD: 0.7 % (ref 0–1.9)
BILIRUB SERPL-MCNC: 0.6 MG/DL (ref 0.1–1)
BUN SERPL-MCNC: 32 MG/DL (ref 8–23)
CALCIUM SERPL-MCNC: 9.4 MG/DL (ref 8.7–10.5)
CHLORIDE SERPL-SCNC: 104 MMOL/L (ref 95–110)
CHOLEST SERPL-MCNC: 110 MG/DL (ref 120–199)
CHOLEST/HDLC SERPL: 3.1 {RATIO} (ref 2–5)
CO2 SERPL-SCNC: 24 MMOL/L (ref 23–29)
CREAT SERPL-MCNC: 1.5 MG/DL (ref 0.5–1.4)
DIFFERENTIAL METHOD: NORMAL
EOSINOPHIL # BLD AUTO: 0.5 K/UL (ref 0–0.5)
EOSINOPHIL NFR BLD: 5.9 % (ref 0–8)
ERYTHROCYTE [DISTWIDTH] IN BLOOD BY AUTOMATED COUNT: 13.2 % (ref 11.5–14.5)
EST. GFR  (AFRICAN AMERICAN): 51.9 ML/MIN/1.73 M^2
EST. GFR  (NON AFRICAN AMERICAN): 44.9 ML/MIN/1.73 M^2
ESTIMATED AVG GLUCOSE: 200 MG/DL (ref 68–131)
GLUCOSE SERPL-MCNC: 164 MG/DL (ref 70–110)
HBA1C MFR BLD: 8.6 % (ref 4–5.6)
HCT VFR BLD AUTO: 43.8 % (ref 40–54)
HDLC SERPL-MCNC: 35 MG/DL (ref 40–75)
HDLC SERPL: 31.8 % (ref 20–50)
HGB BLD-MCNC: 14.2 G/DL (ref 14–18)
IMM GRANULOCYTES # BLD AUTO: 0.04 K/UL (ref 0–0.04)
IMM GRANULOCYTES NFR BLD AUTO: 0.5 % (ref 0–0.5)
LDLC SERPL CALC-MCNC: 60 MG/DL (ref 63–159)
LYMPHOCYTES # BLD AUTO: 1.9 K/UL (ref 1–4.8)
LYMPHOCYTES NFR BLD: 23 % (ref 18–48)
MCH RBC QN AUTO: 30.4 PG (ref 27–31)
MCHC RBC AUTO-ENTMCNC: 32.4 G/DL (ref 32–36)
MCV RBC AUTO: 94 FL (ref 82–98)
MONOCYTES # BLD AUTO: 0.7 K/UL (ref 0.3–1)
MONOCYTES NFR BLD: 8.2 % (ref 4–15)
NEUTROPHILS # BLD AUTO: 5.1 K/UL (ref 1.8–7.7)
NEUTROPHILS NFR BLD: 61.7 % (ref 38–73)
NONHDLC SERPL-MCNC: 75 MG/DL
NRBC BLD-RTO: 0 /100 WBC
PLATELET # BLD AUTO: 201 K/UL (ref 150–450)
PMV BLD AUTO: 9.8 FL (ref 9.2–12.9)
POTASSIUM SERPL-SCNC: 4.3 MMOL/L (ref 3.5–5.1)
PROT SERPL-MCNC: 6.3 G/DL (ref 6–8.4)
RBC # BLD AUTO: 4.67 M/UL (ref 4.6–6.2)
SODIUM SERPL-SCNC: 144 MMOL/L (ref 136–145)
TRIGL SERPL-MCNC: 75 MG/DL (ref 30–150)
TSH SERPL DL<=0.005 MIU/L-ACNC: 1.64 UIU/ML (ref 0.4–4)
URATE SERPL-MCNC: 7.6 MG/DL (ref 3.4–7)
WBC # BLD AUTO: 8.19 K/UL (ref 3.9–12.7)

## 2021-12-06 PROCEDURE — 85025 COMPLETE CBC W/AUTO DIFF WBC: CPT | Performed by: FAMILY MEDICINE

## 2021-12-06 PROCEDURE — 83036 HEMOGLOBIN GLYCOSYLATED A1C: CPT | Performed by: FAMILY MEDICINE

## 2021-12-06 PROCEDURE — 84550 ASSAY OF BLOOD/URIC ACID: CPT | Performed by: FAMILY MEDICINE

## 2021-12-06 PROCEDURE — 36415 COLL VENOUS BLD VENIPUNCTURE: CPT | Mod: PO | Performed by: FAMILY MEDICINE

## 2021-12-06 PROCEDURE — 80053 COMPREHEN METABOLIC PANEL: CPT | Performed by: FAMILY MEDICINE

## 2021-12-06 PROCEDURE — 84443 ASSAY THYROID STIM HORMONE: CPT | Performed by: FAMILY MEDICINE

## 2021-12-06 PROCEDURE — 80061 LIPID PANEL: CPT | Performed by: FAMILY MEDICINE

## 2021-12-10 ENCOUNTER — OFFICE VISIT (OUTPATIENT)
Dept: FAMILY MEDICINE | Facility: CLINIC | Age: 76
End: 2021-12-10
Payer: MEDICARE

## 2021-12-10 VITALS
TEMPERATURE: 98 F | WEIGHT: 248.69 LBS | OXYGEN SATURATION: 96 % | SYSTOLIC BLOOD PRESSURE: 130 MMHG | DIASTOLIC BLOOD PRESSURE: 70 MMHG | HEIGHT: 69 IN | HEART RATE: 67 BPM | BODY MASS INDEX: 36.83 KG/M2

## 2021-12-10 DIAGNOSIS — M25.561 ARTHRALGIA OF RIGHT KNEE: ICD-10-CM

## 2021-12-10 DIAGNOSIS — N18.32 STAGE 3B CHRONIC KIDNEY DISEASE: ICD-10-CM

## 2021-12-10 DIAGNOSIS — Z00.00 ANNUAL PHYSICAL EXAM: Primary | ICD-10-CM

## 2021-12-10 DIAGNOSIS — R05.9 COUGH: ICD-10-CM

## 2021-12-10 DIAGNOSIS — G89.29 CHRONIC LEFT SHOULDER PAIN: ICD-10-CM

## 2021-12-10 DIAGNOSIS — I71.40 ABDOMINAL AORTIC ANEURYSM (AAA) WITHOUT RUPTURE: ICD-10-CM

## 2021-12-10 DIAGNOSIS — M25.512 CHRONIC LEFT SHOULDER PAIN: ICD-10-CM

## 2021-12-10 DIAGNOSIS — E11.40 TYPE 2 DIABETES MELLITUS WITH DIABETIC NEUROPATHY, WITHOUT LONG-TERM CURRENT USE OF INSULIN: ICD-10-CM

## 2021-12-10 DIAGNOSIS — E66.01 SEVERE OBESITY (BMI 35.0-39.9) WITH COMORBIDITY: ICD-10-CM

## 2021-12-10 DIAGNOSIS — I10 PRIMARY HYPERTENSION: ICD-10-CM

## 2021-12-10 PROCEDURE — 99999 PR PBB SHADOW E&M-EST. PATIENT-LVL V: ICD-10-PCS | Mod: PBBFAC,,, | Performed by: FAMILY MEDICINE

## 2021-12-10 PROCEDURE — 99214 OFFICE O/P EST MOD 30 MIN: CPT | Mod: S$PBB,,, | Performed by: FAMILY MEDICINE

## 2021-12-10 PROCEDURE — 99214 PR OFFICE/OUTPT VISIT, EST, LEVL IV, 30-39 MIN: ICD-10-PCS | Mod: S$PBB,,, | Performed by: FAMILY MEDICINE

## 2021-12-10 PROCEDURE — 99215 OFFICE O/P EST HI 40 MIN: CPT | Mod: PBBFAC,PO | Performed by: FAMILY MEDICINE

## 2021-12-10 PROCEDURE — 99999 PR PBB SHADOW E&M-EST. PATIENT-LVL V: CPT | Mod: PBBFAC,,, | Performed by: FAMILY MEDICINE

## 2021-12-10 RX ORDER — PROMETHAZINE HYDROCHLORIDE AND DEXTROMETHORPHAN HYDROBROMIDE 6.25; 15 MG/5ML; MG/5ML
5 SYRUP ORAL EVERY 12 HOURS PRN
Qty: 180 ML | Refills: 0 | Status: SHIPPED | OUTPATIENT
Start: 2021-12-10 | End: 2021-12-20

## 2021-12-20 PROBLEM — J98.4 CALCIFIED GRANULOMA OF LUNG: Status: ACTIVE | Noted: 2021-12-20

## 2021-12-20 PROBLEM — J84.10 CALCIFIED GRANULOMA OF LUNG: Status: ACTIVE | Noted: 2021-12-20

## 2021-12-22 ENCOUNTER — PATIENT OUTREACH (OUTPATIENT)
Dept: ADMINISTRATIVE | Facility: OTHER | Age: 76
End: 2021-12-22
Payer: MEDICARE

## 2021-12-28 DIAGNOSIS — M25.512 LEFT SHOULDER PAIN, UNSPECIFIED CHRONICITY: Primary | ICD-10-CM

## 2021-12-29 ENCOUNTER — OFFICE VISIT (OUTPATIENT)
Dept: ORTHOPEDICS | Facility: CLINIC | Age: 76
End: 2021-12-29
Payer: MEDICARE

## 2021-12-29 VITALS
OXYGEN SATURATION: 98 % | BODY MASS INDEX: 37.2 KG/M2 | WEIGHT: 251.13 LBS | DIASTOLIC BLOOD PRESSURE: 79 MMHG | HEART RATE: 62 BPM | HEIGHT: 69 IN | RESPIRATION RATE: 18 BRPM | SYSTOLIC BLOOD PRESSURE: 130 MMHG

## 2021-12-29 DIAGNOSIS — M25.512 CHRONIC LEFT SHOULDER PAIN: ICD-10-CM

## 2021-12-29 DIAGNOSIS — M17.12 PRIMARY OSTEOARTHRITIS OF LEFT KNEE: Primary | ICD-10-CM

## 2021-12-29 DIAGNOSIS — G89.29 CHRONIC LEFT SHOULDER PAIN: ICD-10-CM

## 2021-12-29 DIAGNOSIS — M25.561 ARTHRALGIA OF RIGHT KNEE: ICD-10-CM

## 2021-12-29 PROCEDURE — 99213 PR OFFICE/OUTPT VISIT, EST, LEVL III, 20-29 MIN: ICD-10-PCS | Mod: 25,S$PBB,, | Performed by: ORTHOPAEDIC SURGERY

## 2021-12-29 PROCEDURE — 99215 OFFICE O/P EST HI 40 MIN: CPT | Mod: PBBFAC,PN,25 | Performed by: ORTHOPAEDIC SURGERY

## 2021-12-29 PROCEDURE — 99999 PR PBB SHADOW E&M-EST. PATIENT-LVL V: CPT | Mod: PBBFAC,,, | Performed by: ORTHOPAEDIC SURGERY

## 2021-12-29 PROCEDURE — 20610 LARGE JOINT ASPIRATION/INJECTION: L KNEE: ICD-10-PCS | Mod: S$PBB,LT,, | Performed by: ORTHOPAEDIC SURGERY

## 2021-12-29 PROCEDURE — 99213 OFFICE O/P EST LOW 20 MIN: CPT | Mod: 25,S$PBB,, | Performed by: ORTHOPAEDIC SURGERY

## 2021-12-29 PROCEDURE — 20610 DRAIN/INJ JOINT/BURSA W/O US: CPT | Mod: PBBFAC,PN,LT | Performed by: ORTHOPAEDIC SURGERY

## 2021-12-29 PROCEDURE — 99999 PR PBB SHADOW E&M-EST. PATIENT-LVL V: ICD-10-PCS | Mod: PBBFAC,,, | Performed by: ORTHOPAEDIC SURGERY

## 2021-12-29 RX ORDER — TRIAMCINOLONE ACETONIDE 40 MG/ML
40 INJECTION, SUSPENSION INTRA-ARTICULAR; INTRAMUSCULAR
Status: DISCONTINUED | OUTPATIENT
Start: 2021-12-29 | End: 2021-12-29 | Stop reason: HOSPADM

## 2021-12-29 RX ADMIN — TRIAMCINOLONE ACETONIDE 40 MG: 40 INJECTION, SUSPENSION INTRA-ARTICULAR; INTRAMUSCULAR at 09:12

## 2021-12-31 ENCOUNTER — EXTERNAL CHRONIC CARE MANAGEMENT (OUTPATIENT)
Dept: PRIMARY CARE CLINIC | Facility: CLINIC | Age: 76
End: 2021-12-31
Payer: MEDICARE

## 2021-12-31 PROCEDURE — 99490 CHRNC CARE MGMT STAFF 1ST 20: CPT | Mod: PBBFAC,PO | Performed by: FAMILY MEDICINE

## 2021-12-31 PROCEDURE — 99490 PR CHRONIC CARE MGMT, 1ST 20 MIN: ICD-10-PCS | Mod: S$PBB,,, | Performed by: FAMILY MEDICINE

## 2021-12-31 PROCEDURE — 99490 CHRNC CARE MGMT STAFF 1ST 20: CPT | Mod: S$PBB,,, | Performed by: FAMILY MEDICINE

## 2022-01-05 ENCOUNTER — OFFICE VISIT (OUTPATIENT)
Dept: PODIATRY | Facility: CLINIC | Age: 77
End: 2022-01-05
Payer: MEDICARE

## 2022-01-05 VITALS — WEIGHT: 251.13 LBS | HEIGHT: 69 IN | BODY MASS INDEX: 37.2 KG/M2

## 2022-01-05 DIAGNOSIS — B35.1 ONYCHOMYCOSIS DUE TO DERMATOPHYTE: ICD-10-CM

## 2022-01-05 DIAGNOSIS — E11.49 TYPE II DIABETES MELLITUS WITH NEUROLOGICAL MANIFESTATIONS: Primary | ICD-10-CM

## 2022-01-05 PROCEDURE — 99213 OFFICE O/P EST LOW 20 MIN: CPT | Mod: PBBFAC,PO | Performed by: PODIATRIST

## 2022-01-05 PROCEDURE — 11721 DEBRIDE NAIL 6 OR MORE: CPT | Mod: Q9,S$PBB,, | Performed by: PODIATRIST

## 2022-01-05 PROCEDURE — 11721 DEBRIDE NAIL 6 OR MORE: CPT | Mod: Q9,PBBFAC,PO | Performed by: PODIATRIST

## 2022-01-05 PROCEDURE — 99999 PR PBB SHADOW E&M-EST. PATIENT-LVL III: CPT | Mod: PBBFAC,,, | Performed by: PODIATRIST

## 2022-01-05 PROCEDURE — 99499 NO LOS: ICD-10-PCS | Mod: S$PBB,,, | Performed by: PODIATRIST

## 2022-01-05 PROCEDURE — 99999 PR PBB SHADOW E&M-EST. PATIENT-LVL III: ICD-10-PCS | Mod: PBBFAC,,, | Performed by: PODIATRIST

## 2022-01-05 PROCEDURE — 99499 UNLISTED E&M SERVICE: CPT | Mod: S$PBB,,, | Performed by: PODIATRIST

## 2022-01-05 PROCEDURE — 11721 PR DEBRIDEMENT OF NAILS, 6 OR MORE: ICD-10-PCS | Mod: Q9,S$PBB,, | Performed by: PODIATRIST
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
## 2022-01-06 ENCOUNTER — OFFICE VISIT (OUTPATIENT)
Dept: ORTHOPEDICS | Facility: CLINIC | Age: 77
End: 2022-01-06
Payer: MEDICARE

## 2022-01-06 VITALS
DIASTOLIC BLOOD PRESSURE: 80 MMHG | BODY MASS INDEX: 35.69 KG/M2 | WEIGHT: 249.31 LBS | RESPIRATION RATE: 18 BRPM | OXYGEN SATURATION: 96 % | HEART RATE: 71 BPM | HEIGHT: 70 IN | SYSTOLIC BLOOD PRESSURE: 118 MMHG

## 2022-01-06 DIAGNOSIS — M25.561 ARTHRALGIA OF RIGHT KNEE: Primary | ICD-10-CM

## 2022-01-06 DIAGNOSIS — M25.512 LEFT SHOULDER PAIN, UNSPECIFIED CHRONICITY: ICD-10-CM

## 2022-01-06 PROCEDURE — 99999 PR PBB SHADOW E&M-EST. PATIENT-LVL IV: CPT | Mod: PBBFAC,,, | Performed by: ORTHOPAEDIC SURGERY

## 2022-01-06 PROCEDURE — 99999 PR PBB SHADOW E&M-EST. PATIENT-LVL IV: ICD-10-PCS | Mod: PBBFAC,,, | Performed by: ORTHOPAEDIC SURGERY

## 2022-01-06 PROCEDURE — 99499 UNLISTED E&M SERVICE: CPT | Mod: S$PBB,,, | Performed by: ORTHOPAEDIC SURGERY

## 2022-01-06 PROCEDURE — 20610 DRAIN/INJ JOINT/BURSA W/O US: CPT | Mod: PBBFAC,PN,LT | Performed by: ORTHOPAEDIC SURGERY

## 2022-01-06 PROCEDURE — 20610 LARGE JOINT ASPIRATION/INJECTION: R KNEE: ICD-10-PCS | Mod: S$PBB,50,, | Performed by: ORTHOPAEDIC SURGERY

## 2022-01-06 PROCEDURE — 20610 DRAIN/INJ JOINT/BURSA W/O US: CPT | Mod: PBBFAC,PN | Performed by: ORTHOPAEDIC SURGERY

## 2022-01-06 PROCEDURE — 99214 OFFICE O/P EST MOD 30 MIN: CPT | Mod: PBBFAC,PN,25 | Performed by: ORTHOPAEDIC SURGERY

## 2022-01-06 PROCEDURE — 99499 NO LOS: ICD-10-PCS | Mod: S$PBB,,, | Performed by: ORTHOPAEDIC SURGERY

## 2022-01-06 RX ORDER — TRIAMCINOLONE ACETONIDE 40 MG/ML
80 INJECTION, SUSPENSION INTRA-ARTICULAR; INTRAMUSCULAR
Status: DISCONTINUED | OUTPATIENT
Start: 2022-01-06 | End: 2022-01-06 | Stop reason: HOSPADM

## 2022-01-06 RX ORDER — TRIAMCINOLONE ACETONIDE 40 MG/ML
40 INJECTION, SUSPENSION INTRA-ARTICULAR; INTRAMUSCULAR
Status: DISCONTINUED | OUTPATIENT
Start: 2022-01-06 | End: 2022-01-06 | Stop reason: HOSPADM

## 2022-01-06 RX ADMIN — TRIAMCINOLONE ACETONIDE 80 MG: 40 INJECTION, SUSPENSION INTRA-ARTICULAR; INTRAMUSCULAR at 08:01

## 2022-01-06 RX ADMIN — TRIAMCINOLONE ACETONIDE 40 MG: 40 INJECTION, SUSPENSION INTRA-ARTICULAR; INTRAMUSCULAR at 08:01
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
## 2022-01-06 NOTE — PROCEDURES
Large Joint Aspiration/Injection: L subacromial bursa    Date/Time: 1/6/2022 8:45 AM  Performed by: Radha Valadez MD  Authorized by: Radha Valadez MD     Consent Done?:  Yes (Verbal)  Indications:  Pain  Timeout: prior to procedure the correct patient, procedure, and site was verified    Prep: patient was prepped and draped in usual sterile fashion      Local anesthesia used?: Yes    Local anesthetic:  Topical anesthetic    Details:  Needle Size:  22 G  Approach:  Posterior  Location:  Shoulder  Site:  L subacromial bursa  Medications:  40 mg triamcinolone acetonide 40 mg/mL  Patient tolerance:  Patient tolerated the procedure well with no immediate complications

## 2022-01-06 NOTE — PROCEDURES
Large Joint Aspiration/Injection: R knee    Date/Time: 1/6/2022 8:45 AM  Performed by: Radha Valadez MD  Authorized by: Radha Valadez MD     Consent Done?:  Yes (Verbal)  Indications:  Arthritis  Timeout: prior to procedure the correct patient, procedure, and site was verified    Prep: patient was prepped and draped in usual sterile fashion      Local anesthesia used?: Yes    Local anesthetic:  Topical anesthetic    Details:  Needle Size:  22 G  Approach:  Anteromedial  Location:  Knee  Site:  R knee  Medications:  80 mg triamcinolone acetonide 40 mg/mL  Patient tolerance:  Patient tolerated the procedure well with no immediate complications

## 2022-01-11 NOTE — TELEPHONE ENCOUNTER
No new care gaps identified.  Powered by [employer]. Reference number: [id number].   1/11/2022 8:29:23 AM CST

## 2022-01-13 RX ORDER — CLOPIDOGREL BISULFATE 75 MG/1
75 TABLET ORAL DAILY
Qty: 90 TABLET | Refills: 3 | Status: SHIPPED | OUTPATIENT
Start: 2022-01-13 | End: 2022-11-17

## 2022-01-13 NOTE — TELEPHONE ENCOUNTER
Refill Authorization Note   Jani Cha  is requesting a refill authorization.  Brief Assessment and Rationale for Refill:  Approve     Medication Therapy Plan:       Medication Reconciliation Completed: No   Comments:   --->Care Gap information included below if applicable.   Orders Placed This Encounter    clopidogreL (PLAVIX) 75 mg tablet      Requested Prescriptions   Signed Prescriptions Disp Refills    clopidogreL (PLAVIX) 75 mg tablet 90 tablet 3     Sig: Take 1 tablet (75 mg total) by mouth once daily.       Hematology: Antiplatelets - clopidogrel Passed - 1/11/2022  8:28 AM        Passed - Patient is at least 18 years old        Passed - No contraindicated proton pump inhibitors on active medication list        Passed - Valid encounter within last 15 months     Recent Visits  Date Type Provider Dept   12/10/21 Office Visit Laila Bains MD [facility] Family Med/ Internal Med/ Peds   06/11/21 Office Visit MD Cristela Arizmendi Family Med/ Internal Med/ Peds   12/11/20 Office Visit Laila Bains MD [facility] Family Med/ Internal Med/ Peds   07/07/20 Office Visit Laila Bains MD [facility] Family Med/ Internal Med/ Peds   06/11/20 Office Visit Laila Bains MD [facility] Family Med/ Internal Med/ Peds   Showing recent visits within past 720 days and meeting all other requirements  Future Appointments  No visits were found meeting these conditions.  Showing future appointments within next 150 days and meeting all other requirements      Future Appointments              In 3 weeks [facility] - [facility]    In 3 weeks LAB, [facility] - Lab[facility]italiy    In 1 month Malcolm Kent MD [facility] [place] - Endocrinology, [facility]    In 2 months ROGER Carreono - Podiatry[facility]italiy                Passed - HCT is between 33 and 75 and within 360 days     Hematocrit   Date Value Ref Range Status   12/06/2021 43.8 40.0 - 54.0 % Final   01/25/2021 43.6 40.0 - 54.0 % Final   10/08/2020 47.3 40.0 - 54.0 % Final               Passed - HGB is 11 or above and within 360 days     Hemoglobin   Date Value Ref Range Status   12/06/2021 14.2 14.0 - 18.0 g/dL Final   01/25/2021 13.9 (L) 14.0 - 18.0 g/dL Final   10/08/2020 14.7 14.0 - 18.0 g/dL Final              Passed - PLT in normal range and within 360 days     Platelets   Date Value Ref Range Status   12/06/2021 201 150 - 450 K/uL Final   01/25/2021 225 150 - 350 K/uL Final   10/08/2020 243 150 - 350 K/uL Final                  Appointments  past 12m or future 3m with PCP    Date Provider   Last Visit   12/10/2021 Laila Bains MD   Next Visit   Visit date not found Laila Bains MD   ED visits in past 90 days: 0     Note composed:9:41 AM 01/13/2022

## 2022-01-31 ENCOUNTER — EXTERNAL CHRONIC CARE MANAGEMENT (OUTPATIENT)
Dept: PRIMARY CARE CLINIC | Facility: CLINIC | Age: 77
End: 2022-01-31
Payer: MEDICARE

## 2022-01-31 PROCEDURE — 99490 CHRNC CARE MGMT STAFF 1ST 20: CPT | Mod: PBBFAC,PO | Performed by: FAMILY MEDICINE

## 2022-01-31 PROCEDURE — 99490 CHRNC CARE MGMT STAFF 1ST 20: CPT | Mod: S$PBB,,, | Performed by: FAMILY MEDICINE

## 2022-01-31 PROCEDURE — 99490 PR CHRONIC CARE MGMT, 1ST 20 MIN: ICD-10-PCS | Mod: S$PBB,,, | Performed by: FAMILY MEDICINE

## 2022-02-07 ENCOUNTER — LAB VISIT (OUTPATIENT)
Dept: LAB | Facility: HOSPITAL | Age: 77
End: 2022-02-07
Attending: HOSPITALIST
Payer: MEDICARE

## 2022-02-07 DIAGNOSIS — E11.40 TYPE 2 DIABETES MELLITUS WITH DIABETIC NEUROPATHY, WITHOUT LONG-TERM CURRENT USE OF INSULIN: Chronic | ICD-10-CM

## 2022-02-07 LAB
ALBUMIN SERPL BCP-MCNC: 3.3 G/DL (ref 3.5–5.2)
ALP SERPL-CCNC: 76 U/L (ref 55–135)
ALT SERPL W/O P-5'-P-CCNC: 17 U/L (ref 10–44)
ANION GAP SERPL CALC-SCNC: 8 MMOL/L (ref 8–16)
AST SERPL-CCNC: 12 U/L (ref 10–40)
BILIRUB SERPL-MCNC: 0.4 MG/DL (ref 0.1–1)
BUN SERPL-MCNC: 32 MG/DL (ref 8–23)
CALCIUM SERPL-MCNC: 9.9 MG/DL (ref 8.7–10.5)
CHLORIDE SERPL-SCNC: 107 MMOL/L (ref 95–110)
CO2 SERPL-SCNC: 29 MMOL/L (ref 23–29)
CREAT SERPL-MCNC: 1.6 MG/DL (ref 0.5–1.4)
EST. GFR  (AFRICAN AMERICAN): 48 ML/MIN/1.73 M^2
EST. GFR  (NON AFRICAN AMERICAN): 41.5 ML/MIN/1.73 M^2
ESTIMATED AVG GLUCOSE: 214 MG/DL (ref 68–131)
GLUCOSE SERPL-MCNC: 182 MG/DL (ref 70–110)
HBA1C MFR BLD: 9.1 % (ref 4–5.6)
POTASSIUM SERPL-SCNC: 5.2 MMOL/L (ref 3.5–5.1)
PROT SERPL-MCNC: 6.2 G/DL (ref 6–8.4)
SODIUM SERPL-SCNC: 144 MMOL/L (ref 136–145)

## 2022-02-07 PROCEDURE — 36415 COLL VENOUS BLD VENIPUNCTURE: CPT | Mod: PO | Performed by: HOSPITALIST

## 2022-02-07 PROCEDURE — 80053 COMPREHEN METABOLIC PANEL: CPT | Performed by: HOSPITALIST

## 2022-02-07 PROCEDURE — 83036 HEMOGLOBIN GLYCOSYLATED A1C: CPT | Performed by: HOSPITALIST

## 2022-02-14 ENCOUNTER — OFFICE VISIT (OUTPATIENT)
Dept: ENDOCRINOLOGY | Facility: CLINIC | Age: 77
End: 2022-02-14
Payer: MEDICARE

## 2022-02-14 VITALS
WEIGHT: 245 LBS | DIASTOLIC BLOOD PRESSURE: 92 MMHG | HEART RATE: 69 BPM | BODY MASS INDEX: 35.15 KG/M2 | SYSTOLIC BLOOD PRESSURE: 147 MMHG | TEMPERATURE: 98 F

## 2022-02-14 DIAGNOSIS — D35.02 ADENOMA OF LEFT ADRENAL GLAND: Chronic | ICD-10-CM

## 2022-02-14 DIAGNOSIS — Z95.1 S/P CABG X 4: Chronic | ICD-10-CM

## 2022-02-14 DIAGNOSIS — I25.10 CORONARY ARTERY DISEASE INVOLVING NATIVE CORONARY ARTERY OF NATIVE HEART WITHOUT ANGINA PECTORIS: Chronic | ICD-10-CM

## 2022-02-14 DIAGNOSIS — N18.32 STAGE 3B CHRONIC KIDNEY DISEASE: ICD-10-CM

## 2022-02-14 DIAGNOSIS — E11.65 UNCONTROLLED TYPE 2 DIABETES MELLITUS WITH HYPERGLYCEMIA, WITHOUT LONG-TERM CURRENT USE OF INSULIN: Primary | ICD-10-CM

## 2022-02-14 DIAGNOSIS — E66.01 CLASS 2 SEVERE OBESITY DUE TO EXCESS CALORIES WITH SERIOUS COMORBIDITY AND BODY MASS INDEX (BMI) OF 37.0 TO 37.9 IN ADULT: ICD-10-CM

## 2022-02-14 DIAGNOSIS — E78.00 PURE HYPERCHOLESTEROLEMIA: Chronic | ICD-10-CM

## 2022-02-14 PROCEDURE — 99214 PR OFFICE/OUTPT VISIT, EST, LEVL IV, 30-39 MIN: ICD-10-PCS | Mod: S$PBB,,, | Performed by: HOSPITALIST

## 2022-02-14 PROCEDURE — 99214 OFFICE O/P EST MOD 30 MIN: CPT | Mod: PBBFAC | Performed by: HOSPITALIST

## 2022-02-14 PROCEDURE — 99214 OFFICE O/P EST MOD 30 MIN: CPT | Mod: S$PBB,,, | Performed by: HOSPITALIST

## 2022-02-14 PROCEDURE — 99999 PR PBB SHADOW E&M-EST. PATIENT-LVL IV: ICD-10-PCS | Mod: PBBFAC,,, | Performed by: HOSPITALIST

## 2022-02-14 PROCEDURE — 99999 PR PBB SHADOW E&M-EST. PATIENT-LVL IV: CPT | Mod: PBBFAC,,, | Performed by: HOSPITALIST

## 2022-02-14 RX ORDER — DULAGLUTIDE 0.75 MG/.5ML
0.75 INJECTION, SOLUTION SUBCUTANEOUS
Qty: 4 PEN | Refills: 11 | Status: SHIPPED | OUTPATIENT
Start: 2022-02-14 | End: 2022-03-16

## 2022-02-14 NOTE — ASSESSMENT & PLAN NOTE
- ASCVD Risk below: Statin: Taking  The ASCVD Risk score (Chaiharitha KAMINSKI Jr., et al., 2013) failed to calculate for the following reasons:    The valid total cholesterol range is 130 to 320 mg/dL

## 2022-02-14 NOTE — PATIENT INSTRUCTIONS
Thank you for completing the visit with me    Continue:  Metformin 500mg twice a day with food  Glipizide 10 mg daily    Start Trulicity 0.75mg once a week >> use it for 2 months, if you are doing well, call and we can increase the dose    Please check glucose 1 times a day (before breakfast).   Glucose logs given in clinic, please filled out and bring back to next office visit for me to review    We will plan an in-clinic visit in 3 months, with labs prior to that appointment.       Malcolm Kent M.D  [facility] [address], [address]  [place] [address]    Office:  [phone]  Fax:  [phone]

## 2022-02-14 NOTE — ASSESSMENT & PLAN NOTE
- Renal function reviewed on lab work today  - Renally Adjust diabetes medications, avoid hypoglycemia  - Oral options limited  - continue routine monitoring

## 2022-02-14 NOTE — ASSESSMENT & PLAN NOTE
- Diabetes poorly controlled, given current A1c, goal A1C for patient is 7%  - Complicated by hyperglycemia, obesity, hx CAD/CABG, CKD3b,  poor dietary indiscretion.  - high deductible, unable to afford medications  - hyperglycemia noted on fasting glucose in a.m.  - discussion about decreasing portion size and decreasing carbohydrate intake  - Diabetic supplies/medications, review at every visit to ensure continue refills    Plan  - due to the renal function, medical options limited  - renally dose metformin:  500 mg b.i.d. due to CKD stage IIIB, continue glipizide to 10 mg daily  - attempt for Trulicity, cost may be an issue, see above due to high deductible  - can consider other option including Actos versus DPP 4 versus insulin  - advised the patient follow with my regimen with close follow-up as scheduled  - repeat lab work prior to next visit, 3 months

## 2022-02-14 NOTE — ASSESSMENT & PLAN NOTE
- on statin, beta-blocker, aspirin  - Discussed importance of better glycemic control  - Candidate for GLP1 in the future

## 2022-02-14 NOTE — PROGRESS NOTES
Subjective:      Patient ID: Jani Cha is a 76 y.o. male presented to [facility] on 2/14/2022.  Chief Complaint:  Diabetes type 2, Diabetes      History of Present Illness: Jani Cha is a 76 y.o. male here for management of type 2 diabetes and left adrenal incidentaloma  Other significant past medical history:  CKD stage IIIB, CAD, CABG, hypertension, hyperlipidemia      Interval history:  Patient is here for follow-up of type 2 diabetes, A1c worsen due to multiple steroid injections.  Glucose log reviewed today show hyperglycemia predominantly in the 150-270 in the morning fasting.  Had 3 cortisone shot in knees and shoulder> Jan 2022    Discuss with [facility] pharmacy: Trulicity is $370 with insurance. The problem is that it looks like he has a deductible to meet, so  all the alternatives would be just as high       With regards to Diabetes Mellitus Type 2  Known diabetic complications: nephropathy and cardiovascular disease  Cardiovascular risk factors: advanced age (older than 55 for men, 65 for women), diabetes mellitus, dyslipidemia, hypertension, male gender, microalbuminuria, obesity (BMI >= 30 kg/m2) and sedentary lifestyle  Diagnosed w/ DM: in 2006    Current meds:               Glipizide 10mg daily   Metformin 500 mg b.i.d.  Home glucose checks: does not check glucose, ranging 156-272        Hypoglycemia: denies  Fasting:   unavailable  Diet/Exercise:               Eating 2 meals per day , lunch in, large portion dinner, does eat cookies prior to going to bed              Drink:  Coffee with sugar in the morning              Current exercise: none due to knee pain  Weight trend: stable  Diabetes Education: no  Diabetes Related Hospitalization:  no  Hx of pancreatitis, hx of thyroid cancer: no  Family history of diabetes: unknow  Occupation: retired  Eye exam current (within one year): yes  Reports cuts or ulcers on feet:  Denies    Statin: Taking  ACE/ARB:  Taking    Diabetes Management Status: Reviewed     A1C Trend  Lab Results   Component Value Date    HGBA1C 9.1 (H) 02/07/2022    HGBA1C 8.6 (H) 12/06/2021    HGBA1C 8.7 (H) 08/24/2021       Lab Results   Component Value Date    MICALBCREAT 47.1 (H) 02/07/2022     No results found for: OUOOHRKZ16  No results found for: TTGIGA    No results found for: CPEPTIDE, GLUTAMICACID, ISLETCELLANT, FRUCTOSAMINE     Screening or Prevention Patient's value Goal Complete/Controlled?   Lipid profile : 12/06/2021 Annually Yes   LDL control Lab Results   Component Value Date    LDLCALC 60.0 (L) 12/06/2021    Annually/Less than 100 mg/dl  Yes   Nephropathy screening Lab Results   Component Value Date    LABMICR 56.0 02/07/2022     Lab Results   Component Value Date    PROTEINUA 1+ (A) 12/31/2015    Annually No   Blood pressure BP Readings from Last 1 Encounters:   02/14/22 (!) 147/92    Less than 140/90 Yes   Dilated retinal exam : 10/19/2021 Annually Yes   Foot exam   : 06/11/2021 Annually Yes       Regards to adrenal incidentaloma  Noted on CT chest, left adrenal mass 1.2 cm in size.  Adrenal lesion imaging characteristics left adrenal nodule, 1.2 cm, Smooth, heterogenous, without calcifications      Patient does have history of diabetes as above, Morbidly obese, Hypertension but no history of hypertensive emergency     Pt denies history of paroxysmal symptoms such as syncope, pallor or dizziness  No palpitations   No history of cancer  Pt reports no abdominal discomfort  Denies cancer history     DST: WNL, cortisol 2, due to the overweight  Metanephrines and normetanephrines:  Within acceptable ranges  Renin/navneet level normal      Reviewed past surgical, medical, family, social history and updated as appropriate.    Review of Systems: see HPI above    Objective:   BP (!) 147/92   Pulse 69   Temp 97.9 °F (36.6 °C) (Oral)   Wt 111.1 kg (245 lb)   BMI 35.15 kg/m²     Body mass index is 35.15 kg/m².  Vital signs  reviewed    Physical Exam  Vitals and nursing note reviewed.   Constitutional:       General: He is not in acute distress.     Appearance: Normal appearance. He is well-developed. He is obese.   Neck:      Thyroid: No thyromegaly.   Cardiovascular:      Rate and Rhythm: Normal rate.      Heart sounds: Normal heart sounds.   Pulmonary:      Effort: Pulmonary effort is normal. No respiratory distress.   Abdominal:      Tenderness: There is no abdominal tenderness.   Musculoskeletal:         General: Normal range of motion.      Cervical back: Neck supple.   Skin:     General: Skin is warm.      Findings: No erythema.   Neurological:      Mental Status: He is alert and oriented to person, place, and time.         Lab Reviewed:   Lab Results   Component Value Date    HGBA1C 9.1 (H) 02/07/2022       Lab Results   Component Value Date    CHOL 110 (L) 12/06/2021    HDL 35 (L) 12/06/2021    LDLCALC 60.0 (L) 12/06/2021    TRIG 75 12/06/2021    CHOLHDL 31.8 12/06/2021       Lab Results   Component Value Date     02/07/2022    K 5.2 (H) 02/07/2022     02/07/2022    CO2 29 02/07/2022     (H) 02/07/2022    BUN 32 (H) 02/07/2022    CREATININE 1.6 (H) 02/07/2022    CALCIUM 9.9 02/07/2022    PROT 6.2 02/07/2022    ALBUMIN 3.3 (L) 02/07/2022    BILITOT 0.4 02/07/2022    ALKPHOS 76 02/07/2022    AST 12 02/07/2022    ALT 17 02/07/2022    ANIONGAP 8 02/07/2022    ESTGFRAFRICA 48.0 (A) 02/07/2022    EGFRNONAA 41.5 (A) 02/07/2022    TSH 1.640 12/06/2021        Lab Results   Component Value Date    CALCIUM 9.9 02/07/2022    CALCIUM 9.4 12/06/2021    CALCIUM 10.2 08/24/2021    ALKPHOS 76 02/07/2022    ALKPHOS 80 12/06/2021    ALKPHOS 75 08/24/2021    TSH 1.640 12/06/2021       Assessment     1. Uncontrolled type 2 diabetes mellitus with hyperglycemia, without long-term current use of insulin  dulaglutide (TRULICITY) 0.75 mg/0.5 mL pen injector    Hemoglobin A1C    Comprehensive Metabolic Panel   2. Adenoma of left  adrenal gland     3. Stage 3b chronic kidney disease     4. S/P CABG x 4  dulaglutide (TRULICITY) 0.75 mg/0.5 mL pen injector   5. Coronary artery disease involving native coronary artery of native heart without angina pectoris  dulaglutide (TRULICITY) 0.75 mg/0.5 mL pen injector   6. Pure hypercholesterolemia     7. Class 2 severe obesity due to excess calories with serious comorbidity and body mass index (BMI) of 37.0 to 37.9 in adult          Plan     Uncontrolled type 2 diabetes mellitus with hyperglycemia, without long-term current use of insulin  - Diabetes poorly controlled, given current A1c, goal A1C for patient is 7%  - Complicated by hyperglycemia, obesity, hx CAD/CABG, CKD3b,  poor dietary indiscretion.  - high deductible, unable to afford medications  - hyperglycemia noted on fasting glucose in a.m.  - discussion about decreasing portion size and decreasing carbohydrate intake  - Diabetic supplies/medications, review at every visit to ensure continue refills    Plan  - due to the renal function, medical options limited  - renally dose metformin:  500 mg b.i.d. due to CKD stage IIIB, continue glipizide to 10 mg daily  - attempt for Trulicity, cost may be an issue, see above due to high deductible  - can consider other option including Actos versus DPP 4 versus insulin  - advised the patient follow with my regimen with close follow-up as scheduled  - repeat lab work prior to next visit, 3 months        Adrenal adenoma  - adrenal incidentaloma, Noted on CT chest, left adrenal mass 1.2 cm in size.  - Adrenal lesion imaging characteristics left adrenal nodule, 1.2 cm, Smooth, heterogenous, without calcifications   - DST: WNL, cortisol 2, due to the overweight  - Metanephrines and normetanephrines:  Within acceptable ranges  - Renin/navneet level normal  - repeat DST     CKD (chronic kidney disease) stage 3, GFR 30-59 ml/min  - Renal function reviewed on lab work today  - Renally Adjust diabetes medications,  avoid hypoglycemia  - Oral options limited  - continue routine monitoring         Coronary artery disease involving native coronary artery of native heart without angina pectoris  - on statin, beta-blocker, aspirin  - Discussed importance of better glycemic control  - Candidate for GLP1 in the future    Hyperlipidemia  - ASCVD Risk below: Statin: Taking  The ASCVD Risk score (Chai KAMINSKI Jr., et al., 2013) failed to calculate for the following reasons:    The valid total cholesterol range is 130 to 320 mg/dL      Class 2 severe obesity due to excess calories with serious comorbidity and body mass index (BMI) of 37.0 to 37.9 in adult  - Body mass index is 35.15 kg/m².  - dietary discussion as above  - continue to monitor weight        Advised patient to follow up with PCP for routine health maintenance care.   RTC in 3 months    Malcolm Kent M.D  Endocrinology  [facility]  2/14/2022      Disclaimer: This note has been generated using voice-recognition software. There may be typographical errors that have been missed during proof-reading.

## 2022-02-16 ENCOUNTER — TELEPHONE (OUTPATIENT)
Dept: PHARMACY | Facility: CLINIC | Age: 77
End: 2022-02-16
Payer: MEDICARE

## 2022-02-16 ENCOUNTER — PATIENT MESSAGE (OUTPATIENT)
Dept: PHARMACY | Facility: CLINIC | Age: 77
End: 2022-02-16
Payer: MEDICARE

## 2022-02-16 NOTE — TELEPHONE ENCOUNTER
Per patient request [facility] and the necessary documents to submit the application.

## 2022-02-25 ENCOUNTER — TELEPHONE (OUTPATIENT)
Dept: FAMILY MEDICINE | Facility: CLINIC | Age: 77
End: 2022-02-25
Payer: MEDICARE

## 2022-02-25 RX ORDER — COLCHICINE 0.6 MG/1
0.6 TABLET ORAL DAILY
Qty: 5 TABLET | Refills: 0 | Status: SHIPPED | OUTPATIENT
Start: 2022-02-25 | End: 2022-06-17

## 2022-02-25 RX ORDER — COLCHICINE 0.6 MG/1
0.6 TABLET ORAL 2 TIMES DAILY
Qty: 60 TABLET | Refills: 11 | Status: SHIPPED | OUTPATIENT
Start: 2022-02-25 | End: 2022-02-25 | Stop reason: SDUPTHER

## 2022-02-25 RX ORDER — INDOMETHACIN 50 MG/1
50 CAPSULE ORAL 2 TIMES DAILY WITH MEALS
Qty: 30 CAPSULE | Refills: 0 | Status: SHIPPED | OUTPATIENT
Start: 2022-02-25 | End: 2022-06-28 | Stop reason: ALTCHOICE

## 2022-02-25 NOTE — TELEPHONE ENCOUNTER
Spoke with patient has c/o swelling to left foot with pain, started 3 or 4 days ago, has gout, Patient says he is going away next week to visit grand daughter

## 2022-02-25 NOTE — TELEPHONE ENCOUNTER
----- Message from Rosy Eckert MA sent at 2/25/2022  8:25 AM CST -----  Regarding: pt requesting a call back  Name of Who is Calling: ELLEN STRATTON [[id number]]           What is the request in detail: pt requesting a call back needs to speak with someone in office about a flare up needs meds called into pharmacy            Can the clinic reply by [facility]: N           What Number to Call Back if not in [facility]: [phone]

## 2022-02-25 NOTE — TELEPHONE ENCOUNTER
----- Message from Anna Roper sent at 2/25/2022  9:51 AM CST -----  Regarding: drug interaction on patient's medications  Type:  Pharmacy Calling to Clarify an RX    Name of Caller: Frank from [facility]     Pharmacy Name: [facility]     Prescription Name: carvediloL (COREG) 25 MG tablet and colchicine (COLCRYS) 0.6 mg tablet    What do they need to clarify? Frank from [facility] pharmacy called to notify the staff that there is a drug interaction between the patient's  thecarvediloL (COREG) 25 MG tablet & colchicine (COLCRYS) 0.6 mg tablet. Please return call at earliest convenience.     Can you be contacted via [facility]?call back     Best Call Back Number: [phone]

## 2022-02-25 NOTE — TELEPHONE ENCOUNTER
colchicine and indomethacin sent to pharmacy  Unable to prescribe steroids at this time as his a1c is too high.

## 2022-02-25 NOTE — TELEPHONE ENCOUNTER
Patient can be given colchicine.   Medication will be adjusted for once daily dosing and for 5 days only.   Patient will need it for acute gout.

## 2022-02-25 NOTE — TELEPHONE ENCOUNTER
----- Message from Ashley Ludwig sent at 2/25/2022  3:18 PM CST -----  Regarding: self [phone]  .Type:  Patient Returning Call    Who Called:  self     Who Left Message for Patient: Isabel Davis MA    Does the patient know what this is regarding? Isabel Davis MA    Would the patient rather a call back or a response via [facility]? Call back     Best Call Back Number: .[phone]

## 2022-02-28 ENCOUNTER — EXTERNAL CHRONIC CARE MANAGEMENT (OUTPATIENT)
Dept: PRIMARY CARE CLINIC | Facility: CLINIC | Age: 77
End: 2022-02-28
Payer: MEDICARE

## 2022-02-28 PROCEDURE — 99490 CHRNC CARE MGMT STAFF 1ST 20: CPT | Mod: PBBFAC,PO | Performed by: FAMILY MEDICINE

## 2022-02-28 PROCEDURE — 99490 CHRNC CARE MGMT STAFF 1ST 20: CPT | Mod: S$PBB,,, | Performed by: FAMILY MEDICINE

## 2022-02-28 PROCEDURE — 99490 PR CHRONIC CARE MGMT, 1ST 20 MIN: ICD-10-PCS | Mod: S$PBB,,, | Performed by: FAMILY MEDICINE

## 2022-03-17 ENCOUNTER — PATIENT OUTREACH (OUTPATIENT)
Dept: ADMINISTRATIVE | Facility: HOSPITAL | Age: 77
End: 2022-03-17
Payer: MEDICARE

## 2022-03-17 ENCOUNTER — TELEPHONE (OUTPATIENT)
Dept: FAMILY MEDICINE | Facility: CLINIC | Age: 77
End: 2022-03-17
Payer: MEDICARE

## 2022-03-17 NOTE — PROGRESS NOTES
Merged with [facility] BP Non-Compliant gap report - patient reports that he does have a blood pressure cuff at home, but he is not sure if it still works. He will call back with a reading if it is working.

## 2022-03-31 ENCOUNTER — EXTERNAL CHRONIC CARE MANAGEMENT (OUTPATIENT)
Dept: PRIMARY CARE CLINIC | Facility: CLINIC | Age: 77
End: 2022-03-31
Payer: MEDICARE

## 2022-03-31 PROCEDURE — 99439 CHRNC CARE MGMT STAF EA ADDL: CPT | Mod: S$PBB,,, | Performed by: FAMILY MEDICINE

## 2022-03-31 PROCEDURE — 99490 PR CHRONIC CARE MGMT, 1ST 20 MIN: ICD-10-PCS | Mod: S$PBB,,, | Performed by: FAMILY MEDICINE

## 2022-03-31 PROCEDURE — 99439 PR CHRONIC CARE MGMT, EA ADDTL 20 MIN: ICD-10-PCS | Mod: S$PBB,,, | Performed by: FAMILY MEDICINE

## 2022-03-31 PROCEDURE — 99490 CHRNC CARE MGMT STAFF 1ST 20: CPT | Mod: PBBFAC,PO,25 | Performed by: FAMILY MEDICINE

## 2022-03-31 PROCEDURE — 99439 CHRNC CARE MGMT STAF EA ADDL: CPT | Mod: PBBFAC,PO,27 | Performed by: FAMILY MEDICINE

## 2022-03-31 PROCEDURE — 99490 CHRNC CARE MGMT STAFF 1ST 20: CPT | Mod: S$PBB,,, | Performed by: FAMILY MEDICINE

## 2022-04-09 ENCOUNTER — PATIENT OUTREACH (OUTPATIENT)
Dept: ADMINISTRATIVE | Facility: OTHER | Age: 77
End: 2022-04-09
Payer: MEDICARE

## 2022-04-11 ENCOUNTER — OFFICE VISIT (OUTPATIENT)
Dept: PODIATRY | Facility: CLINIC | Age: 77
End: 2022-04-11
Payer: MEDICARE

## 2022-04-11 VITALS — WEIGHT: 244.94 LBS | HEIGHT: 70 IN | BODY MASS INDEX: 35.07 KG/M2

## 2022-04-11 DIAGNOSIS — B35.1 ONYCHOMYCOSIS DUE TO DERMATOPHYTE: ICD-10-CM

## 2022-04-11 DIAGNOSIS — E11.49 TYPE II DIABETES MELLITUS WITH NEUROLOGICAL MANIFESTATIONS: Primary | ICD-10-CM

## 2022-04-11 PROCEDURE — 99999 PR PBB SHADOW E&M-EST. PATIENT-LVL III: CPT | Mod: PBBFAC,,, | Performed by: PODIATRIST

## 2022-04-11 PROCEDURE — 99999 PR PBB SHADOW E&M-EST. PATIENT-LVL III: ICD-10-PCS | Mod: PBBFAC,,, | Performed by: PODIATRIST

## 2022-04-11 PROCEDURE — 11721 PR DEBRIDEMENT OF NAILS, 6 OR MORE: ICD-10-PCS | Mod: Q9,S$PBB,, | Performed by: PODIATRIST

## 2022-04-11 PROCEDURE — 99213 OFFICE O/P EST LOW 20 MIN: CPT | Mod: PBBFAC,PO | Performed by: PODIATRIST

## 2022-04-11 PROCEDURE — 11721 DEBRIDE NAIL 6 OR MORE: CPT | Mod: Q9,S$PBB,, | Performed by: PODIATRIST

## 2022-04-11 PROCEDURE — 99499 UNLISTED E&M SERVICE: CPT | Mod: S$PBB,,, | Performed by: PODIATRIST

## 2022-04-11 PROCEDURE — 99499 NO LOS: ICD-10-PCS | Mod: S$PBB,,, | Performed by: PODIATRIST

## 2022-04-11 PROCEDURE — 11721 DEBRIDE NAIL 6 OR MORE: CPT | Mod: PBBFAC,PO | Performed by: PODIATRIST
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
## 2022-04-19 ENCOUNTER — IMMUNIZATION (OUTPATIENT)
Dept: OBSTETRICS AND GYNECOLOGY | Facility: CLINIC | Age: 77
End: 2022-04-19
Payer: MEDICARE

## 2022-04-19 DIAGNOSIS — Z23 NEED FOR VACCINATION: Primary | ICD-10-CM

## 2022-04-19 PROCEDURE — 91305 COVID-19, MRNA, LNP-S, PF, 30 MCG/0.3 ML DOSE VACCINE (PFIZER): CPT | Mod: PBBFAC

## 2022-04-21 ENCOUNTER — TELEPHONE (OUTPATIENT)
Dept: FAMILY MEDICINE | Facility: CLINIC | Age: 77
End: 2022-04-21
Payer: MEDICARE

## 2022-04-21 NOTE — TELEPHONE ENCOUNTER
Patient appointment is in June    It looks like he is getting blood work prior to seeing Dr. Kent  I can use that blood work obtain from Dr. Kent at that time.   The rest of his blood work is due in December

## 2022-04-21 NOTE — TELEPHONE ENCOUNTER
----- Message from Ann Manning sent at 4/21/2022 11:38 AM CDT -----  Type: Lab    Caller is requesting to schedule their Lab appointment prior to annual appointment.    Order is not listed in EPIC.  Please enter order and contact patient to schedule.    Name of Caller self     Date of EPP Appointment: 6/28     Where would they like the lab performed? [place]     Would the patient rather a call back or a response via [facility]? Call back     Best Call Back Number: [phone]

## 2022-04-26 ENCOUNTER — TELEPHONE (OUTPATIENT)
Dept: UROLOGY | Facility: CLINIC | Age: 77
End: 2022-04-26
Payer: MEDICARE

## 2022-04-29 ENCOUNTER — HOSPITAL ENCOUNTER (OUTPATIENT)
Dept: RADIOLOGY | Facility: HOSPITAL | Age: 77
Discharge: HOME | End: 2022-04-29
Attending: UROLOGY
Payer: MEDICARE

## 2022-04-29 DIAGNOSIS — N28.1 KIDNEY CYST, ACQUIRED: ICD-10-CM

## 2022-04-29 DIAGNOSIS — N20.0 KIDNEY STONE: ICD-10-CM

## 2022-04-29 PROCEDURE — 76770 US EXAM ABDO BACK WALL COMP: CPT | Mod: 26,,, | Performed by: RADIOLOGY

## 2022-04-29 PROCEDURE — 76770 US RETROPERITONEAL COMPLETE: ICD-10-PCS | Mod: 26,,, | Performed by: RADIOLOGY

## 2022-04-29 PROCEDURE — 76770 US EXAM ABDO BACK WALL COMP: CPT | Mod: TC

## 2022-04-30 ENCOUNTER — EXTERNAL CHRONIC CARE MANAGEMENT (OUTPATIENT)
Dept: PRIMARY CARE CLINIC | Facility: CLINIC | Age: 77
End: 2022-04-30
Payer: MEDICARE

## 2022-04-30 PROCEDURE — 99439 CHRNC CARE MGMT STAF EA ADDL: CPT | Mod: S$PBB,,, | Performed by: FAMILY MEDICINE

## 2022-04-30 PROCEDURE — 99490 PR CHRONIC CARE MGMT, 1ST 20 MIN: ICD-10-PCS | Mod: S$PBB,,, | Performed by: FAMILY MEDICINE

## 2022-04-30 PROCEDURE — 99439 PR CHRONIC CARE MGMT, EA ADDTL 20 MIN: ICD-10-PCS | Mod: S$PBB,,, | Performed by: FAMILY MEDICINE

## 2022-04-30 PROCEDURE — 99439 CHRNC CARE MGMT STAF EA ADDL: CPT | Mod: PBBFAC,PO,27 | Performed by: FAMILY MEDICINE

## 2022-04-30 PROCEDURE — 99490 CHRNC CARE MGMT STAFF 1ST 20: CPT | Mod: PBBFAC,PO | Performed by: FAMILY MEDICINE

## 2022-04-30 PROCEDURE — 99490 CHRNC CARE MGMT STAFF 1ST 20: CPT | Mod: S$PBB,,, | Performed by: FAMILY MEDICINE

## 2022-05-02 DIAGNOSIS — B35.6 TINEA CRURIS: ICD-10-CM

## 2022-05-03 ENCOUNTER — OFFICE VISIT (OUTPATIENT)
Dept: UROLOGY | Facility: CLINIC | Age: 77
End: 2022-05-03
Payer: MEDICARE

## 2022-05-03 VITALS — BODY MASS INDEX: 35.02 KG/M2 | WEIGHT: 244.06 LBS

## 2022-05-03 DIAGNOSIS — N20.0 KIDNEY STONE: ICD-10-CM

## 2022-05-03 DIAGNOSIS — N40.0 BPH WITHOUT OBSTRUCTION/LOWER URINARY TRACT SYMPTOMS: Primary | ICD-10-CM

## 2022-05-03 DIAGNOSIS — N28.1 KIDNEY CYST, ACQUIRED: ICD-10-CM

## 2022-05-03 PROCEDURE — 99999 PR PBB SHADOW E&M-EST. PATIENT-LVL III: ICD-10-PCS | Mod: PBBFAC,,, | Performed by: UROLOGY

## 2022-05-03 PROCEDURE — 99213 PR OFFICE/OUTPT VISIT, EST, LEVL III, 20-29 MIN: ICD-10-PCS | Mod: S$PBB,,, | Performed by: UROLOGY

## 2022-05-03 PROCEDURE — 81001 URINALYSIS AUTO W/SCOPE: CPT | Mod: PBBFAC | Performed by: UROLOGY

## 2022-05-03 PROCEDURE — 99213 OFFICE O/P EST LOW 20 MIN: CPT | Mod: PBBFAC | Performed by: UROLOGY

## 2022-05-03 PROCEDURE — 99213 OFFICE O/P EST LOW 20 MIN: CPT | Mod: S$PBB,,, | Performed by: UROLOGY

## 2022-05-03 PROCEDURE — 99999 PR PBB SHADOW E&M-EST. PATIENT-LVL III: CPT | Mod: PBBFAC,,, | Performed by: UROLOGY

## 2022-05-03 RX ORDER — CLOTRIMAZOLE AND BETAMETHASONE DIPROPIONATE 10; .64 MG/G; MG/G
CREAM TOPICAL
Qty: 45 G | Refills: 0 | Status: SHIPPED | OUTPATIENT
Start: 2022-05-03 | End: 2022-06-28 | Stop reason: SDUPTHER

## 2022-05-03 RX ORDER — TAMSULOSIN HYDROCHLORIDE 0.4 MG/1
0.4 CAPSULE ORAL DAILY
Qty: 30 CAPSULE | Refills: 11 | Status: SHIPPED | OUTPATIENT
Start: 2022-05-03 | End: 2022-06-02

## 2022-05-03 NOTE — PROGRESS NOTES
Subjective:       Patient ID: Jani Cha is a 76 y.o. male The patient's last visit with me was on 4/29/2021.     Chief Complaint:   Chief Complaint   Patient presents with    Annual Exam       History of Kidney Stones  He had an issue with a ureteral stone in Winter 2015.  He did not recall passing the stone but his pain resolved.       3/25/2021  He had right flank pain and hematuria a couple of weeks ago. He brought the stone for analysis a couple of weeks ago.  He denies anymore hematuria, flank pain.  He denies fever.    05/03/2022   He denies pain or hematuria.    Benign Prostatic Hyperplasia  He patient reports nocturia three times a night. He denies frequency, incomplete emptying and intermittency. The patient states symptoms are of moderate severity. Onset of symptoms was several years ago and was gradual in onset.  He has no personal history and no family history of prostate cancer. He reports a history of kidney stones. He denies flank pain, gross hematuria and recurrent UTI.  He is currently taking no prostate medications.  He has noted some frequency with occasional urgency.       ACTIVE MEDICAL ISSUES:  Patient Active Problem List   Diagnosis    Hyperlipidemia    Hypertension    Coronary artery disease involving native coronary artery of native heart without angina pectoris    Sleep apnea    S/P CABG x 4    Type 2 diabetes mellitus with diabetic neuropathy, without long-term current use of insulin    GERD (gastroesophageal reflux disease)    Personal history of colonic polyps    Abdominal aortic aneurysm (AAA) without rupture    CKD (chronic kidney disease) stage 3, GFR 30-59 ml/min    Total occlusion of coronary artery, chronic - PDA with collaterals.  No ischemic on PET    Pulmonary nodule    Thoracic aortic aneurysm without rupture    Bilateral renal cysts    Adrenal adenoma    History of PCI of RCA    Class 2 severe obesity due to excess calories with serious comorbidity  and body mass index (BMI) of 37.0 to 37.9 in adult    Calcified granuloma of lung    Uncontrolled type 2 diabetes mellitus with hyperglycemia, without long-term current use of insulin       ALLERGIES AND MEDICATIONS: updated and reviewed.  Review of patient's allergies indicates:   Allergen Reactions    Penicillins Hives, Itching and Rash     Current Outpatient Medications   Medication Sig    aspirin (ECOTRIN) 81 MG EC tablet Take 81 mg by mouth once daily.    atorvastatin (LIPITOR) 80 MG tablet Take 1 tablet by mouth once daily    blood sugar diagnostic Strp 1 strip by Misc.(Non-Drug; Combo Route) route once daily.    carvediloL (COREG) 25 MG tablet TAKE 1 TABLET BY MOUTH TWICE DAILY WITH MEALS    clopidogreL (PLAVIX) 75 mg tablet Take 1 tablet (75 mg total) by mouth once daily.    clotrimazole-betamethasone 1-0.05% (LOTRISONE) cream APPLY  CREAM TOPICALLY TO AFFECTED AREA TWICE DAILY    glipiZIDE (GLUCOTROL) 10 MG TR24 Take 1 tablet (10 mg total) by mouth daily with breakfast.    indomethacin (INDOCIN) 50 MG capsule Take 1 capsule (50 mg total) by mouth 2 (two) times daily with meals.    lancets (TRUEPLUS LANCETS) 30 gauge Misc 1 lancet by Misc.(Non-Drug; Combo Route) route 2 (two) times a day.    lancing device Misc 1 Device by Misc.(Non-Drug; Combo Route) route 2 (two) times daily with meals.    pantoprazole (PROTONIX) 40 MG tablet Take 1 tablet by mouth once daily    TRUEPLUS LANCETS 33 gauge Misc Apply topically 2 (two) times daily.    valsartan-hydrochlorothiazide (DIOVAN-HCT) 320-12.5 mg per tablet Take 1 tablet by mouth once daily    colchicine (COLCRYS) 0.6 mg tablet Take 1 tablet (0.6 mg total) by mouth once daily. for 5 days    metFORMIN (GLUCOPHAGE) 500 MG tablet Take 1 tablet (500 mg total) by mouth 2 (two) times daily with meals.    tamsulosin (FLOMAX) 0.4 mg Cap Take 1 capsule (0.4 mg total) by mouth once daily.     No current facility-administered medications for this visit.        Review of Systems   Constitutional: Negative for activity change, fatigue, fever and unexpected weight change.   HENT: Negative for congestion.    Eyes: Negative for redness.   Respiratory: Negative for chest tightness and shortness of breath.    Cardiovascular: Negative for chest pain and leg swelling.   Gastrointestinal: Negative for abdominal pain, constipation, diarrhea, nausea and vomiting.   Genitourinary: Negative for dysuria, flank pain, frequency, hematuria, penile pain, penile swelling, scrotal swelling, testicular pain and urgency.   Musculoskeletal: Negative for arthralgias and back pain.   Neurological: Negative for dizziness and light-headedness.   Psychiatric/Behavioral: Negative for behavioral problems and confusion. The patient is not nervous/anxious.    All other systems reviewed and are negative.      Objective:      Vitals:    05/03/22 1017   Weight: 110.7 kg (244 lb 0.8 oz)     Physical Exam  Vitals and nursing note reviewed.   Constitutional:       Appearance: He is well-developed.   HENT:      Head: Normocephalic.   Eyes:      Conjunctiva/sclera: Conjunctivae normal.   Neck:      Thyroid: No thyromegaly.      Trachea: No tracheal deviation.   Cardiovascular:      Rate and Rhythm: Normal rate.      Heart sounds: Normal heart sounds.   Pulmonary:      Effort: Pulmonary effort is normal. No respiratory distress.      Breath sounds: Normal breath sounds. No wheezing.   Abdominal:      General: Bowel sounds are normal.      Palpations: Abdomen is soft.      Tenderness: There is no abdominal tenderness. There is no rebound.      Hernia: No hernia is present.   Musculoskeletal:         General: No tenderness. Normal range of motion.      Cervical back: Normal range of motion and neck supple.   Lymphadenopathy:      Cervical: No cervical adenopathy.   Skin:     General: Skin is warm and dry.      Findings: No erythema or rash.   Neurological:      Mental Status: He is alert and oriented to  person, place, and time.   Psychiatric:         Behavior: Behavior normal.         Thought Content: Thought content normal.         Judgment: Judgment normal.         Urine dipstick shows negative for all components.  Micro exam: negative for WBC's or RBC's    Component Ref Range & Units 4 d ago 1 yr ago   PSA Diagnostic 0.00 - 4.00 ng/mL 1.8  1.8 CM    Comment: PSA Expected levels:   Hormonal Therapy: <0.05 ng/ml   Prostatectomy: <0.01 ng/ml   Radiation Therapy: <1.00 ng/ml    Resulting Agency  [facility]             Narrative  Performed by: [facility]  1 year      Specimen Collected: 04/29/22 09:08 Last Resulted: 04/29/22 15:04           .    US Retroperitoneal Complete (Kidney and  Order: [id number]   Status: Final result     Visible to patient: Yes (not seen)     Next appt: 05/09/2022 at 09:00 AM in Orthopedics (Radha Valadez MD)     Dx: Kidney stone; Kidney cyst, acquired     0 Result Notes    Details    Reading Physician Reading Date Result Priority   Bobby Alves MD  [phone]  [phone] 4/29/2022 Routine     Narrative & Impression  EXAMINATION:  US RETROPERITONEAL COMPLETE     CLINICAL HISTORY:  Calculus of kidney     TECHNIQUE:  Ultrasound of the kidneys and urinary bladder was performed including color flow and Doppler evaluation of the kidneys.     COMPARISON:  07/13/2016.     FINDINGS:  Right kidney: The right kidney measures 12.1 x 6.6 x 4.7 cm. No cortical thinning. No loss of corticomedullary distinction. Resistive index measures 0.70.  There are multiple right renal cysts present including a 1.4 cm cyst within the lower pole, a 1.8 cm cyst within the lower pole, and a 1.5 cm cyst within the upper pole.  No solid renal masses are identified.  No renal stone. No hydronephrosis.     Left kidney: The left kidney measures 11.7 x 5.5 x 5.1 cm. No cortical thinning. No loss of corticomedullary distinction. Resistive index measures 0.73.  There are 3 left renal cysts present with the largest measuring  1.4 cm.  No renal stone. No hydronephrosis.     The bladder is partially distended at the time of scanning and has an unremarkable appearance.  Prevoid bladder volume is estimated to be 53 mL.  Following voiding, no significant postvoid residual is noted.     The prostate is enlarged measuring 5.0 x 3.6 x 4.5 cm consistent with 43 mL.     Impression:     Bilateral simple renal cysts.     Mild prostatomegaly.     No renal calculi are identified.        Electronically signed by: Bobby Alves MD  Date:                                            04/29/2022  Time:                                           13:26             Exam Ended: 04/29/22 10:16 Last Resulted: 04/29/22 13:26                 Assessment:       1. BPH without obstruction/lower urinary tract symptoms    2. Kidney cyst, acquired    3. Kidney stone          Plan:       1. BPH without obstruction/lower urinary tract symptoms  Add Flomax  - tamsulosin (FLOMAX) 0.4 mg Cap; Take 1 capsule (0.4 mg total) by mouth once daily.  Dispense: 30 capsule; Refill: 11    2. Kidney cyst, acquired  stable    3. Kidney stone  None on recent US       Follow up in about 3 months (around 8/3/2022) for Bladder Scan.

## 2022-05-04 RX ORDER — PANTOPRAZOLE SODIUM 40 MG/1
TABLET, DELAYED RELEASE ORAL
Qty: 90 TABLET | Refills: 2 | Status: SHIPPED | OUTPATIENT
Start: 2022-05-04 | End: 2023-02-28

## 2022-05-04 NOTE — TELEPHONE ENCOUNTER
No new care gaps identified.  [facility] Embedded Care Gaps. Reference number: [id number]. 5/04/2022   3:47:30 PM CDT

## 2022-05-05 DIAGNOSIS — M25.561 PAIN IN BOTH KNEES, UNSPECIFIED CHRONICITY: Primary | ICD-10-CM

## 2022-05-05 DIAGNOSIS — M25.562 PAIN IN BOTH KNEES, UNSPECIFIED CHRONICITY: Primary | ICD-10-CM

## 2022-05-05 NOTE — TELEPHONE ENCOUNTER
Refill Authorization Note   Jani Elizabethharoldo  is requesting a refill authorization.  Brief Assessment and Rationale for Refill:  Approve     Medication Therapy Plan:       Medication Reconciliation Completed: No   Comments:     No Care Gaps recommended.     Note composed:7:05 PM 05/04/2022

## 2022-05-09 ENCOUNTER — APPOINTMENT (OUTPATIENT)
Dept: RADIOLOGY | Facility: HOSPITAL | Age: 77
End: 2022-05-09
Attending: ORTHOPAEDIC SURGERY
Payer: MEDICARE

## 2022-05-09 ENCOUNTER — OFFICE VISIT (OUTPATIENT)
Dept: ORTHOPEDICS | Facility: CLINIC | Age: 77
End: 2022-05-09
Payer: MEDICARE

## 2022-05-09 VITALS
HEART RATE: 64 BPM | RESPIRATION RATE: 18 BRPM | DIASTOLIC BLOOD PRESSURE: 62 MMHG | SYSTOLIC BLOOD PRESSURE: 114 MMHG | OXYGEN SATURATION: 96 % | WEIGHT: 245.13 LBS | BODY MASS INDEX: 35.18 KG/M2

## 2022-05-09 DIAGNOSIS — M25.562 PAIN IN BOTH KNEES, UNSPECIFIED CHRONICITY: ICD-10-CM

## 2022-05-09 DIAGNOSIS — M25.561 PAIN IN BOTH KNEES, UNSPECIFIED CHRONICITY: ICD-10-CM

## 2022-05-09 DIAGNOSIS — M17.0 ARTHRITIS OF BOTH KNEES: Primary | ICD-10-CM

## 2022-05-09 PROCEDURE — 73562 X-RAY EXAM OF KNEE 3: CPT | Mod: 26,50,, | Performed by: RADIOLOGY

## 2022-05-09 PROCEDURE — 99213 OFFICE O/P EST LOW 20 MIN: CPT | Mod: S$PBB,,, | Performed by: ORTHOPAEDIC SURGERY

## 2022-05-09 PROCEDURE — 99999 PR PBB SHADOW E&M-EST. PATIENT-LVL IV: CPT | Mod: PBBFAC,,, | Performed by: ORTHOPAEDIC SURGERY

## 2022-05-09 PROCEDURE — 99214 OFFICE O/P EST MOD 30 MIN: CPT | Mod: PBBFAC,PN | Performed by: ORTHOPAEDIC SURGERY

## 2022-05-09 PROCEDURE — 73562 XR KNEE 3 VIEW BILATERAL: ICD-10-PCS | Mod: 26,50,, | Performed by: RADIOLOGY

## 2022-05-09 PROCEDURE — 99213 PR OFFICE/OUTPT VISIT, EST, LEVL III, 20-29 MIN: ICD-10-PCS | Mod: S$PBB,,, | Performed by: ORTHOPAEDIC SURGERY

## 2022-05-09 PROCEDURE — 99999 PR PBB SHADOW E&M-EST. PATIENT-LVL IV: ICD-10-PCS | Mod: PBBFAC,,, | Performed by: ORTHOPAEDIC SURGERY

## 2022-05-09 PROCEDURE — 73562 X-RAY EXAM OF KNEE 3: CPT | Mod: TC,50,FY,PN

## 2022-05-09 NOTE — PROGRESS NOTES
"Follow up visit    History of Present Illness:   Jani comes to the office for follow up evaluation of bilateral knee pain.  Recommended treatment at the last visit included injection of the right knee - last performed 1/6/22.  "took the edge off" for about a month.  Caused elevated glucose- still having issues with his blood glucose.   Pain worse with weather changes  Minimal relief with tylenol arthritis     ROS: unremarkable and no change since last visit    Physical Examination:    Vitals:    05/09/22 0944   BP: 114/62   Pulse: 64   Resp: 18   SpO2: 96%   Weight: 111.2 kg (245 lb 2.4 oz)   PainSc:   5   PainLoc: Knee        NAD  Left and Right knee   No change in exam     Radiographic imaging: 3 views of the bilateral knees show OA - KL3 R, KL4 L    Assessment/Plan:  76 y.o. male  with Bilateral knee OA    We discussed the etiology of persistent pain and further treatment options.  1. Durolane pre auth submitted  2. Interested in TKA but not candidate at this time due to poorly controlled DM - A1c 9.  Going to endocrinology soon. Persistent elevated blood glucose beyond 2 weeks not associated with knee injection. CSI contraindicated with poorly controlled glucose.  3. Return for follow up visit: 3 weeks    All questions were answered in detail. The patient  verbalized the understanding of the treatment plan and is in full agreement with the treatment plan.    "

## 2022-05-10 ENCOUNTER — LAB VISIT (OUTPATIENT)
Dept: LAB | Facility: HOSPITAL | Age: 77
End: 2022-05-10
Attending: HOSPITALIST
Payer: MEDICARE

## 2022-05-10 DIAGNOSIS — E11.65 UNCONTROLLED TYPE 2 DIABETES MELLITUS WITH HYPERGLYCEMIA, WITHOUT LONG-TERM CURRENT USE OF INSULIN: ICD-10-CM

## 2022-05-10 LAB
ALBUMIN SERPL BCP-MCNC: 3.4 G/DL (ref 3.5–5.2)
ALP SERPL-CCNC: 79 U/L (ref 55–135)
ALT SERPL W/O P-5'-P-CCNC: 16 U/L (ref 10–44)
ANION GAP SERPL CALC-SCNC: 10 MMOL/L (ref 8–16)
AST SERPL-CCNC: 11 U/L (ref 10–40)
BILIRUB SERPL-MCNC: 0.5 MG/DL (ref 0.1–1)
BUN SERPL-MCNC: 36 MG/DL (ref 8–23)
CALCIUM SERPL-MCNC: 9.7 MG/DL (ref 8.7–10.5)
CHLORIDE SERPL-SCNC: 104 MMOL/L (ref 95–110)
CO2 SERPL-SCNC: 25 MMOL/L (ref 23–29)
CREAT SERPL-MCNC: 1.6 MG/DL (ref 0.5–1.4)
EST. GFR  (AFRICAN AMERICAN): 48 ML/MIN/1.73 M^2
EST. GFR  (NON AFRICAN AMERICAN): 41.5 ML/MIN/1.73 M^2
ESTIMATED AVG GLUCOSE: 229 MG/DL (ref 68–131)
GLUCOSE SERPL-MCNC: 183 MG/DL (ref 70–110)
HBA1C MFR BLD: 9.6 % (ref 4–5.6)
POTASSIUM SERPL-SCNC: 4.2 MMOL/L (ref 3.5–5.1)
PROT SERPL-MCNC: 6.4 G/DL (ref 6–8.4)
SODIUM SERPL-SCNC: 139 MMOL/L (ref 136–145)

## 2022-05-10 PROCEDURE — 36415 COLL VENOUS BLD VENIPUNCTURE: CPT | Mod: PO | Performed by: HOSPITALIST

## 2022-05-10 PROCEDURE — 80053 COMPREHEN METABOLIC PANEL: CPT | Performed by: HOSPITALIST

## 2022-05-10 PROCEDURE — 83036 HEMOGLOBIN GLYCOSYLATED A1C: CPT | Performed by: HOSPITALIST

## 2022-05-17 ENCOUNTER — OFFICE VISIT (OUTPATIENT)
Dept: ENDOCRINOLOGY | Facility: CLINIC | Age: 77
End: 2022-05-17
Payer: MEDICARE

## 2022-05-17 VITALS
SYSTOLIC BLOOD PRESSURE: 128 MMHG | BODY MASS INDEX: 35.44 KG/M2 | DIASTOLIC BLOOD PRESSURE: 81 MMHG | TEMPERATURE: 99 F | HEART RATE: 64 BPM | WEIGHT: 247 LBS

## 2022-05-17 DIAGNOSIS — E11.65 UNCONTROLLED TYPE 2 DIABETES MELLITUS WITH HYPERGLYCEMIA, WITHOUT LONG-TERM CURRENT USE OF INSULIN: ICD-10-CM

## 2022-05-17 DIAGNOSIS — E11.40 TYPE 2 DIABETES MELLITUS WITH DIABETIC NEUROPATHY, WITHOUT LONG-TERM CURRENT USE OF INSULIN: Chronic | ICD-10-CM

## 2022-05-17 DIAGNOSIS — N18.32 STAGE 3B CHRONIC KIDNEY DISEASE: ICD-10-CM

## 2022-05-17 DIAGNOSIS — I25.10 CORONARY ARTERY DISEASE INVOLVING NATIVE CORONARY ARTERY OF NATIVE HEART WITHOUT ANGINA PECTORIS: Chronic | ICD-10-CM

## 2022-05-17 DIAGNOSIS — D35.02 ADENOMA OF LEFT ADRENAL GLAND: Chronic | ICD-10-CM

## 2022-05-17 DIAGNOSIS — E66.01 CLASS 2 SEVERE OBESITY DUE TO EXCESS CALORIES WITH SERIOUS COMORBIDITY AND BODY MASS INDEX (BMI) OF 37.0 TO 37.9 IN ADULT: ICD-10-CM

## 2022-05-17 DIAGNOSIS — E78.00 PURE HYPERCHOLESTEROLEMIA: Chronic | ICD-10-CM

## 2022-05-17 DIAGNOSIS — E11.65 UNCONTROLLED TYPE 2 DIABETES MELLITUS WITH HYPERGLYCEMIA, WITHOUT LONG-TERM CURRENT USE OF INSULIN: Primary | ICD-10-CM

## 2022-05-17 PROCEDURE — 99999 PR PBB SHADOW E&M-EST. PATIENT-LVL V: ICD-10-PCS | Mod: PBBFAC,,, | Performed by: HOSPITALIST

## 2022-05-17 PROCEDURE — 99215 OFFICE O/P EST HI 40 MIN: CPT | Mod: PBBFAC | Performed by: HOSPITALIST

## 2022-05-17 PROCEDURE — 99999 PR PBB SHADOW E&M-EST. PATIENT-LVL V: CPT | Mod: PBBFAC,,, | Performed by: HOSPITALIST

## 2022-05-17 PROCEDURE — 99214 OFFICE O/P EST MOD 30 MIN: CPT | Mod: S$PBB,,, | Performed by: HOSPITALIST

## 2022-05-17 PROCEDURE — 99214 PR OFFICE/OUTPT VISIT, EST, LEVL IV, 30-39 MIN: ICD-10-PCS | Mod: S$PBB,,, | Performed by: HOSPITALIST

## 2022-05-17 RX ORDER — GLIPIZIDE 10 MG/1
10 TABLET, FILM COATED, EXTENDED RELEASE ORAL 2 TIMES DAILY WITH MEALS
Qty: 180 TABLET | Refills: 3 | Status: SHIPPED | OUTPATIENT
Start: 2022-05-17 | End: 2023-03-17 | Stop reason: SDUPTHER

## 2022-05-17 RX ORDER — GLIPIZIDE 10 MG/1
10 TABLET, FILM COATED, EXTENDED RELEASE ORAL
Qty: 90 TABLET | Refills: 3 | Status: SHIPPED | OUTPATIENT
Start: 2022-05-17 | End: 2022-05-17 | Stop reason: SDUPTHER

## 2022-05-17 RX ORDER — DULAGLUTIDE 0.75 MG/.5ML
0.75 INJECTION, SOLUTION SUBCUTANEOUS
Qty: 4 PEN | Refills: 6 | Status: SHIPPED | OUTPATIENT
Start: 2022-05-17 | End: 2022-05-20 | Stop reason: SDUPTHER

## 2022-05-17 RX ORDER — PIOGLITAZONEHYDROCHLORIDE 30 MG/1
30 TABLET ORAL DAILY
Qty: 30 TABLET | Refills: 6 | Status: SHIPPED | OUTPATIENT
Start: 2022-05-17 | End: 2022-05-17

## 2022-05-17 RX ORDER — METFORMIN HYDROCHLORIDE 500 MG/1
500 TABLET ORAL 2 TIMES DAILY WITH MEALS
Qty: 180 TABLET | Refills: 3 | Status: SHIPPED | OUTPATIENT
Start: 2022-05-17 | End: 2022-11-17

## 2022-05-17 RX ORDER — DULAGLUTIDE 0.75 MG/.5ML
0.75 INJECTION, SOLUTION SUBCUTANEOUS
Qty: 4 PEN | Refills: 6 | OUTPATIENT
Start: 2022-05-17 | End: 2022-06-16

## 2022-05-17 NOTE — ASSESSMENT & PLAN NOTE
- Diabetes poorly controlled, given current A1c, goal A1C for patient is 7%  - Complicated by hyperglycemia, obesity, hx CAD/CABG, CKD3b,  poor dietary indiscretion.  - unable to afford many diabetes medication:  high deductible  - hyperglycemia noted on fasting glucose in a.m.  - due to the renal function, medical options limited  - Diabetic supplies/medications, review at every visit to ensure continue refills    Plan  - once again I discussed about decreasing portion size and decreasing carbohydrate intake>> formal referral for diabetes educator  - renally dose metformin:  500 mg b.i.d. due to CKD stage IIIB  - increased glipizide to 10 mg b.i.d.  - Trulicity 0.75 mg Q weekly injection, will as for pharmacy assistant to help cost  - consider Actos  - consider long-acting insulin:  Levemir versus Lantus  - advised the patient follow with my regimen with close follow-up as scheduled  - patient to drop of glucose log in 1 month, for me to review  - repeat lab work prior to next visit, 3 months

## 2022-05-17 NOTE — PROGRESS NOTES
Subjective:      Patient ID: Jani Cha is a 76 y.o. male presented to [facility] on 5/17/2022.  Chief Complaint:  Diabetes type 2, Diabetes      History of Present Illness: Jani Cha is a 76 y.o. male here for management of type 2 diabetes and left adrenal incidentaloma  Other significant past medical history:  CKD stage IIIB, CAD, CABG, hypertension, hyperlipidemia      With regards to Diabetes Mellitus Type 2  Known diabetic complications: nephropathy and cardiovascular disease  Cardiovascular risk factors: advanced age (older than 55 for men, 65 for women), diabetes mellitus, dyslipidemia, hypertension, male gender, microalbuminuria, obesity (BMI >= 30 kg/m2) and sedentary lifestyle  Diagnosed w/ DM: in 2006      Interval history:  Patient is here for follow-up of type 2 diabetes, A1c worsen due to dietary indiscretion, not watching his diet as well as steroid injections.  Discuss with [facility]: Trulicity is $370 with insurance. The problem is that it looks like he has a deductible to meet, so  all the alternatives would be just as high  Saw Ortho need Left Knee Replacement>>  Need to get A1C <7%  Now patient seems to be more motivated to get his glucose better      Current meds:               Glipizide 10mg daily   Metformin 500 mg b.i.d.  Home glucose checks:  Daily, hyperglycemia    Glucose log in AM  158  184  195  198  174  177  192  186  195  212  192  Diet/Exercise:               Eating 2 meals per day , lunch in, large portion dinner, does eat cookies prior to going to bed              Drink:  Coffee with sugar in the morning              Current exercise: none due to knee pain  Weight trend: stable  Diabetes Education: no  Diabetes Related Hospitalization:  no  Hx of pancreatitis, hx of thyroid cancer: no  Family history of diabetes: unknow  Occupation: retired  Eye exam current (within one year): yes  Reports cuts or ulcers on feet:   Denies    Statin: Taking  ACE/ARB: Taking    Diabetes Management Status: Reviewed     A1C Trend  Lab Results   Component Value Date    HGBA1C 9.6 (H) 05/10/2022    HGBA1C 9.1 (H) 02/07/2022    HGBA1C 8.6 (H) 12/06/2021       Lab Results   Component Value Date    MICALBCREAT 47.1 (H) 02/07/2022     No results found for: [id number]  No results found for: TTGIGA    No results found for: CPEPTIDE, GLUTAMICACID, ISLETCELLANT, FRUCTOSAMINE     Screening or Prevention Patient's value Goal Complete/Controlled?   Lipid profile : 12/06/2021 Annually Yes   LDL control Lab Results   Component Value Date    LDLCALC 60.0 (L) 12/06/2021    Annually/Less than 100 mg/dl  Yes   Nephropathy screening Lab Results   Component Value Date    LABMICR 56.0 02/07/2022     Lab Results   Component Value Date    PROTEINUA 1+ (A) 12/31/2015    Annually No   Blood pressure BP Readings from Last 1 Encounters:   05/17/22 128/81    Less than 140/90 Yes   Dilated retinal exam : 10/19/2021 Annually Yes   Foot exam   : 01/05/2022 Annually Yes       Regards to adrenal incidentaloma  Noted on CT chest, left adrenal mass 1.2 cm in size.  Adrenal lesion imaging characteristics left adrenal nodule, 1.2 cm, Smooth, heterogenous, without calcifications      Patient does have history of diabetes as above, Morbidly obese, Hypertension but no history of hypertensive emergency     Pt denies history of paroxysmal symptoms such as syncope, pallor or dizziness  No palpitations   No history of cancer  Pt reports no abdominal discomfort  Denies cancer history     DST: WNL, cortisol 2, due to the overweight  Metanephrines and normetanephrines:  Within acceptable ranges  Renin/navneet level normal      Reviewed past surgical, medical, family, social history and updated as appropriate.    Review of Systems: see HPI above    Objective:   /81 (BP Location: Right arm)   Pulse 64   Temp 98.7 °F (37.1 °C) (Oral)   Wt 112 kg (247 lb)   BMI 35.44 kg/m²     Body mass  index is 35.44 kg/m².  Vital signs reviewed    Physical Exam  Vitals and nursing note reviewed.   Constitutional:       General: He is not in acute distress.     Appearance: Normal appearance. He is well-developed. He is obese.   Neck:      Thyroid: No thyromegaly.   Cardiovascular:      Rate and Rhythm: Normal rate.      Heart sounds: Normal heart sounds.   Pulmonary:      Effort: Pulmonary effort is normal. No respiratory distress.   Abdominal:      Tenderness: There is no abdominal tenderness.   Musculoskeletal:         General: Normal range of motion.      Cervical back: Neck supple.   Skin:     General: Skin is warm.      Findings: No erythema.   Neurological:      Mental Status: He is alert and oriented to person, place, and time.         Lab Reviewed:   Lab Results   Component Value Date    HGBA1C 9.6 (H) 05/10/2022       Lab Results   Component Value Date    CHOL 110 (L) 12/06/2021    HDL 35 (L) 12/06/2021    LDLCALC 60.0 (L) 12/06/2021    TRIG 75 12/06/2021    CHOLHDL 31.8 12/06/2021       Lab Results   Component Value Date     05/10/2022    K 4.2 05/10/2022     05/10/2022    CO2 25 05/10/2022     (H) 05/10/2022    BUN 36 (H) 05/10/2022    CREATININE 1.6 (H) 05/10/2022    CALCIUM 9.7 05/10/2022    PROT 6.4 05/10/2022    ALBUMIN 3.4 (L) 05/10/2022    BILITOT 0.5 05/10/2022    ALKPHOS 79 05/10/2022    AST 11 05/10/2022    ALT 16 05/10/2022    ANIONGAP 10 05/10/2022    ESTGFRAFRICA 48.0 (A) 05/10/2022    EGFRNONAA 41.5 (A) 05/10/2022    TSH 1.640 12/06/2021        Lab Results   Component Value Date    CALCIUM 9.7 05/10/2022    CALCIUM 9.9 02/07/2022    CALCIUM 9.4 12/06/2021    ALKPHOS 79 05/10/2022    ALKPHOS 76 02/07/2022    ALKPHOS 80 12/06/2021    TSH 1.640 12/06/2021       Assessment     1. Uncontrolled type 2 diabetes mellitus with hyperglycemia, without long-term current use of insulin  dulaglutide (TRULICITY) 0.75 mg/0.5 mL pen injector    Ambulatory referral/consult to Diabetes  Education    Ambulatory referral/consult to Pharmacy Assistance    metFORMIN (GLUCOPHAGE) 500 MG tablet    glipiZIDE (GLUCOTROL) 10 MG TR24    Hemoglobin A1C    Basic Metabolic Panel    DISCONTINUED: glipiZIDE (GLUCOTROL) 10 MG TR24    DISCONTINUED: pioglitazone (ACTOS) 30 MG tablet   2. Type 2 diabetes mellitus with diabetic neuropathy, without long-term current use of insulin  metFORMIN (GLUCOPHAGE) 500 MG tablet    glipiZIDE (GLUCOTROL) 10 MG TR24    DISCONTINUED: glipiZIDE (GLUCOTROL) 10 MG TR24   3. Adenoma of left adrenal gland     4. Coronary artery disease involving native coronary artery of native heart without angina pectoris     5. Stage 3b chronic kidney disease     6. Pure hypercholesterolemia     7. Class 2 severe obesity due to excess calories with serious comorbidity and body mass index (BMI) of 37.0 to 37.9 in adult          Plan     Uncontrolled type 2 diabetes mellitus with hyperglycemia, without long-term current use of insulin  - Diabetes poorly controlled, given current A1c, goal A1C for patient is 7%  - Complicated by hyperglycemia, obesity, hx CAD/CABG, CKD3b,  poor dietary indiscretion.  - unable to afford many diabetes medication:  high deductible  - hyperglycemia noted on fasting glucose in a.m.  - due to the renal function, medical options limited  - Diabetic supplies/medications, review at every visit to ensure continue refills    Plan  - once again I discussed about decreasing portion size and decreasing carbohydrate intake>> formal referral for diabetes educator  - renally dose metformin:  500 mg b.i.d. due to CKD stage IIIB  - increased glipizide to 10 mg b.i.d.  - Trulicity 0.75 mg Q weekly injection, will as for pharmacy assistant to help cost  - consider Actos  - consider long-acting insulin:  Levemir versus Lantus  - advised the patient follow with my regimen with close follow-up as scheduled  - patient to drop of glucose log in 1 month, for me to review  - repeat lab work prior  to next visit, 3 months    Adrenal adenoma  - adrenal incidentaloma, Noted on CT chest, left adrenal mass 1.2 cm in size.  - Adrenal lesion imaging characteristics left adrenal nodule, 1.2 cm, Smooth, heterogenous, without calcifications   - DST: WNL, cortisol 2, due to the overweight  - Metanephrines and normetanephrines:  Within acceptable ranges  - Renin/navneet level normal      Coronary artery disease involving native coronary artery of native heart without angina pectoris  - on statin, beta-blocker, aspirin  - Discussed importance of better glycemic control  - Candidate for GLP1 in the future    CKD (chronic kidney disease) stage 3, GFR 30-59 ml/min  - Renal function reviewed on lab work today  - Renally Adjust diabetes medications, avoid hypoglycemia  - Oral options limited  - continue routine monitoring    Hyperlipidemia  - ASCVD Risk below: Statin: Taking  The ASCVD Risk score (Chai YAMILEX Jr., et al., 2013) failed to calculate for the following reasons:    The valid total cholesterol range is 130 to 320 mg/dL    Class 2 severe obesity due to excess calories with serious comorbidity and body mass index (BMI) of 37.0 to 37.9 in adult  - Body mass index is 35.44 kg/m².  - dietary discussion as above  - continue to monitor weight        Advised patient to follow up with PCP for routine health maintenance care.   RTC in 3 months    Malcolm Kent M.D  Endocrinology  [facility]  5/17/2022      Disclaimer: This note has been generated using voice-recognition software. There may be typographical errors that have been missed during proof-reading.

## 2022-05-17 NOTE — ASSESSMENT & PLAN NOTE
- adrenal incidentaloma, Noted on CT chest, left adrenal mass 1.2 cm in size.  - Adrenal lesion imaging characteristics left adrenal nodule, 1.2 cm, Smooth, heterogenous, without calcifications   - DST: WNL, cortisol 2, due to the overweight  - Metanephrines and normetanephrines:  Within acceptable ranges  - Renin/navneet level normal

## 2022-05-17 NOTE — TELEPHONE ENCOUNTER
----- Message from Damaris Watts sent at 5/17/2022 11:58 AM CDT -----  Type: RX Refill Request    Who Called: self     Have you contacted your pharmacy: no    Refill or New Rx: New    RX Name and Strength: dulaglutide (TRULICITY) 0.75 mg/0.5 mL pen injector    Is this a 30 day or 90 day RX: 90 days    Preferred Pharmacy with phone number: [facility] - [address], [address]   Phone:  [phone]  Fax:  [phone]    Local or Mail Order: Mail    Would the patient rather a call back or a response via [facility]? call    Best Call Back Number: [phone] (home)

## 2022-05-17 NOTE — PATIENT INSTRUCTIONS
Thank you for completing the visit with me    Continue:    Metformin 500mg twice a day with food  Glipizide 10 mg  twice day     HOLD OFF ON ACTOS      TRY TO GET Trulicity 0.75mg once a week >> use it for 2 months if you are doing well, call and we can increase the dose    Please check glucose 1 times a day (before breakfast).   Glucose logs given in clinic, please filled out and bring back to next office visit for me to review    We will plan an in-clinic visit in 3 months, with labs prior to that appointment.       Malcolm Kent M.D  [facility] [address] [facility] [address], [address]  [place] [address]    Office:  [phone]  Fax:  [phone]

## 2022-05-18 ENCOUNTER — TELEPHONE (OUTPATIENT)
Dept: PHARMACY | Facility: CLINIC | Age: 77
End: 2022-05-18
Payer: MEDICARE

## 2022-05-18 NOTE — TELEPHONE ENCOUNTER
I called the patient to remind him I need his proof of income to submit an application with [employer](Trulicity) and he stated he do not need assistance because he was able to get a good price with [facility].  I tried to explain to him he would get the Trulicity at no cost but he said he don't want to provide his information.

## 2022-05-19 ENCOUNTER — OFFICE VISIT (OUTPATIENT)
Dept: VASCULAR SURGERY | Facility: CLINIC | Age: 77
End: 2022-05-19
Payer: MEDICARE

## 2022-05-19 ENCOUNTER — PATIENT OUTREACH (OUTPATIENT)
Dept: ADMINISTRATIVE | Facility: OTHER | Age: 77
End: 2022-05-19
Payer: MEDICARE

## 2022-05-19 VITALS
BODY MASS INDEX: 35.19 KG/M2 | DIASTOLIC BLOOD PRESSURE: 80 MMHG | WEIGHT: 245.25 LBS | SYSTOLIC BLOOD PRESSURE: 122 MMHG

## 2022-05-19 DIAGNOSIS — I71.40 ABDOMINAL AORTIC ANEURYSM (AAA) WITHOUT RUPTURE: Primary | ICD-10-CM

## 2022-05-19 DIAGNOSIS — I87.303 VENOUS HYPERTENSION OF BOTH LOWER EXTREMITIES: ICD-10-CM

## 2022-05-19 DIAGNOSIS — I89.0 LYMPHEDEMA OF BOTH LOWER EXTREMITIES: ICD-10-CM

## 2022-05-19 DIAGNOSIS — I71.60 THORACOABDOMINAL AORTIC ANEURYSM, WITHOUT RUPTURE: ICD-10-CM

## 2022-05-19 PROCEDURE — 99213 OFFICE O/P EST LOW 20 MIN: CPT | Mod: PBBFAC | Performed by: SURGERY

## 2022-05-19 PROCEDURE — 99214 PR OFFICE/OUTPT VISIT, EST, LEVL IV, 30-39 MIN: ICD-10-PCS | Mod: S$PBB,,, | Performed by: SURGERY

## 2022-05-19 PROCEDURE — 99214 OFFICE O/P EST MOD 30 MIN: CPT | Mod: S$PBB,,, | Performed by: SURGERY

## 2022-05-19 PROCEDURE — 99999 PR PBB SHADOW E&M-EST. PATIENT-LVL III: CPT | Mod: PBBFAC,,, | Performed by: SURGERY

## 2022-05-19 PROCEDURE — 99999 PR PBB SHADOW E&M-EST. PATIENT-LVL III: ICD-10-PCS | Mod: PBBFAC,,, | Performed by: SURGERY

## 2022-05-19 RX ORDER — TAMSULOSIN HYDROCHLORIDE 0.4 MG/1
CAPSULE ORAL DAILY
COMMUNITY
End: 2022-06-17

## 2022-05-19 NOTE — PROGRESS NOTES
Health Maintenance Due   Topic Date Due    Shingles Vaccine (1 of 2) Never done       Chart was reviewed for overdue Proactive [facility] (MATHEW) topics (CRS, Breast Cancer Screening, Eye exam)  Health Maintenance has been updated.  LINKS immunization registry triggered.  Immunizations were reconciled.

## 2022-05-19 NOTE — LETTER
May 19, 2022        Laila Bains MD  [address] [facility]  [place] [address]             [facility]  [address]  [place] [address]  Phone: [phone]  Fax: [phone]   Patient: Jani Cha   MR Number: [id number]   YOB: 1945   Date of Visit: 5/19/2022       Dear Dr. Bains:    Thank you for referring Jani Cha to me for evaluation. Below are the relevant portions of my assessment and plan of care.            If you have questions, please do not hesitate to call me. I look forward to following Jani along with you.    Sincerely,      Vladimir Echavarria MD           CC  No Recipients

## 2022-05-19 NOTE — PATIENT INSTRUCTIONS
Low-Salt Choices  Eating salt (sodium) can make your body retain too much water. Excess water makes your heart work harder. Canned, packaged, and frozen foods are easy to prepare, but they are often high in sodium. Here are some ideas for low-salt foods you can easily prepare yourself.    For breakfast  Fruit or 100% fruit juice  Whole-wheat bread or an English muffin. Compare sodium content on labels.  Low-fat milk or yogurt  Unsalted eggs  Shredded wheat  Corn tortillas  Unsalted steamed rice  Regular (not instant) hot cereal, made without salt  Stay away from:  Sausage, simon, and ham  Flour tortillas  Packaged muffins, pancakes, and biscuits  Instant hot cereals  Cottage cheese  For lunch and dinner  Fresh fish, chicken, turkey, or meat--baked, broiled, or roasted without salt  Dry beans, cooked without salt  Tofu, stir-fried without salt  Unsalted fresh fruit and vegetables, or frozen or canned fruit and vegetables with no added salt  Stay away from:  Lunch or deli meat that is cured or smoked  Cheese  Tomato juice and catsup  Canned vegetables, soups, and fish not labeled as no-salt-added or reduced sodium  Packaged gravies and sauces  Olives, pickles, and relish  Bottled salad dressings  For snacks and desserts  Yogurt  Unsalted, air popped popcorn  Unsalted nuts or seeds  Stay away from:  Pies and cakes  Packaged dessert mixes  Pizza  Canned and packaged puddings  Pretzels, chips, crackers, and nuts--unless the label says unsalted  Date Last Reviewed: 6/17/2015  © 2196-1902 [employer]. [address] [place] [address]. All rights reserved. This information is not intended as a substitute for professional medical care. Always follow your healthcare professional's instructions.      Low-Salt Diet  This diet removes foods that are high in salt. It also limits the amount of salt you use when cooking. It is most often used for people with high blood pressure, edema (fluid retention), and  kidney, liver, or heart disease.  Table salt contains the mineral sodium. Your body needs sodium to work normally. But too much sodium can make your health problems worse. Your healthcare provider is recommending a low-salt (also called low-sodium) diet for you. Your total daily allowance of salt is 1,500 to 2,300 milligrams (mg). It is less than 1 teaspoon of table salt. This means you can have only about 500 to 700 mg of sodium at each meal. People with certain health problems should limit salt intake to the lower end of the recommended range.    When you cook, dont add much salt. If you can cook without using salt, even better. Dont add salt to your food at the table.  When shopping, read food labels. Salt is often called sodium on the label. Choose foods that are salt-free, low salt, or very low salt. Note that foods with reduced salt may not lower your salt intake enough.    Beans, potatoes, and pasta  Ok: Dry beans, split peas, lentils, potatoes, rice, macaroni, pasta, spaghetti without added salt  Avoid: Potato chips, tortilla chips, and similar products  Breads and cereals  Ok: Low-sodium breads, rolls, cereals, and cakes; low-salt crackers, matzo crackers  Avoid: Salted crackers, pretzels, popcorn, Vietnamese toast, pancakes, muffins  Dairy  Ok: Milk, chocolate milk, hot chocolate mix, low-salt cheeses, and yogurt  Avoid: Processed cheese and cheese spreads; Roquefort, Camembert, and cottage cheese; buttermilk, instant breakfast drink  Desserts  Ok: Ice cream, frozen yogurt, juice bars, gelatin, cookies and pies, sugar, honey, jelly, hard candy  Avoid: Most pies, cakes and cookies prepared or processed with salt; instant pudding  Drinks  Ok: Tea, coffee, fizzy (carbonated) drinks, juices  Avoid: Flavored coffees, electrolyte replacement drinks, sports drinks  Meats  Ok: All fresh meat, fish, poultry, low-salt tuna, eggs, egg substitute  Avoid: Smoked, pickled, brine-cured, or salted meats and fish. This  includes simon, chipped beef, corned beef, hot dogs, deli meats, ham, kosher meats, salt pork, sausage, canned tuna, salted codfish, smoked salmon, herring, sardines, or anchovies.  Seasonings and spices  Ok: Most seasonings are okay. Good substitutes for salt include: fresh herb blends, hot sauce, lemon, garlic, santiago, vinegar, dry mustard, parsley, cilantro, horseradish, tomato paste, regular margarine, mayonnaise, unsalted butter, cream cheese, vegetable oil, cream, low-salt salad dressing and gravy.  Avoid: Regular ketchup, relishes, pickles, soy sauce, teriyaki sauce, Worcestershire sauce, BBQ sauce, tartar sauce, meat tenderizer, chili sauce, regular gravy, regular salad dressing, salted butter  Soups  Ok: Low-salt soups and broths made with allowed foods  Avoid: Bouillon cubes, soups with smoked or salted meats, regular soup and broth  Vegetables  Ok: Most vegetables are okay; also low-salt tomato and vegetable juices  Avoid: Sauerkraut and other brine-soaked vegetables; pickles and other pickled vegetables; tomato juice, olives  Date Last Reviewed: 8/1/2016  © 2854-5344 [employer]. [address] [place] [address]. All rights reserved. This information is not intended as a substitute for professional medical care. Always follow your healthcare professional's instructions.        Tips for Using Less Salt    Most people with heart problems need to eat less salt (sodium). Reducing the amount of salt you eat may help control your blood pressure. The higher your blood pressure, the greater your risk for heart disease, stroke, blindness, and kidney problems.  At the store  Make low-salt choices by reading labels carefully. Look for the total amount of sodium per serving.  Use more fresh food. Buy more fruits and vegetables. Select lean meats, fish, and poultry.  Use fewer frozen, canned, and packaged foods which often contain a lot of sodium.  Use plain frozen vegetables without sauces or  toppings. These products are often low- or no-sodium.  Opt for reduced-sodium or no-salt-added versions of canned vegetables and soups.  In the kitchen  Don't add salt to food when you're cooking. Season with flavorings such as onion, garlic, pepper, salt-free herbal blends, and lemon or lime juice.  Use a cookbook containing low-salt recipes. It can give you ideas for tasty meals that are healthy for your heart.  Sprinkle salt-free herbal blends on vegetables and meat.  Drain and rinse canned foods, such as canned beans and vegetables, before cooking or eating.  Eating out  Tell the  you're on a low-salt diet. Ask questions about the menu.  Order fish, chicken, and meat broiled, baked, poached, or grilled without salt, butter, or breading.  Use lemon, pepper, and salt-free herb mixes to add flavor.  Choose plain steamed rice, boiled noodles, and baked or boiled potatoes. Top potatoes with chives and a little sour cream.     Beware! Salt goes by many other names. Limit foods with these words listed as ingredients: salt, sodium, soy sauce, baking soda, baking powder, MSG, monosodium, Na (the chemical symbol for sodium). Some antacids are also high in salt.   Date Last Reviewed: 6/19/2015  © 9230-8905 [employer]. [address] [place] [address], [place] [address]. All rights reserved. This information is not intended as a substitute for professional medical care. Always follow your healthcare professional's instructions.
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
## 2022-05-20 ENCOUNTER — PATIENT MESSAGE (OUTPATIENT)
Dept: ENDOCRINOLOGY | Facility: CLINIC | Age: 77
End: 2022-05-20
Payer: MEDICARE

## 2022-05-20 DIAGNOSIS — E11.65 UNCONTROLLED TYPE 2 DIABETES MELLITUS WITH HYPERGLYCEMIA, WITHOUT LONG-TERM CURRENT USE OF INSULIN: ICD-10-CM

## 2022-05-20 DIAGNOSIS — I82.A13 ACUTE EMBOLISM AND THROMBOSIS OF AXILLARY VEIN, BILATERAL: ICD-10-CM

## 2022-05-20 DIAGNOSIS — I87.303 VENOUS HYPERTENSION OF BOTH LOWER EXTREMITIES: ICD-10-CM

## 2022-05-20 DIAGNOSIS — I89.0 LYMPHEDEMA OF BOTH LOWER EXTREMITIES: Primary | ICD-10-CM

## 2022-05-20 RX ORDER — DULAGLUTIDE 0.75 MG/.5ML
0.75 INJECTION, SOLUTION SUBCUTANEOUS
Qty: 12 PEN | Refills: 1 | Status: SHIPPED | OUTPATIENT
Start: 2022-05-20 | End: 2022-08-17

## 2022-05-25 ENCOUNTER — PATIENT MESSAGE (OUTPATIENT)
Dept: VASCULAR SURGERY | Facility: CLINIC | Age: 77
End: 2022-05-25
Payer: MEDICARE

## 2022-05-30 ENCOUNTER — OFFICE VISIT (OUTPATIENT)
Dept: ORTHOPEDICS | Facility: CLINIC | Age: 77
End: 2022-05-30
Payer: MEDICARE

## 2022-05-30 VITALS
BODY MASS INDEX: 35.23 KG/M2 | HEIGHT: 70 IN | RESPIRATION RATE: 15 BRPM | WEIGHT: 246.06 LBS | OXYGEN SATURATION: 99 % | DIASTOLIC BLOOD PRESSURE: 69 MMHG | SYSTOLIC BLOOD PRESSURE: 103 MMHG | HEART RATE: 78 BPM

## 2022-05-30 DIAGNOSIS — M17.0 ARTHRITIS OF BOTH KNEES: Primary | ICD-10-CM

## 2022-05-30 PROCEDURE — 99499 NO LOS: ICD-10-PCS | Mod: S$PBB,,, | Performed by: ORTHOPAEDIC SURGERY

## 2022-05-30 PROCEDURE — 20610 LARGE JOINT ASPIRATION/INJECTION: BILATERAL KNEE: ICD-10-PCS | Mod: 50,S$PBB,, | Performed by: ORTHOPAEDIC SURGERY

## 2022-05-30 PROCEDURE — 99499 UNLISTED E&M SERVICE: CPT | Mod: S$PBB,,, | Performed by: ORTHOPAEDIC SURGERY

## 2022-05-30 PROCEDURE — 99999 PR PBB SHADOW E&M-EST. PATIENT-LVL IV: CPT | Mod: PBBFAC,,, | Performed by: ORTHOPAEDIC SURGERY

## 2022-05-30 PROCEDURE — 99214 OFFICE O/P EST MOD 30 MIN: CPT | Mod: PBBFAC,PN,25 | Performed by: ORTHOPAEDIC SURGERY

## 2022-05-30 PROCEDURE — 20610 DRAIN/INJ JOINT/BURSA W/O US: CPT | Mod: 50,PBBFAC,PN | Performed by: ORTHOPAEDIC SURGERY

## 2022-05-30 PROCEDURE — 99999 PR PBB SHADOW E&M-EST. PATIENT-LVL IV: ICD-10-PCS | Mod: PBBFAC,,, | Performed by: ORTHOPAEDIC SURGERY

## 2022-05-30 RX ADMIN — Medication 60 MG: at 09:05

## 2022-05-30 NOTE — PROCEDURES
Large Joint Aspiration/Injection: bilateral knee    Date/Time: 5/30/2022 9:00 AM  Performed by: Radha Valadez MD  Authorized by: Radha Valadez MD     Consent Done?:  Yes (Verbal)  Indications:  Arthritis  Timeout: prior to procedure the correct patient, procedure, and site was verified      Local anesthesia used?: Yes    Approach:  Anteromedial  Location:  Knee  Laterality:  Bilateral  Site:  Bilateral knee  Medications (Right):  60 mg hyaluronate sodium, stabilized 60 mg/3 mL  Medications (Left):  60 mg hyaluronate sodium, stabilized 60 mg/3 mL  Patient tolerance:  Patient tolerated the procedure well with no immediate complications

## 2022-05-30 NOTE — PROGRESS NOTES
"Follow up visit    History of Present Illness:   Jani Cha comes to the office for follow up evaluation of bilateral knee arthritis   He has failed other treatment modalities including medications,  activity modification and steroid injection. He is here today for viscosupplementation - Durolane    MEDICAL NECESSITY FOR VISCOSUPPLEMENTATION:  A thorough evaluation of the patient, have determined that viscosupplementation is medically necessary.  The patient has painful DJD of the knee with failure of conservative therapy including lifestyle modifications and rehabilitation exercises.  Oral analgesics/NSAIDs have not adequately controlled symptoms and there is radiographic evidence of joint space narrowing, subchondral sclerosis, and osteophyte formation - [place] grade 2 or greater.    ROS: unremarkable and no change since last visit    Physical Examination:    Vitals:    05/30/22 0830   BP: 103/69   Pulse: 78   Resp: 15   SpO2: 99%   Weight: 111.6 kg (246 lb 0.5 oz)   Height: 5' 10" (1.778 m)   PainSc:   4      NAD  bilateral knee  No change in exam     Radiographic imaging: no new imaging    Assessment/Plan:  77 y.o. male with bilateral  knee arthritis     1. Injection to Bilateral  knee  performed, please see procedure note for details.  We discussed the risks and benefits of injection including pain, infection, bleeding, failure to improve pain, temporary increase in pain, synovitis, and damage to surrounding structures including nerves, blood vessels, tendons and muscles.   2.   Return for follow up visit: PRN    All questions were answered in detail. The patient  verbalized the understanding of the treatment plan and is in full agreement with the treatment plan.    "

## 2022-05-31 ENCOUNTER — EXTERNAL CHRONIC CARE MANAGEMENT (OUTPATIENT)
Dept: PRIMARY CARE CLINIC | Facility: CLINIC | Age: 77
End: 2022-05-31
Payer: MEDICARE

## 2022-05-31 PROCEDURE — 99490 PR CHRONIC CARE MGMT, 1ST 20 MIN: ICD-10-PCS | Mod: S$PBB,,, | Performed by: FAMILY MEDICINE

## 2022-05-31 PROCEDURE — 99490 CHRNC CARE MGMT STAFF 1ST 20: CPT | Mod: PBBFAC,PO | Performed by: FAMILY MEDICINE

## 2022-05-31 PROCEDURE — 99490 CHRNC CARE MGMT STAFF 1ST 20: CPT | Mod: S$PBB,,, | Performed by: FAMILY MEDICINE

## 2022-06-02 DIAGNOSIS — I71.40 ABDOMINAL AORTIC ANEURYSM (AAA) WITHOUT RUPTURE: Primary | ICD-10-CM

## 2022-06-17 ENCOUNTER — OFFICE VISIT (OUTPATIENT)
Dept: FAMILY MEDICINE | Facility: CLINIC | Age: 77
End: 2022-06-17
Payer: MEDICARE

## 2022-06-17 VITALS
HEIGHT: 70 IN | DIASTOLIC BLOOD PRESSURE: 72 MMHG | BODY MASS INDEX: 35.07 KG/M2 | WEIGHT: 244.94 LBS | SYSTOLIC BLOOD PRESSURE: 124 MMHG | TEMPERATURE: 99 F | OXYGEN SATURATION: 95 % | HEART RATE: 67 BPM

## 2022-06-17 DIAGNOSIS — M79.89 LEG SWELLING: Primary | ICD-10-CM

## 2022-06-17 DIAGNOSIS — E11.65 UNCONTROLLED TYPE 2 DIABETES MELLITUS WITH HYPERGLYCEMIA, WITHOUT LONG-TERM CURRENT USE OF INSULIN: ICD-10-CM

## 2022-06-17 DIAGNOSIS — I10 ESSENTIAL HYPERTENSION: ICD-10-CM

## 2022-06-17 DIAGNOSIS — M79.661 RIGHT CALF PAIN: ICD-10-CM

## 2022-06-17 DIAGNOSIS — I89.0 LYMPHEDEMA OF BOTH LOWER EXTREMITIES: ICD-10-CM

## 2022-06-17 DIAGNOSIS — I87.303 VENOUS HYPERTENSION OF BOTH LOWER EXTREMITIES: ICD-10-CM

## 2022-06-17 PROCEDURE — 99214 PR OFFICE/OUTPT VISIT, EST, LEVL IV, 30-39 MIN: ICD-10-PCS | Mod: S$PBB,,, | Performed by: NURSE PRACTITIONER

## 2022-06-17 PROCEDURE — 99999 PR PBB SHADOW E&M-EST. PATIENT-LVL IV: CPT | Mod: PBBFAC,,, | Performed by: NURSE PRACTITIONER

## 2022-06-17 PROCEDURE — 99999 PR PBB SHADOW E&M-EST. PATIENT-LVL IV: ICD-10-PCS | Mod: PBBFAC,,, | Performed by: NURSE PRACTITIONER

## 2022-06-17 PROCEDURE — 99214 OFFICE O/P EST MOD 30 MIN: CPT | Mod: PBBFAC,PO | Performed by: NURSE PRACTITIONER

## 2022-06-17 PROCEDURE — 99214 OFFICE O/P EST MOD 30 MIN: CPT | Mod: S$PBB,,, | Performed by: NURSE PRACTITIONER

## 2022-06-17 RX ORDER — PIOGLITAZONEHYDROCHLORIDE 30 MG/1
30 TABLET ORAL DAILY
COMMUNITY
Start: 2022-05-17 | End: 2022-06-28 | Stop reason: ALTCHOICE
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
## 2022-06-28 ENCOUNTER — OFFICE VISIT (OUTPATIENT)
Dept: FAMILY MEDICINE | Facility: CLINIC | Age: 77
End: 2022-06-28
Payer: MEDICARE

## 2022-06-28 VITALS
TEMPERATURE: 98 F | HEART RATE: 68 BPM | SYSTOLIC BLOOD PRESSURE: 92 MMHG | OXYGEN SATURATION: 95 % | WEIGHT: 244.94 LBS | DIASTOLIC BLOOD PRESSURE: 72 MMHG | HEIGHT: 70 IN | BODY MASS INDEX: 35.07 KG/M2

## 2022-06-28 DIAGNOSIS — I89.0 LYMPHEDEMA OF BOTH LOWER EXTREMITIES: ICD-10-CM

## 2022-06-28 DIAGNOSIS — J30.9 ALLERGIC RHINITIS, UNSPECIFIED SEASONALITY, UNSPECIFIED TRIGGER: ICD-10-CM

## 2022-06-28 DIAGNOSIS — I10 ESSENTIAL HYPERTENSION: ICD-10-CM

## 2022-06-28 DIAGNOSIS — E11.40 TYPE 2 DIABETES MELLITUS WITH DIABETIC NEUROPATHY, WITHOUT LONG-TERM CURRENT USE OF INSULIN: Primary | ICD-10-CM

## 2022-06-28 DIAGNOSIS — I87.303 VENOUS HYPERTENSION OF BOTH LOWER EXTREMITIES: ICD-10-CM

## 2022-06-28 DIAGNOSIS — B35.6 TINEA CRURIS: ICD-10-CM

## 2022-06-28 PROCEDURE — 99214 OFFICE O/P EST MOD 30 MIN: CPT | Mod: S$PBB,,, | Performed by: FAMILY MEDICINE

## 2022-06-28 PROCEDURE — 99999 PR PBB SHADOW E&M-EST. PATIENT-LVL IV: ICD-10-PCS | Mod: PBBFAC,,, | Performed by: FAMILY MEDICINE

## 2022-06-28 PROCEDURE — 99214 PR OFFICE/OUTPT VISIT, EST, LEVL IV, 30-39 MIN: ICD-10-PCS | Mod: S$PBB,,, | Performed by: FAMILY MEDICINE

## 2022-06-28 PROCEDURE — 99214 OFFICE O/P EST MOD 30 MIN: CPT | Mod: PBBFAC,PO | Performed by: FAMILY MEDICINE

## 2022-06-28 PROCEDURE — 99999 PR PBB SHADOW E&M-EST. PATIENT-LVL IV: CPT | Mod: PBBFAC,,, | Performed by: FAMILY MEDICINE

## 2022-06-28 RX ORDER — FLUTICASONE PROPIONATE 50 MCG
1 SPRAY, SUSPENSION (ML) NASAL DAILY
Qty: 16 G | Refills: 3 | Status: SHIPPED | OUTPATIENT
Start: 2022-06-28 | End: 2023-08-25

## 2022-06-28 RX ORDER — KETOCONAZOLE 20 MG/G
CREAM TOPICAL DAILY
Qty: 60 G | Refills: 0 | Status: SHIPPED | OUTPATIENT
Start: 2022-06-28 | End: 2022-09-29

## 2022-06-28 RX ORDER — CLOTRIMAZOLE AND BETAMETHASONE DIPROPIONATE 10; .64 MG/G; MG/G
CREAM TOPICAL 2 TIMES DAILY
Qty: 45 G | Refills: 0 | Status: SHIPPED | OUTPATIENT
Start: 2022-06-28 | End: 2022-09-29

## 2022-06-28 NOTE — PROGRESS NOTES
"Routine Office Visit    Jani Cha  1945  [id number]      Subjective     Jani is a 77 y.o. male who presents today for:    1. Diabetes follow-up - Patient continues to follow-up with Dr. Kent. He states that he had a recent change and is now on trulicity. Patient states that his blood glucose has improved and his goal is to be less than 7 for knee surgery   2. Calf pain follow-up - left calf - Patient is scheduled for ultrasound. He reports calf pain has resolved but he notes he still has swelling   3. Rash - Patient has groin rash - rash is red and pruritic. Rash is alleviated but not resolved with clotrimazole.   4. Knee pain - Patient continues to see ortho. He reports topical gel does not improve pain. He was advised of surgery but needs to wait until he has a reduction of a1c.       Objective     Review of Systems   Constitutional: Negative for chills and fever.   HENT: Negative for congestion.    Eyes: Negative for blurred vision.   Respiratory: Negative for cough.    Cardiovascular: Negative for chest pain.   Gastrointestinal: Negative for abdominal pain, constipation, diarrhea, heartburn, nausea and vomiting.   Genitourinary: Negative for dysuria.   Musculoskeletal: Negative for myalgias.   Skin: Negative for itching and rash.   Neurological: Negative for dizziness and headaches.   Psychiatric/Behavioral: Negative for depression.       BP 92/72 (BP Location: Left arm, Patient Position: Sitting, BP Method: Large (Automatic))   Pulse 68   Temp 98.4 °F (36.9 °C) (Oral)   Ht 5' 10" (1.778 m)   Wt 111.1 kg (244 lb 14.9 oz)   SpO2 95%   BMI 35.14 kg/m²   Physical Exam  Constitutional:       Appearance: He is well-developed.   HENT:      Head: Normocephalic and atraumatic.   Eyes:      Conjunctiva/sclera: Conjunctivae normal.      Pupils: Pupils are equal, round, and reactive to light.   Neck:      Thyroid: No thyromegaly.      Vascular: No JVD.   Cardiovascular:      Rate and Rhythm: Normal rate " and regular rhythm.      Heart sounds: Normal heart sounds.   Pulmonary:      Effort: Pulmonary effort is normal.      Breath sounds: Normal breath sounds. No wheezing.   Abdominal:      General: Bowel sounds are normal. There is no distension.      Palpations: Abdomen is soft.      Tenderness: There is no abdominal tenderness. There is no guarding.   Musculoskeletal:         General: Normal range of motion.      Cervical back: Normal range of motion and neck supple.   Lymphadenopathy:      Cervical: No cervical adenopathy.   Skin:     General: Skin is warm and dry.   Neurological:      Mental Status: He is alert and oriented to person, place, and time.   Psychiatric:         Behavior: Behavior normal.           Assessment     Problem List Items Addressed This Visit        Endocrine    Type 2 diabetes mellitus with diabetic neuropathy, without long-term current use of insulin - Primary (Chronic)  The current medical regimen is effective;  continue present plan and medications.        Other Visit Diagnoses     Tinea cruris        Relevant Medications    ketoconazole (NIZORAL) 2 % cream    clotrimazole-betamethasone 1-0.05% (LOTRISONE) cream    Lymphedema of both lower extremities        Venous hypertension of both lower extremities      Recommend compression stockings   Leg elevation       Essential hypertension      The current medical regimen is effective;  continue present plan and medications.      Allergic rhinitis, unspecified seasonality, unspecified trigger        Relevant Medications    fluticasone propionate (FLONASE) 50 mcg/actuation nasal spray          Follow up in about 6 months (around 12/28/2022), or if symptoms worsen or fail to improve.

## 2022-06-29 ENCOUNTER — HOSPITAL ENCOUNTER (OUTPATIENT)
Dept: CARDIOLOGY | Facility: HOSPITAL | Age: 77
Discharge: HOME | End: 2022-06-29
Attending: SURGERY
Payer: MEDICARE

## 2022-06-29 DIAGNOSIS — I87.303 VENOUS HYPERTENSION OF BOTH LOWER EXTREMITIES: ICD-10-CM

## 2022-06-29 PROCEDURE — 93970 CV US LOWER VENOUS INSUFFICIENCY BILATERAL (CUPID ONLY): ICD-10-PCS | Mod: 26,,, | Performed by: SURGERY

## 2022-06-29 PROCEDURE — 93970 EXTREMITY STUDY: CPT | Mod: 26,,, | Performed by: SURGERY

## 2022-06-29 PROCEDURE — 93970 EXTREMITY STUDY: CPT | Mod: TC

## 2022-06-30 ENCOUNTER — EXTERNAL CHRONIC CARE MANAGEMENT (OUTPATIENT)
Dept: PRIMARY CARE CLINIC | Facility: CLINIC | Age: 77
End: 2022-06-30
Payer: MEDICARE

## 2022-06-30 PROCEDURE — 99490 CHRNC CARE MGMT STAFF 1ST 20: CPT | Mod: PBBFAC,PO | Performed by: FAMILY MEDICINE

## 2022-06-30 PROCEDURE — 99490 PR CHRONIC CARE MGMT, 1ST 20 MIN: ICD-10-PCS | Mod: S$PBB,,, | Performed by: FAMILY MEDICINE

## 2022-06-30 PROCEDURE — 99490 CHRNC CARE MGMT STAFF 1ST 20: CPT | Mod: S$PBB,,, | Performed by: FAMILY MEDICINE

## 2022-07-01 LAB
LEFT GREAT SAPHENOUS JUNCTION DIA: 0.9 CM
LEFT SMALL SAPHENOUS KNEE DIA: 0.4 CM
LEFT SMALL SAPHENOUS SPJ DIA: 0.3 CM
RIGHT GREAT SAPHENOUS DISTAL THIGH DIA: 0.4 CM
RIGHT GREAT SAPHENOUS JUNCTION DIA: 0.7 CM
RIGHT GREAT SAPHENOUS KNEE DIA: 0.7 CM
RIGHT GREAT SAPHENOUS MIDDLE THIGH DIA: 0.3 CM
RIGHT GREAT SAPHENOUS PROXIMAL CALF DIA: 0.4 CM
RIGHT SMALL SAPHENOUS KNEE DIA: 0.3 CM
RIGHT SMALL SAPHENOUS SPJ DIA: 0.3 CM

## 2022-07-06 ENCOUNTER — TELEPHONE (OUTPATIENT)
Dept: VASCULAR SURGERY | Facility: CLINIC | Age: 77
End: 2022-07-06
Payer: MEDICARE

## 2022-07-06 DIAGNOSIS — I71.60 THORACOABDOMINAL AORTIC ANEURYSM, WITHOUT RUPTURE: Primary | ICD-10-CM

## 2022-07-06 NOTE — TELEPHONE ENCOUNTER
CMP order has been placed in the patient's chart    ----- Message from Shelli Wells sent at 7/6/2022  1:13 PM CDT -----  Regarding: CT   Patient will need a CMP lab before we can scheduled his CT ./     Please have Dr. Vladimir Echavarria put in the lab order ,   Thank you

## 2022-07-11 ENCOUNTER — OFFICE VISIT (OUTPATIENT)
Dept: PODIATRY | Facility: CLINIC | Age: 77
End: 2022-07-11
Payer: MEDICARE

## 2022-07-11 VITALS — BODY MASS INDEX: 35.07 KG/M2 | WEIGHT: 244.94 LBS | HEIGHT: 70 IN

## 2022-07-11 DIAGNOSIS — B35.1 ONYCHOMYCOSIS DUE TO DERMATOPHYTE: ICD-10-CM

## 2022-07-11 DIAGNOSIS — E11.49 TYPE II DIABETES MELLITUS WITH NEUROLOGICAL MANIFESTATIONS: Primary | ICD-10-CM

## 2022-07-11 PROCEDURE — 11721 DEBRIDE NAIL 6 OR MORE: CPT | Mod: Q9,S$PBB,, | Performed by: PODIATRIST

## 2022-07-11 PROCEDURE — 99999 PR PBB SHADOW E&M-EST. PATIENT-LVL III: ICD-10-PCS | Mod: PBBFAC,,, | Performed by: PODIATRIST

## 2022-07-11 PROCEDURE — 99499 NO LOS: ICD-10-PCS | Mod: S$PBB,,, | Performed by: PODIATRIST

## 2022-07-11 PROCEDURE — 99999 PR PBB SHADOW E&M-EST. PATIENT-LVL III: CPT | Mod: PBBFAC,,, | Performed by: PODIATRIST

## 2022-07-11 PROCEDURE — 99499 UNLISTED E&M SERVICE: CPT | Mod: S$PBB,,, | Performed by: PODIATRIST

## 2022-07-11 PROCEDURE — 11721 PR DEBRIDEMENT OF NAILS, 6 OR MORE: ICD-10-PCS | Mod: Q9,S$PBB,, | Performed by: PODIATRIST

## 2022-07-11 PROCEDURE — 99213 OFFICE O/P EST LOW 20 MIN: CPT | Mod: PBBFAC,PO | Performed by: PODIATRIST

## 2022-07-11 PROCEDURE — 11721 DEBRIDE NAIL 6 OR MORE: CPT | Mod: Q9,PBBFAC,PO | Performed by: PODIATRIST

## 2022-07-11 NOTE — PATIENT INSTRUCTIONS
Kerasal for fungal nails apply daily to all toenails.  Can be found at [facility]
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
## 2022-07-18 ENCOUNTER — CLINICAL SUPPORT (OUTPATIENT)
Dept: DIABETES | Facility: CLINIC | Age: 77
End: 2022-07-18
Payer: MEDICARE

## 2022-07-18 VITALS — WEIGHT: 247.69 LBS | BODY MASS INDEX: 35.54 KG/M2

## 2022-07-18 DIAGNOSIS — E11.65 UNCONTROLLED TYPE 2 DIABETES MELLITUS WITH HYPERGLYCEMIA, WITHOUT LONG-TERM CURRENT USE OF INSULIN: ICD-10-CM

## 2022-07-18 PROCEDURE — 99999 PR PBB SHADOW E&M-EST. PATIENT-LVL II: CPT | Mod: PBBFAC,,, | Performed by: DIETITIAN, REGISTERED

## 2022-07-18 PROCEDURE — G0108 DIAB MANAGE TRN  PER INDIV: HCPCS | Mod: PBBFAC | Performed by: DIETITIAN, REGISTERED

## 2022-07-18 PROCEDURE — 99999 PR PBB SHADOW E&M-EST. PATIENT-LVL II: ICD-10-PCS | Mod: PBBFAC,,, | Performed by: DIETITIAN, REGISTERED

## 2022-07-18 PROCEDURE — 99212 OFFICE O/P EST SF 10 MIN: CPT | Mod: PBBFAC | Performed by: DIETITIAN, REGISTERED

## 2022-07-18 NOTE — PROGRESS NOTES
Diabetes Care Specialist Progress Note  Author: Cici Amaro RD  Date: 7/18/2022    Program Intake  Reason for Diabetes Program Visit:: Initial Diabetes Assessment  Current diabetes risk level:: moderate  In the last 12 months, have you:: none  Permission to speak with others about care:: yes    Lab Results   Component Value Date    HGBA1C 9.6 (H) 05/10/2022     Clinical    Patient Health Rating  Compared to other people your age, how would you rate your health?: Fair    Problem Review  Reviewed Problem List with Patient: yes  Active comorbidities affecting diabetes self-care.: yes  Comorbidities: Cardiovascular Disease, Hypertension  Reviewed health maintenance: yes    Clinical Assessment  Current Diabetes Treatment: Oral Medication, Diet, Injectable    Medication Information  Medication adherence impacting ability to self-manage diabetes?: No    Labs  Do you have regular lab work to monitor your medications?: Yes  Lab Compliance Barriers: No    Nutritional Status  Diet: Diabetic diet, Regular  Meal Plan 24 Hour Recall: Breakfast, Lunch, Dinner, Snack  Meal Plan 24 Hour Recall - Breakfast: MUST HAVE coffee w 2 heaping spoons sugar; 1-2 eggs, 1-2 sl toast, occ PB,  Meal Plan 24 Hour Recall - Lunch: eats out w wife 3x/wk; often at buffet; eats large serving Red Beans/rice, grilled pork chop, water or at home have leftovers or ham sanwich; 8 oz reg coke diluted w water  Meal Plan 24 Hour Recall - Dinner: often at home- eats Steak or shske and bake pork chop, Baked Potato. NO VEG, drinks water  Meal Plan 24 Hour Recall - Snack: must have 1-3 clarissa chip cookies or ice ream or DARK clarissa candy, occ has bowl of honey bunches of oats  Dentation:: Intact  Recent Changes in Weight: No Recent Weight Change  Current nutritional status an area of need that is impacting patient's ability to self-manage diabetes?: No    Additional Social History    Support  Does anyone support you with your diabetes care?: yes  Who supports you?:  spouse  Who takes you to your medical appointments?: self  Does the current support meet the patient's needs?: Yes  Is Support an area impacting ability to self-manage diabetes?: No    Access to Mass Media & Technology  Does the patient have access to any of the following devices or technologies?: Smart phone    Cognitive/Behavioral Health  Alert and Oriented: Yes  Difficulty Thinking: No  Requires Prompting: No  Requires assistance for routine expression?: No  Cognitive or behavioral barriers impacting ability to self-manage diabetes?: No    Culture/Mormon  Culture or Latter-day beliefs that may impact ability to access healthcare: No    Communication  Language preference: English  Hearing Problems: No  Vision Problems: No  Communication needs impacting ability to self-manage diabetes?: No    Health Literacy  Preferred Learning Method: Face to Face, Group Education, Demonstration  How often do you need to have someone help you read instructions, pamphlets, or written material from your doctor or pharmacy?: Never  Health literacy needs impacting ability to self-manage diabetes?: No    Diabetes Self-Management Skills Assessment    Diabetes Disease Process/Treatment Options  Patient/caregiver able to state what happens when someone has diabetes.: somewhat  Patient/caregiver knows what type of diabetes they have.: yes  Diabetes Disease Process/Treatment Options: Skills Assessment Completed: Yes  Assessment indicates:: Adequate understanding  Area of need?: No    Nutrition/Healthy Eating  Challenges to healthy eating:: lack of will power, portion control, snacking between meals and at night  Method of carbohydrate measurement:: no knowledge of what carbs are  Patient can identify foods that impact blood sugar.: no (see comments)  Nutrition/Healthy Eating Skills Assessment Completed:: Yes  Assessment indicates:: Knowledge deficit, Instruction Needed  Area of need?: Yes    Physical Activity/Exercise  Patient's daily  activity level:: lightly active  Patient formally exercises outside of work.: no  Reasons for not exercising:: physically unable to exercise currently  Physical Activity/Exercise Skills Assessment Completed: : Yes  Assessment indicates:: Adequate understanding, Knowledge deficit  Area of need?: No    Medications  Patient is able to describe current diabetes management routine.: yes  Patient understands the purpose of the medications taken for diabetes.: yes  Medication Skills Assessment Completed:: Yes  Assessment indicates:: Adequate understanding  Area of need?: No    Home Blood Glucose Monitoring  Patient states that blood sugar is checked at home daily.: yes  Monitoring Method:: home glucometer  How often do you check your blood sugar?: Once a day  When you check what is your typical blood sugar range? : 115-130  Blood glucose logs:: no, encouraged to bring logs to provider visits  Blood glucose logs reviewed today?: no  Home Blood Glucose Monitoring Skills Assessment Completed: : Yes  Assessment indicates:: Adequate understanding  Area of need?: No    Acute Complications  Patient is able to identify types of acute complications: Yes  Acute Complications Skills Assessment Completed: : Yes  Assessment indicates:: Adequate understanding  Area of need?: No    Chronic Complications  Chronic Complications Skills Assessment Completed: : Yes  Assessment indicates:: Adequate understanding  Area of need?: No    Diabetes Self Support Plan  Assessment Summary and Plan    Based on today's diabetes care assessment, the following areas of need were identified:      Diabetes Self-Management Skills 7/18/2022   Diabetes Disease Process/Treatment Options No   Nutrition/Healthy Eating Yes-has high carb intake; pt somewhat carb unaware; still consumes sugar often; see care plan   Physical Activity/Exercise No   Medication No   Home Blood Glucose Monitoring No   Acute Complications No   Chronic Complications No        Today's  interventions were provided through individual discussion, instruction, and written materials were provided.    Patient verbalized understanding of instruction and written materials.  Pt was able to return back demonstration of instructions today. Patient understood key points, needs reinforcement and further instruction.     Diabetes Self-Management Care Plan:    Today's Diabetes Self-Management Care Plan was developed with Jani's input. Jani has agreed to work toward the following goal(s) to improve his/her overall diabetes control.      Care Plan: Diabetes Management   Updates made since 6/18/2022 12:00 AM      Problem: Healthy Eating       Goal: To achieve long-term weight loss and improved BG, pt agrees to eat 3 evenly spaced meals/day containing 45-60 g carb/3-4 servings of carb/each meal. Snacks limited to 0-15 g carb each.    Start Date: 7/18/2022   Priority: High   Barriers: Lack of Motivation to Change   Note:    Pt started visit by listing all of his diet restrictions (ADA, AHA, Gout, weight) and stated he eats what he wants while mildly watching food choices. Diet recall notes reg intake of sugar in coffee, occ reg soda (diluted), very high carb intake in all meals and snacks heavily /often on dark chocolate,  delroy butter cookies or chocolate chip. Meal time portions of carb rich foods are large and inconsistent. Very little non-starchy veg  consumed. Pt does not read food labels or carb counts. Pt eats out often and like to eat at buffets.    -Discussed carb vs non-carb foods, appropriate amount of carbs to have at meals/snacks and acceptable serving sizes of individual carb items.  - Instructed on CONSISTENT CARB INTAKE with appropriate label reading and serving sizes of specific carb containing foods., portion control (hand) and using the plate method of meal planning.     -Encouraged carb sources primarily from whole grains, fresh/frozen fruits, and low-fat milk and yogurt.   -Discussed meal  plans and snack ideas amenable to pt.    Emphasized importance of eliminating all SSB. Discussed SF drink options.   Reviewed need to limit total/saturated fats and high sodium foods in daily diet       Task: Provided visual examples using dry measuring cups, food models, and other familiar objects such as computer mouse, deck or cards, tennis ball etc. to help with visualization of portions. Completed 7/18/2022      Task: Explained how to count carbohydrates using the food label and the use of dry measuring cups for accurate carb counting. Completed 7/18/2022      Task: Discussed strategies for choosing healthier menu options when dining out. Completed 7/18/2022      Task: Recommended replacing beverages containing high sugar content with noncaloric/sugar free options and/or water. Completed 7/18/2022      Task: Review the importance of balancing carbohydrates with each meal using portion control techniques to count servings of carbohydrate and label reading to identify serving size and amount of total carbs per serving. Completed 7/18/2022      Task: Reviewed small plate method and reviewed the use of the plate to estimate amounts of carbohydrate per meal. Completed 7/18/2022          Follow Up Plan     No follow-ups on file.  Today's care plan and follow up schedule was discussed with patient.  Jani verbalized understanding of the care plan, goals, and agrees to follow up plan.       The patient was encouraged to communicate with his/her health care provider/physician and care team regarding his/her condition(s) and treatment.  I provided the patient with my contact information today and encouraged to contact me via phone or [facility]'s Patient Portal as needed.     Length of Visit   Total Time: 60 Minutes

## 2022-07-21 ENCOUNTER — TELEPHONE (OUTPATIENT)
Dept: FAMILY MEDICINE | Facility: CLINIC | Age: 77
End: 2022-07-21
Payer: MEDICARE

## 2022-07-21 NOTE — TELEPHONE ENCOUNTER
Spoke with the Patient to schedule an EAWV appointment.  Patient is not due another EAWV visit until after 10/07/22.

## 2022-07-31 ENCOUNTER — EXTERNAL CHRONIC CARE MANAGEMENT (OUTPATIENT)
Dept: PRIMARY CARE CLINIC | Facility: CLINIC | Age: 77
End: 2022-07-31
Payer: MEDICARE

## 2022-07-31 PROCEDURE — 99490 CHRNC CARE MGMT STAFF 1ST 20: CPT | Mod: PBBFAC,PO | Performed by: FAMILY MEDICINE

## 2022-07-31 PROCEDURE — 99490 CHRNC CARE MGMT STAFF 1ST 20: CPT | Mod: S$PBB,,, | Performed by: FAMILY MEDICINE

## 2022-07-31 PROCEDURE — 99490 PR CHRONIC CARE MGMT, 1ST 20 MIN: ICD-10-PCS | Mod: S$PBB,,, | Performed by: FAMILY MEDICINE

## 2022-08-03 ENCOUNTER — OFFICE VISIT (OUTPATIENT)
Dept: UROLOGY | Facility: CLINIC | Age: 77
End: 2022-08-03
Payer: MEDICARE

## 2022-08-03 VITALS — WEIGHT: 241.88 LBS | HEIGHT: 70 IN | BODY MASS INDEX: 34.63 KG/M2

## 2022-08-03 DIAGNOSIS — N28.1 KIDNEY CYST, ACQUIRED: ICD-10-CM

## 2022-08-03 DIAGNOSIS — N40.0 BPH WITHOUT OBSTRUCTION/LOWER URINARY TRACT SYMPTOMS: Primary | ICD-10-CM

## 2022-08-03 DIAGNOSIS — N20.0 KIDNEY STONE: ICD-10-CM

## 2022-08-03 PROCEDURE — 99214 OFFICE O/P EST MOD 30 MIN: CPT | Mod: S$PBB,,, | Performed by: UROLOGY

## 2022-08-03 PROCEDURE — 99214 PR OFFICE/OUTPT VISIT, EST, LEVL IV, 30-39 MIN: ICD-10-PCS | Mod: S$PBB,,, | Performed by: UROLOGY

## 2022-08-03 PROCEDURE — 99999 PR PBB SHADOW E&M-EST. PATIENT-LVL III: CPT | Mod: PBBFAC,,, | Performed by: UROLOGY

## 2022-08-03 PROCEDURE — 99999 PR PBB SHADOW E&M-EST. PATIENT-LVL III: ICD-10-PCS | Mod: PBBFAC,,, | Performed by: UROLOGY

## 2022-08-03 PROCEDURE — 99213 OFFICE O/P EST LOW 20 MIN: CPT | Mod: PBBFAC | Performed by: UROLOGY

## 2022-08-03 NOTE — PROGRESS NOTES
Subjective:       Patient ID: Jani Cha is a 77 y.o. male The patient's last visit with me was on 5/3/2022.     Chief Complaint:   Chief Complaint   Patient presents with    Follow-up       History of Kidney Stones  He had an issue with a ureteral stone in Winter 2015.  He did not recall passing the stone but his pain resolved.       3/25/2021  He had right flank pain and hematuria a couple of weeks ago. He brought the stone for analysis a couple of weeks ago.  He denies anymore hematuria, flank pain.  He denies fever.    5/3/2022  He denies pain or hematuria.        Benign Prostatic Hyperplasia  He patient reports nocturia three times a night. He denies frequency, incomplete emptying and intermittency. The patient states symptoms are of moderate severity. Onset of symptoms was several years ago and was gradual in onset.  He has no personal history and no family history of prostate cancer. He reports a history of kidney stones. He denies flank pain, gross hematuria and recurrent UTI.  He is currently taking no prostate medications.  He has noted some frequency with occasional urgency.    08/03/2022  He was to add Flomax last visit.  He took it for no more than 2 weeks.  He stopped it due to retrograde ejaculation and pain in his prostate afterwards.  He did not note any change to his stream.    IPSS Questionnaire (AUA-7):  Over the past month    1)  How often have you had a sensation of not emptying your bladder completely after you finish urinating?  1 - Less than 1 time in 5   2)  How often have you had to urinate again less than two hours after you finished urinating? 0 - Not at all   3)  How often have you found you stopped and started again several times when you urinated?  0 - Not at all   4) How difficult have you found it to postpone urination?  2 - Less than half the time   5) How often have you had a weak urinary stream?  2 - Less than half the time   6) How often have you had to push or strain  to begin urination?  0 - Not at all   7) How many times did you most typically get up to urinate from the time you went to bed until the time you got up in the morning?  3 - 3 times   Total score:  0-7 mildly symptomatic    8-19 moderately symptomatic    20-35 severely symptomatic           ACTIVE MEDICAL ISSUES:  Patient Active Problem List   Diagnosis    Hyperlipidemia    Hypertension    Coronary artery disease involving native coronary artery of native heart without angina pectoris    Sleep apnea    S/P CABG x 4    Type 2 diabetes mellitus with diabetic neuropathy, without long-term current use of insulin    GERD (gastroesophageal reflux disease)    Personal history of colonic polyps    Abdominal aortic aneurysm (AAA) without rupture    CKD (chronic kidney disease) stage 3, GFR 30-59 ml/min    Total occlusion of coronary artery, chronic - PDA with collaterals.  No ischemic on PET    Pulmonary nodule    Thoracic aortic aneurysm without rupture    Bilateral renal cysts    Adrenal adenoma    History of PCI of RCA    Class 2 severe obesity due to excess calories with serious comorbidity and body mass index (BMI) of 37.0 to 37.9 in adult    Calcified granuloma of lung    Uncontrolled type 2 diabetes mellitus with hyperglycemia, without long-term current use of insulin       ALLERGIES AND MEDICATIONS: updated and reviewed.  Review of patient's allergies indicates:   Allergen Reactions    Penicillins Hives, Itching and Rash     Current Outpatient Medications   Medication Sig    aspirin (ECOTRIN) 81 MG EC tablet Take 81 mg by mouth once daily.    atorvastatin (LIPITOR) 80 MG tablet Take 1 tablet by mouth once daily    blood sugar diagnostic Strp 1 strip by Misc.(Non-Drug; Combo Route) route once daily.    carvediloL (COREG) 25 MG tablet TAKE 1 TABLET BY MOUTH TWICE DAILY WITH MEALS    clopidogreL (PLAVIX) 75 mg tablet Take 1 tablet (75 mg total) by mouth once daily.    clotrimazole-betamethasone  1-0.05% (LOTRISONE) cream Apply topically 2 (two) times a day. to affected area    dulaglutide (TRULICITY) 0.75 mg/0.5 mL pen injector Inject 0.75 mg into the skin every 7 days.    fluticasone propionate (FLONASE) 50 mcg/actuation nasal spray 1 spray (50 mcg total) by Each Nostril route once daily.    glipiZIDE (GLUCOTROL) 10 MG TR24 Take 1 tablet (10 mg total) by mouth 2 (two) times daily with meals.    ketoconazole (NIZORAL) 2 % cream Apply topically once daily.    lancets (TRUEPLUS LANCETS) 30 gauge Misc 1 lancet by Misc.(Non-Drug; Combo Route) route 2 (two) times a day.    lancing device Misc 1 Device by Misc.(Non-Drug; Combo Route) route 2 (two) times daily with meals.    metFORMIN (GLUCOPHAGE) 500 MG tablet Take 1 tablet (500 mg total) by mouth 2 (two) times daily with meals.    pantoprazole (PROTONIX) 40 MG tablet Take 1 tablet by mouth once daily    TRUEPLUS LANCETS 33 gauge Misc Apply topically 2 (two) times daily.    valsartan-hydrochlorothiazide (DIOVAN-HCT) 320-12.5 mg per tablet Take 1 tablet by mouth once daily     No current facility-administered medications for this visit.       Review of Systems   Constitutional: Negative for activity change, fatigue, fever and unexpected weight change.   HENT: Negative for congestion.    Eyes: Negative for redness.   Respiratory: Negative for chest tightness and shortness of breath.    Cardiovascular: Negative for chest pain and leg swelling.   Gastrointestinal: Negative for abdominal pain, constipation, diarrhea, nausea and vomiting.   Genitourinary: Negative for dysuria, flank pain, frequency, hematuria, penile pain, penile swelling, scrotal swelling, testicular pain and urgency.   Musculoskeletal: Negative for arthralgias and back pain.   Neurological: Negative for dizziness and light-headedness.   Psychiatric/Behavioral: Negative for behavioral problems and confusion. The patient is not nervous/anxious.    All other systems reviewed and are  "negative.      Objective:      Vitals:    08/03/22 1344   Weight: 109.7 kg (241 lb 13.5 oz)   Height: 5' 10" (1.778 m)     Physical Exam  Vitals and nursing note reviewed.   Constitutional:       Appearance: He is well-developed.   HENT:      Head: Normocephalic.   Eyes:      Conjunctiva/sclera: Conjunctivae normal.   Neck:      Thyroid: No thyromegaly.      Trachea: No tracheal deviation.   Cardiovascular:      Rate and Rhythm: Normal rate.      Heart sounds: Normal heart sounds.   Pulmonary:      Effort: Pulmonary effort is normal. No respiratory distress.      Breath sounds: Normal breath sounds. No wheezing.   Abdominal:      General: Bowel sounds are normal.      Palpations: Abdomen is soft.      Tenderness: There is no abdominal tenderness. There is no rebound.      Hernia: No hernia is present.   Musculoskeletal:         General: No tenderness. Normal range of motion.      Cervical back: Normal range of motion and neck supple.   Lymphadenopathy:      Cervical: No cervical adenopathy.   Skin:     General: Skin is warm and dry.      Findings: No erythema or rash.   Neurological:      Mental Status: He is alert and oriented to person, place, and time.   Psychiatric:         Behavior: Behavior normal.         Thought Content: Thought content normal.         Judgment: Judgment normal.         Urine dipstick shows negative for all components.  Micro exam: negative for WBC's or RBC's    Component Ref Range & Units 4 d ago 1 yr ago   PSA Diagnostic 0.00 - 4.00 ng/mL 1.8  1.8 CM    Comment: PSA Expected levels:   Hormonal Therapy: <0.05 ng/ml   Prostatectomy: <0.01 ng/ml   Radiation Therapy: <1.00 ng/ml    Resulting Agency  [facility]             Narrative  Performed by: OCCLAY  1 year      Specimen Collected: 04/29/22 09:08 Last Resulted: 04/29/22 15:04           .    US Retroperitoneal Complete (Kidney and  Order: [id number]   Status: Final result     Visible to patient: Yes (not seen)     Next appt: 05/09/2022 at 09:00 " AM in Orthopedics (Radha Valadez MD)     Dx: Kidney stone; Kidney cyst, acquired     0 Result Notes    Details    Reading Physician Reading Date Result Priority   Bobby Alves MD  [phone]  [phone] 4/29/2022 Routine     Narrative & Impression  EXAMINATION:  US RETROPERITONEAL COMPLETE     CLINICAL HISTORY:  Calculus of kidney     TECHNIQUE:  Ultrasound of the kidneys and urinary bladder was performed including color flow and Doppler evaluation of the kidneys.     COMPARISON:  07/13/2016.     FINDINGS:  Right kidney: The right kidney measures 12.1 x 6.6 x 4.7 cm. No cortical thinning. No loss of corticomedullary distinction. Resistive index measures 0.70.  There are multiple right renal cysts present including a 1.4 cm cyst within the lower pole, a 1.8 cm cyst within the lower pole, and a 1.5 cm cyst within the upper pole.  No solid renal masses are identified.  No renal stone. No hydronephrosis.     Left kidney: The left kidney measures 11.7 x 5.5 x 5.1 cm. No cortical thinning. No loss of corticomedullary distinction. Resistive index measures 0.73.  There are 3 left renal cysts present with the largest measuring 1.4 cm.  No renal stone. No hydronephrosis.     The bladder is partially distended at the time of scanning and has an unremarkable appearance.  Prevoid bladder volume is estimated to be 53 mL.  Following voiding, no significant postvoid residual is noted.     The prostate is enlarged measuring 5.0 x 3.6 x 4.5 cm consistent with 43 mL.     Impression:     Bilateral simple renal cysts.     Mild prostatomegaly.     No renal calculi are identified.        Electronically signed by: Bobby Alves MD  Date:                                            04/29/2022  Time:                                           13:26             Exam Ended: 04/29/22 10:16 Last Resulted: 04/29/22 13:26                 Assessment:       1. BPH without obstruction/lower urinary tract symptoms    2. Kidney cyst, acquired     3. Kidney stone          Plan:       1. BPH without obstruction/lower urinary tract symptoms  We talked about finasteride or UroLift.  He wants to think about it.  He will call.  If he wants to try UroLift, I will set up Cystoscopy    2. Kidney cyst, acquired  stable    3. Kidney stone  stable       Follow up in about 3 months (around 11/3/2022) for Follow up.

## 2022-08-10 ENCOUNTER — LAB VISIT (OUTPATIENT)
Dept: LAB | Facility: HOSPITAL | Age: 77
End: 2022-08-10
Attending: HOSPITALIST
Payer: MEDICARE

## 2022-08-10 DIAGNOSIS — E11.65 UNCONTROLLED TYPE 2 DIABETES MELLITUS WITH HYPERGLYCEMIA, WITHOUT LONG-TERM CURRENT USE OF INSULIN: ICD-10-CM

## 2022-08-10 LAB
ANION GAP SERPL CALC-SCNC: 10 MMOL/L (ref 8–16)
BUN SERPL-MCNC: 31 MG/DL (ref 8–23)
CALCIUM SERPL-MCNC: 9.7 MG/DL (ref 8.7–10.5)
CHLORIDE SERPL-SCNC: 106 MMOL/L (ref 95–110)
CO2 SERPL-SCNC: 26 MMOL/L (ref 23–29)
CREAT SERPL-MCNC: 1.6 MG/DL (ref 0.5–1.4)
EST. GFR  (NO RACE VARIABLE): 44.4 ML/MIN/1.73 M^2
ESTIMATED AVG GLUCOSE: 166 MG/DL (ref 68–131)
GLUCOSE SERPL-MCNC: 113 MG/DL (ref 70–110)
HBA1C MFR BLD: 7.4 % (ref 4–5.6)
POTASSIUM SERPL-SCNC: 4.3 MMOL/L (ref 3.5–5.1)
SODIUM SERPL-SCNC: 142 MMOL/L (ref 136–145)

## 2022-08-10 PROCEDURE — 36415 COLL VENOUS BLD VENIPUNCTURE: CPT | Mod: PO | Performed by: HOSPITALIST

## 2022-08-10 PROCEDURE — 83036 HEMOGLOBIN GLYCOSYLATED A1C: CPT | Performed by: HOSPITALIST

## 2022-08-10 PROCEDURE — 80048 BASIC METABOLIC PNL TOTAL CA: CPT | Performed by: HOSPITALIST

## 2022-08-17 ENCOUNTER — OFFICE VISIT (OUTPATIENT)
Dept: ENDOCRINOLOGY | Facility: CLINIC | Age: 77
End: 2022-08-17
Payer: MEDICARE

## 2022-08-17 VITALS
TEMPERATURE: 98 F | WEIGHT: 243.69 LBS | HEART RATE: 71 BPM | SYSTOLIC BLOOD PRESSURE: 134 MMHG | BODY MASS INDEX: 34.97 KG/M2 | DIASTOLIC BLOOD PRESSURE: 69 MMHG

## 2022-08-17 DIAGNOSIS — I25.10 CORONARY ARTERY DISEASE INVOLVING NATIVE CORONARY ARTERY OF NATIVE HEART WITHOUT ANGINA PECTORIS: Chronic | ICD-10-CM

## 2022-08-17 DIAGNOSIS — E11.65 TYPE 2 DIABETES MELLITUS WITH HYPERGLYCEMIA, WITHOUT LONG-TERM CURRENT USE OF INSULIN: Primary | ICD-10-CM

## 2022-08-17 DIAGNOSIS — N18.32 STAGE 3B CHRONIC KIDNEY DISEASE: ICD-10-CM

## 2022-08-17 DIAGNOSIS — D35.02 ADENOMA OF LEFT ADRENAL GLAND: Chronic | ICD-10-CM

## 2022-08-17 DIAGNOSIS — E66.01 CLASS 2 SEVERE OBESITY DUE TO EXCESS CALORIES WITH SERIOUS COMORBIDITY AND BODY MASS INDEX (BMI) OF 37.0 TO 37.9 IN ADULT: ICD-10-CM

## 2022-08-17 DIAGNOSIS — E78.00 PURE HYPERCHOLESTEROLEMIA: Chronic | ICD-10-CM

## 2022-08-17 PROCEDURE — 99213 OFFICE O/P EST LOW 20 MIN: CPT | Mod: PBBFAC | Performed by: HOSPITALIST

## 2022-08-17 PROCEDURE — 99214 PR OFFICE/OUTPT VISIT, EST, LEVL IV, 30-39 MIN: ICD-10-PCS | Mod: S$PBB,,, | Performed by: HOSPITALIST

## 2022-08-17 PROCEDURE — 99214 OFFICE O/P EST MOD 30 MIN: CPT | Mod: S$PBB,,, | Performed by: HOSPITALIST

## 2022-08-17 PROCEDURE — 99999 PR PBB SHADOW E&M-EST. PATIENT-LVL III: ICD-10-PCS | Mod: PBBFAC,,, | Performed by: HOSPITALIST

## 2022-08-17 PROCEDURE — 99999 PR PBB SHADOW E&M-EST. PATIENT-LVL III: CPT | Mod: PBBFAC,,, | Performed by: HOSPITALIST

## 2022-08-17 NOTE — ASSESSMENT & PLAN NOTE
- Diabetes better controlled, improvement  A1c, goal A1C for patient is <7%  - Complicated by hyperglycemia, obesity, hx CAD/CABG, CKD3b, dietary indiscretion.  - unable to afford many diabetes medication:  high deductible  - still with occasional morning time hyperglycemia  - due to the renal function, medical options limited  - Diabetic supplies/medications, review at every visit to ensure continue refills    Plan  - renally dose metformin:  500 mg b.i.d. due to CKD stage IIIB  - glipizide to 10 mg b.i.d.  - if possible we will increase Trulicity 1.5 mg Q weekly injection, will as for pharmacy assistant to help cost  - consider Actos/Starlix, if diabetes worsen we can consider consider long-acting insulin  - advised the patient follow with my regimen with close follow-up as scheduled  - repeat lab work prior to next visit, 3 months

## 2022-08-17 NOTE — TELEPHONE ENCOUNTER
Reminded patient wife I still need their household proof of income and copy of his current insurance card to submit an application to [employer] ([employer])

## 2022-08-17 NOTE — ASSESSMENT & PLAN NOTE
- on statin, beta-blocker, aspirin  - Discussed importance of better glycemic control  - continue Trulicity if possible given history of CAD

## 2022-08-17 NOTE — PROGRESS NOTES
Subjective:      Patient ID: Jani Cha is a 77 y.o. male presented to [facility] on 8/17/2022.  Chief Complaint:  Diabetes type 2, No chief complaint on file.      History of Present Illness: Jani Cha is a 77 y.o. male here for management of type 2 diabetes and left adrenal incidentaloma  Other significant past medical history:  CKD stage IIIB, CAD, CABG, hypertension, hyperlipidemia      With regards to Diabetes Mellitus Type 2  Known diabetic complications: nephropathy and cardiovascular disease  Cardiovascular risk factors: advanced age (older than 55 for men, 65 for women), diabetes mellitus, dyslipidemia, hypertension, male gender, microalbuminuria, obesity (BMI >= 30 kg/m2) and sedentary lifestyle  Diagnosed w/ DM: in 2006  Saw Ortho need Left Knee Replacement>>  Need to get A1C <7%  Patient having issue with high deductible, gap.  Unable to afford multiple diabetes medication      Interval history:  Patient is here for follow-up of type 2 diabetes, A1c improved to 7.4%  Better dietary changes.  Currently still on Trulicity, has 3 more pens left before high deductible cost will incur  Trulicity is $370 with insurance. The problem is that it looks like he has a deductible to meet, so  all the alternatives would be just as high  Now patient seems to be more motivated to get his glucose better    Current meds:   metformin:  500 mg twice a day   glipizide 10 mg twice a day   Trulicity 0.75 mg Q weekly injection   Home glucose checks:  Daily, hyperglycemia  Glucose log in AM  147  109  139  157  113  121  99  130  95  133  140  149  123  139    Diet/Exercise:               Eating 2 meals per day , lunch in, large portion dinner, does eat cookies prior to going to bed              Drink:  Coffee with sugar in the morning              Current exercise: none due to knee pain  Weight trend: stable  Diabetes Education: no  Diabetes Related Hospitalization:  no  Hx of pancreatitis, hx  of thyroid cancer: no  Family history of diabetes: unknow  Occupation: retired  Eye exam current (within one year): yes  Reports cuts or ulcers on feet:  Denies    Statin: Taking  ACE/ARB: Taking    Diabetes Management Status: Reviewed     A1C Trend  Lab Results   Component Value Date    HGBA1C 7.4 (H) 08/10/2022    HGBA1C 9.6 (H) 05/10/2022    HGBA1C 9.1 (H) 02/07/2022       Lab Results   Component Value Date    MICALBCREAT 47.1 (H) 02/07/2022     No results found for: ZPSQPYIR24  No results found for: TTGIGA    No results found for: CPEPTIDE, GLUTAMICACID, ISLETCELLANT, FRUCTOSAMINE     Screening or Prevention Patient's value Goal Complete/Controlled?   Lipid profile : 12/06/2021 Annually Yes   LDL control Lab Results   Component Value Date    LDLCALC 60.0 (L) 12/06/2021    Annually/Less than 100 mg/dl  Yes   Nephropathy screening Lab Results   Component Value Date    LABMICR 56.0 02/07/2022     Lab Results   Component Value Date    PROTEINUA 1+ (A) 12/31/2015    Annually No   Blood pressure BP Readings from Last 1 Encounters:   08/17/22 134/69    Less than 140/90 Yes   Dilated retinal exam : 10/19/2021 Annually Yes   Foot exam   : 01/05/2022 Annually Yes       Regards to Left adrenal incidentaloma  - Noted on CT chest, left adrenal mass 1.2 cm in size.  - Adrenal lesion imaging characteristics left adrenal nodule, 1.2 cm, Smooth, heterogenous, without calcifications    - Patient does have history of diabetes as above, Morbidly obese, Hypertension but no history of hypertensive emergency   - Pt denies history of paroxysmal symptoms such as syncope, pallor or dizziness  - No palpitations, No history of cancer  - Pt reports no abdominal discomfort  - Work up: DST: WNL, cortisol 2, due to the overweight  - Metanephrines and normetanephrines:  Within acceptable ranges  - Renin/navneet level normal    Reviewed past surgical, medical, family, social history and updated as appropriate.    Review of Systems: see HPI  above    Objective:   /69   Pulse 71   Temp 98 °F (36.7 °C)   Wt 110.5 kg (243 lb 11.2 oz)   BMI 34.97 kg/m²     Body mass index is 34.97 kg/m².  Vital signs reviewed    Physical Exam  Vitals and nursing note reviewed.   Constitutional:       General: He is not in acute distress.     Appearance: Normal appearance. He is well-developed. He is obese.   Neck:      Thyroid: No thyromegaly.   Cardiovascular:      Rate and Rhythm: Normal rate.      Heart sounds: Normal heart sounds.   Pulmonary:      Effort: Pulmonary effort is normal. No respiratory distress.   Abdominal:      Tenderness: There is no abdominal tenderness.   Musculoskeletal:         General: Normal range of motion.      Cervical back: Neck supple.   Skin:     General: Skin is warm.      Findings: No erythema.   Neurological:      Mental Status: He is alert and oriented to person, place, and time.         Lab Reviewed:   Lab Results   Component Value Date    HGBA1C 7.4 (H) 08/10/2022       Lab Results   Component Value Date    CHOL 110 (L) 12/06/2021    HDL 35 (L) 12/06/2021    LDLCALC 60.0 (L) 12/06/2021    TRIG 75 12/06/2021    CHOLHDL 31.8 12/06/2021       Lab Results   Component Value Date     08/10/2022    K 4.3 08/10/2022     08/10/2022    CO2 26 08/10/2022     (H) 08/10/2022    BUN 31 (H) 08/10/2022    CREATININE 1.6 (H) 08/10/2022    CALCIUM 9.7 08/10/2022    PROT 6.4 05/10/2022    ALBUMIN 3.4 (L) 05/10/2022    BILITOT 0.5 05/10/2022    ALKPHOS 79 05/10/2022    AST 11 05/10/2022    ALT 16 05/10/2022    ANIONGAP 10 08/10/2022    ESTGFRAFRICA 48.0 (A) 05/10/2022    EGFRNONAA 41.5 (A) 05/10/2022    TSH 1.640 12/06/2021        Lab Results   Component Value Date    CALCIUM 9.7 08/10/2022    CALCIUM 9.7 05/10/2022    CALCIUM 9.9 02/07/2022    ALKPHOS 79 05/10/2022    ALKPHOS 76 02/07/2022    ALKPHOS 80 12/06/2021    TSH 1.640 12/06/2021       Assessment     1. Type 2 diabetes mellitus with hyperglycemia, without long-term  current use of insulin  Ambulatory referral/consult to Pharmacy Assistance    dulaglutide (TRULICITY) 1.5 mg/0.5 mL pen injector    Hemoglobin A1C    Basic Metabolic Panel   2. Adenoma of left adrenal gland     3. Coronary artery disease involving native coronary artery of native heart without angina pectoris     4. Stage 3b chronic kidney disease     5. Class 2 severe obesity due to excess calories with serious comorbidity and body mass index (BMI) of 37.0 to 37.9 in adult     6. Pure hypercholesterolemia          Plan       Type 2 diabetes mellitus with hyperglycemia, without long-term current use of insulin  - Diabetes better controlled, improvement  A1c, goal A1C for patient is <7%  - Complicated by hyperglycemia, obesity, hx CAD/CABG, CKD3b, dietary indiscretion.  - unable to afford many diabetes medication:  high deductible  - still with occasional morning time hyperglycemia  - due to the renal function, medical options limited  - Diabetic supplies/medications, review at every visit to ensure continue refills    Plan  - renally dose metformin:  500 mg b.i.d. due to CKD stage IIIB  - glipizide to 10 mg b.i.d.  - if possible we will increase Trulicity 1.5 mg Q weekly injection, will as for pharmacy assistant to help cost  - consider Actos/Starlix, if diabetes worsen we can consider consider long-acting insulin  - advised the patient follow with my regimen with close follow-up as scheduled  - repeat lab work prior to next visit, 3 months    Adrenal adenoma  - Adrenal incidentaloma, Noted on CT chest, left adrenal mass 1.2 cm in size.  - Adrenal lesion imaging characteristics left adrenal nodule, 1.2 cm, Smooth, heterogenous, without calcifications   - DST: WNL, cortisol 2, due to the overweight  - Metanephrines and normetanephrines:  Within acceptable ranges  - Renin/navneet level normal    Coronary artery disease involving native coronary artery of native heart without angina pectoris  - on statin, beta-blocker,  aspirin  - Discussed importance of better glycemic control  - continue Trulicity if possible given history of CAD    CKD (chronic kidney disease) stage 3, GFR 30-59 ml/min  - Renal function reviewed on lab work today  - Renally Adjust diabetes medications, avoid hypoglycemia  - Oral options limited  - continue routine monitoring    Hyperlipidemia  - ASCVD Risk below: Statin: Taking  The ASCVD Risk score (Chai KAMINSKI Jr., et al., 2013) failed to calculate for the following reasons:    The valid total cholesterol range is 130 to 320 mg/dL      Advised patient to follow up with PCP for routine health maintenance care.   RTC in 3 months    aMlcolm Kent M.D  Endocrinology  [facility]  8/17/2022      Disclaimer: This note has been generated using voice-recognition software. There may be typographical errors that have been missed during proof-reading.

## 2022-08-19 ENCOUNTER — TELEPHONE (OUTPATIENT)
Dept: UROLOGY | Facility: CLINIC | Age: 77
End: 2022-08-19
Payer: MEDICARE

## 2022-08-19 ENCOUNTER — LAB VISIT (OUTPATIENT)
Dept: LAB | Facility: HOSPITAL | Age: 77
End: 2022-08-19
Attending: UROLOGY
Payer: MEDICARE

## 2022-08-19 DIAGNOSIS — R30.0 DYSURIA: Primary | ICD-10-CM

## 2022-08-19 DIAGNOSIS — R30.0 DYSURIA: ICD-10-CM

## 2022-08-19 PROCEDURE — 87088 URINE BACTERIA CULTURE: CPT | Performed by: UROLOGY

## 2022-08-19 PROCEDURE — 87086 URINE CULTURE/COLONY COUNT: CPT | Performed by: UROLOGY

## 2022-08-19 PROCEDURE — 87077 CULTURE AEROBIC IDENTIFY: CPT | Performed by: UROLOGY

## 2022-08-19 PROCEDURE — 87186 SC STD MICRODIL/AGAR DIL: CPT | Performed by: UROLOGY

## 2022-08-19 NOTE — TELEPHONE ENCOUNTER
----- Message from Princess Thomson RN sent at 8/19/2022 12:00 PM CDT -----  Regarding: FW: Self/  [phone]  Pt called he is urinating quite often and when he goes its not a lot of urine and it burns.  ----- Message -----  From: Jeniffer White  Sent: 8/19/2022  11:50 AM CDT  To: Valentin Murry Staff  Subject: Self/  [phone]                              Type: Patient Call Back    Who called:  Patient    What is the request in detail:  Patient is needing a call back from staff, he stated he's having a problem with urination and burning when he does go.  Thank you    Would the patient rather a call back or a response via [facility]?  Call back    Best call back number:   [phone]          Thank you

## 2022-08-19 NOTE — TELEPHONE ENCOUNTER
Pt notified urine culture was ordered and he can take AZO for burning.  Pt verbalized understanding.

## 2022-08-22 ENCOUNTER — TELEPHONE (OUTPATIENT)
Dept: UROLOGY | Facility: CLINIC | Age: 77
End: 2022-08-22
Payer: MEDICARE

## 2022-08-22 ENCOUNTER — OFFICE VISIT (OUTPATIENT)
Dept: VASCULAR SURGERY | Facility: CLINIC | Age: 77
End: 2022-08-22
Payer: MEDICARE

## 2022-08-22 VITALS — WEIGHT: 239.5 LBS | BODY MASS INDEX: 34.37 KG/M2 | SYSTOLIC BLOOD PRESSURE: 110 MMHG | DIASTOLIC BLOOD PRESSURE: 60 MMHG

## 2022-08-22 DIAGNOSIS — R30.0 DYSURIA: Primary | ICD-10-CM

## 2022-08-22 DIAGNOSIS — I71.40 ABDOMINAL AORTIC ANEURYSM (AAA) WITHOUT RUPTURE: Primary | ICD-10-CM

## 2022-08-22 DIAGNOSIS — N32.0 BLADDER-NECK OBSTRUCTION: ICD-10-CM

## 2022-08-22 DIAGNOSIS — I89.0 LYMPHEDEMA OF BOTH LOWER EXTREMITIES: ICD-10-CM

## 2022-08-22 DIAGNOSIS — I87.303 VENOUS HYPERTENSION OF BOTH LOWER EXTREMITIES: ICD-10-CM

## 2022-08-22 DIAGNOSIS — E78.5 HYPERLIPIDEMIA, UNSPECIFIED HYPERLIPIDEMIA TYPE: ICD-10-CM

## 2022-08-22 LAB — BACTERIA UR CULT: ABNORMAL

## 2022-08-22 PROCEDURE — 99999 PR PBB SHADOW E&M-EST. PATIENT-LVL III: ICD-10-PCS | Mod: PBBFAC,,, | Performed by: SURGERY

## 2022-08-22 PROCEDURE — 99999 PR PBB SHADOW E&M-EST. PATIENT-LVL III: CPT | Mod: PBBFAC,,, | Performed by: SURGERY

## 2022-08-22 PROCEDURE — 99213 OFFICE O/P EST LOW 20 MIN: CPT | Mod: PBBFAC | Performed by: SURGERY

## 2022-08-22 PROCEDURE — 99214 PR OFFICE/OUTPT VISIT, EST, LEVL IV, 30-39 MIN: ICD-10-PCS | Mod: S$PBB,,, | Performed by: SURGERY

## 2022-08-22 PROCEDURE — 99214 OFFICE O/P EST MOD 30 MIN: CPT | Mod: S$PBB,,, | Performed by: SURGERY

## 2022-08-22 RX ORDER — SULFAMETHOXAZOLE AND TRIMETHOPRIM 800; 160 MG/1; MG/1
1 TABLET ORAL 2 TIMES DAILY
Qty: 14 TABLET | Refills: 0 | Status: SHIPPED | OUTPATIENT
Start: 2022-08-22 | End: 2022-08-29

## 2022-08-22 RX ORDER — CEPHALEXIN 500 MG/1
500 CAPSULE ORAL EVERY 8 HOURS
Qty: 21 CAPSULE | Refills: 0 | Status: SHIPPED | OUTPATIENT
Start: 2022-08-22 | End: 2022-08-22

## 2022-08-22 NOTE — TELEPHONE ENCOUNTER
LVM to inform pt that keflex was sent to his pharmacy    ----- Message from ARNULFO Hanson MD sent at 8/22/2022  7:13 AM CDT -----  Keflex sent to pharmacy

## 2022-08-22 NOTE — TELEPHONE ENCOUNTER
----- Message from Princess Thomson RN sent at 8/22/2022 12:50 PM CDT -----    ----- Message -----  From: Babatunde Toro  Sent: 8/22/2022  12:14 PM CDT  To: Valentin [facility]ry Staff    Type:  Pharmacy Calling to Clarify an RX    Name of Caller: martin    Pharmacy Name: [facility]    Prescription Name: cephALEXin (KEFLEX) 500 MG capsule    What do they need to clarify? Pt is allergic to penicillin and not comfortable taking med; would like something else prescribed.    Can you be contacted via [facility]?no    Best Call Back Number: [phone]
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
## 2022-08-26 ENCOUNTER — CLINICAL SUPPORT (OUTPATIENT)
Dept: REHABILITATION | Facility: HOSPITAL | Age: 77
End: 2022-08-26
Payer: MEDICARE

## 2022-08-26 DIAGNOSIS — I89.0 LYMPHEDEMA OF BOTH LOWER EXTREMITIES: ICD-10-CM

## 2022-08-26 DIAGNOSIS — M79.89 SWELLING OF LOWER EXTREMITY: ICD-10-CM

## 2022-08-26 PROCEDURE — 97140 MANUAL THERAPY 1/> REGIONS: CPT

## 2022-08-26 PROCEDURE — 97162 PT EVAL MOD COMPLEX 30 MIN: CPT
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
## 2022-08-29 DIAGNOSIS — I89.0 LYMPHEDEMA OF BOTH LOWER EXTREMITIES: Primary | ICD-10-CM

## 2022-08-31 ENCOUNTER — EXTERNAL CHRONIC CARE MANAGEMENT (OUTPATIENT)
Dept: PRIMARY CARE CLINIC | Facility: CLINIC | Age: 77
End: 2022-08-31
Payer: MEDICARE

## 2022-08-31 ENCOUNTER — TELEPHONE (OUTPATIENT)
Dept: VASCULAR SURGERY | Facility: CLINIC | Age: 77
End: 2022-08-31
Payer: MEDICARE

## 2022-08-31 DIAGNOSIS — I89.0 LYMPHEDEMA OF BOTH LOWER EXTREMITIES: Primary | ICD-10-CM

## 2022-08-31 PROCEDURE — 99490 CHRNC CARE MGMT STAFF 1ST 20: CPT | Mod: S$PBB,,, | Performed by: FAMILY MEDICINE

## 2022-08-31 PROCEDURE — 99490 PR CHRONIC CARE MGMT, 1ST 20 MIN: ICD-10-PCS | Mod: S$PBB,,, | Performed by: FAMILY MEDICINE

## 2022-08-31 PROCEDURE — 99490 CHRNC CARE MGMT STAFF 1ST 20: CPT | Mod: PBBFAC,PO | Performed by: FAMILY MEDICINE

## 2022-08-31 NOTE — TELEPHONE ENCOUNTER
Spoke with Pt to let him know that his new order for compression stockings were faxed over to [facility] and to listen for a call.    ----- Message from Norris Amos sent at 8/31/2022  9:58 AM CDT -----  Type: Patient Call Back    Who called:Self    What is the request in detail: Pt states that he needs 20-30 compression socks. Order goes to [facility] on [address]. Pt states that the previous order was for 30-40.     Can the clinic reply by [facility]? no    Would the patient rather a call back or a response via [facility]?     Best call back number: [phone] (home)       Additional Information:

## 2022-09-01 ENCOUNTER — TELEPHONE (OUTPATIENT)
Dept: PHARMACY | Facility: CLINIC | Age: 77
End: 2022-09-01
Payer: MEDICARE

## 2022-09-01 NOTE — TELEPHONE ENCOUNTER
[facility] 12[facility].  Medication will be ship to patients home within 7-10 business days.

## 2022-09-02 ENCOUNTER — PES CALL (OUTPATIENT)
Dept: ADMINISTRATIVE | Facility: CLINIC | Age: 77
End: 2022-09-02
Payer: MEDICARE

## 2022-09-12 ENCOUNTER — HOSPITAL ENCOUNTER (OUTPATIENT)
Dept: RADIOLOGY | Facility: HOSPITAL | Age: 77
Discharge: HOME | End: 2022-09-12
Attending: SURGERY
Payer: MEDICARE

## 2022-09-12 ENCOUNTER — PATIENT MESSAGE (OUTPATIENT)
Dept: FAMILY MEDICINE | Facility: CLINIC | Age: 77
End: 2022-09-12
Payer: MEDICARE

## 2022-09-12 DIAGNOSIS — I71.40 ABDOMINAL AORTIC ANEURYSM (AAA) WITHOUT RUPTURE: ICD-10-CM

## 2022-09-12 PROCEDURE — 74174 CTA ABD&PLVS W/CONTRAST: CPT | Mod: 26,,, | Performed by: RADIOLOGY

## 2022-09-12 PROCEDURE — 74174 CTA ABD&PLVS W/CONTRAST: CPT | Mod: TC

## 2022-09-12 PROCEDURE — 25500020 PHARM REV CODE 255: Performed by: SURGERY

## 2022-09-12 PROCEDURE — 74174 CTA ABDOMEN AND PELVIS: ICD-10-PCS | Mod: 26,,, | Performed by: RADIOLOGY

## 2022-09-12 RX ADMIN — IOHEXOL 100 ML: 350 INJECTION, SOLUTION INTRAVENOUS at 10:09

## 2022-09-19 ENCOUNTER — TELEPHONE (OUTPATIENT)
Dept: FAMILY MEDICINE | Facility: CLINIC | Age: 77
End: 2022-09-19
Payer: MEDICARE

## 2022-09-19 NOTE — TELEPHONE ENCOUNTER
Spoke with the Patient and rescheduled EAW[facility] appointment on 10/13/22 @ 10:00am.  Patient verbally understands.

## 2022-09-22 ENCOUNTER — IMMUNIZATION (OUTPATIENT)
Dept: OBSTETRICS AND GYNECOLOGY | Facility: CLINIC | Age: 77
End: 2022-09-22
Payer: MEDICARE

## 2022-09-22 DIAGNOSIS — Z23 NEED FOR VACCINATION: Primary | ICD-10-CM

## 2022-09-22 PROCEDURE — 91312 COVID-19, MRNA, LNP-S, BIVALENT BOOSTER, PF, 30 MCG/0.3 ML DOSE: CPT | Mod: PBBFAC

## 2022-09-22 PROCEDURE — 0124A COVID-19, MRNA, LNP-S, BIVALENT BOOSTER, PF, 30 MCG/0.3 ML DOSE: CPT | Mod: PBBFAC

## 2022-09-24 ENCOUNTER — IMMUNIZATION (OUTPATIENT)
Dept: OBSTETRICS AND GYNECOLOGY | Facility: CLINIC | Age: 77
End: 2022-09-24
Payer: MEDICARE

## 2022-09-24 PROCEDURE — G0008 ADMIN INFLUENZA VIRUS VAC: HCPCS | Mod: PBBFAC

## 2022-09-30 ENCOUNTER — EXTERNAL CHRONIC CARE MANAGEMENT (OUTPATIENT)
Dept: PRIMARY CARE CLINIC | Facility: CLINIC | Age: 77
End: 2022-09-30
Payer: MEDICARE

## 2022-09-30 PROCEDURE — 99490 CHRNC CARE MGMT STAFF 1ST 20: CPT | Mod: S$PBB,,, | Performed by: FAMILY MEDICINE

## 2022-09-30 PROCEDURE — 99490 PR CHRONIC CARE MGMT, 1ST 20 MIN: ICD-10-PCS | Mod: S$PBB,,, | Performed by: FAMILY MEDICINE

## 2022-09-30 PROCEDURE — 99490 CHRNC CARE MGMT STAFF 1ST 20: CPT | Mod: PBBFAC,PO | Performed by: FAMILY MEDICINE

## 2022-10-07 DIAGNOSIS — E11.40 TYPE 2 DIABETES MELLITUS WITH DIABETIC NEUROPATHY, WITHOUT LONG-TERM CURRENT USE OF INSULIN: ICD-10-CM

## 2022-10-07 RX ORDER — BLOOD-GLUCOSE CONTROL, NORMAL
EACH MISCELLANEOUS
Qty: 200 EACH | Refills: 3 | OUTPATIENT
Start: 2022-10-07

## 2022-10-07 NOTE — TELEPHONE ENCOUNTER
----- Message from Treasure Babar sent at 10/7/2022  9:56 AM CDT -----  Regarding: refill request  Type: RX Refill Request    Who Called: Margaret-Wife    Have you contacted your pharmacy: no    Refill or New Rx: refill     RX Name and Strength: lancets 30 gauge [facility]    Preferred Pharmacy with phone number: [facility] [address] [place] [address] [address] [place] [address]   Phone:  [phone]  Fax:  [phone]    Local or Mail Order: local    Ordering Provider: Dr. Bains    Would the patient rather a call back or a response via [facility]? Call    Best Call Back Number: [phone]    Additional Info: pt needs rx changed to 1 a day, he only uses 1 lancet per day

## 2022-10-07 NOTE — TELEPHONE ENCOUNTER
No new care gaps identified.  [facility] Embedded Care Gaps. Reference number: [id number]. 10/07/2022   10:13:19 AM ELIZT

## 2022-10-07 NOTE — TELEPHONE ENCOUNTER
Oscar DC. Request already responded to by other means (e.g. phone or fax)   Refill Authorization Note   Jani Cha  is requesting a refill authorization.  Brief Assessment and Rationale for Refill:  Quick Discontinue  Medication Therapy Plan:  Signed 10/4/22    Medication Reconciliation Completed:  No      Comments:     Note composed:2:24 PM 10/07/2022

## 2022-10-10 ENCOUNTER — OFFICE VISIT (OUTPATIENT)
Dept: PODIATRY | Facility: CLINIC | Age: 77
End: 2022-10-10
Payer: MEDICARE

## 2022-10-10 VITALS — HEIGHT: 70 IN | WEIGHT: 239.63 LBS | BODY MASS INDEX: 34.3 KG/M2

## 2022-10-10 DIAGNOSIS — B35.1 ONYCHOMYCOSIS DUE TO DERMATOPHYTE: ICD-10-CM

## 2022-10-10 DIAGNOSIS — E11.49 TYPE II DIABETES MELLITUS WITH NEUROLOGICAL MANIFESTATIONS: Primary | ICD-10-CM

## 2022-10-10 PROCEDURE — 99499 NO LOS: ICD-10-PCS | Mod: S$PBB,,, | Performed by: PODIATRIST

## 2022-10-10 PROCEDURE — 11721 DEBRIDE NAIL 6 OR MORE: CPT | Mod: PBBFAC,PO | Performed by: PODIATRIST

## 2022-10-10 PROCEDURE — 99999 PR PBB SHADOW E&M-EST. PATIENT-LVL III: ICD-10-PCS | Mod: PBBFAC,,, | Performed by: PODIATRIST

## 2022-10-10 PROCEDURE — 99499 UNLISTED E&M SERVICE: CPT | Mod: S$PBB,,, | Performed by: PODIATRIST

## 2022-10-10 PROCEDURE — 99213 OFFICE O/P EST LOW 20 MIN: CPT | Mod: PBBFAC,PO | Performed by: PODIATRIST

## 2022-10-10 PROCEDURE — 99999 PR PBB SHADOW E&M-EST. PATIENT-LVL III: CPT | Mod: PBBFAC,,, | Performed by: PODIATRIST

## 2022-10-10 PROCEDURE — 11721 DEBRIDE NAIL 6 OR MORE: CPT | Mod: Q9,S$PBB,, | Performed by: PODIATRIST

## 2022-10-10 PROCEDURE — 11721 PR DEBRIDEMENT OF NAILS, 6 OR MORE: ICD-10-PCS | Mod: Q9,S$PBB,, | Performed by: PODIATRIST

## 2022-10-13 ENCOUNTER — OFFICE VISIT (OUTPATIENT)
Dept: FAMILY MEDICINE | Facility: CLINIC | Age: 77
End: 2022-10-13
Payer: MEDICARE

## 2022-10-13 VITALS
RESPIRATION RATE: 18 BRPM | BODY MASS INDEX: 34.63 KG/M2 | DIASTOLIC BLOOD PRESSURE: 66 MMHG | TEMPERATURE: 98 F | SYSTOLIC BLOOD PRESSURE: 120 MMHG | HEIGHT: 70 IN | HEART RATE: 72 BPM | WEIGHT: 241.88 LBS | OXYGEN SATURATION: 95 %

## 2022-10-13 DIAGNOSIS — I71.20 THORACIC AORTIC ANEURYSM WITHOUT RUPTURE, UNSPECIFIED PART: ICD-10-CM

## 2022-10-13 DIAGNOSIS — E66.09 CLASS 1 OBESITY DUE TO EXCESS CALORIES WITH SERIOUS COMORBIDITY AND BODY MASS INDEX (BMI) OF 34.0 TO 34.9 IN ADULT: ICD-10-CM

## 2022-10-13 DIAGNOSIS — I25.10 CORONARY ARTERY DISEASE INVOLVING NATIVE CORONARY ARTERY OF NATIVE HEART WITHOUT ANGINA PECTORIS: Chronic | ICD-10-CM

## 2022-10-13 DIAGNOSIS — Z98.61 HISTORY OF PERCUTANEOUS CORONARY INTERVENTION: ICD-10-CM

## 2022-10-13 DIAGNOSIS — I71.40 ABDOMINAL AORTIC ANEURYSM (AAA) WITHOUT RUPTURE, UNSPECIFIED PART: ICD-10-CM

## 2022-10-13 DIAGNOSIS — Z86.010 PERSONAL HISTORY OF COLONIC POLYPS: ICD-10-CM

## 2022-10-13 DIAGNOSIS — Z00.00 ENCOUNTER FOR PREVENTIVE HEALTH EXAMINATION: Primary | ICD-10-CM

## 2022-10-13 DIAGNOSIS — E78.5 HYPERLIPIDEMIA, UNSPECIFIED HYPERLIPIDEMIA TYPE: Chronic | ICD-10-CM

## 2022-10-13 DIAGNOSIS — Z95.1 S/P CABG X 4: Chronic | ICD-10-CM

## 2022-10-13 DIAGNOSIS — E11.40 TYPE 2 DIABETES MELLITUS WITH DIABETIC NEUROPATHY, WITHOUT LONG-TERM CURRENT USE OF INSULIN: Chronic | ICD-10-CM

## 2022-10-13 DIAGNOSIS — D35.00 ADRENAL ADENOMA, UNSPECIFIED LATERALITY: Chronic | ICD-10-CM

## 2022-10-13 DIAGNOSIS — N18.32 STAGE 3B CHRONIC KIDNEY DISEASE: ICD-10-CM

## 2022-10-13 DIAGNOSIS — J84.10 CALCIFIED GRANULOMA OF LUNG: ICD-10-CM

## 2022-10-13 DIAGNOSIS — K21.9 GASTROESOPHAGEAL REFLUX DISEASE, UNSPECIFIED WHETHER ESOPHAGITIS PRESENT: ICD-10-CM

## 2022-10-13 DIAGNOSIS — E11.65 TYPE 2 DIABETES MELLITUS WITH HYPERGLYCEMIA, WITHOUT LONG-TERM CURRENT USE OF INSULIN: ICD-10-CM

## 2022-10-13 DIAGNOSIS — N28.1 BILATERAL RENAL CYSTS: ICD-10-CM

## 2022-10-13 DIAGNOSIS — I10 HYPERTENSION, UNSPECIFIED TYPE: Chronic | ICD-10-CM

## 2022-10-13 PROBLEM — E66.811 CLASS 1 OBESITY DUE TO EXCESS CALORIES WITH SERIOUS COMORBIDITY IN ADULT: Status: ACTIVE | Noted: 2021-11-12

## 2022-10-13 PROCEDURE — 99215 OFFICE O/P EST HI 40 MIN: CPT | Mod: PBBFAC,PO | Performed by: NURSE PRACTITIONER

## 2022-10-13 PROCEDURE — G0439 PR MEDICARE ANNUAL WELLNESS SUBSEQUENT VISIT: ICD-10-PCS | Mod: ,,, | Performed by: NURSE PRACTITIONER

## 2022-10-13 PROCEDURE — 99999 PR PBB SHADOW E&M-EST. PATIENT-LVL V: CPT | Mod: PBBFAC,,, | Performed by: NURSE PRACTITIONER

## 2022-10-13 PROCEDURE — 99999 PR PBB SHADOW E&M-EST. PATIENT-LVL V: ICD-10-PCS | Mod: PBBFAC,,, | Performed by: NURSE PRACTITIONER

## 2022-10-13 PROCEDURE — G0439 PPPS, SUBSEQ VISIT: HCPCS | Mod: ,,, | Performed by: NURSE PRACTITIONER
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
## 2022-10-13 NOTE — PATIENT INSTRUCTIONS
Counseling and Referral of Other Preventative  (Italic type indicates deductible and co-insurance are waived)    Patient Name: Jani Cha  Today's Date: 10/13/2022    Health Maintenance       Date Due Completion Date    Shingles Vaccine (1 of 2) Never done ---    Influenza Vaccine (1) 09/01/2022 10/8/2021    Eye Exam 10/19/2022 10/19/2021    Lipid Panel 12/06/2022 12/6/2021    Diabetes Urine Screening 02/07/2023 2/7/2022    Hemoglobin A1c 02/10/2023 8/10/2022    Aspirin/Antiplatelet Therapy 10/13/2023 10/13/2022    TETANUS VACCINE 02/20/2027 2/20/2017        No orders of the defined types were placed in this encounter.      The following information is provided to all patients.  This information is to help you find resources for any of the problems found today that may be affecting your health:                Living healthy guide: www.[facility].[place].[facility]      Understanding Diabetes: www.diabetes.org      Eating healthy: www.cdc.gov/healthyweight      CDC home safety checklist: www.cdc.gov/steadi/patient.html      Agency on Aging: www.goea.[place].[facility]      Alcoholics anonymous (AA): www.aa.org      Physical Activity: www.wing.nih.gov/yv9ifqn      Tobacco use: www.quitwithusla.org

## 2022-10-20 ENCOUNTER — TELEPHONE (OUTPATIENT)
Dept: UROLOGY | Facility: CLINIC | Age: 77
End: 2022-10-20
Payer: MEDICARE

## 2022-10-20 ENCOUNTER — HOSPITAL ENCOUNTER (INPATIENT)
Facility: HOSPITAL | Age: 77
LOS: 5 days | Discharge: NURSING, REHAB OR LONG-TERM CARE | DRG: 522 | End: 2022-10-25
Attending: EMERGENCY MEDICINE | Admitting: EMERGENCY MEDICINE
Payer: MEDICARE

## 2022-10-20 ENCOUNTER — TELEPHONE (OUTPATIENT)
Dept: FAMILY MEDICINE | Facility: CLINIC | Age: 77
End: 2022-10-20
Payer: MEDICARE

## 2022-10-20 ENCOUNTER — ANESTHESIA EVENT (OUTPATIENT)
Dept: SURGERY | Facility: HOSPITAL | Age: 77
DRG: 522 | End: 2022-10-20
Payer: MEDICARE

## 2022-10-20 DIAGNOSIS — B96.20 E. COLI URINARY TRACT INFECTION: ICD-10-CM

## 2022-10-20 DIAGNOSIS — Z01.811 PRE-OP CHEST EXAM: ICD-10-CM

## 2022-10-20 DIAGNOSIS — I50.9 CHF (CONGESTIVE HEART FAILURE): ICD-10-CM

## 2022-10-20 DIAGNOSIS — S72.001A CLOSED DISPLACED FRACTURE OF RIGHT FEMORAL NECK: Primary | ICD-10-CM
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
## 2022-10-20 DIAGNOSIS — R31.9 HEMATURIA, UNSPECIFIED TYPE: ICD-10-CM

## 2022-10-20 DIAGNOSIS — N39.0 E. COLI URINARY TRACT INFECTION: ICD-10-CM

## 2022-10-20 DIAGNOSIS — S72.001A CLOSED DISPLACED FRACTURE OF RIGHT FEMORAL NECK: ICD-10-CM

## 2022-10-20 DIAGNOSIS — S72.001A CLOSED FRACTURE OF RIGHT HIP, INITIAL ENCOUNTER: ICD-10-CM

## 2022-10-20 DIAGNOSIS — M79.604 LEG PAIN, RIGHT: ICD-10-CM

## 2022-10-20 LAB
ABO + RH BLD: NORMAL
ALBUMIN SERPL BCP-MCNC: 3.4 G/DL (ref 3.5–5.2)
ALP SERPL-CCNC: 87 U/L (ref 55–135)
ALT SERPL W/O P-5'-P-CCNC: 16 U/L (ref 10–44)
ANION GAP SERPL CALC-SCNC: 9 MMOL/L (ref 8–16)
AST SERPL-CCNC: 15 U/L (ref 10–40)
BASOPHILS # BLD AUTO: 0.05 K/UL (ref 0–0.2)
BASOPHILS NFR BLD: 0.4 % (ref 0–1.9)
BILIRUB SERPL-MCNC: 0.8 MG/DL (ref 0.1–1)
BLD GP AB SCN CELLS X3 SERPL QL: NORMAL
BUN SERPL-MCNC: 30 MG/DL (ref 8–23)
CALCIUM SERPL-MCNC: 9.7 MG/DL (ref 8.7–10.5)
CHLORIDE SERPL-SCNC: 104 MMOL/L (ref 95–110)
CO2 SERPL-SCNC: 22 MMOL/L (ref 23–29)
CREAT SERPL-MCNC: 1.7 MG/DL (ref 0.5–1.4)
DIFFERENTIAL METHOD: ABNORMAL
EOSINOPHIL # BLD AUTO: 0.3 K/UL (ref 0–0.5)
EOSINOPHIL NFR BLD: 2 % (ref 0–8)
ERYTHROCYTE [DISTWIDTH] IN BLOOD BY AUTOMATED COUNT: 13.1 % (ref 11.5–14.5)
EST. GFR  (NO RACE VARIABLE): 41.3 ML/MIN/1.73 M^2
ESTIMATED AVG GLUCOSE: 148 MG/DL (ref 68–131)
GLUCOSE SERPL-MCNC: 159 MG/DL (ref 70–110)
HBA1C MFR BLD: 6.8 % (ref 4–5.6)
HCT VFR BLD AUTO: 44.3 % (ref 40–54)
HCV AB SERPL QL IA: NORMAL
HGB BLD-MCNC: 14.6 G/DL (ref 14–18)
HIV 1+2 AB+HIV1 P24 AG SERPL QL IA: NORMAL
IMM GRANULOCYTES # BLD AUTO: 0.06 K/UL (ref 0–0.04)
IMM GRANULOCYTES NFR BLD AUTO: 0.4 % (ref 0–0.5)
INR PPP: 1.3 (ref 0.8–1.2)
LYMPHOCYTES # BLD AUTO: 1.1 K/UL (ref 1–4.8)
LYMPHOCYTES NFR BLD: 8.1 % (ref 18–48)
MAGNESIUM SERPL-MCNC: 1.5 MG/DL (ref 1.6–2.6)
MCH RBC QN AUTO: 30.9 PG (ref 27–31)
MCHC RBC AUTO-ENTMCNC: 33 G/DL (ref 32–36)
MCV RBC AUTO: 94 FL (ref 82–98)
MONOCYTES # BLD AUTO: 0.6 K/UL (ref 0.3–1)
MONOCYTES NFR BLD: 4.6 % (ref 4–15)
NEUTROPHILS # BLD AUTO: 11.3 K/UL (ref 1.8–7.7)
NEUTROPHILS NFR BLD: 84.5 % (ref 38–73)
NRBC BLD-RTO: 0 /100 WBC
PHOSPHATE SERPL-MCNC: 2.3 MG/DL (ref 2.7–4.5)
PLATELET # BLD AUTO: 201 K/UL (ref 150–450)
PMV BLD AUTO: 9.6 FL (ref 9.2–12.9)
POTASSIUM SERPL-SCNC: 4.5 MMOL/L (ref 3.5–5.1)
PREALB SERPL-MCNC: 28 MG/DL (ref 20–43)
PROT SERPL-MCNC: 6.1 G/DL (ref 6–8.4)
PROTHROMBIN TIME: 13.4 SEC (ref 9–12.5)
RBC # BLD AUTO: 4.73 M/UL (ref 4.6–6.2)
SODIUM SERPL-SCNC: 135 MMOL/L (ref 136–145)
WBC # BLD AUTO: 13.36 K/UL (ref 3.9–12.7)

## 2022-10-20 PROCEDURE — 63600175 PHARM REV CODE 636 W HCPCS: Performed by: PHYSICIAN ASSISTANT

## 2022-10-20 PROCEDURE — 99285 EMERGENCY DEPT VISIT HI MDM: CPT | Mod: ,,, | Performed by: PHYSICIAN ASSISTANT

## 2022-10-20 PROCEDURE — 87389 HIV-1 AG W/HIV-1&-2 AB AG IA: CPT | Performed by: PHYSICIAN ASSISTANT

## 2022-10-20 PROCEDURE — 87077 CULTURE AEROBIC IDENTIFY: CPT

## 2022-10-20 PROCEDURE — 87088 URINE BACTERIA CULTURE: CPT

## 2022-10-20 PROCEDURE — 93005 ELECTROCARDIOGRAM TRACING: CPT

## 2022-10-20 PROCEDURE — 81001 URINALYSIS AUTO W/SCOPE: CPT

## 2022-10-20 PROCEDURE — 96374 THER/PROPH/DIAG INJ IV PUSH: CPT

## 2022-10-20 PROCEDURE — 87086 URINE CULTURE/COLONY COUNT: CPT

## 2022-10-20 PROCEDURE — 85025 COMPLETE CBC W/AUTO DIFF WBC: CPT | Performed by: PHYSICIAN ASSISTANT

## 2022-10-20 PROCEDURE — 93010 EKG 12-LEAD: ICD-10-PCS | Mod: ,,, | Performed by: INTERNAL MEDICINE

## 2022-10-20 PROCEDURE — 99223 PR INITIAL HOSPITAL CARE,LEVL III: ICD-10-PCS | Mod: ,,, | Performed by: FAMILY MEDICINE

## 2022-10-20 PROCEDURE — 80053 COMPREHEN METABOLIC PANEL: CPT | Performed by: PHYSICIAN ASSISTANT

## 2022-10-20 PROCEDURE — 51702 INSERT TEMP BLADDER CATH: CPT

## 2022-10-20 PROCEDURE — 93010 ELECTROCARDIOGRAM REPORT: CPT | Mod: ,,, | Performed by: INTERNAL MEDICINE

## 2022-10-20 PROCEDURE — 99285 EMERGENCY DEPT VISIT HI MDM: CPT | Mod: 25

## 2022-10-20 PROCEDURE — 11000001 HC ACUTE MED/SURG PRIVATE ROOM

## 2022-10-20 PROCEDURE — 99223 1ST HOSP IP/OBS HIGH 75: CPT | Mod: ,,, | Performed by: FAMILY MEDICINE

## 2022-10-20 PROCEDURE — 84100 ASSAY OF PHOSPHORUS: CPT

## 2022-10-20 PROCEDURE — 63600175 PHARM REV CODE 636 W HCPCS: Performed by: FAMILY MEDICINE

## 2022-10-20 PROCEDURE — 86803 HEPATITIS C AB TEST: CPT | Performed by: PHYSICIAN ASSISTANT

## 2022-10-20 PROCEDURE — 83735 ASSAY OF MAGNESIUM: CPT

## 2022-10-20 PROCEDURE — 83036 HEMOGLOBIN GLYCOSYLATED A1C: CPT

## 2022-10-20 PROCEDURE — 85610 PROTHROMBIN TIME: CPT | Performed by: PHYSICIAN ASSISTANT

## 2022-10-20 PROCEDURE — 87186 SC STD MICRODIL/AGAR DIL: CPT

## 2022-10-20 PROCEDURE — 86901 BLOOD TYPING SEROLOGIC RH(D): CPT | Performed by: PHYSICIAN ASSISTANT

## 2022-10-20 PROCEDURE — 25000003 PHARM REV CODE 250: Performed by: FAMILY MEDICINE

## 2022-10-20 PROCEDURE — 94761 N-INVAS EAR/PLS OXIMETRY MLT: CPT

## 2022-10-20 PROCEDURE — 99285 PR EMERGENCY DEPT VISIT,LEVEL V: ICD-10-PCS | Mod: ,,, | Performed by: PHYSICIAN ASSISTANT

## 2022-10-20 PROCEDURE — 84134 ASSAY OF PREALBUMIN: CPT

## 2022-10-20 RX ORDER — SODIUM CHLORIDE 0.9 % (FLUSH) 0.9 %
10 SYRINGE (ML) INJECTION
Status: DISCONTINUED | OUTPATIENT
Start: 2022-10-20 | End: 2022-10-21

## 2022-10-20 RX ORDER — LOSARTAN POTASSIUM AND HYDROCHLOROTHIAZIDE 12.5; 1 MG/1; MG/1
1 TABLET ORAL DAILY
Status: DISCONTINUED | OUTPATIENT
Start: 2022-10-21 | End: 2022-10-22

## 2022-10-20 RX ORDER — TALC
6 POWDER (GRAM) TOPICAL NIGHTLY PRN
Status: DISCONTINUED | OUTPATIENT
Start: 2022-10-20 | End: 2022-10-25 | Stop reason: HOSPADM

## 2022-10-20 RX ORDER — GLUCAGON 1 MG
1 KIT INJECTION
Status: DISCONTINUED | OUTPATIENT
Start: 2022-10-20 | End: 2022-10-25 | Stop reason: HOSPADM

## 2022-10-20 RX ORDER — MORPHINE SULFATE 4 MG/ML
2 INJECTION, SOLUTION INTRAMUSCULAR; INTRAVENOUS
Status: DISCONTINUED | OUTPATIENT
Start: 2022-10-20 | End: 2022-10-21

## 2022-10-20 RX ORDER — ONDANSETRON 2 MG/ML
4 INJECTION INTRAMUSCULAR; INTRAVENOUS EVERY 12 HOURS PRN
Status: DISCONTINUED | OUTPATIENT
Start: 2022-10-20 | End: 2022-10-21

## 2022-10-20 RX ORDER — ACETAMINOPHEN 500 MG
1000 TABLET ORAL EVERY 8 HOURS
Status: DISCONTINUED | OUTPATIENT
Start: 2022-10-20 | End: 2022-10-21

## 2022-10-20 RX ORDER — SODIUM CHLORIDE 0.9 % (FLUSH) 0.9 %
10 SYRINGE (ML) INJECTION
Status: DISCONTINUED | OUTPATIENT
Start: 2022-10-20 | End: 2022-10-25 | Stop reason: HOSPADM

## 2022-10-20 RX ORDER — MORPHINE SULFATE 2 MG/ML
6 INJECTION, SOLUTION INTRAMUSCULAR; INTRAVENOUS
Status: COMPLETED | OUTPATIENT
Start: 2022-10-20 | End: 2022-10-20

## 2022-10-20 RX ORDER — IBUPROFEN 200 MG
16 TABLET ORAL
Status: DISCONTINUED | OUTPATIENT
Start: 2022-10-20 | End: 2022-10-25 | Stop reason: HOSPADM

## 2022-10-20 RX ORDER — ATORVASTATIN CALCIUM 40 MG/1
80 TABLET, FILM COATED ORAL DAILY
Status: DISCONTINUED | OUTPATIENT
Start: 2022-10-21 | End: 2022-10-25 | Stop reason: HOSPADM

## 2022-10-20 RX ORDER — INSULIN ASPART 100 [IU]/ML
1-10 INJECTION, SOLUTION INTRAVENOUS; SUBCUTANEOUS
Status: DISCONTINUED | OUTPATIENT
Start: 2022-10-20 | End: 2022-10-25 | Stop reason: HOSPADM

## 2022-10-20 RX ORDER — SODIUM CHLORIDE 9 MG/ML
INJECTION, SOLUTION INTRAVENOUS CONTINUOUS
Status: ACTIVE | OUTPATIENT
Start: 2022-10-20 | End: 2022-10-21

## 2022-10-20 RX ORDER — IBUPROFEN 200 MG
24 TABLET ORAL
Status: DISCONTINUED | OUTPATIENT
Start: 2022-10-20 | End: 2022-10-25 | Stop reason: HOSPADM

## 2022-10-20 RX ORDER — BISACODYL 10 MG
10 SUPPOSITORY, RECTAL RECTAL DAILY PRN
Status: DISCONTINUED | OUTPATIENT
Start: 2022-10-20 | End: 2022-10-25 | Stop reason: HOSPADM

## 2022-10-20 RX ORDER — MUPIROCIN 20 MG/G
1 OINTMENT TOPICAL 2 TIMES DAILY
Status: CANCELLED | OUTPATIENT
Start: 2022-10-20 | End: 2022-10-25

## 2022-10-20 RX ORDER — TALC
6 POWDER (GRAM) TOPICAL NIGHTLY PRN
Status: DISCONTINUED | OUTPATIENT
Start: 2022-10-20 | End: 2022-10-20

## 2022-10-20 RX ORDER — OXYCODONE HYDROCHLORIDE 5 MG/1
5 TABLET ORAL
Status: DISCONTINUED | OUTPATIENT
Start: 2022-10-20 | End: 2022-10-21

## 2022-10-20 RX ORDER — POLYETHYLENE GLYCOL 3350 17 G/17G
17 POWDER, FOR SOLUTION ORAL DAILY
Status: DISCONTINUED | OUTPATIENT
Start: 2022-10-21 | End: 2022-10-21

## 2022-10-20 RX ORDER — PREGABALIN 75 MG/1
75 CAPSULE ORAL NIGHTLY
Status: DISCONTINUED | OUTPATIENT
Start: 2022-10-20 | End: 2022-10-21

## 2022-10-20 RX ORDER — MORPHINE SULFATE 4 MG/ML
4 INJECTION, SOLUTION INTRAMUSCULAR; INTRAVENOUS
Status: COMPLETED | OUTPATIENT
Start: 2022-10-20 | End: 2022-10-20

## 2022-10-20 RX ADMIN — SODIUM CHLORIDE: 0.9 INJECTION, SOLUTION INTRAVENOUS at 09:10

## 2022-10-20 RX ADMIN — MORPHINE SULFATE 6 MG: 2 INJECTION, SOLUTION INTRAMUSCULAR; INTRAVENOUS at 06:10

## 2022-10-20 RX ADMIN — MORPHINE SULFATE 4 MG: 4 INJECTION INTRAVENOUS at 04:10

## 2022-10-20 RX ADMIN — OXYCODONE 5 MG: 5 TABLET ORAL at 11:10

## 2022-10-20 RX ADMIN — MORPHINE SULFATE 2 MG: 4 INJECTION INTRAVENOUS at 09:10

## 2022-10-20 RX ADMIN — PREGABALIN 75 MG: 75 CAPSULE ORAL at 09:10

## 2022-10-20 NOTE — TELEPHONE ENCOUNTER
The pt called wanting a sooner appointment for back pain. I explained that Dr. Hanson does not have any sooner availability and that I could put him on a wait list. I also offered to get him scheduled with NAMRATA Yu and he declined. I advised he might want to go see his primary care but that if the pain gets excruciating, he should go to the urgent care or ER. The pt verbalized understanding,
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
## 2022-10-20 NOTE — ED NOTES
"Pt presents to ED via EMS w/ c/o fall. Pt reports his "knees buckling" while bending down to  the newspaper; states he has bad knees. Pt reports falling on to right hip and right arm. Abrasions noted to right arm. Pt reporting 8/10 pain in his hip at present. States he can not walk or put any weight on his right leg. Denies hitting his head; denies LOC; denies any other symptoms at present.  "

## 2022-10-20 NOTE — HPI
Jani Cha is a 77 y.o. male with PMHx of hypertension, hyperlipidemia, CAD s/p CABG x 4, diabetes (last HbA1c 7.4), and CKD presenting to the emergency department with right hip pain after ground level fall at home.  He landed on his right elbow and right hip.  He had immediate right hip pain and inability to bear weight.  He noticed scratches on his right elbow, but otherwise states he was without injury and had no pain in the elbow.  He denies pain all other extremities.  He denies head trauma loss of consciousness.  He denies right hip pain right prior to the fall, but states he has bilateral knee pain and was in the process of getting worked up for total knee replacements.  He walks with no assistive devices.  He takes aspirin daily.    Orthopedics was consulted for evaluation and treatment of a right hip fracture.

## 2022-10-20 NOTE — TELEPHONE ENCOUNTER
Patient c/o back pain and states he was old to just call the provider and she would squeeze him in to be seen, he has been taking tylenol but it just does not help. Please  advise further
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
## 2022-10-20 NOTE — TELEPHONE ENCOUNTER
----- Message from Dominga Haddad sent at 10/20/2022 11:04 AM CDT -----  Regarding: self [phone]  Type: Patient Call Back    Who called: self    What is the request in detail:  pt asked to speak to the nurse, he is having lower back pain.     Can the clinic reply by [facility]? no    Would the patient rather a call back or a response via [facility]? Call back    Best call back number:[phone]

## 2022-10-20 NOTE — ASSESSMENT & PLAN NOTE
Jani Cha is a 77 y.o. male with right femoral neck fracture. They are closed and neurovascularly intact. They take aspirin at home (Plavix reported in chart, but patient states he is not taking it). They required no ambulatory assistive devices prior to this injury.     The patient was explained in detail the severity of the injury that was suffered. The patient was explained the risks/benefits/and alternatives to operative management in detail including infection, bleeding, pain, nerve and vascular damage, heterotopic ossification, leg length discrepancies, rotational deformities and they express full understanding.  We discussed the 30% national first year mortality rate with hip fractures, the need for early mobilization, and the expected rehab course.  They express full understanding of the condition and express that they want to proceed with surgery. The patient is admitted to the [facility] Hip Fracture service for perioperative optimization and management. Will plan for OR 10/21/2022. No guarantees were made. Informed consent was obtained. All questions were answered to patient's and family's satisfaction.     Plan:  - Admitted to [facility] Hip Fracture service for perioperative optimization and management  - To OR 10/21/2022 for right total hip arthroplasty versus hip hemiarthroplasty  - Patient marked, booked, and consented for surgery  - DVT PPx: Hold anticoagulation  - Abx: Pre-op antibiotics ordered  - Labs: Hemoglobin 14.6, Platelets 201, WBC 13.36, INR 1.3, Albumin 3.4; rest of labs pending  - PT/OT: Bed rest  - Pili: place and send UA  - NPO at midnight

## 2022-10-20 NOTE — ED NOTES
Pt awake and alert; resting quietly on stretcher. No acute distress or discomfort reported or observed. Bed locked in lowest position; side rails up and locked x 2; call light, bedside table, and personal belongings within reach. Plan of care discussed. Pt instructed to alert nurse for assistance and before attempting to get out of bed; verbalizes understanding. Family at beside. Pt denies needs.

## 2022-10-21 ENCOUNTER — ANESTHESIA (OUTPATIENT)
Dept: SURGERY | Facility: HOSPITAL | Age: 77
DRG: 522 | End: 2022-10-21
Payer: MEDICARE

## 2022-10-21 ENCOUNTER — PATIENT MESSAGE (OUTPATIENT)
Dept: ORTHOPEDICS | Facility: CLINIC | Age: 77
End: 2022-10-21
Payer: MEDICARE

## 2022-10-21 ENCOUNTER — TELEPHONE (OUTPATIENT)
Dept: FAMILY MEDICINE | Facility: CLINIC | Age: 77
End: 2022-10-21
Payer: MEDICARE

## 2022-10-21 PROBLEM — N30.00 ACUTE CYSTITIS WITHOUT HEMATURIA: Status: ACTIVE | Noted: 2022-10-21

## 2022-10-21 LAB
ALBUMIN SERPL BCP-MCNC: 3.1 G/DL (ref 3.5–5.2)
ALP SERPL-CCNC: 75 U/L (ref 55–135)
ALT SERPL W/O P-5'-P-CCNC: 14 U/L (ref 10–44)
ANION GAP SERPL CALC-SCNC: 8 MMOL/L (ref 8–16)
AST SERPL-CCNC: 15 U/L (ref 10–40)
BACTERIA #/AREA URNS AUTO: ABNORMAL /HPF
BASOPHILS # BLD AUTO: 0.03 K/UL (ref 0–0.2)
BASOPHILS NFR BLD: 0.3 % (ref 0–1.9)
BILIRUB SERPL-MCNC: 0.9 MG/DL (ref 0.1–1)
BILIRUB UR QL STRIP: NEGATIVE
BUN SERPL-MCNC: 30 MG/DL (ref 8–23)
CALCIUM SERPL-MCNC: 9.1 MG/DL (ref 8.7–10.5)
CHLORIDE SERPL-SCNC: 106 MMOL/L (ref 95–110)
CLARITY UR REFRACT.AUTO: CLEAR
CO2 SERPL-SCNC: 25 MMOL/L (ref 23–29)
COLOR UR AUTO: YELLOW
CREAT SERPL-MCNC: 1.5 MG/DL (ref 0.5–1.4)
DIFFERENTIAL METHOD: ABNORMAL
EOSINOPHIL # BLD AUTO: 0.3 K/UL (ref 0–0.5)
EOSINOPHIL NFR BLD: 2.7 % (ref 0–8)
ERYTHROCYTE [DISTWIDTH] IN BLOOD BY AUTOMATED COUNT: 13 % (ref 11.5–14.5)
EST. GFR  (NO RACE VARIABLE): 48 ML/MIN/1.73 M^2
GLUCOSE SERPL-MCNC: 84 MG/DL (ref 70–110)
GLUCOSE UR QL STRIP: NEGATIVE
HCT VFR BLD AUTO: 41.6 % (ref 40–54)
HGB BLD-MCNC: 13.8 G/DL (ref 14–18)
HGB UR QL STRIP: NEGATIVE
IMM GRANULOCYTES # BLD AUTO: 0.03 K/UL (ref 0–0.04)
IMM GRANULOCYTES NFR BLD AUTO: 0.3 % (ref 0–0.5)
KETONES UR QL STRIP: ABNORMAL
LEUKOCYTE ESTERASE UR QL STRIP: ABNORMAL
LYMPHOCYTES # BLD AUTO: 1.4 K/UL (ref 1–4.8)
LYMPHOCYTES NFR BLD: 14.5 % (ref 18–48)
MAGNESIUM SERPL-MCNC: 1.6 MG/DL (ref 1.6–2.6)
MCH RBC QN AUTO: 31 PG (ref 27–31)
MCHC RBC AUTO-ENTMCNC: 33.2 G/DL (ref 32–36)
MCV RBC AUTO: 94 FL (ref 82–98)
MICROSCOPIC COMMENT: ABNORMAL
MONOCYTES # BLD AUTO: 0.7 K/UL (ref 0.3–1)
MONOCYTES NFR BLD: 7.6 % (ref 4–15)
NEUTROPHILS # BLD AUTO: 7.3 K/UL (ref 1.8–7.7)
NEUTROPHILS NFR BLD: 74.6 % (ref 38–73)
NITRITE UR QL STRIP: NEGATIVE
NRBC BLD-RTO: 0 /100 WBC
PH UR STRIP: 5 [PH] (ref 5–8)
PHOSPHATE SERPL-MCNC: 2.9 MG/DL (ref 2.7–4.5)
PLATELET # BLD AUTO: 197 K/UL (ref 150–450)
PMV BLD AUTO: 9.8 FL (ref 9.2–12.9)
POCT GLUCOSE: 162 MG/DL (ref 70–110)
POCT GLUCOSE: 241 MG/DL (ref 70–110)
POCT GLUCOSE: 97 MG/DL (ref 70–110)
POCT GLUCOSE: 98 MG/DL (ref 70–110)
POTASSIUM SERPL-SCNC: 4.1 MMOL/L (ref 3.5–5.1)
PROT SERPL-MCNC: 5.6 G/DL (ref 6–8.4)
PROT UR QL STRIP: ABNORMAL
RBC # BLD AUTO: 4.45 M/UL (ref 4.6–6.2)
RBC #/AREA URNS AUTO: 1 /HPF (ref 0–4)
SODIUM SERPL-SCNC: 139 MMOL/L (ref 136–145)
SP GR UR STRIP: 1.02 (ref 1–1.03)
SQUAMOUS #/AREA URNS AUTO: 1 /HPF
URN SPEC COLLECT METH UR: ABNORMAL
WBC # BLD AUTO: 9.74 K/UL (ref 3.9–12.7)
WBC #/AREA URNS AUTO: 37 /HPF (ref 0–5)

## 2022-10-21 PROCEDURE — 99223 1ST HOSP IP/OBS HIGH 75: CPT | Mod: ,,, | Performed by: ORTHOPAEDIC SURGERY

## 2022-10-21 PROCEDURE — 88311 DECALCIFY TISSUE: CPT | Performed by: PATHOLOGY

## 2022-10-21 PROCEDURE — 25000003 PHARM REV CODE 250: Performed by: FAMILY MEDICINE

## 2022-10-21 PROCEDURE — 63600175 PHARM REV CODE 636 W HCPCS: Performed by: FAMILY MEDICINE

## 2022-10-21 PROCEDURE — 88305 TISSUE EXAM BY PATHOLOGIST: ICD-10-PCS | Mod: 26,,, | Performed by: PATHOLOGY

## 2022-10-21 PROCEDURE — 36000711: Performed by: ORTHOPAEDIC SURGERY

## 2022-10-21 PROCEDURE — 71000015 HC POSTOP RECOV 1ST HR: Performed by: ORTHOPAEDIC SURGERY

## 2022-10-21 PROCEDURE — 88311 PR  DECALCIFY TISSUE: ICD-10-PCS | Mod: 26,,, | Performed by: PATHOLOGY

## 2022-10-21 PROCEDURE — 82962 GLUCOSE BLOOD TEST: CPT | Performed by: ORTHOPAEDIC SURGERY

## 2022-10-21 PROCEDURE — 63600175 PHARM REV CODE 636 W HCPCS: Performed by: SURGERY

## 2022-10-21 PROCEDURE — 27201423 OPTIME MED/SURG SUP & DEVICES STERILE SUPPLY: Performed by: ORTHOPAEDIC SURGERY

## 2022-10-21 PROCEDURE — 83735 ASSAY OF MAGNESIUM: CPT | Performed by: FAMILY MEDICINE

## 2022-10-21 PROCEDURE — 64999 UNLISTED PX NERVOUS SYSTEM: CPT | Mod: ,,, | Performed by: SURGERY

## 2022-10-21 PROCEDURE — 37000008 HC ANESTHESIA 1ST 15 MINUTES: Performed by: ORTHOPAEDIC SURGERY

## 2022-10-21 PROCEDURE — 25000003 PHARM REV CODE 250: Performed by: ORTHOPAEDIC SURGERY

## 2022-10-21 PROCEDURE — 25000003 PHARM REV CODE 250: Performed by: INTERNAL MEDICINE

## 2022-10-21 PROCEDURE — 27130 TOTAL HIP ARTHROPLASTY: CPT | Mod: RT,GC,, | Performed by: ORTHOPAEDIC SURGERY

## 2022-10-21 PROCEDURE — 88305 TISSUE EXAM BY PATHOLOGIST: CPT | Mod: 59 | Performed by: PATHOLOGY

## 2022-10-21 PROCEDURE — 25000003 PHARM REV CODE 250

## 2022-10-21 PROCEDURE — 63600175 PHARM REV CODE 636 W HCPCS

## 2022-10-21 PROCEDURE — 71000016 HC POSTOP RECOV ADDL HR: Performed by: ORTHOPAEDIC SURGERY

## 2022-10-21 PROCEDURE — 36000710: Performed by: ORTHOPAEDIC SURGERY

## 2022-10-21 PROCEDURE — D9220A PRA ANESTHESIA: Mod: ANES,,, | Performed by: ANESTHESIOLOGY

## 2022-10-21 PROCEDURE — D9220A PRA ANESTHESIA: ICD-10-PCS | Mod: CRNA,,, | Performed by: NURSE ANESTHETIST, CERTIFIED REGISTERED

## 2022-10-21 PROCEDURE — 64999 RIGHT SIFI CATHETER: ICD-10-PCS | Mod: ,,, | Performed by: SURGERY

## 2022-10-21 PROCEDURE — D9220A PRA ANESTHESIA: ICD-10-PCS | Mod: ANES,,, | Performed by: ANESTHESIOLOGY

## 2022-10-21 PROCEDURE — 63600175 PHARM REV CODE 636 W HCPCS: Performed by: NURSE ANESTHETIST, CERTIFIED REGISTERED

## 2022-10-21 PROCEDURE — 27130 PR TOTAL HIP ARTHROPLASTY: ICD-10-PCS | Mod: RT,GC,, | Performed by: ORTHOPAEDIC SURGERY

## 2022-10-21 PROCEDURE — 99223 PR INITIAL HOSPITAL CARE,LEVL III: ICD-10-PCS | Mod: ,,, | Performed by: ORTHOPAEDIC SURGERY

## 2022-10-21 PROCEDURE — 63600175 PHARM REV CODE 636 W HCPCS: Performed by: ORTHOPAEDIC SURGERY

## 2022-10-21 PROCEDURE — 80053 COMPREHEN METABOLIC PANEL: CPT | Performed by: FAMILY MEDICINE

## 2022-10-21 PROCEDURE — 63600175 PHARM REV CODE 636 W HCPCS: Performed by: STUDENT IN AN ORGANIZED HEALTH CARE EDUCATION/TRAINING PROGRAM

## 2022-10-21 PROCEDURE — C1713 ANCHOR/SCREW BN/BN,TIS/BN: HCPCS | Performed by: ORTHOPAEDIC SURGERY

## 2022-10-21 PROCEDURE — 36415 COLL VENOUS BLD VENIPUNCTURE: CPT | Performed by: FAMILY MEDICINE

## 2022-10-21 PROCEDURE — C1776 JOINT DEVICE (IMPLANTABLE): HCPCS | Performed by: ORTHOPAEDIC SURGERY

## 2022-10-21 PROCEDURE — 71000033 HC RECOVERY, INTIAL HOUR: Performed by: ORTHOPAEDIC SURGERY

## 2022-10-21 PROCEDURE — C1889 IMPLANT/INSERT DEVICE, NOC: HCPCS | Performed by: ORTHOPAEDIC SURGERY

## 2022-10-21 PROCEDURE — 25000003 PHARM REV CODE 250: Performed by: NURSE ANESTHETIST, CERTIFIED REGISTERED

## 2022-10-21 PROCEDURE — 76942 ECHO GUIDE FOR BIOPSY: CPT | Performed by: STUDENT IN AN ORGANIZED HEALTH CARE EDUCATION/TRAINING PROGRAM

## 2022-10-21 PROCEDURE — D9220A PRA ANESTHESIA: Mod: CRNA,,, | Performed by: NURSE ANESTHETIST, CERTIFIED REGISTERED

## 2022-10-21 PROCEDURE — 88305 TISSUE EXAM BY PATHOLOGIST: CPT | Mod: 26,,, | Performed by: PATHOLOGY

## 2022-10-21 PROCEDURE — 76942 ECHO GUIDE FOR BIOPSY: CPT | Mod: 26,,, | Performed by: SURGERY

## 2022-10-21 PROCEDURE — 76942 RIGHT SIFI CATHETER: ICD-10-PCS | Mod: 26,,, | Performed by: SURGERY

## 2022-10-21 PROCEDURE — 11000001 HC ACUTE MED/SURG PRIVATE ROOM

## 2022-10-21 PROCEDURE — 99233 SBSQ HOSP IP/OBS HIGH 50: CPT | Mod: ,,, | Performed by: INTERNAL MEDICINE

## 2022-10-21 PROCEDURE — 85025 COMPLETE CBC W/AUTO DIFF WBC: CPT | Performed by: FAMILY MEDICINE

## 2022-10-21 PROCEDURE — 99233 PR SUBSEQUENT HOSPITAL CARE,LEVL III: ICD-10-PCS | Mod: ,,, | Performed by: INTERNAL MEDICINE

## 2022-10-21 PROCEDURE — 94761 N-INVAS EAR/PLS OXIMETRY MLT: CPT

## 2022-10-21 PROCEDURE — 99900035 HC TECH TIME PER 15 MIN (STAT)

## 2022-10-21 PROCEDURE — 84100 ASSAY OF PHOSPHORUS: CPT | Performed by: FAMILY MEDICINE

## 2022-10-21 PROCEDURE — 88311 DECALCIFY TISSUE: CPT | Mod: 26,,, | Performed by: PATHOLOGY

## 2022-10-21 PROCEDURE — 37000009 HC ANESTHESIA EA ADD 15 MINS: Performed by: ORTHOPAEDIC SURGERY

## 2022-10-21 DEVICE — INSERT ADM X3 28MM CUP OD 52MM: Type: IMPLANTABLE DEVICE | Site: HIP | Status: FUNCTIONAL

## 2022-10-21 DEVICE — SCREW TRIDENT II LP HEX 6.5X25: Type: IMPLANTABLE DEVICE | Site: HIP | Status: FUNCTIONAL

## 2022-10-21 DEVICE — CEMENT BONE SURG SMPLX P RADPQ: Type: IMPLANTABLE DEVICE | Site: HIP | Status: FUNCTIONAL

## 2022-10-21 DEVICE — PLUG BONE: Type: IMPLANTABLE DEVICE | Site: HIP | Status: FUNCTIONAL

## 2022-10-21 DEVICE — SHELL TRIDENT II ACET F 56MM: Type: IMPLANTABLE DEVICE | Site: HIP | Status: FUNCTIONAL

## 2022-10-21 DEVICE — IMPLANTABLE DEVICE: Type: IMPLANTABLE DEVICE | Site: HIP | Status: FUNCTIONAL

## 2022-10-21 DEVICE — LINER ACET SZ F 46MM COCR: Type: IMPLANTABLE DEVICE | Site: HIP | Status: FUNCTIONAL

## 2022-10-21 DEVICE — SET CABLE BONE SLEEVE 2MM: Type: IMPLANTABLE DEVICE | Site: HIP | Status: FUNCTIONAL

## 2022-10-21 RX ORDER — ROPIVACAINE HYDROCHLORIDE 5 MG/ML
INJECTION, SOLUTION EPIDURAL; INFILTRATION; PERINEURAL
Status: COMPLETED | OUTPATIENT
Start: 2022-10-21 | End: 2022-10-21

## 2022-10-21 RX ORDER — ROPIVACAINE HYDROCHLORIDE 2 MG/ML
2 INJECTION, SOLUTION EPIDURAL; INFILTRATION; PERINEURAL CONTINUOUS
Status: DISCONTINUED | OUTPATIENT
Start: 2022-10-21 | End: 2022-10-21

## 2022-10-21 RX ORDER — ONDANSETRON 2 MG/ML
INJECTION INTRAMUSCULAR; INTRAVENOUS
Status: DISCONTINUED | OUTPATIENT
Start: 2022-10-21 | End: 2022-10-21

## 2022-10-21 RX ORDER — FENTANYL CITRATE 50 UG/ML
25 INJECTION, SOLUTION INTRAMUSCULAR; INTRAVENOUS EVERY 5 MIN PRN
Status: DISCONTINUED | OUTPATIENT
Start: 2022-10-21 | End: 2022-10-21

## 2022-10-21 RX ORDER — LIDOCAINE HYDROCHLORIDE 10 MG/ML
1 INJECTION, SOLUTION EPIDURAL; INFILTRATION; INTRACAUDAL; PERINEURAL
Status: DISCONTINUED | OUTPATIENT
Start: 2022-10-21 | End: 2022-10-21 | Stop reason: HOSPADM

## 2022-10-21 RX ORDER — SODIUM CHLORIDE 0.9 % (FLUSH) 0.9 %
10 SYRINGE (ML) INJECTION
Status: DISCONTINUED | OUTPATIENT
Start: 2022-10-21 | End: 2022-10-21

## 2022-10-21 RX ORDER — ACETAMINOPHEN 500 MG
1000 TABLET ORAL EVERY 6 HOURS
Status: DISPENSED | OUTPATIENT
Start: 2022-10-21 | End: 2022-10-23

## 2022-10-21 RX ORDER — CEFAZOLIN SODIUM 1 G/3ML
INJECTION, POWDER, FOR SOLUTION INTRAMUSCULAR; INTRAVENOUS
Status: DISCONTINUED | OUTPATIENT
Start: 2022-10-21 | End: 2022-10-21

## 2022-10-21 RX ORDER — CIPROFLOXACIN 2 MG/ML
400 INJECTION, SOLUTION INTRAVENOUS
Status: DISCONTINUED | OUTPATIENT
Start: 2022-10-21 | End: 2022-10-21

## 2022-10-21 RX ORDER — TRANEXAMIC ACID 100 MG/ML
INJECTION, SOLUTION INTRAVENOUS
Status: DISCONTINUED | OUTPATIENT
Start: 2022-10-21 | End: 2022-10-21

## 2022-10-21 RX ORDER — ONDANSETRON 2 MG/ML
4 INJECTION INTRAMUSCULAR; INTRAVENOUS EVERY 12 HOURS PRN
Status: DISCONTINUED | OUTPATIENT
Start: 2022-10-21 | End: 2022-10-25 | Stop reason: HOSPADM

## 2022-10-21 RX ORDER — LIDOCAINE HCL/PF 100 MG/5ML
SYRINGE (ML) INTRAVENOUS
Status: DISCONTINUED | OUTPATIENT
Start: 2022-10-21 | End: 2022-10-21

## 2022-10-21 RX ORDER — ONDANSETRON 2 MG/ML
4 INJECTION INTRAMUSCULAR; INTRAVENOUS EVERY 12 HOURS PRN
Status: DISCONTINUED | OUTPATIENT
Start: 2022-10-21 | End: 2022-10-21

## 2022-10-21 RX ORDER — CHLORHEXIDINE GLUCONATE ORAL RINSE 1.2 MG/ML
10 SOLUTION DENTAL
Status: DISCONTINUED | OUTPATIENT
Start: 2022-10-21 | End: 2022-10-21 | Stop reason: HOSPADM

## 2022-10-21 RX ORDER — SUCCINYLCHOLINE CHLORIDE 20 MG/ML
INJECTION INTRAMUSCULAR; INTRAVENOUS
Status: DISCONTINUED | OUTPATIENT
Start: 2022-10-21 | End: 2022-10-21

## 2022-10-21 RX ORDER — MIDAZOLAM HYDROCHLORIDE 1 MG/ML
1 INJECTION INTRAMUSCULAR; INTRAVENOUS
Status: DISCONTINUED | OUTPATIENT
Start: 2022-10-21 | End: 2022-10-21

## 2022-10-21 RX ORDER — AMOXICILLIN 250 MG
1 CAPSULE ORAL 2 TIMES DAILY
Status: DISCONTINUED | OUTPATIENT
Start: 2022-10-21 | End: 2022-10-25 | Stop reason: HOSPADM

## 2022-10-21 RX ORDER — VANCOMYCIN HYDROCHLORIDE 1 G/20ML
INJECTION, POWDER, LYOPHILIZED, FOR SOLUTION INTRAVENOUS
Status: DISCONTINUED | OUTPATIENT
Start: 2022-10-21 | End: 2022-10-21 | Stop reason: HOSPADM

## 2022-10-21 RX ORDER — PHENYLEPHRINE HCL IN 0.9% NACL 1 MG/10 ML
SYRINGE (ML) INTRAVENOUS
Status: DISCONTINUED | OUTPATIENT
Start: 2022-10-21 | End: 2022-10-21

## 2022-10-21 RX ORDER — ONDANSETRON 2 MG/ML
4 INJECTION INTRAMUSCULAR; INTRAVENOUS DAILY PRN
Status: DISCONTINUED | OUTPATIENT
Start: 2022-10-21 | End: 2022-10-21

## 2022-10-21 RX ORDER — POLYETHYLENE GLYCOL 3350 17 G/17G
17 POWDER, FOR SOLUTION ORAL DAILY
Status: DISCONTINUED | OUTPATIENT
Start: 2022-10-21 | End: 2022-10-25 | Stop reason: HOSPADM

## 2022-10-21 RX ORDER — CEFAZOLIN SODIUM 2 G/50ML
2 SOLUTION INTRAVENOUS ONCE
Status: CANCELLED | OUTPATIENT
Start: 2022-10-21

## 2022-10-21 RX ORDER — VASOPRESSIN 20 [USP'U]/ML
INJECTION, SOLUTION INTRAMUSCULAR; SUBCUTANEOUS
Status: DISCONTINUED | OUTPATIENT
Start: 2022-10-21 | End: 2022-10-21

## 2022-10-21 RX ORDER — ROPIVACAINE HYDROCHLORIDE 2 MG/ML
0.1 INJECTION, SOLUTION EPIDURAL; INFILTRATION; PERINEURAL CONTINUOUS
Status: DISCONTINUED | OUTPATIENT
Start: 2022-10-21 | End: 2022-10-24

## 2022-10-21 RX ORDER — FENTANYL CITRATE 50 UG/ML
INJECTION, SOLUTION INTRAMUSCULAR; INTRAVENOUS
Status: DISCONTINUED | OUTPATIENT
Start: 2022-10-21 | End: 2022-10-21

## 2022-10-21 RX ORDER — MUPIROCIN 20 MG/G
OINTMENT TOPICAL
Status: DISCONTINUED | OUTPATIENT
Start: 2022-10-21 | End: 2022-10-21 | Stop reason: HOSPADM

## 2022-10-21 RX ORDER — METHOCARBAMOL 500 MG/1
500 TABLET, FILM COATED ORAL 3 TIMES DAILY
Status: DISCONTINUED | OUTPATIENT
Start: 2022-10-21 | End: 2022-10-23

## 2022-10-21 RX ORDER — FENTANYL CITRATE 50 UG/ML
50 INJECTION, SOLUTION INTRAMUSCULAR; INTRAVENOUS
Status: DISCONTINUED | OUTPATIENT
Start: 2022-10-21 | End: 2022-10-21 | Stop reason: HOSPADM

## 2022-10-21 RX ORDER — MUPIROCIN 20 MG/G
1 OINTMENT TOPICAL
Status: DISCONTINUED | OUTPATIENT
Start: 2022-10-21 | End: 2022-10-21 | Stop reason: HOSPADM

## 2022-10-21 RX ORDER — FENTANYL CITRATE 50 UG/ML
25 INJECTION, SOLUTION INTRAMUSCULAR; INTRAVENOUS EVERY 5 MIN PRN
Status: DISCONTINUED | OUTPATIENT
Start: 2022-10-21 | End: 2022-10-21 | Stop reason: HOSPADM

## 2022-10-21 RX ORDER — CEFAZOLIN SODIUM/D5W 2 G/100 ML
2 PLASTIC BAG, INJECTION (ML) INTRAVENOUS
Status: COMPLETED | OUTPATIENT
Start: 2022-10-21 | End: 2022-10-22

## 2022-10-21 RX ORDER — METHOCARBAMOL 500 MG/1
500 TABLET, FILM COATED ORAL EVERY 6 HOURS PRN
Status: DISCONTINUED | OUTPATIENT
Start: 2022-10-21 | End: 2022-10-21

## 2022-10-21 RX ORDER — TALC
6 POWDER (GRAM) TOPICAL NIGHTLY PRN
Status: DISCONTINUED | OUTPATIENT
Start: 2022-10-21 | End: 2022-10-21

## 2022-10-21 RX ORDER — PROPOFOL 10 MG/ML
VIAL (ML) INTRAVENOUS
Status: DISCONTINUED | OUTPATIENT
Start: 2022-10-21 | End: 2022-10-21

## 2022-10-21 RX ORDER — NAPROXEN SODIUM 220 MG/1
81 TABLET, FILM COATED ORAL DAILY
Status: DISCONTINUED | OUTPATIENT
Start: 2022-10-22 | End: 2022-10-25 | Stop reason: HOSPADM

## 2022-10-21 RX ORDER — ROCURONIUM BROMIDE 10 MG/ML
INJECTION, SOLUTION INTRAVENOUS
Status: DISCONTINUED | OUTPATIENT
Start: 2022-10-21 | End: 2022-10-21

## 2022-10-21 RX ADMIN — ROCURONIUM BROMIDE 5 MG: 10 INJECTION INTRAVENOUS at 11:10

## 2022-10-21 RX ADMIN — ROCURONIUM BROMIDE 10 MG: 10 INJECTION INTRAVENOUS at 01:10

## 2022-10-21 RX ADMIN — MUPIROCIN: 20 OINTMENT TOPICAL at 08:10

## 2022-10-21 RX ADMIN — SODIUM CHLORIDE: 0.9 INJECTION, SOLUTION INTRAVENOUS at 06:10

## 2022-10-21 RX ADMIN — ONDANSETRON 4 MG: 2 INJECTION INTRAMUSCULAR; INTRAVENOUS at 02:10

## 2022-10-21 RX ADMIN — SODIUM CHLORIDE 0.5 MCG/KG/MIN: 9 INJECTION, SOLUTION INTRAVENOUS at 12:10

## 2022-10-21 RX ADMIN — ROCURONIUM BROMIDE 10 MG: 10 INJECTION INTRAVENOUS at 12:10

## 2022-10-21 RX ADMIN — METHOCARBAMOL 500 MG: 500 TABLET ORAL at 04:10

## 2022-10-21 RX ADMIN — ACETAMINOPHEN 1000 MG: 500 TABLET ORAL at 06:10

## 2022-10-21 RX ADMIN — Medication 200 MCG: at 11:10

## 2022-10-21 RX ADMIN — VANCOMYCIN HYDROCHLORIDE 1500 MG: 1.5 INJECTION, POWDER, LYOPHILIZED, FOR SOLUTION INTRAVENOUS at 08:10

## 2022-10-21 RX ADMIN — PROPOFOL 100 MG: 10 INJECTION, EMULSION INTRAVENOUS at 11:10

## 2022-10-21 RX ADMIN — Medication 2 G: at 08:10

## 2022-10-21 RX ADMIN — CEFAZOLIN 2 G: 330 INJECTION, POWDER, FOR SOLUTION INTRAMUSCULAR; INTRAVENOUS at 11:10

## 2022-10-21 RX ADMIN — FENTANYL CITRATE 50 MCG: 50 INJECTION, SOLUTION INTRAMUSCULAR; INTRAVENOUS at 11:10

## 2022-10-21 RX ADMIN — ROPIVACAINE HYDROCHLORIDE 0.1 ML/HR: 2 INJECTION, SOLUTION EPIDURAL; INFILTRATION at 04:10

## 2022-10-21 RX ADMIN — FENTANYL CITRATE 25 MCG: 50 INJECTION, SOLUTION INTRAMUSCULAR; INTRAVENOUS at 04:10

## 2022-10-21 RX ADMIN — METHOCARBAMOL 500 MG: 500 TABLET ORAL at 08:10

## 2022-10-21 RX ADMIN — CEFTRIAXONE 1 G: 1 INJECTION, SOLUTION INTRAVENOUS at 06:10

## 2022-10-21 RX ADMIN — VASOPRESSIN 1 UNITS: 20 INJECTION INTRAVENOUS at 02:10

## 2022-10-21 RX ADMIN — ROCURONIUM BROMIDE 45 MG: 10 INJECTION INTRAVENOUS at 11:10

## 2022-10-21 RX ADMIN — MORPHINE SULFATE 2 MG: 4 INJECTION INTRAVENOUS at 12:10

## 2022-10-21 RX ADMIN — FENTANYL CITRATE 50 MCG: 0.05 INJECTION, SOLUTION INTRAMUSCULAR; INTRAVENOUS at 09:10

## 2022-10-21 RX ADMIN — VASOPRESSIN 1 UNITS: 20 INJECTION INTRAVENOUS at 01:10

## 2022-10-21 RX ADMIN — Medication 300 MCG: at 11:10

## 2022-10-21 RX ADMIN — FENTANYL CITRATE 25 MCG: 50 INJECTION, SOLUTION INTRAMUSCULAR; INTRAVENOUS at 03:10

## 2022-10-21 RX ADMIN — MORPHINE SULFATE 2 MG: 4 INJECTION INTRAVENOUS at 03:10

## 2022-10-21 RX ADMIN — ROPIVACAINE HYDROCHLORIDE 20 ML: 5 INJECTION EPIDURAL; INFILTRATION; PERINEURAL at 09:10

## 2022-10-21 RX ADMIN — TRANEXAMIC ACID 1000 MG: 100 INJECTION, SOLUTION INTRAVENOUS at 11:10

## 2022-10-21 RX ADMIN — Medication 100 MCG: at 12:10

## 2022-10-21 RX ADMIN — SODIUM CHLORIDE: 9 INJECTION, SOLUTION INTRAVENOUS at 10:10

## 2022-10-21 RX ADMIN — SODIUM CHLORIDE, SODIUM GLUCONATE, SODIUM ACETATE, POTASSIUM CHLORIDE, MAGNESIUM CHLORIDE, SODIUM PHOSPHATE, DIBASIC, AND POTASSIUM PHOSPHATE: .53; .5; .37; .037; .03; .012; .00082 INJECTION, SOLUTION INTRAVENOUS at 11:10

## 2022-10-21 RX ADMIN — INSULIN ASPART 2 UNITS: 100 INJECTION, SOLUTION INTRAVENOUS; SUBCUTANEOUS at 10:10

## 2022-10-21 RX ADMIN — LIDOCAINE HYDROCHLORIDE 100 MG: 20 INJECTION, SOLUTION INTRAVENOUS at 11:10

## 2022-10-21 RX ADMIN — SUGAMMADEX 200 MG: 100 INJECTION, SOLUTION INTRAVENOUS at 03:10

## 2022-10-21 RX ADMIN — SUCCINYLCHOLINE CHLORIDE 120 MG: 20 INJECTION, SOLUTION INTRAMUSCULAR; INTRAVENOUS at 11:10

## 2022-10-21 RX ADMIN — SENNOSIDES AND DOCUSATE SODIUM 1 TABLET: 50; 8.6 TABLET ORAL at 08:10

## 2022-10-21 NOTE — TRANSFER OF CARE
"Anesthesia Transfer of Care Note    Patient: Jani Cha    Procedure(s) Performed: Procedure(s) (LRB):  ARTHROPLASTY, HIP, RIGHT (Right)    Patient location: PACU    Anesthesia Type: general    Transport from OR: Transported from OR on 6-10 L/min O2 by face mask with adequate spontaneous ventilation    Post pain: adequate analgesia    Post assessment: no apparent anesthetic complications    Post vital signs: stable    Level of consciousness: awake    Nausea/Vomiting: no nausea/vomiting    Complications: none    Transfer of care protocol was followed      Last vitals:   Visit Vitals  /63 (BP Location: Right arm, Patient Position: Lying)   Pulse 80   Temp 36.7 °C (98.1 °F) (Temporal)   Resp 14   Ht 5' 10" (1.778 m)   Wt 110.2 kg (242 lb 15.2 oz)   SpO2 97%   BMI 34.86 kg/m²     "

## 2022-10-21 NOTE — OP NOTE
OPERATIVE NOTE     DATE OF PROCEDURE:  10/21/2022     PREOPERATIVE DIAGNOSIS:           Right femoral neck fracture, transcervical closed, displaced, initial encounter  Fall from standing     POSTOPERATIVE DIAGNOSIS:         Right femoral neck fracture, transcervical closed, displaced, initial encounter  Fall from standing     PROCEDURE:              Right total hip arthroplasty for femoral neck fracture     SURGEON:       Isidro Paulino MD     ASSISTANT:                 Moshe Razo MD     ANESTHESIA:              General with regional block     EBL:                  400 mL     COMPLICATIONS:  None     IMPLANTS:       Gorge  Trident 2 cup, 56 mm  6.5 mm screw x2 (25 mm, 25 mm)  MDM metal liner  Rupert cemented stem, size 7, extended offset  28 mm, +2.5 ceramic head, biolox delta  Poly insert  Simplex bone cement     5 FiberWire     Vancomycin 1g     SPECIMENS:    Femoral head sent for pathology     INDICATIONS FOR PROCEDURE:  77-year-old male prior CABG, diabetes, ground level fall 10/20/2022   Right hip pain, inability bear weight.  Came to ED. Evaluated by ER, hospitalist team, orthopaedic resident on call tearm. Diagnosed with a right displaced femoral neck fracture.  Admitted to the hospital for further evaluation treatment.     At the time my evaluation patient complained of isolated pain in right hip, 7/10, sharp, stabbing, no numbness no tingling.     Lives at home with wife  independent community ambulator, no gait aids  Performs all ADLs  +CAD, prior CABG  DM, HbA1c 7.4  No tobacco  Denies prior stroke, blood clot, cancer     Counseled patient and family members on diagnosis and treatment options.  Discussed non operative and operative management options.  Non operative management would consist of bed rest, traction, protected weight-bearing, likely result in malunion, nonunion, prolonged immobility, medical comorbidities associated with these.  Discussed operative intervention in the form of total  hip arthroplasty.  Hopefully this would improve immediate pain, function, mobility, decrease overall risk of medical comorbidities.  Also discussed hemiarthroplasty and open reduction internal fixation, but given patients functional activity level and age, I feel that DARNELL is in patients best interest to decrease risk of repeat operation and to maximize long term function.     The risks, benefits, and alternatives to surgery were discussed with the patient and/or family.    Specific risks discussed included, but were not limited to:  Leg length inequality, rotational malalignment, sciatic nerve palsy, dislocation/instability, chronic pain, stiffness, periprosthetic fracture, need for revision surgery, damage to nearby structures, including neurovascular structures leading to loss of function or loss of limb, bleeding, need for blood transfusion, pain, stiffness, scarring, numbness, tingling, weakness, compartment syndrome, malunion/nonunion, hardware failure, hardware prominence, infection, need for multiple staged procedures, prolonged antibiotics, iatrogenic fracture, heterotopic ossification, arthritis, a variety of medical complications including but not limited to heart attack, stroke, deep venous thrombosis, pulmonary embolism, prolonged hospitalization, prolonged intubation, and death.   Patient and/or family expressed an understanding and desires to proceed with surgery.   All questions were answered.  No guarantees were implied or stated.  Informed consent was obtained.     OPERATIVE PROCEDURE:  Patient met in the preoperative hold area and the correct site and side of surgery being the right hip were marked and verified.  Preop regional block placed by our anesthesia colleagues.  Patient brought back to the operative suite.  General anesthesia smoothly induced.  Patient transferred over to operative table.  Placed in lateral position on peg board.  Axillary roll placed.  All bony prominences were  appropriately padded.  Patient received 2 g Ancef, 1 g vancomycin for preoperative antibiotics.  The right lower extremity was then prepped and draped in normal sterile fashion.     Time-out was performed verifying the correct patient, site/side of surgery, surgical consent, radiographs as applicable, preop antibiotics, necessary equipment, anticipated blood loss, length of procedure, postoperative disposition.        Planned for a posterior approach to hip.  Incision was made in line with the femur curving gently towards the posterior superior iliac spine at the level of the greater trochanter.  Hemostasis was achieved as needed with electrocautery. Subcutaneous tissue was divided.  Fascia was identified.  IT band was split.  Glute max fascia was split.  Muscle was divided. Bursa was resected as needed. Charnley retractor was placed.  Piriformis tendon was identified. It was sharply detached from its origin.  The remaining short external rotators and posterior capsule were also detached and tagged.  The femoral neck fracture was visualized.  Fracture hematoma was evacuated. Femoral neck was presented to our view.  Homans were placed to protect the underlying structures.  A femoral neck cut was made with a saw approximately 15 mm above the lesser trochanter, just distal to the extent of the fracture. Femoral head was then removed with a corkscrew. Utilizing rongeur, removed bony shards left in the acetabulum.    It was noted that the femoral neck fracture had some spike extending towards the calcar segment.  We chose to place a cable to prevent propagation fracture.  We slid cable Passer underneath the vastus lateralis, safely around the proximal femur above the lesser trochanter.  Cable passed, tightened, crimped, excess cable cut.     We then turned our attention to acetabulum exposure. Anterior retractor was placed over the anterior lip. Inferior capsular release was performed as needed. An inferior Cobra  retractor was placed. This provided complete view of her acetabulum. Labrum was completely excised. Ligamentum was completely excised demonstrating the bony floor of the acetabulum. We then began reaming. Medialized until good bleeding bone was noted indicating we reached the floor of the acetabulum. We then sequentially reamed up to the appropriate size Reamer in line to line fashion at appropriate version/abduction.  We then placed a trial in the appropriate abduction and anteversion.  Good fit was noted.The appropriate size real acetabular component was then securely malleted into place in the appropriate version of approximately 40° abduction and 20° anteversion utilized anatomic landmarks.  It was good secure fit. We placed 2 screws through the cup in a safe location for added fixation. The acetabular shell was irrigated. The MDM liner was securely malleted into place. Acetabular retractors were removed.     We then turned our attention to the femur. Femoral neck elevator was utilized.   was utilized. Opening Reamer was utilized.  Lateralizing Reamer was utilized.  We then reamed and broached up to the appropriate size stem at approximately 15° of anteversion.  There was good secure fit and rotational control.  Calcar planer was utilized.  We then placed a trial neck and head and were satisfied with the leg length, abductor tension, hip stability.  The hip was stable at full extension with no undue shuck, hip was stable at full flexion with no impingement, the hip was stable 90° of flexion/30° adduction/30° internal rotation, 90° flexion/60° internal rotation.      The hip was dislocated.  Trial components were removed. Broach was removed. Cement restrictor was placed.  Canal was irrigated with pulse lavage saline. Cement was mixed on the back table. It was pressurized into the canal.  Final stem was placed in appropriate anteversion, approximately 15°.  Cement was allowed to harden.  Excess cement  was removed. Acetabulum was irrigated.      Repeat head trial was placed. Once we were satisfied with appropriate limb length and stability, final femoral head was chosen. Femoral neck was irrigated and dried.  A dual mobility Femoral head was impacted on the Jones taper obtaining good secure fit. Once again acetabulum was checked and any debris were removed.  Acetabulum was irrigated. Hip was reduced. There was appropriate leg length, abductor tension and stability as per the above parameters.     Wounds thoroughly irrigated with saline.  Topical TXA irrigated into the wound to aid in hemostasis.  Hemostasis was achieved as needed with electrocautery. 1 g vancomycin powder was placed deep into the wound. Posterior capsule and short external rotators were repaired through drill holes in the greater trochanter with 5 FiberWire. Fascia closed with 1 Vicryl.  Deep tissue closed with 0 Vicryl.  Subcutaneous tissue closed with 2 O Vicryl.  Skin closed with 3 Monocryl.  Dermabond/prineo, Aquacel dressing applied.     At the conclusion of procedure the patient had soft and compressible compartments, brisk cap refill, palpable DP/PT pulse in the operative extremity.  Leg lengths were equal when supine     Prior to final closure all counts were confirmed to be correct.  Patient tolerated the procedure well without any complications, was awoken from anesthesia, transferred PACU for further recovery.     POSTOPERATIVE PLAN:  77-year-old male, fall 10/20/2022, right femoral neck fracture     10/21/2022 - right total hip arthroplasty for femoral neck fracture     Antibiotics x 24 hr  Eliquis x 4 wks for DVT prophylaxis    Weight-bearing as tolerated right lower extremity  Posterior hip precautions right lower extremity x6 weeks     Multimodal pain control  Hospitalist comanagement  Physical therapy  Calcium, vitamin-D, boost     Fragility fracture Clinic referral     X-rays at subsequent followups:  Right hip     Follow-up  postop 2 weeks, 6 weeks, 3 months, 6 months, 1 year     =====================  Isidro Paulino MD  Orthopaedic Surgery
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
## 2022-10-21 NOTE — OR NURSING
Urology consulted due to no/very little urine output which was black/red in color.   Tyrell Abrams & Hosea came in @1500 removed old spivey and replaced with a new spivey.  YESENIA Madera RN

## 2022-10-21 NOTE — ASSESSMENT & PLAN NOTE
- Creatinine 1.7 on presentation  ;  Baseline  - avoid nephrotoxic agents as appropriate  - continue to monitor

## 2022-10-21 NOTE — PROGRESS NOTES
Urology Progress Note    Called to the OR due to concern for new gross hematuria and decreased UOP overnight and throughout the case. At bedside, patient with 16Fr Coude in place with scant dark red urine drainage with clots. Spivey up sized to 24Fr two-way with 10cc in the balloon. Spivey irrigated to clear with return of a large burden of clot. Suspect hematuria secondary to spivey trauma.    - We will evaluate him tomorrow morning and make final spivey recs.    Please call with any additional questions/concerns.     Myles Jc MD  Department of Urology  PGY-2  Pager: [phone]

## 2022-10-21 NOTE — HOSPITAL COURSE
Patient noted ot have UTI on admit and started on IV Ceftriaxone to treat. Patient admitted to [facility] H: Hip Fracture team and started on Hip Fracture Pathway with Orthopedic surgery consult for hip fracture. Patient was seen and evaluated by Orthopedic surgery who recommended operative repair of hip fracture. Patient was medically optimized prior to surgery and was taken to OR after optimization on 10/21/2022. Patient underwent right hip total hip arthroplasty by Dr. Isidro Paulino. Post-op patient WBAT with posterior hip precautions to the right lower extremity as per Orthopedics recommendation. Patient placed on Apixiban 2.5 mg po BID and MATTHEW/SCD's for DVT prophylaxis post-op and will need for total of 30 days. Perineural pain catheter placed by Anesthesia Pain Service with continuous infusion of Ropivacaine to help with pain control post-op and Anesthesia Pain Service managing while patient in the hospital. Patient placed on multimodal pain management post-op with Tylenol 1000 mg po every 6 hours and Robaxin 500 mg po 3 times daily post-op and will continue. PT/OT consulted post-op. Patient noted to have increased Creatinine to 2.7 on 10/22 from 1.5 on 10/21. Patient ha very difficult Alejandre placement on admit and eventually Urology had to place in OR as noted to have gross hematuria. Urology felt hematuria related to Alejandre trauma. Plan to keep Alejandre in place for now as per Urology but plan voiding trial as per urology prior to hospital discharge Patient draining yellow urine in Alejandre bag on 10/22 and no further hematuria noted. Patient also noted to have SBP in 90's likely also contributing to rise in Creatinine. Home Losartan/HCTZ stopped on 10/22 and patient bolused 1 liter of NS. BP improved after fluids given and BP medication stopped on 10/23. Creatinine improved from 2.7 to 2.5 on 10/23. PT/OT recommending IP Rehab on discharge when medically ready. Creatinine improved again to  1.8 on 10/24. IVF's stopped. Plan to remove Alejandre and do voiding trial prior to discharge as per Urology recs. Hematuria resolved. Patient should be medically ready to discharge to an  Rehab facility on 10/25. Final urine culture grew >100,000 organisms E. Coli that was pan sensitive. Patient switched from IV Ceftriaxone to po Cefpodoxime on 10/24 and patient to continue Cefpodoxime 100 mg po BID until 10/27/2022 to treat UTI on discharge. Creatinine back to his baseline 1.4 on day of discharge on 10/25. Alejandre removed prior to discharge on 10/25 and patient able to void on his own with no return of hematuria. Perineural pain catheter removed by Anesthesia prior to discharge. Patient accepted and discharged to [facility] IP rehab on 10/25/2022 in good condition to continue PT/OT for recovery from his right DARNELL. Surgical bandage to remain in place until Orthopedic clinic follow-up. Patient discharged on 30 day treatment course of Apixiban for DVT prophylaxis.
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
## 2022-10-21 NOTE — ASSESSMENT & PLAN NOTE
- sp CABG and stents  ;  Last stent>1yr ago currently on ASA monotherapy  - holding home ASA for planned procedure in am  ;  Resume as appropriate  - continue home statin, coreg, ARB

## 2022-10-21 NOTE — ASSESSMENT & PLAN NOTE
Jani Cha is a 77 y.o. male with right femoral neck fracture. They are closed and neurovascularly intact. They take aspirin at home (Plavix reported in chart, but patient states he is not taking it). They required no ambulatory assistive devices prior to this injury.     The patient was explained in detail the severity of the injury that was suffered. The patient was explained the risks/benefits/and alternatives to operative management in detail including infection, bleeding, pain, nerve and vascular damage, heterotopic ossification, leg length discrepancies, rotational deformities and they express full understanding.  We discussed the 30% national first year mortality rate with hip fractures, the need for early mobilization, and the expected rehab course.  They express full understanding of the condition and express that they want to proceed with surgery. The patient is admitted to the [facility] Hip Fracture service for perioperative optimization and management. Will plan for OR 10/21/2022. No guarantees were made. Informed consent was obtained. All questions were answered to patient's and family's satisfaction.     Plan:  - Admitted to [facility] Hip Fracture service for perioperative optimization and management  - To OR 10/21/2022 for right total hip arthroplasty versus hip hemiarthroplasty  - Patient marked, booked, and consented for surgery  - DVT PPx: Hold anticoagulation  - Abx: Pre-op antibiotics ordered  - Labs: Hemoglobin 13.8  - PT/OT: Bed rest  - Alejandre: placed; UTI, given ceftriaxone  - NPO since midnight

## 2022-10-21 NOTE — NURSING TRANSFER
Nursing Transfer Note      10/21/2022     Reason patient is being transferred: post procedure    Transfer To: 603    Transfer via bed    Transfer with IV pole/pump    Transported by PCT    Medicines sent: none    Any special needs or follow-up needed: routine    Chart send with patient: Yes    Notified: family via surgical texting system     Patient reassessed at: 10/21/2022 at 1740

## 2022-10-21 NOTE — SUBJECTIVE & OBJECTIVE
"Principal Problem:Closed displaced fracture of right femoral neck    Principal Orthopedic Problem: same    Interval History: NAEON. VSS.  Pain controlled rest, but exacerbated with any movement.  NPO since midnight.  Traumatic Alejandre insertion overnight with blood tinged urine.    Review of patient's allergies indicates:   Allergen Reactions    Penicillins Hives, Itching and Rash    Shellfish containing products        Current Facility-Administered Medications   Medication    0.9%  NaCl infusion    acetaminophen tablet 1,000 mg    atorvastatin tablet 80 mg    bisacodyL suppository 10 mg    dextrose 10% bolus 125 mL    dextrose 10% bolus 250 mL    glucagon (human recombinant) injection 1 mg    glucose chewable tablet 16 g    glucose chewable tablet 24 g    insulin aspart U-100 pen 1-10 Units    losartan-hydrochlorothiazide 100-12.5 mg per tablet 1 tablet    melatonin tablet 6 mg    morphine injection 2 mg    morphine injection 2 mg    ondansetron injection 4 mg    oxyCODONE immediate release tablet 5 mg    oxyCODONE immediate release tablet 5 mg    polyethylene glycol packet 17 g    pregabalin capsule 75 mg    sodium chloride 0.9% flush 10 mL    sodium chloride 0.9% flush 10 mL    sodium chloride 0.9% flush 10 mL     Objective:     Vital Signs (Most Recent):  Temp: 98.5 °F (36.9 °C) (10/21/22 0420)  Pulse: 78 (10/21/22 0420)  Resp: 16 (10/21/22 0420)  BP: (!) 110/57 (10/21/22 0420)  SpO2: (!) 93 % (10/21/22 0420)   Vital Signs (24h Range):  Temp:  [97.8 °F (36.6 °C)-98.7 °F (37.1 °C)] 98.5 °F (36.9 °C)  Pulse:  [67-82] 78  Resp:  [16-18] 16  SpO2:  [90 %-99 %] 93 %  BP: (110-136)/(57-88) 110/57     Weight: 110.2 kg (242 lb 15.2 oz)  Height: 5' 10" (177.8 cm)  Body mass index is 34.86 kg/m².      Intake/Output Summary (Last 24 hours) at 10/21/2022 0529  Last data filed at 10/21/2022 0320  Gross per 24 hour   Intake --   Output 600 ml   Net -600 ml       Ortho/SPM Exam  Gen: NAD, sitting comfortably in bed  CV: regular " rate  Resp: non-labored respirations    RLE:  - Skin intact throughout, no open wounds  - TTP over hip and proximal thigh  - NonTTP throughout lower extremity distal to proximal thigh  - ROM hip not tested due to known hip fracture  - ROM knee, ankle, and foot intact and painless  - TA/EHL/Gastroc/FHL assessed in isolation and are without deficit  - SILT throughout  - Compartments soft  - 2+ DP and PT pulses  - Capillary Refill < 2 sec  - Positive Log roll    Significant Labs: All pertinent labs within the past 24 hours have been reviewed.    Significant Imaging: I have reviewed and interpreted all pertinent imaging results/findings.

## 2022-10-21 NOTE — ASSESSMENT & PLAN NOTE
- Orthopedic surgery consulted  ;  Appreciate recs  - Xray Acute impacted right subcapital femoral fracture.  - IP Hip Fracture Pathway initiated  - npo midnight  - plan for OR in am  - holding home ASA  ; resume as appropriate  - PT/OT consulted  - pain control  - spivey cath  - ECHO pending  - Revised Cardiac Risk Index for Pre-Operative Risk  Score 2  ;  CLASS III Risk  - patient medically appropriate for OR in am
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
## 2022-10-21 NOTE — NURSING
Alejandre insertion attempt unsuccessful, pt refused 2nd try, states he will try later in the morning.

## 2022-10-21 NOTE — ANESTHESIA PROCEDURE NOTES
Intubation    Date/Time: 10/21/2022 11:14 AM  Performed by: Darya Soto CRNA  Authorized by: Estefania Grijalva MD     Intubation:     Induction:  Intravenous    Intubated:  Postinduction    Mask Ventilation:  Easy mask    Attempts:  1    Attempted By:  CRNA    Method of Intubation:  Direct    Blade:  Thomson 2    Laryngeal View Grade: Grade IIA - cords partially seen      Difficult Airway Encountered?: No      Complications:  None    Airway Device:  Oral endotracheal tube    Airway Device Size:  7.0    Style/Cuff Inflation:  Cuffed (inflated to minimal occlusive pressure)    Tube secured:  24    Secured at:  The lips    Placement Verified By:  Capnometry    Complicating Factors:  None    Findings Post-Intubation:  BS equal bilateral and atraumatic/condition of teeth unchanged
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
## 2022-10-21 NOTE — PLAN OF CARE
Problem: Infection  Goal: Absence of Infection Signs and Symptoms  Outcome: Ongoing, Progressing     Problem: Adult Inpatient Plan of Care  Goal: Plan of Care Review  Outcome: Ongoing, Progressing  Goal: Patient-Specific Goal (Individualized)  Outcome: Ongoing, Progressing  Goal: Absence of Hospital-Acquired Illness or Injury  Outcome: Ongoing, Progressing  Goal: Optimal Comfort and Wellbeing  Outcome: Ongoing, Progressing  Goal: Readiness for Transition of Care  Outcome: Ongoing, Progressing   VSS. NPO since midnight. Pain control with PRN Morphine and Oxy. Alejandre cath in place. Call light and personal items within reach.

## 2022-10-21 NOTE — HPI
77-year-old male with history of hypertension, hyperlipidemia, CAD, diabetes, CKD presents to the ED complaining of right hip pain after fall this afternoon.  He was walking in his driveway when his knee gave out causing him to fall landing onto his right hip.  He denies any head trauma or loss of consciousness.  He is having 8/10 pain to the right hip and is unable to move the leg or bear weight.  He denies fever, chills, chest pain, shortness of breath, abdominal pain, numbness, paresthesias, confusion.    In the ED patient afebrile and hemodynamically stable saturating well on room air. Xray imaging with Acute impacted right subcapital femoral fracture. Orthopedic surgery consulted and patient admitted to  for further evaluation and management.

## 2022-10-21 NOTE — ANESTHESIA PROCEDURE NOTES
Right SIFI catheter    Patient location during procedure: pre-op   Block not for primary anesthetic.  Reason for block: at surgeon's request and post-op pain management   Post-op Pain Location: Right hip pain   Start time: 10/21/2022 9:11 AM  Timeout: 10/21/2022 9:10 AM   End time: 10/21/2022 9:28 AM    Staffing  Authorizing Provider: Rancho Daniel MD  Performing Provider: Eliceo Monge MD    Preanesthetic Checklist  Completed: patient identified, IV checked, site marked, risks and benefits discussed, surgical consent, monitors and equipment checked, pre-op evaluation and timeout performed  Peripheral Block  Patient position: supine  Prep: ChloraPrep and site prepped and draped  Patient monitoring: heart rate, cardiac monitor, continuous pulse ox, continuous capnometry and frequent blood pressure checks  Block type: fascia iliaca  Laterality: right  Injection technique: continuous  Needle  Needle type: Tuohy   Needle gauge: 17 G  Needle length: 3.5 in  Needle localization: anatomical landmarks and ultrasound guidance  Catheter type: spring wound  Catheter size: 19 G  Test dose: lidocaine 1.5% with Epi 1-to-200,000 and negative   -ultrasound image captured on disc.  Assessment  Injection assessment: negative aspiration, negative parasthesia and local visualized surrounding nerve  Paresthesia pain: none  Heart rate change: no  Slow fractionated injection: yes  Pain Tolerance: comfortable throughout block and no complaints  Medications:    Medications: ropivacaine (NAROPIN) injection 0.5% - Perineural   20 mL - 10/21/2022 9:25:00 AM    Additional Notes  VSS.  [facility] RN monitoring vitals throughout procedure.  Patient tolerated procedure well.

## 2022-10-21 NOTE — PROGRESS NOTES
[facility]  Orthopedics  Progress Note    Patient Name: Jani Cha  MRN: [id number]  Admission Date: 10/20/2022  Hospital Length of Stay: 1 days  Attending Provider: Trudy Cross MD  Primary Care Provider: Laila Bains MD  Follow-up For: Procedure(s) (LRB):  ARTHROPLASTY, HIP, RIGHT, Peg board, Lateral, [place], Ancef/Vanc, TXA (Right)    Post-Operative Day:    Subjective:     Principal Problem:Closed displaced fracture of right femoral neck    Principal Orthopedic Problem: same    Interval History: NAEON. VSS.  Pain controlled rest, but exacerbated with any movement.  NPO since midnight.  Traumatic Alejandre insertion overnight with blood tinged urine.    Review of patient's allergies indicates:   Allergen Reactions    Penicillins Hives, Itching and Rash    Shellfish containing products        Current Facility-Administered Medications   Medication    0.9%  NaCl infusion    acetaminophen tablet 1,000 mg    atorvastatin tablet 80 mg    bisacodyL suppository 10 mg    dextrose 10% bolus 125 mL    dextrose 10% bolus 250 mL    glucagon (human recombinant) injection 1 mg    glucose chewable tablet 16 g    glucose chewable tablet 24 g    insulin aspart U-100 pen 1-10 Units    losartan-hydrochlorothiazide 100-12.5 mg per tablet 1 tablet    melatonin tablet 6 mg    morphine injection 2 mg    morphine injection 2 mg    ondansetron injection 4 mg    oxyCODONE immediate release tablet 5 mg    oxyCODONE immediate release tablet 5 mg    polyethylene glycol packet 17 g    pregabalin capsule 75 mg    sodium chloride 0.9% flush 10 mL    sodium chloride 0.9% flush 10 mL    sodium chloride 0.9% flush 10 mL     Objective:     Vital Signs (Most Recent):  Temp: 98.5 °F (36.9 °C) (10/21/22 0420)  Pulse: 78 (10/21/22 0420)  Resp: 16 (10/21/22 0420)  BP: (!) 110/57 (10/21/22 0420)  SpO2: (!) 93 % (10/21/22 0420)   Vital Signs (24h Range):  Temp:  [97.8 °F (36.6 °C)-98.7 °F (37.1 °C)] 98.5 °F (36.9  "°C)  Pulse:  [67-82] 78  Resp:  [16-18] 16  SpO2:  [90 %-99 %] 93 %  BP: (110-136)/(57-88) 110/57     Weight: 110.2 kg (242 lb 15.2 oz)  Height: 5' 10" (177.8 cm)  Body mass index is 34.86 kg/m².      Intake/Output Summary (Last 24 hours) at 10/21/2022 0555  Last data filed at 10/21/2022 0320  Gross per 24 hour   Intake --   Output 600 ml   Net -600 ml       Ortho/SPM Exam  Gen: NAD, sitting comfortably in bed  CV: regular rate  Resp: non-labored respirations    RLE:  - Skin intact throughout, no open wounds  - TTP over hip and proximal thigh  - NonTTP throughout lower extremity distal to proximal thigh  - ROM hip not tested due to known hip fracture  - ROM knee, ankle, and foot intact and painless  - TA/EHL/Gastroc/FHL assessed in isolation and are without deficit  - SILT throughout  - Compartments soft  - 2+ DP and PT pulses  - Capillary Refill < 2 sec  - Positive Log roll    Significant Labs: All pertinent labs within the past 24 hours have been reviewed.    Significant Imaging: I have reviewed and interpreted all pertinent imaging results/findings.    Assessment/Plan:     * Closed displaced fracture of right femoral neck  Jani Cha is a 77 y.o. male with right femoral neck fracture. They are closed and neurovascularly intact. They take aspirin at home (Plavix reported in chart, but patient states he is not taking it). They required no ambulatory assistive devices prior to this injury.     The patient was explained in detail the severity of the injury that was suffered. The patient was explained the risks/benefits/and alternatives to operative management in detail including infection, bleeding, pain, nerve and vascular damage, heterotopic ossification, leg length discrepancies, rotational deformities and they express full understanding.  We discussed the 30% national first year mortality rate with hip fractures, the need for early mobilization, and the expected rehab course.  They express full " understanding of the condition and express that they want to proceed with surgery. The patient is admitted to the [facility] Hip Fracture service for perioperative optimization and management. Will plan for OR 10/21/2022. No guarantees were made. Informed consent was obtained. All questions were answered to patient's and family's satisfaction.     Plan:  - Admitted to [facility] Hip Fracture service for perioperative optimization and management  - To OR 10/21/2022 for right total hip arthroplasty versus hip hemiarthroplasty  - Patient marked, booked, and consented for surgery  - DVT PPx: Hold anticoagulation  - Abx: Pre-op antibiotics ordered  - Labs: Hemoglobin 13.8  - PT/OT: Bed rest  - Alejandre: placed; UTI, given ceftriaxone  - NPO since midnight          Mike Smith MD  Orthopedics  [facility]

## 2022-10-22 PROBLEM — R31.9 HEMATURIA: Status: ACTIVE | Noted: 2022-10-22

## 2022-10-22 PROBLEM — N17.9 ACUTE RENAL FAILURE SUPERIMPOSED ON STAGE 3A CHRONIC KIDNEY DISEASE: Status: ACTIVE | Noted: 2020-01-24

## 2022-10-22 PROBLEM — N18.31 ACUTE RENAL FAILURE SUPERIMPOSED ON STAGE 3A CHRONIC KIDNEY DISEASE: Status: ACTIVE | Noted: 2020-01-24

## 2022-10-22 LAB
ALBUMIN SERPL BCP-MCNC: 2.6 G/DL (ref 3.5–5.2)
ALP SERPL-CCNC: 64 U/L (ref 55–135)
ALT SERPL W/O P-5'-P-CCNC: 9 U/L (ref 10–44)
ANION GAP SERPL CALC-SCNC: 11 MMOL/L (ref 8–16)
ASCENDING AORTA: 3.63 CM
AST SERPL-CCNC: 19 U/L (ref 10–40)
AV INDEX (PROSTH): 0.43
AV MEAN GRADIENT: 4 MMHG
AV PEAK GRADIENT: 8 MMHG
AV VALVE AREA: 1.7 CM2
AV VELOCITY RATIO: 0.55
BACTERIA UR CULT: ABNORMAL
BASOPHILS # BLD AUTO: 0.03 K/UL (ref 0–0.2)
BASOPHILS NFR BLD: 0.3 % (ref 0–1.9)
BILIRUB SERPL-MCNC: 0.4 MG/DL (ref 0.1–1)
BSA FOR ECHO PROCEDURE: 2.33 M2
BUN SERPL-MCNC: 41 MG/DL (ref 8–23)
CALCIUM SERPL-MCNC: 8.4 MG/DL (ref 8.7–10.5)
CHLORIDE SERPL-SCNC: 102 MMOL/L (ref 95–110)
CO2 SERPL-SCNC: 21 MMOL/L (ref 23–29)
CREAT SERPL-MCNC: 2.7 MG/DL (ref 0.5–1.4)
CV ECHO LV RWT: 0.31 CM
DIFFERENTIAL METHOD: ABNORMAL
DOP CALC AO PEAK VEL: 1.43 M/S
DOP CALC AO VTI: 24.73 CM
DOP CALC LVOT AREA: 3.9 CM2
DOP CALC LVOT DIAMETER: 2.23 CM
DOP CALC LVOT PEAK VEL: 0.78 M/S
DOP CALC LVOT STROKE VOLUME: 41.93 CM3
DOP CALCLVOT PEAK VEL VTI: 10.74 CM
E WAVE DECELERATION TIME: 197.85 MSEC
E/A RATIO: 1.02
E/E' RATIO: 9.38 M/S
ECHO LV POSTERIOR WALL: 0.76 CM (ref 0.6–1.1)
EJECTION FRACTION: 50 %
EOSINOPHIL # BLD AUTO: 0.1 K/UL (ref 0–0.5)
EOSINOPHIL NFR BLD: 0.7 % (ref 0–8)
ERYTHROCYTE [DISTWIDTH] IN BLOOD BY AUTOMATED COUNT: 13.2 % (ref 11.5–14.5)
EST. GFR  (NO RACE VARIABLE): 23.7 ML/MIN/1.73 M^2
FRACTIONAL SHORTENING: 24 % (ref 28–44)
GLUCOSE SERPL-MCNC: 162 MG/DL (ref 70–110)
HCT VFR BLD AUTO: 35.1 % (ref 40–54)
HGB BLD-MCNC: 11.5 G/DL (ref 14–18)
IMM GRANULOCYTES # BLD AUTO: 0.04 K/UL (ref 0–0.04)
IMM GRANULOCYTES NFR BLD AUTO: 0.4 % (ref 0–0.5)
INTERVENTRICULAR SEPTUM: 1.04 CM (ref 0.6–1.1)
IVRT: 108.47 MSEC
LA MAJOR: 5.76 CM
LA MINOR: 5.1 CM
LA WIDTH: 4.28 CM
LEFT ATRIUM SIZE: 2.72 CM
LEFT ATRIUM VOLUME INDEX MOD: 24.9 ML/M2
LEFT ATRIUM VOLUME INDEX: 23.7 ML/M2
LEFT ATRIUM VOLUME MOD: 56.24 CM3
LEFT ATRIUM VOLUME: 53.53 CM3
LEFT INTERNAL DIMENSION IN SYSTOLE: 3.68 CM (ref 2.1–4)
LEFT VENTRICLE DIASTOLIC VOLUME INDEX: 48.99 ML/M2
LEFT VENTRICLE DIASTOLIC VOLUME: 110.72 ML
LEFT VENTRICLE MASS INDEX: 67 G/M2
LEFT VENTRICLE SYSTOLIC VOLUME INDEX: 25.3 ML/M2
LEFT VENTRICLE SYSTOLIC VOLUME: 57.25 ML
LEFT VENTRICULAR INTERNAL DIMENSION IN DIASTOLE: 4.86 CM (ref 3.5–6)
LEFT VENTRICULAR MASS: 150.87 G
LV LATERAL E/E' RATIO: 7.63 M/S
LV SEPTAL E/E' RATIO: 12.2 M/S
LYMPHOCYTES # BLD AUTO: 1 K/UL (ref 1–4.8)
LYMPHOCYTES NFR BLD: 9.1 % (ref 18–48)
MAGNESIUM SERPL-MCNC: 1.7 MG/DL (ref 1.6–2.6)
MCH RBC QN AUTO: 31.1 PG (ref 27–31)
MCHC RBC AUTO-ENTMCNC: 32.8 G/DL (ref 32–36)
MCV RBC AUTO: 95 FL (ref 82–98)
MONOCYTES # BLD AUTO: 0.8 K/UL (ref 0.3–1)
MONOCYTES NFR BLD: 7.7 % (ref 4–15)
MV PEAK A VEL: 0.6 M/S
MV PEAK E VEL: 0.61 M/S
MV STENOSIS PRESSURE HALF TIME: 57.38 MS
MV VALVE AREA P 1/2 METHOD: 3.83 CM2
NEUTROPHILS # BLD AUTO: 8.5 K/UL (ref 1.8–7.7)
NEUTROPHILS NFR BLD: 81.8 % (ref 38–73)
NRBC BLD-RTO: 0 /100 WBC
PHOSPHATE SERPL-MCNC: 4 MG/DL (ref 2.7–4.5)
PISA TR MAX VEL: 4.13 M/S
PLATELET # BLD AUTO: 172 K/UL (ref 150–450)
PMV BLD AUTO: 10 FL (ref 9.2–12.9)
POCT GLUCOSE: 179 MG/DL (ref 70–110)
POCT GLUCOSE: 221 MG/DL (ref 70–110)
POCT GLUCOSE: 223 MG/DL (ref 70–110)
POTASSIUM SERPL-SCNC: 4.5 MMOL/L (ref 3.5–5.1)
PROT SERPL-MCNC: 5 G/DL (ref 6–8.4)
RA MAJOR: 4.46 CM
RA PRESSURE: 3 MMHG
RA WIDTH: 4.15 CM
RBC # BLD AUTO: 3.7 M/UL (ref 4.6–6.2)
RIGHT VENTRICULAR END-DIASTOLIC DIMENSION: 3.8 CM
RV TISSUE DOPPLER FREE WALL SYSTOLIC VELOCITY 1 (APICAL 4 CHAMBER VIEW): 10.92 CM/S
SINUS: 3.99 CM
SODIUM SERPL-SCNC: 134 MMOL/L (ref 136–145)
STJ: 3.19 CM
TDI LATERAL: 0.08 M/S
TDI SEPTAL: 0.05 M/S
TDI: 0.07 M/S
TR MAX PG: 68 MMHG
TRICUSPID ANNULAR PLANE SYSTOLIC EXCURSION: 1.08 CM
TV REST PULMONARY ARTERY PRESSURE: 71 MMHG
WBC # BLD AUTO: 10.4 K/UL (ref 3.9–12.7)

## 2022-10-22 PROCEDURE — 99233 SBSQ HOSP IP/OBS HIGH 50: CPT | Mod: ,,, | Performed by: INTERNAL MEDICINE

## 2022-10-22 PROCEDURE — 11000001 HC ACUTE MED/SURG PRIVATE ROOM

## 2022-10-22 PROCEDURE — 83735 ASSAY OF MAGNESIUM: CPT | Performed by: FAMILY MEDICINE

## 2022-10-22 PROCEDURE — 94761 N-INVAS EAR/PLS OXIMETRY MLT: CPT

## 2022-10-22 PROCEDURE — 84100 ASSAY OF PHOSPHORUS: CPT | Performed by: FAMILY MEDICINE

## 2022-10-22 PROCEDURE — 97530 THERAPEUTIC ACTIVITIES: CPT

## 2022-10-22 PROCEDURE — 25000003 PHARM REV CODE 250: Performed by: INTERNAL MEDICINE

## 2022-10-22 PROCEDURE — 85025 COMPLETE CBC W/AUTO DIFF WBC: CPT | Performed by: FAMILY MEDICINE

## 2022-10-22 PROCEDURE — 99233 PR SUBSEQUENT HOSPITAL CARE,LEVL III: ICD-10-PCS | Mod: ,,, | Performed by: INTERNAL MEDICINE

## 2022-10-22 PROCEDURE — 63600175 PHARM REV CODE 636 W HCPCS

## 2022-10-22 PROCEDURE — 97165 OT EVAL LOW COMPLEX 30 MIN: CPT

## 2022-10-22 PROCEDURE — 25000003 PHARM REV CODE 250: Performed by: FAMILY MEDICINE

## 2022-10-22 PROCEDURE — 99900035 HC TECH TIME PER 15 MIN (STAT)

## 2022-10-22 PROCEDURE — 36415 COLL VENOUS BLD VENIPUNCTURE: CPT | Performed by: FAMILY MEDICINE

## 2022-10-22 PROCEDURE — 25000003 PHARM REV CODE 250: Performed by: STUDENT IN AN ORGANIZED HEALTH CARE EDUCATION/TRAINING PROGRAM

## 2022-10-22 PROCEDURE — 80053 COMPREHEN METABOLIC PANEL: CPT | Performed by: FAMILY MEDICINE

## 2022-10-22 PROCEDURE — 97161 PT EVAL LOW COMPLEX 20 MIN: CPT

## 2022-10-22 PROCEDURE — 97535 SELF CARE MNGMENT TRAINING: CPT

## 2022-10-22 RX ORDER — SODIUM CHLORIDE 9 MG/ML
INJECTION, SOLUTION INTRAVENOUS CONTINUOUS
Status: DISCONTINUED | OUTPATIENT
Start: 2022-10-22 | End: 2022-10-24

## 2022-10-22 RX ORDER — OXYCODONE HYDROCHLORIDE 5 MG/1
5 TABLET ORAL EVERY 4 HOURS PRN
Status: DISCONTINUED | OUTPATIENT
Start: 2022-10-22 | End: 2022-10-25 | Stop reason: HOSPADM

## 2022-10-22 RX ORDER — OXYCODONE HYDROCHLORIDE 5 MG/1
5 TABLET ORAL EVERY 4 HOURS PRN
Status: DISCONTINUED | OUTPATIENT
Start: 2022-10-22 | End: 2022-10-22

## 2022-10-22 RX ORDER — OXYCODONE HYDROCHLORIDE 10 MG/1
10 TABLET ORAL EVERY 4 HOURS PRN
Status: DISCONTINUED | OUTPATIENT
Start: 2022-10-22 | End: 2022-10-22

## 2022-10-22 RX ADMIN — SENNOSIDES AND DOCUSATE SODIUM 1 TABLET: 50; 8.6 TABLET ORAL at 09:10

## 2022-10-22 RX ADMIN — METHOCARBAMOL 500 MG: 500 TABLET ORAL at 09:10

## 2022-10-22 RX ADMIN — METHOCARBAMOL 500 MG: 500 TABLET ORAL at 05:10

## 2022-10-22 RX ADMIN — INSULIN ASPART 4 UNITS: 100 INJECTION, SOLUTION INTRAVENOUS; SUBCUTANEOUS at 11:10

## 2022-10-22 RX ADMIN — INSULIN ASPART 4 UNITS: 100 INJECTION, SOLUTION INTRAVENOUS; SUBCUTANEOUS at 05:10

## 2022-10-22 RX ADMIN — ACETAMINOPHEN 1000 MG: 500 TABLET ORAL at 06:10

## 2022-10-22 RX ADMIN — ASPIRIN 81 MG: 81 TABLET, CHEWABLE ORAL at 09:10

## 2022-10-22 RX ADMIN — APIXABAN 2.5 MG: 2.5 TABLET, FILM COATED ORAL at 09:10

## 2022-10-22 RX ADMIN — ACETAMINOPHEN 1000 MG: 500 TABLET ORAL at 11:10

## 2022-10-22 RX ADMIN — ACETAMINOPHEN 1000 MG: 500 TABLET ORAL at 05:10

## 2022-10-22 RX ADMIN — POLYETHYLENE GLYCOL 3350 17 G: 17 POWDER, FOR SOLUTION ORAL at 09:10

## 2022-10-22 RX ADMIN — ATORVASTATIN CALCIUM 80 MG: 40 TABLET, FILM COATED ORAL at 09:10

## 2022-10-22 RX ADMIN — ACETAMINOPHEN 1000 MG: 500 TABLET ORAL at 12:10

## 2022-10-22 RX ADMIN — CEFTRIAXONE 1 G: 1 INJECTION, SOLUTION INTRAVENOUS at 06:10

## 2022-10-22 RX ADMIN — Medication 2 G: at 03:10

## 2022-10-22 RX ADMIN — SODIUM CHLORIDE: 0.9 INJECTION, SOLUTION INTRAVENOUS at 04:10

## 2022-10-22 RX ADMIN — SODIUM CHLORIDE 1000 ML: 9 INJECTION, SOLUTION INTRAVENOUS at 11:10

## 2022-10-22 NOTE — SUBJECTIVE & OBJECTIVE
Interval History: NAEON. AFVSS.  Patient is established with Dr. Dubon for BPH and history of stones.  Catheter draining clear yellow without blood or clot.  Creatinine increased to 2.7 from 1.5 yesterday.  Tolerating diet, currently not on IV fluids.  Renal ultrasound ordered but not performed.    Objective:     Temp:  [97.7 °F (36.5 °C)-98 °F (36.7 °C)] 98 °F (36.7 °C)  Pulse:  [69-98] 81  Resp:  [13-20] 18  SpO2:  [92 %-100 %] 93 %  BP: ()/(48-61) 82/48     Body mass index is 34.72 kg/m².           Drains       Drain  Duration                  Urethral Catheter 10/21/22 1520 Straight-tip 24 Fr. <1 day                    Physical Exam  Vitals and nursing note reviewed.   Constitutional:       General: He is not in acute distress.     Appearance: He is well-developed.   Cardiovascular:      Rate and Rhythm: Normal rate.   Pulmonary:      Effort: Pulmonary effort is normal. No respiratory distress.   Abdominal:      General: There is no distension.      Palpations: Abdomen is soft.      Tenderness: There is no abdominal tenderness.   Genitourinary:     Comments: Alejandre draining clear yellow, no clots.  Musculoskeletal:         General: No tenderness. Normal range of motion.   Skin:     General: Skin is warm and dry.      Findings: No rash.   Neurological:      Mental Status: He is alert and oriented to person, place, and time.   Psychiatric:         Judgment: Judgment normal.       Significant Labs:    BMP:  Recent Labs   Lab 10/20/22  1627 10/21/22  0305 10/22/22  0351   * 139 134*   K 4.5 4.1 4.5    106 102   CO2 22* 25 21*   BUN 30* 30* 41*   CREATININE 1.7* 1.5* 2.7*   CALCIUM 9.7 9.1 8.4*       CBC:   Recent Labs   Lab 10/20/22  1627 10/21/22  0305 10/22/22  0351   WBC 13.36* 9.74 10.40   HGB 14.6 13.8* 11.5*   HCT 44.3 41.6 35.1*    197 172       All pertinent labs results from the past 24 hours have been reviewed.    Significant Imaging:  All pertinent imaging results/findings from  the past 24 hours have been reviewed.

## 2022-10-22 NOTE — ASSESSMENT & PLAN NOTE
- Chronic and controlled. Patient asymptomatic. Patient with previous CABG and cardiac stenting. Last stent>1yr ago currently on ASA monotherapy and will continue in hospital.  - Continue home Lipitor to treat CAD.

## 2022-10-22 NOTE — ASSESSMENT & PLAN NOTE
Chronic and controlled. Patient hypotensive on 10/22 so will discontinue home Losartan/HCTZ 100/25 mg.

## 2022-10-22 NOTE — ASSESSMENT & PLAN NOTE
· Patient is day of surgery for surgical repair of right hip fracture. Patient reports minimal pain in right hip. Patient went to OR today and had operative repair of hip fracture and tolerated procedure well.   · Patient will start PT/OT in the am tomorrow for gait training and strengthening and restoration of ADL's.   · Patient is FWB/WBAT: right lower extremity, posterior hip precautions as per Orthopedic recommendations post-op.   · Plan is to start Apixiban 2.5 mg po BID post-op and continue MATTHEW/SCDs for DVT prophylaxis for now.   · Perineural pain catheter in place with continuous Ropivacaine for pain control and being managed by Anesthesia Pain Service.   · Patient started on multimodal pain management post-op with Tylenol 1000 mg po every 6 hours and Robaxin 500 mg po 3 times daily post-op and will continue.  · Continue Alejandre to gravity and remove on POD #1.

## 2022-10-22 NOTE — PROGRESS NOTES
[facility]  Urology  Progress Note    Patient Name: Jani Cha  MRN: [id number]  Admission Date: 10/20/2022  Hospital Length of Stay: 2 days  Code Status: Full Code   Attending Provider: Trudy Cross MD   Primary Care Physician: Laila Bains MD    Subjective:     HPI:  No notes on file    Interval History: NAEON. AFVSS.  Patient is established with Dr. Dubon for BPH and history of stones.  Catheter draining clear yellow without blood or clot.  Creatinine increased to 2.7 from 1.5 yesterday.  Tolerating diet, currently not on IV fluids.  Renal ultrasound ordered but not performed.    Objective:     Temp:  [97.7 °F (36.5 °C)-98 °F (36.7 °C)] 98 °F (36.7 °C)  Pulse:  [69-98] 81  Resp:  [13-20] 18  SpO2:  [92 %-100 %] 93 %  BP: ()/(48-61) 82/48     Body mass index is 34.72 kg/m².           Drains       Drain  Duration                  Urethral Catheter 10/21/22 1520 Straight-tip 24 Fr. <1 day                    Physical Exam  Vitals and nursing note reviewed.   Constitutional:       General: He is not in acute distress.     Appearance: He is well-developed.   Cardiovascular:      Rate and Rhythm: Normal rate.   Pulmonary:      Effort: Pulmonary effort is normal. No respiratory distress.   Abdominal:      General: There is no distension.      Palpations: Abdomen is soft.      Tenderness: There is no abdominal tenderness.   Genitourinary:     Comments: Alejandre draining clear yellow, no clots.  Musculoskeletal:         General: No tenderness. Normal range of motion.   Skin:     General: Skin is warm and dry.      Findings: No rash.   Neurological:      Mental Status: He is alert and oriented to person, place, and time.   Psychiatric:         Judgment: Judgment normal.       Significant Labs:    BMP:  Recent Labs   Lab 10/20/22  1627 10/21/22  0305 10/22/22  0351   * 139 134*   K 4.5 4.1 4.5    106 102   CO2 22* 25 21*   BUN 30* 30* 41*   CREATININE 1.7* 1.5* 2.7*   CALCIUM 9.7 9.1  8.4*       CBC:   Recent Labs   Lab 10/20/22  1627 10/21/22  0305 10/22/22  0351   WBC 13.36* 9.74 10.40   HGB 14.6 13.8* 11.5*   HCT 44.3 41.6 35.1*    197 172       All pertinent labs results from the past 24 hours have been reviewed.    Significant Imaging:  All pertinent imaging results/findings from the past 24 hours have been reviewed.  GERMAN: no hydronephrosis bilaterally.      Assessment/Plan:     Hematuria  - Catheter clear yellow  - Patient with known history of BPH, gross hematuria likely traumatic spivey  - Recommend maintaining catheter for 3-5 days  - If patient is still hospitalized at this time, may attempt a voiding trila  - If he is to be discharged, recommend follow up with Dr. Dubon for discussion of BPH  - GERMAN with no hydronephrosis.    Urology will now sign off. Please call with any additional questions/concerns.         VTE Risk Mitigation (From admission, onward)           Ordered     apixaban tablet 2.5 mg  2 times daily         10/21/22 1542     IP VTE HIGH RISK PATIENT  Once         10/20/22 1912     Place sequential compression device  Until discontinued         10/20/22 1912                    Yimi Verma MD  Urology  [facility] - Med Surg

## 2022-10-22 NOTE — SUBJECTIVE & OBJECTIVE
Interval History: Patient admitted overnight with right hip fracture and UTI and started on IV Ceftriaxone to treat UTI.Patient taken to OR this am and underwent right total hip replacement by Ortho and patient back in his room from surgery. Patient reports 3/10 pain to right hip. PNC in place for pain control. Discussed with patient and plan to start therapy tomorrow.    Review of Systems   Constitutional:  Negative for fever.   Respiratory:  Negative for cough and shortness of breath.    Cardiovascular:  Negative for chest pain and leg swelling.   Gastrointestinal:  Negative for abdominal pain, diarrhea, nausea and vomiting.   Musculoskeletal:  Positive for arthralgias (Right hip) and gait problem.   Neurological:  Negative for dizziness and light-headedness.   Psychiatric/Behavioral:  Negative for agitation and confusion.    Objective:     Vital Signs (Most Recent):  Temp: 97.8 °F (36.6 °C) (10/21/22 1745)  Pulse: 76 (10/21/22 1903)  Resp: 14 (10/21/22 1745)  BP: 109/54 (10/21/22 1745)  SpO2: 94 % (10/21/22 1745) on 2 liters of oxygen   Vital Signs (24h Range):  Temp:  [97.7 °F (36.5 °C)-98.7 °F (37.1 °C)] 97.8 °F (36.6 °C)  Pulse:  [67-88] 76  Resp:  [13-19] 14  SpO2:  [90 %-100 %] 94 %  BP: ()/(50-82) 99/54     Weight: 110.2 kg (242 lb 15.2 oz)  Body mass index is 34.86 kg/m².    Intake/Output Summary (Last 24 hours) at 10/21/2022 1907  Last data filed at 10/21/2022 1739  Gross per 24 hour   Intake 2550 ml   Output 1275 ml   Net 1275 ml      Physical Exam  Vitals and nursing note reviewed.   Constitutional:       General: He is awake. He is not in acute distress.     Appearance: Normal appearance. He is well-developed. He is obese. He is not ill-appearing.   Eyes:      General: No scleral icterus.  Cardiovascular:      Rate and Rhythm: Normal rate and regular rhythm.      Heart sounds: Normal heart sounds. No murmur heard.    No friction rub. No gallop.   Pulmonary:      Effort: Pulmonary effort is  normal. No respiratory distress.      Breath sounds: Normal breath sounds. No wheezing or rales.   Abdominal:      General: Abdomen is flat. Bowel sounds are normal. There is no distension.      Palpations: Abdomen is soft.      Tenderness: There is no abdominal tenderness. There is no guarding.   Musculoskeletal:      Right lower leg: No edema.      Left lower leg: No edema.   Skin:     Findings: No erythema or rash.      Comments: Surgical dressing noted on right hip. Dressing is clean and dry and intact.   Neurological:      Mental Status: He is alert and oriented to person, place, and time.   Psychiatric:         Mood and Affect: Mood normal.         Behavior: Behavior normal. Behavior is cooperative.         Thought Content: Thought content normal.         Judgment: Judgment normal.       Significant Labs: CBC:   Recent Labs   Lab 10/20/22  1627 10/21/22  0305   WBC 13.36* 9.74   HGB 14.6 13.8*   HCT 44.3 41.6    197     CMP:   Recent Labs   Lab 10/20/22  1627 10/21/22  0305   * 139   K 4.5 4.1    106   CO2 22* 25   * 84   BUN 30* 30*   CREATININE 1.7* 1.5*   CALCIUM 9.7 9.1   PROT 6.1 5.6*   ALBUMIN 3.4* 3.1*   BILITOT 0.8 0.9   ALKPHOS 87 75   AST 15 15   ALT 16 14   ANIONGAP 9 8     Urine Culture:   Recent Labs   Lab 10/20/22  2322   LABURIN GRAM NEGATIVE MELO  >100,000 cfu/ml  Identification and susceptibility pending  *       Significant Imaging: I have reviewed all pertinent imaging results/findings within the past 24 hours.

## 2022-10-22 NOTE — SUBJECTIVE & OBJECTIVE
Interval History: Patient noted to have increased Creatinine to 2.7 on 10/22 from 1.5 on 10/21. Patient had very difficult Alejandre placement on admit and eventually Urology had to place in OR. Plan to keep Alejandre in place for now as per Urology. Patient draining yellow urine in Alejandre bag on this am. Patient also noted to have SBP in 80's so likely also contributing to rise in Creatinine. Home Losartan/HCTZ stopped this am and patient bolused 1 liter of NS for hypotension. Patient asymptomatic and was actually sitting up in chair when I entered room. Patient denied any dizziness or lightheadedness. Patient reports 5/10 pain to right hip area. Patient awake and alert and oriented x 4.     Review of Systems   Constitutional:  Negative for fever.   Respiratory:  Negative for cough and shortness of breath.    Cardiovascular:  Negative for chest pain and leg swelling.   Gastrointestinal:  Negative for abdominal pain, diarrhea, nausea and vomiting.   Musculoskeletal:  Positive for arthralgias (Right hip).   Neurological:  Negative for dizziness and light-headedness.   Psychiatric/Behavioral:  Negative for agitation and confusion.    Objective:     Vital Signs (Most Recent):  Temp: 98 °F (36.7 °C) (10/22/22 1112)  Pulse: 81 (10/22/22 1112)  Resp: 18 (10/22/22 1112)  BP: 102/48 (10/22/22 1112)  SpO2: 93 % (10/22/22 1112) on room air   Vital Signs (24h Range):  Temp:  [97.7 °F (36.5 °C)-98 °F (36.7 °C)] 98 °F (36.7 °C)  Pulse:  [69-98] 81  Resp:  [13-20] 18  SpO2:  [92 %-100 %] 93 %  BP: ()/(48-61) 82/48     Weight: 109.8 kg (242 lb)  Body mass index is 34.72 kg/m².    Intake/Output Summary (Last 24 hours) at 10/22/2022 4197  Last data filed at 10/22/2022 0617  Gross per 24 hour   Intake 2150 ml   Output 300 ml   Net 1850 ml      Physical Exam  Vitals and nursing note reviewed.   Constitutional:       General: He is awake. He is not in acute distress.     Appearance: Normal appearance. He is well-developed. He is obese. He  is not ill-appearing.   Eyes:      General: No scleral icterus.  Cardiovascular:      Rate and Rhythm: Normal rate and regular rhythm.      Heart sounds: Normal heart sounds. No murmur heard.    No friction rub. No gallop.   Pulmonary:      Effort: Pulmonary effort is normal. No respiratory distress.      Breath sounds: Normal breath sounds. No wheezing or rales.   Abdominal:      General: Abdomen is flat. Bowel sounds are normal. There is no distension.      Palpations: Abdomen is soft.      Tenderness: There is no abdominal tenderness. There is no guarding.   Musculoskeletal:      Right lower leg: No edema.      Left lower leg: No edema.   Skin:     Findings: No erythema or rash.      Comments: Surgical dressing noted on right hip. Dressing is clean and dry and intact.   Neurological:      Mental Status: He is alert and oriented to person, place, and time.   Psychiatric:         Mood and Affect: Mood normal.         Behavior: Behavior normal. Behavior is cooperative.         Thought Content: Thought content normal.         Judgment: Judgment normal.       Significant Labs: CBC:   Recent Labs   Lab 10/20/22  1627 10/21/22  0305 10/22/22  0351   WBC 13.36* 9.74 10.40   HGB 14.6 13.8* 11.5*   HCT 44.3 41.6 35.1*    197 172     CMP:   Recent Labs   Lab 10/20/22  1627 10/21/22  0305 10/22/22  0351   * 139 134*   K 4.5 4.1 4.5    106 102   CO2 22* 25 21*   * 84 162*   BUN 30* 30* 41*   CREATININE 1.7* 1.5* 2.7*   CALCIUM 9.7 9.1 8.4*   PROT 6.1 5.6* 5.0*   ALBUMIN 3.4* 3.1* 2.6*   BILITOT 0.8 0.9 0.4   ALKPHOS 87 75 64   AST 15 15 19   ALT 16 14 9*   ANIONGAP 9 8 11     Magnesium:   Recent Labs   Lab 10/20/22  1836 10/21/22  0305 10/22/22  0351   MG 1.5* 1.6 1.7     Urine Culture, Routine   Date Value Ref Range Status   10/20/2022 (A)  Preliminary    GRAM NEGATIVE MELO  >100,000 cfu/ml  Identification and susceptibility pending         Significant Imaging: I have reviewed all pertinent imaging  results/findings within the past 24 hours.

## 2022-10-22 NOTE — PLAN OF CARE
Problem: Occupational Therapy  Goal: Occupational Therapy Goal  Description: Goals to be met by: 11/5/22     Patient will increase functional independence with ADLs by performing:    UE Dressing with Modified [place]um[place].  LE Dressing with Modified [place]ummer.  Grooming while standing at sink with Modified [place]um[place].  Toileting from bedside commode with Modified Plummer for hygiene and clothing management.   Toilet transfer to bedside commode with Modified Plummer.    Outcome: Ongoing, Progressing

## 2022-10-22 NOTE — ASSESSMENT & PLAN NOTE
· Controlled. Patient is at baseline renal function at present.   · Need to avoid any nephrotoxic agents such as NSAIDS, IV contrast dye or aminoglycosides unless necessary while patient is hospitalized.  · Renally adjust medications based on patient's creatinine clearance.   · Strict I+Os.   · Monitor daily BMP to monitor renal function.

## 2022-10-22 NOTE — ASSESSMENT & PLAN NOTE
Jani Cha is a 77 y.o. male with right femoral neck fracture s/p R DARNELL on 10/21/22 by Dr. Paulino. Doing well.    Pain control: multimodal, avoid nephrotoxic meds given CKD  PT/OT: WBAT RLE, posterior hip precautions  DVT PPx: Eliquis 2.5 BID, SCDs at all times when not ambulating  Abx: postop Ancef. Rocephin for UTI  Labs: Hb 11.5  Drain: none   Alejandre: leave for 3 days postop    Dispo: f/u PT recs
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
## 2022-10-22 NOTE — ASSESSMENT & PLAN NOTE
Good control in the hospital in past 24 hours. Patient on Trulicity, Metformin and Glipizide at home to treat. Hold these meds in hospital.     Blood Sugars (AccuCheck):  Recent Labs     10/20/22  1922 10/21/22  0902 10/21/22  1738   POCTGLUCOSE 98 97 162*        · Plan is to continue to monitor POCT glucose 4 times a day with each meal and at bedtime and cover with Novolog low dose sliding scale insulin.   · Plan is to continue 2000 calorie diabetic diet in the hospital.   · Target pre-meal glucose goal is <140 with all random glucoses <180 in non-critically ill patient.

## 2022-10-22 NOTE — SUBJECTIVE & OBJECTIVE
"Principal Problem:Closed displaced fracture of right femoral neck    Principal Orthopedic Problem: s/p R DARNELL on 10/21 by Dr. Paulino    Interval History: Patient seen and examined at bedside. NEY. Patient doing well. Pain well controlled. Anticipating PT today.      Review of patient's allergies indicates:   Allergen Reactions    Penicillins Hives, Itching and Rash    Shellfish containing products        Current Facility-Administered Medications   Medication    acetaminophen tablet 1,000 mg    apixaban tablet 2.5 mg    aspirin chewable tablet 81 mg    atorvastatin tablet 80 mg    bisacodyL suppository 10 mg    cefTRIAXone (ROCEPHIN) 1 g/50 mL D5W IVPB    dextrose 10% bolus 125 mL    dextrose 10% bolus 250 mL    glucagon (human recombinant) injection 1 mg    glucose chewable tablet 16 g    glucose chewable tablet 24 g    insulin aspart U-100 pen 1-10 Units    losartan-hydrochlorothiazide 100-12.5 mg per tablet 1 tablet    melatonin tablet 6 mg    methocarbamoL tablet 500 mg    ondansetron injection 4 mg    polyethylene glycol packet 17 g    ROPIvacaine (PF) 2 mg/ml (0.2%) solution    senna-docusate 8.6-50 mg per tablet 1 tablet    sodium chloride 0.9% flush 10 mL     Objective:     Vital Signs (Most Recent):  Temp: 97.7 °F (36.5 °C) (10/21/22 2015)  Pulse: 77 (10/22/22 0313)  Resp: 20 (10/21/22 2015)  BP: (!) 91/56 (10/21/22 2015)  SpO2: 97 % (10/21/22 2015)   Vital Signs (24h Range):  Temp:  [97.7 °F (36.5 °C)-98.1 °F (36.7 °C)] 97.7 °F (36.5 °C)  Pulse:  [68-88] 77  Resp:  [13-20] 20  SpO2:  [92 %-100 %] 97 %  BP: ()/(50-72) 91/56     Weight: 110.2 kg (242 lb 15.2 oz)  Height: 5' 10" (177.8 cm)  Body mass index is 34.86 kg/m².      Intake/Output Summary (Last 24 hours) at 10/22/2022 0711  Last data filed at 10/22/2022 0617  Gross per 24 hour   Intake 2850 ml   Output 875 ml   Net 1975 ml       Ortho/SPM Exam  AAOx4  NAD  Reg rate  No increased WOB    RLE:  Dressing c/d/i  SILT T/SP/DP/[place]/Sa  Motor intact " T/SP/DP  WWP extremities  FCDs in place and functioning      Significant Labs: CBC:   Recent Labs   Lab 10/20/22  1627 10/21/22  0305 10/22/22  0351   WBC 13.36* 9.74 10.40   HGB 14.6 13.8* 11.5*   HCT 44.3 41.6 35.1*    197 172     CMP:   Recent Labs   Lab 10/20/22  1627 10/21/22  0305 10/22/22  0351   * 139 134*   K 4.5 4.1 4.5    106 102   CO2 22* 25 21*   * 84 162*   BUN 30* 30* 41*   CREATININE 1.7* 1.5* 2.7*   CALCIUM 9.7 9.1 8.4*   PROT 6.1 5.6* 5.0*   ALBUMIN 3.4* 3.1* 2.6*   BILITOT 0.8 0.9 0.4   ALKPHOS 87 75 64   AST 15 15 19   ALT 16 14 9*   ANIONGAP 9 8 11     Urine Culture:   Recent Labs   Lab 10/20/22  2322   LABURIN GRAM NEGATIVE MELO  >100,000 cfu/ml  Identification and susceptibility pending  *     Urine Studies:   Recent Labs   Lab 10/20/22  2322   COLORU Yellow   APPEARANCEUA Clear   PHUR 5.0   SPECGRAV 1.020   PROTEINUA Trace*   GLUCUA Negative   KETONESU Trace*   BILIRUBINUA Negative   OCCULTUA Negative   NITRITE Negative   LEUKOCYTESUR 2+*   RBCUA 1   WBCUA 37*   BACTERIA Occasional   SQUAMEPITHEL 1     All pertinent labs within the past 24 hours have been reviewed.    Significant Imaging: I have reviewed all pertinent imaging results/findings.

## 2022-10-22 NOTE — ASSESSMENT & PLAN NOTE
- Chronic and controlled. Patient asymptomatic. Patient with previous CABG and cardiac stenting. Last stent>1yr ago currently on ASA monotherapy and will continue in hospital   - Continue home Lipitor to treat CAD.

## 2022-10-22 NOTE — PROGRESS NOTES
[facility]  Orthopedics  Progress Note    Patient Name: Jani Cha  MRN: [id number]  Admission Date: 10/20/2022  Hospital Length of Stay: 2 days  Attending Provider: Trudy Cross MD  Primary Care Provider: Laila Bains MD  Follow-up For: Procedure(s) (LRB):  ARTHROPLASTY, HIP, RIGHT (Right)    Post-Operative Day: 1 Day Post-Op  Subjective:     Principal Problem:Closed displaced fracture of right femoral neck    Principal Orthopedic Problem: s/p R DARNELL on 10/21 by Dr. Paulino    Interval History: Patient seen and examined at bedside. NAEON. Patient doing well. Pain well controlled. Anticipating PT today.      Review of patient's allergies indicates:   Allergen Reactions    Penicillins Hives, Itching and Rash    Shellfish containing products        Current Facility-Administered Medications   Medication    acetaminophen tablet 1,000 mg    apixaban tablet 2.5 mg    aspirin chewable tablet 81 mg    atorvastatin tablet 80 mg    bisacodyL suppository 10 mg    cefTRIAXone (ROCEPHIN) 1 g/50 mL D5W IVPB    dextrose 10% bolus 125 mL    dextrose 10% bolus 250 mL    glucagon (human recombinant) injection 1 mg    glucose chewable tablet 16 g    glucose chewable tablet 24 g    insulin aspart U-100 pen 1-10 Units    losartan-hydrochlorothiazide 100-12.5 mg per tablet 1 tablet    melatonin tablet 6 mg    methocarbamoL tablet 500 mg    ondansetron injection 4 mg    polyethylene glycol packet 17 g    ROPIvacaine (PF) 2 mg/ml (0.2%) solution    senna-docusate 8.6-50 mg per tablet 1 tablet    sodium chloride 0.9% flush 10 mL     Objective:     Vital Signs (Most Recent):  Temp: 97.7 °F (36.5 °C) (10/21/22 2015)  Pulse: 77 (10/22/22 0313)  Resp: 20 (10/21/22 2015)  BP: (!) 91/56 (10/21/22 2015)  SpO2: 97 % (10/21/22 2015)   Vital Signs (24h Range):  Temp:  [97.7 °F (36.5 °C)-98.1 °F (36.7 °C)] 97.7 °F (36.5 °C)  Pulse:  [68-88] 77  Resp:  [13-20] 20  SpO2:  [92 %-100 %] 97 %  BP: ()/(50-72)  "91/56     Weight: 110.2 kg (242 lb 15.2 oz)  Height: 5' 10" (177.8 cm)  Body mass index is 34.86 kg/m².      Intake/Output Summary (Last 24 hours) at 10/22/2022 0711  Last data filed at 10/22/2022 0617  Gross per 24 hour   Intake 2850 ml   Output 875 ml   Net 1975 ml       Ortho/SPM Exam  AAOx4  NAD  Reg rate  No increased WOB    RLE:  Dressing c/d/i  SILT T/SP/DP/[place]/Sa  Motor intact T/SP/DP  WWP extremities  FCDs in place and functioning      Significant Labs: CBC:   Recent Labs   Lab 10/20/22  1627 10/21/22  0305 10/22/22  0351   WBC 13.36* 9.74 10.40   HGB 14.6 13.8* 11.5*   HCT 44.3 41.6 35.1*    197 172     CMP:   Recent Labs   Lab 10/20/22  1627 10/21/22  0305 10/22/22  0351   * 139 134*   K 4.5 4.1 4.5    106 102   CO2 22* 25 21*   * 84 162*   BUN 30* 30* 41*   CREATININE 1.7* 1.5* 2.7*   CALCIUM 9.7 9.1 8.4*   PROT 6.1 5.6* 5.0*   ALBUMIN 3.4* 3.1* 2.6*   BILITOT 0.8 0.9 0.4   ALKPHOS 87 75 64   AST 15 15 19   ALT 16 14 9*   ANIONGAP 9 8 11     Urine Culture:   Recent Labs   Lab 10/20/22  2322   LABURIN GRAM NEGATIVE MELO  >100,000 cfu/ml  Identification and susceptibility pending  *     Urine Studies:   Recent Labs   Lab 10/20/22  2322   COLORU Yellow   APPEARANCEUA Clear   PHUR 5.0   SPECGRAV 1.020   PROTEINUA Trace*   GLUCUA Negative   KETONESU Trace*   BILIRUBINUA Negative   OCCULTUA Negative   NITRITE Negative   LEUKOCYTESUR 2+*   RBCUA 1   WBCUA 37*   BACTERIA Occasional   SQUAMEPITHEL 1     All pertinent labs within the past 24 hours have been reviewed.    Significant Imaging: I have reviewed all pertinent imaging results/findings.    Assessment/Plan:     * Closed displaced fracture of right femoral neck s/p total hip arthroplasty on 10/21/2022  Jani Cha is a 77 y.o. male with right femoral neck fracture s/p R DARENLL on 10/21/22 by Dr. Paulino. Doing well.    Pain control: multimodal, avoid nephrotoxic meds given CKD  PT/OT: WBAT RLE, posterior hip precautions  DVT PPx: " Eliquis 2.5 BID, SCDs at all times when not ambulating  Abx: postop Ancef. Rocephin for UTI  Labs: Hb 11.5  Drain: none   Alejandre: leave for 3 days postop    Dispo: f/u PT recs          Garett Baum MD  Orthopedics  [facility]

## 2022-10-22 NOTE — ASSESSMENT & PLAN NOTE
Present on admit. Admit U/A with 37 WBC and occasional bacteria. Patient started on IV Ceftriaxone 1 gram daily to treat. Prelim urine culture with > 100,000 organisms with gram negative senia. Continue Ceftriaxone pending final urine culture results and sensitivities.

## 2022-10-22 NOTE — ASSESSMENT & PLAN NOTE
· Patient's creatinine's has increased above baseline renal function to 2.7 on 10/22 consistent with acute kidney injury as up from baseline creatinine of 1.5-1.7.   · Suspect related to difficult Alejandre placement and hypovolemia.   · Patient to be bolused 1 liter of NS then maintenance fluid of NS at 150 cc/hr.   · Patient needs strict I+Os to closely monitor urine output.   · Avoid any nephrotoxic agents such as NSAIDS, IV contrast or aminoglycosides that could worsen renal function.  · Renally adjust all medications.   · Monitor daily BMP to follow renal function.   · Renal ultrasound from 10/22 unremarkable with no hydronephrosis.

## 2022-10-22 NOTE — PROGRESS NOTES
[facility] Medicine  Progress Note    Patient Name: aJni Cha  MRN: [id number]  Patient Class: IP- Inpatient   Admission Date: 10/20/2022  Length of Stay: 2 days  Attending Physician: Trudy Cross MD  Primary Care Provider: Laila Bains MD        Subjective:     Principal Problem:Closed displaced fracture of right femoral neck        HPI:  77-year-old male with history of hypertension, hyperlipidemia, CAD, diabetes, CKD presents to the ED complaining of right hip pain after fall this afternoon.  He was walking in his driveway when his knee gave out causing him to fall landing onto his right hip.  He denies any head trauma or loss of consciousness.  He is having 8/10 pain to the right hip and is unable to move the leg or bear weight.  He denies fever, chills, chest pain, shortness of breath, abdominal pain, numbness, paresthesias, confusion.    In the ED patient afebrile and hemodynamically stable saturating well on room air. Xray imaging with Acute impacted right subcapital femoral fracture. Orthopedic surgery consulted and patient admitted to  for further evaluation and management.      Overview/Hospital Course:  Patient admitted to [facility] Medicine Team H: Hip Fracture team and started on Hip Fracture Pathway with Orthopedic surgery consult for hip fracture. Patient was seen and evaluated by Orthopedic surgery who recommended operative repair of hip fracture. Patient was medically optimized prior to surgery and was taken to OR after optimization on 10/21/2022. Patient underwent right hip total hip arthroplasty by Dr. Isidro Paulino. Post-op patient WBAT with posterior hip precautions to the right lower extremity as per Orthopedics recommendation. Patient placed on Apixiban 2.5 mg po BID and MATTHEW/SCD's for DVT prophylaxis post-op and will need for total of 30 days. Perineural pain catheter placed by Anesthesia Pain Service with continuous infusion of Ropivacaine to help  with pain control post-op and Anesthesia Pain Service managing while patient in the hospital. Patient placed on multimodal pain management post-op with Tylenol 1000 mg po every 6 hours and Robaxin 500 mg po 3 times daily post-op and will continue. PT/OT consulted post-op. Patient noted to have increased Creatinine to 2.7 on 10/22 from 1.5 on 10/21. Patient ha very difficult Alejandre placement on admit and eventually Urology had to place in OR. Plan to keep Alejandre in place for now as per Urology. Patient draining yellow urine in Alejandre bag on 10/22. Patient also noted to have SBP in 90's likely also contributing to rise in Creatinine. Home Losartan/HCTZ stopped on 10/22 and patient bolused 1 liter of NS.       Interval History: Patient noted to have increased Creatinine to 2.7 on 10/22 from 1.5 on 10/21. Patient had very difficult Alejandre placement on admit and eventually Urology had to place in OR. Plan to keep Alejandre in place for now as per Urology. Patient draining yellow urine in Alejandre bag on this am. Patient also noted to have SBP in 80's so likely also contributing to rise in Creatinine. Home Losartan/HCTZ stopped this am and patient bolused 1 liter of NS for hypotension. Patient asymptomatic and was actually sitting up in chair when I entered room. Patient denied any dizziness or lightheadedness. Patient reports 5/10 pain to right hip area. Patient awake and alert and oriented x 4.     Review of Systems   Constitutional:  Negative for fever.   Respiratory:  Negative for cough and shortness of breath.    Cardiovascular:  Negative for chest pain and leg swelling.   Gastrointestinal:  Negative for abdominal pain, diarrhea, nausea and vomiting.   Musculoskeletal:  Positive for arthralgias (Right hip).   Neurological:  Negative for dizziness and light-headedness.   Psychiatric/Behavioral:  Negative for agitation and confusion.    Objective:     Vital Signs (Most Recent):  Temp: 98 °F (36.7 °C) (10/22/22 1112)  Pulse: 81  (10/22/22 1112)  Resp: 18 (10/22/22 1112)  BP: 102/48 (10/22/22 1112)  SpO2: 93 % (10/22/22 1112) on room air   Vital Signs (24h Range):  Temp:  [97.7 °F (36.5 °C)-98 °F (36.7 °C)] 98 °F (36.7 °C)  Pulse:  [69-98] 81  Resp:  [13-20] 18  SpO2:  [92 %-100 %] 93 %  BP: ()/(48-61) 82/48     Weight: 109.8 kg (242 lb)  Body mass index is 34.72 kg/m².    Intake/Output Summary (Last 24 hours) at 10/22/2022 1457  Last data filed at 10/22/2022 0617  Gross per 24 hour   Intake 2150 ml   Output 300 ml   Net 1850 ml      Physical Exam  Vitals and nursing note reviewed.   Constitutional:       General: He is awake. He is not in acute distress.     Appearance: Normal appearance. He is well-developed. He is obese. He is not ill-appearing.   Eyes:      General: No scleral icterus.  Cardiovascular:      Rate and Rhythm: Normal rate and regular rhythm.      Heart sounds: Normal heart sounds. No murmur heard.    No friction rub. No gallop.   Pulmonary:      Effort: Pulmonary effort is normal. No respiratory distress.      Breath sounds: Normal breath sounds. No wheezing or rales.   Abdominal:      General: Abdomen is flat. Bowel sounds are normal. There is no distension.      Palpations: Abdomen is soft.      Tenderness: There is no abdominal tenderness. There is no guarding.   Musculoskeletal:      Right lower leg: No edema.      Left lower leg: No edema.   Skin:     Findings: No erythema or rash.      Comments: Surgical dressing noted on right hip. Dressing is clean and dry and intact.   Neurological:      Mental Status: He is alert and oriented to person, place, and time.   Psychiatric:         Mood and Affect: Mood normal.         Behavior: Behavior normal. Behavior is cooperative.         Thought Content: Thought content normal.         Judgment: Judgment normal.       Significant Labs: CBC:   Recent Labs   Lab 10/20/22  1627 10/21/22  0305 10/22/22  0351   WBC 13.36* 9.74 10.40   HGB 14.6 13.8* 11.5*   HCT 44.3 41.6 35.1*     197 172     CMP:   Recent Labs   Lab 10/20/22  1627 10/21/22  0305 10/22/22  0351   * 139 134*   K 4.5 4.1 4.5    106 102   CO2 22* 25 21*   * 84 162*   BUN 30* 30* 41*   CREATININE 1.7* 1.5* 2.7*   CALCIUM 9.7 9.1 8.4*   PROT 6.1 5.6* 5.0*   ALBUMIN 3.4* 3.1* 2.6*   BILITOT 0.8 0.9 0.4   ALKPHOS 87 75 64   AST 15 15 19   ALT 16 14 9*   ANIONGAP 9 8 11     Magnesium:   Recent Labs   Lab 10/20/22  1836 10/21/22  0305 10/22/22  0351   MG 1.5* 1.6 1.7     Urine Culture, Routine   Date Value Ref Range Status   10/20/2022 (A)  Preliminary    GRAM NEGATIVE MELO  >100,000 cfu/ml  Identification and susceptibility pending         Significant Imaging: I have reviewed all pertinent imaging results/findings within the past 24 hours.      Assessment/Plan:      * Closed displaced fracture of right femoral neck s/p total hip arthroplasty on 10/21/2022  · Patient underwent right total hip replacement for femoral neck fracture by Ortho on 10/21/2022.   · Patient reports moderate pain in right hip.   · Plan is to start PT/OT for gait training and strengthening and restoration of ADL's. Patient is FWB/WBAT: right lower extremity, posterior hip precautions as per Orthopedic recommendations.   · Plan is to start Apixiban 2.5 mg po BID and will need a total of 30 days post-op after hip fracture surgery and MATTHEW/SCDs for DVT prophylaxis.   · Orthopedics is following and managing hip fracture and surgical site.   · Perineural pain catheter in place with continuous Ropivacaine infusion for pain control and being managed by Anesthesia Pain Service.   · Patient on multimodal pain management post-op with Tylenol 1000 mg po every 6 hours and Robaxin 500 mg po 4 times daily post-op and will continue.  · Keep Alejandre in place post-op as per Urology.     Acute renal failure superimposed on stage 3a chronic kidney disease  · Patient's creatinine's has increased above baseline renal function to 2.7 on 10/22 consistent with  acute kidney injury as up from baseline creatinine of 1.5-1.7.   · Suspect related to difficult Alejandre placement and hypovolemia.   · Patient to be bolused 1 liter of NS then maintenance fluid of NS at 150 cc/hr.   · Patient needs strict I+Os to closely monitor urine output.   · Avoid any nephrotoxic agents such as NSAIDS, IV contrast or aminoglycosides that could worsen renal function.  · Renally adjust all medications.   · Monitor daily BMP to follow renal function.   · Renal ultrasound from 10/22 unremarkable with no hydronephrosis.     Hematuria  · Urology consulted in OR after noting gross hematuria in Alejandre bag. Patient had multiple prior Alejandre attempts during this admit by nursing and was a difficult Alejandre placement. Patient with known BPH. Alejandre replaced by Urology in OR. Hematuria resolved on am of 10/22 and Urology suspects Alejandre trauma as cause of hematuria and recommends to keep current Alejandre in for 3-5 days.   · Appreciate Urology assistance on the case.   · Renal ultrasound on 10/22 showed no hydronephrosis.   · Urology recommends follow up with Dr. Dubon for discussion of BPH in Urology clinic.     Type 2 diabetes mellitus with hyperglycemia, without long-term current use of insulin  Good control in the hospital in past 24 hours. Patient on Trulicity, Metformin and Glipizide at home to treat. Hold these meds in hospital.     Blood Sugars (AccuCheck):    Recent Labs     10/20/22  1922 10/21/22  0902 10/21/22  1738 10/21/22  2101 10/22/22  0715 10/22/22  1109   POCTGLUCOSE 98 97 162* 241* 179* 221*        · Plan is to continue to monitor POCT glucose 4 times a day with each meal and at bedtime and cover with Novolog low dose sliding scale insulin.   · Plan is to continue 2000 calorie diabetic diet in the hospital.   · Target pre-meal glucose goal is <140 with all random glucoses <180 in non-critically ill patient.      Primary hypertension  Chronic and controlled. Patient hypotensive on 10/22 so will  discontinue home Losartan/HCTZ 100/25 mg.    Coronary artery disease involving native coronary artery of native heart without angina pectoris  - Chronic and controlled. Patient asymptomatic. Patient with previous CABG and cardiac stenting. Last stent>1yr ago currently on ASA monotherapy and will continue in hospital.  - Continue home Lipitor to treat CAD.    Acute cystitis without hematuria  Present on admit. Admit U/A with 37 WBC and occasional bacteria. Patient started on IV Ceftriaxone 1 gram daily to treat. Prelim urine culture with > 100,000 organisms with gram negative senia. Continue Ceftriaxone pending final urine culture results and sensitivities.         VTE Risk Mitigation (From admission, onward)         Ordered     apixaban tablet 2.5 mg  2 times daily         10/21/22 1542     IP VTE HIGH RISK PATIENT  Once         10/20/22 1912     Place sequential compression device  Until discontinued         10/20/22 1912                Discharge Planning   SARKIS: 10/24/2022     Code Status: Full Code   Is the patient medically ready for discharge?: No    Reason for patient still in hospital (select all that apply): Patient new problem and Patient trending condition             Trudy Cross MD  Department of Hospital Medicine   [facility] Surg

## 2022-10-22 NOTE — ASSESSMENT & PLAN NOTE
· Patient underwent right total hip replacement for femoral neck fracture by Ortho on 10/21/2022.   · Patient reports moderate pain in right hip.   · Plan is to start PT/OT for gait training and strengthening and restoration of ADL's. Patient is FWB/WBAT: right lower extremity, posterior hip precautions as per Orthopedic recommendations.   · Plan is to start Apixiban 2.5 mg po BID and will need a total of 30 days post-op after hip fracture surgery and MATTHEW/SCDs for DVT prophylaxis.   · Orthopedics is following and managing hip fracture and surgical site.   · Perineural pain catheter in place with continuous Ropivacaine infusion for pain control and being managed by Anesthesia Pain Service.   · Patient on multimodal pain management post-op with Tylenol 1000 mg po every 6 hours and Robaxin 500 mg po 4 times daily post-op and will continue.  · Keep Alejandre in place post-op as per Urology.    Detail Level: Generalized Detail Level: Detailed Detail Level: Zone Detail Level: Simple

## 2022-10-22 NOTE — PLAN OF CARE
Plan of Care:  PT evaluation completed- see note for details. Goals and POC established.     Discharge Recommendation: Rehab.  Please continue Progressive Mobility Protocol as appropriate.  Pt to be seen daily during acute hospitalization to address listed goals below.     10/22/2022    Physical Therapy Treatment Goals:  Multidisciplinary Problems       Physical Therapy Goals          Problem: Physical Therapy    Goal Priority Disciplines Outcome Goal Variances Interventions   Physical Therapy Goal     PT, PT/OT Ongoing, Progressing     Description: Goals to be met by: 22     Patient will increase functional independence with mobility by performin. Supine to sit with Contact Guard Assistance  2. Sit to supine with Contact Guard Assistance  3. Sit to stand transfer with Stand-by Assistance using RW   4. Gait  x 75 feet with Contact Guard Assistance using Rolling Walker.   5. Stand for 3 minutes with Stand-by Assistance using Rolling Walker  6. Lower extremity exercise program x20 reps per handout, with assistance as needed

## 2022-10-22 NOTE — ANESTHESIA POST-OP PAIN MANAGEMENT
Acute Pain Service Progress Note    Jani Cha is a 77 y.o. male with HTN, CAD (s/p CABG x 4), T2DM, and CKD admitted following fall from standing resulting in right subcapital femoral fracture for which he underwent right hip arthroplasty on 10/21 with Dr. Paulino.    Surgery:  ARTHROPLASTY, HIP, RIGHT (Right: Hip)    Post Op Day #: 1    Catheter type: perineural  R SIFI    Infusion type: Ropivacaine 0.2%  0.1cc/hr basal + 15cc/3hr intermittent bolus    Problem List:    Active Hospital Problems    Diagnosis  POA    *Closed displaced fracture of right femoral neck s/p total hip arthroplasty on 10/21/2022 [S72.001A]  Yes    Acute cystitis without hematuria [N30.00]  Yes    Type 2 diabetes mellitus with hyperglycemia, without long-term current use of insulin [E11.65]  Yes    Stage 3b chronic kidney disease [N18.32]  Yes    Primary hypertension [I10]  Yes    Coronary artery disease involving native coronary artery of native heart without angina pectoris [I25.10]  Yes     Chronic     s/p 4 V CABG  Cardiologist - Dr. Oliveira        Resolved Hospital Problems   No resolved problems to display.       Subjective:     General appearance of alert & oriented. Eating breakfast during exam. Appears somewhat uncomfortable when repositioning in bed.   Pain with rest: 6    Numbers   Pain with movement: 8    Numbers   Side Effects    1. Pruritis No    2. Nausea No    3. Motor Blockade No, 0=Ability to raise lower extremities off bed    4. Sedation No, 1=awake and alert    Objective:     Catheter site clean, dry, intact      Vitals   Vitals:    10/22/22 0750   BP: (!) 82/51   Pulse: 78   Resp: 16   Temp:       Meds   Current Facility-Administered Medications   Medication Dose Route Frequency Provider Last Rate Last Admin    acetaminophen tablet 1,000 mg  1,000 mg Oral Q6H Jose Antonio Palomino MD   1,000 mg at 10/22/22 0623    apixaban tablet 2.5 mg  2.5 mg Oral BID [facility] Eyad Razo MD        aspirin chewable tablet 81  mg  81 mg Oral Daily Trudy Cross MD        atorvastatin tablet 80 mg  80 mg Oral Daily Jose Antonio Palomino MD        bisacodyL suppository 10 mg  10 mg Rectal Daily PRN Jose Antonio Palomino MD        cefTRIAXone (ROCEPHIN) 1 g/50 mL D5W IVPB  1 g Intravenous Q24H Mike Smith  mL/hr at 10/22/22 0617 1 g at 10/22/22 0617    dextrose 10% bolus 125 mL  12.5 g Intravenous PRN Trudy Cross MD        dextrose 10% bolus 250 mL  25 g Intravenous PRN Trudy Cross MD        glucagon (human recombinant) injection 1 mg  1 mg Intramuscular PRN Jose Antonio Palomino MD        glucose chewable tablet 16 g  16 g Oral PRN Jose Antonio Palomino MD        glucose chewable tablet 24 g  24 g Oral PRN Jose Antonio Palomino MD        insulin aspart U-100 pen 1-10 Units  1-10 Units Subcutaneous QID (AC + HS) PRN Jose Antonio Palomino MD   2 Units at 10/21/22 2201    losartan-hydrochlorothiazide 100-12.5 mg per tablet 1 tablet  1 tablet Oral Daily Jose Antonio Palomino MD        melatonin tablet 6 mg  6 mg Oral Nightly PRN Patience Connell PA-C        methocarbamoL tablet 500 mg  500 mg Oral TID Trudy Cross MD   500 mg at 10/21/22 2053    ondansetron injection 4 mg  4 mg Intravenous Q12H PRN Jose Antonio Palomino MD        oxyCODONE immediate release tablet 5 mg  5 mg Oral Q4H PRN Garett Carmen MD        And    oxyCODONE immediate release tablet Tab 10 mg  10 mg Oral Q4H PRN Garett Carmen MD        polyethylene glycol packet 17 g  17 g Oral Daily Jose Antonio Palomino MD        ROPIvacaine (PF) 2 mg/ml (0.2%) solution  0.1 mL/hr Perineural Continuous Eliceo Monge MD 0.1 mL/hr at 10/21/22 1651 0.1 mL/hr at 10/21/22 1651    senna-docusate 8.6-50 mg per tablet 1 tablet  1 tablet Oral BID Jose Antonio Palomino MD   1 tablet at 10/21/22 2053    sodium chloride 0.9% flush 10 mL  10 mL Intravenous PRN Jose Antonio Palomino MD            Anticoagulant: apixaban 2.5mg BID, first dose at 9AM on  10/22    Assessment:     POD #1 s/p R Hip Arthroplasty w/ Dr. Paulino.   Pain control adequate, but could be improved for patient to tolerate planned therapy today    Plan:     Continue PNC - R SIFI with intermittent bolus 15cc/3hr   Continue multimodal analgesic regimen:    - Tylenol 1000mg Q6H    - Methocarbamol 500mg TID    - Add oxycodone 5mg Q4H PRN today for breakthrough pain      Case discussed with staff, Dr. Santana; final recommendations per attestation above.     Thank you for your consult and allowing us to participate in the care of this patient. We will continue to follow along. Please call the Acute Pain Service at [phone] or Anesthesia at [phone] if you have any questions or concerns.      Garett Carmen MD  Anesthesiology  Acute Pain Service - [phone]  [facility]

## 2022-10-22 NOTE — ASSESSMENT & PLAN NOTE
· Urology consulted in OR after noting gross hematuria in Alejandre bag. Patient had multiple prior Alejandre attempts during this admit by nursing and was a difficult Alejandre placement. Patient with known BPH. Alejandre replaced by Urology in OR. Hematuria resolved on am of 10/22 and Urology suspects Alejandre trauma as cause of hematuria and recommends to keep current Alejandre in for 3-5 days.   · Appreciate Urology assistance on the case.   · Renal ultrasound on 10/22 showed no hydronephrosis.   · Urology recommends follow up with Dr. Dubon for discussion of BPH in Urology clinic.

## 2022-10-22 NOTE — ASSESSMENT & PLAN NOTE
- Catheter clear yellow  - Patient with known history of BPH, gross hematuria likely traumatic spivey  - Recommend maintaining catheter for 3-5 days  - If patient is still hospitalized at this time, may attempt a voiding trila  - If he is to be discharged, recommend follow up with Dr. Dubon for discussion of BPH  - f/u GERMAN to r/o upper tract obstruction

## 2022-10-22 NOTE — ASSESSMENT & PLAN NOTE
Good control in the hospital in past 24 hours. Patient on Trulicity, Metformin and Glipizide at home to treat. Hold these meds in hospital.     Blood Sugars (AccuCheck):    Recent Labs     10/20/22  1922 10/21/22  0902 10/21/22  1738 10/21/22  2101 10/22/22  0715 10/22/22  1109   POCTGLUCOSE 98 97 162* 241* 179* 221*        · Plan is to continue to monitor POCT glucose 4 times a day with each meal and at bedtime and cover with Novolog low dose sliding scale insulin.   · Plan is to continue 2000 calorie diabetic diet in the hospital.   · Target pre-meal glucose goal is <140 with all random glucoses <180 in non-critically ill patient.

## 2022-10-22 NOTE — PROGRESS NOTES
[facility] Medicine  Progress Note    Patient Name: Jani Cha  MRN: [id number]  Patient Class: IP- Inpatient   Admission Date: 10/20/2022  Length of Stay: 1 days  Attending Physician: Trudy Cross MD  Primary Care Provider: Laila Bains MD        Subjective:     Principal Problem:Closed displaced fracture of right femoral neck        HPI:  77-year-old male with history of hypertension, hyperlipidemia, CAD, diabetes, CKD presents to the ED complaining of right hip pain after fall this afternoon.  He was walking in his driveway when his knee gave out causing him to fall landing onto his right hip.  He denies any head trauma or loss of consciousness.  He is having 8/10 pain to the right hip and is unable to move the leg or bear weight.  He denies fever, chills, chest pain, shortness of breath, abdominal pain, numbness, paresthesias, confusion.    In the ED patient afebrile and hemodynamically stable saturating well on room air. Xray imaging with Acute impacted right subcapital femoral fracture. Orthopedic surgery consulted and patient admitted to  for further evaluation and management.      Overview/Hospital Course:  Patient admitted to [facility] Medicine Team H: Hip Fracture team and started on Hip Fracture Pathway with Orthopedic surgery consult for hip fracture. Patient was seen and evaluated by Orthopedic surgery who recommended operative repair of hip fracture. Patient was medically optimized prior to surgery and was taken to OR after optimization on 10/21/2022. Patient underwent right hip total hip arthroplasty by Dr. Isidro Paulino. Post-op patient WBAT with posterior hip precautions to the right lower extremity as per Orthopedics recommendation. Patient placed on Apixiban 2.5 mg po BID and MATTHEW/SCD's for DVT prophylaxis post-op and will need for total of 30 days. Perineural pain catheter placed by Anesthesia Pain Service with continuous infusion of Ropivacaine to help  with pain control post-op and Anesthesia Pain Service managing while patient in the hospital. Patient placed on multimodal pain management post-op with Tylenol 1000 mg po every 6 hours and Robaxin 500 mg po 3 times daily post-op and will continue. PT/OT consulted post-op.      Interval History: Patient admitted overnight with right hip fracture and UTI and started on IV Ceftriaxone to treat UTI.Patient taken to OR this am and underwent right total hip replacement by Ortho and patient back in his room from surgery. Patient reports 3/10 pain to right hip. PNC in place for pain control. Discussed with patient and plan to start therapy tomorrow.    Review of Systems   Constitutional:  Negative for fever.   Respiratory:  Negative for cough and shortness of breath.    Cardiovascular:  Negative for chest pain and leg swelling.   Gastrointestinal:  Negative for abdominal pain, diarrhea, nausea and vomiting.   Musculoskeletal:  Positive for arthralgias (Right hip) and gait problem.   Neurological:  Negative for dizziness and light-headedness.   Psychiatric/Behavioral:  Negative for agitation and confusion.    Objective:     Vital Signs (Most Recent):  Temp: 97.8 °F (36.6 °C) (10/21/22 1745)  Pulse: 76 (10/21/22 1903)  Resp: 14 (10/21/22 1745)  BP: 109/54 (10/21/22 1745)  SpO2: 94 % (10/21/22 1745) on 2 liters of oxygen   Vital Signs (24h Range):  Temp:  [97.7 °F (36.5 °C)-98.7 °F (37.1 °C)] 97.8 °F (36.6 °C)  Pulse:  [67-88] 76  Resp:  [13-19] 14  SpO2:  [90 %-100 %] 94 %  BP: ()/(50-82) 99/54     Weight: 110.2 kg (242 lb 15.2 oz)  Body mass index is 34.86 kg/m².    Intake/Output Summary (Last 24 hours) at 10/21/2022 1907  Last data filed at 10/21/2022 1739  Gross per 24 hour   Intake 2550 ml   Output 1275 ml   Net 1275 ml      Physical Exam  Vitals and nursing note reviewed.   Constitutional:       General: He is awake. He is not in acute distress.     Appearance: Normal appearance. He is well-developed. He is obese.  He is not ill-appearing.   Eyes:      General: No scleral icterus.  Cardiovascular:      Rate and Rhythm: Normal rate and regular rhythm.      Heart sounds: Normal heart sounds. No murmur heard.    No friction rub. No gallop.   Pulmonary:      Effort: Pulmonary effort is normal. No respiratory distress.      Breath sounds: Normal breath sounds. No wheezing or rales.   Abdominal:      General: Abdomen is flat. Bowel sounds are normal. There is no distension.      Palpations: Abdomen is soft.      Tenderness: There is no abdominal tenderness. There is no guarding.   Musculoskeletal:      Right lower leg: No edema.      Left lower leg: No edema.   Skin:     Findings: No erythema or rash.      Comments: Surgical dressing noted on right hip. Dressing is clean and dry and intact.   Neurological:      Mental Status: He is alert and oriented to person, place, and time.   Psychiatric:         Mood and Affect: Mood normal.         Behavior: Behavior normal. Behavior is cooperative.         Thought Content: Thought content normal.         Judgment: Judgment normal.       Significant Labs: CBC:   Recent Labs   Lab 10/20/22  1627 10/21/22  0305   WBC 13.36* 9.74   HGB 14.6 13.8*   HCT 44.3 41.6    197     CMP:   Recent Labs   Lab 10/20/22  1627 10/21/22  0305   * 139   K 4.5 4.1    106   CO2 22* 25   * 84   BUN 30* 30*   CREATININE 1.7* 1.5*   CALCIUM 9.7 9.1   PROT 6.1 5.6*   ALBUMIN 3.4* 3.1*   BILITOT 0.8 0.9   ALKPHOS 87 75   AST 15 15   ALT 16 14   ANIONGAP 9 8     Urine Culture:   Recent Labs   Lab 10/20/22  2322   LABURIN GRAM NEGATIVE MELO  >100,000 cfu/ml  Identification and susceptibility pending  *       Significant Imaging: I have reviewed all pertinent imaging results/findings within the past 24 hours.      Assessment/Plan:      * Closed displaced fracture of right femoral neck s/p total hip arthroplasty on 10/21/2022  · Patient is day of surgery for surgical repair of right hip fracture.  Patient reports minimal pain in right hip. Patient went to OR today and had operative repair of hip fracture and tolerated procedure well.   · Patient will start PT/OT in the am tomorrow for gait training and strengthening and restoration of ADL's.   · Patient is FWB/WBAT: right lower extremity, posterior hip precautions as per Orthopedic recommendations post-op.   · Plan is to start Apixiban 2.5 mg po BID post-op and continue MATTHEW/SCDs for DVT prophylaxis for now.   · Perineural pain catheter in place with continuous Ropivacaine for pain control and being managed by Anesthesia Pain Service.   · Patient started on multimodal pain management post-op with Tylenol 1000 mg po every 6 hours and Robaxin 500 mg po 3 times daily post-op and will continue.  · Continue Alejandre to gravity and remove on POD #1.     Type 2 diabetes mellitus with hyperglycemia, without long-term current use of insulin  Good control in the hospital in past 24 hours. Patient on Trulicity, Metformin and Glipizide at home to treat. Hold these meds in hospital.     Blood Sugars (AccuCheck):  Recent Labs     10/20/22  1922 10/21/22  0902 10/21/22  1738   POCTGLUCOSE 98 97 162*        · Plan is to continue to monitor POCT glucose 4 times a day with each meal and at bedtime and cover with Novolog low dose sliding scale insulin.   · Plan is to continue 2000 calorie diabetic diet in the hospital.   · Target pre-meal glucose goal is <140 with all random glucoses <180 in non-critically ill patient.      Coronary artery disease involving native coronary artery of native heart without angina pectoris  - Chronic and controlled. Patient asymptomatic. Patient with previous CABG and cardiac stenting. Last stent>1yr ago currently on ASA monotherapy and will continue in hospital   - Continue home Lipitor to treat CAD.    Primary hypertension  Chronic and controlled. Continue home Losartan/HCTZ 100/25 mg 1 tablet po daily to treat.       Stage 3b chronic kidney  disease  · Controlled. Patient is at baseline renal function at present.   · Need to avoid any nephrotoxic agents such as NSAIDS, IV contrast dye or aminoglycosides unless necessary while patient is hospitalized.  · Renally adjust medications based on patient's creatinine clearance.   · Strict I+Os.   · Monitor daily BMP to monitor renal function.       Acute cystitis without hematuria  Present on admit. Admit U/A with 37 WBC and occasional bacteria. Patient started on IV Ceftriaxone 1 gram daily to treat. Prelim urine culture with > 100,000 organisms with gram negative senia. Continue Ceftriaxone pending final urine culture results and sensitivities.         VTE Risk Mitigation (From admission, onward)         Ordered     apixaban tablet 2.5 mg  2 times daily         10/21/22 1542     Place sequential compression device  Until discontinued         10/20/22 1907     IP VTE HIGH RISK PATIENT  Once         10/20/22 1912     Place sequential compression device  Until discontinued         10/20/22 1912     Place sequential compression device  Until discontinued         10/20/22 1837                Discharge Planning   SARKIS: 10/24/2022     Code Status: Full Code   Is the patient medically ready for discharge?: No    Reason for patient still in hospital (select all that apply): Patient trending condition             Trudy Cross MD  Department of Hospital Medicine   [facility] Surg
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
## 2022-10-23 PROBLEM — B96.20 E. COLI URINARY TRACT INFECTION: Status: ACTIVE | Noted: 2022-10-21

## 2022-10-23 PROBLEM — N39.0 E. COLI URINARY TRACT INFECTION: Status: ACTIVE | Noted: 2022-10-21

## 2022-10-23 LAB
ALBUMIN SERPL BCP-MCNC: 2.1 G/DL (ref 3.5–5.2)
ALP SERPL-CCNC: 56 U/L (ref 55–135)
ALT SERPL W/O P-5'-P-CCNC: <5 U/L (ref 10–44)
ANION GAP SERPL CALC-SCNC: 8 MMOL/L (ref 8–16)
AST SERPL-CCNC: 15 U/L (ref 10–40)
BASOPHILS # BLD AUTO: 0.02 K/UL (ref 0–0.2)
BASOPHILS NFR BLD: 0.2 % (ref 0–1.9)
BILIRUB SERPL-MCNC: 0.3 MG/DL (ref 0.1–1)
BUN SERPL-MCNC: 46 MG/DL (ref 8–23)
CALCIUM SERPL-MCNC: 8.3 MG/DL (ref 8.7–10.5)
CHLORIDE SERPL-SCNC: 110 MMOL/L (ref 95–110)
CO2 SERPL-SCNC: 19 MMOL/L (ref 23–29)
CREAT SERPL-MCNC: 2.5 MG/DL (ref 0.5–1.4)
DIFFERENTIAL METHOD: ABNORMAL
EOSINOPHIL # BLD AUTO: 0.2 K/UL (ref 0–0.5)
EOSINOPHIL NFR BLD: 2.7 % (ref 0–8)
ERYTHROCYTE [DISTWIDTH] IN BLOOD BY AUTOMATED COUNT: 13.3 % (ref 11.5–14.5)
EST. GFR  (NO RACE VARIABLE): 26 ML/MIN/1.73 M^2
GLUCOSE SERPL-MCNC: 110 MG/DL (ref 70–110)
HCT VFR BLD AUTO: 30.5 % (ref 40–54)
HGB BLD-MCNC: 9.7 G/DL (ref 14–18)
IMM GRANULOCYTES # BLD AUTO: 0.04 K/UL (ref 0–0.04)
IMM GRANULOCYTES NFR BLD AUTO: 0.4 % (ref 0–0.5)
LYMPHOCYTES # BLD AUTO: 1.3 K/UL (ref 1–4.8)
LYMPHOCYTES NFR BLD: 13.9 % (ref 18–48)
MAGNESIUM SERPL-MCNC: 1.8 MG/DL (ref 1.6–2.6)
MCH RBC QN AUTO: 30.5 PG (ref 27–31)
MCHC RBC AUTO-ENTMCNC: 31.8 G/DL (ref 32–36)
MCV RBC AUTO: 96 FL (ref 82–98)
MONOCYTES # BLD AUTO: 0.8 K/UL (ref 0.3–1)
MONOCYTES NFR BLD: 8.4 % (ref 4–15)
NEUTROPHILS # BLD AUTO: 6.7 K/UL (ref 1.8–7.7)
NEUTROPHILS NFR BLD: 74.4 % (ref 38–73)
NRBC BLD-RTO: 0 /100 WBC
PHOSPHATE SERPL-MCNC: 3.8 MG/DL (ref 2.7–4.5)
PLATELET # BLD AUTO: 122 K/UL (ref 150–450)
PMV BLD AUTO: 10.2 FL (ref 9.2–12.9)
POCT GLUCOSE: 117 MG/DL (ref 70–110)
POCT GLUCOSE: 178 MG/DL (ref 70–110)
POCT GLUCOSE: 258 MG/DL (ref 70–110)
POCT GLUCOSE: 323 MG/DL (ref 70–110)
POTASSIUM SERPL-SCNC: 4 MMOL/L (ref 3.5–5.1)
PROT SERPL-MCNC: 4.6 G/DL (ref 6–8.4)
RBC # BLD AUTO: 3.18 M/UL (ref 4.6–6.2)
SODIUM SERPL-SCNC: 137 MMOL/L (ref 136–145)
WBC # BLD AUTO: 9.02 K/UL (ref 3.9–12.7)

## 2022-10-23 PROCEDURE — 25000003 PHARM REV CODE 250: Performed by: INTERNAL MEDICINE

## 2022-10-23 PROCEDURE — 97530 THERAPEUTIC ACTIVITIES: CPT

## 2022-10-23 PROCEDURE — 99233 SBSQ HOSP IP/OBS HIGH 50: CPT | Mod: ,,, | Performed by: INTERNAL MEDICINE

## 2022-10-23 PROCEDURE — 80053 COMPREHEN METABOLIC PANEL: CPT | Performed by: FAMILY MEDICINE

## 2022-10-23 PROCEDURE — 25000003 PHARM REV CODE 250: Performed by: STUDENT IN AN ORGANIZED HEALTH CARE EDUCATION/TRAINING PROGRAM

## 2022-10-23 PROCEDURE — 83735 ASSAY OF MAGNESIUM: CPT | Performed by: FAMILY MEDICINE

## 2022-10-23 PROCEDURE — 63600175 PHARM REV CODE 636 W HCPCS: Performed by: STUDENT IN AN ORGANIZED HEALTH CARE EDUCATION/TRAINING PROGRAM

## 2022-10-23 PROCEDURE — 11000001 HC ACUTE MED/SURG PRIVATE ROOM

## 2022-10-23 PROCEDURE — 25000003 PHARM REV CODE 250: Performed by: FAMILY MEDICINE

## 2022-10-23 PROCEDURE — 97530 THERAPEUTIC ACTIVITIES: CPT | Mod: CQ

## 2022-10-23 PROCEDURE — A4216 STERILE WATER/SALINE, 10 ML: HCPCS | Performed by: FAMILY MEDICINE

## 2022-10-23 PROCEDURE — 63600175 PHARM REV CODE 636 W HCPCS

## 2022-10-23 PROCEDURE — 85025 COMPLETE CBC W/AUTO DIFF WBC: CPT | Performed by: FAMILY MEDICINE

## 2022-10-23 PROCEDURE — 99233 PR SUBSEQUENT HOSPITAL CARE,LEVL III: ICD-10-PCS | Mod: ,,, | Performed by: INTERNAL MEDICINE

## 2022-10-23 PROCEDURE — 84100 ASSAY OF PHOSPHORUS: CPT | Performed by: FAMILY MEDICINE

## 2022-10-23 PROCEDURE — 97110 THERAPEUTIC EXERCISES: CPT | Mod: CQ

## 2022-10-23 RX ORDER — PREGABALIN 75 MG/1
75 CAPSULE ORAL NIGHTLY
Status: DISCONTINUED | OUTPATIENT
Start: 2022-10-23 | End: 2022-10-25 | Stop reason: HOSPADM

## 2022-10-23 RX ORDER — ACETAMINOPHEN 500 MG
1000 TABLET ORAL EVERY 6 HOURS
Status: DISCONTINUED | OUTPATIENT
Start: 2022-10-23 | End: 2022-10-25 | Stop reason: HOSPADM

## 2022-10-23 RX ORDER — METHOCARBAMOL 750 MG/1
750 TABLET, FILM COATED ORAL 3 TIMES DAILY
Status: DISCONTINUED | OUTPATIENT
Start: 2022-10-23 | End: 2022-10-25 | Stop reason: HOSPADM

## 2022-10-23 RX ADMIN — METHOCARBAMOL 750 MG: 750 TABLET ORAL at 10:10

## 2022-10-23 RX ADMIN — METHOCARBAMOL 500 MG: 500 TABLET ORAL at 08:10

## 2022-10-23 RX ADMIN — SENNOSIDES AND DOCUSATE SODIUM 1 TABLET: 50; 8.6 TABLET ORAL at 08:10

## 2022-10-23 RX ADMIN — ATORVASTATIN CALCIUM 80 MG: 40 TABLET, FILM COATED ORAL at 08:10

## 2022-10-23 RX ADMIN — Medication 10 ML: at 12:10

## 2022-10-23 RX ADMIN — INSULIN ASPART 2 UNITS: 100 INJECTION, SOLUTION INTRAVENOUS; SUBCUTANEOUS at 04:10

## 2022-10-23 RX ADMIN — INSULIN ASPART 8 UNITS: 100 INJECTION, SOLUTION INTRAVENOUS; SUBCUTANEOUS at 01:10

## 2022-10-23 RX ADMIN — POLYETHYLENE GLYCOL 3350 17 G: 17 POWDER, FOR SOLUTION ORAL at 08:10

## 2022-10-23 RX ADMIN — METHOCARBAMOL 750 MG: 750 TABLET ORAL at 03:10

## 2022-10-23 RX ADMIN — SENNOSIDES AND DOCUSATE SODIUM 1 TABLET: 50; 8.6 TABLET ORAL at 10:10

## 2022-10-23 RX ADMIN — ASPIRIN 81 MG: 81 TABLET, CHEWABLE ORAL at 08:10

## 2022-10-23 RX ADMIN — APIXABAN 2.5 MG: 2.5 TABLET, FILM COATED ORAL at 08:10

## 2022-10-23 RX ADMIN — ACETAMINOPHEN 1000 MG: 500 TABLET ORAL at 05:10

## 2022-10-23 RX ADMIN — PREGABALIN 75 MG: 75 CAPSULE ORAL at 10:10

## 2022-10-23 RX ADMIN — CEFTRIAXONE 1 G: 1 INJECTION, SOLUTION INTRAVENOUS at 06:10

## 2022-10-23 RX ADMIN — ACETAMINOPHEN 1000 MG: 500 TABLET ORAL at 12:10

## 2022-10-23 RX ADMIN — OXYCODONE 5 MG: 5 TABLET ORAL at 11:10

## 2022-10-23 RX ADMIN — APIXABAN 2.5 MG: 2.5 TABLET, FILM COATED ORAL at 10:10

## 2022-10-23 RX ADMIN — ROPIVACAINE HYDROCHLORIDE 0.1 ML/HR: 2 INJECTION, SOLUTION EPIDURAL; INFILTRATION at 06:10

## 2022-10-23 RX ADMIN — OXYCODONE 5 MG: 5 TABLET ORAL at 03:10

## 2022-10-23 RX ADMIN — SODIUM CHLORIDE: 0.9 INJECTION, SOLUTION INTRAVENOUS at 01:10

## 2022-10-23 NOTE — ASSESSMENT & PLAN NOTE
Controlled off BP medication. Patient hypotensive on 10/22 discontinued home Losartan/HCTZ 100/25 mg.

## 2022-10-23 NOTE — NURSING
Ropivicain pump settings verified by charge nurse Josh.  Worked with therapy team.  Up in chair for a few hours.  2 doses pain med given throughout the shift.

## 2022-10-23 NOTE — SUBJECTIVE & OBJECTIVE
Interval History: BP improved today after fluids given yesterday and BP medication stopped yesterday. Creatinine improved from 2.7 to 2.5 today. Alejandre in place and draining clear yellow urine. Patient with good urine output. Patient reporting 7/10 pain to right hip with any movement and 5/10 pain in hip at rest. Will adjust patient's multimodal pain medications today to help with pain management. Therapy evaluated yesterday and recommending IP Rehab on discharge when medically ready so will place PM&R consult.     Review of Systems   Constitutional:  Negative for fever.   Respiratory:  Negative for cough and shortness of breath.    Cardiovascular:  Negative for chest pain and leg swelling.   Gastrointestinal:  Negative for abdominal pain, diarrhea, nausea and vomiting.   Musculoskeletal:  Positive for arthralgias (Right hip).   Neurological:  Negative for dizziness and light-headedness.   Psychiatric/Behavioral:  Negative for agitation and confusion.    Objective:     Vital Signs (Most Recent):  Temp: 97.8 °F (36.6 °C) (10/23/22 1256)  Pulse: 75 (10/23/22 1256)  Resp: 19 (10/23/22 1256)  BP: 139/65 (10/23/22 1256)  SpO2: 97 % (10/23/22 1256) on room air Vital Signs (24h Range):  Temp:  [97.5 °F (36.4 °C)-98.1 °F (36.7 °C)] 97.8 °F (36.6 °C)  Pulse:  [69-95] 75  Resp:  [16-21] 19  SpO2:  [92 %-98 %] 97 %  BP: ()/(46-65) 139/65     Weight: 109.8 kg (242 lb)  Body mass index is 34.72 kg/m².    Intake/Output Summary (Last 24 hours) at 10/23/2022 1330  Last data filed at 10/23/2022 0805  Gross per 24 hour   Intake --   Output 1100 ml   Net -1100 ml      Physical Exam  Vitals and nursing note reviewed.   Constitutional:       General: He is awake. He is not in acute distress.     Appearance: Normal appearance. He is well-developed. He is obese. He is not ill-appearing.   Eyes:      General: No scleral icterus.  Cardiovascular:      Rate and Rhythm: Normal rate and regular rhythm.      Heart sounds: Normal heart  sounds. No murmur heard.    No friction rub. No gallop.   Pulmonary:      Effort: Pulmonary effort is normal. No respiratory distress.      Breath sounds: Normal breath sounds. No wheezing or rales.   Abdominal:      General: Abdomen is flat. Bowel sounds are normal. There is no distension.      Palpations: Abdomen is soft.      Tenderness: There is no abdominal tenderness. There is no guarding.   Musculoskeletal:      Right lower leg: No edema.      Left lower leg: No edema.   Skin:     Findings: No erythema or rash.      Comments: Surgical dressing noted on right hip. Dressing is clean and dry and intact.   Neurological:      Mental Status: He is alert and oriented to person, place, and time.   Psychiatric:         Mood and Affect: Mood normal.         Behavior: Behavior normal. Behavior is cooperative.         Thought Content: Thought content normal.         Judgment: Judgment normal.       Significant Labs: CBC:   Recent Labs   Lab 10/22/22  0351 10/23/22  0608   WBC 10.40 9.02   HGB 11.5* 9.7*   HCT 35.1* 30.5*    122*     CMP:   Recent Labs   Lab 10/22/22  0351 10/23/22  0608   * 137   K 4.5 4.0    110   CO2 21* 19*   * 110   BUN 41* 46*   CREATININE 2.7* 2.5*   CALCIUM 8.4* 8.3*   PROT 5.0* 4.6*   ALBUMIN 2.6* 2.1*   BILITOT 0.4 0.3   ALKPHOS 64 56   AST 19 15   ALT 9* <5*   ANIONGAP 11 8     Magnesium:   Recent Labs   Lab 10/22/22  0351 10/23/22  0608   MG 1.7 1.8     POCT Glucose:   Recent Labs   Lab 10/22/22  1109 10/22/22  1638 10/23/22  0802   POCTGLUCOSE 221* 223* 117*     Urine Culture, Routine   Date Value Ref Range Status   10/20/2022 ESCHERICHIA COLI  >100,000 cfu/ml   (A)  Final       Significant Imaging: I have reviewed all pertinent imaging results/findings within the past 24 hours.

## 2022-10-23 NOTE — ASSESSMENT & PLAN NOTE
· Improved. Creatinine down to 2.5 on 10/23. Continue IVF's.   · Patient's creatinine's has increased above baseline renal function to 2.7 on 10/22 consistent with acute kidney injury as up from baseline creatinine of 1.5-1.7.   · Suspect related to difficult Alejandre placement and hypovolemia.   · Patient to be bolused 1 liter of NS then maintenance fluid of NS at 150 cc/hr.   · Patient needs strict I+Os to closely monitor urine output.   · Avoid any nephrotoxic agents such as NSAIDS, IV contrast or aminoglycosides that could worsen renal function.  · Renally adjust all medications.   · Monitor daily BMP to follow renal function.   · Renal ultrasound from 10/22 unremarkable with no hydronephrosis.

## 2022-10-23 NOTE — ANESTHESIA POST-OP PAIN MANAGEMENT
Acute Pain Service Progress Note    Jani Cha is a 77 y.o. male with HTN, CAD (s/p CABG x 4), T2DM, and CKD admitted following fall from standing resulting in right subcapital femoral fracture for which he underwent right hip arthroplasty on 10/21 with Dr. Paulino.    Surgery:  ARTHROPLASTY, HIP, RIGHT (Right: Hip)    Post Op Day #: 2    Catheter type: perineural  R SIFI    Infusion type: Ropivacaine 0.2%  0.1cc/hr basal + 15cc/3hr intermittent bolus    Problem List:    Active Hospital Problems    Diagnosis  POA    *Closed displaced fracture of right femoral neck s/p total hip arthroplasty on 10/21/2022 [S72.001A]  Yes    Hematuria [R31.9]  Yes    Acute cystitis without hematuria [N30.00]  Yes    Type 2 diabetes mellitus with hyperglycemia, without long-term current use of insulin [E11.65]  Yes    Acute renal failure superimposed on stage 3a chronic kidney disease [N17.9, N18.31]  Yes    Primary hypertension [I10]  Yes    Coronary artery disease involving native coronary artery of native heart without angina pectoris [I25.10]  Yes     Chronic     s/p 4 V CABG  Cardiologist - Dr. Oliveira        Resolved Hospital Problems   No resolved problems to display.       Subjective:     General appearance of alert & oriented. States pain is tolerable with current regimen.   Pain with rest: 4    Numbers   Pain with movement: 8    Numbers   Side Effects    1. Pruritis No    2. Nausea No    3. Motor Blockade No, 0=Ability to raise lower extremities off bed    4. Sedation No, 1=awake and alert    Objective:     Catheter site clean, dry, intact      Vitals   Vitals:    10/23/22 0813   BP: 136/62   Pulse: 76   Resp: 17   Temp:       Meds   Current Facility-Administered Medications   Medication Dose Route Frequency Provider Last Rate Last Admin    0.9%  NaCl infusion   Intravenous Continuous Trudy Cross  mL/hr at 10/22/22 1630 New Bag at 10/22/22 1630    acetaminophen tablet 1,000 mg  1,000 mg Oral Q6H  Jose Antonio Palomino MD   1,000 mg at 10/23/22 0518    apixaban tablet 2.5 mg  2.5 mg Oral BID Moshe Eyad Razo MD   2.5 mg at 10/23/22 0817    aspirin chewable tablet 81 mg  81 mg Oral Daily Trudy Cross MD   81 mg at 10/23/22 0817    atorvastatin tablet 80 mg  80 mg Oral Daily Jose Antonio Palomino MD   80 mg at 10/23/22 0817    bisacodyL suppository 10 mg  10 mg Rectal Daily PRN Jose Antonio Palomino MD        cefTRIAXone (ROCEPHIN) 1 g/50 mL D5W IVPB  1 g Intravenous Q24H Mike Smith MD   Stopped at 10/23/22 0657    dextrose 10% bolus 125 mL  12.5 g Intravenous PRN Trudy Cross MD        dextrose 10% bolus 250 mL  25 g Intravenous PRN Trudy Cross MD        glucagon (human recombinant) injection 1 mg  1 mg Intramuscular PRN Jose Antonio Palomino MD        glucose chewable tablet 16 g  16 g Oral PRN Jose Antonio Palomino MD        glucose chewable tablet 24 g  24 g Oral PRN Jose Antonio Palomino MD        insulin aspart U-100 pen 1-10 Units  1-10 Units Subcutaneous QID (AC + HS) PRN Jose Antonio Palomino MD   4 Units at 10/22/22 1702    melatonin tablet 6 mg  6 mg Oral Nightly PRN Patience Connell PA-C        methocarbamoL tablet 500 mg  500 mg Oral TID Trudy Cross MD   500 mg at 10/23/22 0817    ondansetron injection 4 mg  4 mg Intravenous Q12H PRN Jose Antonio Palomino MD        oxyCODONE immediate release tablet 5 mg  5 mg Oral Q4H PRN Garett Carmen MD        polyethylene glycol packet 17 g  17 g Oral Daily Jose Antonio Palomino MD   17 g at 10/23/22 0817    ROPIvacaine (PF) 2 mg/ml (0.2%) solution  0.1 mL/hr Perineural Continuous Eliceo Monge MD 0.1 mL/hr at 10/23/22 0625 0.1 mL/hr at 10/23/22 0625    senna-docusate 8.6-50 mg per tablet 1 tablet  1 tablet Oral BID Jose Antonio Palomino MD   1 tablet at 10/23/22 0817    sodium chloride 0.9% flush 10 mL  10 mL Intravenous PRN Jose Antonio Palomino MD   10 mL at 10/23/22 0022        Anticoagulant: apixaban 2.5mg BID    Assessment:     POD  #2 s/p R Hip Arthroplasty w/ Dr. Paulino.   Pain control adequate, did not require PRN opioids yesterday    Plan:     Continue PNC - R SIFI with intermittent bolus 15cc/3hr   Continue multimodal analgesic regimen:    - Tylenol 1000mg Q6H    - Methocarbamol 500mg TID    - Continue oxycodone 5mg Q4H PRN today for breakthrough pain      Case discussed with staff, Dr. Daniel; final recommendations per attestation above.     Thank you for your consult and allowing us to participate in the care of this patient. We will continue to follow along. Please call the Acute Pain Service at [phone] or Anesthesia at [phone] if you have any questions or concerns.      Garett Carmen MD  Anesthesiology  Acute Pain Service - [phone]  [facility]

## 2022-10-23 NOTE — ASSESSMENT & PLAN NOTE
· Hematuria resolved. Urology consulted in OR after noting gross hematuria in Alejandre bag. Patient had multiple prior Alejandre attempts during this admit by nursing and was a difficult Alejandre placement. Patient with known BPH. Alejandre replaced by Urology in OR. Hematuria resolved on am of 10/22 and Urology suspects Alejandre trauma as cause of hematuria and recommends to keep current Alejandre in for 3-5 days.   · Appreciate Urology assistance on the case.   · Renal ultrasound on 10/22 showed no hydronephrosis.   · Urology recommends follow up with Dr. Dubon for discussion of BPH in Urology clinic.

## 2022-10-23 NOTE — ASSESSMENT & PLAN NOTE
Good control in the hospital in past 24 hours. Patient on Trulicity, Metformin and Glipizide at home to treat. Hold these meds in hospital.     Blood Sugars (AccuCheck):    Recent Labs     10/21/22  1738 10/21/22  2101 10/22/22  0715 10/22/22  1109 10/22/22  1638 10/23/22  0802   POCTGLUCOSE 162* 241* 179* 221* 223* 117*        · Plan is to continue to monitor POCT glucose 4 times a day with each meal and at bedtime and cover with Novolog low dose sliding scale insulin.   · Plan is to continue 2000 calorie diabetic diet in the hospital.   · Target pre-meal glucose goal is <140 with all random glucoses <180 in non-critically ill patient.

## 2022-10-23 NOTE — ASSESSMENT & PLAN NOTE
Jani Cha is a 77 y.o. male with right femoral neck fracture s/p R DARNELL on 10/21/22 by Dr. Paulino. Doing well.    Pain control: multimodal, avoid nephrotoxic meds given CKD  PT/OT: WBAT RLE, posterior hip precautions  DVT PPx: Eliquis 2.5 BID, SCDs at all times when not ambulating  Abx: postop Ancef. Rocephin for UTI (pansensitive E coli)  Labs: Hb 9.7  Drain: none   Alejandre: leave for 3 days postop  CKD: per     Dispo: PT recs IPR

## 2022-10-23 NOTE — PLAN OF CARE
Problem: Infection  Goal: Absence of Infection Signs and Symptoms  Outcome: Ongoing, Progressing     Problem: Adult Inpatient Plan of Care  Goal: Plan of Care Review  Outcome: Ongoing, Progressing  Goal: Patient-Specific Goal (Individualized)  Outcome: Ongoing, Progressing  Goal: Absence of Hospital-Acquired Illness or Injury  Outcome: Ongoing, Progressing  Goal: Optimal Comfort and Wellbeing  Outcome: Ongoing, Progressing  Goal: Readiness for Transition of Care  Outcome: Ongoing, Progressing     Problem: Fall Injury Risk  Goal: Absence of Fall and Fall-Related Injury  Outcome: Ongoing, Progressing     Problem: Bleeding (Hip Fracture Medical Management)  Goal: Absence of Bleeding  Outcome: Ongoing, Progressing     Problem: Bowel Elimination Impaired (Hip Fracture Medical Management)  Goal: Effective Bowel Elimination  Outcome: Ongoing, Progressing     Problem: Cognitive Decline Risk (Hip Fracture Medical Management)  Goal: Baseline Cognitive Function Maintained  Outcome: Ongoing, Progressing     Problem: Embolism (Hip Fracture Medical Management)  Goal: Absence of Embolism  Outcome: Ongoing, Progressing     Problem: Fracture Stabilization and Management (Hip Fracture Medical Management)  Goal: Fracture Stability  Outcome: Ongoing, Progressing     Problem: Functional Ability Impaired (Hip Fracture Medical Management)  Goal: Optimal Functional Performance  Outcome: Ongoing, Progressing     Problem: Pain (Hip Fracture Medical Management)  Goal: Acceptable Pain Level  Outcome: Ongoing, Progressing     Problem: Urinary Elimination Impaired (Hip Fracture Medical Management)  Goal: Effective Urinary Elimination  Outcome: Ongoing, Progressing     Problem: Bleeding (Surgery Nonspecified)  Goal: Absence of Bleeding  Outcome: Ongoing, Progressing     Problem: Bowel Motility Impaired (Surgery Nonspecified)  Goal: Effective Bowel Elimination  Outcome: Ongoing, Progressing     Problem: Fluid and Electrolyte Imbalance (Surgery  Nonspecified)  Goal: Fluid and Electrolyte Balance  Outcome: Ongoing, Progressing       Reviewed plan of care with emphasis on fall prevention and pain management.  Patient verbalized understanding and agreement with plan of care.

## 2022-10-23 NOTE — ASSESSMENT & PLAN NOTE
· Final urine culture resulted on 10/23 with pan sensitive >100,000 organisms E. Coli. Will continue IV Ceftriaxone to treat.   · Present on admit. Admit U/A with 37 WBC and occasional bacteria. Patient started on IV Ceftriaxone 1 gram daily to treat. Prelim urine culture with > 100,000 organisms with gram negative senia. Continue Ceftriaxone pending final urine culture results and sensitivities.

## 2022-10-23 NOTE — PT/OT/SLP PROGRESS
Occupational Therapy   Treatment    Name: Jani Cha  MRN: [id number]  Admitting Diagnosis:  Closed displaced fracture of right femoral neck  2 Days Post-Op    Recommendations:     Discharge Recommendations: rehabilitation facility  Discharge Equipment Recommendations:  hip kit, walker, rolling, bedside commode  Barriers to discharge:  Decreased caregiver support    Assessment:     Jani Cha is a 77 y.o. male with a medical diagnosis of Closed displaced fracture of right femoral neck.  He presents with closed displaced fracture of right femoral neck . Performance deficits affecting function are weakness, impaired endurance, impaired self care skills, impaired functional mobility, gait instability, impaired balance, decreased coordination, decreased lower extremity function, pain, impaired coordination, orthopedic precautions.     Rehab Prognosis:  Good; patient would benefit from acute skilled OT services to address these deficits and reach maximum level of function.       Plan:     Patient to be seen daily to address the above listed problems via self-care/home management, therapeutic activities, therapeutic exercises  Plan of Care Expires: 11/21/22  Plan of Care Reviewed with: patient    Subjective     Pain/Comfort:  Pain Rating 1: 3/10  Location - Side 1: Right  Location - Orientation 1: generalized  Location 1: hip  Pain Addressed 1: Reposition, Distraction, Cessation of Activity  Pain Rating Post-Intervention 1: 9/10    Objective:     Communicated with: RN prior to session.  Patient found up in chair with spivey catheter, telemetry, oxygen, peripheral IV, perineural catheter upon OT entry to room.    General Precautions: Standard, fall   Orthopedic Precautions:RLE weight bearing as tolerated, RLE posterior precautions   Braces: N/A  Respiratory Status: Nasal cannula, flow 1.5 L/min     Occupational Performance:     Bed Mobility:    Patient completed Scooting/Bridging with contact guard  assistance  Patient completed Sit to Supine with minimum assistance for leg management    Functional Mobility/Transfers:  Patient completed Sit <> Stand Transfer with minimum assistance  with  rolling walker   Patient completed Bed <> Chair Transfer using Step Transfer technique with contact guard assistance and minimum assistance with rolling walker  Functional Mobility: static sitting balance in preparation for ADLs with SBA for balance      [facility] 6 Click ADL: 15    Treatment & Education:  Pt educated on OT POC  Pt educated on using call bell to request assistance for fxnl ambulation  All questions within OT scope of practice answered to satisfaction  Pt left with all lines intact and all needs met         Patient left HOB elevated with all lines intact, call button in reach, and RN notified    GOALS:   Multidisciplinary Problems       Occupational Therapy Goals          Problem: Occupational Therapy    Goal Priority Disciplines Outcome Interventions   Occupational Therapy Goal     OT, PT/OT Ongoing, Progressing    Description: Goals to be met by: 11/5/22     Patient will increase functional independence with ADLs by performing:    UE Dressing with Modified [place].  LE Dressing with Modified [place].  Grooming while standing at sink with Modified [place].  Toileting from bedside commode with Modified [place] for hygiene and clothing management.   Toilet transfer to bedside commode with Modified [place].                         Time Tracking:     OT Date of Treatment: 10/23/22  OT Start Time: 1450  OT Stop Time: 1513  OT Total Time (min): 23 min    Billable Minutes:Therapeutic Activity 23    OT/CORIN: OT          10/23/2022

## 2022-10-23 NOTE — SUBJECTIVE & OBJECTIVE
"Principal Problem:Closed displaced fracture of right femoral neck    Principal Orthopedic Problem: s/p R DARNELL on 10/21 by Dr. Paulino    Interval History: Patient seen and examined at bedside. NEY. Patient doing well. Pain fairly well controlled. Took a couple of steps with PT.      Review of patient's allergies indicates:   Allergen Reactions    Penicillins Hives, Itching and Rash    Shellfish containing products        Current Facility-Administered Medications   Medication    0.9%  NaCl infusion    acetaminophen tablet 1,000 mg    apixaban tablet 2.5 mg    aspirin chewable tablet 81 mg    atorvastatin tablet 80 mg    bisacodyL suppository 10 mg    cefTRIAXone (ROCEPHIN) 1 g/50 mL D5W IVPB    dextrose 10% bolus 125 mL    dextrose 10% bolus 250 mL    glucagon (human recombinant) injection 1 mg    glucose chewable tablet 16 g    glucose chewable tablet 24 g    insulin aspart U-100 pen 1-10 Units    melatonin tablet 6 mg    methocarbamoL tablet 500 mg    ondansetron injection 4 mg    oxyCODONE immediate release tablet 5 mg    polyethylene glycol packet 17 g    ROPIvacaine (PF) 2 mg/ml (0.2%) solution    senna-docusate 8.6-50 mg per tablet 1 tablet    sodium chloride 0.9% flush 10 mL     Objective:     Vital Signs (Most Recent):  Temp: 98.1 °F (36.7 °C) (10/23/22 0021)  Pulse: 69 (10/23/22 0302)  Resp: 16 (10/23/22 0021)  BP: (!) 129/59 (10/23/22 0021)  SpO2: 97 % (10/23/22 0021)   Vital Signs (24h Range):  Temp:  [97.5 °F (36.4 °C)-98.1 °F (36.7 °C)] 98.1 °F (36.7 °C)  Pulse:  [69-98] 69  Resp:  [16-21] 16  SpO2:  [92 %-97 %] 97 %  BP: ()/(46-59) 129/59     Weight: 109.8 kg (242 lb)  Height: 5' 10" (177.8 cm)  Body mass index is 34.72 kg/m².    No intake or output data in the 24 hours ending 10/23/22 0711      Ortho/SPM Exam  AAOx4  NAD  Reg rate  No increased WOB    RLE:  Dressing c/d/i  SILT T/SP/DP/[place]/Sa  Motor intact T/SP/DP  WWP extremities  FCDs in place and functioning      Significant Labs: CBC: "   Recent Labs   Lab 10/22/22  0351 10/23/22  0608   WBC 10.40 9.02   HGB 11.5* 9.7*   HCT 35.1* 30.5*    122*       CMP:   Recent Labs   Lab 10/22/22  0351 10/23/22  0608   * 137   K 4.5 4.0    110   CO2 21* 19*   * 110   BUN 41* 46*   CREATININE 2.7* 2.5*   CALCIUM 8.4* 8.3*   PROT 5.0* 4.6*   ALBUMIN 2.6* 2.1*   BILITOT 0.4 0.3   ALKPHOS 64 56   AST 19 15   ALT 9* <5*   ANIONGAP 11 8       Urine Culture:   No results for input(s): LABURIN in the last 48 hours.    Urine Studies:   No results for input(s): COLORU, APPEARANCEUA, PHUR, SPECGRAV, PROTEINUA, GLUCUA, KETONESU, BILIRUBINUA, OCCULTUA, NITRITE, UROBILINOGEN, LEUKOCYTESUR, RBCUA, WBCUA, BACTERIA, SQUAMEPITHEL, HYALINECASTS in the last 48 hours.    Invalid input(s): LA    All pertinent labs within the past 24 hours have been reviewed.    Significant Imaging: I have reviewed all pertinent imaging results/findings.

## 2022-10-23 NOTE — PROGRESS NOTES
[facility]  Orthopedics  Progress Note    Patient Name: Jani Cha  MRN: [id number]  Admission Date: 10/20/2022  Hospital Length of Stay: 3 days  Attending Provider: Trudy Cross MD  Primary Care Provider: Laila Bains MD  Follow-up For: Procedure(s) (LRB):  ARTHROPLASTY, HIP, RIGHT (Right)    Post-Operative Day: 2 Days Post-Op  Subjective:     Principal Problem:Closed displaced fracture of right femoral neck    Principal Orthopedic Problem: s/p R DARNELL on 10/21 by Dr. Paulino    Interval History: Patient seen and examined at bedside. NAEON. Patient doing well. Pain fairly well controlled. Took a couple of steps with PT.      Review of patient's allergies indicates:   Allergen Reactions    Penicillins Hives, Itching and Rash    Shellfish containing products        Current Facility-Administered Medications   Medication    0.9%  NaCl infusion    acetaminophen tablet 1,000 mg    apixaban tablet 2.5 mg    aspirin chewable tablet 81 mg    atorvastatin tablet 80 mg    bisacodyL suppository 10 mg    cefTRIAXone (ROCEPHIN) 1 g/50 mL D5W IVPB    dextrose 10% bolus 125 mL    dextrose 10% bolus 250 mL    glucagon (human recombinant) injection 1 mg    glucose chewable tablet 16 g    glucose chewable tablet 24 g    insulin aspart U-100 pen 1-10 Units    melatonin tablet 6 mg    methocarbamoL tablet 500 mg    ondansetron injection 4 mg    oxyCODONE immediate release tablet 5 mg    polyethylene glycol packet 17 g    ROPIvacaine (PF) 2 mg/ml (0.2%) solution    senna-docusate 8.6-50 mg per tablet 1 tablet    sodium chloride 0.9% flush 10 mL     Objective:     Vital Signs (Most Recent):  Temp: 98.1 °F (36.7 °C) (10/23/22 0021)  Pulse: 69 (10/23/22 0302)  Resp: 16 (10/23/22 0021)  BP: (!) 129/59 (10/23/22 0021)  SpO2: 97 % (10/23/22 0021)   Vital Signs (24h Range):  Temp:  [97.5 °F (36.4 °C)-98.1 °F (36.7 °C)] 98.1 °F (36.7 °C)  Pulse:  [69-98] 69  Resp:  [16-21] 16  SpO2:  [92 %-97 %] 97  "%  BP: ()/(46-59) 129/59     Weight: 109.8 kg (242 lb)  Height: 5' 10" (177.8 cm)  Body mass index is 34.72 kg/m².    No intake or output data in the 24 hours ending 10/23/22 0711      Ortho/SPM Exam  AAOx4  NAD  Reg rate  No increased WOB    RLE:  Dressing c/d/i  SILT T/SP/DP/[place]/Sa  Motor intact T/SP/DP  WWP extremities  FCDs in place and functioning      Significant Labs: CBC:   Recent Labs   Lab 10/22/22  0351 10/23/22  0608   WBC 10.40 9.02   HGB 11.5* 9.7*   HCT 35.1* 30.5*    122*       CMP:   Recent Labs   Lab 10/22/22  0351 10/23/22  0608   * 137   K 4.5 4.0    110   CO2 21* 19*   * 110   BUN 41* 46*   CREATININE 2.7* 2.5*   CALCIUM 8.4* 8.3*   PROT 5.0* 4.6*   ALBUMIN 2.6* 2.1*   BILITOT 0.4 0.3   ALKPHOS 64 56   AST 19 15   ALT 9* <5*   ANIONGAP 11 8       Urine Culture:   No results for input(s): LABURIN in the last 48 hours.    Urine Studies:   No results for input(s): COLORU, APPEARANCEUA, PHUR, SPECGRAV, PROTEINUA, GLUCUA, KETONESU, BILIRUBINUA, OCCULTUA, NITRITE, UROBILINOGEN, LEUKOCYTESUR, RBCUA, WBCUA, BACTERIA, SQUAMEPITHEL, HYALINECASTS in the last 48 hours.    Invalid input(s): WRIGHTSUR    All pertinent labs within the past 24 hours have been reviewed.    Significant Imaging: I have reviewed all pertinent imaging results/findings.    Assessment/Plan:     * Closed displaced fracture of right femoral neck s/p total hip arthroplasty on 10/21/2022  Jani Cha is a 77 y.o. male with right femoral neck fracture s/p R DARNELL on 10/21/22 by Dr. Paulino. Doing well.    Pain control: multimodal, avoid nephrotoxic meds given CKD  PT/OT: WBAT RLE, posterior hip precautions  DVT PPx: Eliquis 2.5 BID, SCDs at all times when not ambulating  Abx: postop Ancef. Rocephin for UTI (pansensitive E coli)  Labs: Hb 9.7  Drain: none   Alejandre: leave for 3 days postop  CKD: per HM    Dispo: PT recs IPR          Garett Baum MD  Orthopedics  [facility]  "

## 2022-10-23 NOTE — PROGRESS NOTES
[facility] Medicine  Progress Note    Patient Name: Jani Cha  MRN: [id number]  Patient Class: IP- Inpatient   Admission Date: 10/20/2022  Length of Stay: 3 days  Attending Physician: Trudy Cross MD  Primary Care Provider: Laila Bains MD        Subjective:     Principal Problem:Closed displaced fracture of right femoral neck        HPI:  77-year-old male with history of hypertension, hyperlipidemia, CAD, diabetes, CKD presents to the ED complaining of right hip pain after fall this afternoon.  He was walking in his driveway when his knee gave out causing him to fall landing onto his right hip.  He denies any head trauma or loss of consciousness.  He is having 8/10 pain to the right hip and is unable to move the leg or bear weight.  He denies fever, chills, chest pain, shortness of breath, abdominal pain, numbness, paresthesias, confusion.    In the ED patient afebrile and hemodynamically stable saturating well on room air. Xray imaging with Acute impacted right subcapital femoral fracture. Orthopedic surgery consulted and patient admitted to  for further evaluation and management.      Overview/Hospital Course:  Patient admitted to [facility] Medicine Team H: Hip Fracture team and started on Hip Fracture Pathway with Orthopedic surgery consult for hip fracture. Patient was seen and evaluated by Orthopedic surgery who recommended operative repair of hip fracture. Patient was medically optimized prior to surgery and was taken to OR after optimization on 10/21/2022. Patient underwent right hip total hip arthroplasty by Dr. Isidro Paluino. Post-op patient WBAT with posterior hip precautions to the right lower extremity as per Orthopedics recommendation. Patient placed on Apixiban 2.5 mg po BID and MATTHEW/SCD's for DVT prophylaxis post-op and will need for total of 30 days. Perineural pain catheter placed by Anesthesia Pain Service with continuous infusion of Ropivacaine to help  with pain control post-op and Anesthesia Pain Service managing while patient in the hospital. Patient placed on multimodal pain management post-op with Tylenol 1000 mg po every 6 hours and Robaxin 500 mg po 3 times daily post-op and will continue. PT/OT consulted post-op. Patient noted to have increased Creatinine to 2.7 on 10/22 from 1.5 on 10/21. Patient ha very difficult Alejandre placement on admit and eventually Urology had to place in OR. Plan to keep Alejandre in place for now as per Urology. Patient draining yellow urine in Alejandre bag on 10/22. Patient also noted to have SBP in 90's likely also contributing to rise in Creatinine. Home Losartan/HCTZ stopped on 10/22 and patient bolused 1 liter of NS. BP improved after fluids given and BP medication stopped on 10/23. Creatinine improved from 2.7 to 2.5 on 10/23. PT/OT recommending IP Rehab on discharge when medically ready.       Interval History: BP improved today after fluids given yesterday and BP medication stopped yesterday. Creatinine improved from 2.7 to 2.5 today. Alejandre in place and draining clear yellow urine. Patient with good urine output. Patient reporting 7/10 pain to right hip with any movement and 5/10 pain in hip at rest. Will adjust patient's multimodal pain medications today to help with pain management. Therapy evaluated yesterday and recommending IP Rehab on discharge when medically ready so will place PM&R consult.     Review of Systems   Constitutional:  Negative for fever.   Respiratory:  Negative for cough and shortness of breath.    Cardiovascular:  Negative for chest pain and leg swelling.   Gastrointestinal:  Negative for abdominal pain, diarrhea, nausea and vomiting.   Musculoskeletal:  Positive for arthralgias (Right hip).   Neurological:  Negative for dizziness and light-headedness.   Psychiatric/Behavioral:  Negative for agitation and confusion.    Objective:     Vital Signs (Most Recent):  Temp: 97.8 °F (36.6 °C) (10/23/22 1256)  Pulse:  75 (10/23/22 1256)  Resp: 19 (10/23/22 1256)  BP: 139/65 (10/23/22 1256)  SpO2: 97 % (10/23/22 1256) on room air Vital Signs (24h Range):  Temp:  [97.5 °F (36.4 °C)-98.1 °F (36.7 °C)] 97.8 °F (36.6 °C)  Pulse:  [69-95] 75  Resp:  [16-21] 19  SpO2:  [92 %-98 %] 97 %  BP: ()/(46-65) 139/65     Weight: 109.8 kg (242 lb)  Body mass index is 34.72 kg/m².    Intake/Output Summary (Last 24 hours) at 10/23/2022 1330  Last data filed at 10/23/2022 0805  Gross per 24 hour   Intake --   Output 1100 ml   Net -1100 ml      Physical Exam  Vitals and nursing note reviewed.   Constitutional:       General: He is awake. He is not in acute distress.     Appearance: Normal appearance. He is well-developed. He is obese. He is not ill-appearing.   Eyes:      General: No scleral icterus.  Cardiovascular:      Rate and Rhythm: Normal rate and regular rhythm.      Heart sounds: Normal heart sounds. No murmur heard.    No friction rub. No gallop.   Pulmonary:      Effort: Pulmonary effort is normal. No respiratory distress.      Breath sounds: Normal breath sounds. No wheezing or rales.   Abdominal:      General: Abdomen is flat. Bowel sounds are normal. There is no distension.      Palpations: Abdomen is soft.      Tenderness: There is no abdominal tenderness. There is no guarding.   Musculoskeletal:      Right lower leg: No edema.      Left lower leg: No edema.   Skin:     Findings: No erythema or rash.      Comments: Surgical dressing noted on right hip. Dressing is clean and dry and intact.   Neurological:      Mental Status: He is alert and oriented to person, place, and time.   Psychiatric:         Mood and Affect: Mood normal.         Behavior: Behavior normal. Behavior is cooperative.         Thought Content: Thought content normal.         Judgment: Judgment normal.       Significant Labs: CBC:   Recent Labs   Lab 10/22/22  0351 10/23/22  0608   WBC 10.40 9.02   HGB 11.5* 9.7*   HCT 35.1* 30.5*    122*     CMP:    Recent Labs   Lab 10/22/22  0351 10/23/22  0608   * 137   K 4.5 4.0    110   CO2 21* 19*   * 110   BUN 41* 46*   CREATININE 2.7* 2.5*   CALCIUM 8.4* 8.3*   PROT 5.0* 4.6*   ALBUMIN 2.6* 2.1*   BILITOT 0.4 0.3   ALKPHOS 64 56   AST 19 15   ALT 9* <5*   ANIONGAP 11 8     Magnesium:   Recent Labs   Lab 10/22/22  0351 10/23/22  0608   MG 1.7 1.8     POCT Glucose:   Recent Labs   Lab 10/22/22  1109 10/22/22  1638 10/23/22  0802   POCTGLUCOSE 221* 223* 117*     Urine Culture, Routine   Date Value Ref Range Status   10/20/2022 ESCHERICHIA COLI  >100,000 cfu/ml   (A)  Final       Significant Imaging: I have reviewed all pertinent imaging results/findings within the past 24 hours.      Assessment/Plan:      * Closed displaced fracture of right femoral neck s/p total hip arthroplasty on 10/21/2022  · Patient underwent right total hip replacement for femoral neck fracture by Ortho on 10/21/2022.   · Patient reports moderate pain in right hip but more severe with movement.   · Plan to continue PT/OT for gait training and strengthening and restoration of ADL's.   · Patient is FWB/WBAT: right lower extremity, posterior hip precautions as per Orthopedic recommendations.   · Plan is to continue Apixiban 2.5 mg po BID and will need a total of 30 days post-op after hip fracture surgery for DVT prophylaxis.   · Orthopedics is following and managing hip fracture and surgical site.   · Perineural pain catheter in place with continuous Ropivacaine infusion for pain control and being managed by Anesthesia Pain Service.   · Patient on multimodal pain management post-op with Tylenol 1000 mg po every 6 hours and Robaxin 500 mg po 3 times daily post-op. Continue Tylenol but will increase Robaxin from 500 to 750 mg po TID and add Lyrica 75 mg po nightly to see if will help with pain. Continue Oxy IR prn for breakthrough pain.   · Keep Alejandre in place post-op as per Urology.     Acute renal failure superimposed on stage 3a  chronic kidney disease  · Improved. Creatinine down to 2.5 on 10/23. Continue IVF's.   · Patient's creatinine's has increased above baseline renal function to 2.7 on 10/22 consistent with acute kidney injury as up from baseline creatinine of 1.5-1.7.   · Suspect related to difficult Alejandre placement and hypovolemia.   · Patient to be bolused 1 liter of NS then maintenance fluid of NS at 150 cc/hr.   · Patient needs strict I+Os to closely monitor urine output.   · Avoid any nephrotoxic agents such as NSAIDS, IV contrast or aminoglycosides that could worsen renal function.  · Renally adjust all medications.   · Monitor daily BMP to follow renal function.   · Renal ultrasound from 10/22 unremarkable with no hydronephrosis.     Hematuria  · Hematuria resolved. Urology consulted in OR after noting gross hematuria in Alejandre bag. Patient had multiple prior Alejandre attempts during this admit by nursing and was a difficult Alejandre placement. Patient with known BPH. Alejandre replaced by Urology in OR. Hematuria resolved on am of 10/22 and Urology suspects Alejandre trauma as cause of hematuria and recommends to keep current Alejandre in for 3-5 days.   · Appreciate Urology assistance on the case.   · Renal ultrasound on 10/22 showed no hydronephrosis.   · Urology recommends follow up with Dr. Dubon for discussion of BPH in Urology clinic.     Type 2 diabetes mellitus with hyperglycemia, without long-term current use of insulin  Good control in the hospital in past 24 hours. Patient on Trulicity, Metformin and Glipizide at home to treat. Hold these meds in hospital.     Blood Sugars (AccuCheck):    Recent Labs     10/21/22  1738 10/21/22  2101 10/22/22  0715 10/22/22  1109 10/22/22  1638 10/23/22  0802   POCTGLUCOSE 162* 241* 179* 221* 223* 117*        · Plan is to continue to monitor POCT glucose 4 times a day with each meal and at bedtime and cover with Novolog low dose sliding scale insulin.   · Plan is to continue 2000 calorie diabetic  diet in the hospital.   · Target pre-meal glucose goal is <140 with all random glucoses <180 in non-critically ill patient.      Primary hypertension  Controlled off BP medication. Patient hypotensive on 10/22 discontinued home Losartan/HCTZ 100/25 mg.    Coronary artery disease involving native coronary artery of native heart without angina pectoris  - Chronic and controlled. Patient asymptomatic. Patient with previous CABG and cardiac stenting. Last stent>1yr ago currently on ASA monotherapy and will continue in hospital.  - Continue home Lipitor to treat CAD.    E. coli urinary tract infection  · Final urine culture resulted on 10/23 with pan sensitive >100,000 organisms E. Coli. Will continue IV Ceftriaxone to treat.   · Present on admit. Admit U/A with 37 WBC and occasional bacteria. Patient started on IV Ceftriaxone 1 gram daily to treat. Prelim urine culture with > 100,000 organisms with gram negative senia. Continue Ceftriaxone pending final urine culture results and sensitivities.         VTE Risk Mitigation (From admission, onward)         Ordered     apixaban tablet 2.5 mg  2 times daily         10/21/22 1542     IP VTE HIGH RISK PATIENT  Once         10/20/22 1912     Place sequential compression device  Until discontinued         10/20/22 1912                Discharge Planning   SARKIS: 10/25/2022     Code Status: Full Code   Is the patient medically ready for discharge?: No    Reason for patient still in hospital (select all that apply): Patient trending condition             Trudy Cross MD  Department of Hospital Medicine   [facility] Surg

## 2022-10-23 NOTE — ASSESSMENT & PLAN NOTE
· Patient underwent right total hip replacement for femoral neck fracture by Ortho on 10/21/2022.   · Patient reports moderate pain in right hip but more severe with movement.   · Plan to continue PT/OT for gait training and strengthening and restoration of ADL's.   · Patient is FWB/WBAT: right lower extremity, posterior hip precautions as per Orthopedic recommendations.   · Plan is to continue Apixiban 2.5 mg po BID and will need a total of 30 days post-op after hip fracture surgery for DVT prophylaxis.   · Orthopedics is following and managing hip fracture and surgical site.   · Perineural pain catheter in place with continuous Ropivacaine infusion for pain control and being managed by Anesthesia Pain Service.   · Patient on multimodal pain management post-op with Tylenol 1000 mg po every 6 hours and Robaxin 500 mg po 3 times daily post-op. Continue Tylenol but will increase Robaxin from 500 to 750 mg po TID and add Lyrica 75 mg po nightly to see if will help with pain. Continue Oxy IR prn for breakthrough pain.   · Keep Alejandre in place post-op as per Urology.

## 2022-10-24 LAB
ALBUMIN SERPL BCP-MCNC: 1.9 G/DL (ref 3.5–5.2)
ALP SERPL-CCNC: 60 U/L (ref 55–135)
ALT SERPL W/O P-5'-P-CCNC: <5 U/L (ref 10–44)
ANION GAP SERPL CALC-SCNC: 7 MMOL/L (ref 8–16)
AST SERPL-CCNC: 15 U/L (ref 10–40)
BASOPHILS # BLD AUTO: 0.04 K/UL (ref 0–0.2)
BASOPHILS NFR BLD: 0.5 % (ref 0–1.9)
BILIRUB SERPL-MCNC: 0.4 MG/DL (ref 0.1–1)
BUN SERPL-MCNC: 37 MG/DL (ref 8–23)
CALCIUM SERPL-MCNC: 8.4 MG/DL (ref 8.7–10.5)
CHLORIDE SERPL-SCNC: 109 MMOL/L (ref 95–110)
CO2 SERPL-SCNC: 20 MMOL/L (ref 23–29)
CREAT SERPL-MCNC: 1.8 MG/DL (ref 0.5–1.4)
DIFFERENTIAL METHOD: ABNORMAL
EOSINOPHIL # BLD AUTO: 0.4 K/UL (ref 0–0.5)
EOSINOPHIL NFR BLD: 5.1 % (ref 0–8)
ERYTHROCYTE [DISTWIDTH] IN BLOOD BY AUTOMATED COUNT: 13.4 % (ref 11.5–14.5)
EST. GFR  (NO RACE VARIABLE): 38.5 ML/MIN/1.73 M^2
GLUCOSE SERPL-MCNC: 118 MG/DL (ref 70–110)
HCT VFR BLD AUTO: 29.2 % (ref 40–54)
HGB BLD-MCNC: 9.4 G/DL (ref 14–18)
IMM GRANULOCYTES # BLD AUTO: 0.08 K/UL (ref 0–0.04)
IMM GRANULOCYTES NFR BLD AUTO: 1 % (ref 0–0.5)
LYMPHOCYTES # BLD AUTO: 1.3 K/UL (ref 1–4.8)
LYMPHOCYTES NFR BLD: 16.1 % (ref 18–48)
MAGNESIUM SERPL-MCNC: 1.8 MG/DL (ref 1.6–2.6)
MCH RBC QN AUTO: 30.7 PG (ref 27–31)
MCHC RBC AUTO-ENTMCNC: 32.2 G/DL (ref 32–36)
MCV RBC AUTO: 95 FL (ref 82–98)
MONOCYTES # BLD AUTO: 0.7 K/UL (ref 0.3–1)
MONOCYTES NFR BLD: 9.1 % (ref 4–15)
NEUTROPHILS # BLD AUTO: 5.5 K/UL (ref 1.8–7.7)
NEUTROPHILS NFR BLD: 68.2 % (ref 38–73)
NRBC BLD-RTO: 0 /100 WBC
PHOSPHATE SERPL-MCNC: 2.9 MG/DL (ref 2.7–4.5)
PLATELET # BLD AUTO: 144 K/UL (ref 150–450)
PMV BLD AUTO: 10.5 FL (ref 9.2–12.9)
POCT GLUCOSE: 194 MG/DL (ref 70–110)
POCT GLUCOSE: 216 MG/DL (ref 70–110)
POCT GLUCOSE: 270 MG/DL (ref 70–110)
POTASSIUM SERPL-SCNC: 4.3 MMOL/L (ref 3.5–5.1)
PROT SERPL-MCNC: 4.6 G/DL (ref 6–8.4)
RBC # BLD AUTO: 3.06 M/UL (ref 4.6–6.2)
SODIUM SERPL-SCNC: 136 MMOL/L (ref 136–145)
WBC # BLD AUTO: 8.06 K/UL (ref 3.9–12.7)

## 2022-10-24 PROCEDURE — 25000003 PHARM REV CODE 250: Performed by: STUDENT IN AN ORGANIZED HEALTH CARE EDUCATION/TRAINING PROGRAM

## 2022-10-24 PROCEDURE — 25000242 PHARM REV CODE 250 ALT 637 W/ HCPCS: Performed by: INTERNAL MEDICINE

## 2022-10-24 PROCEDURE — 99233 PR SUBSEQUENT HOSPITAL CARE,LEVL III: ICD-10-PCS | Mod: ,,, | Performed by: INTERNAL MEDICINE

## 2022-10-24 PROCEDURE — 63600175 PHARM REV CODE 636 W HCPCS

## 2022-10-24 PROCEDURE — 80053 COMPREHEN METABOLIC PANEL: CPT | Performed by: FAMILY MEDICINE

## 2022-10-24 PROCEDURE — 83735 ASSAY OF MAGNESIUM: CPT | Performed by: FAMILY MEDICINE

## 2022-10-24 PROCEDURE — 36415 COLL VENOUS BLD VENIPUNCTURE: CPT | Performed by: FAMILY MEDICINE

## 2022-10-24 PROCEDURE — 97530 THERAPEUTIC ACTIVITIES: CPT | Mod: CO

## 2022-10-24 PROCEDURE — 11000001 HC ACUTE MED/SURG PRIVATE ROOM

## 2022-10-24 PROCEDURE — 25000003 PHARM REV CODE 250: Performed by: INTERNAL MEDICINE

## 2022-10-24 PROCEDURE — 25000003 PHARM REV CODE 250: Performed by: PHYSICIAN ASSISTANT

## 2022-10-24 PROCEDURE — 85025 COMPLETE CBC W/AUTO DIFF WBC: CPT | Performed by: FAMILY MEDICINE

## 2022-10-24 PROCEDURE — 63600175 PHARM REV CODE 636 W HCPCS: Performed by: INTERNAL MEDICINE

## 2022-10-24 PROCEDURE — 99222 PR INITIAL HOSPITAL CARE,LEVL II: ICD-10-PCS | Mod: ,,, | Performed by: NURSE PRACTITIONER

## 2022-10-24 PROCEDURE — 97535 SELF CARE MNGMENT TRAINING: CPT | Mod: CO

## 2022-10-24 PROCEDURE — 99233 SBSQ HOSP IP/OBS HIGH 50: CPT | Mod: ,,, | Performed by: INTERNAL MEDICINE

## 2022-10-24 PROCEDURE — 84100 ASSAY OF PHOSPHORUS: CPT | Performed by: FAMILY MEDICINE

## 2022-10-24 PROCEDURE — 99222 1ST HOSP IP/OBS MODERATE 55: CPT | Mod: ,,, | Performed by: NURSE PRACTITIONER

## 2022-10-24 PROCEDURE — 97530 THERAPEUTIC ACTIVITIES: CPT | Mod: CQ

## 2022-10-24 PROCEDURE — 97116 GAIT TRAINING THERAPY: CPT | Mod: CQ

## 2022-10-24 PROCEDURE — 25000003 PHARM REV CODE 250: Performed by: FAMILY MEDICINE

## 2022-10-24 RX ORDER — FUROSEMIDE 10 MG/ML
20 INJECTION INTRAMUSCULAR; INTRAVENOUS ONCE
Status: COMPLETED | OUTPATIENT
Start: 2022-10-24 | End: 2022-10-24

## 2022-10-24 RX ORDER — ROPIVACAINE HYDROCHLORIDE 2 MG/ML
0.1 INJECTION, SOLUTION EPIDURAL; INFILTRATION; PERINEURAL CONTINUOUS
Status: DISCONTINUED | OUTPATIENT
Start: 2022-10-24 | End: 2022-10-25

## 2022-10-24 RX ORDER — CEFPODOXIME PROXETIL 100 MG/1
100 TABLET, FILM COATED ORAL EVERY 12 HOURS
Status: DISCONTINUED | OUTPATIENT
Start: 2022-10-24 | End: 2022-10-25 | Stop reason: HOSPADM

## 2022-10-24 RX ADMIN — APIXABAN 2.5 MG: 2.5 TABLET, FILM COATED ORAL at 09:10

## 2022-10-24 RX ADMIN — FUROSEMIDE 20 MG: 10 INJECTION, SOLUTION INTRAMUSCULAR; INTRAVENOUS at 01:10

## 2022-10-24 RX ADMIN — METHOCARBAMOL 750 MG: 750 TABLET ORAL at 11:10

## 2022-10-24 RX ADMIN — ACETAMINOPHEN 1000 MG: 500 TABLET ORAL at 11:10

## 2022-10-24 RX ADMIN — ACETAMINOPHEN 1000 MG: 500 TABLET ORAL at 06:10

## 2022-10-24 RX ADMIN — ATORVASTATIN CALCIUM 80 MG: 40 TABLET, FILM COATED ORAL at 11:10

## 2022-10-24 RX ADMIN — POLYETHYLENE GLYCOL 3350 17 G: 17 POWDER, FOR SOLUTION ORAL at 11:10

## 2022-10-24 RX ADMIN — SENNOSIDES AND DOCUSATE SODIUM 1 TABLET: 50; 8.6 TABLET ORAL at 11:10

## 2022-10-24 RX ADMIN — CEFPODOXIME PROXETIL 100 MG: 100 TABLET, FILM COATED ORAL at 09:10

## 2022-10-24 RX ADMIN — ROPIVACAINE HYDROCHLORIDE 0.1 ML/HR: 2 INJECTION, SOLUTION EPIDURAL; INFILTRATION; PERINEURAL at 03:10

## 2022-10-24 RX ADMIN — INSULIN ASPART 6 UNITS: 100 INJECTION, SOLUTION INTRAVENOUS; SUBCUTANEOUS at 11:10

## 2022-10-24 RX ADMIN — APIXABAN 2.5 MG: 2.5 TABLET, FILM COATED ORAL at 11:10

## 2022-10-24 RX ADMIN — SENNOSIDES AND DOCUSATE SODIUM 1 TABLET: 50; 8.6 TABLET ORAL at 09:10

## 2022-10-24 RX ADMIN — Medication 6 MG: at 09:10

## 2022-10-24 RX ADMIN — ASPIRIN 81 MG: 81 TABLET, CHEWABLE ORAL at 11:10

## 2022-10-24 RX ADMIN — OXYCODONE 5 MG: 5 TABLET ORAL at 11:10

## 2022-10-24 RX ADMIN — INSULIN ASPART 4 UNITS: 100 INJECTION, SOLUTION INTRAVENOUS; SUBCUTANEOUS at 05:10

## 2022-10-24 RX ADMIN — ACETAMINOPHEN 1000 MG: 500 TABLET ORAL at 12:10

## 2022-10-24 RX ADMIN — ROPIVACAINE HYDROCHLORIDE 0.1 ML/HR: 2 INJECTION, SOLUTION EPIDURAL; INFILTRATION; PERINEURAL at 11:10

## 2022-10-24 RX ADMIN — METHOCARBAMOL 750 MG: 750 TABLET ORAL at 09:10

## 2022-10-24 RX ADMIN — OXYCODONE 5 MG: 5 TABLET ORAL at 09:10

## 2022-10-24 RX ADMIN — CEFTRIAXONE 1 G: 1 INJECTION, SOLUTION INTRAVENOUS at 06:10

## 2022-10-24 RX ADMIN — PREGABALIN 75 MG: 75 CAPSULE ORAL at 09:10

## 2022-10-24 RX ADMIN — METHOCARBAMOL 750 MG: 750 TABLET ORAL at 03:10

## 2022-10-24 NOTE — PROGRESS NOTES
Pt resting comfortably.  Right SIFl PNC has been paused. Dressing CDI.  Catheter discontinued, tip intact.  Pt tolerated well.  Educated regarding continued pain management.  Understanding verbalized.

## 2022-10-24 NOTE — ASSESSMENT & PLAN NOTE
- Per Ortho WBAT RLE, posterior hip precautions.   - On Eliquis BID.     - Related to prolonged/acute hospital course.     Recommendations  -  Encourage mobility, OOB in chair at least 3 hours per day, and early ambulation as appropriate  -  PT/OT evaluate and treat  -  Pain management  -  Monitor for and prevent skin breakdown and pressure ulcers  · Early mobility, repositioning/weight shifting every 20-30 minutes when sitting, turn patient every 2 hours, proper mattress/overlay and chair cushioning, pressure relief/heel protector boots  -  DVT prophylaxis    -  Reviewed discharge options (IP rehab, SNF, HH therapy, and OP therapy)

## 2022-10-24 NOTE — SUBJECTIVE & OBJECTIVE
Interval History: Patient states right hip pain better today. Anesthesia paused perineural pain catheter and likely to remove today. Alejandre continues to drain yellow urine and no blood. Plan voiding trial today and will remove Alejandre. Discussed with patient recs for IP Rehab and he wanted to know about facilities on [facility] where he is from and  to come and talk to patient. I also placed a PM&R consult for [facility] also. Creatinine improved to 1.8 today so will stop IVF's. Plan to switch from IV Ceftriaxone to oral Cefpodoxime to treat E. Coli UTI today.     Review of Systems   Constitutional:  Negative for fever.   Respiratory:  Negative for cough and shortness of breath.    Cardiovascular:  Negative for chest pain and leg swelling.   Gastrointestinal:  Negative for abdominal pain, diarrhea, nausea and vomiting.   Musculoskeletal:  Positive for arthralgias (Right hip).   Neurological:  Negative for dizziness and light-headedness.   Psychiatric/Behavioral:  Negative for agitation and confusion.    Objective:     Vital Signs (Most Recent):  Temp: 98.6 °F (37 °C) (10/24/22 1634)  Pulse: 74 (10/24/22 1634)  Resp: 18 (10/24/22 1634)  BP: 139/67 (10/24/22 1634)  SpO2: 97 % (10/24/22 1634) on 2 liters of oxygen Vital Signs (24h Range):  Temp:  [97.6 °F (36.4 °C)-98.6 °F (37 °C)] 98.6 °F (37 °C)  Pulse:  [73-94] 74  Resp:  [16-18] 18  SpO2:  [94 %-98 %] 97 %  BP: (121-154)/(62-67) 139/67     Weight: 109.8 kg (242 lb)  Body mass index is 34.72 kg/m².    Intake/Output Summary (Last 24 hours) at 10/24/2022 1731  Last data filed at 10/24/2022 0647  Gross per 24 hour   Intake --   Output 1525 ml   Net -1525 ml      Physical Exam  Vitals and nursing note reviewed.   Constitutional:       General: He is awake. He is not in acute distress.     Appearance: Normal appearance. He is well-developed. He is obese. He is not ill-appearing.   Eyes:      General: No scleral icterus.  Cardiovascular:      Rate and  Rhythm: Normal rate and regular rhythm.      Heart sounds: Normal heart sounds. No murmur heard.    No friction rub. No gallop.   Pulmonary:      Effort: Pulmonary effort is normal. No respiratory distress.      Breath sounds: Normal breath sounds. No wheezing or rales.   Abdominal:      General: Abdomen is flat. Bowel sounds are normal. There is no distension.      Palpations: Abdomen is soft.      Tenderness: There is no abdominal tenderness. There is no guarding.   Musculoskeletal:      Right lower leg: No edema.      Left lower leg: No edema.   Skin:     Findings: No erythema or rash.      Comments: Surgical dressing noted on right hip. Dressing is clean and dry and intact.   Neurological:      Mental Status: He is alert and oriented to person, place, and time.   Psychiatric:         Mood and Affect: Mood normal.         Behavior: Behavior normal. Behavior is cooperative.         Thought Content: Thought content normal.         Judgment: Judgment normal.       Significant Labs: CBC:   Recent Labs   Lab 10/23/22  0608 10/24/22  0556   WBC 9.02 8.06   HGB 9.7* 9.4*   HCT 30.5* 29.2*   * 144*     CMP:   Recent Labs   Lab 10/23/22  0608 10/24/22  0556    136   K 4.0 4.3    109   CO2 19* 20*    118*   BUN 46* 37*   CREATININE 2.5* 1.8*   CALCIUM 8.3* 8.4*   PROT 4.6* 4.6*   ALBUMIN 2.1* 1.9*   BILITOT 0.3 0.4   ALKPHOS 56 60   AST 15 15   ALT <5* <5*   ANIONGAP 8 7*       Significant Imaging: I have reviewed all pertinent imaging results/findings within the past 24 hours.

## 2022-10-24 NOTE — PLAN OF CARE
Problem: Occupational Therapy  Goal: Occupational Therapy Goal  Description: Goals to be met by: 11/5/22     Patient will increase functional independence with ADLs by performing:    UE Dressing with Modified [place].  LE Dressing with Modified [place].  Grooming while standing at sink with Modified [place].  Toileting from bedside commode with Modified [place]son for hygiene and clothing management.   Toilet transfer to bedside commode with Modified [place].    Outcome: Ongoing, Progressing

## 2022-10-24 NOTE — HOSPITAL COURSE
10/22/22: Participated w/ PT. Bed mob mod- maxA. Sit to stand Ava x 2 ppl. 3 steps from bed>chair (1st visit)+ ~ 2 steps from chair>bed (2nd visit) Ava w/ rolling walker.   [facility] 11 w/ PT and 15 w/ OT.

## 2022-10-24 NOTE — ASSESSMENT & PLAN NOTE
- Per Urology on 10/22/22 with known history of BPH, gross hematuria likely traumatic spivey. Recommend maintaining catheter for 3-5 days

## 2022-10-24 NOTE — ASSESSMENT & PLAN NOTE
· Patient to be switched from IV Ceftriaxone to po Cefpodoxime on 10/24 to complete treatment of UTI.   · Final urine culture resulted on 10/23 with pan sensitive >100,000 organisms E. Coli. Will continue IV Ceftriaxone to treat.   · Present on admit. Admit U/A with 37 WBC and occasional bacteria. Patient started on IV Ceftriaxone 1 gram daily to treat. Prelim urine culture with > 100,000 organisms with gram negative senia. Continue Ceftriaxone pending final urine culture results and sensitivities.

## 2022-10-24 NOTE — ASSESSMENT & PLAN NOTE
Jani Cha is a 77 y.o. male with right femoral neck fracture s/p R DARNELL on 10/21/22 by Dr. Paulino. Doing well.    Pain control: multimodal, avoid nephrotoxic meds given CKD  PT/OT: WBAT RLE, posterior hip precautions  DVT PPx: Eliquis 2.5 BID, SCDs at all times when not ambulating  Abx: postop Ancef. Rocephin for UTI (pansensitive E coli)  Labs:Cr down to 1.8 from 2.5 yesterday   Drain: none   Alejandre: dc today   CKD: per     Dispo: PT recs IPR

## 2022-10-24 NOTE — ASSESSMENT & PLAN NOTE
Good control in the hospital in past 24 hours. Patient on Trulicity, Metformin and Glipizide at home to treat. Hold these meds in hospital.     Blood Sugars (AccuCheck):    Recent Labs     10/23/22  0802 10/23/22  1257 10/23/22  1600 10/23/22  1942 10/24/22  1146 10/24/22  1632   POCTGLUCOSE 117* 323* 258* 178* 270* 216*        · Plan is to continue to monitor POCT glucose 4 times a day with each meal and at bedtime and cover with Novolog low dose sliding scale insulin.   · Plan is to continue 2000 calorie diabetic diet in the hospital.   · Target pre-meal glucose goal is <140 with all random glucoses <180 in non-critically ill patient.
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
## 2022-10-24 NOTE — ASSESSMENT & PLAN NOTE
· Continues to improve on 10/24. Stop IVF's. Creatinine 1.8 on 10/24.   · Improved. Creatinine down to 2.5 on 10/23. Continue IVF's.   · Patient's creatinine's has increased above baseline renal function to 2.7 on 10/22 consistent with acute kidney injury as up from baseline creatinine of 1.5-1.7.   · Suspect related to difficult Alejandre placement and hypovolemia.   · Patient to be bolused 1 liter of NS then maintenance fluid of NS at 150 cc/hr.   · Patient needs strict I+Os to closely monitor urine output.   · Avoid any nephrotoxic agents such as NSAIDS, IV contrast or aminoglycosides that could worsen renal function.  · Renally adjust all medications.   · Monitor daily BMP to follow renal function.   · Renal ultrasound from 10/22 unremarkable with no hydronephrosis.

## 2022-10-24 NOTE — ANESTHESIA POST-OP PAIN MANAGEMENT
Acute Pain Service Progress Note    Jani Cha is a 77 y.o. male with HTN, CAD (s/p CABG x 4), T2DM, and CKD admitted following fall from standing resulting in right subcapital femoral fracture for which he underwent right hip arthroplasty on 10/21 with Dr. Paulino.    Surgery:  ARTHROPLASTY, HIP, RIGHT (Right: Hip)    Post Op Day #: 3    Catheter type: perineural  R SIFI    Infusion type: Ropivacaine 0.2%  0.1cc/hr basal + 15cc/3hr intermittent bolus    Problem List:    Active Hospital Problems    Diagnosis  POA    *Closed displaced fracture of right femoral neck s/p total hip arthroplasty on 10/21/2022 [S72.001A]  Yes    Hematuria [R31.9]  Yes    E. coli urinary tract infection [N39.0, B96.20]  Yes    Type 2 diabetes mellitus with hyperglycemia, without long-term current use of insulin [E11.65]  Yes    Acute renal failure superimposed on stage 3a chronic kidney disease [N17.9, N18.31]  Yes    Primary hypertension [I10]  Yes    Coronary artery disease involving native coronary artery of native heart without angina pectoris [I25.10]  Yes     Chronic     s/p 4 V CABG  Cardiologist - Dr. Oliveira        Resolved Hospital Problems   No resolved problems to display.       Subjective:     General appearance of alert & oriented. States pain is tolerable with current regimen.   Pain with rest: 2    Numbers   Pain with movement: 4    Numbers   Side Effects    1. Pruritis No    2. Nausea No    3. Motor Blockade No, 0=Ability to raise lower extremities off bed    4. Sedation No, 1=awake and alert    Objective:     Catheter site clean, dry, intact      Vitals   Vitals:    10/24/22 0822   BP:    Pulse: 94   Resp:    Temp:       Meds   Current Facility-Administered Medications   Medication Dose Route Frequency Provider Last Rate Last Admin    0.9%  NaCl infusion   Intravenous Continuous Truyd Cross  mL/hr at 10/23/22 1318 New Bag at 10/23/22 1318    acetaminophen tablet 1,000 mg  1,000 mg Oral Q6H  Trudy Cross MD   1,000 mg at 10/24/22 0638    apixaban tablet 2.5 mg  2.5 mg Oral BID Moshe Eyad Razo MD   2.5 mg at 10/23/22 2236    aspirin chewable tablet 81 mg  81 mg Oral Daily Trudy Cross MD   81 mg at 10/23/22 0817    atorvastatin tablet 80 mg  80 mg Oral Daily Jose Antonio Palomino MD   80 mg at 10/23/22 0817    bisacodyL suppository 10 mg  10 mg Rectal Daily PRN Jose Antonio Palomino MD        cefTRIAXone (ROCEPHIN) 1 g/50 mL D5W IVPB  1 g Intravenous Q24H Mike MD Sarah 100 mL/hr at 10/24/22 0638 1 g at 10/24/22 0638    dextrose 10% bolus 125 mL  12.5 g Intravenous PRN Trudy Cross MD        dextrose 10% bolus 250 mL  25 g Intravenous PRN Trudy Cross MD        glucagon (human recombinant) injection 1 mg  1 mg Intramuscular PRN Jose Antonio Palomino MD        glucose chewable tablet 16 g  16 g Oral PRN Jose Antonio Palomino MD        glucose chewable tablet 24 g  24 g Oral PRN Jose Antonio Palomino MD        insulin aspart U-100 pen 1-10 Units  1-10 Units Subcutaneous QID (AC + HS) PRN Jose Antonio Palomino MD   2 Units at 10/23/22 1605    melatonin tablet 6 mg  6 mg Oral Nightly PRN Patience Connell PA-C        methocarbamoL tablet 750 mg  750 mg Oral TID Trudy Cross MD   750 mg at 10/23/22 2236    ondansetron injection 4 mg  4 mg Intravenous Q12H PRN Jose Antonio Palomino MD        oxyCODONE immediate release tablet 5 mg  5 mg Oral Q4H PRN Garett Carmen MD   5 mg at 10/23/22 1547    polyethylene glycol packet 17 g  17 g Oral Daily Jose Antonio Palomino MD   17 g at 10/23/22 0817    pregabalin capsule 75 mg  75 mg Oral Nightly Trudy Cross MD   75 mg at 10/23/22 2236    ROPIvacaine (PF) 2 mg/ml (0.2%) solution  0.1 mL/hr Perineural Continuous Eliceo MD Mariposa 0.1 mL/hr at 10/23/22 0625 0.1 mL/hr at 10/23/22 0625    senna-docusate 8.6-50 mg per tablet 1 tablet  1 tablet Oral BID Jose Antonio Palomino MD   1 tablet at 10/23/22 9729    sodium chloride 0.9% flush  10 mL  10 mL Intravenous PRN Jose Antonio Palomino MD   10 mL at 10/23/22 0022        Anticoagulant: apixaban 2.5mg BID    Assessment:     POD #3 s/p R Hip Arthroplasty w/ Dr. Paulino.   Pain control adequate, pending discharge to rehab    Plan:     Continue PNC - R SIFI with intermittent bolus 15cc/3hr   Continue multimodal analgesic regimen:    - Tylenol 1000mg Q6H    - Methocarbamol 500mg TID    - Continue oxycodone 5mg Q4H PRN today for breakthrough pain      Case discussed with staff, Dr. Daniel; final recommendations per attestation above.     Thank you for your consult and allowing us to participate in the care of this patient. We will continue to follow along. Please call the Acute Pain Service at [phone] or Anesthesia at [phone] if you have any questions or concerns.      Reyes Wallace MD  Anesthesiology Resident CA1/PGY2  [facility]
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
## 2022-10-24 NOTE — HPI
Jani Cha is a 77-year-old male with PMHx of HTN, HLD, CAD, DM, CKD. Patient presented to [facility] on 10/20/22 for R hip pain after a fall. Walking in his driveway when his knee gave out causing him to fall landing onto his right hip. Presented to [facility] on 10/20/22. On arrival, X-ray revealed acute impacted right subcapital femoral fracture. S/p R DARNELL on 10/21/22. Per Ortho WBAT RLE, posterior hip precautions. On Eliquis BID. Hospital course complicated by UTI (rocephin) and hematuria (Per Urology on 10/22/22 with known history of BPH, gross hematuria likely traumatic spivey. Recommend maintaining catheter for 3-5 days).    Functional History: Patient lives with spouse in a single story home with no steps to enter.  Prior to admission, I. DME: none.

## 2022-10-24 NOTE — ASSESSMENT & PLAN NOTE
· Hematuria resolved. Urology consulted in OR after noting gross hematuria in Alejandre bag. Patient had multiple prior Alejandre attempts during this admit by nursing and was a difficult Alejandre placement. Patient with known BPH. Alejandre replaced by Urology in OR. Hematuria resolved on am of 10/22 and Urology suspects Alejandre trauma as cause of hematuria .  · Remove Alejandre today, 10/24 as part of voiding trial.   · Appreciate Urology assistance on the case.   · Renal ultrasound on 10/22 showed no hydronephrosis.   · Urology recommends follow up with Dr. Dubon for discussion of BPH in Urology clinic.

## 2022-10-24 NOTE — SUBJECTIVE & OBJECTIVE
"Principal Problem:Closed displaced fracture of right femoral neck    Principal Orthopedic Problem: s/p R DARNELL on 10/21 by Dr. Paulino    Interval History: Patient seen and examined at bedside. NAEON. Pain controlled. Pt took few steps around room with PT today. Plan for dc allyssa today.  Cr down to 1.8      Review of patient's allergies indicates:   Allergen Reactions    Penicillins Hives, Itching and Rash    Shellfish containing products        Current Facility-Administered Medications   Medication    acetaminophen tablet 1,000 mg    apixaban tablet 2.5 mg    aspirin chewable tablet 81 mg    atorvastatin tablet 80 mg    bisacodyL suppository 10 mg    cefTRIAXone (ROCEPHIN) 1 g/50 mL D5W IVPB    dextrose 10% bolus 125 mL    dextrose 10% bolus 250 mL    furosemide injection 20 mg    glucagon (human recombinant) injection 1 mg    glucose chewable tablet 16 g    glucose chewable tablet 24 g    insulin aspart U-100 pen 1-10 Units    melatonin tablet 6 mg    methocarbamoL tablet 750 mg    ondansetron injection 4 mg    oxyCODONE immediate release tablet 5 mg    polyethylene glycol packet 17 g    pregabalin capsule 75 mg    ROPIvacaine (PF) 2 mg/ml (0.2%) solution    senna-docusate 8.6-50 mg per tablet 1 tablet    sodium chloride 0.9% flush 10 mL     Objective:     Vital Signs (Most Recent):  Temp: 97.9 °F (36.6 °C) (10/24/22 1121)  Pulse: 73 (10/24/22 1219)  Resp: 18 (10/24/22 1129)  BP: 121/66 (10/24/22 1121)  SpO2: (!) 94 % (10/24/22 1121)   Vital Signs (24h Range):  Temp:  [97.6 °F (36.4 °C)-98.2 °F (36.8 °C)] 97.9 °F (36.6 °C)  Pulse:  [72-94] 73  Resp:  [16-19] 18  SpO2:  [94 %-98 %] 94 %  BP: (121-154)/(62-67) 121/66     Weight: 109.8 kg (242 lb)  Height: 5' 10" (177.8 cm)  Body mass index is 34.72 kg/m².      Intake/Output Summary (Last 24 hours) at 10/24/2022 1247  Last data filed at 10/24/2022 0647  Gross per 24 hour   Intake --   Output 1525 ml   Net -1525 ml         Ortho/SPM Exam  AAOx4  NAD  Reg rate  No " increased WOB    RLE:  Dressing c/d/i  SILT T/SP/DP/[place]/Sa  Motor intact T/SP/DP  WWP extremities  FCDs in place and functioning      Significant Labs: CBC:   Recent Labs   Lab 10/23/22  0608 10/24/22  0556   WBC 9.02 8.06   HGB 9.7* 9.4*   HCT 30.5* 29.2*   * 144*       CMP:   Recent Labs   Lab 10/23/22  0608 10/24/22  0556    136   K 4.0 4.3    109   CO2 19* 20*    118*   BUN 46* 37*   CREATININE 2.5* 1.8*   CALCIUM 8.3* 8.4*   PROT 4.6* 4.6*   ALBUMIN 2.1* 1.9*   BILITOT 0.3 0.4   ALKPHOS 56 60   AST 15 15   ALT <5* <5*   ANIONGAP 8 7*       Urine Culture:   No results for input(s): LABURIN in the last 48 hours.    Urine Studies:   No results for input(s): COLORU, APPEARANCEUA, PHUR, SPECGRAV, PROTEINUA, GLUCUA, KETONESU, BILIRUBINUA, OCCULTUA, NITRITE, UROBILINOGEN, LEUKOCYTESUR, RBCUA, WBCUA, BACTERIA, SQUAMEPITHEL, HYALINECASTS in the last 48 hours.    Invalid input(s): YURISUR    All pertinent labs within the past 24 hours have been reviewed.    Significant Imaging: I have reviewed all pertinent imaging results/findings.

## 2022-10-24 NOTE — PROGRESS NOTES
[facility]  Orthopedics  Progress Note    Patient Name: Jani Cha  MRN: [id number]  Admission Date: 10/20/2022  Hospital Length of Stay: 4 days  Attending Provider: Turdy Cross MD  Primary Care Provider: Laila Bains MD  Follow-up For: Procedure(s) (LRB):  ARTHROPLASTY, HIP, RIGHT (Right)    Post-Operative Day: 3 Days Post-Op  Subjective:     Principal Problem:Closed displaced fracture of right femoral neck    Principal Orthopedic Problem: s/p R DARNELL on 10/21 by Dr. Paulino    Interval History: Patient seen and examined at bedside. NAEON. Pain controlled. Pt took few steps around room with PT today. Plan for dc spivey today.  Cr down to 1.8      Review of patient's allergies indicates:   Allergen Reactions    Penicillins Hives, Itching and Rash    Shellfish containing products        Current Facility-Administered Medications   Medication    acetaminophen tablet 1,000 mg    apixaban tablet 2.5 mg    aspirin chewable tablet 81 mg    atorvastatin tablet 80 mg    bisacodyL suppository 10 mg    cefTRIAXone (ROCEPHIN) 1 g/50 mL D5W IVPB    dextrose 10% bolus 125 mL    dextrose 10% bolus 250 mL    furosemide injection 20 mg    glucagon (human recombinant) injection 1 mg    glucose chewable tablet 16 g    glucose chewable tablet 24 g    insulin aspart U-100 pen 1-10 Units    melatonin tablet 6 mg    methocarbamoL tablet 750 mg    ondansetron injection 4 mg    oxyCODONE immediate release tablet 5 mg    polyethylene glycol packet 17 g    pregabalin capsule 75 mg    ROPIvacaine (PF) 2 mg/ml (0.2%) solution    senna-docusate 8.6-50 mg per tablet 1 tablet    sodium chloride 0.9% flush 10 mL     Objective:     Vital Signs (Most Recent):  Temp: 97.9 °F (36.6 °C) (10/24/22 1121)  Pulse: 73 (10/24/22 1219)  Resp: 18 (10/24/22 1129)  BP: 121/66 (10/24/22 1121)  SpO2: (!) 94 % (10/24/22 1121)   Vital Signs (24h Range):  Temp:  [97.6 °F (36.4 °C)-98.2 °F (36.8 °C)] 97.9 °F (36.6 °C)  Pulse:   "[72-94] 73  Resp:  [16-19] 18  SpO2:  [94 %-98 %] 94 %  BP: (121-154)/(62-67) 121/66     Weight: 109.8 kg (242 lb)  Height: 5' 10" (177.8 cm)  Body mass index is 34.72 kg/m².      Intake/Output Summary (Last 24 hours) at 10/24/2022 1247  Last data filed at 10/24/2022 0647  Gross per 24 hour   Intake --   Output 1525 ml   Net -1525 ml         Ortho/SPM Exam  AAOx4  NAD  Reg rate  No increased WOB    RLE:  Dressing c/d/i  SILT T/SP/DP/Dor[place]/Sa  Motor intact T/SP/DP  WWP extremities  FCDs in place and functioning      Significant Labs: CBC:   Recent Labs   Lab 10/23/22  0608 10/24/22  0556   WBC 9.02 8.06   HGB 9.7* 9.4*   HCT 30.5* 29.2*   * 144*       CMP:   Recent Labs   Lab 10/23/22  0608 10/24/22  0556    136   K 4.0 4.3    109   CO2 19* 20*    118*   BUN 46* 37*   CREATININE 2.5* 1.8*   CALCIUM 8.3* 8.4*   PROT 4.6* 4.6*   ALBUMIN 2.1* 1.9*   BILITOT 0.3 0.4   ALKPHOS 56 60   AST 15 15   ALT <5* <5*   ANIONGAP 8 7*       Urine Culture:   No results for input(s): LABURIN in the last 48 hours.    Urine Studies:   No results for input(s): COLORU, APPEARANCEUA, PHUR, SPECGRAV, PROTEINUA, GLUCUA, KETONESU, BILIRUBINUA, OCCULTUA, NITRITE, UROBILINOGEN, LEUKOCYTESUR, RBCUA, WBCUA, BACTERIA, SQUAMEPITHEL, HYALINECASTS in the last 48 hours.    Invalid input(s): WRIGHTSUR    All pertinent labs within the past 24 hours have been reviewed.    Significant Imaging: I have reviewed all pertinent imaging results/findings.    Assessment/Plan:     * Closed displaced fracture of right femoral neck s/p total hip arthroplasty on 10/21/2022  Jani Cah is a 77 y.o. male with right femoral neck fracture s/p R DARNELL on 10/21/22 by Dr. Paulino. Doing well.    Pain control: multimodal, avoid nephrotoxic meds given CKD  PT/OT: WBAT RLE, posterior hip precautions  DVT PPx: Eliquis 2.5 BID, SCDs at all times when not ambulating  Abx: postop Ancef. Rocephin for UTI (pansensitive E coli)  Labs:Cr down to 1.8 from " 2.5 yesterday   Drain: none   Pili: xu today   CKD: per HM    Dispo: PT recs IPR          Moshe Razo MD  Orthopedics  [facility]

## 2022-10-24 NOTE — PLAN OF CARE
Multiple rehabs sent out, Sw to follow, [facility] rehab liaison Pam met with Pt in the room. Family will tour, will follow.

## 2022-10-24 NOTE — CONSULTS
Inpatient consult to Physical Medicine Rehab  Consult performed by: Olivia Valladares NP  Consult ordered by: Trudy Cross MD  Reason for consult: Assess rehab needs    Reviewed patient history and current admission.  Rehab team following.  Full consult to follow.    IRAIDA Turner, FNP-C  Physical Medicine & Rehabilitation   10/24/2022

## 2022-10-24 NOTE — PT/OT/SLP PROGRESS
"Occupational Therapy   Co-Treatment with Physical Therapy    Name: Jani Cha  MRN: [id number]  Admitting Diagnosis:  Closed displaced fracture of right femoral neck  3 Days Post-Op  Procedure(s):  ARTHROPLASTY, HIP, RIGHT   Recommendations:     Discharge Recommendations: rehabilitation facility  Discharge Equipment Recommendations:  hip kit, walker, rolling, bedside commode  Barriers to discharge:  Decreased caregiver support    Assessment:     Jani Cha is a 77 y.o. male with a medical diagnosis of Closed displaced fracture of right femoral neck.  He presents with the following performance deficits affecting function are weakness, impaired endurance, impaired self care skills, impaired functional mobility, gait instability, impaired balance, decreased lower extremity function, decreased ROM, orthopedic precautions, decreased coordination, pain, decreased safety awareness, impaired cardiopulmonary response to activity. Patient agreeable to OT session and tolerated well. Patient primarily limited by deconditioning and c/o pain with mobility. Patient would benefit from continued OT services to address deficits and progress towards goals. Continue OT POC.    Rehab Prognosis:  Good; patient would benefit from acute skilled OT services to address these deficits and reach maximum level of function.       Plan:     Patient to be seen daily to address the above listed problems via self-care/home management, therapeutic activities, therapeutic exercises  Plan of Care Expires: 11/21/22  Plan of Care Reviewed with: patient    Subjective   "You can imagine how I feel about not being able to do things by myself."    Pain/Comfort:  Pain Rating 1:  (pt did not rate)  Location - Side 1: Right  Location - Orientation 1: generalized  Location 1: hip  Pain Addressed 1: Reposition, Distraction, Cessation of Activity  Pain Rating Post-Intervention 1:  (pt did not rate)    Objective:     Communicated with: RN and OTR prior " to session.  Patient found HOB elevated with spivey catheter, telemetry, peripheral IV, perineural catheter, oxygen upon OT entry to room.  A client care conference was completed by the OTR and the CARRIZALES prior to treatment by the CARRIZALES to discuss the patient's POC and current status.    General Precautions: Standard, fall   Orthopedic Precautions:RLE weight bearing as tolerated, RLE posterior precautions   Braces: N/A  Respiratory Status: Nasal cannula     Occupational Performance:     Bed Mobility:    Patient completed Supine to Sit with moderate assistance     Functional Mobility/Transfers:  Patient completed Sit <> Stand Transfer with contact guard assistance  with  rolling walker   Patient completed Bed > Chair Transfer using Step Transfer technique with contact guard assistance with rolling walker  Patient completed BSC Transfer Step Transfer technique with contact guard assistance with  rolling walker  Functional Mobility: within room environment using RW with CGA    Activities of Daily Living:  Grooming: modified independence seated   Upper Body Dressing: contact guard assistance for line management to don gown posteriorly  Toileting: total assistance for decreased endurance and ROM to complete gown management and perirectal hygiene in standing using RW      AMPA 6 Click ADL: 17    Treatment & Education:  Education on OT POC, goals, and current progress  Patient is able to recall 3/3 posterior hip precautions, but requires min cues throughout therapy for application .   CORIN brought patient 3:1 raised commode to utilize in room to encourage mobility requiring 1 person (A) for safety; Pt instructed to use call button and pt verbalized understanding.   Addressed all patient questions/concerns within CORIN scope of practice.   Co-treatment performed with PTA due to patient's complexity and benefit of 2 skilled therapists to facilitate functional and safe occupational performance, accommodate patient's activity  tolerance, and maximize patient's participation in therapy.     Patient left up in chair with all lines intact and call button in reach    GOALS:   Multidisciplinary Problems       Occupational Therapy Goals          Problem: Occupational Therapy    Goal Priority Disciplines Outcome Interventions   Occupational Therapy Goal     OT, PT/OT Ongoing, Progressing    Description: Goals to be met by: 11/5/22     Patient will increase functional independence with ADLs by performing:    UE Dressing with Modified [place].  LE Dressing with Modified [place].  Grooming while standing at sink with Modified [place].  Toileting from bedside commode with Modified [place] for hygiene and clothing management.   Toilet transfer to bedside commode with Modified [place].                         Time Tracking:     OT Date of Treatment: 10/24/22  OT Start Time: 1009  OT Stop Time: 1039  OT Total Time (min): 30 min    Billable Minutes:Self Care/Home Management 15  Therapeutic Activity 15    OT/CORIN: CORIN COOMBS Visit Number: 1    10/24/2022

## 2022-10-24 NOTE — PROGRESS NOTES
[facility] Medicine  Progress Note    Patient Name: Jani Cha  MRN: [id number]  Patient Class: IP- Inpatient   Admission Date: 10/20/2022  Length of Stay: 4 days  Attending Physician: Trudy Cross MD  Primary Care Provider: Laila Bains MD        Subjective:     Principal Problem:Closed displaced fracture of right femoral neck        HPI:  77-year-old male with history of hypertension, hyperlipidemia, CAD, diabetes, CKD presents to the ED complaining of right hip pain after fall this afternoon.  He was walking in his driveway when his knee gave out causing him to fall landing onto his right hip.  He denies any head trauma or loss of consciousness.  He is having 8/10 pain to the right hip and is unable to move the leg or bear weight.  He denies fever, chills, chest pain, shortness of breath, abdominal pain, numbness, paresthesias, confusion.    In the ED patient afebrile and hemodynamically stable saturating well on room air. Xray imaging with Acute impacted right subcapital femoral fracture. Orthopedic surgery consulted and patient admitted to  for further evaluation and management.      Overview/Hospital Course:  Patient noted ot have UTI on admit and started on IV Ceftriaxone to treat. Patient admitted to [facility] Medicine Team H: Hip Fracture team and started on Hip Fracture Pathway with Orthopedic surgery consult for hip fracture. Patient was seen and evaluated by Orthopedic surgery who recommended operative repair of hip fracture. Patient was medically optimized prior to surgery and was taken to OR after optimization on 10/21/2022. Patient underwent right hip total hip arthroplasty by Dr. Isidro Paulino. Post-op patient WBAT with posterior hip precautions to the right lower extremity as per Orthopedics recommendation. Patient placed on Apixiban 2.5 mg po BID and MATTHEW/SCD's for DVT prophylaxis post-op and will need for total of 30 days. Perineural pain catheter placed by  Anesthesia Pain Service with continuous infusion of Ropivacaine to help with pain control post-op and Anesthesia Pain Service managing while patient in the hospital. Patient placed on multimodal pain management post-op with Tylenol 1000 mg po every 6 hours and Robaxin 500 mg po 3 times daily post-op and will continue. PT/OT consulted post-op. Patient noted to have increased Creatinine to 2.7 on 10/22 from 1.5 on 10/21. Patient ha very difficult Alejandre placement on admit and eventually Urology had to place in OR as noted to have gross hematuria. Urology felt hematuria related to Alejandre trauma. Plan to keep Alejandre in place for now as per Urology but plan voiding trial as per urology prior to hospital discharge Patient draining yellow urine in Alejandre bag on 10/22 and no further hematuria noted. Patient also noted to have SBP in 90's likely also contributing to rise in Creatinine. Home Losartan/HCTZ stopped on 10/22 and patient bolused 1 liter of NS. BP improved after fluids given and BP medication stopped on 10/23. Creatinine improved from 2.7 to 2.5 on 10/23. PT/OT recommending IP Rehab on discharge when medically ready. Creatinine improved again to 1.8 on 10/24. IVF's stopped. Plan to remove Alejandre and do voiding trial prior to discharge as per Urology recs. Hematuria resolved. Patient should be medically ready to discharge to an IP Rehab facility on 10/25. Final urine culture grew >100,000 organisms E. Coli that was pan sensitive.       Interval History: Patient states right hip pain better today. Anesthesia paused perineural pain catheter and likely to remove today. Alejandre continues to drain yellow urine and no blood. Plan voiding trial today and will remove Alejandre. Discussed with patient recs for IP Rehab and he wanted to know about facilities on [facility] where he is from and  to come and talk to patient. I also placed a PM&R consult for [facility] also. Creatinine improved to 1.8 today so will  stop IVF's. Plan to switch from IV Ceftriaxone to oral Cefpodoxime to treat E. Coli UTI today.     Review of Systems   Constitutional:  Negative for fever.   Respiratory:  Negative for cough and shortness of breath.    Cardiovascular:  Negative for chest pain and leg swelling.   Gastrointestinal:  Negative for abdominal pain, diarrhea, nausea and vomiting.   Musculoskeletal:  Positive for arthralgias (Right hip).   Neurological:  Negative for dizziness and light-headedness.   Psychiatric/Behavioral:  Negative for agitation and confusion.    Objective:     Vital Signs (Most Recent):  Temp: 98.6 °F (37 °C) (10/24/22 1634)  Pulse: 74 (10/24/22 1634)  Resp: 18 (10/24/22 1634)  BP: 139/67 (10/24/22 1634)  SpO2: 97 % (10/24/22 1634) on 2 liters of oxygen Vital Signs (24h Range):  Temp:  [97.6 °F (36.4 °C)-98.6 °F (37 °C)] 98.6 °F (37 °C)  Pulse:  [73-94] 74  Resp:  [16-18] 18  SpO2:  [94 %-98 %] 97 %  BP: (121-154)/(62-67) 139/67     Weight: 109.8 kg (242 lb)  Body mass index is 34.72 kg/m².    Intake/Output Summary (Last 24 hours) at 10/24/2022 1731  Last data filed at 10/24/2022 0647  Gross per 24 hour   Intake --   Output 1525 ml   Net -1525 ml      Physical Exam  Vitals and nursing note reviewed.   Constitutional:       General: He is awake. He is not in acute distress.     Appearance: Normal appearance. He is well-developed. He is obese. He is not ill-appearing.   Eyes:      General: No scleral icterus.  Cardiovascular:      Rate and Rhythm: Normal rate and regular rhythm.      Heart sounds: Normal heart sounds. No murmur heard.    No friction rub. No gallop.   Pulmonary:      Effort: Pulmonary effort is normal. No respiratory distress.      Breath sounds: Normal breath sounds. No wheezing or rales.   Abdominal:      General: Abdomen is flat. Bowel sounds are normal. There is no distension.      Palpations: Abdomen is soft.      Tenderness: There is no abdominal tenderness. There is no guarding.   Musculoskeletal:       Right lower leg: No edema.      Left lower leg: No edema.   Skin:     Findings: No erythema or rash.      Comments: Surgical dressing noted on right hip. Dressing is clean and dry and intact.   Neurological:      Mental Status: He is alert and oriented to person, place, and time.   Psychiatric:         Mood and Affect: Mood normal.         Behavior: Behavior normal. Behavior is cooperative.         Thought Content: Thought content normal.         Judgment: Judgment normal.       Significant Labs: CBC:   Recent Labs   Lab 10/23/22  0608 10/24/22  0556   WBC 9.02 8.06   HGB 9.7* 9.4*   HCT 30.5* 29.2*   * 144*     CMP:   Recent Labs   Lab 10/23/22  0608 10/24/22  0556    136   K 4.0 4.3    109   CO2 19* 20*    118*   BUN 46* 37*   CREATININE 2.5* 1.8*   CALCIUM 8.3* 8.4*   PROT 4.6* 4.6*   ALBUMIN 2.1* 1.9*   BILITOT 0.3 0.4   ALKPHOS 56 60   AST 15 15   ALT <5* <5*   ANIONGAP 8 7*       Significant Imaging: I have reviewed all pertinent imaging results/findings within the past 24 hours.      Assessment/Plan:      * Closed displaced fracture of right femoral neck s/p total hip arthroplasty on 10/21/2022  · Patient underwent right total hip replacement for femoral neck fracture by Ortho on 10/21/2022.   · Patient reports moderate pain in right hip and improved from previous days.   · Plan to continue PT/OT for gait training and strengthening and restoration of ADL's.   · Patient is FWB/WBAT: right lower extremity, posterior hip precautions as per Orthopedic recommendations.   · Plan is to continue Apixiban 2.5 mg po BID and will need a total of 30 days post-op after hip fracture surgery for DVT prophylaxis.   · Orthopedics is following and managing hip fracture and surgical site.   · Perineural pain catheter in place with continuous Ropivacaine infusion for pain control and being managed by Anesthesia Pain Service. Likely to be removed today by Anesthesia.   · Patient on multimodal pain  management post-op with Tylenol 1000 mg po every 6 hours and Robaxin 500 mg po 3 times daily post-op. Continue Tylenol but will increase Robaxin from 500 to 750 mg po TID and add Lyrica 75 mg po nightly to see if will help with pain. Continue Oxy IR prn for breakthrough pain.   · Therapy recommending IP Rehab on discharge and  working with patient on IP Rehab. Patient would like to look at facilities on the [facility]. PM&R consult also placed for [facility].     Acute renal failure superimposed on stage 3a chronic kidney disease  · Continues to improve on 10/24. Stop IVF's. Creatinine 1.8 on 10/24.   · Improved. Creatinine down to 2.5 on 10/23. Continue IVF's.   · Patient's creatinine's has increased above baseline renal function to 2.7 on 10/22 consistent with acute kidney injury as up from baseline creatinine of 1.5-1.7.   · Suspect related to difficult Alejandre placement and hypovolemia.   · Patient to be bolused 1 liter of NS then maintenance fluid of NS at 150 cc/hr.   · Patient needs strict I+Os to closely monitor urine output.   · Avoid any nephrotoxic agents such as NSAIDS, IV contrast or aminoglycosides that could worsen renal function.  · Renally adjust all medications.   · Monitor daily BMP to follow renal function.   · Renal ultrasound from 10/22 unremarkable with no hydronephrosis.     Hematuria  · Hematuria resolved. Urology consulted in OR after noting gross hematuria in Alejandre bag. Patient had multiple prior Alejandre attempts during this admit by nursing and was a difficult Alejandre placement. Patient with known BPH. Alejandre replaced by Urology in OR. Hematuria resolved on am of 10/22 and Urology suspects Alejandre trauma as cause of hematuria .  · Remove Alejandre today, 10/24 as part of voiding trial.   · Appreciate Urology assistance on the case.   · Renal ultrasound on 10/22 showed no hydronephrosis.   · Urology recommends follow up with Dr. Dubon for discussion of BPH in Urology clinic.      Type 2 diabetes mellitus with hyperglycemia, without long-term current use of insulin  Good control in the hospital in past 24 hours. Patient on Trulicity, Metformin and Glipizide at home to treat. Hold these meds in hospital.     Blood Sugars (AccuCheck):    Recent Labs     10/23/22  0802 10/23/22  1257 10/23/22  1600 10/23/22  1942 10/24/22  1146 10/24/22  1632   POCTGLUCOSE 117* 323* 258* 178* 270* 216*        · Plan is to continue to monitor POCT glucose 4 times a day with each meal and at bedtime and cover with Novolog low dose sliding scale insulin.   · Plan is to continue 2000 calorie diabetic diet in the hospital.   · Target pre-meal glucose goal is <140 with all random glucoses <180 in non-critically ill patient.      Primary hypertension  Controlled off BP medication. Patient hypotensive on 10/22 discontinued home Losartan/HCTZ 100/25 mg.    Coronary artery disease involving native coronary artery of native heart without angina pectoris  - Chronic and controlled. Patient asymptomatic. Patient with previous CABG and cardiac stenting. Last stent>1yr ago currently on ASA monotherapy and will continue in hospital.  - Continue home Lipitor to treat CAD.    E. coli urinary tract infection  · Patient to be switched from IV Ceftriaxone to po Cefpodoxime on 10/24 to complete treatment of UTI.   · Final urine culture resulted on 10/23 with pan sensitive >100,000 organisms E. Coli. Will continue IV Ceftriaxone to treat.   · Present on admit. Admit U/A with 37 WBC and occasional bacteria. Patient started on IV Ceftriaxone 1 gram daily to treat. Prelim urine culture with > 100,000 organisms with gram negative senia. Continue Ceftriaxone pending final urine culture results and sensitivities.         VTE Risk Mitigation (From admission, onward)         Ordered     apixaban tablet 2.5 mg  2 times daily         10/21/22 1542     IP VTE HIGH RISK PATIENT  Once         10/20/22 1912     Place sequential compression device   Until discontinued         10/20/22 1912                Discharge Planning   SARKIS: 10/25/2022     Code Status: Full Code   Is the patient medically ready for discharge?: No    Reason for patient still in hospital (select all that apply): Patient trending condition           Trudy Cross MD  Department of Hospital Medicine   [facility]

## 2022-10-24 NOTE — ASSESSMENT & PLAN NOTE
· Patient underwent right total hip replacement for femoral neck fracture by Ortho on 10/21/2022.   · Patient reports moderate pain in right hip and improved from previous days.   · Plan to continue PT/OT for gait training and strengthening and restoration of ADL's.   · Patient is FWB/WBAT: right lower extremity, posterior hip precautions as per Orthopedic recommendations.   · Plan is to continue Apixiban 2.5 mg po BID and will need a total of 30 days post-op after hip fracture surgery for DVT prophylaxis.   · Orthopedics is following and managing hip fracture and surgical site.   · Perineural pain catheter in place with continuous Ropivacaine infusion for pain control and being managed by Anesthesia Pain Service. Likely to be removed today by Anesthesia.   · Patient on multimodal pain management post-op with Tylenol 1000 mg po every 6 hours and Robaxin 500 mg po 3 times daily post-op. Continue Tylenol but will increase Robaxin from 500 to 750 mg po TID and add Lyrica 75 mg po nightly to see if will help with pain. Continue Oxy IR prn for breakthrough pain.   · Therapy recommending IP Rehab on discharge and  working with patient on IP Rehab. Patient would like to look at facilities on the [facility]. PM&R consult also placed for [facility].

## 2022-10-24 NOTE — PT/OT/SLP PROGRESS
Physical Therapy Treatment    Patient Name:  Jani Cha   MRN:  [id number]    Recommendations:     Discharge Recommendations:  rehabilitation facility   Discharge Equipment Recommendations: hip kit, walker, rolling, bedside commode   Barriers to discharge: Decreased caregiver support    Assessment:     Jani Cha is a 77 y.o. male admitted with a medical diagnosis of Closed displaced fracture of right femoral neck.  He presents with the following impairments/functional limitations:  impaired cardiopulmonary response to activity (weakness; impaired endurance; impaired self care skills; impaired functional mobility; gait instability; impaired balance; decreased lower extremity function; decreased ROM; orthopedic precautions; decreased coordination; pain; decreased safety awareness) Pt  tolerated treatment fairly well and is progressing slowly with mobility. pt limited due to fatigue. Patient remains appropriate for continued skilled services within the acute environment and goals remain appropriate.    Rehab Prognosis: Good; patient would benefit from acute skilled PT services to address these deficits and reach maximum level of function.    Recent Surgery: Procedure(s) (LRB):  ARTHROPLASTY, HIP, RIGHT (Right) 3 Days Post-Op    Plan:     During this hospitalization, patient to be seen daily to address the identified rehab impairments via therapeutic exercises, therapeutic activities, gait training, neuromuscular re-education and progress toward the following goals:    Plan of Care Expires:  11/21/22    Subjective     Chief Complaint: I am in a hospital bed, I am not doing well  Pain/Comfort:  Pain Rating 1:  (pt did not rate)  Location - Side 1: Right  Location - Orientation 1: generalized  Location 1: hip  Pain Addressed 1: Reposition, Distraction, Cessation of Activity  Pain Rating Post-Intervention 1:  (pt did not rate)      Objective:     Communicated with RN prior to session.  Patient found HOB  elevated with  (spivey catheter; telemetry; peripheral IV; perineural catheter; oxygen) upon PT entry to room.     General Precautions: Standard, fall   Orthopedic Precautions: (RLE weight bearing as tolerated; RLE posterior precautions)   Braces: N/A  Respiratory Status: Nasal cannula     Functional Mobility:  Bed Mobility:     Supine to Sit: moderate assistance  Transfers:   vcsfor hand placement and R LE post precautions  Sit to Stand:  contact guard assistance with rolling walker  Toilet Transfer: contact guard assistance with  rolling walker  using  Step Transfer  Gait: pt amb 3 ft and 8 ft with RW with CGa. Pt declined further ambulation      AM-PAC 6 CLICK MOBILITY  Turning over in bed (including adjusting bedclothes, sheets and blankets)?: 2  Sitting down on and standing up from a chair with arms (e.g., wheelchair, bedside commode, etc.): 3  Moving from lying on back to sitting on the side of the bed?: 2  Moving to and from a bed to a chair (including a wheelchair)?: 3  Need to walk in hospital room?: 3  Climbing 3-5 steps with a railing?: 1  Basic Mobility Total Score: 14       Treatment & Education:  Therapist provided instruction and educated of  patient on progress, safety,d/c,PT POC,   proper body mechanics, energy conservation, and fall prevention strategies during tasks listed above, on the effects of prolonged immobility and the importance of performing OOB activity and exercises to promote healing and reduce recovery time    Pt performed AP,QS,GS x 10 reps   Updated white board with appropriate PT mobility information for medical team notification     Donned an extra gown   Call nursing/pct to transfer to chair/use bathroom. Pt stated understanding      Bedside table in front of patient and area set up for function, convenience, and safety. RN aware of patient's mobility needs and status. Questions/concerns addressed within PTA scope of practice; patient  with no further questions. Time was provided  for active listening, discussion of health disposition, and discussion of safe discharge. Pt?verbalized?agreement .    Patient left up in chair with all lines intact, call button in reach, and nsg notified..    GOALS:   Multidisciplinary Problems       Physical Therapy Goals          Problem: Physical Therapy    Goal Priority Disciplines Outcome Goal Variances Interventions   Physical Therapy Goal     PT, PT/OT Ongoing, Progressing     Description: Goals to be met by: 22     Patient will increase functional independence with mobility by performin. Supine to sit with Contact Guard Assistance  2. Sit to supine with Contact Guard Assistance  3. Sit to stand transfer with Stand-by Assistance using RW   4. Gait  x 75 feet with Contact Guard Assistance using Rolling Walker.   5. Stand for 3 minutes with Stand-by Assistance using Rolling Walker  6. Lower extremity exercise program x20 reps per handout, with assistance as needed                         Time Tracking:     PT Received On: 10/24/22  PT Start Time: 1015     PT Stop Time: 1039  PT Total Time (min): 24 min     Billable Minutes: Gait Training 12 and Therapeutic Activity 12    Treatment Type: Treatment  PT/PTA: PTA     PTA Visit Number: 2     10/24/2022

## 2022-10-25 VITALS
BODY MASS INDEX: 37.21 KG/M2 | RESPIRATION RATE: 18 BRPM | WEIGHT: 259.94 LBS | HEART RATE: 98 BPM | DIASTOLIC BLOOD PRESSURE: 57 MMHG | TEMPERATURE: 98 F | SYSTOLIC BLOOD PRESSURE: 119 MMHG | OXYGEN SATURATION: 92 % | HEIGHT: 70 IN

## 2022-10-25 PROBLEM — N18.31 ACUTE RENAL FAILURE SUPERIMPOSED ON STAGE 3A CHRONIC KIDNEY DISEASE: Status: RESOLVED | Noted: 2020-01-24 | Resolved: 2022-10-25

## 2022-10-25 PROBLEM — R31.9 HEMATURIA: Status: RESOLVED | Noted: 2022-10-22 | Resolved: 2022-10-25

## 2022-10-25 PROBLEM — N17.9 ACUTE RENAL FAILURE SUPERIMPOSED ON STAGE 3A CHRONIC KIDNEY DISEASE: Status: RESOLVED | Noted: 2020-01-24 | Resolved: 2022-10-25

## 2022-10-25 LAB
ALBUMIN SERPL BCP-MCNC: 2 G/DL (ref 3.5–5.2)
ALP SERPL-CCNC: 63 U/L (ref 55–135)
ALT SERPL W/O P-5'-P-CCNC: 5 U/L (ref 10–44)
ANION GAP SERPL CALC-SCNC: 6 MMOL/L (ref 8–16)
AST SERPL-CCNC: 17 U/L (ref 10–40)
BASOPHILS # BLD AUTO: 0.04 K/UL (ref 0–0.2)
BASOPHILS NFR BLD: 0.5 % (ref 0–1.9)
BILIRUB SERPL-MCNC: 0.4 MG/DL (ref 0.1–1)
BUN SERPL-MCNC: 33 MG/DL (ref 8–23)
CALCIUM SERPL-MCNC: 8.9 MG/DL (ref 8.7–10.5)
CHLORIDE SERPL-SCNC: 109 MMOL/L (ref 95–110)
CO2 SERPL-SCNC: 24 MMOL/L (ref 23–29)
CREAT SERPL-MCNC: 1.4 MG/DL (ref 0.5–1.4)
DIFFERENTIAL METHOD: ABNORMAL
EOSINOPHIL # BLD AUTO: 0.5 K/UL (ref 0–0.5)
EOSINOPHIL NFR BLD: 6.7 % (ref 0–8)
ERYTHROCYTE [DISTWIDTH] IN BLOOD BY AUTOMATED COUNT: 13.3 % (ref 11.5–14.5)
EST. GFR  (NO RACE VARIABLE): 52.1 ML/MIN/1.73 M^2
GLUCOSE SERPL-MCNC: 151 MG/DL (ref 70–110)
HCT VFR BLD AUTO: 30.3 % (ref 40–54)
HGB BLD-MCNC: 9.7 G/DL (ref 14–18)
IMM GRANULOCYTES # BLD AUTO: 0.04 K/UL (ref 0–0.04)
IMM GRANULOCYTES NFR BLD AUTO: 0.5 % (ref 0–0.5)
LYMPHOCYTES # BLD AUTO: 1.4 K/UL (ref 1–4.8)
LYMPHOCYTES NFR BLD: 18.5 % (ref 18–48)
MAGNESIUM SERPL-MCNC: 1.8 MG/DL (ref 1.6–2.6)
MCH RBC QN AUTO: 30.5 PG (ref 27–31)
MCHC RBC AUTO-ENTMCNC: 32 G/DL (ref 32–36)
MCV RBC AUTO: 95 FL (ref 82–98)
MONOCYTES # BLD AUTO: 0.7 K/UL (ref 0.3–1)
MONOCYTES NFR BLD: 8.9 % (ref 4–15)
NEUTROPHILS # BLD AUTO: 5 K/UL (ref 1.8–7.7)
NEUTROPHILS NFR BLD: 64.9 % (ref 38–73)
NRBC BLD-RTO: 0 /100 WBC
PHOSPHATE SERPL-MCNC: 2.8 MG/DL (ref 2.7–4.5)
PLATELET # BLD AUTO: 167 K/UL (ref 150–450)
PMV BLD AUTO: 9.9 FL (ref 9.2–12.9)
POCT GLUCOSE: 152 MG/DL (ref 70–110)
POCT GLUCOSE: 269 MG/DL (ref 70–110)
POTASSIUM SERPL-SCNC: 4.5 MMOL/L (ref 3.5–5.1)
PROT SERPL-MCNC: 4.9 G/DL (ref 6–8.4)
RBC # BLD AUTO: 3.18 M/UL (ref 4.6–6.2)
SODIUM SERPL-SCNC: 139 MMOL/L (ref 136–145)
WBC # BLD AUTO: 7.72 K/UL (ref 3.9–12.7)

## 2022-10-25 PROCEDURE — 25000003 PHARM REV CODE 250: Performed by: STUDENT IN AN ORGANIZED HEALTH CARE EDUCATION/TRAINING PROGRAM

## 2022-10-25 PROCEDURE — 85025 COMPLETE CBC W/AUTO DIFF WBC: CPT | Performed by: FAMILY MEDICINE

## 2022-10-25 PROCEDURE — 84100 ASSAY OF PHOSPHORUS: CPT | Performed by: FAMILY MEDICINE

## 2022-10-25 PROCEDURE — 25000003 PHARM REV CODE 250: Performed by: INTERNAL MEDICINE

## 2022-10-25 PROCEDURE — 36415 COLL VENOUS BLD VENIPUNCTURE: CPT | Performed by: FAMILY MEDICINE

## 2022-10-25 PROCEDURE — 83735 ASSAY OF MAGNESIUM: CPT | Performed by: FAMILY MEDICINE

## 2022-10-25 PROCEDURE — 99231 PR SUBSEQUENT HOSPITAL CARE,LEVL I: ICD-10-PCS | Mod: GC,,, | Performed by: ANESTHESIOLOGY

## 2022-10-25 PROCEDURE — 99239 HOSP IP/OBS DSCHRG MGMT >30: CPT | Mod: ,,, | Performed by: INTERNAL MEDICINE

## 2022-10-25 PROCEDURE — 99231 SBSQ HOSP IP/OBS SF/LOW 25: CPT | Mod: GC,,, | Performed by: ANESTHESIOLOGY

## 2022-10-25 PROCEDURE — 99239 PR HOSPITAL DISCHARGE DAY,>30 MIN: ICD-10-PCS | Mod: ,,, | Performed by: INTERNAL MEDICINE

## 2022-10-25 PROCEDURE — 80053 COMPREHEN METABOLIC PANEL: CPT | Performed by: FAMILY MEDICINE

## 2022-10-25 PROCEDURE — 25000003 PHARM REV CODE 250: Performed by: FAMILY MEDICINE

## 2022-10-25 RX ORDER — TALC
6 POWDER (GRAM) TOPICAL NIGHTLY PRN
Refills: 0
Start: 2022-10-25

## 2022-10-25 RX ORDER — CEFPODOXIME PROXETIL 100 MG/1
100 TABLET, FILM COATED ORAL EVERY 12 HOURS
Start: 2022-10-25 | End: 2022-10-27

## 2022-10-25 RX ORDER — AMOXICILLIN 250 MG
1 CAPSULE ORAL 2 TIMES DAILY
Start: 2022-10-25 | End: 2023-11-02

## 2022-10-25 RX ORDER — OXYCODONE HYDROCHLORIDE 5 MG/1
5 TABLET ORAL EVERY 4 HOURS PRN
Refills: 0
Start: 2022-10-25 | End: 2022-11-28

## 2022-10-25 RX ORDER — ACETAMINOPHEN 500 MG
1000 TABLET ORAL EVERY 8 HOURS PRN
Refills: 0 | COMMUNITY
Start: 2022-10-25

## 2022-10-25 RX ORDER — METHOCARBAMOL 750 MG/1
750 TABLET, FILM COATED ORAL 3 TIMES DAILY
Start: 2022-10-25 | End: 2023-03-03 | Stop reason: ALTCHOICE

## 2022-10-25 RX ORDER — PREGABALIN 75 MG/1
75 CAPSULE ORAL NIGHTLY
Start: 2022-10-25 | End: 2023-04-17 | Stop reason: CLARIF

## 2022-10-25 RX ADMIN — METHOCARBAMOL 750 MG: 750 TABLET ORAL at 09:10

## 2022-10-25 RX ADMIN — ASPIRIN 81 MG: 81 TABLET, CHEWABLE ORAL at 09:10

## 2022-10-25 RX ADMIN — ATORVASTATIN CALCIUM 80 MG: 40 TABLET, FILM COATED ORAL at 09:10

## 2022-10-25 RX ADMIN — ACETAMINOPHEN 1000 MG: 500 TABLET ORAL at 12:10

## 2022-10-25 RX ADMIN — ACETAMINOPHEN 1000 MG: 500 TABLET ORAL at 06:10

## 2022-10-25 RX ADMIN — SENNOSIDES AND DOCUSATE SODIUM 1 TABLET: 50; 8.6 TABLET ORAL at 09:10

## 2022-10-25 RX ADMIN — INSULIN ASPART 6 UNITS: 100 INJECTION, SOLUTION INTRAVENOUS; SUBCUTANEOUS at 11:10

## 2022-10-25 RX ADMIN — APIXABAN 2.5 MG: 2.5 TABLET, FILM COATED ORAL at 09:10

## 2022-10-25 NOTE — PLAN OF CARE
Pt and spouse have chosen [facility], [facility] team updated. Orders completed, will follow with transport.

## 2022-10-25 NOTE — ANESTHESIA POST-OP PAIN MANAGEMENT
Acute Pain Service Progress Note    Jani Cha is a 77 y.o. male with HTN, CAD (s/p CABG x 4), T2DM, and CKD admitted following fall from standing resulting in right subcapital femoral fracture for which he underwent right hip arthroplasty on 10/21 with Dr. Paulino.    Surgery:  ARTHROPLASTY, HIP, RIGHT (Right: Hip)    Post Op Day #: 4    Catheter type: perineural  R SIFI    Infusion type: Ropivacaine 0.2%  0.1cc/hr basal + 15cc/3hr intermittent bolus    Problem List:    Active Hospital Problems    Diagnosis  POA    *Closed displaced fracture of right femoral neck s/p total hip arthroplasty on 10/21/2022 [S72.001A]  Yes    Hematuria [R31.9]  Yes    E. coli urinary tract infection [N39.0, B96.20]  Yes    Type 2 diabetes mellitus with hyperglycemia, without long-term current use of insulin [E11.65]  Yes    Acute renal failure superimposed on stage 3a chronic kidney disease [N17.9, N18.31]  Yes    Primary hypertension [I10]  Yes    Coronary artery disease involving native coronary artery of native heart without angina pectoris [I25.10]  Yes     Chronic     s/p 4 V CABG  Cardiologist - Dr. Oliveira        Resolved Hospital Problems   No resolved problems to display.       Subjective:     General appearance of alert & oriented. States pain is tolerable with current regimen.   Pain with rest: 2    Numbers   Pain with movement: 4    Numbers   Side Effects    1. Pruritis No    2. Nausea No    3. Motor Blockade No, 0=Ability to raise lower extremities off bed    4. Sedation No, 1=awake and alert    Objective:     Catheter site clean, dry, intact      Vitals   Vitals:    10/25/22 1109   BP: (!) 119/57   Pulse: 98   Resp: 18   Temp: 36.7 °C (98.1 °F)      Meds   Current Facility-Administered Medications   Medication Dose Route Frequency Provider Last Rate Last Admin    acetaminophen tablet 1,000 mg  1,000 mg Oral Q6H Trudy Cross MD   1,000 mg at 10/25/22 0600    apixaban tablet 2.5 mg  2.5 mg Oral BID  Mosheglenn Razo MD   2.5 mg at 10/25/22 0922    aspirin chewable tablet 81 mg  81 mg Oral Daily Trudy Cross MD   81 mg at 10/25/22 0922    atorvastatin tablet 80 mg  80 mg Oral Daily Jose Antonio Palomino MD   80 mg at 10/25/22 0923    bisacodyL suppository 10 mg  10 mg Rectal Daily PRN Jose Antonio Palomino MD        cefpodoxime tablet 100 mg  100 mg Oral Q12H Trudy Cross MD   100 mg at 10/24/22 2142    dextrose 10% bolus 125 mL  12.5 g Intravenous PRN Trudy Cross MD        dextrose 10% bolus 250 mL  25 g Intravenous PRN Trudy Cross MD        glucagon (human recombinant) injection 1 mg  1 mg Intramuscular PRN Jose Antonio Palomino MD        glucose chewable tablet 16 g  16 g Oral PRN Jose Antonio Palomino MD        glucose chewable tablet 24 g  24 g Oral PRN Jose Antonio Palomino MD        insulin aspart U-100 pen 1-10 Units  1-10 Units Subcutaneous QID (AC + HS) PRN Jose Antonio Palomino MD   6 Units at 10/25/22 1131    melatonin tablet 6 mg  6 mg Oral Nightly PRN Patience Connell PA-C   6 mg at 10/24/22 2147    methocarbamoL tablet 750 mg  750 mg Oral TID Trudy Cross MD   750 mg at 10/25/22 0922    ondansetron injection 4 mg  4 mg Intravenous Q12H PRN Jose Antonio Palomino MD        oxyCODONE immediate release tablet 5 mg  5 mg Oral Q4H PRN Garett Carmen MD   5 mg at 10/24/22 2144    polyethylene glycol packet 17 g  17 g Oral Daily Jose Antonio Palomino MD   17 g at 10/24/22 1129    pregabalin capsule 75 mg  75 mg Oral Nightly Trudy Cross MD   75 mg at 10/24/22 2143    senna-docusate 8.6-50 mg per tablet 1 tablet  1 tablet Oral BID Jose Antonio Palomino MD   1 tablet at 10/25/22 0922    sodium chloride 0.9% flush 10 mL  10 mL Intravenous PRN Jose Antonio Palomino MD   10 mL at 10/23/22 0022        Anticoagulant: apixaban 2.5mg BID    Assessment:     POD #4 s/p R Hip Arthroplasty w/ Dr. Paulino.   Pain control adequate, pending discharge to rehab    Plan:     Discontinue  PNC- pulled this AM with blue tip intact, tolerated well   Continue multimodal analgesic regimen:    - Tylenol 1000mg Q6H    - Methocarbamol 500mg TID    - Lyrica 75 mg Qhs    - Continue oxycodone 5mg Q4H PRN today for breakthrough pain      Case discussed with staff, Dr. Soto; final recommendations per attestation above.     Thank you for your consult and allowing us to participate in the care of this patient. We will sign off. Please call the Acute Pain Service at [phone] or Anesthesia at [phone] if you have any questions or concerns.      Reyes Wallace MD  Anesthesiology Resident CA1/PGY2  [facility]

## 2022-10-25 NOTE — PLAN OF CARE
[facility]  Discharge Final Note    Primary Care Provider: Laila Bains MD    Expected Discharge Date: 10/25/2022    Final Discharge Note (most recent)       Final Note - 10/25/22 0825          Final Note    Assessment Type Final Discharge Note     Anticipated Discharge Disposition Rehab Facility     What phone number can be called within the next 1-3 days to see how you are doing after discharge? [phone]     Hospital Resources/Appts/Education Provided Post-Acute resouces added to AVS        Post-Acute Status    Post-Acute Authorization Placement     Post-Acute Placement Status Set-up Complete/Auth obtained     Discharge Delays None known at this time                     Important Message from Medicare  Important Message from Medicare regarding Discharge Appeal Rights: Given to patient/caregiver     Date IMM was signed: 10/25/22  Time IMM was signed: 1254    Waiting room assignment with Tucker to setup stretcher transport pended, wife updated, IMM verbalized.

## 2022-10-25 NOTE — SUBJECTIVE & OBJECTIVE
"Principal Problem:Closed displaced fracture of right femoral neck    Principal Orthopedic Problem: s/p R DARNELL on 10/21 by Dr. Paulino    Interval History: Patient seen and examined at bedside. NAEON. Pain controlled. Pt ambulated 11 ft with PT. Cr down to 1.4, hgb 9.67.      Review of patient's allergies indicates:   Allergen Reactions    Penicillins Hives, Itching and Rash    Shellfish containing products        Current Facility-Administered Medications   Medication    acetaminophen tablet 1,000 mg    apixaban tablet 2.5 mg    aspirin chewable tablet 81 mg    atorvastatin tablet 80 mg    bisacodyL suppository 10 mg    cefpodoxime tablet 100 mg    dextrose 10% bolus 125 mL    dextrose 10% bolus 250 mL    glucagon (human recombinant) injection 1 mg    glucose chewable tablet 16 g    glucose chewable tablet 24 g    insulin aspart U-100 pen 1-10 Units    melatonin tablet 6 mg    methocarbamoL tablet 750 mg    ondansetron injection 4 mg    oxyCODONE immediate release tablet 5 mg    polyethylene glycol packet 17 g    pregabalin capsule 75 mg    senna-docusate 8.6-50 mg per tablet 1 tablet    sodium chloride 0.9% flush 10 mL     Objective:     Vital Signs (Most Recent):  Temp: 98.3 °F (36.8 °C) (10/25/22 0751)  Pulse: 82 (10/25/22 0751)  Resp: 18 (10/25/22 0751)  BP: 129/64 (10/25/22 0751)  SpO2: 96 % (10/25/22 0751)   Vital Signs (24h Range):  Temp:  [97.2 °F (36.2 °C)-98.7 °F (37.1 °C)] 98.3 °F (36.8 °C)  Pulse:  [73-86] 82  Resp:  [18-20] 18  SpO2:  [94 %-97 %] 96 %  BP: (117-150)/(64-70) 129/64     Weight: 117.9 kg (259 lb 14.8 oz)  Height: 5' 10" (177.8 cm)  Body mass index is 37.29 kg/m².      Intake/Output Summary (Last 24 hours) at 10/25/2022 1056  Last data filed at 10/25/2022 0604  Gross per 24 hour   Intake 300 ml   Output 3000 ml   Net -2700 ml         Ortho/SPM Exam  AAOx4  NAD  Reg rate  No increased WOB    RLE:  Dressing c/d/i  SILT T/SP/DP/Dorantes/Sa  Motor intact T/SP/DP  WWP extremities  FCDs in place and " functioning      Significant Labs: CBC:   Recent Labs   Lab 10/24/22  0556 10/25/22  0243   WBC 8.06 7.72   HGB 9.4* 9.7*   HCT 29.2* 30.3*   * 167       CMP:   Recent Labs   Lab 10/24/22  0556 10/25/22  0242    139   K 4.3 4.5    109   CO2 20* 24   * 151*   BUN 37* 33*   CREATININE 1.8* 1.4   CALCIUM 8.4* 8.9   PROT 4.6* 4.9*   ALBUMIN 1.9* 2.0*   BILITOT 0.4 0.4   ALKPHOS 60 63   AST 15 17   ALT <5* 5*   ANIONGAP 7* 6*       Urine Culture:   No results for input(s): LABURIN in the last 48 hours.    Urine Studies:   No results for input(s): COLORU, APPEARANCEUA, PHUR, SPECGRAV, PROTEINUA, GLUCUA, KETONESU, BILIRUBINUA, OCCULTUA, NITRITE, UROBILINOGEN, LEUKOCYTESUR, RBCUA, WBCUA, BACTERIA, SQUAMEPITHEL, HYALINECASTS in the last 48 hours.    Invalid input(s): YURISUR    All pertinent labs within the past 24 hours have been reviewed.    Significant Imaging: I have reviewed all pertinent imaging results/findings.

## 2022-10-25 NOTE — ADDENDUM NOTE
Addendum  created 10/25/22 1506 by Jessica Soto MD    Charge Capture section accepted, Cosign clinical note with attestation

## 2022-10-25 NOTE — PHYSICIAN QUERY
PT Name: Jani Cha  MR #: [id number]    DOCUMENTATION CLARIFICATION      CDS/: Claire Solis RN, CDS   Contact Information: [email or web address]     This form is a permanent document in the medical record.      Query Date: October 25, 2022    By submitting this query, we are merely seeking further clarification of documentation. Please utilize your independent clinical judgment when addressing the question(s) below.    The Medical Record contains the following:   Indicators  Supporting Clinical Findings Location in Medical Record    Anemia documented     x H&H H&H  14.6/44.3  H&H  11.5/35.1  H&H    9.4/29.2 Labs 10/20  Labs 10/22  Labs 10/24   x BP                    HR BP: (132-136)/(72-88)   Pulse:  [67-73  BP: ()/(48-61)     Pulse:  [69-98]  BP: ()/(46-65)     Pulse:  [69-95]  BP: (121-154)/(62-67)   Pulse:  [73-94] [facility] 10/20  [facility] 10/22  [facility] 10/23  [facility] 10/24    GI bleeding documented      Acute bleeding (Non GI site)      Transfusion(s)     x Acute/Chronic illness Closed displaced fracture of right femoral neck  HTN, CAD, DM2  MADISYN,CKF3a, Acute cystitis without hematuria. Gross Hematuria - difficult spivey placement.  hypovolemia.  [facility] 10/20    [facility] 10/22   x Treatments Sodium chloride 0.9% bolus 1,000mL IV bolus    SBP in 80's so likely also contributing to rise in Creatinine. Home Losartan/HCTZ stopped this am and patient bolused 1 liter of NS for hypotension.   [facility] 10/22    [facility] 10/22   x Other Right total hip arthroplasty for femoral neck fracture   cc OP note 10/21     Provider, please specify diagnosis or diagnoses associated with above clinical findings.   [   ] Acute blood loss anemia    [ x  ] Acute blood loss anemia expected post-operatively    [   ] Precipitous drop in Hematocrit   [   ] Anemia, unspecified    [   ] Other Hematological Diagnosis (please specify):  _________________   [   ] Clinically Undetermined       Please document in your progress notes daily for the duration of treatment, until resolved, and include in your discharge summary.    Form No. [id number]

## 2022-10-25 NOTE — ADDENDUM NOTE
Addendum  created 10/25/22 0758 by Jessica Parish, RN    Delete clinical note, LDA removed, Order list changed

## 2022-10-25 NOTE — ASSESSMENT & PLAN NOTE
Jani Cha is a 77 y.o. male with right femoral neck fracture s/p R DARNELL on 10/21/22 by Dr. Paulino. Doing well.    Pain control: multimodal, avoid nephrotoxic meds given CKD  PT/OT: WBAT RLE, posterior hip precautions  DVT PPx: Eliquis 2.5 BID, SCDs at all times when not ambulating  Abx: postop Ancef. Rocephin for UTI (pansensitive E coli)  Labs:Cr down to 1.4  Drain: none   Alejandre: dc today   CKD: per     Dispo: PT recs IPR

## 2022-10-25 NOTE — PLAN OF CARE
[facility]     Department of Hospital Medicine     [address] [place] [address]     [phone] after hours  [phone] fax                                        FACILITY TRANSFER ORDERS     10/25/2022    Admit to:  [facility]    Diagnoses:  Active Hospital Problems    Diagnosis  POA    *Closed displaced fracture of right femoral neck s/p total hip arthroplasty on 10/21/2022 [S72.001A]  Yes     Priority: 1 - High    Acute renal failure superimposed on stage 3a chronic kidney disease [N17.9, N18.31]  Yes     Priority: 1 - High    Hematuria [R31.9]  Yes     Priority: 2     Type 2 diabetes mellitus with hyperglycemia, without long-term current use of insulin [E11.65]  Yes     Priority: 3     Primary hypertension [I10]  Yes     Priority: 4     Coronary artery disease involving native coronary artery of native heart without angina pectoris [I25.10]  Yes     Priority: 5      Chronic     s/p 4 V CABG  Cardiologist - Dr. Oliveira      E. coli urinary tract infection [N39.0, B96.20]  Yes     Priority: 6       Resolved Hospital Problems   No resolved problems to display.       Vital Signs: Routine.    Allergies:  Review of patient's allergies indicates:   Allergen Reactions    Penicillins Hives, Itching and Rash    Shellfish containing products        Code Status: Full Code     Diet: diabetic diet: 2000 calorie      Supplement: House supplement, one can by mouth with meals                             Activities:   - Activity as tolerated   - Up in a chair each morning as tolerated   - Ambulate with assistance to bathroom   - May use walker, cane, or self-propelled wheelchair    Weight Bearing Status: FWB/WBAT: right lower extremity, Posterior Hip Precautions    Nursing: Out of bed BID, Blood glucose target 100-130, Up with assistance  Measure height and weight on admit    Nursing Precautions:       - Fall precautions per nursing home  protocol      Labs: Per facility protocol    CONSULTS:      Physical Therapy to evaluate and treat 5 times a week      Occupational Therapy to evaluate and treat 5 times a week     MISCELLANEOUS CARE:  Routine Skin for Bedridden Patients: Instruct patient/caregiver to apply moisture barrier cream to all skin folds and wet areas in perineal area daily and after baths and all bowel movements.    WOUND CARE ORDERS:  Keep surgical dressing in place that was applied post-op and leave on right hip and do not remove until Orthopedic clinic follow-up. Assess surgical dressing with each treatment. Call MD if any drainage reaches border to border of dressing horizontally, signs or symptoms of infection, temp >101 F, induration, swelling or redness.        DIABETES CARE:     SN to perform and educate Diabetic management with blood glucose monitoring:, Fingerstick blood sugar before each meal and at bedtime, and Report CBG < 60 or > 350 to physician.                                          Insulin Sliding Scale          Glucose  Novolog Insulin Subcutaneous        0 - 60   Orange juice or glucose tablet, hold insulin      No insulin   201-250  2 units   251-300  4 units   301-350  6 units   351-400  8 units   >400   10 units then call physician    Medications:     Current Discharge Medication List        START taking these medications    Details   acetaminophen (TYLENOL) 500 MG tablet Take 2 tablets (1,000 mg total) by mouth every 8 (eight) hours as needed (Mild to moderate pain).  Refills: 0      apixaban (ELIQUIS) 2.5 mg Tab Take 1 tablet (2.5 mg total) by mouth 2 (two) times daily. DVT prophylaxis after hip replacement surgery. End date 11/24/2022.  Qty: 60 tablet, Refills: 0      cefpodoxime (VANTIN) 100 MG tablet Take 1 tablet (100 mg total) by mouth every 12 (twelve) hours. End date 10/27/2022. E. Coli UTI. for 2 days      melatonin (MELATIN) 3 mg tablet Take 2 tablets (6 mg total) by mouth nightly as needed for  Insomnia.  Refills: 0      methocarbamoL (ROBAXIN) 750 MG Tab Take 1 tablet (750 mg total) by mouth 3 (three) times daily.      oxyCODONE (ROXICODONE) 5 MG immediate release tablet Take 1 tablet (5 mg total) by mouth every 4 (four) hours as needed (Moderate to severe pain).  Refills: 0    Comments: Quantity prescribed more than 7 day supply? No      pregabalin (LYRICA) 75 MG capsule Take 1 capsule (75 mg total) by mouth nightly. for 10 days      senna-docusate 8.6-50 mg (PERICOLACE) 8.6-50 mg per tablet Take 1 tablet by mouth 2 (two) times daily.           CONTINUE these medications which have NOT CHANGED    Details   aspirin (ECOTRIN) 81 MG EC tablet Take 81 mg by mouth once daily.      atorvastatin (LIPITOR) 80 MG tablet Take 1 tablet by mouth once daily  Qty: 90 tablet, Refills: 3      carvediloL (COREG) 25 MG tablet TAKE 1 TABLET BY MOUTH TWICE DAILY WITH MEALS  Qty: 180 tablet, Refills: 3    Associated Diagnoses: Essential hypertension      clopidogreL (PLAVIX) 75 mg tablet Take 1 tablet (75 mg total) by mouth once daily.  Qty: 90 tablet, Refills: 3    Comments: Please inactivate all prior scripts with same name and strength including any scripts on hold.      dulaglutide (TRULICITY) 1.5 mg/0.5 mL pen injector Inject 1.5 mg into the skin every 7 days. Every Sunday.   Qty: 12 pen, Refills: 3    Associated Diagnoses: Type 2 diabetes mellitus with hyperglycemia, without long-term current use of insulin      glipiZIDE (GLUCOTROL) 10 MG TR24 Take 1 tablet (10 mg total) by mouth 2 (two) times daily with meals.  Qty: 180 tablet, Refills: 3    Associated Diagnoses: Uncontrolled type 2 diabetes mellitus with hyperglycemia, without long-term current use of insulin; Type 2 diabetes mellitus with diabetic neuropathy, without long-term current use of insulin      pantoprazole (PROTONIX) 40 MG tablet Take 1 tablet by mouth once daily  Qty: 90 tablet, Refills: 2         fluticasone propionate (FLONASE) 50 mcg/actuation nasal  spray 1 spray (50 mcg total) by Each Nostril route once daily.  Qty: 16 g, Refills: 3    Associated Diagnoses: Allergic rhinitis, unspecified seasonality, unspecified trigger         metFORMIN (GLUCOPHAGE) 500 MG tablet Take 1 tablet (500 mg total) by mouth 2 (two) times daily with meals.  Qty: 180 tablet, Refills: 3    Associated Diagnoses: Uncontrolled type 2 diabetes mellitus with hyperglycemia, without long-term current use of insulin; Type 2 diabetes mellitus with diabetic neuropathy, without long-term current use of insulin           STOP taking these medications       valsartan-hydrochlorothiazide (DIOVAN-HCT) 320-12.5 mg per tablet Comments:   Reason for Stopping:         clotrimazole-betamethasone 1-0.05% (LOTRISONE) cream Comments:   Reason for Stopping:         ketoconazole (NIZORAL) 2 % cream Comments:   Reason for Stopping:         lancing device Misc Comments:   Reason for Stopping:                Follow-up:   Future Appointments   Date Time Provider Department Center   11/1/2022  9:00 AM Gissell Terrazas OT [facility]2 [place]   11/10/2022  8:15 AM LAB, [facility] LAB [place]   11/11/2022 10:45 AM Kobi Mauricio NP [facility] ORTHO [facility]   11/15/2022 11:00 AM ARNULFO Hanson MD [facility] URO [facility]   11/17/2022 10:00 AM Malcolm Kent MD [facility]   1/9/2023 10:15 AM Karina Vicente DPM [facility] POD [place]   2/24/2023  1:40 PM Laila Bains MD [facility] MED [place]        _________________________________  Trudy Cross MD  10/25/2022

## 2022-10-25 NOTE — ANESTHESIA POSTPROCEDURE EVALUATION
Anesthesia Post Evaluation    Patient: Jani Cha    Procedure(s) Performed: Procedure(s) (LRB):  ARTHROPLASTY, HIP, RIGHT (Right)    Final Anesthesia Type: general      Patient location during evaluation: PACU  Patient participation: Yes- Able to Participate  Level of consciousness: awake and alert  Post-procedure vital signs: reviewed and stable  Pain management: adequate  Airway patency: patent    PONV status at discharge: No PONV  Anesthetic complications: no      Cardiovascular status: blood pressure returned to baseline  Respiratory status: unassisted  Hydration status: euvolemic  Follow-up not needed.          Vitals Value Taken Time   /65 10/25/22 0445   Temp 37.1 °C (98.7 °F) 10/25/22 0445   Pulse 82 10/25/22 0445   Resp 20 10/25/22 0445   SpO2 95 % 10/25/22 0445         Event Time   Out of Recovery 10/21/2022 16:15:00         Pain/Good Score: Pain Rating Prior to Med Admin: 0 (10/25/2022  6:00 AM)

## 2022-10-25 NOTE — PROGRESS NOTES
[facility]  Orthopedics  Progress Note    Patient Name: Jani Cha  MRN: [id number]  Admission Date: 10/20/2022  Hospital Length of Stay: 5 days  Attending Provider: Trudy Cross MD  Primary Care Provider: Laila Bains MD  Follow-up For: Procedure(s) (LRB):  ARTHROPLASTY, HIP, RIGHT (Right)    Post-Operative Day: 4 Days Post-Op  Subjective:     Principal Problem:Closed displaced fracture of right femoral neck    Principal Orthopedic Problem: s/p R DARNELL on 10/21 by Dr. Paulino    Interval History: Patient seen and examined at bedside. NAEON. Pain controlled. Pt ambulated 11 ft with PT. Cr down to 1.4, hgb 9.67.      Review of patient's allergies indicates:   Allergen Reactions    Penicillins Hives, Itching and Rash    Shellfish containing products        Current Facility-Administered Medications   Medication    acetaminophen tablet 1,000 mg    apixaban tablet 2.5 mg    aspirin chewable tablet 81 mg    atorvastatin tablet 80 mg    bisacodyL suppository 10 mg    cefpodoxime tablet 100 mg    dextrose 10% bolus 125 mL    dextrose 10% bolus 250 mL    glucagon (human recombinant) injection 1 mg    glucose chewable tablet 16 g    glucose chewable tablet 24 g    insulin aspart U-100 pen 1-10 Units    melatonin tablet 6 mg    methocarbamoL tablet 750 mg    ondansetron injection 4 mg    oxyCODONE immediate release tablet 5 mg    polyethylene glycol packet 17 g    pregabalin capsule 75 mg    senna-docusate 8.6-50 mg per tablet 1 tablet    sodium chloride 0.9% flush 10 mL     Objective:     Vital Signs (Most Recent):  Temp: 98.3 °F (36.8 °C) (10/25/22 0751)  Pulse: 82 (10/25/22 0751)  Resp: 18 (10/25/22 0751)  BP: 129/64 (10/25/22 0751)  SpO2: 96 % (10/25/22 0751)   Vital Signs (24h Range):  Temp:  [97.2 °F (36.2 °C)-98.7 °F (37.1 °C)] 98.3 °F (36.8 °C)  Pulse:  [73-86] 82  Resp:  [18-20] 18  SpO2:  [94 %-97 %] 96 %  BP: (117-150)/(64-70) 129/64     Weight: 117.9 kg (259 lb 14.8  "oz)  Height: 5' 10" (177.8 cm)  Body mass index is 37.29 kg/m².      Intake/Output Summary (Last 24 hours) at 10/25/2022 1056  Last data filed at 10/25/2022 0604  Gross per 24 hour   Intake 300 ml   Output 3000 ml   Net -2700 ml         Ortho/SPM Exam  AAOx4  NAD  Reg rate  No increased WOB    RLE:  Dressing c/d/i  SILT T/SP/DP/Dorantes/Sa  Motor intact T/SP/DP  WWP extremities  FCDs in place and functioning      Significant Labs: CBC:   Recent Labs   Lab 10/24/22  0556 10/25/22  0243   WBC 8.06 7.72   HGB 9.4* 9.7*   HCT 29.2* 30.3*   * 167       CMP:   Recent Labs   Lab 10/24/22  0556 10/25/22  0242    139   K 4.3 4.5    109   CO2 20* 24   * 151*   BUN 37* 33*   CREATININE 1.8* 1.4   CALCIUM 8.4* 8.9   PROT 4.6* 4.9*   ALBUMIN 1.9* 2.0*   BILITOT 0.4 0.4   ALKPHOS 60 63   AST 15 17   ALT <5* 5*   ANIONGAP 7* 6*       Urine Culture:   No results for input(s): LABURIN in the last 48 hours.    Urine Studies:   No results for input(s): COLORU, APPEARANCEUA, PHUR, SPECGRAV, PROTEINUA, GLUCUA, KETONESU, BILIRUBINUA, OCCULTUA, NITRITE, UROBILINOGEN, LEUKOCYTESUR, RBCUA, WBCUA, BACTERIA, SQUAMEPITHEL, HYALINECASTS in the last 48 hours.    Invalid input(s): WRIGHTSUR    All pertinent labs within the past 24 hours have been reviewed.    Significant Imaging: I have reviewed all pertinent imaging results/findings.    Assessment/Plan:     * Closed displaced fracture of right femoral neck s/p total hip arthroplasty on 10/21/2022  Jani Cha is a 77 y.o. male with right femoral neck fracture s/p R DARNELL on 10/21/22 by Dr. Paulino. Doing well.    Pain control: multimodal, avoid nephrotoxic meds given CKD  PT/OT: WBAT RLE, posterior hip precautions  DVT PPx: Eliquis 2.5 BID, SCDs at all times when not ambulating  Abx: postop Ancef. Rocephin for UTI (pansensitive E coli)  Labs:Cr down to 1.4  Drain: none   Alejandre: dc today   CKD: per     Dispo: PT recs IPR          Moshe Razo MD  Orthopedics  [facility]

## 2022-10-26 NOTE — ASSESSMENT & PLAN NOTE
· Hematuria resolved. Urology consulted in OR after noting gross hematuria in Alejandre bag. Patient had multiple prior Alejandre attempts during this admit by nursing and was a difficult Alejandre placement. Patient with known BPH. Alejandre replaced by Urology in OR. Hematuria resolved on am of 10/22 and Urology suspects Alejandre trauma as cause of hematuria.  · Alejandre removed on 10/25 and patient able to void on his own prior to discharge.   · Appreciate Urology assistance on the case.   · Renal ultrasound on 10/22 showed no hydronephrosis.   · Patient to follow up with Dr. Dubon for discussion of BPH in Urology clinic as outpatient.

## 2022-10-26 NOTE — ASSESSMENT & PLAN NOTE
Controlled off BP medication. Patient hypotensive on 10/22 discontinued home Losartan/HCTZ 100/25 mg and will not resume on hospital discharge. Patient to resume home Coreg 25 mg po BID on discharge.

## 2022-10-26 NOTE — ASSESSMENT & PLAN NOTE
· MADISYN resolved on discharge. Patient's creatinine 1.4 on discharge and back to baseline. Alejandre removed prior to discharge and patient able to void on his own.   · Continues to improve on 10/24. Stop IVF's. Creatinine 1.8 on 10/24.   · Improved. Creatinine down to 2.5 on 10/23. Continue IVF's.   · Patient's creatinine's has increased above baseline renal function to 2.7 on 10/22 consistent with acute kidney injury as up from baseline creatinine of 1.5-1.7.   · Suspect related to difficult Alejandre placement and hypovolemia.   · Patient to be bolused 1 liter of NS then maintenance fluid of NS at 150 cc/hr.   · Patient needs strict I+Os to closely monitor urine output.   · Avoid any nephrotoxic agents such as NSAIDS, IV contrast or aminoglycosides that could worsen renal function.  · Renally adjust all medications.   · Monitor daily BMP to follow renal function.   · Renal ultrasound from 10/22 unremarkable with no hydronephrosis.

## 2022-10-26 NOTE — ASSESSMENT & PLAN NOTE
· Patient switched from IV Ceftriaxone to po Cefpodoxime 100 mg po BID on 10/24 to complete treatment of UTI with end date 10/27/2022. Patient to continue Cefpodoxime on discharge to Rehab.   · Final urine culture resulted on 10/23 with pan sensitive >100,000 organisms E. Coli. Will continue IV Ceftriaxone to treat.   · Present on admit. Admit U/A with 37 WBC and occasional bacteria. Patient started on IV Ceftriaxone 1 gram daily to treat. Prelim urine culture with > 100,000 organisms with gram negative senia. Continue Ceftriaxone pending final urine culture results and sensitivities.

## 2022-10-26 NOTE — ASSESSMENT & PLAN NOTE
· Patient underwent right total hip replacement for femoral neck fracture by Ortho on 10/21/2022.   · Patient reports moderate pain in right hip on discharge.   · Patient to continue PT/OT for gait training and strengthening and restoration of ADL's on discharge to [facility].   · Patient is FWB/WBAT: right lower extremity, posterior hip precautions as per Orthopedic recommendations on discharge.  · Patient to continue Apixiban 2.5 mg po BID for 30 days post-op after hip fracture surgery for DVT prophylaxis on discharge.   · Perineural pain catheter in place removed prior to discharge.   · Patient on multimodal pain management post-op with Tylenol 1000 mg po every 8 hours prn and Robaxin 500 mg po 3 times daily post-op on discharge. Patient discharged on Lyrica 75 mg po nightly and Oxy IR prn for breakthrough pain on discharge.   · Surgical bandage to remain in place until Orthopedic clinic follow-up.

## 2022-10-26 NOTE — ASSESSMENT & PLAN NOTE
- Chronic and controlled. Patient asymptomatic. Patient with previous CABG and cardiac stenting. Last stent>1yr ago currently on ASA monotherapy and will continue in hospital.  - Continue home Lipitor and Coreg to treat CAD on discharge.

## 2022-10-26 NOTE — DISCHARGE SUMMARY
[facility] Medicine  Discharge Summary      Patient Name: Jani Cha  MRN: [id number]  Patient Class: IP- Inpatient  Admission Date: 10/20/2022  Hospital Length of Stay: 5 days  Discharge Date and Time: 10/25/2022  3:32 PM  Attending Physician: Trudy Cross MD   Discharging Provider: Trudy Cross MD  Primary Care Provider: Laila Bains MD  [facility]: [facility] Trudy Cross MD    HPI:   77-year-old male with history of hypertension, hyperlipidemia, CAD, diabetes, CKD presents to the ED complaining of right hip pain after fall this afternoon.  He was walking in his driveway when his knee gave out causing him to fall landing onto his right hip.  He denies any head trauma or loss of consciousness.  He is having 8/10 pain to the right hip and is unable to move the leg or bear weight.  He denies fever, chills, chest pain, shortness of breath, abdominal pain, numbness, paresthesias, confusion.    In the ED patient afebrile and hemodynamically stable saturating well on room air. Xray imaging with Acute impacted right subcapital femoral fracture. Orthopedic surgery consulted and patient admitted to  for further evaluation and management.      10/21/2022  Procedure(s) (LRB):  ARTHROPLASTY, HIP, RIGHT (Right)    Surgeon(s):  MD Moshe Gimenez MD Jeffrey Daniel Reese, MD Nicholas Major, MD      Hospital Course:   Patient noted ot have UTI on admit and started on IV Ceftriaxone to treat. Patient admitted to [facility] Medicine Team H: Hip Fracture team and started on Hip Fracture Pathway with Orthopedic surgery consult for hip fracture. Patient was seen and evaluated by Orthopedic surgery who recommended operative repair of hip fracture. Patient was medically optimized prior to surgery and was taken to OR after optimization on 10/21/2022. Patient underwent right hip total hip arthroplasty by Dr. Isidro Paulino. Post-op patient  WBAT with posterior hip precautions to the right lower extremity as per Orthopedics recommendation. Patient placed on Apixiban 2.5 mg po BID and MATTHEW/SCD's for DVT prophylaxis post-op and will need for total of 30 days. Perineural pain catheter placed by Anesthesia Pain Service with continuous infusion of Ropivacaine to help with pain control post-op and Anesthesia Pain Service managing while patient in the hospital. Patient placed on multimodal pain management post-op with Tylenol 1000 mg po every 6 hours and Robaxin 500 mg po 3 times daily post-op and will continue. PT/OT consulted post-op. Patient noted to have increased Creatinine to 2.7 on 10/22 from 1.5 on 10/21. Patient ha very difficult Alejandre placement on admit and eventually Urology had to place in OR as noted to have gross hematuria. Urology felt hematuria related to Alejandre trauma. Plan to keep Alejandre in place for now as per Urology but plan voiding trial as per urology prior to hospital discharge Patient draining yellow urine in Alejandre bag on 10/22 and no further hematuria noted. Patient also noted to have SBP in 90's likely also contributing to rise in Creatinine. Home Losartan/HCTZ stopped on 10/22 and patient bolused 1 liter of NS. BP improved after fluids given and BP medication stopped on 10/23. Creatinine improved from 2.7 to 2.5 on 10/23. PT/OT recommending IP Rehab on discharge when medically ready. Creatinine improved again to 1.8 on 10/24. IVF's stopped. Plan to remove Alejandre and do voiding trial prior to discharge as per Urology recs. Hematuria resolved. Patient should be medically ready to discharge to an IP Rehab facility on 10/25. Final urine culture grew >100,000 organisms E. Coli that was pan sensitive. Patient switched from IV Ceftriaxone to po Cefpodoxime on 10/24 and patient to continue Cefpodoxime 100 mg po BID until 10/27/2022 to treat UTI on discharge. Creatinine back to his baseline 1.4 on day of discharge on 10/25. Alejandre removed prior  to discharge on 10/25 and patient able to void on his own with no return of hematuria. Perineural pain catheter removed by Anesthesia prior to discharge. Patient accepted and discharged to [facility] IP rehab on 10/25/2022 in good condition to continue PT/OT for recovery from his right DARNELL. Surgical bandage to remain in place until Orthopedic clinic follow-up. Patient discharged on 30 day treatment course of Apixiban for DVT prophylaxis.        Goals of Care Treatment Preferences:  Code Status: Full Code      Consults:   Consults (From admission, onward)        Status Ordering Provider     Inpatient consult to Physical Medicine Rehab  Once        Provider:  (Not yet assigned)    Completed SHANNAN GARIBAY     Inpatient consult to Orthopedic Surgery  Once        Provider:  (Not yet assigned)    Completed RESHMA GIORDANO          * Closed displaced fracture of right femoral neck s/p total hip arthroplasty on 10/21/2022  · Patient underwent right total hip replacement for femoral neck fracture by Ortho on 10/21/2022.   · Patient reports moderate pain in right hip on discharge.   · Patient to continue PT/OT for gait training and strengthening and restoration of ADL's on discharge to [facility].   · Patient is FWB/WBAT: right lower extremity, posterior hip precautions as per Orthopedic recommendations on discharge.  · Patient to continue Apixiban 2.5 mg po BID for 30 days post-op after hip fracture surgery for DVT prophylaxis on discharge.   · Perineural pain catheter in place removed prior to discharge.   · Patient on multimodal pain management post-op with Tylenol 1000 mg po every 8 hours prn and Robaxin 500 mg po 3 times daily post-op on discharge. Patient discharged on Lyrica 75 mg po nightly and Oxy IR prn for breakthrough pain on discharge.   · Surgical bandage to remain in place until Orthopedic clinic follow-up.     Acute renal failure superimposed on stage 3a chronic kidney disease  · MADISYN resolved on  discharge. Patient's creatinine 1.4 on discharge and back to baseline. Alejandre removed prior to discharge and patient able to void on his own.   · Continues to improve on 10/24. Stop IVF's. Creatinine 1.8 on 10/24.   · Improved. Creatinine down to 2.5 on 10/23. Continue IVF's.   · Patient's creatinine's has increased above baseline renal function to 2.7 on 10/22 consistent with acute kidney injury as up from baseline creatinine of 1.5-1.7.   · Suspect related to difficult Alejandre placement and hypovolemia.   · Patient to be bolused 1 liter of NS then maintenance fluid of NS at 150 cc/hr.   · Patient needs strict I+Os to closely monitor urine output.   · Avoid any nephrotoxic agents such as NSAIDS, IV contrast or aminoglycosides that could worsen renal function.  · Renally adjust all medications.   · Monitor daily BMP to follow renal function.   · Renal ultrasound from 10/22 unremarkable with no hydronephrosis.     Hematuria-resolved as of 10/25/2022  · Hematuria resolved. Urology consulted in OR after noting gross hematuria in Alejandre bag. Patient had multiple prior Alejandre attempts during this admit by nursing and was a difficult Alejandre placement. Patient with known BPH. Alejandre replaced by Urology in OR. Hematuria resolved on am of 10/22 and Urology suspects Alejandre trauma as cause of hematuria.  · Alejandre removed on 10/25 and patient able to void on his own prior to discharge.   · Appreciate Urology assistance on the case.   · Renal ultrasound on 10/22 showed no hydronephrosis.   · Patient to follow up with Dr. Dubon for discussion of BPH in Urology clinic as outpatient.     Type 2 diabetes mellitus with hyperglycemia, without long-term current use of insulin  Good control in the hospital in past 24 hours. Patient on Trulicity, Metformin and Glipizide at home to treat and to continue on discharge.      Blood Sugars (AccuCheck):    Recent Labs     10/23/22  1942 10/24/22  1146 10/24/22  1632 10/24/22  2126 10/25/22  0713  10/25/22  1105   POCTGLUCOSE 178* 270* 216* 194* 152* 269*        · Plan is to continue to monitor POCT glucose 4 times a day with each meal and at bedtime and cover with Novolog low dose sliding scale insulin at Rehab.   · Continue 2000 calorie diabetic diet on discharge.   · Target pre-meal glucose goal is <140 with all random glucoses <180.      Primary hypertension  Controlled off BP medication. Patient hypotensive on 10/22 discontinued home Losartan/HCTZ 100/25 mg and will not resume on hospital discharge. Patient to resume home Coreg 25 mg po BID on discharge.     Coronary artery disease involving native coronary artery of native heart without angina pectoris  - Chronic and controlled. Patient asymptomatic. Patient with previous CABG and cardiac stenting. Last stent>1yr ago currently on ASA monotherapy and will continue in hospital.  - Continue home Lipitor and Coreg to treat CAD on discharge.    E. coli urinary tract infection  · Patient switched from IV Ceftriaxone to po Cefpodoxime 100 mg po BID on 10/24 to complete treatment of UTI with end date 10/27/2022. Patient to continue Cefpodoxime on discharge to Rehab.   · Final urine culture resulted on 10/23 with pan sensitive >100,000 organisms E. Coli. Will continue IV Ceftriaxone to treat.   · Present on admit. Admit U/A with 37 WBC and occasional bacteria. Patient started on IV Ceftriaxone 1 gram daily to treat. Prelim urine culture with > 100,000 organisms with gram negative senia. Continue Ceftriaxone pending final urine culture results and sensitivities.       Final Active Diagnoses:    Diagnosis Date Noted POA    PRINCIPAL PROBLEM:  Closed displaced fracture of right femoral neck s/p total hip arthroplasty on 10/21/2022 [S72.001A] 10/20/2022 Yes    Acute renal failure superimposed on stage 3a chronic kidney disease [N17.9, N18.31] 01/24/2020 Yes    Type 2 diabetes mellitus with hyperglycemia, without long-term current use of insulin [E11.65] 08/17/2022  Yes    Primary hypertension [I10]  Yes    Coronary artery disease involving native coronary artery of native heart without angina pectoris [I25.10]  Yes     Chronic    E. coli urinary tract infection [N39.0, B96.20] 10/21/2022 Yes      Problems Resolved During this Admission:    Diagnosis Date Noted Date Resolved POA    Hematuria [R31.9] 10/22/2022 10/25/2022 Yes       Discharged Condition: good    Disposition: Rehab Facility ([facility])    Follow Up:     Future Appointments   Date Time Provider Department Center   11/1/2022  9:00 AM Gissell Terrazas OT [facility] OP [facility] [place]   11/10/2022  8:15 AM LAB, [facility] LAB [place]   11/11/2022 10:45 AM Kobi Mauricio NP [facility] ORTHO [address]   11/15/2022 11:00 AM ARNULFO Hanson MD Health system URO [facility]   11/17/2022 10:00 AM Malcolm Kent MD Health system ENDOCRN [facility]   1/9/2023 10:15 AM [facility] [place]   2/24/2023  1:40 PM Laila Bains MD [facility] FAM MED [place]       Patient Instructions:      Ambulatory referral/consult to Orthopedics   Standing Status: Future   Referral Priority: Routine Referral Type: Consultation   Requested Specialty: Orthopedic Surgery   Number of Visits Requested: 1     Ambulatory referral/consult to Orthopedics Fracture Care   Standing Status: Future   Referral Priority: Routine Referral Type: Consultation   Requested Specialty: Orthopedic Surgery   Number of Visits Requested: 1     Diet diabetic     Notify your health care provider if you experience any of the following:  temperature >100.4     Notify your health care provider if you experience any of the following:  increased confusion or weakness     Notify your health care provider if you experience any of the following:  persistent dizziness, light-headedness, or visual disturbances     Notify your health care provider if you experience any of the following:  worsening rash     Notify your health care provider if you experience any of the following:  severe  persistent headache     Notify your health care provider if you experience any of the following:  difficulty breathing or increased cough     Notify your health care provider if you experience any of the following:  redness, tenderness, or signs of infection (pain, swelling, redness, odor or green/yellow discharge around incision site)     Notify your health care provider if you experience any of the following:  severe uncontrolled pain     Notify your health care provider if you experience any of the following:  persistent nausea and vomiting or diarrhea     Leave dressing on - Keep it clean, dry, and intact until clinic visit     Activity as tolerated     Weight bearing restrictions (specify):   Order Comments: Weight bear as tolerate to right lower extremity with posterior hip precautions       Significant Diagnostic Studies: Labs:   CMP   Recent Labs   Lab 10/24/22  0556 10/25/22  0242    139   K 4.3 4.5    109   CO2 20* 24   * 151*   BUN 37* 33*   CREATININE 1.8* 1.4   CALCIUM 8.4* 8.9   PROT 4.6* 4.9*   ALBUMIN 1.9* 2.0*   BILITOT 0.4 0.4   ALKPHOS 60 63   AST 15 17   ALT <5* 5*   ANIONGAP 7* 6*    and CBC   Recent Labs   Lab 10/24/22  0556 10/25/22  0243   WBC 8.06 7.72   HGB 9.4* 9.7*   HCT 29.2* 30.3*   * 167       Pending Diagnostic Studies:     Procedure Component Value Units Date/Time    Specimen to Pathology, Surgery Orthopedics [[id number]] Collected: 10/21/22 1410    Order Status: Sent Lab Status: In process Updated: 10/21/22 0716    Specimen: Tissue          Medications:  Reconciled Home Medications:      Medication List      START taking these medications    acetaminophen 500 MG tablet  Commonly known as: TYLENOL  Take 2 tablets (1,000 mg total) by mouth every 8 (eight) hours as needed (Mild to moderate pain).     apixaban 2.5 mg Tab  Commonly known as: ELIQUIS  Take 1 tablet (2.5 mg total) by mouth 2 (two) times daily. DVT prophylaxis after hip replacement surgery. End date  11/24/2022.     cefpodoxime 100 MG tablet  Commonly known as: VANTIN  Take 1 tablet (100 mg total) by mouth every 12 (twelve) hours. End date 10/27/2022. E. Coli UTI. for 2 days     melatonin 3 mg tablet  Commonly known as: MELATIN  Take 2 tablets (6 mg total) by mouth nightly as needed for Insomnia.     methocarbamoL 750 MG Tab  Commonly known as: ROBAXIN  Take 1 tablet (750 mg total) by mouth 3 (three) times daily.     oxyCODONE 5 MG immediate release tablet  Commonly known as: ROXICODONE  Take 1 tablet (5 mg total) by mouth every 4 (four) hours as needed (Moderate to severe pain).     pregabalin 75 MG capsule  Commonly known as: LYRICA  Take 1 capsule (75 mg total) by mouth nightly. for 10 days     senna-docusate 8.6-50 mg 8.6-50 mg per tablet  Commonly known as: PERICOLACE  Take 1 tablet by mouth 2 (two) times daily.        CONTINUE taking these medications    aspirin 81 MG EC tablet  Commonly known as: ECOTRIN  Take 81 mg by mouth once daily.     atorvastatin 80 MG tablet  Commonly known as: LIPITOR  Take 1 tablet by mouth once daily     blood sugar diagnostic Strp  1 strip by Misc.(Non-Drug; Combo Route) route once daily.     carvediloL 25 MG tablet  Commonly known as: COREG  TAKE 1 TABLET BY MOUTH TWICE DAILY WITH MEALS     clopidogreL 75 mg tablet  Commonly known as: PLAVIX  Take 1 tablet (75 mg total) by mouth once daily.     dulaglutide 1.5 mg/0.5 mL pen injector  Commonly known as: TRULICITY  Inject 1.5 mg into the skin every 7 days.     fluticasone propionate 50 mcg/actuation nasal spray  Commonly known as: FLONASE  1 spray (50 mcg total) by Each Nostril route once daily.     glipiZIDE 10 MG Tr24  Commonly known as: GLUCOTROL  Take 1 tablet (10 mg total) by mouth 2 (two) times daily with meals.     metFORMIN 500 MG tablet  Commonly known as: GLUCOPHAGE  Take 1 tablet (500 mg total) by mouth 2 (two) times daily with meals.     pantoprazole 40 MG tablet  Commonly known as: PROTONIX  Take 1 tablet by mouth  once daily     * TRUEPLUS LANCETS 33 gauge Misc  Generic drug: lancets  Apply topically 2 (two) times daily.     * lancets 30 gauge Misc  Use to test blood sugar two (2) times daily; discard lancet after each use         * This list has 2 medication(s) that are the same as other medications prescribed for you. Read the directions carefully, and ask your doctor or other care provider to review them with you.            STOP taking these medications    clotrimazole-betamethasone 1-0.05% cream  Commonly known as: LOTRISONE     ketoconazole 2 % cream  Commonly known as: NIZORAL     lancing device Misc     valsartan-hydrochlorothiazide 320-12.5 mg per tablet  Commonly known as: DIOVAN-HCT            Indwelling Lines/Drains at time of discharge:   Lines/Drains/Airways     None                 Time spent on the discharge of patient: 32 minutes         Trudy Cross MD  Department of Hospital Medicine  [facility] Surg

## 2022-10-28 LAB
FINAL PATHOLOGIC DIAGNOSIS: NORMAL
GROSS: NORMAL
Lab: NORMAL

## 2022-10-31 ENCOUNTER — EXTERNAL CHRONIC CARE MANAGEMENT (OUTPATIENT)
Dept: PRIMARY CARE CLINIC | Facility: CLINIC | Age: 77
End: 2022-10-31
Payer: MEDICARE

## 2022-10-31 PROCEDURE — 99490 CHRNC CARE MGMT STAFF 1ST 20: CPT | Mod: S$PBB,,, | Performed by: FAMILY MEDICINE

## 2022-10-31 PROCEDURE — 99490 CHRNC CARE MGMT STAFF 1ST 20: CPT | Mod: PBBFAC,PO | Performed by: FAMILY MEDICINE

## 2022-10-31 PROCEDURE — 99490 PR CHRONIC CARE MGMT, 1ST 20 MIN: ICD-10-PCS | Mod: S$PBB,,, | Performed by: FAMILY MEDICINE

## 2022-11-03 ENCOUNTER — PATIENT MESSAGE (OUTPATIENT)
Dept: CARDIOLOGY | Facility: CLINIC | Age: 77
End: 2022-11-03
Payer: MEDICARE

## 2022-11-09 ENCOUNTER — TELEPHONE (OUTPATIENT)
Dept: UROLOGY | Facility: CLINIC | Age: 77
End: 2022-11-09
Payer: MEDICARE

## 2022-11-09 PROCEDURE — G0180 PR HOME HEALTH MD CERTIFICATION: ICD-10-PCS | Mod: ,,, | Performed by: STUDENT IN AN ORGANIZED HEALTH CARE EDUCATION/TRAINING PROGRAM

## 2022-11-09 PROCEDURE — G0180 MD CERTIFICATION HHA PATIENT: HCPCS | Mod: ,,, | Performed by: STUDENT IN AN ORGANIZED HEALTH CARE EDUCATION/TRAINING PROGRAM

## 2022-11-10 ENCOUNTER — TELEPHONE (OUTPATIENT)
Dept: ORTHOPEDICS | Facility: CLINIC | Age: 77
End: 2022-11-10
Payer: MEDICARE

## 2022-11-10 ENCOUNTER — LAB VISIT (OUTPATIENT)
Dept: LAB | Facility: HOSPITAL | Age: 77
End: 2022-11-10
Attending: HOSPITALIST
Payer: MEDICARE

## 2022-11-10 DIAGNOSIS — E11.65 TYPE 2 DIABETES MELLITUS WITH HYPERGLYCEMIA, WITHOUT LONG-TERM CURRENT USE OF INSULIN: ICD-10-CM

## 2022-11-10 LAB
ANION GAP SERPL CALC-SCNC: 12 MMOL/L (ref 8–16)
BUN SERPL-MCNC: 33 MG/DL (ref 8–23)
CALCIUM SERPL-MCNC: 9.8 MG/DL (ref 8.7–10.5)
CHLORIDE SERPL-SCNC: 105 MMOL/L (ref 95–110)
CO2 SERPL-SCNC: 25 MMOL/L (ref 23–29)
CREAT SERPL-MCNC: 1.7 MG/DL (ref 0.5–1.4)
EST. GFR  (NO RACE VARIABLE): 41.3 ML/MIN/1.73 M^2
ESTIMATED AVG GLUCOSE: 146 MG/DL (ref 68–131)
GLUCOSE SERPL-MCNC: 230 MG/DL (ref 70–110)
HBA1C MFR BLD: 6.7 % (ref 4–5.6)
POTASSIUM SERPL-SCNC: 4.7 MMOL/L (ref 3.5–5.1)
SODIUM SERPL-SCNC: 142 MMOL/L (ref 136–145)

## 2022-11-10 PROCEDURE — 36415 COLL VENOUS BLD VENIPUNCTURE: CPT | Mod: PO | Performed by: HOSPITALIST

## 2022-11-10 PROCEDURE — 83036 HEMOGLOBIN GLYCOSYLATED A1C: CPT | Performed by: HOSPITALIST

## 2022-11-10 PROCEDURE — 80048 BASIC METABOLIC PNL TOTAL CA: CPT | Performed by: HOSPITALIST

## 2022-11-10 NOTE — TELEPHONE ENCOUNTER
Called and rescheduled pt's appt with Kobi to 11/15/2022 @ 2:45 pm due to Kobi absence. Pt was satisfied with new appt date/time.

## 2022-11-15 ENCOUNTER — OFFICE VISIT (OUTPATIENT)
Dept: ORTHOPEDICS | Facility: CLINIC | Age: 77
End: 2022-11-15
Payer: MEDICARE

## 2022-11-15 ENCOUNTER — HOSPITAL ENCOUNTER (OUTPATIENT)
Dept: RADIOLOGY | Facility: HOSPITAL | Age: 77
Discharge: HOME | End: 2022-11-15
Attending: NURSE PRACTITIONER
Payer: MEDICARE

## 2022-11-15 DIAGNOSIS — Z98.890 POST-OPERATIVE STATE: ICD-10-CM

## 2022-11-15 DIAGNOSIS — M25.551 RIGHT HIP PAIN: ICD-10-CM

## 2022-11-15 DIAGNOSIS — M25.551 RIGHT HIP PAIN: Primary | ICD-10-CM

## 2022-11-15 DIAGNOSIS — S72.001A CLOSED DISPLACED FRACTURE OF RIGHT FEMORAL NECK: Primary | ICD-10-CM

## 2022-11-15 PROCEDURE — 73502 X-RAY EXAM HIP UNI 2-3 VIEWS: CPT | Mod: TC,RT

## 2022-11-15 PROCEDURE — 99999 PR PBB SHADOW E&M-EST. PATIENT-LVL III: CPT | Mod: PBBFAC,,, | Performed by: NURSE PRACTITIONER

## 2022-11-15 PROCEDURE — 99024 POSTOP FOLLOW-UP VISIT: CPT | Mod: POP,,, | Performed by: NURSE PRACTITIONER

## 2022-11-15 PROCEDURE — 73502 XR HIP WITH PELVIS WHEN PERFORMED, 2 OR 3  VIEWS RIGHT: ICD-10-PCS | Mod: 26,RT,, | Performed by: RADIOLOGY

## 2022-11-15 PROCEDURE — 99999 PR PBB SHADOW E&M-EST. PATIENT-LVL III: ICD-10-PCS | Mod: PBBFAC,,, | Performed by: NURSE PRACTITIONER

## 2022-11-15 PROCEDURE — 73502 X-RAY EXAM HIP UNI 2-3 VIEWS: CPT | Mod: 26,RT,, | Performed by: RADIOLOGY

## 2022-11-15 PROCEDURE — 99213 OFFICE O/P EST LOW 20 MIN: CPT | Mod: PBBFAC | Performed by: NURSE PRACTITIONER

## 2022-11-15 PROCEDURE — 99024 PR POST-OP FOLLOW-UP VISIT: ICD-10-PCS | Mod: POP,,, | Performed by: NURSE PRACTITIONER

## 2022-11-15 NOTE — PROGRESS NOTES
Mr. Cha is here today for a post-operative visit after undergoing a right DARNELL by Dr. Paulino on 10/21/2022.    Interval History:  He reports that he is doing ok.  Pain is controlled.  He does endorse pain to his lateral hip and groin region on the right.  He is taking pain medication.  He reports he is using OxyIR 5 mg qam and sometimes at night otherwise he is using Tylenol.  He reports he was discharged from IP Rehab last week and now back at home.  He is getting home health, said OT came out and said their services were not needed.  He reports PT is coming out tomorrow.  He is well aware of his hip precautions.  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  Wants to know when he can resume driving.  He denies falls or injuries since his discharge.  He denies fever, chills, and sweats since the time of the surgery.     Physical exam:  Dressing previously removed.  Incision is open to air and clean, dry and intact.  No signs of infection.  He has tactile stimulation to their lower leg, he denies calf pain, there is no leg edema and their pedal pulse is palpable x 2.     RADS: Right hip x-ray was obtained and personally reviewed by me, hemiarthroplasty is in proper position.  No evidence of hardware failure or new fractures seen.    Assessment:  Post-op visit (3 weeks)    Plan:    ICD-10-CM ICD-9-CM   1. Closed displaced fracture of right femoral neck s/p total hip arthroplasty on 10/21/2022  S72.001A 820.8   2. Post-operative state  Z98.890 V45.89     Current care, treatment plan, precautions, activity level/ modifications, limitations, rehabilitation exercises and proposed future treatment were discussed with the patient. We discussed the need to monitor for changes in symptoms and condition and report them to the physician.  Discussed importance of compliance with all appointments and follow up examinations.     WOUND CARE ORDERS  -Jani was advised to keep the incision clean and dry for the next 24 hours after which  he may wash the area with antibacterial soap in the shower.   -He not submerge until the incision is completely healed  -Patient was advised to monitor wound closely and multiple times daily for any problems. Call clinic immediately or report to ED for immediate medical attention for any complications including reopening of wound, drainage, purulence, redness, streaking, odor, pain out of proportion, fever, chills, etc.   - If there are signs of infection, please call Ortho Clinic [phone] for further instructions and to make appt to be seen.       PHYSICAL THERAPY:   - Continue therapy as ordered.  - Weight bearing as tolerated   - Range of motion - posterior hip precautions x 6 weeks.  - Advised patient no driving minimal 6 weeks.     PAIN MEDICATION:   - Pain medication: refill was not needed  - Pain medication refill policy provided to patient for review, yes.    - Patient was informed a multi-modal approach is used to treat their pain.     DVT PROPHYLAXIS:   - Eliquis 2.5 mg bid x 1 more week     FOLLOW UP:   - Patient will follow up in the clinic in 3 weeks.  - X-ray of his right hip is needed.  - Will coordinate next visit with fracture clinic.  - Future Appointments:   Future Appointments   Date Time Provider Department Center   11/17/2022  8:30 AM ARNULFO Hanson MD [facility]RO [facility]   11/17/2022 10:00 AM Malcolm Kent MD [facility] ENDOCRN [facility]   11/22/2022 10:30 AM Yudi Richardson NP [facility] FAM MED [place]   11/25/2022  8:00 AM Max Oliveira MD [facility] CARDIO [address]   12/6/2022  2:30 PM Kobi Mauricio NP [facility] ORTHO [address]   1/9/2023 10:15 AM Karina Vicente DPM [facility] POD [place]   2/24/2023  1:40 PM Laila Bains MD [facility] FAM MED [place]           If there are any questions prior to scheduled follow up, the patient was instructed to contact the office

## 2022-11-17 ENCOUNTER — OFFICE VISIT (OUTPATIENT)
Dept: ENDOCRINOLOGY | Facility: CLINIC | Age: 77
End: 2022-11-17
Payer: MEDICARE

## 2022-11-17 ENCOUNTER — OFFICE VISIT (OUTPATIENT)
Dept: UROLOGY | Facility: CLINIC | Age: 77
End: 2022-11-17
Payer: MEDICARE

## 2022-11-17 ENCOUNTER — PATIENT OUTREACH (OUTPATIENT)
Dept: ADMINISTRATIVE | Facility: HOSPITAL | Age: 77
End: 2022-11-17
Payer: MEDICARE

## 2022-11-17 VITALS
SYSTOLIC BLOOD PRESSURE: 125 MMHG | DIASTOLIC BLOOD PRESSURE: 72 MMHG | TEMPERATURE: 98 F | WEIGHT: 240.81 LBS | BODY MASS INDEX: 34.55 KG/M2 | HEART RATE: 61 BPM

## 2022-11-17 VITALS — WEIGHT: 241.19 LBS | BODY MASS INDEX: 34.53 KG/M2 | HEIGHT: 70 IN

## 2022-11-17 DIAGNOSIS — D35.00 ADRENAL ADENOMA, UNSPECIFIED LATERALITY: Chronic | ICD-10-CM

## 2022-11-17 DIAGNOSIS — N13.8 BPH WITH URINARY OBSTRUCTION: Primary | ICD-10-CM

## 2022-11-17 DIAGNOSIS — N40.1 BPH WITH URINARY OBSTRUCTION: Primary | ICD-10-CM

## 2022-11-17 DIAGNOSIS — E11.65 TYPE 2 DIABETES MELLITUS WITH HYPERGLYCEMIA, WITHOUT LONG-TERM CURRENT USE OF INSULIN: Primary | ICD-10-CM

## 2022-11-17 DIAGNOSIS — S72.001A CLOSED DISPLACED FRACTURE OF RIGHT FEMORAL NECK: ICD-10-CM

## 2022-11-17 DIAGNOSIS — N20.0 KIDNEY STONE: ICD-10-CM

## 2022-11-17 DIAGNOSIS — E78.5 HYPERLIPIDEMIA, UNSPECIFIED HYPERLIPIDEMIA TYPE: Chronic | ICD-10-CM

## 2022-11-17 DIAGNOSIS — Z13.820 SCREENING FOR OSTEOPOROSIS: ICD-10-CM

## 2022-11-17 DIAGNOSIS — E66.09 CLASS 1 OBESITY DUE TO EXCESS CALORIES WITH SERIOUS COMORBIDITY AND BODY MASS INDEX (BMI) OF 34.0 TO 34.9 IN ADULT: ICD-10-CM

## 2022-11-17 DIAGNOSIS — R30.0 DYSURIA: ICD-10-CM

## 2022-11-17 DIAGNOSIS — M80.08XA AGE-RELATED OSTEOPOROSIS WITH CURRENT PATHOLOGICAL FRACTURE, VERTEBRA(E), INITIAL ENCOUNTER FOR FRACTURE: ICD-10-CM

## 2022-11-17 LAB
BILIRUB SERPL-MCNC: NEGATIVE MG/DL
BLOOD URINE, POC: NEGATIVE
COLOR, POC UA: YELLOW
GLUCOSE UR QL STRIP: 100
KETONES UR QL STRIP: NEGATIVE
LEUKOCYTE ESTERASE URINE, POC: NEGATIVE
NITRITE, POC UA: NEGATIVE
PH, POC UA: 6
PROTEIN, POC: NORMAL
SPECIFIC GRAVITY, POC UA: 1010
UROBILINOGEN, POC UA: NORMAL

## 2022-11-17 PROCEDURE — 99214 OFFICE O/P EST MOD 30 MIN: CPT | Mod: PBBFAC | Performed by: HOSPITALIST

## 2022-11-17 PROCEDURE — 81001 URINALYSIS AUTO W/SCOPE: CPT | Mod: PBBFAC | Performed by: UROLOGY

## 2022-11-17 PROCEDURE — 99999 PR PBB SHADOW E&M-EST. PATIENT-LVL III: ICD-10-PCS | Mod: PBBFAC,,, | Performed by: UROLOGY

## 2022-11-17 PROCEDURE — 99999 PR PBB SHADOW E&M-EST. PATIENT-LVL IV: ICD-10-PCS | Mod: PBBFAC,,, | Performed by: HOSPITALIST

## 2022-11-17 PROCEDURE — 99214 PR OFFICE/OUTPT VISIT, EST, LEVL IV, 30-39 MIN: ICD-10-PCS | Mod: S$PBB,,, | Performed by: UROLOGY

## 2022-11-17 PROCEDURE — 99999 PR PBB SHADOW E&M-EST. PATIENT-LVL IV: CPT | Mod: PBBFAC,,, | Performed by: HOSPITALIST

## 2022-11-17 PROCEDURE — 99214 PR OFFICE/OUTPT VISIT, EST, LEVL IV, 30-39 MIN: ICD-10-PCS | Mod: S$PBB,,, | Performed by: HOSPITALIST

## 2022-11-17 PROCEDURE — 99214 OFFICE O/P EST MOD 30 MIN: CPT | Mod: S$PBB,,, | Performed by: UROLOGY

## 2022-11-17 PROCEDURE — 99213 OFFICE O/P EST LOW 20 MIN: CPT | Mod: PBBFAC | Performed by: UROLOGY

## 2022-11-17 PROCEDURE — 99214 OFFICE O/P EST MOD 30 MIN: CPT | Mod: S$PBB,,, | Performed by: HOSPITALIST

## 2022-11-17 PROCEDURE — 99999 PR PBB SHADOW E&M-EST. PATIENT-LVL III: CPT | Mod: PBBFAC,,, | Performed by: UROLOGY

## 2022-11-17 RX ORDER — CIPROFLOXACIN 500 MG/1
500 TABLET ORAL ONCE
Qty: 1 TABLET | Refills: 0 | Status: SHIPPED | OUTPATIENT
Start: 2022-11-17 | End: 2022-11-17

## 2022-11-17 NOTE — ASSESSMENT & PLAN NOTE
- Body mass index is 34.55 kg/m².  - dietary discussion as above  - continue to monitor weight  - continue Trulicity

## 2022-11-17 NOTE — ASSESSMENT & PLAN NOTE
- lipid panel review today  - ASCVD Risk below: Statin: Taking  The ASCVD Risk score (Eyad OGLESBY, et al., 2019) failed to calculate for the following reasons:    The valid total cholesterol range is 130 to 320 mg/dL

## 2022-11-17 NOTE — PROGRESS NOTES
Subjective:      Patient ID: Jani Cha is a 77 y.o. male presented to [facility] on 11/17/2022.  Chief Complaint:  Diabetes type 2, Diabetes      History of Present Illness: Jani Cha is a 77 y.o. male here for management of type 2 diabetes and left adrenal incidentaloma  Other significant past medical history:  CKD stage IIIB, CAD, CABG, hypertension, hyperlipidemia    1) With regards to Diabetes Mellitus Type 2  Known diabetic complications: nephropathy and cardiovascular disease  Cardiovascular risk factors: advanced age (older than 55 for men, 65 for women), diabetes mellitus, dyslipidemia, hypertension, male gender, microalbuminuria, obesity (BMI >= 30 kg/m2) and sedentary lifestyle  Diagnosed w/ DM: in 2006  Saw Ortho need Left Knee Replacement>>  Need to get A1C <7%  Patient having issue with high deductible, gap.  Unable to afford multiple diabetes medication      Interval history:  Patient is here for follow-up of type 2 diabetes, A1c improved to 6.7%  Better dietary changes.  Currently still on Trulicity, getting from [facility] without any issue  Better glycemic control.      Recently: 10/25/2022>> patient fell picking up a newspaper.  Sustained right hip fracture requiring surgery.  Follow-up with Orthopedic surgery at this time.  Recently discharged from rehab  Currently using walker to help with ambulation    Current meds:   metformin:  500 mg twice a day   glipizide 10 mg twice a day   Trulicity 1.5 mg Q weekly injection   Home glucose checks:  Daily, hyperglycemia  Glucose log in AM    113  153  141  152  94  97  118    Diet/Exercise:               Eating 2 meals per day , lunch in, large portion dinner, does eat cookies prior to going to bed              Drink:  Coffee with sugar in the morning              Current exercise: none due to knee pain  Weight trend: stable  Diabetes Education: no  Diabetes Related Hospitalization:  no  Hx of pancreatitis, hx of  thyroid cancer: no  Family history of diabetes: unknow  Occupation: retired  Eye exam current (within one year): yes  Reports cuts or ulcers on feet:  Denies    Statin: Taking  ACE/ARB: Taking    Diabetes Management Status: Reviewed     A1C Trend  Lab Results   Component Value Date    HGBA1C 6.7 (H) 11/10/2022    HGBA1C 6.8 (H) 10/20/2022    HGBA1C 7.4 (H) 08/10/2022       Lab Results   Component Value Date    MICALBCREAT 47.1 (H) 02/07/2022     No results found for: EMKTGRYE89  No results found for: TTGIGA    No results found for: CPEPTIDE, GLUTAMICACID, ISLETCELLANT, FRUCTOSAMINE     Screening or Prevention Patient's value Goal Complete/Controlled?   Lipid profile : 12/06/2021 Annually Yes   LDL control Lab Results   Component Value Date    LDLCALC 60.0 (L) 12/06/2021    Annually/Less than 100 mg/dl  Yes   Nephropathy screening Lab Results   Component Value Date    LABMICR 56.0 02/07/2022     Lab Results   Component Value Date    PROTEINUA Trace (A) 10/20/2022    Annually No   Blood pressure BP Readings from Last 1 Encounters:   11/17/22 125/72    Less than 140/90 Yes   Dilated retinal exam : 10/19/2021 Annually Yes   Foot exam   Most Recent Foot Exam Date: Not Found Annually Yes       2) Regards to Left adrenal incidentaloma  - Noted on CT chest, left adrenal mass 1.2 cm in size.  - Adrenal lesion imaging characteristics left adrenal nodule, 1.2 cm, Smooth, heterogenous, without calcifications    - Patient does have history of diabetes as above, Morbidly obese, Hypertension but no history of hypertensive emergency   - Pt denies history of paroxysmal symptoms such as syncope, pallor or dizziness  - No palpitations, No history of cancer  - Pt reports no abdominal discomfort  - Work up: DST: WNL, cortisol 2, due to the overweight  - Metanephrines and normetanephrines:  Within acceptable ranges  - Renin/navneet level normal    3) Right femoral neck fracture  - Recently: 10/25/2022>> patient fell picking up a newspaper.   Sustained right hip fracture requiring surgery.    - Follow-up with Orthopedic surgery at this time.  Recently discharged from rehab  - Currently using walker to help with ambulation  - denies history of other fractures  - no history of cirrhosis, liver disease, cancer  - currently not taking any vitamin supplements including calcium/vitamin-D      Reviewed past surgical, medical, family, social history and updated as appropriate.  Review of Systems: see HPI above    Objective:   /72 (BP Location: Right arm)   Pulse 61   Temp 97.6 °F (36.4 °C) (Oral)   Wt 109.2 kg (240 lb 12.8 oz)   BMI 34.55 kg/m²     Body mass index is 34.55 kg/m².  Vital signs reviewed    Physical Exam  Vitals and nursing note reviewed.   Constitutional:       General: He is not in acute distress.     Appearance: Normal appearance. He is well-developed. He is obese.   Neck:      Thyroid: No thyromegaly.   Cardiovascular:      Rate and Rhythm: Normal rate.      Heart sounds: Normal heart sounds.   Pulmonary:      Effort: Pulmonary effort is normal. No respiratory distress.   Abdominal:      Tenderness: There is no abdominal tenderness.   Musculoskeletal:         General: Normal range of motion.      Cervical back: Neck supple.   Skin:     General: Skin is warm.      Findings: No erythema.   Neurological:      Mental Status: He is alert and oriented to person, place, and time.       Lab Reviewed:   Lab Results   Component Value Date    HGBA1C 6.7 (H) 11/10/2022       Lab Results   Component Value Date    CHOL 110 (L) 12/06/2021    HDL 35 (L) 12/06/2021    LDLCALC 60.0 (L) 12/06/2021    TRIG 75 12/06/2021    CHOLHDL 31.8 12/06/2021       Lab Results   Component Value Date     11/10/2022    K 4.7 11/10/2022     11/10/2022    CO2 25 11/10/2022     (H) 11/10/2022    BUN 33 (H) 11/10/2022    CREATININE 1.7 (H) 11/10/2022    CALCIUM 9.8 11/10/2022    PROT 4.9 (L) 10/25/2022    ALBUMIN 2.0 (L) 10/25/2022    BILITOT 0.4  10/25/2022    ALKPHOS 63 10/25/2022    AST 17 10/25/2022    ALT 5 (L) 10/25/2022    ANIONGAP 12 11/10/2022    ESTGFRAFRICA 48.0 (A) 05/10/2022    EGFRNONAA 41.5 (A) 05/10/2022    TSH 1.640 12/06/2021        Lab Results   Component Value Date    CALCIUM 9.8 11/10/2022    CALCIUM 8.9 10/25/2022    CALCIUM 8.4 (L) 10/24/2022    PHOS 2.8 10/25/2022    PHOS 2.9 10/24/2022    PHOS 3.8 10/23/2022    ALKPHOS 63 10/25/2022    ALKPHOS 60 10/24/2022    ALKPHOS 56 10/23/2022    TSH 1.640 12/06/2021       Assessment     1. Type 2 diabetes mellitus with hyperglycemia, without long-term current use of insulin  Hemoglobin A1C    Basic Metabolic Panel    Vitamin D    PTH, Intact      2. Screening for osteoporosis        3. Closed displaced fracture of right femoral neck s/p total hip arthroplasty on 10/21/2022  PTH, Intact    CANCELED: DXA Bone Density Spine And Hip      4. Class 1 obesity due to excess calories with serious comorbidity and body mass index (BMI) of 34.0 to 34.9 in adult  Vitamin D      5. Age-related osteoporosis with current pathological fracture, vertebra(e), initial encounter for fracture  PTH, Intact    CANCELED: DXA Bone Density Spine And Hip      6. Adrenal adenoma, unspecified laterality        7. Hyperlipidemia, unspecified hyperlipidemia type             Plan       Type 2 diabetes mellitus with hyperglycemia, without long-term current use of insulin  - Diabetes better controlled, improvement  A1c, goal A1C for patient is <7%  - Complicated by hyperglycemia, obesity, hx CAD/CABG, CKD3b, dietary indiscretion.  - still with occasional morning time hyperglycemia  - due to the renal function, medical options limited  - Diabetic supplies/medications, review at every visit to ensure continue refills    Plan  - continue current dose of Trulicity 1.5 mg once a week injection, getting it from [employer]  - we will stop metformin given CKD stage IIIB  - continue glipizide to 10 mg b.i.d.  - advised the patient follow  with my regimen with close follow-up as scheduled  - repeat lab work prior to next visit, 3-4 months    Adrenal adenoma  - Adrenal incidentaloma, Noted on CT chest, left adrenal mass 1.2 cm in size.  - Adrenal lesion imaging characteristics left adrenal nodule, 1.2 cm, Smooth, heterogenous, without calcifications   - DST: WNL, cortisol 2, due to the overweight  - Metanephrines and normetanephrines:  Within acceptable ranges  - Renin/navneet level normal    Closed displaced fracture of right femoral neck s/p total hip arthroplasty on 10/21/2022  - right femoral neck fracture 10/21/22  - status post fixation  - continue follow-up with Orthopedic surgery  - plan to check vitamin-D, PTH, BMP  - plan to check DXA if not already done by orthopedic surgery  - advised patient to start general men's multivitamin along with vitamin D3 2000 IU daily    Class 1 obesity due to excess calories with serious comorbidity in adult  - Body mass index is 34.55 kg/m².  - dietary discussion as above  - continue to monitor weight  - continue Trulicity    Hyperlipidemia  - lipid panel review today  - ASCVD Risk below: Statin: Taking  The ASCVD Risk score (Kelayres DK, et al., 2019) failed to calculate for the following reasons:    The valid total cholesterol range is 130 to 320 mg/dL      Advised patient to follow up with PCP for routine health maintenance care.   RTC in 3-4 months    Malcolm Kent M.D  Endocrinology  [facility]  11/17/2022      Disclaimer: This note has been generated using voice-recognition software. There may be typographical errors that have been missed during proof-reading.

## 2022-11-17 NOTE — ASSESSMENT & PLAN NOTE
- Diabetes better controlled, improvement  A1c, goal A1C for patient is <7%  - Complicated by hyperglycemia, obesity, hx CAD/CABG, CKD3b, dietary indiscretion.  - still with occasional morning time hyperglycemia  - due to the renal function, medical options limited  - Diabetic supplies/medications, review at every visit to ensure continue refills    Plan  - continue current dose of Trulicity 1.5 mg once a week injection, getting it from [facility]  - we will stop metformin given CKD stage IIIB  - continue glipizide to 10 mg b.i.d.  - advised the patient follow with my regimen with close follow-up as scheduled  - repeat lab work prior to next visit, 3-4 months

## 2022-11-17 NOTE — PROGRESS NOTES
Subjective:       Patient ID: Jani Cha is a 77 y.o. male The patient's last visit with me was on 8/3/2022.      Chief Complaint:   Chief Complaint   Patient presents with    Follow-up         History of Kidney Stones  He had an issue with a ureteral stone in Winter 2015.  He did not recall passing the stone but his pain resolved.       3/25/2021  He had right flank pain and hematuria a couple of weeks ago. He brought the stone for analysis a couple of weeks ago.  He denies anymore hematuria, flank pain.  He denies fever.    5/3/2022  He denies pain or hematuria.    11/17/2022  He denies pain or hematuria      Benign Prostatic Hyperplasia  He patient reports nocturia three times a night. He denies frequency, incomplete emptying and intermittency. The patient states symptoms are of moderate severity. Onset of symptoms was several years ago and was gradual in onset.  He has no personal history and no family history of prostate cancer. He reports a history of kidney stones. He denies flank pain, gross hematuria and recurrent UTI.  He is currently taking no prostate medications.  He has noted some frequency with occasional urgency.    8/3/2022  He was to add Flomax last visit.  He took it for no more than 2 weeks.  He stopped it due to retrograde ejaculation and pain in his prostate afterwards.  He did not note any change to his stream.    IPSS Questionnaire (AUA-7):  8    11/17/2022   He is doing okay.  He recently fractured his hip.  His stream is about the same.  He required a urologist to place the Alejandre.  He is not sure why.    He is not interested in trying prostate medications again.    IPSS Questionnaire (AUA-7):  Over the past month    1)  How often have you had a sensation of not emptying your bladder completely after you finish urinating?  1 - Less than 1 time in 5   2)  How often have you had to urinate again less than two hours after you finished urinating? 2 - Less than half the time   3)  How  often have you found you stopped and started again several times when you urinated?  1 - Less than 1 time in 5   4) How difficult have you found it to postpone urination?  4 - More than half the time   5) How often have you had a weak urinary stream?  0 - Not at all   6) How often have you had to push or strain to begin urination?  0 - Not at all   7) How many times did you most typically get up to urinate from the time you went to bed until the time you got up in the morning?  4 - 4 times   Total score:  0-7 mildly symptomatic    8-19 moderately symptomatic    20-35 severely symptomatic        ACTIVE MEDICAL ISSUES:  Patient Active Problem List   Diagnosis    Hyperlipidemia    Primary hypertension    Coronary artery disease involving native coronary artery of native heart without angina pectoris    Sleep apnea    S/P CABG x 4    Type 2 diabetes mellitus with diabetic neuropathy, without long-term current use of insulin    GERD (gastroesophageal reflux disease)    Personal history of colonic polyps    Abdominal aortic aneurysm (AAA) without rupture    Total occlusion of coronary artery, chronic - PDA with collaterals.  No ischemic on PET    Pulmonary nodule    Thoracic aortic aneurysm without rupture    Bilateral renal cysts    Adrenal adenoma    History of PCI of RCA    Class 1 obesity due to excess calories with serious comorbidity in adult    Calcified granuloma of lung    Type 2 diabetes mellitus with hyperglycemia, without long-term current use of insulin    Lymphedema of both lower extremities    Swelling of lower extremity    Closed displaced fracture of right femoral neck s/p total hip arthroplasty on 10/21/2022    E. coli urinary tract infection       ALLERGIES AND MEDICATIONS: updated and reviewed.  Review of patient's allergies indicates:   Allergen Reactions    Penicillins Hives, Itching and Rash    Shellfish containing products      Current Outpatient Medications   Medication Sig    acetaminophen  (TYLENOL) 500 MG tablet Take 2 tablets (1,000 mg total) by mouth every 8 (eight) hours as needed (Mild to moderate pain).    apixaban (ELIQUIS) 2.5 mg Tab Take 1 tablet (2.5 mg total) by mouth 2 (two) times daily. DVT prophylaxis after hip replacement surgery. End date 11/24/2022.    aspirin (ECOTRIN) 81 MG EC tablet Take 81 mg by mouth once daily.    atorvastatin (LIPITOR) 80 MG tablet Take 1 tablet by mouth once daily    blood sugar diagnostic Strp 1 strip by Misc.(Non-Drug; Combo Route) route once daily.    carvediloL (COREG) 25 MG tablet TAKE 1 TABLET BY MOUTH TWICE DAILY WITH MEALS    dulaglutide (TRULICITY) 1.5 mg/0.5 mL pen injector Inject 1.5 mg into the skin every 7 days.    fluticasone propionate (FLONASE) 50 mcg/actuation nasal spray 1 spray (50 mcg total) by Each Nostril route once daily.    glipiZIDE (GLUCOTROL) 10 MG TR24 Take 1 tablet (10 mg total) by mouth 2 (two) times daily with meals.    lancets 30 gauge Misc Use to test blood sugar two (2) times daily; discard lancet after each use    melatonin (MELATIN) 3 mg tablet Take 2 tablets (6 mg total) by mouth nightly as needed for Insomnia.    methocarbamoL (ROBAXIN) 750 MG Tab Take 1 tablet (750 mg total) by mouth 3 (three) times daily.    oxyCODONE (ROXICODONE) 5 MG immediate release tablet Take 1 tablet (5 mg total) by mouth every 4 (four) hours as needed (Moderate to severe pain).    pantoprazole (PROTONIX) 40 MG tablet Take 1 tablet by mouth once daily    senna-docusate 8.6-50 mg (PERICOLACE) 8.6-50 mg per tablet Take 1 tablet by mouth 2 (two) times daily.    TRUEPLUS LANCETS 33 gauge Misc Apply topically 2 (two) times daily.    ciprofloxacin HCl (CIPRO) 500 MG tablet Take 1 tablet (500 mg total) by mouth once. for 1 dose    metFORMIN (GLUCOPHAGE) 500 MG tablet Take 1 tablet (500 mg total) by mouth 2 (two) times daily with meals.    pregabalin (LYRICA) 75 MG capsule Take 1 capsule (75 mg total) by mouth nightly. for 10 days     No current  "facility-administered medications for this visit.       Review of Systems   Constitutional:  Negative for activity change, fatigue, fever and unexpected weight change.   HENT:  Negative for congestion.    Eyes:  Negative for redness.   Respiratory:  Negative for chest tightness and shortness of breath.    Cardiovascular:  Negative for chest pain and leg swelling.   Gastrointestinal:  Negative for abdominal pain, constipation, diarrhea, nausea and vomiting.   Genitourinary:  Negative for dysuria, flank pain, frequency, hematuria, penile pain, penile swelling, scrotal swelling, testicular pain and urgency.   Musculoskeletal:  Negative for arthralgias and back pain.   Neurological:  Negative for dizziness and light-headedness.   Psychiatric/Behavioral:  Negative for behavioral problems and confusion. The patient is not nervous/anxious.    All other systems reviewed and are negative.    Objective:      Vitals:    11/17/22 0824   Weight: 109.4 kg (241 lb 2.9 oz)   Height: 5' 10" (1.778 m)       Physical Exam  Vitals and nursing note reviewed.   Constitutional:       Appearance: He is well-developed.   HENT:      Head: Normocephalic.   Eyes:      Conjunctiva/sclera: Conjunctivae normal.   Neck:      Thyroid: No thyromegaly.      Trachea: No tracheal deviation.   Cardiovascular:      Rate and Rhythm: Normal rate.      Heart sounds: Normal heart sounds.   Pulmonary:      Effort: Pulmonary effort is normal. No respiratory distress.      Breath sounds: Normal breath sounds. No wheezing.   Abdominal:      General: Bowel sounds are normal.      Palpations: Abdomen is soft.      Tenderness: There is no abdominal tenderness. There is no rebound.      Hernia: No hernia is present.   Musculoskeletal:         General: No tenderness. Normal range of motion.      Cervical back: Normal range of motion and neck supple.   Lymphadenopathy:      Cervical: No cervical adenopathy.   Skin:     General: Skin is warm and dry.      Findings: No " erythema or rash.   Neurological:      Mental Status: He is alert and oriented to person, place, and time.   Psychiatric:         Behavior: Behavior normal.         Thought Content: Thought content normal.         Judgment: Judgment normal.       Urine dipstick shows negative for all components.  Micro exam: negative for WBC's or RBC's    Component Ref Range & Units 4 d ago 1 yr ago   PSA Diagnostic 0.00 - 4.00 ng/mL 1.8  1.8 CM    Comment: PSA Expected levels:   Hormonal Therapy: <0.05 ng/ml   Prostatectomy: <0.01 ng/ml   Radiation Therapy: <1.00 ng/ml    Resulting Agency  [facility]             Narrative  Performed by: [facility]  1 year      Specimen Collected: 04/29/22 09:08 Last Resulted: 04/29/22 15:04           .      Assessment:       1. BPH with urinary obstruction    2. Kidney stone    3. Dysuria            Plan:       1. BPH with urinary obstruction  He wants to look into Urolift  He will need to be able to get into Lithotomy position.  Cipro prior due to recent hip surgery    - Cystoscopy; Future  - POCT urinalysis, dipstick or tablet reag    2. Kidney stone  stable    3. Dysuria  resolved       Follow up in about 6 months (around 5/17/2023) for Follow up.

## 2022-11-17 NOTE — ASSESSMENT & PLAN NOTE
- right femoral neck fracture 10/21/22  - status post fixation  - continue follow-up with Orthopedic surgery  - plan to check vitamin-D, PTH, BMP  - plan to check DXA if not already done by orthopedic surgery  - advised patient to start general men's multivitamin along with vitamin D3 2000 IU daily

## 2022-11-21 ENCOUNTER — DOCUMENTATION ONLY (OUTPATIENT)
Dept: REHABILITATION | Facility: HOSPITAL | Age: 77
End: 2022-11-21
Payer: MEDICARE

## 2022-11-21 DIAGNOSIS — E11.40 TYPE 2 DIABETES MELLITUS WITH DIABETIC NEUROPATHY, WITHOUT LONG-TERM CURRENT USE OF INSULIN: Chronic | ICD-10-CM

## 2022-11-21 DIAGNOSIS — I89.0 LYMPHEDEMA OF BOTH LOWER EXTREMITIES: Primary | ICD-10-CM

## 2022-11-21 DIAGNOSIS — M79.89 SWELLING OF LOWER EXTREMITY: ICD-10-CM

## 2022-11-21 NOTE — PROGRESS NOTES
[facility]   Discharge Note    [id number]    Therapy Diagnosis:   Encounter Diagnoses   Name Primary?    Lymphedema of both lower extremities Yes    Type 2 diabetes mellitus with diabetic neuropathy, without long-term current use of insulin     Swelling of lower extremity      Physician: Vladimir Echavarria MD     Physician Orders: PT Eval and Treat - lymphedema  Medical Diagnosis from Referral:   Diagnosis   I89.0 (ICD-10-CM) - Lymphedema of both lower extremities   Evaluation Date: 8/26/2022  Authorization Period Expiration: 8/22/23  Plan of Care Expiration: 11/18/22  Visit # / Visits authorized: 1/ 1     Precautions: Standard, Diabetes and cardiac, CKD 3, CVI    Date of Last visit: evaluation only, pt did not schedule follow up visits or return to PT  Total Visits Received: 1    ASSESSMENT      Pt has orders for compression and DME options    Per chart review - pt with fall 10/20/22 and R DARNELL 10/21/22     Discharge reason: Patient has been hospitalized - surgery  Patient has not attended therapy since 8/26/22    Discharge FOTO Score: na    Goals: na    PLAN   This patient is discharged from Physical Therapy      Laura Zarate, PT

## 2022-11-22 ENCOUNTER — OFFICE VISIT (OUTPATIENT)
Dept: FAMILY MEDICINE | Facility: CLINIC | Age: 77
End: 2022-11-22
Payer: MEDICARE

## 2022-11-22 ENCOUNTER — TELEPHONE (OUTPATIENT)
Dept: FAMILY MEDICINE | Facility: CLINIC | Age: 77
End: 2022-11-22

## 2022-11-22 VITALS
DIASTOLIC BLOOD PRESSURE: 74 MMHG | HEART RATE: 65 BPM | OXYGEN SATURATION: 98 % | HEIGHT: 70 IN | TEMPERATURE: 98 F | SYSTOLIC BLOOD PRESSURE: 130 MMHG | WEIGHT: 240.06 LBS | BODY MASS INDEX: 34.37 KG/M2

## 2022-11-22 DIAGNOSIS — E11.40 TYPE 2 DIABETES MELLITUS WITH DIABETIC NEUROPATHY, WITHOUT LONG-TERM CURRENT USE OF INSULIN: ICD-10-CM

## 2022-11-22 DIAGNOSIS — Z96.641 HISTORY OF RIGHT HIP REPLACEMENT: ICD-10-CM

## 2022-11-22 DIAGNOSIS — G47.39 OTHER SLEEP APNEA: ICD-10-CM

## 2022-11-22 DIAGNOSIS — R30.0 DYSURIA: ICD-10-CM

## 2022-11-22 DIAGNOSIS — N40.1 BPH WITH URINARY OBSTRUCTION: ICD-10-CM

## 2022-11-22 DIAGNOSIS — N13.8 BPH WITH URINARY OBSTRUCTION: ICD-10-CM

## 2022-11-22 DIAGNOSIS — Z09 HOSPITAL DISCHARGE FOLLOW-UP: Primary | ICD-10-CM

## 2022-11-22 DIAGNOSIS — N20.0 KIDNEY STONE: ICD-10-CM

## 2022-11-22 DIAGNOSIS — S72.001A CLOSED DISPLACED FRACTURE OF RIGHT FEMORAL NECK: ICD-10-CM

## 2022-11-22 PROCEDURE — 99999 PR PBB SHADOW E&M-EST. PATIENT-LVL IV: CPT | Mod: PBBFAC,,, | Performed by: NURSE PRACTITIONER

## 2022-11-22 PROCEDURE — 99214 OFFICE O/P EST MOD 30 MIN: CPT | Mod: S$PBB,,, | Performed by: NURSE PRACTITIONER

## 2022-11-22 PROCEDURE — 99214 PR OFFICE/OUTPT VISIT, EST, LEVL IV, 30-39 MIN: ICD-10-PCS | Mod: S$PBB,,, | Performed by: NURSE PRACTITIONER

## 2022-11-22 PROCEDURE — 99999 PR PBB SHADOW E&M-EST. PATIENT-LVL IV: ICD-10-PCS | Mod: PBBFAC,,, | Performed by: NURSE PRACTITIONER

## 2022-11-22 PROCEDURE — 99214 OFFICE O/P EST MOD 30 MIN: CPT | Mod: PBBFAC,PO | Performed by: NURSE PRACTITIONER

## 2022-11-22 RX ORDER — LANCETS 33 GAUGE
1 EACH MISCELLANEOUS DAILY
Qty: 100 EACH | Refills: 11 | Status: SHIPPED | OUTPATIENT
Start: 2022-11-22 | End: 2022-11-23 | Stop reason: SDUPTHER
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
## 2022-11-23 ENCOUNTER — TELEPHONE (OUTPATIENT)
Dept: FAMILY MEDICINE | Facility: CLINIC | Age: 77
End: 2022-11-23
Payer: MEDICARE

## 2022-11-23 DIAGNOSIS — E11.40 TYPE 2 DIABETES MELLITUS WITH DIABETIC NEUROPATHY, WITHOUT LONG-TERM CURRENT USE OF INSULIN: ICD-10-CM

## 2022-11-23 RX ORDER — LANCETS 33 GAUGE
1 EACH MISCELLANEOUS DAILY
Qty: 100 EACH | Refills: 11 | Status: SHIPPED | OUTPATIENT
Start: 2022-11-23 | End: 2024-02-19

## 2022-11-23 NOTE — TELEPHONE ENCOUNTER
----- Message from Ashley Ludwig sent at 11/23/2022 10:41 AM CST -----  Regarding: [facility]  .Type: Patient Call Back    [facility]     What is the request in detail: Pt's rx for lancets is not going through due to NP Id number not working and requesting for the rx's to be called in under a different Dr for rx to be filled .  [facility] [place] - [address]  [address]  [place] [address]  Phone: [phone] Fax: [phone]      Can the clinic reply by [facility]? Call back     Would the patient rather a call back or a response via [facility]?  Call back     Best call back number:  [phone]              Eyeglasses

## 2022-11-23 NOTE — TELEPHONE ENCOUNTER
Call placed to [facility] and [facility] spoke with [employer] . Informed by both reps that patient has no active coverage. Call placed to patient spoke with patient's spouse at patient's request. Informed that I was told by carriers on file that patient has no active coverage.Patient's spouse stated coverage is active and she will place call to carrier.

## 2022-11-23 NOTE — TELEPHONE ENCOUNTER
I am unsure why this would need to be called under another providers ID number as I have never had a problem filling any prescriptions especially lancets.

## 2022-11-23 NOTE — TELEPHONE ENCOUNTER
Spoke with Pratibha at [facility] stated patient's lancet will not go through. Due to Yudi ID number?? Stated prescription must be signed by PCP, please advise. This for the patient's lancets pended in this encounter, thank you.

## 2022-11-23 NOTE — TELEPHONE ENCOUNTER
----- Message from Dominga Haddad sent at 11/22/2022 12:15 PM CST -----  Regarding: [facility]  Type: Patient Call Back    Who called:  Sherlyn with [facility]     What is the request in detail: needs to know medication and DX  also needs supporting labs/ test results faxed to [phone]    Can the clinic reply by [facility]? no  Would the patient rather a call back or a reponse via [facility]?  Call back    Best call back number:[phone]

## 2022-11-23 NOTE — TELEPHONE ENCOUNTER
Spoke with representative informed that prior authorization for trueplus lancets was denied. Informed by rep to call medicare part B, reference and phone number received at this time.

## 2022-11-24 ENCOUNTER — HOSPITAL ENCOUNTER (EMERGENCY)
Facility: HOSPITAL | Age: 77
Discharge: HOME | End: 2022-11-25
Attending: EMERGENCY MEDICINE
Payer: MEDICARE

## 2022-11-24 VITALS
WEIGHT: 240 LBS | TEMPERATURE: 98 F | DIASTOLIC BLOOD PRESSURE: 56 MMHG | OXYGEN SATURATION: 96 % | RESPIRATION RATE: 17 BRPM | HEART RATE: 91 BPM | BODY MASS INDEX: 34.36 KG/M2 | HEIGHT: 70 IN | SYSTOLIC BLOOD PRESSURE: 99 MMHG

## 2022-11-24 DIAGNOSIS — N30.01 ACUTE CYSTITIS WITH HEMATURIA: Primary | ICD-10-CM

## 2022-11-24 LAB
BACTERIA #/AREA URNS HPF: ABNORMAL /HPF
BILIRUB UR QL STRIP: NEGATIVE
CLARITY UR: ABNORMAL
COLOR UR: ABNORMAL
CTP QC/QA: YES
GLUCOSE UR QL STRIP: ABNORMAL
HGB UR QL STRIP: ABNORMAL
HYALINE CASTS #/AREA URNS LPF: 1 /LPF
KETONES UR QL STRIP: NEGATIVE
LEUKOCYTE ESTERASE UR QL STRIP: ABNORMAL
MICROSCOPIC COMMENT: ABNORMAL
MOLECULAR STREP A: NEGATIVE
NITRITE UR QL STRIP: NEGATIVE
PH UR STRIP: 6 [PH] (ref 5–8)
POC MOLECULAR INFLUENZA A AGN: NEGATIVE
POC MOLECULAR INFLUENZA B AGN: NEGATIVE
PROT UR QL STRIP: ABNORMAL
RBC #/AREA URNS HPF: >100 /HPF (ref 0–4)
SARS-COV-2 RDRP RESP QL NAA+PROBE: NEGATIVE
SP GR UR STRIP: 1.02 (ref 1–1.03)
URN SPEC COLLECT METH UR: ABNORMAL
UROBILINOGEN UR STRIP-ACNC: NEGATIVE EU/DL
WBC #/AREA URNS HPF: >100 /HPF (ref 0–5)

## 2022-11-24 PROCEDURE — 87086 URINE CULTURE/COLONY COUNT: CPT | Performed by: EMERGENCY MEDICINE

## 2022-11-24 PROCEDURE — 87635 SARS-COV-2 COVID-19 AMP PRB: CPT | Performed by: EMERGENCY MEDICINE

## 2022-11-24 PROCEDURE — 87186 SC STD MICRODIL/AGAR DIL: CPT | Performed by: EMERGENCY MEDICINE

## 2022-11-24 PROCEDURE — 87088 URINE BACTERIA CULTURE: CPT | Performed by: EMERGENCY MEDICINE

## 2022-11-24 PROCEDURE — 87077 CULTURE AEROBIC IDENTIFY: CPT | Performed by: EMERGENCY MEDICINE

## 2022-11-24 PROCEDURE — 81000 URINALYSIS NONAUTO W/SCOPE: CPT | Performed by: EMERGENCY MEDICINE

## 2022-11-24 PROCEDURE — 87502 INFLUENZA DNA AMP PROBE: CPT

## 2022-11-24 PROCEDURE — 87651 STREP A DNA AMP PROBE: CPT

## 2022-11-25 ENCOUNTER — OFFICE VISIT (OUTPATIENT)
Dept: CARDIOLOGY | Facility: CLINIC | Age: 77
End: 2022-11-25
Payer: MEDICARE

## 2022-11-25 ENCOUNTER — TELEPHONE (OUTPATIENT)
Dept: FAMILY MEDICINE | Facility: CLINIC | Age: 77
End: 2022-11-25
Payer: MEDICARE

## 2022-11-25 DIAGNOSIS — N18.30 DIABETES MELLITUS DUE TO UNDERLYING CONDITION WITH STAGE 3 CHRONIC KIDNEY DISEASE, WITHOUT LONG-TERM CURRENT USE OF INSULIN, UNSPECIFIED WHETHER STAGE 3A OR 3B CKD: ICD-10-CM

## 2022-11-25 DIAGNOSIS — I25.10 CORONARY ARTERY DISEASE INVOLVING NATIVE CORONARY ARTERY OF NATIVE HEART WITHOUT ANGINA PECTORIS: Primary | Chronic | ICD-10-CM

## 2022-11-25 DIAGNOSIS — I10 PRIMARY HYPERTENSION: ICD-10-CM

## 2022-11-25 DIAGNOSIS — E08.22 DIABETES MELLITUS DUE TO UNDERLYING CONDITION WITH STAGE 3 CHRONIC KIDNEY DISEASE, WITHOUT LONG-TERM CURRENT USE OF INSULIN, UNSPECIFIED WHETHER STAGE 3A OR 3B CKD: ICD-10-CM

## 2022-11-25 DIAGNOSIS — E11.40 TYPE 2 DIABETES MELLITUS WITH DIABETIC NEUROPATHY, WITHOUT LONG-TERM CURRENT USE OF INSULIN: Chronic | ICD-10-CM

## 2022-11-25 DIAGNOSIS — Z95.1 S/P CABG X 4: Chronic | ICD-10-CM

## 2022-11-25 DIAGNOSIS — I71.23 ANEURYSM OF DESCENDING THORACIC AORTA WITHOUT RUPTURE: ICD-10-CM

## 2022-11-25 DIAGNOSIS — I25.82 TOTAL OCCLUSION OF CORONARY ARTERY, CHRONIC: ICD-10-CM

## 2022-11-25 DIAGNOSIS — Z98.61 HISTORY OF PERCUTANEOUS CORONARY INTERVENTION: ICD-10-CM

## 2022-11-25 DIAGNOSIS — I71.40 ABDOMINAL AORTIC ANEURYSM (AAA) WITHOUT RUPTURE, UNSPECIFIED PART: ICD-10-CM

## 2022-11-25 DIAGNOSIS — E78.00 PURE HYPERCHOLESTEROLEMIA: Chronic | ICD-10-CM

## 2022-11-25 PROBLEM — E08.29: Status: ACTIVE | Noted: 2022-11-25

## 2022-11-25 PROCEDURE — 96372 THER/PROPH/DIAG INJ SC/IM: CPT | Performed by: EMERGENCY MEDICINE

## 2022-11-25 PROCEDURE — 99291 CRITICAL CARE FIRST HOUR: CPT

## 2022-11-25 PROCEDURE — 99214 PR OFFICE/OUTPT VISIT, EST, LEVL IV, 30-39 MIN: ICD-10-PCS | Mod: 95,,, | Performed by: INTERNAL MEDICINE

## 2022-11-25 PROCEDURE — 99284 EMERGENCY DEPT VISIT MOD MDM: CPT | Mod: 25

## 2022-11-25 PROCEDURE — 63600175 PHARM REV CODE 636 W HCPCS: Performed by: EMERGENCY MEDICINE

## 2022-11-25 PROCEDURE — 99214 OFFICE O/P EST MOD 30 MIN: CPT | Mod: 95,,, | Performed by: INTERNAL MEDICINE

## 2022-11-25 RX ORDER — CIPROFLOXACIN 500 MG/1
500 TABLET ORAL 2 TIMES DAILY
Qty: 14 TABLET | Refills: 0 | Status: SHIPPED | OUTPATIENT
Start: 2022-11-25 | End: 2022-11-29 | Stop reason: SDUPTHER

## 2022-11-25 RX ORDER — CEFTRIAXONE 1 G/1
1 INJECTION, POWDER, FOR SOLUTION INTRAMUSCULAR; INTRAVENOUS
Status: COMPLETED | OUTPATIENT
Start: 2022-11-25 | End: 2022-11-25

## 2022-11-25 RX ADMIN — CEFTRIAXONE 1 G: 1 INJECTION, POWDER, FOR SOLUTION INTRAMUSCULAR; INTRAVENOUS at 12:11

## 2022-11-25 NOTE — TELEPHONE ENCOUNTER
Patient states he only wants to his PCP declined any other provider. Next available date with PCP 03/28. Patient states he would like provider to squeeze him in. Patient went into ER on yesterday.  Please advise

## 2022-11-25 NOTE — PROGRESS NOTES
Subjective:   Patient ID:  Jani Cha is a 77 y.o. male who presents for follow-up of Coronary Artery Disease      Assessment/Plan:     1. Coronary artery disease involving native coronary artery of native heart without angina pectoris - on DAP and statin.      2. Total occlusion of coronary artery, chronic -LCX with collaterals.  No ischemic on PET    3. Aneurysm of descending thoracic aorta without rupture - At level of diaphragm.  followed by Vasc Surg     4. S/P CABG x 4 - LIMA-LAD patent.  All other grafts occluded    5. Primary hypertension - BPs seem controlled.  No smartphone for Dig Med program    6. Pure hypercholesterolemia - - on statin at goal     7. History of PCI of RCA - 9-2020     8. Abdominal aortic aneurysm (AAA) without ruptur- At level of diaphragm.  followed by Vasc Surg     9. Type 2 diabetes mellitus with diabetic neuropathy, without long-term current use of insulin- under control    10. Diabetes mellitus due to underlying condition with stage 3 chronic kidney disease, without long-term current use of insulin, unspecified whether stage 3a or 3b CKD - stable and under control      As stated in prior visit--Pt has no active cardiac condition (ACS/USA, decompenstated CHF, significant arrhythmias or severe valvular disease) and can easily achieve 4 METS.  Pt does not require any further workup prior to undergoing knee surgery. ASA can be held as needed but should be restarted as soon as possible after surgery is completed.  Pt should remain on beta-blockers throughout the entire marvin-operative time period. The other cardiac meds can be held at Surgerys discretion but should be restarted as soon as safely possible after surgery.  These recommendations follow the most current Guideline on Perioperative Cardiovascular Evaluation and Management of Patients Undergoing Noncardiac Surgery released by the ACC/AHA. (JACC 2014.07.944).         The patient location is: home  The chief complaint  leading to consultation is: CAD- annual follow up    Visit type: audiovisual    Face to Face time with patient: 20 minutes  30 minutes of total time spent on the encounter, which includes face to face time and non-face to face time preparing to see the patient (eg, review of tests), Obtaining and/or reviewing separately obtained history, Documenting clinical information in the electronic or other health record, Independently interpreting results (not separately reported) and communicating results to the patient/family/caregiver, or Care coordination (not separately reported).         Each patient to whom he or she provides medical services by telemedicine is:  (1) informed of the relationship between the physician and patient and the respective role of any other health care provider with respect to management of the patient; and (2) notified that he or she may decline to receive medical services by telemedicine and may withdraw from such care at any time.    Notes:      No orders of the defined types were placed in this encounter.          ___________________________________________________________________________________________    HPI:   Pt has CAD s/p CABG in 5-2006 (LIMA-LAD (patent), SVG-OM (occluded), SVG-PDA (occluded)), abd AAA, JAYSON on CPAP, DM, HTN and dyslipidemia.  Pt recently had a mechanical fall and broke his hip.  He had surgery and was without any cardiac complications.  He is doing home PT.  He still intends to have his knee replacement once his hip heals.     Mr. Cha denies any CP, BUCK, palpitations, TIA's, syncope or presyncope. He has been having orthopedic (knee issues) recently.     Pt had a syncopal episode in 2020 which prompted a PET scan which confirmed ischemia in the PL distribution.  LHC revealed  RCA  with good PDA collaterals and  LCX (SVG to OM and PDA were occluded).  He subsequently had  RCA PCI that was complicated by mild dissection and thus was staged.  PCI  completed in 9-2020.  He remain on DAP and he completed phase 2 of cardiac rehab .     CT scan for renal stones in  demonstrated Infrarenal abdominal aortic aneurysm measuring 3.5-cm and diffuse dilatation of the descending thoracic aorta measuring 3.9-cm.    CTA 2018 The distal descending thoracic aorta remains slightly dilated at 4.3 cm, previously 3.9 cm. There is a persistent, infrarenal abdominal aortic aneurysm measuring approximately 3.6 cm, partially thrombosed, previously 3.5 cm. There is significant extensive atherosclerotic plaque throughout the aorta. Celiac, SMA, and ROXANNE are patent.  Now followed by Vasc Surgery.      Caroitds 7-2020  There is 0-19% right Internal Carotid Stenosis.  There is 0-19% left Internal Carotid Stenosis.    Broke hip s/p repair. -     Results for orders placed during the hospital encounter of 10/20/22    Echo    Interpretation Summary  · The left ventricle is small with low normal systolic function.  · The estimated ejection fraction is 50%.  · Indeterminate left ventricular diastolic function.  · Mild right ventricular enlargement with moderately reduced right ventricular systolic function.  · Mild to moderate tricuspid regurgitation.  · There is abnormal septal wall motion. There is systolic flattening of the interventricular septum consistent with right ventricle pressure overload.  · Normal central venous pressure (3 mmHg).  · The estimated PA systolic pressure is 71 mmHg.  · There is pulmonary hypertension.     Results for orders placed during the hospital encounter of 07/17/20    Cardiac PET Scan Stress    Interpretation Summary    Perfusion defect #1 - There is a small sized, moderate to severe intensity, basal to mid inferior and inferolateral wall reversible perfusion abnormality in the distribution of the PLB involving 10% of the LV myocardium.    Within perfusion abnormality #1, absolute myocardial perfusion (cc/min/gm) averaged 0.67 cc/min/g at rest, 0.49  cc/min/g at stress and CFR was 0.80 cc/min/g, which equates to severely reduced coronary flow capacity in 10% of the myocardium (within the PLB territory) .    Perfusion defect #2 - There is a very small (<5%) sized, mild intensity apical resting perfusion abnormality in the distribution of the distal LAD territory. This defect is unchanged with stress.    Within perfusion abnormality #2, absolute myocardial perfusion (cc/min/gm) averaged 0.73 cc/min/g at rest, 0.75 cc/min/g at stress and CFR was 1.04 cc/min/g, in 3% of the myocardium (within the LAD territory).    Whole heart absolute myocardial perfusion (cc/min/g) averaged 0.76 cc/min/g at rest, which is normal, 0.99 cc/min/g at stress, which is moderately reduced, and 1.36  CFR, which is moderately reduced.    Gated perfusion images showed an ejection fraction of 50% at rest and 40% during stress. Normal ejection fraction is greater than 51%.    There is basal to mid inferolateral wall hypokinesis at rest and basal to mid inferolateral wall akinesis at stress.    LV cavity size is normal at rest and stress.    The EKG portion of this study is negative for ischemia.    There were no arrhythmias during stress.    The patient reported no chest pain during the stress test.    When compared to the previous study from 2/1/2017, there are significant changes.  The inferior/inferolateral defect appears more intense in severity. Coronary flow capacity is now severely reduced in this region.        Last 5 Patient Entered Readings                Current 30 Day Average:   Recent Readings        SBP (mmHg)        DBP (mmHg)        Pulse                       There were no vitals filed for this visit.  There is no height or weight on file to calculate BMI.  CrCl cannot be calculated (Patient's most recent lab result is older than the maximum 7 days allowed.).    Lab Results   Component Value Date     11/10/2022    K 4.7 11/10/2022     11/10/2022    CO2 25  11/10/2022    BUN 33 (H) 11/10/2022    CREATININE 1.7 (H) 11/10/2022     (H) 11/10/2022    HGBA1C 6.7 (H) 11/10/2022    MG 1.8 10/25/2022    AST 17 10/25/2022    ALT 5 (L) 10/25/2022    ALBUMIN 2.0 (L) 10/25/2022    PROT 4.9 (L) 10/25/2022    BILITOT 0.4 10/25/2022    WBC 7.72 10/25/2022    HGB 9.7 (L) 10/25/2022    HCT 30.3 (L) 10/25/2022    MCV 95 10/25/2022     10/25/2022    INR 1.3 (H) 10/20/2022    PSA 1.4 06/09/2020    TSH 1.640 12/06/2021    CHOL 110 (L) 12/06/2021    HDL 35 (L) 12/06/2021    LDLCALC 60.0 (L) 12/06/2021    TRIG 75 12/06/2021       Current Outpatient Medications   Medication Sig    acetaminophen (TYLENOL) 500 MG tablet Take 2 tablets (1,000 mg total) by mouth every 8 (eight) hours as needed (Mild to moderate pain).    aspirin (ECOTRIN) 81 MG EC tablet Take 81 mg by mouth once daily.    atorvastatin (LIPITOR) 80 MG tablet Take 1 tablet by mouth once daily    blood sugar diagnostic Strp 1 strip by Misc.(Non-Drug; Combo Route) route once daily.    carvediloL (COREG) 25 MG tablet TAKE 1 TABLET BY MOUTH TWICE DAILY WITH MEALS    ciprofloxacin HCl (CIPRO) 500 MG tablet Take 1 tablet (500 mg total) by mouth 2 (two) times daily. for 7 days    dulaglutide (TRULICITY) 1.5 mg/0.5 mL pen injector Inject 1.5 mg into the skin every 7 days.    fluticasone propionate (FLONASE) 50 mcg/actuation nasal spray 1 spray (50 mcg total) by Each Nostril route once daily.    glipiZIDE (GLUCOTROL) 10 MG TR24 Take 1 tablet (10 mg total) by mouth 2 (two) times daily with meals.    melatonin (MELATIN) 3 mg tablet Take 2 tablets (6 mg total) by mouth nightly as needed for Insomnia.    methocarbamoL (ROBAXIN) 750 MG Tab Take 1 tablet (750 mg total) by mouth 3 (three) times daily.    oxyCODONE (ROXICODONE) 5 MG immediate release tablet Take 1 tablet (5 mg total) by mouth every 4 (four) hours as needed (Moderate to severe pain). (Patient not taking: Reported on 11/22/2022)    pantoprazole (PROTONIX) 40 MG tablet  Take 1 tablet by mouth once daily    pregabalin (LYRICA) 75 MG capsule Take 1 capsule (75 mg total) by mouth nightly. for 10 days    senna-docusate 8.6-50 mg (PERICOLACE) 8.6-50 mg per tablet Take 1 tablet by mouth 2 (two) times daily.    TRUEPLUS LANCETS 33 gauge Misc Apply 1 lancet topically once daily. Use to test blood sugar daily; discard lancet after each use     No current facility-administered medications for this visit.       Review of Systems   Constitutional: Negative for decreased appetite, malaise/fatigue, weight gain and weight loss.   Eyes:  Negative for visual disturbance.   Cardiovascular:  Negative for chest pain, claudication, dyspnea on exertion, irregular heartbeat, orthopnea, palpitations, paroxysmal nocturnal dyspnea and syncope.   Respiratory:  Negative for cough, shortness of breath and snoring.    Skin:  Negative for rash.   Musculoskeletal:  Negative for arthritis, muscle cramps, muscle weakness and myalgias.   Gastrointestinal:  Negative for abdominal pain, anorexia, change in bowel habit and nausea.   Genitourinary:  Negative for dysuria and frequency.   Neurological:  Negative for excessive daytime sleepiness, dizziness, headaches, loss of balance, numbness and weakness.   Psychiatric/Behavioral:  Negative for depression.      Objective:   Physical Exam  Constitutional:       Appearance: Normal appearance. He is well-developed.   HENT:      Head: Normocephalic and atraumatic.      Nose: Nose normal.   Cardiovascular:      Pulses: Intact distal pulses.   Pulmonary:      Effort: Pulmonary effort is normal. No accessory muscle usage or respiratory distress.   Skin:     General: Skin is dry.   Neurological:      General: No focal deficit present.      Mental Status: He is alert and oriented to person, place, and time.   Psychiatric:         Attention and Perception: Attention normal.         Mood and Affect: Mood and affect normal.         Speech: Speech normal.         Behavior: Behavior  normal. Behavior is cooperative.         Thought Content: Thought content normal.         Judgment: Judgment normal.

## 2022-11-25 NOTE — TELEPHONE ENCOUNTER
----- Message from Cate Garcia sent at 11/25/2022  2:26 PM CST -----  .Type: Patient Call Back    Who called:Wife    What is the request in detail: pt was seen in the ER for a Uti on 11/24 and was told to follow up w PCP asap. The soonest appt is 12/7. Please call    Can the clinic reply by [facility]?    Would the patient rather a call back or a response via [facility]? call    Best call back number:.[phone] (home)
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
## 2022-11-26 LAB — BACTERIA UR CULT: ABNORMAL

## 2022-11-28 ENCOUNTER — HOSPITAL ENCOUNTER (EMERGENCY)
Facility: HOSPITAL | Age: 77
Discharge: HOME | End: 2022-11-28
Attending: STUDENT IN AN ORGANIZED HEALTH CARE EDUCATION/TRAINING PROGRAM
Payer: MEDICARE

## 2022-11-28 VITALS
DIASTOLIC BLOOD PRESSURE: 71 MMHG | HEART RATE: 84 BPM | WEIGHT: 240 LBS | TEMPERATURE: 99 F | BODY MASS INDEX: 34.36 KG/M2 | OXYGEN SATURATION: 92 % | RESPIRATION RATE: 16 BRPM | HEIGHT: 70 IN | SYSTOLIC BLOOD PRESSURE: 145 MMHG

## 2022-11-28 DIAGNOSIS — N44.2 TESTICULAR CYST: ICD-10-CM

## 2022-11-28 DIAGNOSIS — N50.811 PAIN IN RIGHT TESTICLE: Primary | ICD-10-CM

## 2022-11-28 DIAGNOSIS — N30.90 CYSTITIS: ICD-10-CM

## 2022-11-28 LAB
ALBUMIN SERPL BCP-MCNC: 2.9 G/DL (ref 3.5–5.2)
ALP SERPL-CCNC: 101 U/L (ref 55–135)
ALT SERPL W/O P-5'-P-CCNC: 18 U/L (ref 10–44)
ANION GAP SERPL CALC-SCNC: 8 MMOL/L (ref 8–16)
AST SERPL-CCNC: 15 U/L (ref 10–40)
BACTERIA #/AREA URNS HPF: ABNORMAL /HPF
BASOPHILS # BLD AUTO: 0.04 K/UL (ref 0–0.2)
BASOPHILS NFR BLD: 0.4 % (ref 0–1.9)
BILIRUB SERPL-MCNC: 0.4 MG/DL (ref 0.1–1)
BILIRUB UR QL STRIP: NEGATIVE
BUN SERPL-MCNC: 36 MG/DL (ref 8–23)
CALCIUM SERPL-MCNC: 10.2 MG/DL (ref 8.7–10.5)
CHLORIDE SERPL-SCNC: 108 MMOL/L (ref 95–110)
CLARITY UR: CLEAR
CO2 SERPL-SCNC: 27 MMOL/L (ref 23–29)
COLOR UR: YELLOW
CREAT SERPL-MCNC: 2 MG/DL (ref 0.5–1.4)
DIFFERENTIAL METHOD: ABNORMAL
EOSINOPHIL # BLD AUTO: 0.3 K/UL (ref 0–0.5)
EOSINOPHIL NFR BLD: 3 % (ref 0–8)
ERYTHROCYTE [DISTWIDTH] IN BLOOD BY AUTOMATED COUNT: 14.3 % (ref 11.5–14.5)
EST. GFR  (NO RACE VARIABLE): 34 ML/MIN/1.73 M^2
GLUCOSE SERPL-MCNC: 223 MG/DL (ref 70–110)
GLUCOSE UR QL STRIP: ABNORMAL
HCT VFR BLD AUTO: 36.3 % (ref 40–54)
HGB BLD-MCNC: 11.6 G/DL (ref 14–18)
HGB UR QL STRIP: ABNORMAL
IMM GRANULOCYTES # BLD AUTO: 0.03 K/UL (ref 0–0.04)
IMM GRANULOCYTES NFR BLD AUTO: 0.3 % (ref 0–0.5)
KETONES UR QL STRIP: NEGATIVE
LACTATE SERPL-SCNC: 1.1 MMOL/L (ref 0.5–2.2)
LEUKOCYTE ESTERASE UR QL STRIP: ABNORMAL
LYMPHOCYTES # BLD AUTO: 1.4 K/UL (ref 1–4.8)
LYMPHOCYTES NFR BLD: 13.8 % (ref 18–48)
MCH RBC QN AUTO: 29.6 PG (ref 27–31)
MCHC RBC AUTO-ENTMCNC: 32 G/DL (ref 32–36)
MCV RBC AUTO: 93 FL (ref 82–98)
MICROSCOPIC COMMENT: ABNORMAL
MONOCYTES # BLD AUTO: 1.2 K/UL (ref 0.3–1)
MONOCYTES NFR BLD: 11.7 % (ref 4–15)
NEUTROPHILS # BLD AUTO: 7.4 K/UL (ref 1.8–7.7)
NEUTROPHILS NFR BLD: 70.8 % (ref 38–73)
NITRITE UR QL STRIP: NEGATIVE
NRBC BLD-RTO: 0 /100 WBC
PH UR STRIP: 6 [PH] (ref 5–8)
PLATELET # BLD AUTO: 266 K/UL (ref 150–450)
PMV BLD AUTO: 9.3 FL (ref 9.2–12.9)
POTASSIUM SERPL-SCNC: 5 MMOL/L (ref 3.5–5.1)
PROT SERPL-MCNC: 6.7 G/DL (ref 6–8.4)
PROT UR QL STRIP: ABNORMAL
RBC # BLD AUTO: 3.92 M/UL (ref 4.6–6.2)
RBC #/AREA URNS HPF: 9 /HPF (ref 0–4)
SODIUM SERPL-SCNC: 143 MMOL/L (ref 136–145)
SP GR UR STRIP: 1.01 (ref 1–1.03)
URN SPEC COLLECT METH UR: ABNORMAL
UROBILINOGEN UR STRIP-ACNC: NEGATIVE EU/DL
WBC # BLD AUTO: 10.38 K/UL (ref 3.9–12.7)
WBC #/AREA URNS HPF: 28 /HPF (ref 0–5)
YEAST URNS QL MICRO: ABNORMAL

## 2022-11-28 PROCEDURE — 63600175 PHARM REV CODE 636 W HCPCS: Performed by: STUDENT IN AN ORGANIZED HEALTH CARE EDUCATION/TRAINING PROGRAM

## 2022-11-28 PROCEDURE — 96361 HYDRATE IV INFUSION ADD-ON: CPT

## 2022-11-28 PROCEDURE — 83605 ASSAY OF LACTIC ACID: CPT | Performed by: STUDENT IN AN ORGANIZED HEALTH CARE EDUCATION/TRAINING PROGRAM

## 2022-11-28 PROCEDURE — 25000003 PHARM REV CODE 250: Performed by: STUDENT IN AN ORGANIZED HEALTH CARE EDUCATION/TRAINING PROGRAM

## 2022-11-28 PROCEDURE — 96375 TX/PRO/DX INJ NEW DRUG ADDON: CPT

## 2022-11-28 PROCEDURE — 96365 THER/PROPH/DIAG IV INF INIT: CPT | Mod: 59

## 2022-11-28 PROCEDURE — 87086 URINE CULTURE/COLONY COUNT: CPT | Performed by: STUDENT IN AN ORGANIZED HEALTH CARE EDUCATION/TRAINING PROGRAM

## 2022-11-28 PROCEDURE — 99285 EMERGENCY DEPT VISIT HI MDM: CPT | Mod: 25

## 2022-11-28 PROCEDURE — 63600175 PHARM REV CODE 636 W HCPCS: Performed by: EMERGENCY MEDICINE

## 2022-11-28 PROCEDURE — 25000003 PHARM REV CODE 250: Performed by: EMERGENCY MEDICINE

## 2022-11-28 PROCEDURE — 85025 COMPLETE CBC W/AUTO DIFF WBC: CPT | Performed by: STUDENT IN AN ORGANIZED HEALTH CARE EDUCATION/TRAINING PROGRAM

## 2022-11-28 PROCEDURE — 81000 URINALYSIS NONAUTO W/SCOPE: CPT | Performed by: STUDENT IN AN ORGANIZED HEALTH CARE EDUCATION/TRAINING PROGRAM

## 2022-11-28 PROCEDURE — 80053 COMPREHEN METABOLIC PANEL: CPT | Performed by: STUDENT IN AN ORGANIZED HEALTH CARE EDUCATION/TRAINING PROGRAM

## 2022-11-28 PROCEDURE — 25500020 PHARM REV CODE 255: Performed by: STUDENT IN AN ORGANIZED HEALTH CARE EDUCATION/TRAINING PROGRAM

## 2022-11-28 RX ORDER — MORPHINE SULFATE 4 MG/ML
4 INJECTION, SOLUTION INTRAMUSCULAR; INTRAVENOUS
Status: COMPLETED | OUTPATIENT
Start: 2022-11-28 | End: 2022-11-28

## 2022-11-28 RX ORDER — OXYCODONE AND ACETAMINOPHEN 5; 325 MG/1; MG/1
1 TABLET ORAL EVERY 4 HOURS PRN
Qty: 18 TABLET | Refills: 0 | Status: SHIPPED | OUTPATIENT
Start: 2022-11-28 | End: 2022-12-06

## 2022-11-28 RX ORDER — CEPHALEXIN 500 MG/1
1000 CAPSULE ORAL EVERY 12 HOURS
Qty: 28 CAPSULE | Refills: 0 | Status: SHIPPED | OUTPATIENT
Start: 2022-11-28 | End: 2022-11-30 | Stop reason: SDUPTHER

## 2022-11-28 RX ORDER — HYDROMORPHONE HYDROCHLORIDE 1 MG/ML
1 INJECTION, SOLUTION INTRAMUSCULAR; INTRAVENOUS; SUBCUTANEOUS
Status: COMPLETED | OUTPATIENT
Start: 2022-11-28 | End: 2022-11-28

## 2022-11-28 RX ADMIN — IOHEXOL 85 ML: 350 INJECTION, SOLUTION INTRAVENOUS at 05:11

## 2022-11-28 RX ADMIN — HYDROMORPHONE HYDROCHLORIDE 1 MG: 1 INJECTION, SOLUTION INTRAMUSCULAR; INTRAVENOUS; SUBCUTANEOUS at 09:11

## 2022-11-28 RX ADMIN — CEFTRIAXONE 2 G: 2 INJECTION, SOLUTION INTRAVENOUS at 07:11

## 2022-11-28 RX ADMIN — SODIUM CHLORIDE 1000 ML: 0.9 INJECTION, SOLUTION INTRAVENOUS at 07:11

## 2022-11-28 RX ADMIN — MORPHINE SULFATE 4 MG: 4 INJECTION INTRAVENOUS at 04:11

## 2022-11-28 RX ADMIN — SODIUM CHLORIDE 1000 ML: 0.9 INJECTION, SOLUTION INTRAVENOUS at 06:11

## 2022-11-28 NOTE — ED TRIAGE NOTES
Reports testicular pain and swelling that started last night, recently seen here for hematuria, d/c with abx, reports taking abx as prescribed. Denying any fever, n/v/d.

## 2022-11-28 NOTE — PROVIDER PROGRESS NOTES - EMERGENCY DEPT.
Encounter Date: 11/28/2022    Despite patient's decreased renal function the benefits of contrasted study outweigh the risk of kidney injury.    Amandeep Montoya MD    
Encounter Date: 11/28/2022    ED Physician Progress Notes          This is doctor Greg dictating.  I assumed care this patient at 7:00 a.m..  I examined the patient at 7:31 a.m..  He is marked tenderness of the right testicle.  He will not permit detailed examination.  I do not feel a hernia in the inguinal canal.  There is mild diffuse low pelvic tenderness.  There is no guarding rebound mass or distention.  The patient has an elevated BMI.  CT scan of the abdomen is pending.  I will treat him with ceftriaxone because of concerns for epididymitis and orchitis.  The patient reports a history of allergy to penicillin.  The risks and benefits of ceftriaxone treatment were discussed.  The patient is agreeable to trying ceftriaxone treatment.   CT the abdomen fails to reveal any evidence of bowel obstruction or acute peritonitis.  There is some mild bilateral perinephric stranding.  The patient's urinalysis is improving since last visit.  The patient was treated with IV ceftriaxone in the emergency room without problem.  The patient required treatment for pain.  I will add Keflex to the regimen.  I morning whether this patient has some epididymitis or orchitis.  Refer to follow up with Urology.  I will treat for pain.  No hernia is noted in the right scrotum.  
single lumen
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
## 2022-11-28 NOTE — DISCHARGE INSTRUCTIONS
Medicines as directed.  Please return immediately if you get worse or if new problems develop.  Please follow-up with the urologist above.  Call today for an appointment.  You may also follow-up with the primary care doctor.  Tylenol with oxycodone for pain.  Continue Cipro.  Keflex as directed.

## 2022-11-29 ENCOUNTER — OFFICE VISIT (OUTPATIENT)
Dept: UROLOGY | Facility: CLINIC | Age: 77
End: 2022-11-29
Payer: MEDICARE

## 2022-11-29 VITALS — HEIGHT: 70 IN | WEIGHT: 214.19 LBS | BODY MASS INDEX: 30.66 KG/M2

## 2022-11-29 DIAGNOSIS — N13.8 BPH WITH URINARY OBSTRUCTION: ICD-10-CM

## 2022-11-29 DIAGNOSIS — N40.1 BPH WITH URINARY OBSTRUCTION: ICD-10-CM

## 2022-11-29 DIAGNOSIS — N45.1 EPIDIDYMITIS: Primary | ICD-10-CM

## 2022-11-29 DIAGNOSIS — R30.0 DYSURIA: ICD-10-CM

## 2022-11-29 PROCEDURE — 99214 OFFICE O/P EST MOD 30 MIN: CPT | Mod: S$PBB,,, | Performed by: UROLOGY

## 2022-11-29 PROCEDURE — 99214 PR OFFICE/OUTPT VISIT, EST, LEVL IV, 30-39 MIN: ICD-10-PCS | Mod: S$PBB,,, | Performed by: UROLOGY

## 2022-11-29 PROCEDURE — 99213 OFFICE O/P EST LOW 20 MIN: CPT | Mod: PBBFAC | Performed by: UROLOGY

## 2022-11-29 PROCEDURE — 99999 PR PBB SHADOW E&M-EST. PATIENT-LVL III: CPT | Mod: PBBFAC,,, | Performed by: UROLOGY

## 2022-11-29 PROCEDURE — 99999 PR PBB SHADOW E&M-EST. PATIENT-LVL III: ICD-10-PCS | Mod: PBBFAC,,, | Performed by: UROLOGY

## 2022-11-29 RX ORDER — CIPROFLOXACIN 500 MG/1
500 TABLET ORAL 2 TIMES DAILY
Qty: 14 TABLET | Refills: 0 | Status: SHIPPED | OUTPATIENT
Start: 2022-11-29 | End: 2022-12-06

## 2022-11-29 NOTE — PROGRESS NOTES
Subjective:       Patient ID: Jani Cha is a 77 y.o. male The patient's last visit with me was on 11/17/2022.       Chief Complaint:   Chief Complaint   Patient presents with    Follow-up           History of Kidney Stones  He had an issue with a ureteral stone in Winter 2015.  He did not recall passing the stone but his pain resolved.       3/25/2021  He had right flank pain and hematuria a couple of weeks ago. He brought the stone for analysis a couple of weeks ago.  He denies anymore hematuria, flank pain.  He denies fever.    5/3/2022  He denies pain or hematuria.        Benign Prostatic Hyperplasia  He patient reports nocturia three times a night. He denies frequency, incomplete emptying and intermittency. The patient states symptoms are of moderate severity. Onset of symptoms was several years ago and was gradual in onset.  He has no personal history and no family history of prostate cancer. He reports a history of kidney stones. He denies flank pain, gross hematuria and recurrent UTI.  He is currently taking no prostate medications.  He has noted some frequency with occasional urgency.    8/3/2022  He was to add Flomax last visit.  He took it for no more than 2 weeks.  He stopped it due to retrograde ejaculation and pain in his prostate afterwards.  He did not note any change to his stream.    IPSS Questionnaire (AUA-7):  8    11/17/2022   He is doing okay.  He recently fractured his hip.  His stream is about the same.  He required a urologist to place the Alejandre.  He is not sure why.    He is not interested in trying prostate medications again.    11/29/2022  He went to the ED on 11/24/2022 after developing a fever.  His urine was red at the time.  He was given antibiotics for a UTI.  Two days later he develop pain in his testicles.  He went back to the ED.    ACTIVE MEDICAL ISSUES:  Patient Active Problem List   Diagnosis    Hyperlipidemia    Primary hypertension    Coronary artery disease involving  native coronary artery of native heart without angina pectoris    Sleep apnea    S/P CABG x 4    Type 2 diabetes mellitus with diabetic neuropathy, without long-term current use of insulin    GERD (gastroesophageal reflux disease)    Personal history of colonic polyps    Abdominal aortic aneurysm (AAA) without rupture    Total occlusion of coronary artery, chronic -LCX with collaterals.  No ischemic on PET    Pulmonary nodule    Thoracic aortic aneurysm without rupture    Bilateral renal cysts    Adrenal adenoma    History of PCI of RCA    Class 1 obesity due to excess calories with serious comorbidity in adult    Calcified granuloma of lung    Type 2 diabetes mellitus with hyperglycemia, without long-term current use of insulin    Lymphedema of both lower extremities    Swelling of lower extremity    Closed displaced fracture of right femoral neck s/p total hip arthroplasty on 10/21/2022    E. 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  Review of patient's allergies indicates:   Allergen Reactions    Penicillins Hives, Itching and Rash    Shellfish containing products      Current Outpatient Medications   Medication Sig    acetaminophen (TYLENOL) 500 MG tablet Take 2 tablets (1,000 mg total) by mouth every 8 (eight) hours as needed (Mild to moderate pain).    aspirin (ECOTRIN) 81 MG EC tablet Take 81 mg by mouth once daily.    atorvastatin (LIPITOR) 80 MG tablet Take 1 tablet by mouth once daily    blood sugar diagnostic Strp 1 strip by Misc.(Non-Drug; Combo Route) route once daily.    carvediloL (COREG) 25 MG tablet TAKE 1 TABLET BY MOUTH TWICE DAILY WITH MEALS    cephALEXin (KEFLEX) 500 MG capsule Take 2 capsules (1,000 mg total) by mouth every 12 (twelve) hours. for 7 days    dulaglutide (TRULICITY) 1.5 mg/0.5 mL pen injector Inject 1.5 mg into the skin every 7 days.    fluticasone propionate  (FLONASE) 50 mcg/actuation nasal spray 1 spray (50 mcg total) by Each Nostril route once daily.    glipiZIDE (GLUCOTROL) 10 MG TR24 Take 1 tablet (10 mg total) by mouth 2 (two) times daily with meals.    melatonin (MELATIN) 3 mg tablet Take 2 tablets (6 mg total) by mouth nightly as needed for Insomnia.    methocarbamoL (ROBAXIN) 750 MG Tab Take 1 tablet (750 mg total) by mouth 3 (three) times daily.    oxyCODONE-acetaminophen (PERCOCET) 5-325 mg per tablet Take 1 tablet by mouth every 4 (four) hours as needed.    pantoprazole (PROTONIX) 40 MG tablet Take 1 tablet by mouth once daily    senna-docusate 8.6-50 mg (PERICOLACE) 8.6-50 mg per tablet Take 1 tablet by mouth 2 (two) times daily.    TRUEPLUS LANCETS 33 gauge Misc Apply 1 lancet topically once daily. Use to test blood sugar daily; discard lancet after each use    ciprofloxacin HCl (CIPRO) 500 MG tablet Take 1 tablet (500 mg total) by mouth 2 (two) times daily. for 7 days    pregabalin (LYRICA) 75 MG capsule Take 1 capsule (75 mg total) by mouth nightly. for 10 days     No current facility-administered medications for this visit.       Review of Systems   Constitutional:  Negative for activity change, fatigue, fever and unexpected weight change.   HENT:  Negative for congestion.    Eyes:  Negative for redness.   Respiratory:  Negative for chest tightness and shortness of breath.    Cardiovascular:  Negative for chest pain and leg swelling.   Gastrointestinal:  Negative for abdominal pain, constipation, diarrhea, nausea and vomiting.   Genitourinary:  Negative for dysuria, flank pain, frequency, hematuria, penile pain, penile swelling, scrotal swelling, testicular pain and urgency.   Musculoskeletal:  Negative for arthralgias and back pain.   Neurological:  Negative for dizziness and light-headedness.   Psychiatric/Behavioral:  Negative for behavioral problems and confusion. The patient is not nervous/anxious.    All other systems reviewed and are negative.   "  Objective:      Vitals:    11/29/22 1112   Weight: 97.1 kg (214 lb 2.8 oz)   Height: 5' 10" (1.778 m)         Physical Exam  Vitals and nursing note reviewed.   Constitutional:       Appearance: He is well-developed.   HENT:      Head: Normocephalic.   Eyes:      Conjunctiva/sclera: Conjunctivae normal.   Neck:      Thyroid: No thyromegaly.      Trachea: No tracheal deviation.   Cardiovascular:      Rate and Rhythm: Normal rate.      Heart sounds: Normal heart sounds.   Pulmonary:      Effort: Pulmonary effort is normal. No respiratory distress.      Breath sounds: Normal breath sounds. No wheezing.   Abdominal:      General: Bowel sounds are normal.      Palpations: Abdomen is soft.      Tenderness: There is no abdominal tenderness. There is no rebound.      Hernia: No hernia is present.   Musculoskeletal:         General: No tenderness. Normal range of motion.      Cervical back: Normal range of motion and neck supple.   Lymphadenopathy:      Cervical: No cervical adenopathy.   Skin:     General: Skin is warm and dry.      Findings: No erythema or rash.   Neurological:      Mental Status: He is alert and oriented to person, place, and time.   Psychiatric:         Behavior: Behavior normal.         Thought Content: Thought content normal.         Judgment: Judgment normal.       Urine dipstick shows negative for all components.  Micro exam: negative for WBC's or RBC's    Component Ref Range & Units 4 d ago 1 yr ago   PSA Diagnostic 0.00 - 4.00 ng/mL 1.8  1.8 CM    Comment: PSA Expected levels:   Hormonal Therapy: <0.05 ng/ml   Prostatectomy: <0.01 ng/ml   Radiation Therapy: <1.00 ng/ml    Resulting Agency  [facility]             Narrative  Performed by: [facility]  1 year      Specimen Collected: 04/29/22 09:08 Last Resulted: 04/29/22 15:04           .  US Scrotum And Testicles  Order: [id number]  Status: Final result     Visible to patient: Yes (seen)     Next appt: 12/06/2022 at 10:00 AM in Orthopedics ([facility])     Dx: Pain in right testicle     0 Result Notes  Details    Reading Physician Reading Date Result Priority   RADIOLOGIST, VIRTUAL 11/28/2022      Narrative & Impression  EXAMINATION:  US Scrotum and US Duplex Artery and Vein, Scrotum, Complete     CLINICAL HISTORY:  77 years old Scrotum pain; Patient HX: PT had recent hip surgery     TECHNIQUE:  Imaging protocol: Real-time ultrasound of the scrotum. Real-time duplex ultrasound scan of the arterial and venous flow of the scrotum with B-mode, color Doppler flow and spectral waveform analysis. Complete exam. Duplex exam was performed to evaluate for torsion and other vascular conditions.     COMPARISON:  No relevant prior studies available.     FINDINGS:  Right testicle: The right testicle measures 3.3 x 2.1 x 2.2 cm (volume 7.9 mL). Mildly heterogeneous echogenicity. Small right testicular cyst measuring 0.3 x 0.4 x 0.3 cm. Arterial and venous blood flow is documented within the right testicle. Left testicle: The left testicle measures 3.0 x 1.9 x 3.0 cm (volume 8.8 mL). Mildly heterogeneous echogenicity. Left simple appearing testicular cyst measuring 1 x 0.9 x 0.9 cm. Arterial and venous blood flow is documented within the left testicle. Epididymides: Right epididymal cyst versus spermatocele measuring 0.3 x 0.5 x 0.6 cm. Scrotum: Small right hydrocele. Abnormal soft tissue within the right hemiscrotum with apparent dirty shadowing, concerning for a bowel containing inguinal hernia.     Impression:     1. No evidence of testicular torsion. 2. Bilateral testicular cysts. 3. Abnormal soft tissue within the right hemiscrotum with apparent dirty shadowing, concerning for a bowel containing inguinal hernia        Electronically signed by: Virtual Radiologist  Date:                                            11/28/2022  Time:                                           08:10   CT Abdomen Pelvis With Contrast  Order: [id number]  Status: Final result     Visible  to patient: Yes (seen)     Next appt: 12/06/2022 at 10:00 AM in Orthopedics ([facility])     0 Result Notes  Details    Reading Physician Reading Date Result Priority   Bebeto Mendes MD  [phone] 11/28/2022 STAT     Narrative & Impression  EXAMINATION:  CT ABDOMEN PELVIS WITH CONTRAST     CLINICAL HISTORY:  Suspected right inguinal hernia that is tender to palpation;     TECHNIQUE:  Low dose axial images, sagittal and coronal reformations were obtained from the lung bases to the pubic symphysis following the IV administration of 85 mL of Omnipaque 350 .  Oral contrast was not given.     COMPARISON:  CTA, 09/12/2022 and 01/28/2019.  Renal ultrasound, 04/29/2022 and 10/22/2022.     FINDINGS:  Lower Chest:     There are coarsened bibasilar interstitial lung markings but no focal consolidation.  Left pleural thickening and calcified pleural plaques similar to prior study.  Stable noncalcified pleural plaque along the inferior right hemidiaphragm (sagittal image 63).  Heart size is normal.  No pericardial effusion.  No pleural effusion.     Abdomen:     Liver is stable in size and contour.  No focal hepatic mass.  Gallbladder is unremarkable. No intrahepatic biliary ductal dilatation.     Spleen is not significantly enlarged.  Adrenal glands and pancreas are unremarkable.     The kidneys are symmetric.  No hydronephrosis. There is bilateral perinephric fat stranding.  1.3 cm lesion in the superior pole of the right kidney with internal attenuation greater than expected for a simple cyst.  This finding was not definitively seen on prior ultrasound.  1.7 cm hypodensity in the lower pole of the left kidney with internal attenuation greater than expected for a simple cyst, though most likely a simple cyst when compared with prior ultrasound dated 04/29/2022.  Additional bilateral renal hypodensities suggestive of cysts.     No small bowel obstruction.  There is a small hyperdense area  within proximal small bowel adjacent to the aorta (axial image 77), potentially ingested though suggest correlation for any clinical signs of GI bleed.  Aortoenteric fistula thought unlikely as there is no significant edema or hemorrhage adjacent to the aorta.  No convincing inflammatory changes identified in bowel.  Multiple colonic diverticula.     No pneumoperitoneum or organized fluid collection.     No bulky lymphadenopathy.     Grossly stable appearance of the lower thoracic and abdominal aorta with significant noncalcified and calcified atherosclerotic plaque.  Lower thoracic aorta measures approximately 4.4 cm in transverse width at the level of the diaphragm.  Infrarenal aorta measures approximately 4.1 x 3.9 cm in maximum diameter (axial image 80), similar to the prior study by my measurements.  Large eccentric mural thrombus or atherosclerotic plaque again noted in the infrarenal aorta which contains a small hyperdensity within the thrombus which may represent calcifications or contrast (axial image 76).  This small hyperdensity is stable when compared with 09/12/2022.     Portal, splenic, and superior mesenteric veins are patent.     Pelvis:     Evaluation of the pelvis is limited by streak artifact related to right hip arthroplasty.  Urinary bladder is relatively decompressed and not well evaluated.  There is probable mild perivesicular edema.  Prostate is mildly enlarged.  No significant pelvic free fluid.  No pelvic lymphadenopathy.     Bones and soft tissues:     No aggressive osseous lesions.  Interval right hip arthroplasty with satisfactory alignment.  Soft tissue edema and fluid surrounding the right hip.  No aggressive osseous lesion.  Multilevel degenerative changes throughout the spine.  Extraperitoneal soft tissues appear negative for acute finding.  There is a small fat containing umbilical hernia.  No sizable inguinal hernia.  There are postoperative changes in the right groin region.      Impression:     Overall similar appearance of partially thrombosed lower thoracic and infrarenal aortic aneurysm as described in the body of the report, noting a stable small hyperdensity within the thrombosed portion of the infrarenal aorta which may represent calcification or contrast.     Small hyperdensity within a segment of proximal small bowel adjacent to the abdominal aorta which may represent ingested material.  GI bleed felt unlikely in the absence of clinical signs.     Bilateral perinephric fat stranding and mild perivesicular edema.  Suggest correlation with urinalysis if there is concern for urinary tract infection.     Interval right total hip arthroplasty with soft tissue swelling and fluid surrounding the right hip.     Calcified and noncalcified pleural plaques as seen previously.  This may suggest prior specific exposure.     Grossly stable simple and hyperdense renal cystic lesions.     Colonic diverticulosis.        Electronically signed by: Bebeto Mendes MD  Date:                                            11/28/2022  Time:                                           07:29       Assessment:       1. Epididymitis    2. BPH with urinary obstruction    3. Dysuria              Plan:       1. Epididymitis  Extend antibiotics for 2 week treatment  No sign of hernia on CT or exam    - ciprofloxacin HCl (CIPRO) 500 MG tablet; Take 1 tablet (500 mg total) by mouth 2 (two) times daily. for 7 days  Dispense: 14 tablet; Refill: 0    2. BPH with urinary obstruction  Consider UroLift after he is cleared for Lithotomy position    3. Dysuria  improved       Follow up in about 4 weeks (around 12/27/2022) for Follow up.

## 2022-11-30 ENCOUNTER — EXTERNAL CHRONIC CARE MANAGEMENT (OUTPATIENT)
Dept: PRIMARY CARE CLINIC | Facility: CLINIC | Age: 77
End: 2022-11-30
Payer: MEDICARE

## 2022-11-30 DIAGNOSIS — E11.40 TYPE 2 DIABETES MELLITUS WITH DIABETIC NEUROPATHY, WITHOUT LONG-TERM CURRENT USE OF INSULIN: ICD-10-CM

## 2022-11-30 LAB — BACTERIA UR CULT: NO GROWTH

## 2022-11-30 PROCEDURE — 99490 CHRNC CARE MGMT STAFF 1ST 20: CPT | Mod: PBBFAC,PO | Performed by: FAMILY MEDICINE

## 2022-11-30 PROCEDURE — 99490 CHRNC CARE MGMT STAFF 1ST 20: CPT | Mod: S$PBB,,, | Performed by: FAMILY MEDICINE

## 2022-11-30 PROCEDURE — 99490 PR CHRONIC CARE MGMT, 1ST 20 MIN: ICD-10-PCS | Mod: S$PBB,,, | Performed by: FAMILY MEDICINE

## 2022-11-30 RX ORDER — CEPHALEXIN 500 MG/1
1000 CAPSULE ORAL EVERY 12 HOURS
Qty: 14 CAPSULE | Refills: 0 | Status: SHIPPED | OUTPATIENT
Start: 2022-11-30 | End: 2022-12-07

## 2022-11-30 NOTE — TELEPHONE ENCOUNTER
Spoke with patient offered appointment for Hosp. Follow up on 12/12/2022, patient states he does not need the appointment and refused to be scheduled.

## 2022-11-30 NOTE — TELEPHONE ENCOUNTER
----- Message from Mariajose Ludwig sent at 11/30/2022  9:31 AM CST -----  Regarding: refill  .Type: RX Refill Request    Who Called:  wife     Have you contacted your pharmacy: yes     Refill or New Rx: refill     RX Name and Strength:blood sugar diagnostic Strp        Preferred Pharmacy with phone number: [facility] [place] [facility] [address]  [address] [place] [address]  Phone: [phone] Fax: [phone]          Local or Mail Order: local     Ordering Provider: Herson     Would the patient rather a call back or a response via [facility]? Call     Best Call Back Number: .[phone]

## 2022-12-01 ENCOUNTER — EXTERNAL HOME HEALTH (OUTPATIENT)
Dept: HOME HEALTH SERVICES | Facility: HOSPITAL | Age: 77
End: 2022-12-01
Payer: MEDICARE

## 2022-12-05 ENCOUNTER — TELEPHONE (OUTPATIENT)
Dept: UROLOGY | Facility: CLINIC | Age: 77
End: 2022-12-05
Payer: MEDICARE

## 2022-12-05 NOTE — TELEPHONE ENCOUNTER
Pt called his testicular swelling and pain is not any better.  Pt is taking antibiotics.  Offered sooner appt or informed him to go to Urgent Care or his PCP.  Pt notified of appt with Dr. Mehta 12/7/22 @ 4pm      ----- Message from Annika Santoro sent at 12/5/2022 10:51 AM CST -----  Regarding: [id number]  Type: Patient Call Back    Who called:    What is the request in detail: pt states he came to see the doctor last week for his testicles. Pt states that there has been no reduction in the size of the testicle and he still having severe pain. Will like a call from pcp or nurse    Can the clinic reply by [facility]?no    Would the patient rather a call back or a response via [facility]? Call back    Best call back number:[phone] cell phone      Additional Information: pt states try his house phone and cell phone when calling.

## 2022-12-06 ENCOUNTER — HOSPITAL ENCOUNTER (OUTPATIENT)
Dept: RADIOLOGY | Facility: HOSPITAL | Age: 77
Discharge: HOME | End: 2022-12-06
Attending: NURSE PRACTITIONER
Payer: MEDICARE

## 2022-12-06 ENCOUNTER — OFFICE VISIT (OUTPATIENT)
Dept: ORTHOPEDICS | Facility: CLINIC | Age: 77
End: 2022-12-06
Payer: MEDICARE

## 2022-12-06 ENCOUNTER — TELEPHONE (OUTPATIENT)
Dept: ORTHOPEDICS | Facility: CLINIC | Age: 77
End: 2022-12-06

## 2022-12-06 DIAGNOSIS — M81.0 OSTEOPOROSIS, UNSPECIFIED OSTEOPOROSIS TYPE, UNSPECIFIED PATHOLOGICAL FRACTURE PRESENCE: ICD-10-CM

## 2022-12-06 DIAGNOSIS — I89.0 LYMPHEDEMA OF BOTH LOWER EXTREMITIES: ICD-10-CM

## 2022-12-06 DIAGNOSIS — M81.6 LOCALIZED OSTEOPOROSIS OF LEQUESNE: ICD-10-CM

## 2022-12-06 DIAGNOSIS — M25.551 RIGHT HIP PAIN: ICD-10-CM

## 2022-12-06 DIAGNOSIS — S72.001A CLOSED DISPLACED FRACTURE OF RIGHT FEMORAL NECK: ICD-10-CM

## 2022-12-06 DIAGNOSIS — K21.9 GASTROESOPHAGEAL REFLUX DISEASE, UNSPECIFIED WHETHER ESOPHAGITIS PRESENT: ICD-10-CM

## 2022-12-06 DIAGNOSIS — M17.0 ARTHRITIS OF BOTH KNEES: ICD-10-CM

## 2022-12-06 DIAGNOSIS — M80.80XA PATHOLOGICAL FRACTURE DUE TO OSTEOPOROSIS, UNSPECIFIED FRACTURE SITE, UNSPECIFIED OSTEOPOROSIS TYPE, INITIAL ENCOUNTER: Primary | ICD-10-CM

## 2022-12-06 DIAGNOSIS — S72.001A CLOSED DISPLACED FRACTURE OF RIGHT FEMORAL NECK: Primary | ICD-10-CM

## 2022-12-06 DIAGNOSIS — Z98.890 POST-OPERATIVE STATE: ICD-10-CM

## 2022-12-06 DIAGNOSIS — M25.551 RIGHT HIP PAIN: Primary | ICD-10-CM

## 2022-12-06 PROBLEM — E08.29: Status: RESOLVED | Noted: 2022-11-25 | Resolved: 2022-12-06

## 2022-12-06 PROCEDURE — 99213 OFFICE O/P EST LOW 20 MIN: CPT | Mod: PBBFAC,25

## 2022-12-06 PROCEDURE — 99999 PR PBB SHADOW E&M-EST. PATIENT-LVL III: ICD-10-PCS | Mod: PBBFAC,,, | Performed by: PHYSICIAN ASSISTANT

## 2022-12-06 PROCEDURE — 99024 POSTOP FOLLOW-UP VISIT: CPT | Mod: POP,,, | Performed by: NURSE PRACTITIONER

## 2022-12-06 PROCEDURE — 73502 XR HIP WITH PELVIS WHEN PERFORMED, 2 OR 3  VIEWS RIGHT: ICD-10-PCS | Mod: 26,RT,, | Performed by: RADIOLOGY

## 2022-12-06 PROCEDURE — 73502 X-RAY EXAM HIP UNI 2-3 VIEWS: CPT | Mod: TC,RT

## 2022-12-06 PROCEDURE — 99214 OFFICE O/P EST MOD 30 MIN: CPT | Mod: 25,S$PBB,, | Performed by: PHYSICIAN ASSISTANT

## 2022-12-06 PROCEDURE — 99213 OFFICE O/P EST LOW 20 MIN: CPT | Mod: PBBFAC,27 | Performed by: NURSE PRACTITIONER

## 2022-12-06 PROCEDURE — 99024 PR POST-OP FOLLOW-UP VISIT: ICD-10-PCS | Mod: POP,,, | Performed by: NURSE PRACTITIONER

## 2022-12-06 PROCEDURE — 99999 PR PBB SHADOW E&M-EST. PATIENT-LVL III: CPT | Mod: PBBFAC,,, | Performed by: NURSE PRACTITIONER

## 2022-12-06 PROCEDURE — 99999 PR PBB SHADOW E&M-EST. PATIENT-LVL III: ICD-10-PCS | Mod: PBBFAC,,, | Performed by: NURSE PRACTITIONER

## 2022-12-06 PROCEDURE — 99999 PR PBB SHADOW E&M-EST. PATIENT-LVL III: CPT | Mod: PBBFAC,,, | Performed by: PHYSICIAN ASSISTANT

## 2022-12-06 PROCEDURE — 73502 X-RAY EXAM HIP UNI 2-3 VIEWS: CPT | Mod: 26,RT,, | Performed by: RADIOLOGY

## 2022-12-06 PROCEDURE — 99214 PR OFFICE/OUTPT VISIT, EST, LEVL IV, 30-39 MIN: ICD-10-PCS | Mod: 25,S$PBB,, | Performed by: PHYSICIAN ASSISTANT

## 2022-12-06 RX ORDER — OXYCODONE AND ACETAMINOPHEN 5; 325 MG/1; MG/1
1 TABLET ORAL EVERY 8 HOURS PRN
Qty: 21 TABLET | Refills: 0 | Status: SHIPPED | OUTPATIENT
Start: 2022-12-06 | End: 2023-04-17 | Stop reason: CLARIF

## 2022-12-06 RX ORDER — OXYCODONE AND ACETAMINOPHEN 5; 325 MG/1; MG/1
1 TABLET ORAL EVERY 8 HOURS PRN
Qty: 21 TABLET | Refills: 0 | Status: SHIPPED | OUTPATIENT
Start: 2022-12-06 | End: 2022-12-06 | Stop reason: SDUPTHER

## 2022-12-06 NOTE — PROGRESS NOTES
Mr. Cha is here today for a post-operative visit after undergoing a right DARNELL by Dr. Paulino on 10/21/2022.    Interval History:  He reports that he is doing ok.  Has intermittent lateral hip pain and medial right knee pain.  He states his knee is bothersome when he bends his knees.  He knows he has OA of his knees and needs to get knee replacement.  He has been using Percocet 5/325 mg PRN but is currently out.  He also reports he was recently in the ED for a UTI.  He reports he has scrotal swelling and is scheduled to see urology tomorrow.  He is currently on oral abx for his UTI.      He denies falls or injuries since his last clinic visit.  He is using a Rolator for gait assistance.  He reports home health has stopped but would like more therapy.  He states he has followed his hip precautions.  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  Wants to know when he can resume driving.   He denies fever, chills, and sweats since the time of the surgery.     Physical exam:  Incision is open to air and clean, dry and intact.  No signs of infection.  He has tactile stimulation to their lower leg, he denies calf pain, there is no leg edema and their pedal pulse is palpable x 2. There is mild tenderness at the greater trochanteric area.  No IT pain with palpation.  Right knee ROM is 0-100 degrees, he does get medial knee pain with terminal flexion.  No effusion noted at the right knee.    RADS: Right hip x-ray was obtained and personally reviewed by me, hemiarthroplasty is in proper position.  No evidence of hardware failure or new fractures seen.    Assessment:  Post-op visit (6 weeks)    Plan:    ICD-10-CM ICD-9-CM   1. Closed displaced fracture of right femoral neck s/p total hip arthroplasty on 10/21/2022  S72.001A 820.8   2. Post-operative state  Z98.890 V45.89   3. Arthritis of both knees  M17.0 716.96       Current care, treatment plan, precautions, activity level/ modifications, limitations, rehabilitation exercises  and proposed future treatment were discussed with the patient. We discussed the need to monitor for changes in symptoms and condition and report them to the physician.  Discussed importance of compliance with all appointments and follow up examinations.         PHYSICAL THERAPY:   - Will refer to [facility].  - Weight bearing as tolerated   - Range of motion as tolerates.  - Advised patient no driving until his strength in his RLE has improved, hopefully 3 more weeks.       PAIN MEDICATION:   - Pain medication: refill was needed, I will refill his Percocet 5/325 mg tid PRN.  - Pain medication refill policy provided to patient for review, yes.    - Patient was informed a multi-modal approach is used to treat their pain.    OTHER:  - Patient seen by fracture clinic today.      FOLLOW UP:   - Patient will follow up in the clinic in 6 weeks.  - X-ray of his right hip is needed.  - Follow up with Urology as planned.  Per ultrasound appears has inguinal hernia.    - Future Appointments:   Future Appointments   Date Time Provider Department Center   12/7/2022  4:00 PM Huy Mehta MD [facility]   12/27/2022 10:00 AM ARNULFO Hanson MD [facility]   1/9/2023 10:15 AM [facility] [place]   1/17/2023  9:45 AM ARNULFO Hanson MD [facility]   1/17/2023 10:45 AM Kobi Mauricio NP [facility] ORTHO [facility]   1/19/2023  9:40 AM [facility], DEXA1 [facility] BMD [facility]   1/19/2023 10:30 AM [facility] FRA CAR [facility]   2/24/2023  1:40 PM Laila Bains MD [facility] FAM MED [place]   3/10/2023  9:00 AM LAB, LAPALCO LAP LAB [place]   3/17/2023  8:30 AM Malcolm Kent MD [facility] ENDOCRN [facility]           If there are any questions prior to scheduled follow up, the patient was instructed to contact the office

## 2022-12-06 NOTE — TELEPHONE ENCOUNTER
Called and informed pt his medication has been rerouted to [facility] on [address] as requested. Pt verbally understood and was satisfied.
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
## 2022-12-07 ENCOUNTER — TELEPHONE (OUTPATIENT)
Dept: ORTHOPEDICS | Facility: CLINIC | Age: 77
End: 2022-12-07
Payer: MEDICARE

## 2022-12-07 ENCOUNTER — OFFICE VISIT (OUTPATIENT)
Dept: UROLOGY | Facility: CLINIC | Age: 77
End: 2022-12-07
Payer: MEDICARE

## 2022-12-07 DIAGNOSIS — N45.2 ORCHITIS: Primary | ICD-10-CM

## 2022-12-07 PROCEDURE — 99214 PR OFFICE/OUTPT VISIT, EST, LEVL IV, 30-39 MIN: ICD-10-PCS | Mod: S$PBB,,, | Performed by: UROLOGY

## 2022-12-07 PROCEDURE — 99213 OFFICE O/P EST LOW 20 MIN: CPT | Mod: PBBFAC | Performed by: UROLOGY

## 2022-12-07 PROCEDURE — 99999 PR PBB SHADOW E&M-EST. PATIENT-LVL III: ICD-10-PCS | Mod: PBBFAC,,, | Performed by: UROLOGY

## 2022-12-07 PROCEDURE — 99999 PR PBB SHADOW E&M-EST. PATIENT-LVL III: CPT | Mod: PBBFAC,,, | Performed by: UROLOGY

## 2022-12-07 PROCEDURE — 99214 OFFICE O/P EST MOD 30 MIN: CPT | Mod: S$PBB,,, | Performed by: UROLOGY

## 2022-12-07 NOTE — TELEPHONE ENCOUNTER
Called and informed pt Kobi will be back in the office on Friday and I will forward his message to Kobi to contact him on Friday. Pt verbally understood
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
## 2022-12-08 ENCOUNTER — CLINICAL SUPPORT (OUTPATIENT)
Dept: REHABILITATION | Facility: HOSPITAL | Age: 77
End: 2022-12-08
Payer: MEDICARE

## 2022-12-08 DIAGNOSIS — R53.1 DECREASED STRENGTH, ENDURANCE, AND MOBILITY: ICD-10-CM

## 2022-12-08 DIAGNOSIS — M25.559 HIP PAIN: ICD-10-CM

## 2022-12-08 DIAGNOSIS — Z74.09 DECREASED STRENGTH, ENDURANCE, AND MOBILITY: ICD-10-CM

## 2022-12-08 DIAGNOSIS — M17.0 ARTHRITIS OF BOTH KNEES: ICD-10-CM

## 2022-12-08 DIAGNOSIS — S72.001A CLOSED DISPLACED FRACTURE OF RIGHT FEMORAL NECK: ICD-10-CM

## 2022-12-08 DIAGNOSIS — G89.29 CHRONIC PAIN OF BOTH KNEES: ICD-10-CM

## 2022-12-08 DIAGNOSIS — M25.561 CHRONIC PAIN OF BOTH KNEES: ICD-10-CM

## 2022-12-08 DIAGNOSIS — R68.89 DECREASED STRENGTH, ENDURANCE, AND MOBILITY: ICD-10-CM

## 2022-12-08 DIAGNOSIS — M25.562 CHRONIC PAIN OF BOTH KNEES: ICD-10-CM

## 2022-12-08 PROCEDURE — 97140 MANUAL THERAPY 1/> REGIONS: CPT | Mod: PN

## 2022-12-08 PROCEDURE — 97110 THERAPEUTIC EXERCISES: CPT | Mod: PN

## 2022-12-08 PROCEDURE — 97162 PT EVAL MOD COMPLEX 30 MIN: CPT | Mod: PN

## 2022-12-08 PROCEDURE — 97112 NEUROMUSCULAR REEDUCATION: CPT | Mod: PN
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
## 2022-12-19 NOTE — PROGRESS NOTES
"[facility] OUTPATIENT THERAPY AND WELLNESS   Physical Therapy Treatment Note     Name: Jani PAIZ [facility] Number: [id number]    Therapy Diagnosis:   Encounter Diagnoses   Name Primary?    Hip pain Yes    Chronic pain of both knees     Decreased strength, endurance, and mobility      Physician: Kobi Mauricio NP    Visit Date: 12/20/2022    Physician Orders: PT Eval and Treat   Medical Diagnosis from Referral:   S72.001A (ICD-10-CM) - Closed displaced fracture of right femoral neck   M17.0 (ICD-10-CM) - Arthritis of both knees      Evaluation Date: 12/8/2022  Authorization Period Expiration: 12/6/23  Plan of Care Expiration: 2/2/23  Progress Note Due: 1/8/2023  Visit # / Visits authorized: 1/ 1   FOTO: 1/3     Precautions: Standard, Diabetes, Weightbearing, and HTN  WBAT and ROM AT    PTA Visit #: 0/5     Time In: 1:35  Time Out: 2:30  Total Billable Time: 55 minutes    SUBJECTIVE     Pt reports: hip is feeling so/so, knees are killing him (R>L). Presents today with 4WW  He was NOT compliant with home exercise program.  Response to previous treatment: 1st treatment  Functional change: ongoing    Pain: 6/10  Location: right knee/hips    OBJECTIVE     Objective Measures updated at progress report unless specified.     Treatment     Jani received the treatments listed below:      therapeutic exercises to develop strength, endurance, ROM, and flexibility for 30 minutes including:  Nustep L2 6 min  Quad/glute sets sets 3" x25 bilaterally   SAQ large bolster 3" holds x25 (body weight)  Hooklying clamshells GTB 3x10  Bridges 3x10 3" holds with GTB  Gastroc stretch on incline board 3x20"  Heel raises 3x10  Standing hip abduction 3x10 bilaterally    manual therapy techniques: Joint mobilizations and Soft tissue Mobilization were applied to the: lower extremities for 10 minutes, including:  Manual stretching to hamstrings bilaterally  Patellar mobilizations grade II in all direction for pain relief    neuromuscular " "re-education activities to improve: Balance, Coordination, and Posture for 10 minutes. The following activities were included:  Tandem stance 4x15" bilaterally with visual tracking  Tandem stance air ex balance beam 4x15"    therapeutic activities to improve functional performance for 5  minutes, including:  Sit to stands 2x10 from hi-lo table w/o use of UE    gait training to improve functional mobility and safety for 0 minutes, including:    hot pack for 0 minutes.  cold pack for 0 minutes.    Patient Education and Home Exercises     Home Exercises Provided and Patient Education Provided     Education provided:   - eccentric control    Written Home Exercises Provided: yes. Exercises were reviewed and Jani was able to demonstrate them prior to the end of the session.  Jani demonstrated good  understanding of the education provided. See EMR under Patient Instructions for exercises provided during therapy sessions    ASSESSMENT     Patient reports knees bother him more with extension based therex (quad sets), but seems to tolerate SAQ without increase in pain. Modified to perform quad sets with glute contraction which decreased pain. Grinding reported of L knee during heel raises, for which height was modified. Patient tolerated additional weight bearing activities without c/o symptoms. Patient demonstrates downward gaze during balance activities so was challenged with visual task to incorporate upward gaze. Decreased ability to hold static balance position with cognitive task.     Jani Is progressing well towards his goals.   Pt prognosis is Good.     Pt will continue to benefit from skilled outpatient physical therapy to address the deficits listed in the problem list box on initial evaluation, provide pt/family education and to maximize pt's level of independence in the home and community environment.     Pt's spiritual, cultural and educational needs considered and pt agreeable to plan of care and goals.   "   Anticipated barriers to physical therapy: compliance with HEP    GOALS: Short Term Goals:  4 weeks progressing  1.Report decreased lower extremity pain pain  < / =  6/10  to increase tolerance for transitions  2. Increase ROM by 10 degrees where limited in order to perform ADLs without difficulty.  3. Increase strength by 1/3 MMT grade in lower extremities  to increase tolerance for ADL and work activities.  4. Pt to tolerate HEP to improve ROM and independence with ADL's     Long Term Goals: 8 weeks progressing  1.Report decreased lower extremity pain < / = 4/10  to increase tolerance for transitions  2.Patient goal: ambulate without AD  3.Increase strength to 4+/5 in  lower extremities  to increase tolerance for ADL and work activities.  4. Pt will report at CJ level (20-40% impaired) on LEFS  to demonstrate increase in LE function with every day tasks.    PLAN     Continue to progress weight bearing activities as tolerated    Laura Webber, PT

## 2022-12-20 ENCOUNTER — CLINICAL SUPPORT (OUTPATIENT)
Dept: REHABILITATION | Facility: HOSPITAL | Age: 77
End: 2022-12-20
Payer: MEDICARE

## 2022-12-20 DIAGNOSIS — Z74.09 DECREASED STRENGTH, ENDURANCE, AND MOBILITY: ICD-10-CM

## 2022-12-20 DIAGNOSIS — G89.29 CHRONIC PAIN OF BOTH KNEES: ICD-10-CM

## 2022-12-20 DIAGNOSIS — M25.562 CHRONIC PAIN OF BOTH KNEES: ICD-10-CM

## 2022-12-20 DIAGNOSIS — R53.1 DECREASED STRENGTH, ENDURANCE, AND MOBILITY: ICD-10-CM

## 2022-12-20 DIAGNOSIS — M25.561 CHRONIC PAIN OF BOTH KNEES: ICD-10-CM

## 2022-12-20 DIAGNOSIS — R68.89 DECREASED STRENGTH, ENDURANCE, AND MOBILITY: ICD-10-CM

## 2022-12-20 DIAGNOSIS — M25.559 HIP PAIN: Primary | ICD-10-CM

## 2022-12-20 PROCEDURE — 97112 NEUROMUSCULAR REEDUCATION: CPT | Mod: PN

## 2022-12-20 PROCEDURE — 97110 THERAPEUTIC EXERCISES: CPT | Mod: PN

## 2022-12-20 PROCEDURE — 97140 MANUAL THERAPY 1/> REGIONS: CPT | Mod: PN

## 2022-12-27 ENCOUNTER — TELEPHONE (OUTPATIENT)
Dept: UROLOGY | Facility: CLINIC | Age: 77
End: 2022-12-27

## 2022-12-27 ENCOUNTER — OFFICE VISIT (OUTPATIENT)
Dept: UROLOGY | Facility: CLINIC | Age: 77
End: 2022-12-27
Payer: MEDICARE

## 2022-12-27 VITALS — WEIGHT: 237.19 LBS | BODY MASS INDEX: 33.96 KG/M2 | HEIGHT: 70 IN

## 2022-12-27 DIAGNOSIS — N45.1 EPIDIDYMITIS: Primary | ICD-10-CM

## 2022-12-27 DIAGNOSIS — N40.1 BPH WITH URINARY OBSTRUCTION: ICD-10-CM

## 2022-12-27 DIAGNOSIS — R35.1 NOCTURIA MORE THAN TWICE PER NIGHT: ICD-10-CM

## 2022-12-27 DIAGNOSIS — N13.8 BPH WITH URINARY OBSTRUCTION: ICD-10-CM

## 2022-12-27 PROCEDURE — 99214 PR OFFICE/OUTPT VISIT, EST, LEVL IV, 30-39 MIN: ICD-10-PCS | Mod: S$PBB,,, | Performed by: UROLOGY

## 2022-12-27 PROCEDURE — 99999 PR PBB SHADOW E&M-EST. PATIENT-LVL III: ICD-10-PCS | Mod: PBBFAC,,, | Performed by: UROLOGY

## 2022-12-27 PROCEDURE — 99213 OFFICE O/P EST LOW 20 MIN: CPT | Mod: PBBFAC | Performed by: UROLOGY

## 2022-12-27 PROCEDURE — 81001 URINALYSIS AUTO W/SCOPE: CPT | Mod: PBBFAC | Performed by: UROLOGY

## 2022-12-27 PROCEDURE — 99999 PR PBB SHADOW E&M-EST. PATIENT-LVL III: CPT | Mod: PBBFAC,,, | Performed by: UROLOGY

## 2022-12-27 PROCEDURE — 99214 OFFICE O/P EST MOD 30 MIN: CPT | Mod: S$PBB,,, | Performed by: UROLOGY

## 2022-12-27 NOTE — PROGRESS NOTES
Subjective:       Patient ID: Jani Cha is a 77 y.o. male who was last seen in this office 12/7/2022    Chief Complaint:   Chief Complaint   Patient presents with    Follow-up       History of Kidney Stones  He had an issue with a ureteral stone in Winter 2015.  He did not recall passing the stone but his pain resolved.       3/25/2021  He had right flank pain and hematuria a couple of weeks ago. He brought the stone for analysis a couple of weeks ago.  He denies anymore hematuria, flank pain.  He denies fever.    5/3/2022  He denies pain or hematuria.        Benign Prostatic Hyperplasia  He patient reports nocturia three times a night. He denies frequency, incomplete emptying and intermittency. The patient states symptoms are of moderate severity. Onset of symptoms was several years ago and was gradual in onset.  He has no personal history and no family history of prostate cancer. He reports a history of kidney stones. He denies flank pain, gross hematuria and recurrent UTI.  He is currently taking no prostate medications.  He has noted some frequency with occasional urgency.    8/3/2022  He was to add Flomax last visit.  He took it for no more than 2 weeks.  He stopped it due to retrograde ejaculation and pain in his prostate afterwards.  He did not note any change to his stream.    IPSS Questionnaire (AUA-7):  8    11/17/2022   He is doing okay.  He recently fractured his hip.  His stream is about the same.  He required a urologist to place the Alejandre.  He is not sure why.    He is not interested in trying prostate medications again.    11/29/2022 Orchitis  He went to the ED on 11/24/2022 after developing a fever.  His urine was red at the time.  He was given antibiotics for a UTI.  Two days later he develop pain in his testicles.  He went back to the ED.    12/27/2022  He feels better today.  His testicle is slightly sensitive.      IPSS Questionnaire (AUA-7):  Over the past month    1)  How often have  you had a sensation of not emptying your bladder completely after you finish urinating?  3 - About half the time   2)  How often have you had to urinate again less than two hours after you finished urinating? 3 - About half the time   3)  How often have you found you stopped and started again several times when you urinated?  0 - Not at all   4) How difficult have you found it to postpone urination?  4 - More than half the time   5) How often have you had a weak urinary stream?  3 - About half the time   6) How often have you had to push or strain to begin urination?  0 - Not at all   7) How many times did you most typically get up to urinate from the time you went to bed until the time you got up in the morning?  4 - 4 times   Total score:  0-7 mildly symptomatic    8-19 moderately symptomatic    20-35 severely symptomatic        ACTIVE MEDICAL ISSUES:  Patient Active Problem List   Diagnosis    Hyperlipidemia    Primary hypertension    Coronary artery disease involving native coronary artery of native heart without angina pectoris    Sleep apnea    S/P CABG x 4    Type 2 diabetes mellitus with diabetic neuropathy, without long-term current use of insulin    GERD (gastroesophageal reflux disease)    Personal history of colonic polyps    Abdominal aortic aneurysm (AAA) without rupture    Total occlusion of coronary artery, chronic -LCX with collaterals.  No ischemic on PET    Pulmonary nodule    Thoracic aortic aneurysm without rupture    Bilateral renal cysts    Adrenal adenoma    History of PCI of RCA    Class 1 obesity due to excess calories with serious comorbidity in adult    Calcified granuloma of lung    Type 2 diabetes mellitus with hyperglycemia, without long-term current use of insulin    Lymphedema of both lower extremities    Swelling of lower extremity    Closed displaced fracture of right femoral neck s/p total hip arthroplasty on 10/21/2022    E. coli urinary tract infection    Hip pain    Chronic  pain of both knees    Decreased strength, endurance, and mobility       ALLERGIES AND MEDICATIONS: updated and reviewed.  Review of patient's allergies indicates:   Allergen Reactions    Penicillins Hives, Itching and Rash    Shellfish containing products      Current Outpatient Medications   Medication Sig    acetaminophen (TYLENOL) 500 MG tablet Take 2 tablets (1,000 mg total) by mouth every 8 (eight) hours as needed (Mild to moderate pain).    aspirin (ECOTRIN) 81 MG EC tablet Take 81 mg by mouth once daily.    atorvastatin (LIPITOR) 80 MG tablet Take 1 tablet by mouth once daily    blood sugar diagnostic Strp 1 strip by Misc.(Non-Drug; Combo Route) route once daily.    carvediloL (COREG) 25 MG tablet TAKE 1 TABLET BY MOUTH TWICE DAILY WITH MEALS    dulaglutide (TRULICITY) 1.5 mg/0.5 mL pen injector Inject 1.5 mg into the skin every 7 days.    fluticasone propionate (FLONASE) 50 mcg/actuation nasal spray 1 spray (50 mcg total) by Each Nostril route once daily.    glipiZIDE (GLUCOTROL) 10 MG TR24 Take 1 tablet (10 mg total) by mouth 2 (two) times daily with meals.    melatonin (MELATIN) 3 mg tablet Take 2 tablets (6 mg total) by mouth nightly as needed for Insomnia.    methocarbamoL (ROBAXIN) 750 MG Tab Take 1 tablet (750 mg total) by mouth 3 (three) times daily.    oxyCODONE-acetaminophen (PERCOCET) 5-325 mg per tablet Take 1 tablet by mouth every 8 (eight) hours as needed for Pain.    pantoprazole (PROTONIX) 40 MG tablet Take 1 tablet by mouth once daily    senna-docusate 8.6-50 mg (PERICOLACE) 8.6-50 mg per tablet Take 1 tablet by mouth 2 (two) times daily.    pregabalin (LYRICA) 75 MG capsule Take 1 capsule (75 mg total) by mouth nightly. for 10 days    TRUEPLUS LANCETS 33 gauge Misc Apply 1 lancet topically once daily. Use to test blood sugar daily; discard lancet after each use     No current facility-administered medications for this visit.       Review of Systems   Constitutional:  Negative for activity  "change, fatigue, fever and unexpected weight change.   HENT:  Negative for congestion.    Eyes:  Negative for redness.   Respiratory:  Negative for chest tightness and shortness of breath.    Cardiovascular:  Negative for chest pain and leg swelling.   Gastrointestinal:  Negative for abdominal pain, constipation, diarrhea, nausea and vomiting.   Genitourinary:  Negative for dysuria, flank pain, frequency, hematuria, penile pain, penile swelling, scrotal swelling, testicular pain and urgency.   Musculoskeletal:  Negative for arthralgias and back pain.   Neurological:  Negative for dizziness and light-headedness.   Psychiatric/Behavioral:  Negative for behavioral problems and confusion. The patient is not nervous/anxious.    All other systems reviewed and are negative.    Objective:      Vitals:    12/27/22 1000   Weight: 107.6 kg (237 lb 3.4 oz)   Height: 5' 10" (1.778 m)     Physical Exam  Vitals and nursing note reviewed.   Constitutional:       Appearance: He is well-developed.   HENT:      Head: Normocephalic.   Eyes:      Conjunctiva/sclera: Conjunctivae normal.   Neck:      Thyroid: No thyromegaly.      Trachea: No tracheal deviation.   Cardiovascular:      Rate and Rhythm: Normal rate.      Heart sounds: Normal heart sounds.   Pulmonary:      Effort: Pulmonary effort is normal. No respiratory distress.      Breath sounds: Normal breath sounds. No wheezing.   Abdominal:      General: Bowel sounds are normal.      Palpations: Abdomen is soft.      Tenderness: There is no abdominal tenderness. There is no rebound.      Hernia: No hernia is present.   Genitourinary:     Penis: Normal and circumcised.       Testes:         Right: Tenderness present. Swelling not present.         Left: Tenderness or swelling not present.   Musculoskeletal:         General: No tenderness. Normal range of motion.      Cervical back: Normal range of motion and neck supple.   Lymphadenopathy:      Cervical: No cervical adenopathy. "   Skin:     General: Skin is warm and dry.      Findings: No erythema or rash.   Neurological:      Mental Status: He is alert and oriented to person, place, and time.   Psychiatric:         Behavior: Behavior normal.         Thought Content: Thought content normal.         Judgment: Judgment normal.       Urine dipstick shows negative for all components.  Micro exam: negative for WBC's or RBC's.    Assessment:       1. Epididymitis    2. BPH with urinary obstruction    3. Nocturia more than twice per night          Plan:       1. Epididymitis  resolved    2. BPH with urinary obstruction  Cystoscopy next month to look into UroLift      3. Nocturia more than twice per night  Limit evening fluids            Follow up in about 6 weeks (around 2/7/2023) for Follow up, cystoscopy.

## 2022-12-27 NOTE — TELEPHONE ENCOUNTER
Spoke with pts wife and she stated that he had an appointment scheduled too close to another appointment he had, so I assisted with rescheduling it.    ----- Message from Patel Olivera MA sent at 12/27/2022 11:56 AM CST -----  Type: Patient Call Back    Who called:wife - Ms. Cha    What is the request in detail: states she had a missed call from the office ..     Can the clinic reply by [facility]?NO    Would the patient rather a call back or a response via [facility]? YES    Best call back number: [phone]

## 2022-12-27 NOTE — PROGRESS NOTES
"[facility]   Physical Therapy Treatment Note     Name: Jani PAIZ [facility] Number: [id number]    Therapy Diagnosis:   Encounter Diagnoses   Name Primary?    Hip pain Yes    Chronic pain of both knees     Decreased strength, endurance, and mobility        Physician: Kobi Mauricio NP    Visit Date: 12/28/2022    Physician Orders: PT Eval and Treat   Medical Diagnosis from Referral:   S72.001A (ICD-10-CM) - Closed displaced fracture of right femoral neck   M17.0 (ICD-10-CM) - Arthritis of both knees      Evaluation Date: 12/8/2022  Authorization Period Expiration: 12/6/23  Plan of Care Expiration: 2/2/23  Progress Note Due: 1/8/2023  Visit # / Visits authorized: 2/ 10 (+ eval)  FOTO: 1/3     Precautions: Standard, Diabetes, Weightbearing, and HTN  WBAT and ROM AT    PTA Visit #: 0/5     Time In: 12:28  Time Out: 1:30  Total Billable Time: 62 minutes    SUBJECTIVE     Pt reports: "knees ain't worth a damn" states he went shopping yesterday so he spent a lot of time on your feet  He was NOT compliant with home exercise program.  Response to previous treatment: 1st treatment  Functional change: ongoing    Pain:  6/10  Location: right knee/hips    OBJECTIVE     Objective Measures updated at progress report unless specified.     Treatment     Jani received the treatments listed below:      therapeutic exercises to develop strength, endurance, ROM, and flexibility for 42 minutes including:  Nustep L2 8 min  Quad/glute sets sets with ball under knee 3" holds x25 bilaterally   SAQ large bolster 3" holds x25 (body weight) : NP today  Hooklying clamshells GTB 3x10--> side-lying 3x10  Bridges 3x10 3" holds with GTB  Gastroc stretch on incline board 5x15"  Heel raises 3x10  Standing hip abduction 3x10 bilaterally  +LAQ 3x10 each  +hamstring stretch 3x10" bilaterally    manual therapy techniques: Joint mobilizations and Soft tissue Mobilization were applied to the: lower extremities for 0 " "minutes, including:  Manual stretching to hamstrings bilaterally  Patellar mobilizations grade II in all direction for pain relief    neuromuscular re-education activities to improve: Balance, Coordination, and Posture for 10 minutes. The following activities were included:  Tandem stance 4x15" bilaterally with visual tracking  Tandem stance air ex balance beam 4x15"    therapeutic activities to improve functional performance for 10  minutes, including:  Sit to stands 2x10 from hi-lo table w/o use of UE    gait training to improve functional mobility and safety for 0 minutes, including:    hot pack for 0 minutes.  cold pack for 0 minutes.    Patient Education and Home Exercises     Home Exercises Provided and Patient Education Provided     Education provided:   - eccentric control    Written Home Exercises Provided: yes. Exercises were reviewed and Jani was able to demonstrate them prior to the end of the session.  Jani demonstrated good  understanding of the education provided. See EMR under Patient Instructions for exercises provided during therapy sessions    ASSESSMENT     Patient tolerated additional quad strengthening activities without increase in pain today. Reported minimal discomfort with L knee cracking during standing hip abduction, requiring prolonged single limb stance time. Verbal cuing provided to maintain slight knee flexion to prevent hyperextension. Patient was able to perform sit to stand transitions without use of UE following appropriate set up and use of momentum from trunk. Progressed hip abduction strengthening to perform side-lying and patient demonstrated R>L difficulty.     Jani Is progressing well towards his goals.   Pt prognosis is Good.     Pt will continue to benefit from skilled outpatient physical therapy to address the deficits listed in the problem list box on initial evaluation, provide pt/family education and to maximize pt's level of independence in the home and community " environment.     Pt's spiritual, cultural and educational needs considered and pt agreeable to plan of care and goals.     Anticipated barriers to physical therapy: compliance with HEP    GOALS: Short Term Goals:  4 weeks progressing  1.Report decreased lower extremity pain pain  < / =  6/10  to increase tolerance for transitions  2. Increase ROM by 10 degrees where limited in order to perform ADLs without difficulty.  3. Increase strength by 1/3 MMT grade in lower extremities  to increase tolerance for ADL and work activities.  4. Pt to tolerate HEP to improve ROM and independence with ADL's     Long Term Goals: 8 weeks progressing  1.Report decreased lower extremity pain < / = 4/10  to increase tolerance for transitions  2.Patient goal: ambulate without AD  3.Increase strength to 4+/5 in  lower extremities  to increase tolerance for ADL and work activities.  4. Pt will report at CJ level (20-40% impaired) on LEFS  to demonstrate increase in LE function with every day tasks.    PLAN     Continue with hip strengthening activities per tolerance    Laura Webber, PT

## 2022-12-28 ENCOUNTER — CLINICAL SUPPORT (OUTPATIENT)
Dept: REHABILITATION | Facility: HOSPITAL | Age: 77
End: 2022-12-28
Payer: MEDICARE

## 2022-12-28 DIAGNOSIS — G89.29 CHRONIC PAIN OF BOTH KNEES: ICD-10-CM

## 2022-12-28 DIAGNOSIS — Z74.09 DECREASED STRENGTH, ENDURANCE, AND MOBILITY: ICD-10-CM

## 2022-12-28 DIAGNOSIS — R68.89 DECREASED STRENGTH, ENDURANCE, AND MOBILITY: ICD-10-CM

## 2022-12-28 DIAGNOSIS — R53.1 DECREASED STRENGTH, ENDURANCE, AND MOBILITY: ICD-10-CM

## 2022-12-28 DIAGNOSIS — M25.562 CHRONIC PAIN OF BOTH KNEES: ICD-10-CM

## 2022-12-28 DIAGNOSIS — M25.559 HIP PAIN: Primary | ICD-10-CM

## 2022-12-28 DIAGNOSIS — M25.561 CHRONIC PAIN OF BOTH KNEES: ICD-10-CM

## 2022-12-28 PROCEDURE — 97530 THERAPEUTIC ACTIVITIES: CPT | Mod: PN

## 2022-12-28 PROCEDURE — 97112 NEUROMUSCULAR REEDUCATION: CPT | Mod: PN

## 2022-12-28 PROCEDURE — 97110 THERAPEUTIC EXERCISES: CPT | Mod: PN

## 2022-12-29 NOTE — PROGRESS NOTES
"[facility]   Physical Therapy Treatment Note     Name: Jani PAIZ [facility] Number: [id number]    Therapy Diagnosis:   Encounter Diagnoses   Name Primary?    Hip pain Yes    Chronic pain of both knees     Decreased strength, endurance, and mobility      Physician: Kobi Mauricio NP    Visit Date: 12/30/2022    Physician Orders: PT Eval and Treat   Medical Diagnosis from Referral:   S72.001A (ICD-10-CM) - Closed displaced fracture of right femoral neck   M17.0 (ICD-10-CM) - Arthritis of both knees      Evaluation Date: 12/8/2022  Authorization Period Expiration: 12/6/23  Plan of Care Expiration: 2/2/23  Progress Note Due: 1/8/2023  Visit # / Visits authorized: 3/10 (+ eval)  FOTO: 1/3     Precautions: Standard, Diabetes, Weightbearing, and HTN  WBAT and ROM AT    PTA Visit #: 0/5     Time In: 1:23  Time Out: 2:25  Total Billable Time: 62 minutes    SUBJECTIVE     Pt reports: minimally soreness following last session. States it is hard to differentiate between knee and pain muscle soreness  He was NOT compliant with home exercise program.  Response to previous treatment: minimally sore  Functional change: ongoing    Pain:  6/10  Location: right knee/hips    OBJECTIVE     Objective Measures updated at progress report unless specified.     Treatment     Jani received the treatments listed below:      therapeutic exercises to develop strength, endurance, ROM, and flexibility for 42 minutes including:  Nustep L2.5 8 min  Quad/glute sets sets with ball under knee 3" holds x25 bilaterally   SAQ large bolster 3" holds x25 (body weight): NP today  Hooklying clamshells GTB 3x10--> side-lying 3x10  Bridges 3x10 3" holds with GTB  Gastroc stretch on incline board 5x15"  Heel raises 3x10  Standing hip abduction 3x10 bilaterally on airex  LAQ 3x15 each 3" hold  Hamstring stretch 3x10" bilaterally    manual therapy techniques: Joint mobilizations and Soft tissue Mobilization were applied to " "the: lower extremities for 0 minutes, including:  Manual stretching to hamstrings bilaterally  Patellar mobilizations grade II in all direction for pain relief    neuromuscular re-education activities to improve: Balance, Coordination, and Posture for 10 minutes. The following activities were included:  Step up onto airex 2x10  Tandem stance air ex balance beam 4x15"    therapeutic activities to improve functional performance for 10  minutes, including:  Sit to stands 3x10 from hi-lo table w/o use of UE    gait training to improve functional mobility and safety for 0 minutes, including:    hot pack for 0 minutes.  cold pack for 0 minutes.    Patient Education and Home Exercises     Home Exercises Provided and Patient Education Provided     Education provided:   - eccentric control    Written Home Exercises Provided: yes. Exercises were reviewed and Jani was able to demonstrate them prior to the end of the session.  Jani demonstrated good  understanding of the education provided. See EMR under Patient Instructions for exercises provided during therapy sessions    ASSESSMENT     Patient tolerated additional repetitions of strengthening activities today without increase in pain. Patient reports greatest pain when weight bearing fully of L LE. Demonstrated mild difficulty stepping down off air ex with L foot. Patient was able to tolerate dynamic balance activity with step ups, however, without increase in pain. Noted difficulty with side-lying hip abduction on R. Patient reports they are attending PT in order to get knee replacements since pain levels are so significant and constant.     Jani Is progressing well towards his goals.   Pt prognosis is Good.     Pt will continue to benefit from skilled outpatient physical therapy to address the deficits listed in the problem list box on initial evaluation, provide pt/family education and to maximize pt's level of independence in the home and community environment. "     Pt's spiritual, cultural and educational needs considered and pt agreeable to plan of care and goals.     Anticipated barriers to physical therapy: compliance with HEP    GOALS: Short Term Goals:  4 weeks progressing  1.Report decreased lower extremity pain pain  < / =  6/10  to increase tolerance for transitions  2. Increase ROM by 10 degrees where limited in order to perform ADLs without difficulty.  3. Increase strength by 1/3 MMT grade in lower extremities  to increase tolerance for ADL and work activities.  4. Pt to tolerate HEP to improve ROM and independence with ADL's     Long Term Goals: 8 weeks progressing  1.Report decreased lower extremity pain < / = 4/10  to increase tolerance for transitions  2.Patient goal: ambulate without AD  3.Increase strength to 4+/5 in  lower extremities  to increase tolerance for ADL and work activities.  4. Pt will report at CJ level (20-40% impaired) on LEFS  to demonstrate increase in LE function with every day tasks.    PLAN     Continue with hip strengthening activities per tolerance    Laura Webber, PT

## 2022-12-30 ENCOUNTER — CLINICAL SUPPORT (OUTPATIENT)
Dept: REHABILITATION | Facility: HOSPITAL | Age: 77
End: 2022-12-30
Payer: MEDICARE

## 2022-12-30 DIAGNOSIS — R68.89 DECREASED STRENGTH, ENDURANCE, AND MOBILITY: ICD-10-CM

## 2022-12-30 DIAGNOSIS — M25.559 HIP PAIN: Primary | ICD-10-CM

## 2022-12-30 DIAGNOSIS — Z74.09 DECREASED STRENGTH, ENDURANCE, AND MOBILITY: ICD-10-CM

## 2022-12-30 DIAGNOSIS — M25.562 CHRONIC PAIN OF BOTH KNEES: ICD-10-CM

## 2022-12-30 DIAGNOSIS — M25.561 CHRONIC PAIN OF BOTH KNEES: ICD-10-CM

## 2022-12-30 DIAGNOSIS — R53.1 DECREASED STRENGTH, ENDURANCE, AND MOBILITY: ICD-10-CM

## 2022-12-30 DIAGNOSIS — G89.29 CHRONIC PAIN OF BOTH KNEES: ICD-10-CM

## 2022-12-30 PROCEDURE — 97112 NEUROMUSCULAR REEDUCATION: CPT | Mod: PN

## 2022-12-30 PROCEDURE — 97110 THERAPEUTIC EXERCISES: CPT | Mod: PN

## 2022-12-30 PROCEDURE — 97530 THERAPEUTIC ACTIVITIES: CPT | Mod: PN

## 2022-12-31 ENCOUNTER — EXTERNAL CHRONIC CARE MANAGEMENT (OUTPATIENT)
Dept: PRIMARY CARE CLINIC | Facility: CLINIC | Age: 77
End: 2022-12-31
Payer: MEDICARE

## 2022-12-31 PROCEDURE — 99490 PR CHRONIC CARE MGMT, 1ST 20 MIN: ICD-10-PCS | Mod: S$PBB,,, | Performed by: FAMILY MEDICINE

## 2022-12-31 PROCEDURE — 99490 CHRNC CARE MGMT STAFF 1ST 20: CPT | Mod: S$PBB,,, | Performed by: FAMILY MEDICINE

## 2022-12-31 PROCEDURE — 99490 CHRNC CARE MGMT STAFF 1ST 20: CPT | Mod: PBBFAC,PO | Performed by: FAMILY MEDICINE

## 2023-01-17 ENCOUNTER — CLINICAL SUPPORT (OUTPATIENT)
Dept: REHABILITATION | Facility: HOSPITAL | Age: 78
End: 2023-01-17
Payer: MEDICARE

## 2023-01-17 ENCOUNTER — HOSPITAL ENCOUNTER (OUTPATIENT)
Dept: RADIOLOGY | Facility: HOSPITAL | Age: 78
Discharge: HOME | End: 2023-01-17
Attending: NURSE PRACTITIONER
Payer: MEDICARE

## 2023-01-17 ENCOUNTER — OFFICE VISIT (OUTPATIENT)
Dept: ORTHOPEDICS | Facility: CLINIC | Age: 78
End: 2023-01-17
Payer: MEDICARE

## 2023-01-17 VITALS — BODY MASS INDEX: 33.96 KG/M2 | WEIGHT: 237.19 LBS | HEIGHT: 70 IN

## 2023-01-17 DIAGNOSIS — M25.551 RIGHT HIP PAIN: Primary | ICD-10-CM

## 2023-01-17 DIAGNOSIS — M25.561 ACUTE PAIN OF BOTH KNEES: Primary | ICD-10-CM

## 2023-01-17 DIAGNOSIS — M25.562 CHRONIC PAIN OF BOTH KNEES: ICD-10-CM

## 2023-01-17 DIAGNOSIS — R53.1 DECREASED STRENGTH, ENDURANCE, AND MOBILITY: ICD-10-CM

## 2023-01-17 DIAGNOSIS — G89.29 CHRONIC PAIN OF BOTH KNEES: ICD-10-CM

## 2023-01-17 DIAGNOSIS — M25.559 HIP PAIN: Primary | ICD-10-CM

## 2023-01-17 DIAGNOSIS — M25.561 CHRONIC PAIN OF BOTH KNEES: ICD-10-CM

## 2023-01-17 DIAGNOSIS — R68.89 DECREASED STRENGTH, ENDURANCE, AND MOBILITY: ICD-10-CM

## 2023-01-17 DIAGNOSIS — M25.562 ACUTE PAIN OF BOTH KNEES: ICD-10-CM

## 2023-01-17 DIAGNOSIS — Z98.890 POST-OPERATIVE STATE: ICD-10-CM

## 2023-01-17 DIAGNOSIS — M17.0 OSTEOARTHRITIS OF BOTH KNEES, UNSPECIFIED OSTEOARTHRITIS TYPE: ICD-10-CM

## 2023-01-17 DIAGNOSIS — S72.001A CLOSED DISPLACED FRACTURE OF RIGHT FEMORAL NECK: Primary | ICD-10-CM

## 2023-01-17 DIAGNOSIS — M25.551 RIGHT HIP PAIN: ICD-10-CM

## 2023-01-17 DIAGNOSIS — M25.561 ACUTE PAIN OF BOTH KNEES: ICD-10-CM

## 2023-01-17 DIAGNOSIS — M25.562 ACUTE PAIN OF BOTH KNEES: Primary | ICD-10-CM

## 2023-01-17 DIAGNOSIS — Z74.09 DECREASED STRENGTH, ENDURANCE, AND MOBILITY: ICD-10-CM

## 2023-01-17 PROCEDURE — 99024 PR POST-OP FOLLOW-UP VISIT: ICD-10-PCS | Mod: POP,,, | Performed by: NURSE PRACTITIONER

## 2023-01-17 PROCEDURE — 73502 XR HIP WITH PELVIS WHEN PERFORMED, 2 OR 3  VIEWS RIGHT: ICD-10-PCS | Mod: 26,RT,, | Performed by: RADIOLOGY

## 2023-01-17 PROCEDURE — 99213 OFFICE O/P EST LOW 20 MIN: CPT | Mod: PBBFAC | Performed by: NURSE PRACTITIONER

## 2023-01-17 PROCEDURE — 99024 POSTOP FOLLOW-UP VISIT: CPT | Mod: POP,,, | Performed by: NURSE PRACTITIONER

## 2023-01-17 PROCEDURE — 73502 X-RAY EXAM HIP UNI 2-3 VIEWS: CPT | Mod: 26,RT,, | Performed by: RADIOLOGY

## 2023-01-17 PROCEDURE — 97110 THERAPEUTIC EXERCISES: CPT | Mod: PN

## 2023-01-17 PROCEDURE — 73564 XR KNEE ORTHO BILAT WITH FLEXION: ICD-10-PCS | Mod: 26,50,, | Performed by: RADIOLOGY

## 2023-01-17 PROCEDURE — 73564 X-RAY EXAM KNEE 4 OR MORE: CPT | Mod: 26,50,, | Performed by: RADIOLOGY

## 2023-01-17 PROCEDURE — 73502 X-RAY EXAM HIP UNI 2-3 VIEWS: CPT | Mod: TC,RT

## 2023-01-17 PROCEDURE — 73564 X-RAY EXAM KNEE 4 OR MORE: CPT | Mod: TC,50

## 2023-01-17 PROCEDURE — 99999 PR PBB SHADOW E&M-EST. PATIENT-LVL III: CPT | Mod: PBBFAC,,, | Performed by: NURSE PRACTITIONER

## 2023-01-17 PROCEDURE — 99999 PR PBB SHADOW E&M-EST. PATIENT-LVL III: ICD-10-PCS | Mod: PBBFAC,,, | Performed by: NURSE PRACTITIONER

## 2023-01-17 NOTE — PROGRESS NOTES
[facility]   Physical Therapy Treatment Note     [id number]    Therapy Diagnosis:   Encounter Diagnoses   Name Primary?    Hip pain Yes    Chronic pain of both knees     Decreased strength, endurance, and mobility        Physician: Kobi Mauricio NP    Visit Date: 1/17/2023    Physician Orders: PT Eval and Treat   Medical Diagnosis from Referral:   S72.001A (ICD-10-CM) - Closed displaced fracture of right femoral neck   M17.0 (ICD-10-CM) - Arthritis of both knees      Evaluation Date: 12/8/2022  Authorization Period Expiration: 12/6/23  Plan of Care Expiration: 2/2/23  Progress Note Due: updated 1/17/23   Visit # / Visits authorized: 1/20 (visit 5 total)  FOTO: 2/3     Precautions: Standard, Diabetes, Weightbearing, and HTN  WBAT and ROM AT    PTA Visit #: 0/5     Time In: 5:20  Time Out: 6:20  Total Billable Time: 30 minutes (1:1 with PT)    SUBJECTIVE     Pt reports: he had a follow up appt with doctor for the hip and everything looked good. His greatest concern at this time is his bilateral knee pain (L<R). Plans to re-visit 3 mo for TKA (about 6 mo until possible until scheduled). Gel injection in 2 weeks  He was NOT compliant with home exercise program.  Response to previous treatment: minimally sore  Functional change: ongoing    Pain:  0/10 (R hip), knee (L>R about a 4)  Location: right knee/hips    OBJECTIVE     Objective Measures updated at progress report unless specified.   UPDATED 1/17/2023    Observation: ambulates with 4WW, decreased gait speed, R Trendelenburg with gait     Posture:  increased hip flexion while ambulating     Lower Extremity Strength  Right LE   Left LE     Quadriceps: 5/5 Quadriceps: 5/5   Hamstrings: 4+/5 Hamstrings: 5/5   Iliospoas: 4+/5 Iliospoas: 4+/5   Hip extension:  5/5 Hip extension: 5/5   Hip abduction: 4/5 Hip abduction: 4/5   Ankle dorsiflexion: 4+/5 Ankle dorsiflexion: 4/5   Ankle plantarflexion: 3+/5  "Ankle plantarflexion: 3+/5      Sensation:intact     BALANCE  Tandem R back: 6 seconds  Tandem L back: 10 seconds  R SLB: 5 seconds (increase in pain)  L SLB: 5 seconds (increase in pain)     5xSTS: 14 seconds without use of hands    Hip ROM  R: 100  L: 100    Knee ROM  R: 0-0-112  L: 0-0- 105      FOTO: see under media section  Limitation Score: 32%    Treatment     Jani received the treatments listed below:      therapeutic exercises to develop strength, endurance, ROM, and flexibility for 60 minutes including:    Reassessment  Nustep L2.5 8 min  Quad/glute sets sets with ball under knee 3" holds x25 bilaterally   SAQ large bolster 3" holds x25 (body weight): NP today  Hooklying clamshells GTB 3x10  Bridges 3x10 3" holds with GTB  Gastroc stretch on incline board 5x15"  Heel raises 3x10  Standing hip abduction 3x10 bilaterally on airex  LAQ 2x15 each 3" hold  Sit to stands 3x10 from hi-lo table w/o use of UE  Hamstring stretch 3x10" bilaterally    manual therapy techniques: Joint mobilizations and Soft tissue Mobilization were applied to the: lower extremities for 0 minutes, including:  Manual stretching to hamstrings bilaterally  Patellar mobilizations grade II in all direction for pain relief    neuromuscular re-education activities to improve: Balance, Coordination, and Posture for 0 minutes. The following activities were included:  Step up onto airex 2x10  Tandem stance air ex balance beam 4x15"    therapeutic activities to improve functional performance for 0  minutes, including:      Patient Education and Home Exercises     Home Exercises Provided and Patient Education Provided     Education provided:   - eccentric control    Written Home Exercises Provided: yes. Exercises were reviewed and Jani was able to demonstrate them prior to the end of the session.  Jani demonstrated good  understanding of the education provided. See EMR under Patient Instructions for exercises provided during therapy " sessions    ASSESSMENT       UPDATED PROGRESS  Patient presents with overall low pain levels of the R hip. He reports his greatest pain is in his knees bilaterally (L>R) which limits his ability to ambulate and perform functional transfers. Increase in pain noted today with single limb activities such as balance and hip abduction. Patient demonstrates improved mobility and strength of R hip. Plans to receive gel injections into his knees in 2 weeks. Plan to continue the entirety of scheduled sessions to continue lower extremity strength and balance reactions    Jani Is progressing well towards his goals.   Pt prognosis is Good.     Pt will continue to benefit from skilled outpatient physical therapy to address the deficits listed in the problem list box on initial evaluation, provide pt/family education and to maximize pt's level of independence in the home and community environment.     Pt's spiritual, cultural and educational needs considered and pt agreeable to plan of care and goals.     Anticipated barriers to physical therapy: compliance with HEP    GOALS: Short Term Goals:  4 weeks progressing   1.Report decreased R hip pain  < / =  6/10  to increase tolerance for transitions met  2. Increase ROM by 10 degrees where limited in order to perform ADLs without difficulty. met  3. Increase strength by 1/3 MMT grade in lower extremities  to increase tolerance for ADL and work activities. met  4. Pt to tolerate HEP to improve ROM and independence with ADL's met     Long Term Goals: 8 weeks progressing  1.Report decreased lower extremity pain < / = 4/10  to increase tolerance for transitions progressing, not met  2.Patient goal: ambulate without AD progressing, not met  3.Increase strength to 4+/5 in  lower extremities  to increase tolerance for ADL and work activities. progressing, not met  4. Pt will report at CJ level (20-40% impaired) on LEFS  to demonstrate increase in LE function with every day tasks.  met    PLAN     Continue with hip and knee strengthening activities per tolerance    Laura Webber, PT

## 2023-01-17 NOTE — PROGRESS NOTES
Mr. Cha is here today for a post-operative visit after undergoing a right DARNELL by Dr. Paulino on 10/21/2022.    Interval History:  He reports that he is doing ok.  Reports no pain in his hip but has bilateral knee pain.  He states his knees are bothering him more than his hip.  Reports his left knee is worse than his right.  Denies any falls or injuries since his last visit.  He wants to know when he can consider having his knee replaced.  His other question is when can he resume driving.  He is still going to physical therapy he thinks he has 1-2 days left.  Is not taking anything for pain except for Tylenol p.r.n..  He is planning to have a procedure done on his prostate in the next couple of weeks.  He is using a Rolator for gait assistance.  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  He denies fever, chills, and sweats since the time of the surgery.     Physical exam:  Incision is open to air and clean, dry and intact.  No signs of infection.  He has tactile stimulation to their lower leg, he denies calf pain, there is no leg edema and their pedal pulse is palpable x 2. He has bilateral medial knee pain.  ROM of bilateral knees is 0-100 degrees,  He does get medial knee pain with terminal flexion.  No effusion noted in his bilateral knees.    RADS: Right hip x-ray in bilateral knees were obtained and personally reviewed by me, hemiarthroplasty is in proper position.  No evidence of hardware failure or new fractures seen.  Bilateral knees show moderate medial tibiofemoral joint space narrowing (L>R), no acute fractures seen on knee xrays.    Assessment:  Post-op visit (12 weeks)    Plan:    ICD-10-CM ICD-9-CM   1. Closed displaced fracture of right femoral neck s/p total hip arthroplasty on 10/21/2022  S72.001A 820.8   2. Post-operative state  Z98.890 V45.89       Current care, treatment plan, precautions, activity level/ modifications, limitations, rehabilitation exercises and proposed future treatment were  discussed with the patient. We discussed the need to monitor for changes in symptoms and condition and report them to the physician.  Discussed importance of compliance with all appointments and follow up examinations.       PHYSICAL THERAPY:   - Continue with [facility].  - Weight bearing as tolerated   - Range of motion as tolerates.     PAIN MEDICATION:   - Pain medication: refill was not needed.  - Pain medication refill policy provided to patient for review, yes.    - Patient was informed a multi-modal approach is used to treat their pain.    OTHER:  - I will try to get Synvisc-One injections approved.  - Currently in PT, has tried CSI but did not work and he is a diabetic.  - Kellgren-Hamlet score is 2.  - Ok to resume driving and go camping.      FOLLOW UP:   - Patient will follow up in the clinic in 2 weeks for knee injections if approved and 12 weeks for his hip.  - X-ray of his right hip is needed.    - Future Appointments:   Future Appointments   Date Time Provider Department Center   1/17/2023  5:30 PM Laura Webber, PT [facility] OP [facility] [place] [facility]   1/18/2023 11:30 AM Karina Vicente DPM [facility] POD [place]   1/19/2023  9:40 AM NOM, DEXA1 [facility] BMD [facility]   1/19/2023 10:30 AM [facility] FRACTURE CARE CLINIC [facility] FRA CAR [facility]   1/19/2023  5:30 PM Laura Webber, PT [facility] OP [facility] [place] [facility]   1/23/2023  5:30 PM Jessica Bean PTA [facility] OP [facility] [place] [facility]   1/26/2023  5:30 PM Laura Akbar, PT [facility] OP [facility] [place] [facility]   1/31/2023  5:30 PM Laura Akbar, PT [facility] OP [facility] [place] [facility]   2/2/2023  5:30 PM Laura Akbar, PT [facility] OP [facility] [place] - [facility]   2/24/2023  1:40 PM Laila Bains MD [facility] MED [place]   3/3/2023  2:15 PM Jeanmarie Hanson MD [facility] URO [place] [facility]   3/10/2023  9:00 AM LAB, LAPALCO [facility] LAB [place]   3/17/2023  8:30 AM Malcolm Kent MD [facility] ENDOCRN [facility] Cli   4/4/2023  1:00 PM Max Marques OD [facility] OPTOMTY [place]   4/17/2023  9:30 AM Kobi Mauricio NP  [facility] ORTHO [address]           If there are any questions prior to scheduled follow up, the patient was instructed to contact the office

## 2023-01-18 ENCOUNTER — OFFICE VISIT (OUTPATIENT)
Dept: PODIATRY | Facility: CLINIC | Age: 78
End: 2023-01-18
Payer: MEDICARE

## 2023-01-18 VITALS — WEIGHT: 237.19 LBS | HEIGHT: 70 IN | BODY MASS INDEX: 33.96 KG/M2

## 2023-01-18 DIAGNOSIS — E11.49 TYPE II DIABETES MELLITUS WITH NEUROLOGICAL MANIFESTATIONS: Primary | ICD-10-CM

## 2023-01-18 DIAGNOSIS — B35.1 ONYCHOMYCOSIS DUE TO DERMATOPHYTE: ICD-10-CM

## 2023-01-18 PROCEDURE — 99999 PR PBB SHADOW E&M-EST. PATIENT-LVL III: ICD-10-PCS | Mod: PBBFAC,,, | Performed by: PODIATRIST

## 2023-01-18 PROCEDURE — 99999 PR PBB SHADOW E&M-EST. PATIENT-LVL III: CPT | Mod: PBBFAC,,, | Performed by: PODIATRIST

## 2023-01-18 PROCEDURE — 11721 DEBRIDE NAIL 6 OR MORE: CPT | Mod: Q9,S$PBB,, | Performed by: PODIATRIST

## 2023-01-18 PROCEDURE — 11721 DEBRIDE NAIL 6 OR MORE: CPT | Mod: PBBFAC,PO | Performed by: PODIATRIST

## 2023-01-18 PROCEDURE — 11721 PR DEBRIDEMENT OF NAILS, 6 OR MORE: ICD-10-PCS | Mod: Q9,S$PBB,, | Performed by: PODIATRIST

## 2023-01-18 PROCEDURE — 99499 UNLISTED E&M SERVICE: CPT | Mod: S$PBB,,, | Performed by: PODIATRIST

## 2023-01-18 PROCEDURE — 99499 NO LOS: ICD-10-PCS | Mod: S$PBB,,, | Performed by: PODIATRIST

## 2023-01-18 PROCEDURE — 99213 OFFICE O/P EST LOW 20 MIN: CPT | Mod: PBBFAC,PO | Performed by: PODIATRIST

## 2023-01-18 NOTE — PLAN OF CARE
[facility]   Physical Therapy Treatment Note     [id number]    Therapy Diagnosis:   Encounter Diagnoses   Name Primary?    Hip pain Yes    Chronic pain of both knees     Decreased strength, endurance, and mobility        Physician: Kobi Mauricio NP    Visit Date: 1/17/2023    Physician Orders: PT Eval and Treat   Medical Diagnosis from Referral:   S72.001A (ICD-10-CM) - Closed displaced fracture of right femoral neck   M17.0 (ICD-10-CM) - Arthritis of both knees      Evaluation Date: 12/8/2022  Authorization Period Expiration: 12/6/23  Plan of Care Expiration: 2/2/23  Progress Note Due: updated 1/17/23   Visit # / Visits authorized: 1/20 (visit 5 total)  FOTO: 2/3     Precautions: Standard, Diabetes, Weightbearing, and HTN  WBAT and ROM AT    PTA Visit #: 0/5     Time In: 5:20  Time Out: 6:20  Total Billable Time: 30 minutes (1:1 with PT)    SUBJECTIVE     Pt reports: he had a follow up appt with doctor for the hip and everything looked good. His greatest concern at this time is his bilateral knee pain (L<R). Plans to re-visit 3 mo for TKA (about 6 mo until possible until scheduled). Gel injection in 2 weeks  He was NOT compliant with home exercise program.  Response to previous treatment: minimally sore  Functional change: ongoing    Pain:  0/10 (R hip), knee (L>R about a 4)  Location: right knee/hips    OBJECTIVE     Objective Measures updated at progress report unless specified.   UPDATED 1/17/2023    Observation: ambulates with 4WW, decreased gait speed, R Trendelenburg with gait     Posture:  increased hip flexion while ambulating     Lower Extremity Strength  Right LE   Left LE     Quadriceps: 5/5 Quadriceps: 5/5   Hamstrings: 4+/5 Hamstrings: 5/5   Iliospoas: 4+/5 Iliospoas: 4+/5   Hip extension:  5/5 Hip extension: 5/5   Hip abduction: 4/5 Hip abduction: 4/5   Ankle dorsiflexion: 4+/5 Ankle dorsiflexion: 4/5   Ankle plantarflexion: 3+/5  "Ankle plantarflexion: 3+/5      Sensation:intact     BALANCE  Tandem R back: 6 seconds  Tandem L back: 10 seconds  R SLB: 5 seconds (increase in pain)  L SLB: 5 seconds (increase in pain)     5xSTS: 14 seconds without use of hands    Hip ROM  R: 100  L: 100    Knee ROM  R: 0-0-112  L: 0-0- 105      FOTO: see under media section  Limitation Score: 32%    Treatment     Jani received the treatments listed below:      therapeutic exercises to develop strength, endurance, ROM, and flexibility for 60 minutes including:    Reassessment  Nustep L2.5 8 min  Quad/glute sets sets with ball under knee 3" holds x25 bilaterally   SAQ large bolster 3" holds x25 (body weight): NP today  Hooklying clamshells GTB 3x10  Bridges 3x10 3" holds with GTB  Gastroc stretch on incline board 5x15"  Heel raises 3x10  Standing hip abduction 3x10 bilaterally on airex  LAQ 2x15 each 3" hold  Sit to stands 3x10 from hi-lo table w/o use of UE  Hamstring stretch 3x10" bilaterally    manual therapy techniques: Joint mobilizations and Soft tissue Mobilization were applied to the: lower extremities for 0 minutes, including:  Manual stretching to hamstrings bilaterally  Patellar mobilizations grade II in all direction for pain relief    neuromuscular re-education activities to improve: Balance, Coordination, and Posture for 0 minutes. The following activities were included:  Step up onto airex 2x10  Tandem stance air ex balance beam 4x15"    therapeutic activities to improve functional performance for 0  minutes, including:      Patient Education and Home Exercises     Home Exercises Provided and Patient Education Provided     Education provided:   - eccentric control    Written Home Exercises Provided: yes. Exercises were reviewed and Jani was able to demonstrate them prior to the end of the session.  Jani demonstrated good  understanding of the education provided. See EMR under Patient Instructions for exercises provided during therapy " sessions    ASSESSMENT       UPDATED PROGRESS  Patient presents with overall low pain levels of the R hip. He reports his greatest pain is in his knees bilaterally (L>R) which limits his ability to ambulate and perform functional transfers. Increase in pain noted today with single limb activities such as balance and hip abduction. Patient demonstrates improved mobility and strength of R hip. Plans to receive gel injections into his knees in 2 weeks. Plan to continue the entirety of scheduled sessions to continue lower extremity strength and balance reactions    Jani Is progressing well towards his goals.   Pt prognosis is Good.     Pt will continue to benefit from skilled outpatient physical therapy to address the deficits listed in the problem list box on initial evaluation, provide pt/family education and to maximize pt's level of independence in the home and community environment.     Pt's spiritual, cultural and educational needs considered and pt agreeable to plan of care and goals.     Anticipated barriers to physical therapy: compliance with HEP    GOALS: Short Term Goals:  4 weeks progressing   1.Report decreased R hip pain  < / =  6/10  to increase tolerance for transitions met  2. Increase ROM by 10 degrees where limited in order to perform ADLs without difficulty. met  3. Increase strength by 1/3 MMT grade in lower extremities  to increase tolerance for ADL and work activities. met  4. Pt to tolerate HEP to improve ROM and independence with ADL's met     Long Term Goals: 8 weeks progressing  1.Report decreased lower extremity pain < / = 4/10  to increase tolerance for transitions progressing, not met  2.Patient goal: ambulate without AD progressing, not met  3.Increase strength to 4+/5 in  lower extremities  to increase tolerance for ADL and work activities. progressing, not met  4. Pt will report at CJ level (20-40% impaired) on LEFS  to demonstrate increase in LE function with every day tasks.  met    PLAN     Continue with hip and knee strengthening activities per tolerance    Laura Webber, PT

## 2023-01-18 NOTE — PROGRESS NOTES
"[facility]   Physical Therapy Treatment Note     Name: Jani PAIZ [facility] Number: [id number]    Therapy Diagnosis:   Encounter Diagnoses   Name Primary?    Hip pain Yes    Chronic pain of both knees     Decreased strength, endurance, and mobility        Physician: Kobi Mauricio NP    Visit Date: 1/19/2023    Physician Orders: PT Eval and Treat   Medical Diagnosis from Referral:   S72.001A (ICD-10-CM) - Closed displaced fracture of right femoral neck   M17.0 (ICD-10-CM) - Arthritis of both knees      Evaluation Date: 12/8/2022  Authorization Period Expiration: 12/6/23  Plan of Care Expiration: 2/2/23  Progress Note Due: updated 1/17/23   Visit # / Visits authorized: 2/20 (visit 6 total)  FOTO: 2/3     Precautions: Standard, Diabetes, Weightbearing, and HTN  WBAT and ROM AT    PTA Visit #: 0/5     Time In: 5:08  Time Out: 6:05  Total Billable Time: 57 minutes (1:1 with PT)    SUBJECTIVE     Pt reports: low bone density shown following DEXA scan today- going to receive injections 2x/yr. No change in knee pain. Awaiting insurance to approve injections  He was NOT compliant with home exercise program.  Response to previous treatment: minimally sore  Functional change: ongoing    Pain:  0/10 (R hip), knee (L>R about a 4)  Location: right knee/hips    OBJECTIVE     Objective Measures updated at progress report unless specified.   UPDATED 1/17/2023    Treatment     Jani received the treatments listed below:      therapeutic exercises to develop strength, endurance, ROM, and flexibility for 60 minutes including:    Nustep L2.5 8 min  Quad/glute sets sets with ball under knee 3" holds x25 bilaterally   SAQ large bolster 3" holds x25 (body weight): NP today  Hooklying clamshells BTB 3x10  +hooklying adduction w/ ball 3" x25  Bridges 3x10 3" holds with BTB  Gastroc stretch on incline board 3x20"  Heel raises 3x10  Standing hip abduction 3x10 bilaterally on airex  LAQ 2x10 each 3" " "hold  Sit to stands 3x10 from hi-lo table w/o use of UE  +TKE red theraband 3x10 each leg  Quad stretch 3x10"  Hamstring stretch 3x10" bilaterally  +step up on 4" stair alternating x10 no HHA (pain in L knee during stance)    manual therapy techniques: Joint mobilizations and Soft tissue Mobilization were applied to the: lower extremities for 0 minutes, including:  Manual stretching to hamstrings bilaterally  Patellar mobilizations grade II in all direction for pain relief    neuromuscular re-education activities to improve: Balance, Coordination, and Posture for 0 minutes. The following activities were included:  Step up onto airex 2x10  Tandem stance air ex balance beam 4x15"    therapeutic activities to improve functional performance for 0  minutes, including:      Patient Education and Home Exercises     Home Exercises Provided and Patient Education Provided     Education provided:   Continuation of HEP    Written Home Exercises Provided: yes. Exercises were reviewed and Jani was able to demonstrate them prior to the end of the session.  Jani demonstrated good  understanding of the education provided. See EMR under Patient Instructions for exercises provided during therapy sessions    ASSESSMENT     Patient presents today following a full day of doctor's appt, so he is more tired than usual. States he is also having to take care of his wife following a hospitalization due to a fall. Patient was able to tolerate additional therex to improve quad control without adverse effect. Patient still reports moderate increase in pain during single limb stance such as step taps today. Tolerated progression of resistance with hook-lying activities without increase in pain.     Jani Is progressing well towards his goals.   Pt prognosis is Good.     Pt will continue to benefit from skilled outpatient physical therapy to address the deficits listed in the problem list box on initial evaluation, provide pt/family education " and to maximize pt's level of independence in the home and community environment.     Pt's spiritual, cultural and educational needs considered and pt agreeable to plan of care and goals.     Anticipated barriers to physical therapy: compliance with HEP    GOALS: Short Term Goals:  4 weeks progressing   1.Report decreased R hip pain  < / =  6/10  to increase tolerance for transitions met  2. Increase ROM by 10 degrees where limited in order to perform ADLs without difficulty. met  3. Increase strength by 1/3 MMT grade in lower extremities  to increase tolerance for ADL and work activities. met  4. Pt to tolerate HEP to improve ROM and independence with ADL's met     Long Term Goals: 8 weeks progressing  1.Report decreased lower extremity pain < / = 4/10  to increase tolerance for transitions progressing, not met  2.Patient goal: ambulate without AD progressing, not met  3.Increase strength to 4+/5 in  lower extremities  to increase tolerance for ADL and work activities. progressing, not met  4. Pt will report at CJ level (20-40% impaired) on LEFS  to demonstrate increase in LE function with every day tasks. met    PLAN     Continue with hip and knee strengthening activities per tolerance    Luara Webber, PT

## 2023-01-19 ENCOUNTER — CLINICAL SUPPORT (OUTPATIENT)
Dept: REHABILITATION | Facility: HOSPITAL | Age: 78
End: 2023-01-19
Payer: MEDICARE

## 2023-01-19 ENCOUNTER — HOSPITAL ENCOUNTER (OUTPATIENT)
Dept: RADIOLOGY | Facility: CLINIC | Age: 78
Discharge: HOME | End: 2023-01-19
Attending: PHYSICIAN ASSISTANT
Payer: MEDICARE

## 2023-01-19 ENCOUNTER — OFFICE VISIT (OUTPATIENT)
Dept: ORTHOPEDICS | Facility: CLINIC | Age: 78
End: 2023-01-19
Payer: MEDICARE

## 2023-01-19 DIAGNOSIS — Z74.09 DECREASED STRENGTH, ENDURANCE, AND MOBILITY: ICD-10-CM

## 2023-01-19 DIAGNOSIS — M81.0 OSTEOPOROSIS, UNSPECIFIED OSTEOPOROSIS TYPE, UNSPECIFIED PATHOLOGICAL FRACTURE PRESENCE: ICD-10-CM

## 2023-01-19 DIAGNOSIS — R68.89 DECREASED STRENGTH, ENDURANCE, AND MOBILITY: ICD-10-CM

## 2023-01-19 DIAGNOSIS — E11.65 TYPE 2 DIABETES MELLITUS WITH HYPERGLYCEMIA, WITHOUT LONG-TERM CURRENT USE OF INSULIN: ICD-10-CM

## 2023-01-19 DIAGNOSIS — M80.80XA PATHOLOGICAL FRACTURE DUE TO OSTEOPOROSIS, UNSPECIFIED FRACTURE SITE, UNSPECIFIED OSTEOPOROSIS TYPE, INITIAL ENCOUNTER: ICD-10-CM

## 2023-01-19 DIAGNOSIS — M81.0 OSTEOPOROSIS, UNSPECIFIED OSTEOPOROSIS TYPE, UNSPECIFIED PATHOLOGICAL FRACTURE PRESENCE: Primary | ICD-10-CM

## 2023-01-19 DIAGNOSIS — G89.29 CHRONIC PAIN OF BOTH KNEES: ICD-10-CM

## 2023-01-19 DIAGNOSIS — M25.559 HIP PAIN: Primary | ICD-10-CM

## 2023-01-19 DIAGNOSIS — M25.561 CHRONIC PAIN OF BOTH KNEES: ICD-10-CM

## 2023-01-19 DIAGNOSIS — M81.6 LOCALIZED OSTEOPOROSIS OF LEQUESNE: ICD-10-CM

## 2023-01-19 DIAGNOSIS — R53.1 DECREASED STRENGTH, ENDURANCE, AND MOBILITY: ICD-10-CM

## 2023-01-19 DIAGNOSIS — M25.562 CHRONIC PAIN OF BOTH KNEES: ICD-10-CM

## 2023-01-19 DIAGNOSIS — K21.9 GASTROESOPHAGEAL REFLUX DISEASE, UNSPECIFIED WHETHER ESOPHAGITIS PRESENT: ICD-10-CM

## 2023-01-19 PROCEDURE — 77080 DEXA BONE DENSITY SPINE HIP: ICD-10-PCS | Mod: 26,,, | Performed by: INTERNAL MEDICINE

## 2023-01-19 PROCEDURE — 99214 OFFICE O/P EST MOD 30 MIN: CPT | Mod: S$PBB,24,, | Performed by: PHYSICIAN ASSISTANT

## 2023-01-19 PROCEDURE — 99999 PR PBB SHADOW E&M-EST. PATIENT-LVL III: ICD-10-PCS | Mod: PBBFAC,,, | Performed by: PHYSICIAN ASSISTANT

## 2023-01-19 PROCEDURE — 99999 PR PBB SHADOW E&M-EST. PATIENT-LVL III: CPT | Mod: PBBFAC,,, | Performed by: PHYSICIAN ASSISTANT

## 2023-01-19 PROCEDURE — 99214 PR OFFICE/OUTPT VISIT, EST, LEVL IV, 30-39 MIN: ICD-10-PCS | Mod: S$PBB,24,, | Performed by: PHYSICIAN ASSISTANT

## 2023-01-19 PROCEDURE — 99213 OFFICE O/P EST LOW 20 MIN: CPT | Mod: PBBFAC,25

## 2023-01-19 PROCEDURE — 77080 DXA BONE DENSITY AXIAL: CPT | Mod: TC

## 2023-01-19 PROCEDURE — 97110 THERAPEUTIC EXERCISES: CPT | Mod: PN

## 2023-01-19 PROCEDURE — 77080 DXA BONE DENSITY AXIAL: CPT | Mod: 26,,, | Performed by: INTERNAL MEDICINE
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
## 2023-01-23 ENCOUNTER — TELEPHONE (OUTPATIENT)
Dept: CARDIOLOGY | Facility: CLINIC | Age: 78
End: 2023-01-23
Payer: MEDICARE

## 2023-01-23 ENCOUNTER — CLINICAL SUPPORT (OUTPATIENT)
Dept: REHABILITATION | Facility: HOSPITAL | Age: 78
End: 2023-01-23
Payer: MEDICARE

## 2023-01-23 DIAGNOSIS — M25.559 HIP PAIN: Primary | ICD-10-CM

## 2023-01-23 DIAGNOSIS — Z74.09 DECREASED STRENGTH, ENDURANCE, AND MOBILITY: ICD-10-CM

## 2023-01-23 DIAGNOSIS — M25.562 CHRONIC PAIN OF BOTH KNEES: ICD-10-CM

## 2023-01-23 DIAGNOSIS — G89.29 CHRONIC PAIN OF BOTH KNEES: ICD-10-CM

## 2023-01-23 DIAGNOSIS — R53.1 DECREASED STRENGTH, ENDURANCE, AND MOBILITY: ICD-10-CM

## 2023-01-23 DIAGNOSIS — M25.561 CHRONIC PAIN OF BOTH KNEES: ICD-10-CM

## 2023-01-23 DIAGNOSIS — R68.89 DECREASED STRENGTH, ENDURANCE, AND MOBILITY: ICD-10-CM

## 2023-01-23 PROCEDURE — 97110 THERAPEUTIC EXERCISES: CPT | Mod: PN,CQ

## 2023-01-23 NOTE — PROGRESS NOTES
"[facility]   Physical Therapy Treatment Note     Name: Jani PAIZ [facility] Number: [id number]    Therapy Diagnosis:   Encounter Diagnoses   Name Primary?    Hip pain Yes    Chronic pain of both knees     Decreased strength, endurance, and mobility          Physician: Kobi Mauricio NP    Visit Date: 1/23/2023    Physician Orders: PT Eval and Treat   Medical Diagnosis from Referral:   S72.001A (ICD-10-CM) - Closed displaced fracture of right femoral neck   M17.0 (ICD-10-CM) - Arthritis of both knees      Evaluation Date: 12/8/2022  Authorization Period Expiration: 12/6/23  Plan of Care Expiration: 2/2/23  Progress Note Due: updated 1/17/23   Visit # / Visits authorized: 3/20 (visit 6 total)  FOTO: 2/3     Precautions: Standard, Diabetes, Weightbearing, and HTN  WBAT and ROM AT    PTA Visit #: 1/5     Time In: 5:15  Time Out: 6:14  Total Billable Time: 59 minutes (1:1 with PT)    SUBJECTIVE     Pt reports: " My hip is doing fine, but my knees are no good."  He was NOT compliant with home exercise program.  Response to previous treatment: minimally sore  Functional change: ongoing    Pain:  0/10 (R hip), knee (L>R about a 4)  Location: right knee/hips    OBJECTIVE     Objective Measures updated at progress report unless specified.   UPDATED 1/17/2023    Treatment     Jani received the treatments listed below:      therapeutic exercises to develop strength, endurance, ROM, and flexibility for 59 minutes including:    Nustep L2.5 8 min  Quad/glute sets sets with ball under knee 3" holds x30 bilaterally   SAQ large bolster 3" holds x25 (body weight)  Hooklying clamshells BTB 3x10  hooklying adduction w/ ball 3" x30  Bridges 3x10 3" holds with BTB  Gastroc stretch on incline board 3x20"  Heel raises 3x10  Standing hip abduction 3x10 bilaterally on airex  LAQ 2x10 each 3" hold  Sit to stands 3x10 from hi-lo table w/o use of UE  +TKE red theraband 3x10 each leg  Quad stretch " "3x10"  Hamstring stretch 3x10" bilaterally  +step up on 4" stair alternating x10 no HHA (pain in L knee during stance)    manual therapy techniques: Joint mobilizations and Soft tissue Mobilization were applied to the: lower extremities for 0 minutes, including:  Manual stretching to hamstrings bilaterally  Patellar mobilizations grade II in all direction for pain relief    neuromuscular re-education activities to improve: Balance, Coordination, and Posture for 0 minutes. The following activities were included:  Step up onto airex 2x10  Tandem stance air ex balance beam 4x15"    therapeutic activities to improve functional performance for 0  minutes, including:      Patient Education and Home Exercises     Home Exercises Provided and Patient Education Provided     Education provided:   Continuation of HEP    Written Home Exercises Provided: yes. Exercises were reviewed and Jani was able to demonstrate them prior to the end of the session.  Jani demonstrated good  understanding of the education provided. See EMR under Patient Instructions for exercises provided during therapy sessions    ASSESSMENT     Patient presents to therapy with no complaints of hip pain, however does have increased knee pain that prevents patient from daily activities. Patient continues with exercises to increase hip strength for better stability and dynamic control. Patient demonstrates no limitations within hip, but has increased pain in knees throughout session, especially with hip abduction exercises. Patient demonstrates minimal muscle fatigue. Plan to continue progression hip strength as tolerated.     Jani Is progressing well towards his goals.   Pt prognosis is Good.     Pt will continue to benefit from skilled outpatient physical therapy to address the deficits listed in the problem list box on initial evaluation, provide pt/family education and to maximize pt's level of independence in the home and community environment.     Pt's " spiritual, cultural and educational needs considered and pt agreeable to plan of care and goals.     Anticipated barriers to physical therapy: compliance with HEP    GOALS: Short Term Goals:  4 weeks progressing   1.Report decreased R hip pain  < / =  6/10  to increase tolerance for transitions met  2. Increase ROM by 10 degrees where limited in order to perform ADLs without difficulty. met  3. Increase strength by 1/3 MMT grade in lower extremities  to increase tolerance for ADL and work activities. met  4. Pt to tolerate HEP to improve ROM and independence with ADL's met     Long Term Goals: 8 weeks progressing  1.Report decreased lower extremity pain < / = 4/10  to increase tolerance for transitions progressing, not met  2.Patient goal: ambulate without AD progressing, not met  3.Increase strength to 4+/5 in  lower extremities  to increase tolerance for ADL and work activities. progressing, not met  4. Pt will report at CJ level (20-40% impaired) on LEFS  to demonstrate increase in LE function with every day tasks. met    PLAN     Continue with hip and knee strengthening activities per tolerance    Jessica Bean, PTA

## 2023-01-23 NOTE — TELEPHONE ENCOUNTER
CINDY,    Patient called regarding his request for Diovan HCT refill.  Patient notified that his Diovan HCT was discontinued [id number] due to low BP when he was in the hospital.    Patient also notified that at his office visit 11/25/2022 with Dr. Oliveira, his medicine card indicated that he was not on that medication.      Patient has not kept a BP log and I asked him to start keeping a log starting tomorrow and I will follow up with him next week for his BP readings.      Patient denies having any issues but is just looking for medication refill.

## 2023-01-23 NOTE — TELEPHONE ENCOUNTER
----- Message from Kiara Smith sent at 1/23/2023 11:49 AM CST -----  Regarding: refill  The pt is calling to get a refill on his Valsartan, but it's not on his list. Please call him back @ [phone]. Thanks, Kiara

## 2023-01-24 ENCOUNTER — DOCUMENT SCAN (OUTPATIENT)
Dept: HOME HEALTH SERVICES | Facility: HOSPITAL | Age: 78
End: 2023-01-24
Payer: MEDICARE

## 2023-01-25 ENCOUNTER — TELEPHONE (OUTPATIENT)
Dept: PHARMACY | Facility: CLINIC | Age: 78
End: 2023-01-25
Payer: MEDICARE

## 2023-01-25 NOTE — TELEPHONE ENCOUNTER
Hello,       A Patient Assistance Application for Jani Cha - MRN  [id number] was faxed to your office @[phone].  Please have Dr. Malcolm Kent review the application to ensure the prescription is correct. If correct, sign and fax the application back to the Pharmacy Patient Assistance Team @[phone].      If changes need to be made to this application, please let me know so corrections can be made, and the application will be faxed back to you for approval and signature.         Thank you,    Bozena Abdi

## 2023-01-25 NOTE — TELEPHONE ENCOUNTER
Jani Cha has provided the necessary documents today to begin the enrollment process into the [employer] (Trulicity 1.5mg)program. The prescription portion of the application has been sent to the providers office for approval and signature.

## 2023-01-25 NOTE — PROGRESS NOTES
"[facility]   Physical Therapy Treatment Note     Name: Jani PAIZ [facility] Number: [id number]    Therapy Diagnosis:   Encounter Diagnoses   Name Primary?    Hip pain Yes    Chronic pain of both knees     Decreased strength, endurance, and mobility        Physician: Kobi Mauricio NP    Visit Date: 1/26/2023    Physician Orders: PT Eval and Treat   Medical Diagnosis from Referral:   S72.001A (ICD-10-CM) - Closed displaced fracture of right femoral neck   M17.0 (ICD-10-CM) - Arthritis of both knees      Evaluation Date: 12/8/2022  Authorization Period Expiration: 12/6/23  Plan of Care Expiration: 2/2/23  Progress Note Due: updated 1/17/23   Visit # / Visits authorized: 4/20 (visit 8 total)  FOTO: 2/3     Precautions: Standard, Diabetes, Weightbearing, and HTN  WBAT and ROM AT    PTA Visit #: 1/5     Time In: 5:20  Time Out: 6:13  Total Billable Time: 57 minutes    SUBJECTIVE     Pt reports: continues to have knee pain (L>R). Scheduled for gel injections beginning of February (2/1)  He was NOT compliant with home exercise program.  Response to previous treatment: minimally sore  Functional change: ongoing    Pain:  0/10 (R hip), knee (L>R about a 4)  Location: right knee/hips    OBJECTIVE     Objective Measures updated at progress report unless specified.   UPDATED 1/17/2023    Treatment     Jani received the treatments listed below:      therapeutic exercises to develop strength, endurance, ROM, and flexibility for 59 minutes including:    Nustep L2 8 min  Quad/glute sets sets with ball under knee 3" holds x30 bilaterally   SAQ large bolster 3" holds x25 2#  Hooklying clamshells BTB 3x10  hooklying adduction w/ ball 3" x30  Bridges 3x10 3" holds with BTB  Gastroc stretch on incline board 3x20"  Heel raises 3x10  Standing hip abduction 3x10 bilaterally on airex   LAQ 2x10 each 3" hold  Sit to stands 2x10 from hi-lo table w/o use of UE, x10 with air ex under feet  TKE purple " "theraband 3x10 each leg  Quad stretch 3x10"  Hamstring stretch 3x10" bilaterally  +step up on 4" stair alternating x10 no HHA (pain in L knee during stance)    manual therapy techniques: Joint mobilizations and Soft tissue Mobilization were applied to the: lower extremities for 0 minutes, including:  Manual stretching to hamstrings bilaterally  Patellar mobilizations grade II in all direction for pain relief    neuromuscular re-education activities to improve: Balance, Coordination, and Posture for 0 minutes. The following activities were included:  Step up onto airex 2x10  Tandem stance air ex balance beam 4x15"    therapeutic activities to improve functional performance for 0  minutes, including:      Patient Education and Home Exercises     Home Exercises Provided and Patient Education Provided     Education provided:   Continuation of HEP    Written Home Exercises Provided: yes. Exercises were reviewed and Jani was able to demonstrate them prior to the end of the session.  Jani demonstrated good  understanding of the education provided. See EMR under Patient Instructions for exercises provided during therapy sessions    ASSESSMENT     Patient continues to present to PT with c/o bilateral knee pain L>R. Patient was able to tolerate progression of sit to stands on a dynamic surface, but reported fatigue following. Patient reports greater difficulty performing hip abduction with L as stance leg due to increased weightbearing through L LE. Encouraged performing with step tap, but patient declined. Plan to continue quad and hip abductor strength.    Jani Is progressing well towards his goals.   Pt prognosis is Good.     Pt will continue to benefit from skilled outpatient physical therapy to address the deficits listed in the problem list box on initial evaluation, provide pt/family education and to maximize pt's level of independence in the home and community environment.     Pt's spiritual, cultural and " educational needs considered and pt agreeable to plan of care and goals.     Anticipated barriers to physical therapy: compliance with HEP    GOALS: Short Term Goals:  4 weeks progressing   1.Report decreased R hip pain  < / =  6/10  to increase tolerance for transitions met  2. Increase ROM by 10 degrees where limited in order to perform ADLs without difficulty. met  3. Increase strength by 1/3 MMT grade in lower extremities  to increase tolerance for ADL and work activities. met  4. Pt to tolerate HEP to improve ROM and independence with ADL's met     Long Term Goals: 8 weeks progressing  1.Report decreased lower extremity pain < / = 4/10  to increase tolerance for transitions progressing, not met  2.Patient goal: ambulate without AD progressing, not met  3.Increase strength to 4+/5 in  lower extremities  to increase tolerance for ADL and work activities. progressing, not met  4. Pt will report at CJ level (20-40% impaired) on LEFS  to demonstrate increase in LE function with every day tasks. met    PLAN     Continue with hip and knee strengthening activities per tolerance. Progress weight bearing activities as tolerated     Laura Webber, PT

## 2023-01-26 ENCOUNTER — CLINICAL SUPPORT (OUTPATIENT)
Dept: REHABILITATION | Facility: HOSPITAL | Age: 78
End: 2023-01-26
Payer: MEDICARE

## 2023-01-26 DIAGNOSIS — M25.561 CHRONIC PAIN OF BOTH KNEES: ICD-10-CM

## 2023-01-26 DIAGNOSIS — R68.89 DECREASED STRENGTH, ENDURANCE, AND MOBILITY: ICD-10-CM

## 2023-01-26 DIAGNOSIS — M25.562 CHRONIC PAIN OF BOTH KNEES: ICD-10-CM

## 2023-01-26 DIAGNOSIS — R53.1 DECREASED STRENGTH, ENDURANCE, AND MOBILITY: ICD-10-CM

## 2023-01-26 DIAGNOSIS — I10 PRIMARY HYPERTENSION: Primary | ICD-10-CM

## 2023-01-26 DIAGNOSIS — G89.29 CHRONIC PAIN OF BOTH KNEES: ICD-10-CM

## 2023-01-26 DIAGNOSIS — I27.20 PULMONARY HTN: ICD-10-CM

## 2023-01-26 DIAGNOSIS — Z74.09 DECREASED STRENGTH, ENDURANCE, AND MOBILITY: ICD-10-CM

## 2023-01-26 DIAGNOSIS — M25.559 HIP PAIN: Primary | ICD-10-CM

## 2023-01-26 PROCEDURE — 97110 THERAPEUTIC EXERCISES: CPT | Mod: PN

## 2023-01-27 NOTE — TELEPHONE ENCOUNTER
Patient is scheduled for lab work on Wed 2/1 and an echo 2/2.  Patient and wife notified.    Patient stated he has been keeping a BP log since 1/24 and has not taken any BP medication since 1/24

## 2023-01-30 PROBLEM — B96.20 E. COLI URINARY TRACT INFECTION: Status: RESOLVED | Noted: 2022-10-21 | Resolved: 2023-01-30

## 2023-01-30 PROBLEM — N39.0 E. COLI URINARY TRACT INFECTION: Status: RESOLVED | Noted: 2022-10-21 | Resolved: 2023-01-30

## 2023-01-30 NOTE — PROGRESS NOTES
"[facility]   Physical Therapy Treatment Note     Name: Jani PAIZ [facility] Number: [id number]    Therapy Diagnosis:   Encounter Diagnoses   Name Primary?    Hip pain Yes    Chronic pain of both knees     Decreased strength, endurance, and mobility        Physician: Kobi Mauircio NP    Visit Date: 1/31/2023    Physician Orders: PT Eval and Treat   Medical Diagnosis from Referral:   S72.001A (ICD-10-CM) - Closed displaced fracture of right femoral neck   M17.0 (ICD-10-CM) - Arthritis of both knees      Evaluation Date: 12/8/2022  Authorization Period Expiration: 12/6/23  Plan of Care Expiration: 2/2/23  Progress Note Due: updated 1/17/23   Visit # / Visits authorized: 5/20 (visit 9 total)  FOTO: 2/3     Precautions: Standard, Diabetes, Weightbearing, and HTN  WBAT and ROM AT    PTA Visit #: 1/5     Time In: 5:18  Time Out: 6:13  Total Billable Time: 35 minutes (1:1 with PT)    SUBJECTIVE     Pt reports: constant knee pain that is becoming unbearable. He is scheduled for injections tomorrow (2/1) and hoping for some pain relief  He was NOT compliant with home exercise program.  Response to previous treatment: minimally sore  Functional change: ongoing    Pain:  0/10 (R hip), knee (L>R about a 4)  Location: right knee/hips    OBJECTIVE     Objective Measures updated at progress report unless specified.   UPDATED 1/17/2023    Treatment     Jani received the treatments listed below:      therapeutic exercises to develop strength, endurance, ROM, and flexibility for 55 minutes including:    Nustep L2 8 min  Quad/glute sets sets with ball under knee 3" holds x25 bilaterally   SAQ large bolster 3" holds x25 2# each leg  Hooklying clamshells BTB 3x10  Hooklying adduction w/ ball 3" x30  Bridges 3x10 3" holds with BTB  Gastroc stretch on incline board 3x20"  Heel raises 3x10  Standing hip abduction 3x10 bilaterally on airex   LAQ 2x10 each 3" hold  +Seated knee flexion RTB 2x10 each  Sit " "to stands 2x10 from hi-lo table w/o use of UE  TKE purple theraband 3x10 each leg  Quad stretch 3x10"  Hamstring stretch 3x10" bilaterally  +step up on 4" stair alternating x10 no HHA (pain in L knee during stance)    manual therapy techniques: Joint mobilizations and Soft tissue Mobilization were applied to the: lower extremities for 0 minutes, including:  Manual stretching to hamstrings bilaterally  Patellar mobilizations grade II in all direction for pain relief    neuromuscular re-education activities to improve: Balance, Coordination, and Posture for 0 minutes. The following activities were included:  Step up onto airex 2x10  Tandem stance air ex balance beam 4x15"    therapeutic activities to improve functional performance for 0  minutes, including:      Patient Education and Home Exercises     Home Exercises Provided and Patient Education Provided     Education provided:   Continuation of HEP    Written Home Exercises Provided: yes. Exercises were reviewed and Jani was able to demonstrate them prior to the end of the session.  Jani demonstrated good  understanding of the education provided. See EMR under Patient Instructions for exercises provided during therapy sessions    ASSESSMENT     Patient presents with increased knee pain today, reporting he almost didn't come to therapy. He is scheduled for injections tomorrow with hopes of pain relief. Majority on session completed in non-weight bearing positions due to high pain levels. Patient was able to tolerate addition of hamstring strengthening without increase in pain. Plan to reassess pain levels next session following injections. Progress weight bearing per tolerance and adjust HEP pending potential discharge    Jani Is progressing well towards his goals.   Pt prognosis is Good.     Pt will continue to benefit from skilled outpatient physical therapy to address the deficits listed in the problem list box on initial evaluation, provide pt/family " education and to maximize pt's level of independence in the home and community environment.     Pt's spiritual, cultural and educational needs considered and pt agreeable to plan of care and goals.     Anticipated barriers to physical therapy: compliance with HEP    GOALS: Short Term Goals:  4 weeks progressing   1.Report decreased R hip pain  < / =  6/10  to increase tolerance for transitions met  2. Increase ROM by 10 degrees where limited in order to perform ADLs without difficulty. met  3. Increase strength by 1/3 MMT grade in lower extremities  to increase tolerance for ADL and work activities. met  4. Pt to tolerate HEP to improve ROM and independence with ADL's met     Long Term Goals: 8 weeks progressing  1.Report decreased lower extremity pain < / = 4/10  to increase tolerance for transitions progressing, not met  2.Patient goal: ambulate without AD progressing, not met  3.Increase strength to 4+/5 in  lower extremities  to increase tolerance for ADL and work activities. progressing, not met  4. Pt will report at CJ level (20-40% impaired) on LEFS  to demonstrate increase in LE function with every day tasks. met    PLAN     Continue with hip and knee strengthening activities per tolerance. Progress weight bearing activities as tolerated     Laura Webber, PT

## 2023-01-31 ENCOUNTER — EXTERNAL CHRONIC CARE MANAGEMENT (OUTPATIENT)
Dept: PRIMARY CARE CLINIC | Facility: CLINIC | Age: 78
End: 2023-01-31
Payer: MEDICARE

## 2023-01-31 ENCOUNTER — CLINICAL SUPPORT (OUTPATIENT)
Dept: REHABILITATION | Facility: HOSPITAL | Age: 78
End: 2023-01-31
Payer: MEDICARE

## 2023-01-31 DIAGNOSIS — R68.89 DECREASED STRENGTH, ENDURANCE, AND MOBILITY: ICD-10-CM

## 2023-01-31 DIAGNOSIS — M25.561 CHRONIC PAIN OF BOTH KNEES: ICD-10-CM

## 2023-01-31 DIAGNOSIS — R53.1 DECREASED STRENGTH, ENDURANCE, AND MOBILITY: ICD-10-CM

## 2023-01-31 DIAGNOSIS — Z74.09 DECREASED STRENGTH, ENDURANCE, AND MOBILITY: ICD-10-CM

## 2023-01-31 DIAGNOSIS — M25.562 CHRONIC PAIN OF BOTH KNEES: ICD-10-CM

## 2023-01-31 DIAGNOSIS — G89.29 CHRONIC PAIN OF BOTH KNEES: ICD-10-CM

## 2023-01-31 DIAGNOSIS — M25.559 HIP PAIN: Primary | ICD-10-CM

## 2023-01-31 PROCEDURE — 99490 CHRNC CARE MGMT STAFF 1ST 20: CPT | Mod: S$PBB,,, | Performed by: FAMILY MEDICINE

## 2023-01-31 PROCEDURE — 97110 THERAPEUTIC EXERCISES: CPT | Mod: PN

## 2023-01-31 PROCEDURE — 99439 CHRNC CARE MGMT STAF EA ADDL: CPT | Mod: PBBFAC,PO | Performed by: FAMILY MEDICINE

## 2023-01-31 PROCEDURE — 99490 PR CHRONIC CARE MGMT, 1ST 20 MIN: ICD-10-PCS | Mod: S$PBB,,, | Performed by: FAMILY MEDICINE

## 2023-01-31 PROCEDURE — 99490 CHRNC CARE MGMT STAFF 1ST 20: CPT | Mod: PBBFAC,PO | Performed by: FAMILY MEDICINE

## 2023-01-31 PROCEDURE — 99439 PR CHRONIC CARE MGMT, EA ADDTL 20 MIN: ICD-10-PCS | Mod: S$PBB,,, | Performed by: FAMILY MEDICINE

## 2023-01-31 PROCEDURE — 99439 CHRNC CARE MGMT STAF EA ADDL: CPT | Mod: S$PBB,,, | Performed by: FAMILY MEDICINE

## 2023-02-01 ENCOUNTER — LAB VISIT (OUTPATIENT)
Dept: LAB | Facility: HOSPITAL | Age: 78
End: 2023-02-01
Attending: INTERNAL MEDICINE
Payer: MEDICARE

## 2023-02-01 ENCOUNTER — OFFICE VISIT (OUTPATIENT)
Dept: ORTHOPEDICS | Facility: CLINIC | Age: 78
End: 2023-02-01
Payer: MEDICARE

## 2023-02-01 VITALS — WEIGHT: 237.19 LBS | BODY MASS INDEX: 33.96 KG/M2 | HEIGHT: 70 IN

## 2023-02-01 DIAGNOSIS — M17.0 OSTEOARTHRITIS OF BOTH KNEES, UNSPECIFIED OSTEOARTHRITIS TYPE: Primary | ICD-10-CM

## 2023-02-01 DIAGNOSIS — I27.20 PULMONARY HTN: ICD-10-CM

## 2023-02-01 DIAGNOSIS — I10 PRIMARY HYPERTENSION: ICD-10-CM

## 2023-02-01 LAB
ANION GAP SERPL CALC-SCNC: 10 MMOL/L (ref 8–16)
BNP SERPL-MCNC: 87 PG/ML (ref 0–99)
BUN SERPL-MCNC: 32 MG/DL (ref 8–23)
CALCIUM SERPL-MCNC: 9.6 MG/DL (ref 8.7–10.5)
CHLORIDE SERPL-SCNC: 108 MMOL/L (ref 95–110)
CO2 SERPL-SCNC: 23 MMOL/L (ref 23–29)
CREAT SERPL-MCNC: 1.5 MG/DL (ref 0.5–1.4)
EST. GFR  (NO RACE VARIABLE): 48 ML/MIN/1.73 M^2
GLUCOSE SERPL-MCNC: 77 MG/DL (ref 70–110)
POTASSIUM SERPL-SCNC: 4 MMOL/L (ref 3.5–5.1)
SODIUM SERPL-SCNC: 141 MMOL/L (ref 136–145)

## 2023-02-01 PROCEDURE — 99212 PR OFFICE/OUTPT VISIT, EST, LEVL II, 10-19 MIN: ICD-10-PCS | Mod: S$PBB,25,, | Performed by: NURSE PRACTITIONER

## 2023-02-01 PROCEDURE — 20610 DRAIN/INJ JOINT/BURSA W/O US: CPT | Mod: 50,S$PBB,, | Performed by: NURSE PRACTITIONER

## 2023-02-01 PROCEDURE — 99999 PR PBB SHADOW E&M-EST. PATIENT-LVL III: ICD-10-PCS | Mod: PBBFAC,,, | Performed by: NURSE PRACTITIONER

## 2023-02-01 PROCEDURE — 99999 PR PBB SHADOW E&M-EST. PATIENT-LVL III: CPT | Mod: PBBFAC,,, | Performed by: NURSE PRACTITIONER

## 2023-02-01 PROCEDURE — 99213 OFFICE O/P EST LOW 20 MIN: CPT | Mod: PBBFAC,25 | Performed by: NURSE PRACTITIONER

## 2023-02-01 PROCEDURE — 83880 ASSAY OF NATRIURETIC PEPTIDE: CPT | Performed by: INTERNAL MEDICINE

## 2023-02-01 PROCEDURE — 99212 OFFICE O/P EST SF 10 MIN: CPT | Mod: S$PBB,25,, | Performed by: NURSE PRACTITIONER

## 2023-02-01 PROCEDURE — 80048 BASIC METABOLIC PNL TOTAL CA: CPT | Performed by: INTERNAL MEDICINE

## 2023-02-01 PROCEDURE — 20610 LARGE JOINT ASPIRATION/INJECTION: BILATERAL KNEE: ICD-10-PCS | Mod: 50,S$PBB,, | Performed by: NURSE PRACTITIONER

## 2023-02-01 PROCEDURE — 20610 DRAIN/INJ JOINT/BURSA W/O US: CPT | Mod: 50,PBBFAC | Performed by: NURSE PRACTITIONER

## 2023-02-01 PROCEDURE — 36415 COLL VENOUS BLD VENIPUNCTURE: CPT | Mod: PO | Performed by: INTERNAL MEDICINE

## 2023-02-01 RX ADMIN — Medication 48 MG: at 04:02

## 2023-02-01 NOTE — PROCEDURES
Large Joint Aspiration/Injection: bilateral knee    Date/Time: 2/1/2023 4:00 PM  Performed by: Kobi Mauricio NP  Authorized by: Kobi Mauricio NP     Indications:  Arthritis  Site marked: the procedure site was marked    Timeout: prior to procedure the correct patient, procedure, and site was verified    Prep: patient was prepped and draped in usual sterile fashion      Local anesthesia used?: Yes    Local anesthetic:  Lidocaine spray    Details:  Needle Size:  22 G  Approach:  Anterolateral  Location:  Knee  Laterality:  Bilateral  Site:  Bilateral knee  Medications (Right):  48 mg hylan g-f (SYNVISC ONE) solution 48 mg/6 ml  Medications (Left):  48 mg hylan g-f (SYNVISC ONE) solution 48 mg/6 ml  Patient tolerance:  Patient tolerated the procedure well with no immediate complications

## 2023-02-01 NOTE — PROGRESS NOTES
ICD-10-CM ICD-9-CM   1. Osteoarthritis of both knees, unspecified osteoarthritis type  M17.0 715.96       Jani Cha presents to clinic today for bilateral knee Synvisc-One injection.    Exam demonstrates the no effusion in the  bilateral knee, and the skin is intact.  Patient last seen in the clinic on 1-17-23.  No change from his assessment from this visit.      Diagnosis: osteoarthritis knee    Please see procedure note.    We will see Jani Cha back PRN.

## 2023-02-01 NOTE — PROGRESS NOTES
"[facility]  Physical Therapy Discharge Note    [id number]    Therapy Diagnosis:   Encounter Diagnoses   Name Primary?    Hip pain Yes    Chronic pain of both knees     Decreased strength, endurance, and mobility      Physician: Kobi Mauricio, NP    Physician Orders: PT Eval and Treat  Medical Diagnosis:   S72.001A (ICD-10-CM) - Closed displaced fracture of right femoral neck   M17.0 (ICD-10-CM) - Arthritis of both knees     Evaluation Date: 12/8/22      Date of Last visit: 2/2/23  Total Visits Received: 10    Time In: 5:16  Time Out: 5:57  Total Billable Time: 30 minutes (1:1 with PT)      Treatment     Jani received the treatments listed below:      therapeutic exercises to develop strength, endurance, ROM, and flexibility for 55 minutes including:    Nustep L2 10 min  Quad/glute sets sets with ball under knee 3" holds x30 bilaterally   SAQ large bolster 3" holds x30 each leg  Hooklying clamshells GTB 3x10  Hooklying adduction w/ ball 3" x30  Bridges 3x10 3" holds with BTB  Gastroc stretch on incline board 3x20"  Heel raises 3x10  Standing hip abduction 3x10 bilaterally on airex   LAQ 2x10 each 3" hold  +Seated knee flexion RTB 2x10 each  Sit to stands 2x10 from hi-lo table w/o use of UE  TKE purple theraband 3x10 each leg  Quad stretch 3x10"  Hamstring stretch 3x10" bilaterally  +step up on 4" stair alternating x10 no HHA (pain in L knee during stance)    ASSESSMENT      Patient presents to physical therapy following gel injections to bilateral knees yesterday (2/1/23). He reports he has not experienced any pain relief, but was informed it can take a few days. Limited activity due to doctor's request while gel settles. Patient has met all of his goals for R DARNELL, but limited in therapy at this point due to bilateral knee pain. Plans to assess how gel injections feel and potentially considering TKA if ineffective for pain relief. Patient is in " agreement and eager to be discharged from physical therapy    Discharge reason: Patient has reached the maximum rehab potential for the present time      GOALS: Short Term Goals:  4 weeks  1.Report decreased R hip pain  < / =  6/10  to increase tolerance for transitions met  2. Increase ROM by 10 degrees where limited in order to perform ADLs without difficulty. met  3. Increase strength by 1/3 MMT grade in lower extremities  to increase tolerance for ADL and work activities. met  4. Pt to tolerate HEP to improve ROM and independence with ADL's met     Long Term Goals: 8 weeks   1.Report decreased lower extremity pain < / = 4/10  to increase tolerance for transitions progressing, not met (limited by bilateral knee pain)  2.Patient goal: ambulate without AD. not met- limited due to bilateral knee pain  3.Increase strength to 4+/5 in  lower extremities  to increase tolerance for ADL and work activities. progressing, expected to be met with continuation of HEP  4. Pt will report at CJ level (20-40% impaired) on LEFS  to demonstrate increase in LE function with every day tasks. met    PLAN   This patient is discharged from Physical Therapy    Laura Webber, PT

## 2023-02-02 ENCOUNTER — HOSPITAL ENCOUNTER (OUTPATIENT)
Dept: CARDIOLOGY | Facility: HOSPITAL | Age: 78
Discharge: HOME | End: 2023-02-02
Attending: INTERNAL MEDICINE
Payer: MEDICARE

## 2023-02-02 ENCOUNTER — CLINICAL SUPPORT (OUTPATIENT)
Dept: REHABILITATION | Facility: HOSPITAL | Age: 78
End: 2023-02-02
Payer: MEDICARE

## 2023-02-02 DIAGNOSIS — M25.561 CHRONIC PAIN OF BOTH KNEES: ICD-10-CM

## 2023-02-02 DIAGNOSIS — I10 PRIMARY HYPERTENSION: ICD-10-CM

## 2023-02-02 DIAGNOSIS — R68.89 DECREASED STRENGTH, ENDURANCE, AND MOBILITY: ICD-10-CM

## 2023-02-02 DIAGNOSIS — I27.20 PULMONARY HTN: ICD-10-CM

## 2023-02-02 DIAGNOSIS — R53.1 DECREASED STRENGTH, ENDURANCE, AND MOBILITY: ICD-10-CM

## 2023-02-02 DIAGNOSIS — M25.562 CHRONIC PAIN OF BOTH KNEES: ICD-10-CM

## 2023-02-02 DIAGNOSIS — G89.29 CHRONIC PAIN OF BOTH KNEES: ICD-10-CM

## 2023-02-02 DIAGNOSIS — M25.559 HIP PAIN: Primary | ICD-10-CM

## 2023-02-02 DIAGNOSIS — Z74.09 DECREASED STRENGTH, ENDURANCE, AND MOBILITY: ICD-10-CM

## 2023-02-02 LAB
ASCENDING AORTA: 3.45 CM
AV PEAK GRADIENT: 6 MMHG
AV VELOCITY RATIO: 0.63
CV ECHO LV RWT: 0.43 CM
DOP CALC AO PEAK VEL: 1.27 M/S
DOP CALC LVOT AREA: 3.2 CM2
DOP CALC LVOT DIAMETER: 2.01 CM
DOP CALC LVOT PEAK VEL: 0.8 M/S
DOP CALC LVOT STROKE VOLUME: 52.96 CM3
DOP CALCLVOT PEAK VEL VTI: 16.7 CM
E WAVE DECELERATION TIME: 268.91 MSEC
E/A RATIO: 0.91
E/E' RATIO: 8.27 M/S
ECHO LV POSTERIOR WALL: 1.25 CM (ref 0.6–1.1)
EJECTION FRACTION: 50 %
FRACTIONAL SHORTENING: 27 % (ref 28–44)
INTERVENTRICULAR SEPTUM: 1.32 CM (ref 0.6–1.1)
IVC DIAMETER: 1.35 CM
IVRT: 91.7 MSEC
LA MAJOR: 5.84 CM
LA MINOR: 6.23 CM
LA WIDTH: 5.4 CM
LEFT ATRIUM SIZE: 5.07 CM
LEFT ATRIUM VOLUME: 140.3 CM3
LEFT INTERNAL DIMENSION IN SYSTOLE: 4.24 CM (ref 2.1–4)
LEFT VENTRICLE DIASTOLIC VOLUME: 166.27 ML
LEFT VENTRICLE SYSTOLIC VOLUME: 80.54 ML
LEFT VENTRICULAR INTERNAL DIMENSION IN DIASTOLE: 5.8 CM (ref 3.5–6)
LEFT VENTRICULAR MASS: 326.13 G
LV LATERAL E/E' RATIO: 7.75 M/S
LV SEPTAL E/E' RATIO: 8.86 M/S
LVOT MG: 1.35 MMHG
LVOT MV: 0.55 CM/S
MV PEAK A VEL: 0.68 M/S
MV PEAK E VEL: 0.62 M/S
MV STENOSIS PRESSURE HALF TIME: 77.98 MS
MV VALVE AREA P 1/2 METHOD: 2.82 CM2
PISA TR MAX VEL: 1.36 M/S
PV PEAK VELOCITY: 1.1 CM/S
RA MAJOR: 5.49 CM
RA PRESSURE: 3 MMHG
RA WIDTH: 4.2 CM
RIGHT VENTRICULAR END-DIASTOLIC DIMENSION: 4.2 CM
SINUS: 4.3 CM
STJ: 2.75 CM
TDI LATERAL: 0.08 M/S
TDI SEPTAL: 0.07 M/S
TDI: 0.08 M/S
TR MAX PG: 7 MMHG
TRICUSPID ANNULAR PLANE SYSTOLIC EXCURSION: 1.7 CM
TV REST PULMONARY ARTERY PRESSURE: 10 MMHG

## 2023-02-02 PROCEDURE — 93306 TTE W/DOPPLER COMPLETE: CPT | Mod: 26,,, | Performed by: INTERNAL MEDICINE

## 2023-02-02 PROCEDURE — 93306 TTE W/DOPPLER COMPLETE: CPT

## 2023-02-02 PROCEDURE — 93306 ECHO (CUPID ONLY): ICD-10-PCS | Mod: 26,,, | Performed by: INTERNAL MEDICINE

## 2023-02-02 PROCEDURE — 97110 THERAPEUTIC EXERCISES: CPT | Mod: PN

## 2023-02-02 NOTE — PROGRESS NOTES
Please contact and inform pt that labs look ok.  Received his BP readings. Waiting on echo for next steps.

## 2023-02-02 NOTE — PROGRESS NOTES
As Dr. Oliveira requested me to do is to inform Mr. Cha about his test results. I called and informed Mr. Cha that his labs look ok.  Received his BP readings. Waiting on echo for next steps.  He verbalized and understood.

## 2023-02-17 DIAGNOSIS — I10 PRIMARY HYPERTENSION: Primary | ICD-10-CM

## 2023-02-17 RX ORDER — VALSARTAN 80 MG/1
80 TABLET ORAL DAILY
Qty: 90 TABLET | Refills: 3 | Status: SHIPPED | OUTPATIENT
Start: 2023-02-17 | End: 2024-02-16

## 2023-02-17 NOTE — PROGRESS NOTES
Please call and inform patient that echo appears stable.  I think we should restart Diovan but at a lower dose and without HCTZ.  I will send in a prescription for diovan 80 mg daily.  CMP in ~ 2 wks

## 2023-02-24 ENCOUNTER — OFFICE VISIT (OUTPATIENT)
Dept: FAMILY MEDICINE | Facility: CLINIC | Age: 78
End: 2023-02-24
Payer: MEDICARE

## 2023-02-24 VITALS
SYSTOLIC BLOOD PRESSURE: 128 MMHG | BODY MASS INDEX: 35.29 KG/M2 | DIASTOLIC BLOOD PRESSURE: 66 MMHG | HEIGHT: 70 IN | OXYGEN SATURATION: 96 % | HEART RATE: 60 BPM | WEIGHT: 246.5 LBS | TEMPERATURE: 98 F

## 2023-02-24 DIAGNOSIS — E08.22 DIABETES MELLITUS DUE TO UNDERLYING CONDITION WITH STAGE 3 CHRONIC KIDNEY DISEASE, WITHOUT LONG-TERM CURRENT USE OF INSULIN, UNSPECIFIED WHETHER STAGE 3A OR 3B CKD: ICD-10-CM

## 2023-02-24 DIAGNOSIS — M79.89 PAIN AND SWELLING OF LEFT LOWER LEG: Primary | ICD-10-CM

## 2023-02-24 DIAGNOSIS — M79.662 PAIN AND SWELLING OF LEFT LOWER LEG: Primary | ICD-10-CM

## 2023-02-24 DIAGNOSIS — J84.10 CALCIFIED GRANULOMA OF LUNG: ICD-10-CM

## 2023-02-24 DIAGNOSIS — E11.40 TYPE 2 DIABETES MELLITUS WITH DIABETIC NEUROPATHY, WITHOUT LONG-TERM CURRENT USE OF INSULIN: ICD-10-CM

## 2023-02-24 DIAGNOSIS — E66.01 CLASS 2 SEVERE OBESITY DUE TO EXCESS CALORIES WITH SERIOUS COMORBIDITY AND BODY MASS INDEX (BMI) OF 37.0 TO 37.9 IN ADULT: ICD-10-CM

## 2023-02-24 DIAGNOSIS — I89.0 LYMPHEDEMA OF BOTH LOWER EXTREMITIES: ICD-10-CM

## 2023-02-24 DIAGNOSIS — B35.6 TINEA CRURIS: ICD-10-CM

## 2023-02-24 DIAGNOSIS — I71.23 ANEURYSM OF DESCENDING THORACIC AORTA WITHOUT RUPTURE: ICD-10-CM

## 2023-02-24 DIAGNOSIS — N18.30 DIABETES MELLITUS DUE TO UNDERLYING CONDITION WITH STAGE 3 CHRONIC KIDNEY DISEASE, WITHOUT LONG-TERM CURRENT USE OF INSULIN, UNSPECIFIED WHETHER STAGE 3A OR 3B CKD: ICD-10-CM

## 2023-02-24 DIAGNOSIS — E27.8 OTHER SPECIFIED DISORDERS OF ADRENAL GLAND: ICD-10-CM

## 2023-02-24 DIAGNOSIS — I50.9 CONGESTIVE HEART FAILURE, UNSPECIFIED HF CHRONICITY, UNSPECIFIED HEART FAILURE TYPE: ICD-10-CM

## 2023-02-24 DIAGNOSIS — I87.303 VENOUS HYPERTENSION OF BOTH LOWER EXTREMITIES: ICD-10-CM

## 2023-02-24 PROCEDURE — 99215 OFFICE O/P EST HI 40 MIN: CPT | Mod: S$PBB,,, | Performed by: FAMILY MEDICINE

## 2023-02-24 PROCEDURE — 99215 OFFICE O/P EST HI 40 MIN: CPT | Mod: PBBFAC,PO | Performed by: FAMILY MEDICINE

## 2023-02-24 PROCEDURE — 99999 PR PBB SHADOW E&M-EST. PATIENT-LVL V: ICD-10-PCS | Mod: PBBFAC,,, | Performed by: FAMILY MEDICINE

## 2023-02-24 PROCEDURE — 99999 PR PBB SHADOW E&M-EST. PATIENT-LVL V: CPT | Mod: PBBFAC,,, | Performed by: FAMILY MEDICINE

## 2023-02-24 PROCEDURE — 99215 PR OFFICE/OUTPT VISIT, EST, LEVL V, 40-54 MIN: ICD-10-PCS | Mod: S$PBB,,, | Performed by: FAMILY MEDICINE

## 2023-02-24 RX ORDER — CLOTRIMAZOLE AND BETAMETHASONE DIPROPIONATE 10; .64 MG/G; MG/G
CREAM TOPICAL 2 TIMES DAILY
Qty: 45 G | Refills: 3 | Status: SHIPPED | OUTPATIENT
Start: 2023-02-24

## 2023-02-24 NOTE — PROGRESS NOTES
"Routine Office Visit    Jani Cha  1945  [id number]      Subjective     Jani is a 77 y.o. male who presents today for:    Diabetes follow-up - Patient continues to follow-up with Dr. Kent. Patient is doing well. A1c has improved with Trulicity    S/p hip surgery - after a fall in October 2022. Patient has completed rehab. He continues to work on rehab exercises at home. He is able to walk with a Rolator.  He continues to follow-up with ortho. He has severe knee pain. He feels his next step is to have resolution of knee pain with knee surgery.   Osteoporosis - Patient to start on prolia   Rash - Patient has groin rash - rash is red and pruritic. Requesting refill on clorimazole.       Objective     Review of Systems   Constitutional:  Negative for chills and fever.   HENT:  Negative for congestion.    Eyes:  Negative for blurred vision.   Respiratory:  Negative for cough.    Cardiovascular:  Negative for chest pain.   Gastrointestinal:  Negative for abdominal pain, constipation, diarrhea, heartburn, nausea and vomiting.   Genitourinary:  Negative for dysuria.   Musculoskeletal:  Negative for myalgias.   Skin:  Negative for itching and rash.   Neurological:  Negative for dizziness and headaches.   Psychiatric/Behavioral:  Negative for depression.      /66   Pulse 60   Temp 97.9 °F (36.6 °C) (Oral)   Ht 5' 10" (1.778 m)   Wt 111.8 kg (246 lb 7.6 oz)   SpO2 96%   BMI 35.37 kg/m²   Physical Exam  Constitutional:       Appearance: He is well-developed.   HENT:      Head: Normocephalic and atraumatic.   Eyes:      Conjunctiva/sclera: Conjunctivae normal.      Pupils: Pupils are equal, round, and reactive to light.   Neck:      Thyroid: No thyromegaly.      Vascular: No JVD.   Cardiovascular:      Rate and Rhythm: Normal rate and regular rhythm.      Heart sounds: Normal heart sounds.   Pulmonary:      Effort: Pulmonary effort is normal.      Breath sounds: Normal breath sounds. No wheezing. "   Abdominal:      General: Bowel sounds are normal. There is no distension.      Palpations: Abdomen is soft.      Tenderness: There is no abdominal tenderness. There is no guarding.   Musculoskeletal:         General: Normal range of motion.      Cervical back: Normal range of motion and neck supple.   Lymphadenopathy:      Cervical: No cervical adenopathy.   Skin:     General: Skin is warm and dry.   Neurological:      Mental Status: He is alert and oriented to person, place, and time.   Psychiatric:         Behavior: Behavior normal.           Assessment     Health Maintenance         Date Due Completion Date    Shingles Vaccine (1 of 2) Never done ---    Eye Exam 10/19/2022 10/19/2021    Lipid Panel 12/06/2022 12/6/2021    Diabetes Urine Screening 02/07/2023 2/7/2022    Hemoglobin A1c 05/10/2023 11/10/2022    DEXA Scan 01/19/2025 1/19/2023    TETANUS VACCINE 02/20/2027 2/20/2017              Problem List Items Addressed This Visit          Pulmonary    Calcified granuloma of lung    Overview     CT 4/12/21  Noted in chart            Cardiac/Vascular    Congestive heart failure, unspecified HF chronicity, unspecified heart failure type    Relevant Orders    Ambulatory referral/consult to Outpatient Case Management  Continue f/u with cardiolog       Thoracic aortic aneurysm without rupture    Overview     4/2020 - Fusiform aneurysmal dilatation of the descending thoracic aorta as well as the infrarenal abdominal aorta    Follow-up with Dr. Echavarria             Endocrine    Type 2 diabetes mellitus with diabetic neuropathy, without long-term current use of insulin (Chronic)    Relevant Orders    Lipid Panel    Microalbumin/Creatinine Ratio, Urine (Completed)    Diabetes mellitus due to underlying condition with stage 3 chronic kidney disease, without long-term current use of insulin, unspecified whether stage 3a or 3b CKD    Relevant Orders    Ambulatory referral/consult to Outpatient Case Management    Other  specified disorders of adrenal gland  Continue f/u with endocrine          Other    Lymphedema of both lower extremities    Relevant Orders    Ambulatory referral/consult to Physical/Occupational Therapy    Ambulatory referral/consult to Outpatient Case Management     Other Visit Diagnoses       Pain and swelling of left lower leg    -  Primary    Relevant Orders    Ambulatory referral/consult to Physical/Occupational Therapy    Venous hypertension of both lower extremities        Relevant Orders    Ambulatory referral/consult to Physical/Occupational Therapy    Class 2 severe obesity due to excess calories with serious comorbidity and body mass index (BMI) of 37.0 to 37.9 in adult      The patient is asked to make an attempt to improve diet and exercise patterns to aid in medical management of this problem.       Tinea cruris        Relevant Medications    clotrimazole-betamethasone 1-0.05% (LOTRISONE) cream                Greater than 45 minutes was spent with this patient with greater than 50% spent with face-to-face counseling    Follow up in about 6 months (around 8/24/2023), or if symptoms worsen or fail to improve, for yearly exam.

## 2023-02-27 RX ORDER — CLOPIDOGREL BISULFATE 75 MG/1
75 TABLET ORAL DAILY
Qty: 90 TABLET | Refills: 3 | Status: SHIPPED | OUTPATIENT
Start: 2023-02-27 | End: 2024-03-12 | Stop reason: SDUPTHER

## 2023-02-28 ENCOUNTER — EXTERNAL CHRONIC CARE MANAGEMENT (OUTPATIENT)
Dept: PRIMARY CARE CLINIC | Facility: CLINIC | Age: 78
End: 2023-02-28
Payer: MEDICARE

## 2023-02-28 PROCEDURE — 99490 PR CHRONIC CARE MGMT, 1ST 20 MIN: ICD-10-PCS | Mod: S$PBB,,, | Performed by: FAMILY MEDICINE

## 2023-02-28 PROCEDURE — 99490 CHRNC CARE MGMT STAFF 1ST 20: CPT | Mod: PBBFAC,PO | Performed by: FAMILY MEDICINE

## 2023-02-28 PROCEDURE — 99490 CHRNC CARE MGMT STAFF 1ST 20: CPT | Mod: S$PBB,,, | Performed by: FAMILY MEDICINE

## 2023-03-01 ENCOUNTER — LAB VISIT (OUTPATIENT)
Dept: LAB | Facility: HOSPITAL | Age: 78
End: 2023-03-01
Attending: FAMILY MEDICINE
Payer: MEDICARE

## 2023-03-01 DIAGNOSIS — E11.40 TYPE 2 DIABETES MELLITUS WITH DIABETIC NEUROPATHY, WITHOUT LONG-TERM CURRENT USE OF INSULIN: ICD-10-CM

## 2023-03-01 LAB
ALBUMIN/CREAT UR: 13.2 UG/MG (ref 0–30)
CREAT UR-MCNC: 76 MG/DL (ref 23–375)
MICROALBUMIN UR DL<=1MG/L-MCNC: 10 UG/ML

## 2023-03-01 PROCEDURE — 82570 ASSAY OF URINE CREATININE: CPT | Performed by: FAMILY MEDICINE

## 2023-03-03 ENCOUNTER — PROCEDURE VISIT (OUTPATIENT)
Dept: UROLOGY | Facility: CLINIC | Age: 78
End: 2023-03-03
Payer: MEDICARE

## 2023-03-03 DIAGNOSIS — N40.1 BPH WITH URINARY OBSTRUCTION: ICD-10-CM

## 2023-03-03 DIAGNOSIS — N13.8 BPH WITH URINARY OBSTRUCTION: ICD-10-CM

## 2023-03-03 PROBLEM — E27.8 OTHER SPECIFIED DISORDERS OF ADRENAL GLAND: Status: ACTIVE | Noted: 2023-03-03

## 2023-03-03 PROBLEM — I50.9 CONGESTIVE HEART FAILURE, UNSPECIFIED HF CHRONICITY, UNSPECIFIED HEART FAILURE TYPE: Status: ACTIVE | Noted: 2023-03-03

## 2023-03-03 PROBLEM — N18.30: Status: ACTIVE | Noted: 2023-03-03

## 2023-03-03 PROBLEM — E08.22: Status: ACTIVE | Noted: 2023-03-03

## 2023-03-03 PROCEDURE — 52000 CYSTOURETHROSCOPY: CPT | Mod: PBBFAC | Performed by: UROLOGY

## 2023-03-03 PROCEDURE — 52000 CYSTOSCOPY: ICD-10-PCS | Mod: S$PBB,,, | Performed by: UROLOGY

## 2023-03-03 NOTE — PROCEDURES
Jani Cha is a 77 y.o. male patient.            Cystoscopy    Date/Time: 3/3/2023 3:46 PM  Performed by: Jeanmarie Hanson MD  Authorized by: Jeanmarie Hanson MD     Consent Done?:  Yes (Written)  Timeout: prior to procedure the correct patient, procedure, and site was verified    Prep: patient was prepped and draped in usual sterile fashion    Local anesthesia used?: Yes    Anesthesia:  Lidocaine jelly  Local anesthetic:  Lidocaine 2% topical gel  Anesthetic total (ml):  10  Indications: BPH    Position:  Supine  Anesthesia:  Lidocaine jelly  Patient sedated?: No    Preparation: Patient was prepped and draped in usual sterile fashion    Scope type:  Flexible cystoscope  Stent inserted: No    Stent removed: No    External exam normal: Yes    Digital exam performed: No    Urethra normal: Yes    Prostate normal: No     Hyperplasia   Bilobar  Length (cm):  4  Bladder neck normal: Yes    Bladder normal: Yes     patient tolerated the procedure well with no immediate complications    3/3/2023

## 2023-03-09 ENCOUNTER — OUTPATIENT CASE MANAGEMENT (OUTPATIENT)
Dept: ADMINISTRATIVE | Facility: OTHER | Age: 78
End: 2023-03-09
Payer: MEDICARE

## 2023-03-10 ENCOUNTER — LAB VISIT (OUTPATIENT)
Dept: LAB | Facility: HOSPITAL | Age: 78
End: 2023-03-10
Attending: HOSPITALIST
Payer: MEDICARE

## 2023-03-10 DIAGNOSIS — E11.40 TYPE 2 DIABETES MELLITUS WITH DIABETIC NEUROPATHY, WITHOUT LONG-TERM CURRENT USE OF INSULIN: ICD-10-CM

## 2023-03-10 DIAGNOSIS — S72.001A CLOSED DISPLACED FRACTURE OF RIGHT FEMORAL NECK: ICD-10-CM

## 2023-03-10 DIAGNOSIS — E11.65 TYPE 2 DIABETES MELLITUS WITH HYPERGLYCEMIA, WITHOUT LONG-TERM CURRENT USE OF INSULIN: ICD-10-CM

## 2023-03-10 DIAGNOSIS — E66.09 CLASS 1 OBESITY DUE TO EXCESS CALORIES WITH SERIOUS COMORBIDITY AND BODY MASS INDEX (BMI) OF 34.0 TO 34.9 IN ADULT: ICD-10-CM

## 2023-03-10 DIAGNOSIS — M80.08XA AGE-RELATED OSTEOPOROSIS WITH CURRENT PATHOLOGICAL FRACTURE, VERTEBRA(E), INITIAL ENCOUNTER FOR FRACTURE: ICD-10-CM

## 2023-03-10 LAB
25(OH)D3+25(OH)D2 SERPL-MCNC: 36 NG/ML (ref 30–96)
ANION GAP SERPL CALC-SCNC: 5 MMOL/L (ref 8–16)
BUN SERPL-MCNC: 37 MG/DL (ref 8–23)
CALCIUM SERPL-MCNC: 10 MG/DL (ref 8.7–10.5)
CHLORIDE SERPL-SCNC: 109 MMOL/L (ref 95–110)
CHOLEST SERPL-MCNC: 103 MG/DL (ref 120–199)
CHOLEST/HDLC SERPL: 3 {RATIO} (ref 2–5)
CO2 SERPL-SCNC: 27 MMOL/L (ref 23–29)
CREAT SERPL-MCNC: 1.7 MG/DL (ref 0.5–1.4)
EST. GFR  (NO RACE VARIABLE): 41.3 ML/MIN/1.73 M^2
ESTIMATED AVG GLUCOSE: 157 MG/DL (ref 68–131)
GLUCOSE SERPL-MCNC: 122 MG/DL (ref 70–110)
HBA1C MFR BLD: 7.1 % (ref 4–5.6)
HDLC SERPL-MCNC: 34 MG/DL (ref 40–75)
HDLC SERPL: 33 % (ref 20–50)
LDLC SERPL CALC-MCNC: 47.8 MG/DL (ref 63–159)
NONHDLC SERPL-MCNC: 69 MG/DL
POTASSIUM SERPL-SCNC: 4.9 MMOL/L (ref 3.5–5.1)
PTH-INTACT SERPL-MCNC: 77.3 PG/ML (ref 9–77)
SODIUM SERPL-SCNC: 141 MMOL/L (ref 136–145)
TRIGL SERPL-MCNC: 106 MG/DL (ref 30–150)

## 2023-03-10 PROCEDURE — 36415 COLL VENOUS BLD VENIPUNCTURE: CPT | Mod: PO | Performed by: HOSPITALIST

## 2023-03-10 PROCEDURE — 80061 LIPID PANEL: CPT | Performed by: FAMILY MEDICINE

## 2023-03-10 PROCEDURE — 80048 BASIC METABOLIC PNL TOTAL CA: CPT | Performed by: HOSPITALIST

## 2023-03-10 PROCEDURE — 82306 VITAMIN D 25 HYDROXY: CPT | Performed by: HOSPITALIST

## 2023-03-10 PROCEDURE — 83036 HEMOGLOBIN GLYCOSYLATED A1C: CPT | Performed by: HOSPITALIST

## 2023-03-10 PROCEDURE — 83970 ASSAY OF PARATHORMONE: CPT | Performed by: HOSPITALIST

## 2023-03-17 ENCOUNTER — OFFICE VISIT (OUTPATIENT)
Dept: ENDOCRINOLOGY | Facility: CLINIC | Age: 78
End: 2023-03-17
Payer: MEDICARE

## 2023-03-17 ENCOUNTER — INFUSION (OUTPATIENT)
Dept: INFECTIOUS DISEASES | Facility: HOSPITAL | Age: 78
End: 2023-03-17
Attending: INTERNAL MEDICINE
Payer: MEDICARE

## 2023-03-17 VITALS
RESPIRATION RATE: 20 BRPM | DIASTOLIC BLOOD PRESSURE: 72 MMHG | HEART RATE: 71 BPM | SYSTOLIC BLOOD PRESSURE: 163 MMHG | WEIGHT: 244.69 LBS | OXYGEN SATURATION: 96 % | TEMPERATURE: 97 F | BODY MASS INDEX: 35.11 KG/M2

## 2023-03-17 VITALS
BODY MASS INDEX: 35.33 KG/M2 | WEIGHT: 246.19 LBS | HEART RATE: 73 BPM | SYSTOLIC BLOOD PRESSURE: 125 MMHG | DIASTOLIC BLOOD PRESSURE: 75 MMHG | TEMPERATURE: 98 F

## 2023-03-17 DIAGNOSIS — E11.40 TYPE 2 DIABETES MELLITUS WITH DIABETIC NEUROPATHY, WITHOUT LONG-TERM CURRENT USE OF INSULIN: Chronic | ICD-10-CM

## 2023-03-17 DIAGNOSIS — M81.6 LOCALIZED OSTEOPOROSIS OF LEQUESNE: ICD-10-CM

## 2023-03-17 DIAGNOSIS — E11.65 UNCONTROLLED TYPE 2 DIABETES MELLITUS WITH HYPERGLYCEMIA, WITHOUT LONG-TERM CURRENT USE OF INSULIN: ICD-10-CM

## 2023-03-17 DIAGNOSIS — E66.09 CLASS 1 OBESITY DUE TO EXCESS CALORIES WITH SERIOUS COMORBIDITY AND BODY MASS INDEX (BMI) OF 34.0 TO 34.9 IN ADULT: ICD-10-CM

## 2023-03-17 DIAGNOSIS — S72.001A CLOSED DISPLACED FRACTURE OF RIGHT FEMORAL NECK: ICD-10-CM

## 2023-03-17 DIAGNOSIS — E78.00 PURE HYPERCHOLESTEROLEMIA: Chronic | ICD-10-CM

## 2023-03-17 DIAGNOSIS — E11.65 TYPE 2 DIABETES MELLITUS WITH HYPERGLYCEMIA, WITHOUT LONG-TERM CURRENT USE OF INSULIN: ICD-10-CM

## 2023-03-17 DIAGNOSIS — M81.0 OSTEOPOROSIS, UNSPECIFIED OSTEOPOROSIS TYPE, UNSPECIFIED PATHOLOGICAL FRACTURE PRESENCE: Primary | ICD-10-CM

## 2023-03-17 PROCEDURE — 99999 PR PBB SHADOW E&M-EST. PATIENT-LVL IV: ICD-10-PCS | Mod: PBBFAC,,, | Performed by: HOSPITALIST

## 2023-03-17 PROCEDURE — 63600175 PHARM REV CODE 636 W HCPCS: Mod: JZ,JG | Performed by: PHYSICIAN ASSISTANT

## 2023-03-17 PROCEDURE — 99214 PR OFFICE/OUTPT VISIT, EST, LEVL IV, 30-39 MIN: ICD-10-PCS | Mod: S$PBB,,, | Performed by: HOSPITALIST

## 2023-03-17 PROCEDURE — 99999 PR PBB SHADOW E&M-EST. PATIENT-LVL IV: CPT | Mod: PBBFAC,,, | Performed by: HOSPITALIST

## 2023-03-17 PROCEDURE — 96372 THER/PROPH/DIAG INJ SC/IM: CPT

## 2023-03-17 PROCEDURE — 99214 OFFICE O/P EST MOD 30 MIN: CPT | Mod: PBBFAC,25 | Performed by: HOSPITALIST

## 2023-03-17 PROCEDURE — 99214 OFFICE O/P EST MOD 30 MIN: CPT | Mod: S$PBB,,, | Performed by: HOSPITALIST

## 2023-03-17 RX ORDER — GLIPIZIDE 10 MG/1
10 TABLET, FILM COATED, EXTENDED RELEASE ORAL 2 TIMES DAILY WITH MEALS
Qty: 180 TABLET | Refills: 3 | Status: SHIPPED | OUTPATIENT
Start: 2023-03-17 | End: 2023-07-31 | Stop reason: SDUPTHER

## 2023-03-17 RX ADMIN — DENOSUMAB 60 MG: 60 INJECTION SUBCUTANEOUS at 02:03

## 2023-03-17 NOTE — ASSESSMENT & PLAN NOTE
- Body mass index is 35.33 kg/m².  - dietary discussion as above  - continue to monitor weight  - continue Trulicity

## 2023-03-17 NOTE — ASSESSMENT & PLAN NOTE
- Diabetes better controlled, improvement  A1c, goal A1C for patient is <7%  - Complicated by hyperglycemia, obesity, hx CAD/CABG, CKD3b, dietary indiscretion.  - still with occasional morning time hyperglycemia  - due to the renal function, medical options limited  - Diabetic supplies/medications, review at every visit to ensure continue refills    Plan  - continue current dose of Trulicity 1.5 mg once a week injection, getting it from [facility]  - continue glipizide to 10 mg b.i.d.  - advised the patient follow with my regimen with close follow-up as scheduled  - repeat lab work prior to next visit, 4 months

## 2023-03-17 NOTE — PROGRESS NOTES
Pt received 1st prolia inj to left arm SQ, pt takes VIT D, pt denies dental surgery < 3 months, pt observed for 15 min then left in NAD

## 2023-03-17 NOTE — PROGRESS NOTES
Subjective:      Patient ID: Jani Cha is a 77 y.o. male presented to [facility] on 3/17/2023.  Chief Complaint:  Diabetes type 2, Diabetes    History of Present Illness: Jani Cha is a 77 y.o. male here for management of type 2 diabetes and left adrenal incidentaloma  Other significant past medical history:  CKD stage IIIB, CAD, CABG, hypertension, hyperlipidemia    Interval history:  Patient is here for follow-up of type 2 diabetes, A1c improved to 6.7%  Better dietary changes.  Currently still on Trulicity, getting from [facility] without any issue  Glucose 115-120  Reporte glucose been high 145-151  No hypoglycemia    Recently: 10/25/2022>> patient fell picking up a newspaper.  Sustained right hip fracture requiring surgery.  Follow-up with Orthopedic surgery at this time.  Recently discharged from rehab  Currently using walker to help with ambulation      1) With regards to Diabetes Mellitus Type 2  Known diabetic complications: nephropathy and cardiovascular disease  Cardiovascular risk factors: advanced age (older than 55 for men, 65 for women), diabetes mellitus, dyslipidemia, hypertension, male gender, microalbuminuria, obesity (BMI >= 30 kg/m2) and sedentary lifestyle  Diagnosed w/ DM: in 2006  Saw Ortho need Left Knee Replacement>>  Need to get A1C <7%  Patient having issue with high deductible, gap.  Unable to afford multiple diabetes medication    Current meds:   Trulicity 1.5 mg once a week injection, getting it from [facility]  glipizide 10 mg b.i.d.    Previous medication  stop metformin given CKD stage IIIB    Home glucose checks:  Daily, see above  Diet/Exercise:               Eating 2 meals per day , lunch in, large portion dinner, does eat cookies prior to going to bed              Drink:  Coffee with sugar in the morning              Current exercise: none due to knee pain  Weight trend: stable  Diabetes Education: no  Diabetes Related Hospitalization:   no  Hx of pancreatitis, hx of thyroid cancer: no  Family history of diabetes: unknow  Occupation: retired  Eye exam current (within one year): yes  Reports cuts or ulcers on feet:  Denies  Statin: Taking, ACE/ARB: Taking    Diabetes lab work  Lab Results   Component Value Date    HGBA1C 7.1 (H) 03/10/2023    HGBA1C 6.7 (H) 11/10/2022    HGBA1C 6.8 (H) 10/20/2022    HGBA1C 7.4 (H) 08/10/2022     No results found for: CPEPTIDE, GLUTAMICACID, ISLETCELLANT   No results found for: FRUCTOSAMINE  Lab Results   Component Value Date    MICALBCREAT 13.2 03/01/2023     No results found for: [id number]    Diabetes Management Status: Reviewed this office visit  Screening or Prevention Patient's value Goal Complete/Controlled?   Lipid profile : 03/10/2023 Annually Yes     Dilated retinal exam : 10/19/2021 Annually No     Foot exam   Most Recent Foot Exam Date: Not Found Annually Yes         2) Regards to Left adrenal incidentaloma  - Noted on CT chest, left adrenal mass 1.2 cm in size.  - Adrenal lesion imaging characteristics left adrenal nodule, 1.2 cm, Smooth, heterogenous, without calcifications   - Patient does have history of diabetes as above, Morbidly obese, Hypertension but no history of hypertensive emergency   - Pt denies history of paroxysmal symptoms such as syncope, pallor or dizziness  - No palpitations, No history of cancer  - Pt reports no abdominal discomfort  - Work up: DST: WNL, cortisol 2, due to the overweight  - Metanephrines and normetanephrines:  Within acceptable ranges  - Renin/navneet level normal      3) Osteoporosis  - Right femoral neck fracture Recently: 10/25/2022>> patient fell picking up a newspaper.  Sustained right hip fracture requiring surgery.    - Follow-up with Orthopedic surgery at this time.  Recently discharged from rehab  - Currently using walker to help with ambulation  - denies history of other fractures  - no history of cirrhosis, liver disease, cancer  - currently not taking any  vitamin supplements including calcium/vitamin-D  - started on Prolia SC injection per orthopedic surgery, 1st injection 3/17/2023    DXA: 1/19/2023  Lumbar spine (L1-L4):  BMD is 1.248 g/cm2, T-score is 1.4, and Z-score is 2.5.  Total hip:   BMD is 1.005 g/cm2, T-score is -0.2, and Z-score is 0.7.  Femoral neck: BMD is 0.688 g/cm2, T-score is -1.8, and Z-score is -0.4.  Distal 1/3 radius:  Not applicable     FRAX:  11% risk of a major osteoporotic fracture in the next 10 years.  3.2% risk of hip fracture in the next 10 years.     Impression:  *Osteoporosis based on T-score between -1.0 and -2.5 and elevated risk based on FRAX and history of femur fracture.    Reviewed past surgical, medical, family, social history and updated as appropriate.  Review of Systems: see HPI above    Objective:   /75 (BP Location: Right arm)   Pulse 73   Temp 97.8 °F (36.6 °C) (Oral)   Wt 111.7 kg (246 lb 3.2 oz)   BMI 35.33 kg/m²     Body mass index is 35.33 kg/m².  Vital signs reviewed    Physical Exam  Vitals and nursing note reviewed.   Constitutional:       General: He is not in acute distress.     Appearance: Normal appearance. He is well-developed. He is obese.   Neck:      Thyroid: No thyromegaly.   Cardiovascular:      Rate and Rhythm: Normal rate.      Heart sounds: Normal heart sounds.   Pulmonary:      Effort: Pulmonary effort is normal. No respiratory distress.   Abdominal:      Tenderness: There is no abdominal tenderness.   Musculoskeletal:         General: Normal range of motion.      Cervical back: Neck supple.   Skin:     General: Skin is warm.      Findings: No erythema.   Neurological:      Mental Status: He is alert and oriented to person, place, and time.       Lab Reviewed:   Lab Results   Component Value Date    HGBA1C 7.1 (H) 03/10/2023     Lab Results   Component Value Date    CHOL 103 (L) 03/10/2023    HDL 34 (L) 03/10/2023    LDLCALC 47.8 (L) 03/10/2023    TRIG 106 03/10/2023    CHOLHDL 33.0  03/10/2023       Lab Results   Component Value Date     03/10/2023    K 4.9 03/10/2023     03/10/2023    CO2 27 03/10/2023     (H) 03/10/2023    BUN 37 (H) 03/10/2023    CREATININE 1.7 (H) 03/10/2023    CALCIUM 10.0 03/10/2023    PROT 6.3 12/06/2022    ALBUMIN 2.8 (L) 12/06/2022    BILITOT 0.4 12/06/2022    ALKPHOS 98 12/06/2022    AST 14 12/06/2022    ALT 16 12/06/2022    ANIONGAP 5 (L) 03/10/2023    ESTGFRAFRICA 48.0 (A) 05/10/2022    EGFRNONAA 41.5 (A) 05/10/2022    TSH 2.585 12/06/2022    UDLLKHXY69IM 36 03/10/2023     Assessment     1. Uncontrolled type 2 diabetes mellitus with hyperglycemia, without long-term current use of insulin  glipiZIDE (GLUCOTROL) 10 MG TR24      2. Type 2 diabetes mellitus with diabetic neuropathy, without long-term current use of insulin  glipiZIDE (GLUCOTROL) 10 MG TR24    Hemoglobin A1C    Basic Metabolic Panel      3. Type 2 diabetes mellitus with hyperglycemia, without long-term current use of insulin        4. Closed displaced fracture of right femoral neck s/p total hip arthroplasty on 10/21/2022        5. Pure hypercholesterolemia        6. Class 1 obesity due to excess calories with serious comorbidity and body mass index (BMI) of 34.0 to 34.9 in adult            Plan       Type 2 diabetes mellitus with hyperglycemia, without long-term current use of insulin  - Diabetes better controlled, improvement  A1c, goal A1C for patient is <7%  - Complicated by hyperglycemia, obesity, hx CAD/CABG, CKD3b, dietary indiscretion.  - still with occasional morning time hyperglycemia  - due to the renal function, medical options limited  - Diabetic supplies/medications, review at every visit to ensure continue refills    Plan  - continue current dose of Trulicity 1.5 mg once a week injection, getting it from [facility]  - continue glipizide to 10 mg b.i.d.  - advised the patient follow with my regimen with close follow-up as scheduled  - repeat lab work prior to next visit,  4 months    Closed displaced fracture of right femoral neck s/p total hip arthroplasty on 10/21/2022  - right femoral neck fracture 10/21/22  - status post fixation  - continue follow-up with Orthopedic surgery  - plan to check vitamin-D, PTH, BMP  - advised patient to start general men's multivitamin  - SC Prolia every 6 months, injection 3/17/2023  - along with vitamin D3 2000 IU daily    Hyperlipidemia  - lipid panel review today  - ASCVD Risk below: Statin: Taking  The ASCVD Risk score (Eyad DK, et al., 2019) failed to calculate for the following reasons:    The valid total cholesterol range is 130 to 320 mg/dL    Class 1 obesity due to excess calories with serious comorbidity in adult  - Body mass index is 35.33 kg/m².  - dietary discussion as above  - continue to monitor weight  - continue Trulicity    Advised patient to follow up with PCP for routine health maintenance care.   RTC in 4 months    Malcolm Kent M.D  Endocrinology  [facility]  3/17/2023      Disclaimer: This note has been generated using voice-recognition software. There may be typographical errors that have been missed during proof-reading.

## 2023-03-30 ENCOUNTER — OFFICE VISIT (OUTPATIENT)
Dept: UROLOGY | Facility: CLINIC | Age: 78
End: 2023-03-30
Payer: MEDICARE

## 2023-03-30 ENCOUNTER — ANESTHESIA EVENT (OUTPATIENT)
Dept: SURGERY | Facility: HOSPITAL | Age: 78
End: 2023-03-30
Payer: MEDICARE

## 2023-03-30 VITALS — BODY MASS INDEX: 35.1 KG/M2 | WEIGHT: 244.63 LBS

## 2023-03-30 DIAGNOSIS — N13.8 BPH WITH URINARY OBSTRUCTION: Primary | ICD-10-CM

## 2023-03-30 DIAGNOSIS — R33.9 INCOMPLETE EMPTYING OF BLADDER: ICD-10-CM

## 2023-03-30 DIAGNOSIS — R35.1 NOCTURIA MORE THAN TWICE PER NIGHT: ICD-10-CM

## 2023-03-30 DIAGNOSIS — N40.1 BPH WITH URINARY OBSTRUCTION: Primary | ICD-10-CM

## 2023-03-30 PROCEDURE — 99214 OFFICE O/P EST MOD 30 MIN: CPT | Mod: S$PBB,,, | Performed by: UROLOGY

## 2023-03-30 PROCEDURE — 99214 PR OFFICE/OUTPT VISIT, EST, LEVL IV, 30-39 MIN: ICD-10-PCS | Mod: S$PBB,,, | Performed by: UROLOGY

## 2023-03-30 PROCEDURE — 81001 URINALYSIS AUTO W/SCOPE: CPT | Mod: PBBFAC | Performed by: UROLOGY

## 2023-03-30 PROCEDURE — 87086 URINE CULTURE/COLONY COUNT: CPT | Performed by: UROLOGY

## 2023-03-30 PROCEDURE — 99999 PR PBB SHADOW E&M-EST. PATIENT-LVL IV: ICD-10-PCS | Mod: PBBFAC,,, | Performed by: UROLOGY

## 2023-03-30 PROCEDURE — 99999 PR PBB SHADOW E&M-EST. PATIENT-LVL IV: CPT | Mod: PBBFAC,,, | Performed by: UROLOGY

## 2023-03-30 PROCEDURE — 99214 OFFICE O/P EST MOD 30 MIN: CPT | Mod: PBBFAC | Performed by: UROLOGY

## 2023-03-30 NOTE — PROGRESS NOTES
Subjective:       Patient ID: Jani Cha is a 77 y.o. male The patient's last visit with me was on 3/3/2023.     Chief Complaint:   Chief Complaint   Patient presents with    Follow-up       History of Kidney Stones  He had an issue with a ureteral stone in Winter 2015.  He did not recall passing the stone but his pain resolved.       3/25/2021  He had right flank pain and hematuria a couple of weeks ago. He brought the stone for analysis a couple of weeks ago.  He denies anymore hematuria, flank pain.  He denies fever.    5/3/2022  He denies pain or hematuria.        Benign Prostatic Hyperplasia  He patient reports nocturia three times a night. He denies frequency, incomplete emptying and intermittency. The patient states symptoms are of moderate severity. Onset of symptoms was several years ago and was gradual in onset.  He has no personal history and no family history of prostate cancer. He reports a history of kidney stones. He denies flank pain, gross hematuria and recurrent UTI.  He is currently taking no prostate medications.  He has noted some frequency with occasional urgency.    8/3/2022  He was to add Flomax last visit.  He took it for no more than 2 weeks.  He stopped it due to retrograde ejaculation and pain in his prostate afterwards.  He did not note any change to his stream.    IPSS Questionnaire (AUA-7):  8    11/17/2022   He is doing okay.  He recently fractured his hip.  His stream is about the same.  He required a urologist to place the Alejandre.  He is not sure why.    He is not interested in trying prostate medications again.    11/29/2022 Orchitis  He went to the ED on 11/24/2022 after developing a fever.  His urine was red at the time.  He was given antibiotics for a UTI.  Two days later he develop pain in his testicles.  He went back to the ED.    12/27/2022  He feels better today.  His testicle is slightly sensitive.      IPSS Questionnaire (AUA-7):  17    03/30/2023  Cystoscopy this  month showed bilateral lobe hypertrophy of the prostate.   He would like to proceed with a procedure on his prostate.     ACTIVE MEDICAL ISSUES:  Patient Active Problem List   Diagnosis    Hyperlipidemia    Primary hypertension    Coronary artery disease involving native coronary artery of native heart without angina pectoris    Sleep apnea    S/P CABG x 4    Type 2 diabetes mellitus with diabetic neuropathy, without long-term current use of insulin    GERD (gastroesophageal reflux disease)    Personal history of colonic polyps    Abdominal aortic aneurysm (AAA) without rupture    Total occlusion of coronary artery, chronic -LCX with collaterals.  No ischemic on PET    Pulmonary nodule    Thoracic aortic aneurysm without rupture    Bilateral renal cysts    Adrenal adenoma    History of PCI of RCA    Class 1 obesity due to excess calories with serious comorbidity in adult    Calcified granuloma of lung    Type 2 diabetes mellitus with hyperglycemia, without long-term current use of insulin    Lymphedema of both lower extremities    Swelling of lower extremity    Closed displaced fracture of right femoral neck s/p total hip arthroplasty on 10/21/2022    Hip pain    Chronic pain of both knees    Decreased strength, endurance, and mobility    Osteoporosis    Localized osteoporosis of [place]    Other specified disorders of adrenal gland    Congestive heart failure, unspecified HF chronicity, unspecified heart failure type    Diabetes mellitus due to underlying condition with stage 3 chronic kidney disease, without long-term current use of insulin, unspecified whether stage 3a or 3b CKD       ALLERGIES AND MEDICATIONS: updated and reviewed.  Review of patient's allergies indicates:   Allergen Reactions    Penicillins Hives, Itching and Rash    Shellfish containing products      Current Outpatient Medications   Medication Sig    acetaminophen (TYLENOL) 500 MG tablet Take 2 tablets (1,000 mg total) by mouth every 8  (eight) hours as needed (Mild to moderate pain).    aspirin (ECOTRIN) 81 MG EC tablet Take 81 mg by mouth once daily.    atorvastatin (LIPITOR) 80 MG tablet Take 1 tablet by mouth once daily    blood sugar diagnostic Strp 1 strip by Misc.(Non-Drug; Combo Route) route once daily.    carvediloL (COREG) 25 MG tablet TAKE 1 TABLET BY MOUTH TWICE DAILY WITH MEALS    clopidogreL (PLAVIX) 75 mg tablet Take 1 tablet (75 mg total) by mouth once daily.    clotrimazole-betamethasone 1-0.05% (LOTRISONE) cream Apply topically 2 (two) times daily.    dulaglutide (TRULICITY) 1.5 mg/0.5 mL pen injector Inject 1.5 mg into the skin every 7 days.    fluticasone propionate (FLONASE) 50 mcg/actuation nasal spray 1 spray (50 mcg total) by Each Nostril route once daily.    glipiZIDE (GLUCOTROL) 10 MG TR24 Take 1 tablet (10 mg total) by mouth 2 (two) times daily with meals.    melatonin (MELATIN) 3 mg tablet Take 2 tablets (6 mg total) by mouth nightly as needed for Insomnia.    oxyCODONE-acetaminophen (PERCOCET) 5-325 mg per tablet Take 1 tablet by mouth every 8 (eight) hours as needed for Pain.    pantoprazole (PROTONIX) 40 MG tablet Take 1 tablet by mouth once daily    senna-docusate 8.6-50 mg (PERICOLACE) 8.6-50 mg per tablet Take 1 tablet by mouth 2 (two) times daily.    valsartan (DIOVAN) 80 MG tablet Take 1 tablet (80 mg total) by mouth once daily.    pregabalin (LYRICA) 75 MG capsule Take 1 capsule (75 mg total) by mouth nightly. for 10 days    TRUEPLUS LANCETS 33 gauge Misc Apply 1 lancet topically once daily. Use to test blood sugar daily; discard lancet after each use     No current facility-administered medications for this visit.       Review of Systems   Constitutional:  Negative for activity change, fatigue, fever and unexpected weight change.   HENT:  Negative for congestion.    Eyes:  Negative for redness.   Respiratory:  Negative for chest tightness and shortness of breath.    Cardiovascular:  Negative for chest pain and  leg swelling.   Gastrointestinal:  Negative for abdominal pain, constipation, diarrhea, nausea and vomiting.   Genitourinary:  Negative for dysuria, flank pain, frequency, hematuria, penile pain, penile swelling, scrotal swelling, testicular pain and urgency.   Musculoskeletal:  Negative for arthralgias and back pain.   Neurological:  Negative for dizziness and light-headedness.   Psychiatric/Behavioral:  Negative for behavioral problems and confusion. The patient is not nervous/anxious.    All other systems reviewed and are negative.    Objective:      Vitals:    03/30/23 1500   Weight: 111 kg (244 lb 9.6 oz)       Physical Exam  Vitals and nursing note reviewed.   Constitutional:       Appearance: He is well-developed.   HENT:      Head: Normocephalic.   Eyes:      Conjunctiva/sclera: Conjunctivae normal.   Neck:      Thyroid: No thyromegaly.      Trachea: No tracheal deviation.   Cardiovascular:      Rate and Rhythm: Normal rate.      Heart sounds: Normal heart sounds.   Pulmonary:      Effort: Pulmonary effort is normal. No respiratory distress.      Breath sounds: Normal breath sounds. No wheezing.   Abdominal:      General: Bowel sounds are normal.      Palpations: Abdomen is soft.      Tenderness: There is no abdominal tenderness. There is no rebound.      Hernia: No hernia is present.   Musculoskeletal:         General: No tenderness. Normal range of motion.      Cervical back: Normal range of motion and neck supple.   Lymphadenopathy:      Cervical: No cervical adenopathy.   Skin:     General: Skin is warm and dry.      Findings: No erythema or rash.   Neurological:      Mental Status: He is alert and oriented to person, place, and time.   Psychiatric:         Behavior: Behavior normal.         Thought Content: Thought content normal.         Judgment: Judgment normal.       Urine dipstick shows negative for all components.  Micro exam: negative for WBC's or RBC's.    Assessment:       1. BPH with urinary  obstruction    2. Nocturia more than twice per night    3. Incomplete emptying of bladder            Plan:       1. BPH with urinary obstruction  IPSS 17/4  He has tried and failed Flomax  Prostate on CT 10/2022: 49.8 cc  Cystoscopy 3/2023: lateral lobe hypertrophy, no median lobe  UroLift on Monday 4/24/2023, stop Plavix 5 days and ASA 7 days before      2. Nocturia more than twice per night  Limit evening fluids    3. Incomplete emptying of bladder  As abvoe             Follow up in about 6 weeks (around 5/11/2023) for Follow up Established.

## 2023-03-31 ENCOUNTER — EXTERNAL CHRONIC CARE MANAGEMENT (OUTPATIENT)
Dept: PRIMARY CARE CLINIC | Facility: CLINIC | Age: 78
End: 2023-03-31
Payer: MEDICARE

## 2023-03-31 PROCEDURE — 99490 CHRNC CARE MGMT STAFF 1ST 20: CPT | Mod: S$PBB,,, | Performed by: FAMILY MEDICINE

## 2023-03-31 PROCEDURE — 99490 PR CHRONIC CARE MGMT, 1ST 20 MIN: ICD-10-PCS | Mod: S$PBB,,, | Performed by: FAMILY MEDICINE

## 2023-03-31 PROCEDURE — 99490 CHRNC CARE MGMT STAFF 1ST 20: CPT | Mod: PBBFAC,PO | Performed by: FAMILY MEDICINE

## 2023-04-01 LAB — BACTERIA UR CULT: NORMAL

## 2023-04-04 ENCOUNTER — OFFICE VISIT (OUTPATIENT)
Dept: OPTOMETRY | Facility: CLINIC | Age: 78
End: 2023-04-04
Payer: MEDICARE

## 2023-04-04 DIAGNOSIS — H52.223 MYOPIA OF BOTH EYES WITH REGULAR ASTIGMATISM AND PRESBYOPIA: ICD-10-CM

## 2023-04-04 DIAGNOSIS — H52.13 MYOPIA OF BOTH EYES WITH REGULAR ASTIGMATISM AND PRESBYOPIA: ICD-10-CM

## 2023-04-04 DIAGNOSIS — H02.88A MEIBOMIAN GLAND DYSFUNCTION (MGD), BILATERAL, BOTH UPPER AND LOWER LIDS: ICD-10-CM

## 2023-04-04 DIAGNOSIS — H02.88B MEIBOMIAN GLAND DYSFUNCTION (MGD), BILATERAL, BOTH UPPER AND LOWER LIDS: ICD-10-CM

## 2023-04-04 DIAGNOSIS — H25.13 NUCLEAR SCLEROSIS, BILATERAL: ICD-10-CM

## 2023-04-04 DIAGNOSIS — E11.9 DIABETES MELLITUS TYPE 2 WITHOUT RETINOPATHY: Primary | ICD-10-CM

## 2023-04-04 DIAGNOSIS — H52.4 MYOPIA OF BOTH EYES WITH REGULAR ASTIGMATISM AND PRESBYOPIA: ICD-10-CM

## 2023-04-04 DIAGNOSIS — Z13.5 GLAUCOMA SCREENING: ICD-10-CM

## 2023-04-04 PROCEDURE — 92014 COMPRE OPH EXAM EST PT 1/>: CPT | Mod: S$PBB,,, | Performed by: OPTOMETRIST

## 2023-04-04 PROCEDURE — 92015 DETERMINE REFRACTIVE STATE: CPT | Mod: ,,, | Performed by: OPTOMETRIST

## 2023-04-04 PROCEDURE — 92014 PR EYE EXAM, EST PATIENT,COMPREHESV: ICD-10-PCS | Mod: S$PBB,,, | Performed by: OPTOMETRIST

## 2023-04-04 PROCEDURE — 99213 OFFICE O/P EST LOW 20 MIN: CPT | Mod: PBBFAC,PO | Performed by: OPTOMETRIST

## 2023-04-04 PROCEDURE — 92015 PR REFRACTION: ICD-10-PCS | Mod: ,,, | Performed by: OPTOMETRIST

## 2023-04-04 PROCEDURE — 99999 PR PBB SHADOW E&M-EST. PATIENT-LVL III: ICD-10-PCS | Mod: PBBFAC,,, | Performed by: OPTOMETRIST

## 2023-04-04 PROCEDURE — 99999 PR PBB SHADOW E&M-EST. PATIENT-LVL III: CPT | Mod: PBBFAC,,, | Performed by: OPTOMETRIST

## 2023-04-04 NOTE — PROGRESS NOTES
HPI    CC: Diabetic eye exam  ABRIL: 10/19/2021   Dry eye with cold in the eyes in the AM  Not using any drops   (-) Changes in vision   (-) Pain  (+) Irritation   (-) Itching   (-) Flashes  (+) Floaters  (+) Glasses wearer  (-) CL wearer  (-) Uses eye gtts  (-) Eye injury  (-) Eye surgery   (-)POHx  (-)FOHx    (+)DM  Hemoglobin A1C       Date                     Value               Ref Range             Status                03/10/2023               7.1 (H)             4.0 - 5.6 %           Final                     Last edited by Max Marques, OD on 4/4/2023  1:13 PM.            Assessment /Plan     For exam results, see Encounter Report.    Diabetes mellitus type 2 without retinopathy  -No retinopathy noted today.  Continued control with primary care physician and annual comprehensive eye exam.    Nuclear sclerosis, bilateral  -Educated patient on presence of cataracts at today's exam, monitor at annual dilated fundus exam. 5+ years surgical estimate.    Meibomian gland dysfunction (MGD), bilateral, both upper and lower lids  -Systane PRN    Glaucoma screening  -Monitor with annual eye exam and IOP check    Myopia of both eyes with regular astigmatism and presbyopia  Eyeglass Final Rx       Eyeglass Final Rx         Sphere Cylinder Axis Dist VA Add    Right -3.00 +1.25 170 20/25-- +2.50    Left -2.75 +1.25 165 20/25-- +2.50      Type: PAL    Expiration Date: 4/4/2024                      RTC 1 yr

## 2023-04-13 ENCOUNTER — CLINICAL SUPPORT (OUTPATIENT)
Dept: REHABILITATION | Facility: HOSPITAL | Age: 78
End: 2023-04-13
Payer: MEDICARE

## 2023-04-13 DIAGNOSIS — I89.0 LYMPHEDEMA OF BOTH LOWER EXTREMITIES: ICD-10-CM

## 2023-04-13 DIAGNOSIS — M79.662 PAIN AND SWELLING OF LEFT LOWER LEG: ICD-10-CM

## 2023-04-13 DIAGNOSIS — M79.89 PAIN AND SWELLING OF LEFT LOWER LEG: ICD-10-CM

## 2023-04-13 DIAGNOSIS — I87.303 VENOUS HYPERTENSION OF BOTH LOWER EXTREMITIES: ICD-10-CM

## 2023-04-13 PROCEDURE — 97530 THERAPEUTIC ACTIVITIES: CPT

## 2023-04-13 PROCEDURE — 97166 OT EVAL MOD COMPLEX 45 MIN: CPT
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
## 2023-04-17 ENCOUNTER — OFFICE VISIT (OUTPATIENT)
Dept: ORTHOPEDICS | Facility: CLINIC | Age: 78
End: 2023-04-17
Payer: MEDICARE

## 2023-04-17 ENCOUNTER — HOSPITAL ENCOUNTER (OUTPATIENT)
Dept: RADIOLOGY | Facility: HOSPITAL | Age: 78
Discharge: HOME | End: 2023-04-17
Attending: NURSE PRACTITIONER
Payer: MEDICARE

## 2023-04-17 ENCOUNTER — HOSPITAL ENCOUNTER (OUTPATIENT)
Dept: PREADMISSION TESTING | Facility: HOSPITAL | Age: 78
Discharge: HOME | End: 2023-04-17
Attending: UROLOGY
Payer: MEDICARE

## 2023-04-17 VITALS
RESPIRATION RATE: 18 BRPM | BODY MASS INDEX: 35.79 KG/M2 | HEIGHT: 70 IN | TEMPERATURE: 97 F | WEIGHT: 250 LBS | HEART RATE: 70 BPM | DIASTOLIC BLOOD PRESSURE: 79 MMHG | SYSTOLIC BLOOD PRESSURE: 133 MMHG | OXYGEN SATURATION: 97 %

## 2023-04-17 VITALS — WEIGHT: 244.69 LBS | BODY MASS INDEX: 35.03 KG/M2 | HEIGHT: 70 IN

## 2023-04-17 DIAGNOSIS — N13.8 BPH WITH URINARY OBSTRUCTION: ICD-10-CM

## 2023-04-17 DIAGNOSIS — Z01.818 PREOPERATIVE TESTING: Primary | ICD-10-CM

## 2023-04-17 DIAGNOSIS — N40.1 BPH WITH URINARY OBSTRUCTION: ICD-10-CM

## 2023-04-17 DIAGNOSIS — M25.551 RIGHT HIP PAIN: ICD-10-CM

## 2023-04-17 DIAGNOSIS — M25.551 RIGHT HIP PAIN: Primary | ICD-10-CM

## 2023-04-17 DIAGNOSIS — S72.001A CLOSED DISPLACED FRACTURE OF RIGHT FEMORAL NECK: Primary | ICD-10-CM

## 2023-04-17 DIAGNOSIS — M17.0 OSTEOARTHRITIS OF BOTH KNEES, UNSPECIFIED OSTEOARTHRITIS TYPE: ICD-10-CM

## 2023-04-17 LAB
ANION GAP SERPL CALC-SCNC: 8 MMOL/L (ref 8–16)
BASOPHILS # BLD AUTO: 0.04 K/UL (ref 0–0.2)
BASOPHILS NFR BLD: 0.5 % (ref 0–1.9)
BUN SERPL-MCNC: 39 MG/DL (ref 8–23)
CALCIUM SERPL-MCNC: 9.4 MG/DL (ref 8.7–10.5)
CHLORIDE SERPL-SCNC: 111 MMOL/L (ref 95–110)
CO2 SERPL-SCNC: 23 MMOL/L (ref 23–29)
CREAT SERPL-MCNC: 1.6 MG/DL (ref 0.5–1.4)
DIFFERENTIAL METHOD: ABNORMAL
EOSINOPHIL # BLD AUTO: 0.4 K/UL (ref 0–0.5)
EOSINOPHIL NFR BLD: 5.6 % (ref 0–8)
ERYTHROCYTE [DISTWIDTH] IN BLOOD BY AUTOMATED COUNT: 14.4 % (ref 11.5–14.5)
EST. GFR  (NO RACE VARIABLE): 44 ML/MIN/1.73 M^2
GLUCOSE SERPL-MCNC: 191 MG/DL (ref 70–110)
HCT VFR BLD AUTO: 41.1 % (ref 40–54)
HGB BLD-MCNC: 13.3 G/DL (ref 14–18)
IMM GRANULOCYTES # BLD AUTO: 0.02 K/UL (ref 0–0.04)
IMM GRANULOCYTES NFR BLD AUTO: 0.3 % (ref 0–0.5)
LYMPHOCYTES # BLD AUTO: 1.4 K/UL (ref 1–4.8)
LYMPHOCYTES NFR BLD: 17.9 % (ref 18–48)
MCH RBC QN AUTO: 29.4 PG (ref 27–31)
MCHC RBC AUTO-ENTMCNC: 32.4 G/DL (ref 32–36)
MCV RBC AUTO: 91 FL (ref 82–98)
MONOCYTES # BLD AUTO: 0.5 K/UL (ref 0.3–1)
MONOCYTES NFR BLD: 6.1 % (ref 4–15)
NEUTROPHILS # BLD AUTO: 5.3 K/UL (ref 1.8–7.7)
NEUTROPHILS NFR BLD: 69.6 % (ref 38–73)
NRBC BLD-RTO: 0 /100 WBC
PLATELET # BLD AUTO: 228 K/UL (ref 150–450)
PMV BLD AUTO: 9.5 FL (ref 9.2–12.9)
POTASSIUM SERPL-SCNC: 4.9 MMOL/L (ref 3.5–5.1)
RBC # BLD AUTO: 4.52 M/UL (ref 4.6–6.2)
SODIUM SERPL-SCNC: 142 MMOL/L (ref 136–145)
WBC # BLD AUTO: 7.55 K/UL (ref 3.9–12.7)

## 2023-04-17 PROCEDURE — 93010 EKG 12-LEAD: ICD-10-PCS | Mod: ,,, | Performed by: INTERNAL MEDICINE

## 2023-04-17 PROCEDURE — 73502 XR HIP WITH PELVIS WHEN PERFORMED, 2 OR 3  VIEWS RIGHT: ICD-10-PCS | Mod: 26,RT,, | Performed by: RADIOLOGY

## 2023-04-17 PROCEDURE — 93010 ELECTROCARDIOGRAM REPORT: CPT | Mod: ,,, | Performed by: INTERNAL MEDICINE

## 2023-04-17 PROCEDURE — 80048 BASIC METABOLIC PNL TOTAL CA: CPT | Performed by: UROLOGY

## 2023-04-17 PROCEDURE — 73502 X-RAY EXAM HIP UNI 2-3 VIEWS: CPT | Mod: 26,RT,, | Performed by: RADIOLOGY

## 2023-04-17 PROCEDURE — 93005 ELECTROCARDIOGRAM TRACING: CPT

## 2023-04-17 PROCEDURE — 99999 PR PBB SHADOW E&M-EST. PATIENT-LVL III: CPT | Mod: PBBFAC,,, | Performed by: NURSE PRACTITIONER

## 2023-04-17 PROCEDURE — 85025 COMPLETE CBC W/AUTO DIFF WBC: CPT | Performed by: UROLOGY

## 2023-04-17 PROCEDURE — 99213 PR OFFICE/OUTPT VISIT, EST, LEVL III, 20-29 MIN: ICD-10-PCS | Mod: S$PBB,,, | Performed by: NURSE PRACTITIONER

## 2023-04-17 PROCEDURE — 73502 X-RAY EXAM HIP UNI 2-3 VIEWS: CPT | Mod: TC,RT

## 2023-04-17 PROCEDURE — 36415 COLL VENOUS BLD VENIPUNCTURE: CPT | Performed by: UROLOGY

## 2023-04-17 PROCEDURE — 99999 PR PBB SHADOW E&M-EST. PATIENT-LVL III: ICD-10-PCS | Mod: PBBFAC,,, | Performed by: NURSE PRACTITIONER

## 2023-04-17 PROCEDURE — 99213 OFFICE O/P EST LOW 20 MIN: CPT | Mod: S$PBB,,, | Performed by: NURSE PRACTITIONER

## 2023-04-17 PROCEDURE — 99213 OFFICE O/P EST LOW 20 MIN: CPT | Mod: PBBFAC | Performed by: NURSE PRACTITIONER
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
## 2023-04-17 NOTE — DISCHARGE INSTRUCTIONS
Before 7 AM, enter through the Emergency Entrance..   After 7 AM enter through the Main Entrance.      Your procedure  is scheduled for __4/24/2023________.    Call [phone] between 2pm and 5pm on ___4/23/2023____to find out your arrival time for the day of surgery.    You may have one visitor.  No children allowed.     You will be going to the Same Day Surgery Unit on the 2nd floor of the hospital.    Important instructions:  Do not eat anything after midnight.  You may have plain water, non carbonated.  You may also have Gatorade or Powerade after midnight.    Stop all fluids 2 hours before your surgery.    It is okay to brush your teeth.  Do not have gum, candy or mints.    SEE MEDICATION SHEET.   TAKE MEDICATIONS AS DIRECTED WITH SIPS OF WATER.      Do not take any diabetic medication on the morning of surgery unless instructed to do so by your doctor or pre op nurse.    STOP taking Aspirin, Ibuprofen,  Advil, Motrin, Mobic(meloxicam), Aleve (naproxen), Fish oil, and Vitamin E for at least 7 days before your surgery.     You may take Tylenol if needed which is not a blood thinner.    Please shower the night before and the morning of your surgery.      Contact lenses and removable denture work may not be worn during your procedure.    You may wear deodorant only. If you are having breast surgery, do not wear deodorant on the operative side.    Do not wear powder, body lotion, perfume/cologne or make-up.    Do not wear any jewelry or have any metal on your body.    You will be asked to remove any dentures or partials for the procedure.    If you are going home on the same day of surgery, you must arrange for a family member or a friend to drive you home.  Public transportation is prohibited.  You will not be able to drive home if you were given anesthesia or sedation.    Patients who want to have their Post-op prescriptions filled from our in-house [facility], bring a Credit/Debit Card or cash  with you. A co-pay may be required.  The pharmacy closes at 5:30 pm.    Wear loose fitting clothes allowing for bandages.    Please leave money and valuables home.      You may bring your cell phone.    Call the doctor if fever or illness should occur before your surgery.    Call [phone] to contact us here if needed.                            CLOTHES ON DAY OF SURGERY    SHOULDER surgery:  you must have a very oversized shirt.  Very, Very large.  You will probably have a large sling on with your arm strapped to your chest.  You will not be able to put the arm of the operated shoulder into a sleeve.  You can put the arm of the un-operated shoulder into the sleeve, but the shirt will need to be draped over the operated shoulder.       ARM or HAND surgery:  make sure that your sleeves are large and loose enough to pass over large dressings or cast.      BREAST or UNDERARM surgery:  wear a loose, button down shirt so that you can dress without raising your arms over your head.    ABDOMINAL surgery:  wear loose, comfortable clothing.  Nothing tight around the abdomen.  NO JEANS    PENIS or SCROTAL surgery:  loose comfortable clothing.  Large sweat pants, pajama pants or a robe.  ABSOLUTELY NO JEANS      LEG or FOOT surgery:  wear large loose pants that are able to pass over any large dressings or casts.  You could also wear loose shorts or a skirt.

## 2023-04-17 NOTE — PROGRESS NOTES
"Mr. Cha is here today for a follow up visit after undergoing a right DARNELL by Dr. Paulino on 10/21/2022.    Interval History:  He reports that he is doing ok in relation to his hip, he reports he is still having pain in his knees.  States the shots that were given to him in Feb did not work and he is not interested in any other treatment for his knees said "I will deal with the pain."    Regarding his hip, he reports no pain in his groin.  Endorses intermittent buttock pain only with prolong sitting and resolves when he gets up to walk.  He denies any falls or injuries since his last visit.  He is using a Rolator for gait assistance.  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  He denies fever, chills, and sweats since the time of the surgery.     Physical exam:  Incision is open to air and clean, dry and intact and healed.  No signs of infection.  He has tactile stimulation to their lower leg, he denies calf pain, there is no leg edema and their pedal pulse is palpable x 2. He is able to walk using his rolator without difficulty.    RADS: Right hip x-ray was obtained and personally reviewed by me, hemiarthroplasty is in proper position.  No evidence of hardware failure or new fractures seen.      Assessment:  Follow up visit (5 months)    Plan:    ICD-10-CM ICD-9-CM   1. Closed displaced fracture of right femoral neck s/p total hip arthroplasty on 10/21/2022  S72.001A 820.8   2. Osteoarthritis of both knees, unspecified osteoarthritis type  M17.0 715.96     Current care, treatment plan, precautions, activity level/ modifications, limitations, rehabilitation exercises and proposed future treatment were discussed with the patient. We discussed the need to monitor for changes in symptoms and condition and report them to the physician.  Discussed importance of compliance with all appointments and follow up examinations.       PHYSICAL THERAPY:   - HEP.  - Weight bearing as tolerated   - Range of motion as tolerates.   "   PAIN MEDICATION:   - Pain medication: refill was not needed.  - Pain medication refill policy provided to patient for review, yes.    - Patient was informed a multi-modal approach is used to treat their pain.    OTHER:  - I advised the patient given the significant OA in his knees, I can schedule him an appointment with the joint team but he declined.  Recommend OTC analgesics PRN.      FOLLOW UP:   - Patient will follow up in the clinic in 6 months.  - X-ray of his right hip is needed.    - Future Appointments:   Future Appointments   Date Time Provider Department Center   4/17/2023  1:00 PM PRE-ADMIT 1, [facility]   4/17/2023  2:00 PM Monica Boyce NP [facility]   4/18/2023 11:15 AM Karina Vicente DPM [facility] POD [place]   4/20/2023  2:00 PM Gissell Terrazas, OT [facility] OP [facility] [place]   4/25/2023  2:00 PM Gissell Terrazas, OT [facility] [place]   4/27/2023  2:00 PM Gissell Terrazas, OT [facility] [place]   5/11/2023  8:15 AM Jeanmarie Hanson MD [facility] URO [facility]   7/24/2023 10:00 AM LAB, [facility] LAB [place]   7/31/2023 10:30 AM Malcolm Kent MD [facility] ENDOCRN [facility]   8/25/2023  9:40 AM Laila Bains MD [facility] FAM MED [place]   9/21/2023 10:45 AM INJECTION [facility] AMB INF [facility]           If there are any questions prior to scheduled follow up, the patient was instructed to contact the office

## 2023-04-18 ENCOUNTER — OFFICE VISIT (OUTPATIENT)
Dept: PODIATRY | Facility: CLINIC | Age: 78
End: 2023-04-18
Payer: MEDICARE

## 2023-04-18 VITALS — BODY MASS INDEX: 35.79 KG/M2 | HEIGHT: 70 IN | WEIGHT: 250 LBS

## 2023-04-18 DIAGNOSIS — B35.1 ONYCHOMYCOSIS DUE TO DERMATOPHYTE: ICD-10-CM

## 2023-04-18 DIAGNOSIS — E11.49 TYPE II DIABETES MELLITUS WITH NEUROLOGICAL MANIFESTATIONS: Primary | ICD-10-CM

## 2023-04-18 PROCEDURE — 11721 DEBRIDE NAIL 6 OR MORE: CPT | Mod: PBBFAC,PO | Performed by: PODIATRIST

## 2023-04-18 PROCEDURE — 99213 OFFICE O/P EST LOW 20 MIN: CPT | Mod: PBBFAC,PO | Performed by: PODIATRIST

## 2023-04-18 PROCEDURE — 99999 PR PBB SHADOW E&M-EST. PATIENT-LVL III: CPT | Mod: PBBFAC,,, | Performed by: PODIATRIST

## 2023-04-18 PROCEDURE — 99499 NO LOS: ICD-10-PCS | Mod: S$PBB,,, | Performed by: PODIATRIST

## 2023-04-18 PROCEDURE — 99999 PR PBB SHADOW E&M-EST. PATIENT-LVL III: ICD-10-PCS | Mod: PBBFAC,,, | Performed by: PODIATRIST

## 2023-04-18 PROCEDURE — 11721 PR DEBRIDEMENT OF NAILS, 6 OR MORE: ICD-10-PCS | Mod: Q9,S$PBB,, | Performed by: PODIATRIST

## 2023-04-18 PROCEDURE — 11721 DEBRIDE NAIL 6 OR MORE: CPT | Mod: Q9,S$PBB,, | Performed by: PODIATRIST

## 2023-04-18 PROCEDURE — 99499 UNLISTED E&M SERVICE: CPT | Mod: S$PBB,,, | Performed by: PODIATRIST
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
## 2023-04-20 ENCOUNTER — CLINICAL SUPPORT (OUTPATIENT)
Dept: REHABILITATION | Facility: HOSPITAL | Age: 78
End: 2023-04-20
Payer: MEDICARE

## 2023-04-20 DIAGNOSIS — I89.0 LYMPHEDEMA OF BOTH LOWER EXTREMITIES: Primary | ICD-10-CM

## 2023-04-20 PROCEDURE — 97016 VASOPNEUMATIC DEVICE THERAPY: CPT

## 2023-04-20 PROCEDURE — 97140 MANUAL THERAPY 1/> REGIONS: CPT

## 2023-04-20 NOTE — PROGRESS NOTES
"  [facility]  Occupational Therapy Treatment Note/Discharge Summary    Date: 4/20/2023  Name: Jani PAIZ [facility] Number: [id number]    Time In: 2:00  Time Out: 3:00  Total Billable Time: 60 minutes    Therapy Diagnosis:   Encounter Diagnosis   Name Primary?    Lymphedema of both lower extremities Yes     Physician: Laila Bains MD  Physician Orders: OT Eval and Treat    Evaluation Date: 4/13/2023  Insurance Authorization Period Expiration: 2/24/2024  Plan of Care Expiration: 5/11/2023  Visit # / Visits authorized: 1 / 20    Precautions:  Standard and Fall    SUBJECTIVE     Pt reports: He was interested to see what the compression pump was all about but after the trial, he isn't interested in having one at home. Patient states his daughter purchased the compression knee high stockings with zippers for him, and he will ask her to get a few more pair for him. Patient states his knees are very painful, but the swelling in the lower legs doesn't bother him at all. Patient says " so, from what we talked about the other day, what I hear you telling me, is that I have to wear these stockings every day for the rest of my life if I want to keep the swelling from getting bigger".  He was compliant with home compression program given last session.   Response to previous treatment:Patient is independent in don/doffing zipper style compression stockings.  Functional change: n/a    Pain: 4/10  Location: bilateral knees      OBJECTIVE     Pt arrived wearing zipper style compression knee high socks on BLE and using a rolling walker for saftey, as he states his knees have just given out in the past.    Objective Measures updated at progress report unless specified.    Treatment     Jani received the treatments listed below:     Manual therapy techniques were applied for 60 minutes, including:    MANUAL LYMPHATIC DRAINAGE (MLD):    While supine with LEs elevated stimulation at terminus, along GI " region, B inguinal regions, drainage of entire L LE corby lower leg, ankle, and foot with return proximally.  Scooping and pumping were performed throughout the thigh which is soft. The lower leg presents with a thin layer of soft pitting edema.  Deep thumb Lumbee techniques were performed to break down the edema. Fluid was then shunted up through the lateral leg towards the inquinal nodes.    SEQUENTIAL COMPRESSION PUMP: full leg sleeve applied to R LE  VES 5200 with default setting with distal pressures starting at 45mmHg entire LE x 45 minutes    MULTILAYERED BANDAGING: Patient was observed to independently don his zipper style compression socks. Wash and wear schedules confirmed.     Pt was educated in daily compression needs.  Demo of products including socks, garments, and Inelastic Velcro wraps.     Discussed wear schedule, don/doff, wash and management of products.      Patient Education and Home Exercises      Education provided:   1. Educated on cause of lymphedema.  2. Explained the Complete Decongestive Therapy protocol in depth  3. Educated on Phase 1 and 2 of protocol.  4. Reviewed treatment frequency and likely duration of weeks  5. Precautions and contraindications for treatment.  6. Plan of care and goals.  7. Educated on home management protocols.          Assessment     Pt would continue to benefit from skilled OT. No further therapy is needed or desired by patient at this time.     Jani is progressing well towards his goals and there are no updates to goals at this time. Pt will discharge to home management of BLE edema via daily use of knee high compression stockings at this time.      Pt's spiritual, cultural and educational needs considered and pt agreeable to plan of care and goals.      Goals:      Goals:  Short term : 2 weeks  Patient to report wearing knee high compression socks 7 days per week MET 4/20/2023  Patient to report purchasing a second pair of compression socks NOT MET Patient  states today that he will ask his daughter to order him several more pair.  Patient to verbalize understanding the etiology of lymphedema and the need for compression on the legs to prevent refill  MET 4/20/23  Long Term: 4 weeks  Patient to trial sequential compression pump for possible use at home MET 4/20/2023  If patient trial is successful, Request MD order for compression pump :Declined by patient     PLAN     No further therapy is needed at this time. Patient is discharged to home management of BLE edema with daily use of knee high, zipper style compression stockings.   Gissell Terrazas, OT, [facility]

## 2023-04-24 ENCOUNTER — HOSPITAL ENCOUNTER (OUTPATIENT)
Facility: HOSPITAL | Age: 78
Discharge: HOME | End: 2023-04-24
Attending: UROLOGY | Admitting: UROLOGY
Payer: MEDICARE

## 2023-04-24 ENCOUNTER — ANESTHESIA (OUTPATIENT)
Dept: SURGERY | Facility: HOSPITAL | Age: 78
End: 2023-04-24
Payer: MEDICARE

## 2023-04-24 VITALS
RESPIRATION RATE: 20 BRPM | SYSTOLIC BLOOD PRESSURE: 169 MMHG | BODY MASS INDEX: 35.87 KG/M2 | HEART RATE: 60 BPM | DIASTOLIC BLOOD PRESSURE: 79 MMHG | OXYGEN SATURATION: 95 % | WEIGHT: 250 LBS | TEMPERATURE: 98 F

## 2023-04-24 DIAGNOSIS — N18.30 DIABETES MELLITUS DUE TO UNDERLYING CONDITION WITH STAGE 3 CHRONIC KIDNEY DISEASE, WITHOUT LONG-TERM CURRENT USE OF INSULIN, UNSPECIFIED WHETHER STAGE 3A OR 3B CKD: ICD-10-CM

## 2023-04-24 DIAGNOSIS — N40.1 BPH WITH URINARY OBSTRUCTION: Primary | ICD-10-CM

## 2023-04-24 DIAGNOSIS — N13.8 BPH WITH URINARY OBSTRUCTION: Primary | ICD-10-CM

## 2023-04-24 DIAGNOSIS — E08.22 DIABETES MELLITUS DUE TO UNDERLYING CONDITION WITH STAGE 3 CHRONIC KIDNEY DISEASE, WITHOUT LONG-TERM CURRENT USE OF INSULIN, UNSPECIFIED WHETHER STAGE 3A OR 3B CKD: ICD-10-CM

## 2023-04-24 LAB — POCT GLUCOSE: 112 MG/DL (ref 70–110)

## 2023-04-24 PROCEDURE — 36000706: Performed by: UROLOGY

## 2023-04-24 PROCEDURE — D9220A PRA ANESTHESIA: ICD-10-PCS | Mod: ANES,,, | Performed by: ANESTHESIOLOGY

## 2023-04-24 PROCEDURE — D9220A PRA ANESTHESIA: Mod: ANES,,, | Performed by: ANESTHESIOLOGY

## 2023-04-24 PROCEDURE — 52441 CYSTO INSJ TRNSPRSTC 1 IMPLT: CPT | Mod: ,,, | Performed by: UROLOGY

## 2023-04-24 PROCEDURE — 52441 PR CYSTO W/INSERT PERMANENT ADJ IMPLANT, SINGLE: ICD-10-PCS | Mod: ,,, | Performed by: UROLOGY

## 2023-04-24 PROCEDURE — 25000003 PHARM REV CODE 250: Performed by: ANESTHESIOLOGY

## 2023-04-24 PROCEDURE — 37000009 HC ANESTHESIA EA ADD 15 MINS: Performed by: UROLOGY

## 2023-04-24 PROCEDURE — 63600175 PHARM REV CODE 636 W HCPCS: Performed by: NURSE ANESTHETIST, CERTIFIED REGISTERED

## 2023-04-24 PROCEDURE — 25000003 PHARM REV CODE 250: Performed by: NURSE ANESTHETIST, CERTIFIED REGISTERED

## 2023-04-24 PROCEDURE — 36000707: Performed by: UROLOGY

## 2023-04-24 PROCEDURE — D9220A PRA ANESTHESIA: ICD-10-PCS | Mod: CRNA,,, | Performed by: NURSE ANESTHETIST, CERTIFIED REGISTERED

## 2023-04-24 PROCEDURE — 71000015 HC POSTOP RECOV 1ST HR: Performed by: UROLOGY

## 2023-04-24 PROCEDURE — D9220A PRA ANESTHESIA: Mod: CRNA,,, | Performed by: NURSE ANESTHETIST, CERTIFIED REGISTERED

## 2023-04-24 PROCEDURE — 25000003 PHARM REV CODE 250: Performed by: UROLOGY

## 2023-04-24 PROCEDURE — 52442 CYSTO INS TRNSPRSTC IMPLT EA: CPT | Mod: ,,, | Performed by: UROLOGY

## 2023-04-24 PROCEDURE — L8699 PROSTHETIC IMPLANT NOS: HCPCS | Performed by: UROLOGY

## 2023-04-24 PROCEDURE — 71000033 HC RECOVERY, INTIAL HOUR: Performed by: UROLOGY

## 2023-04-24 PROCEDURE — 63600175 PHARM REV CODE 636 W HCPCS: Performed by: UROLOGY

## 2023-04-24 PROCEDURE — 82962 GLUCOSE BLOOD TEST: CPT | Performed by: UROLOGY

## 2023-04-24 PROCEDURE — 63600175 PHARM REV CODE 636 W HCPCS: Performed by: ANESTHESIOLOGY

## 2023-04-24 PROCEDURE — 71000016 HC POSTOP RECOV ADDL HR: Performed by: UROLOGY

## 2023-04-24 PROCEDURE — 52442 PR CYSTO W/INSERT PERMANENT ADJ IMPLANT, EA ADDTL IMPLANT: ICD-10-PCS | Mod: ,,, | Performed by: UROLOGY

## 2023-04-24 PROCEDURE — 37000008 HC ANESTHESIA 1ST 15 MINUTES: Performed by: UROLOGY

## 2023-04-24 DEVICE — CARTRIDGE UROLIFT 2 IMPLANT: Type: IMPLANTABLE DEVICE | Site: URETHRA | Status: FUNCTIONAL

## 2023-04-24 DEVICE — SYS UROLIFT: Type: IMPLANTABLE DEVICE | Site: PROSTATE | Status: FUNCTIONAL

## 2023-04-24 RX ORDER — SODIUM CHLORIDE, SODIUM LACTATE, POTASSIUM CHLORIDE, CALCIUM CHLORIDE 600; 310; 30; 20 MG/100ML; MG/100ML; MG/100ML; MG/100ML
INJECTION, SOLUTION INTRAVENOUS CONTINUOUS
Status: DISCONTINUED | OUTPATIENT
Start: 2023-04-24 | End: 2023-04-24 | Stop reason: HOSPADM

## 2023-04-24 RX ORDER — HYDROCODONE BITARTRATE AND ACETAMINOPHEN 5; 325 MG/1; MG/1
1 TABLET ORAL EVERY 4 HOURS PRN
Status: DISCONTINUED | OUTPATIENT
Start: 2023-04-24 | End: 2023-04-24 | Stop reason: HOSPADM

## 2023-04-24 RX ORDER — LIDOCAINE HYDROCHLORIDE 20 MG/ML
INJECTION INTRAVENOUS
Status: DISCONTINUED | OUTPATIENT
Start: 2023-04-24 | End: 2023-04-24

## 2023-04-24 RX ORDER — ACETAMINOPHEN 500 MG
1000 TABLET ORAL
Status: COMPLETED | OUTPATIENT
Start: 2023-04-24 | End: 2023-04-24

## 2023-04-24 RX ORDER — SULFAMETHOXAZOLE AND TRIMETHOPRIM 800; 160 MG/1; MG/1
1 TABLET ORAL 2 TIMES DAILY
Qty: 6 TABLET | Refills: 0 | Status: SHIPPED | OUTPATIENT
Start: 2023-04-24 | End: 2023-04-27

## 2023-04-24 RX ORDER — PHENYLEPHRINE HYDROCHLORIDE 10 MG/ML
INJECTION INTRAVENOUS
Status: DISCONTINUED | OUTPATIENT
Start: 2023-04-24 | End: 2023-04-24

## 2023-04-24 RX ORDER — FENTANYL CITRATE 50 UG/ML
INJECTION, SOLUTION INTRAMUSCULAR; INTRAVENOUS
Status: DISCONTINUED | OUTPATIENT
Start: 2023-04-24 | End: 2023-04-24

## 2023-04-24 RX ORDER — PROPOFOL 10 MG/ML
INJECTION, EMULSION INTRAVENOUS CONTINUOUS PRN
Status: DISCONTINUED | OUTPATIENT
Start: 2023-04-24 | End: 2023-04-24

## 2023-04-24 RX ORDER — HYDROCODONE BITARTRATE AND ACETAMINOPHEN 5; 325 MG/1; MG/1
1 TABLET ORAL EVERY 6 HOURS PRN
Qty: 20 TABLET | Refills: 0 | Status: SHIPPED | OUTPATIENT
Start: 2023-04-24 | End: 2023-08-25

## 2023-04-24 RX ORDER — PHENAZOPYRIDINE HYDROCHLORIDE 100 MG/1
200 TABLET, FILM COATED ORAL ONCE
Status: COMPLETED | OUTPATIENT
Start: 2023-04-24 | End: 2023-04-24

## 2023-04-24 RX ORDER — PHENAZOPYRIDINE HYDROCHLORIDE 100 MG/1
200 TABLET, FILM COATED ORAL 3 TIMES DAILY PRN
Qty: 21 TABLET | Refills: 0 | Status: SHIPPED | OUTPATIENT
Start: 2023-04-24 | End: 2023-11-02

## 2023-04-24 RX ORDER — LIDOCAINE HYDROCHLORIDE 10 MG/ML
1 INJECTION, SOLUTION EPIDURAL; INFILTRATION; INTRACAUDAL; PERINEURAL ONCE
Status: DISCONTINUED | OUTPATIENT
Start: 2023-04-24 | End: 2023-04-24 | Stop reason: HOSPADM

## 2023-04-24 RX ORDER — ONDANSETRON 2 MG/ML
INJECTION INTRAMUSCULAR; INTRAVENOUS
Status: DISCONTINUED | OUTPATIENT
Start: 2023-04-24 | End: 2023-04-24

## 2023-04-24 RX ORDER — PROPOFOL 10 MG/ML
INJECTION, EMULSION INTRAVENOUS
Status: DISCONTINUED | OUTPATIENT
Start: 2023-04-24 | End: 2023-04-24

## 2023-04-24 RX ADMIN — PHENAZOPYRIDINE HYDROCHLORIDE 200 MG: 100 TABLET ORAL at 09:04

## 2023-04-24 RX ADMIN — FENTANYL CITRATE 50 MCG: 50 INJECTION, SOLUTION INTRAMUSCULAR; INTRAVENOUS at 08:04

## 2023-04-24 RX ADMIN — ONDANSETRON 4 MG: 2 INJECTION, SOLUTION INTRAMUSCULAR; INTRAVENOUS at 08:04

## 2023-04-24 RX ADMIN — SODIUM CHLORIDE, SODIUM LACTATE, POTASSIUM CHLORIDE, AND CALCIUM CHLORIDE: .6; .31; .03; .02 INJECTION, SOLUTION INTRAVENOUS at 08:04

## 2023-04-24 RX ADMIN — PHENYLEPHRINE HYDROCHLORIDE 100 MCG: 10 INJECTION INTRAVENOUS at 08:04

## 2023-04-24 RX ADMIN — SODIUM CHLORIDE, POTASSIUM CHLORIDE, SODIUM LACTATE AND CALCIUM CHLORIDE: 600; 310; 30; 20 INJECTION, SOLUTION INTRAVENOUS at 07:04

## 2023-04-24 RX ADMIN — PROPOFOL 50 MCG/KG/MIN: 10 INJECTION, EMULSION INTRAVENOUS at 08:04

## 2023-04-24 RX ADMIN — PROPOFOL 30 MG: 10 INJECTION, EMULSION INTRAVENOUS at 08:04

## 2023-04-24 RX ADMIN — GENTAMICIN SULFATE 132.3 MG: 40 INJECTION, SOLUTION INTRAMUSCULAR; INTRAVENOUS at 08:04

## 2023-04-24 RX ADMIN — ACETAMINOPHEN 1000 MG: 500 TABLET ORAL at 07:04

## 2023-04-24 RX ADMIN — LIDOCAINE HYDROCHLORIDE 60 MG: 20 INJECTION, SOLUTION INTRAVENOUS at 08:04

## 2023-04-24 NOTE — OP NOTE
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 year old male with BPH with GARIBAY and LUTS.  He presents today for surgical treatment with prostatic urethral lift (Urolift).  The full risks and benefits of the procedure have been explained and detail and he wishes to proceed.    Procedure Description:  The patient was brought to the operative suite and placed under General/MAC anesthesia. He was placed in lithotomy position and prepped and draped in usual sterile fashion.  Time out was performed prior to starting the procedure.    A 20F cystoscope was inserted into the bladder. The cystoscopy bridge was replaced with a UroLift delivery device. The first treatment site was the patient's left side approximately 1.5cm distal to the bladder neck. The distal tip of the delivery device was then angled laterally approximately 20 degrees at this position to compress the lateral lobe. The trigger was pulled, thereby deploying a needle containing the implant through the prostate. The needle was then retracted, allowing one end of the implant to be delivered to the capsular surface of the prostate. The implant was then tensioned to assure capsular seating and removal of slack monofilament. The device was then angled back toward midline and slowly advanced proximally (typically 3 to 4 mm) until cystoscopic verification of the monofilament being centered in the delivery bay. The urethral end piece was then affixed to the monofilament thereby tailoring the size of the implant. Excess filament was then severed. The delivery device was then re-advanced into the bladder. The delivery device was then replaced and  the same procedure was then repeated on the right side.    The delivery device was then replaced with cystoscope and bridge and the implant location and opening effect was confirmed cystoscopically. Two additional implants were delivered just proximal to the veru montanum, again one on right and one on left side of the prostate, following the same technique.     A final cystoscopy was conducted first to inspect the location and state of each implant and second, to confirm the presence of a continuous anterior channel was present through the prostatic urethra with irrigation flow turned off. The bladder was then filled with approximately 150 cc irrigation fluid to assist the patient in void trial after the procedure, and all instruments were removed. The patient did not receive a post-operative urinary catheter.    Complications: No    Estimated Blood Loss: 0cc         Specimens Removed:   Specimen (24h ago, onward)      None            Implants:   Implant Name Type Inv. Item Serial No.  Lot No. LRB No. Used Action   SYS UROLIFT - [id number]  SYS UROLIFT  ATUL TECH  N/A 1 Implanted   CARTRIDGE UROLIFT 2 IMPLANT - [id number]  CARTRIDGE UROLIFT 2 IMPLANT  ATUL TECH [id number] N/A 1 Implanted   CARTRIDGE UROLIFT 2 IMPLANT - [id number]  CARTRIDGE UROLIFT 2 IMPLANT  ATUL TECH [id number] N/A 1 Implanted   CARTRIDGE UROLIFT 2 IMPLANT - [id number]  CARTRIDGE UROLIFT 2 IMPLANT  ATUL TECH [id number] N/A 1 Implanted              Disposition: PACU - hemodynamically stable.           Condition: Good

## 2023-04-24 NOTE — DISCHARGE SUMMARY
[facility] [place] - Surgery  Discharge Note  Short Stay    Procedure(s) (LRB):  CYSTOSCOPY, WITH INSERTION OF UROLIFT IMPLANT (N/A)      OUTCOME: Patient tolerated treatment/procedure well without complication and is now ready for discharge.    DISPOSITION: Home or Self Care    FINAL DIAGNOSIS:  <principal problem not specified>    FOLLOWUP: In clinic    DISCHARGE INSTRUCTIONS:    Discharge Procedure Orders   Diet general     Call MD for:   Order Comments: Significant Hematuria        TIME SPENT ON DISCHARGE: 20 minutes    [facility] [place]  Urology  Discharge Summary      Patient Name: Jani Cha   MRN: [id number]  Admission Date: 04/24/2023   Hospital Length of Stay: 0 days  Discharge Date and Time:  04/24/2023 8:49 AM  Attending Physician: Jeanmarie Hanson MD   Discharging Provider: JOSSELINE Hanson MD  Primary Care Physician: Laila Bains      HPI: Patient was admitted for an outpatient procedure and tolerated the procedure well with no complications.     Procedures: Procedure(s):  CYSTOSCOPY, WITH INSERTION OF UROLIFT IMPLANT        Indwelling Lines/Drains at time of discharge:           Hospital Course (synopsis of major diagnoses, care, treatment, and services provided during the course of the hospital stay): Patient was admitted for an outpatient procedure and tolerated the procedure well with no complications.         Final Active Diagnoses:    Diagnosis Date Noted POA    <principal problem not specified>   04/24/2023  Yes      Problems Resolved During this Admission:       Discharged Condition: stable    Disposition: Home or Self Care    Follow Up:     Patient Instructions:      Diet general     Call MD for:   Order Comments: Significant Hematuria     Medications:  Reconciled Home Medications:      Medication List        START taking these medications      HYDROcodone-acetaminophen 5-325 mg per tablet  Commonly known as: NORCO  Take 1 tablet by mouth every 6 (six) hours as needed for Pain.      phenazopyridine 100 MG tablet  Commonly known as: PYRIDIUM  Take 2 tablets (200 mg total) by mouth 3 (three) times daily as needed for Pain (Burning).     sulfamethoxazole-trimethoprim 800-160mg 800-160 mg Tab  Commonly known as: BACTRIM DS  Take 1 tablet by mouth 2 (two) times daily. for 3 days            CONTINUE taking these medications      acetaminophen 500 MG tablet  Commonly known as: TYLENOL  Take 2 tablets (1,000 mg total) by mouth every 8 (eight) hours as needed (Mild to moderate pain).     aspirin 81 MG EC tablet  Commonly known as: ECOTRIN  Take 81 mg by mouth once daily.     atorvastatin 80 MG tablet  Commonly known as: LIPITOR  Take 1 tablet by mouth once daily     blood sugar diagnostic Strp  1 strip by Misc.(Non-Drug; Combo Route) route once daily.     carvediloL 25 MG tablet  Commonly known as: COREG  TAKE 1 TABLET BY MOUTH TWICE DAILY WITH MEALS     clopidogreL 75 mg tablet  Commonly known as: PLAVIX  Take 1 tablet (75 mg total) by mouth once daily.     clotrimazole-betamethasone 1-0.05% cream  Commonly known as: LOTRISONE  Apply topically 2 (two) times daily.     dulaglutide 1.5 mg/0.5 mL pen injector  Commonly known as: TRULICITY  Inject 1.5 mg into the skin every 7 days.     fluticasone propionate 50 mcg/actuation nasal spray  Commonly known as: FLONASE  1 spray (50 mcg total) by Each Nostril route once daily.     glipiZIDE 10 MG Tr24  Commonly known as: GLUCOTROL  Take 1 tablet (10 mg total) by mouth 2 (two) times daily with meals.     melatonin 3 mg tablet  Commonly known as: MELATIN  Take 2 tablets (6 mg total) by mouth nightly as needed for Insomnia.     pantoprazole 40 MG tablet  Commonly known as: PROTONIX  Take 1 tablet by mouth once daily     senna-docusate 8.6-50 mg 8.6-50 mg per tablet  Commonly known as: PERICOLACE  Take 1 tablet by mouth 2 (two) times daily.     TRUEPLUS LANCETS 33 gauge Misc  Generic drug: lancets  Apply 1 lancet topically once daily. Use to test blood sugar  daily; discard lancet after each use     valsartan 80 MG tablet  Commonly known as: DIOVAN  Take 1 tablet (80 mg total) by mouth once daily.                JOSSELINE Hanson MD  Urology  [facility]

## 2023-04-24 NOTE — ANESTHESIA POSTPROCEDURE EVALUATION
Anesthesia Post Evaluation    Patient: Jani Cha    Procedure(s) Performed: Procedure(s) (LRB):  CYSTOSCOPY, WITH INSERTION OF UROLIFT IMPLANT (N/A)    Final Anesthesia Type: MAC      Patient location during evaluation: PACU  Patient participation: Yes- Able to Participate  Level of consciousness: awake and alert  Post-procedure vital signs: reviewed and stable  Pain management: adequate  Airway patency: patent    PONV status at discharge: No PONV  Anesthetic complications: no      Cardiovascular status: hemodynamically stable  Respiratory status: unassisted and spontaneous ventilation  Hydration status: euvolemic  Follow-up not needed.          Vitals Value Taken Time   /73 04/24/23 0901   Temp 36.4 °C (97.6 °F) 04/24/23 0853   Pulse 65 04/24/23 0913   Resp 13 04/24/23 0913   SpO2 98 % 04/24/23 0913   Vitals shown include unvalidated device data.      No case tracking events are documented in the log.      Pain/Good Score: Pain Rating Prior to Med Admin: 4 (4/24/2023  7:28 AM)  Good Score: 9 (4/24/2023  9:00 AM)

## 2023-04-24 NOTE — BRIEF OP NOTE
[facility] [place] - Surgery  Brief Operative Note    Surgery Date: 4/24/2023     Surgeon(s) and Role:     * Jeanmarie Hanson MD - Primary    Assisting Surgeon: None    Pre-op Diagnosis:  BPH with urinary obstruction [N40.1, N13.8]    Post-op Diagnosis:  Post-Op Diagnosis Codes:     * BPH with urinary obstruction [N40.1, N13.8]    Procedure(s) (LRB):  CYSTOSCOPY, WITH INSERTION OF UROLIFT IMPLANT (N/A)    Anesthesia: General/MAC    Operative Findings: 4 implants    Estimated Blood Loss: * No values recorded between 4/24/2023  8:30 AM and 4/24/2023  8:45 AM *         Specimens:   Specimen (24h ago, onward)      None              Discharge Note    OUTCOME: Patient tolerated treatment/procedure well without complication and is now ready for discharge.    DISPOSITION: Home or Self Care    FINAL DIAGNOSIS:  <principal problem not specified>    FOLLOWUP: In clinic    DISCHARGE INSTRUCTIONS:    Discharge Procedure Orders   Diet general     Call MD for:   Order Comments: Significant Hematuria

## 2023-04-24 NOTE — DISCHARGE INSTRUCTIONS
ACTIVITY LEVEL: If you have received sedation or an anesthetic, you may feel sleepy for several hours. Rest  until you are more awake. Gradually resume your normal activities.    DIET: You may resume your home diet. If nausea is present, increase your diet gradually with fluids and bland  foods.    Medications: Pain medication should be taken only if needed and as directed. If antibiotics are prescribed, the  medication should be taken until completed. You will be given an updated list of you medications.    No driving, alcoholic beverages or signing legal documents for next 24  hours or while taking pain medication    CALL THE DOCTOR:  Fever over 101°F  Severe pain that doesnt go away with medication.  Upset stomach and vomiting that is persistent.  Problems urinating-unable to urinate or heavy bleeding (with or without clots)           Discharge Instructions Following UroLift Transprostatic Implant Treatment    General Expectations  Some men may experience discomfort after the procedure. On occasion, some bloody discharge may be apparent from the penis. You may have soreness in the lower abdomen, and it may be uncomfortable to sit. You may experience the need to urinate more frequently and with greater urgency. These are all normal reactions to the procedure. It is important to take care of yourself the next couple of days to facilitate a speedy recovery.     The following are some suggestions:    1. Have someone drive you home after the procedure.   2. Drink plenty of water. If you see blood in your urine, drink more water to keep urine flowing.   3. Take your medication as prescribed.   4. If you have a catheter placed, do not engage in strenuous activity until your catheter has been removed. You may take a shower but avoid a bath while you have a catheter.    5. Okay to return to work when urine is light pink or clear. Avoid strenuous activity or heavy lifting for 1 week.    Medications  See discharge  [facility]. But generally you will be given a pain medication, antibiotic, and medication to help with burning urination.     When taking pain medications, you may experience dizziness or drowsiness. Do not drink alcohol or drive when you are taking these medications. You can try Tylenol or ibuprofen first and take the prescribed pain medicine if not relieved with this.    You can take ibuprofen 600 mg three times per day for a max of 3 days as needed. Make sure to eat while taking ibuprofen.    You will be given an antibiotic to prevent urinary tract infection to take twice daily for 2 days, it is important to finish all medications as directed.     You will also be given a medication to help with burning and spasms (Uribel or Pyridium). You do not need to fill this medication unless you experience these symptoms. If it is too expensive you can buy over the counter Azo to take as needed.     *If you have a catheter placed, see instructions for care of catheter.    Complications  You should contact your physician at [phone] during working hours or the on-call urologist at the same number if you experience any of the followin. Temperature above 101.5 (taken by mouth).   2. Excessive urinary bleeding or bleeding from the penis.   3. Continuous bladder spasms.   4. Painful, swollen and/or inflated testicles or scrotum.   5. Unable to urinate spontaneously or the bladder catheter (if you have one) is not draining urine or is blocked.    If you need immediate attention, go to the hospital emergency room for treatment. Always call your physician before going to the emergency room. If your doctor suggests that you go to the emergency room or other facility for catheterization for inability to urinate, be sure to tell the facility personnel to use Coude (pronounced -day) tipped catheter.

## 2023-04-24 NOTE — TRANSFER OF CARE
Anesthesia Transfer of Care Note    Patient: Jani Cha    Procedure(s) Performed: Procedure(s) (LRB):  CYSTOSCOPY, WITH INSERTION OF UROLIFT IMPLANT (N/A)    Patient location: PACU    Anesthesia Type: MAC    Transport from OR: Transported from OR on 2-3 L/min O2 by NC with adequate spontaneous ventilation    Post pain: adequate analgesia    Post assessment: no apparent anesthetic complications and tolerated procedure well    Post vital signs: stable    Level of consciousness: awake, alert and oriented    Nausea/Vomiting: no nausea/vomiting    Complications: none    Transfer of care protocol was followed      Last vitals:   Visit Vitals  BP (!) 110/59 (BP Location: Right arm, Patient Position: Lying)   Pulse 72   Temp 36.4 °C (97.6 °F) (Oral)   Resp 16   Wt 113.4 kg (250 lb)   SpO2 98%   BMI 35.87 kg/m²

## 2023-04-25 NOTE — DISCHARGE SUMMARY
[facility] - Surgery  Discharge Note  Short Stay    Procedure(s) (LRB):  CYSTOSCOPY, WITH INSERTION OF UROLIFT IMPLANT (N/A)      OUTCOME: Patient tolerated treatment/procedure well without complication and is now ready for discharge.    DISPOSITION: Home or Self Care    FINAL DIAGNOSIS:  <principal problem not specified>    FOLLOWUP: In clinic    DISCHARGE INSTRUCTIONS:    Discharge Procedure Orders   Diet general     Call MD for:   Order Comments: Significant Hematuria         Clinical Reference Documents Added to Patient Instructions         Document    UROLIFT DISCHARGE INSTRUCTIONS (ENGLISH)            TIME SPENT ON DISCHARGE: 20 minutes

## 2023-04-30 ENCOUNTER — EXTERNAL CHRONIC CARE MANAGEMENT (OUTPATIENT)
Dept: PRIMARY CARE CLINIC | Facility: CLINIC | Age: 78
End: 2023-04-30
Payer: MEDICARE

## 2023-04-30 PROCEDURE — 99490 CHRNC CARE MGMT STAFF 1ST 20: CPT | Mod: PBBFAC,PO | Performed by: FAMILY MEDICINE

## 2023-04-30 PROCEDURE — 99490 PR CHRONIC CARE MGMT, 1ST 20 MIN: ICD-10-PCS | Mod: S$PBB,,, | Performed by: FAMILY MEDICINE

## 2023-04-30 PROCEDURE — 99490 CHRNC CARE MGMT STAFF 1ST 20: CPT | Mod: S$PBB,,, | Performed by: FAMILY MEDICINE

## 2023-05-05 NOTE — PROGRESS NOTES
"  Jani Cha presented for a  Medicare AWV and comprehensive Health Risk Assessment today. The following components were reviewed and updated:    Medical history  Family History  Social history  Allergies and Current Medications  Health Risk Assessment  Health Maintenance  Care Team       ** See Completed Assessments for Annual Wellness Visit within the encounter summary.**       The following assessments were completed:  Living Situation  CAGE  Depression Screening  Timed Get Up and Go  Whisper Test  Cognitive Function Screening  Nutrition Screening  ADL Screening  PAQ Screening          Vitals:    10/13/22 0954   BP: 120/66   Pulse: 72   Resp: 18   Temp: 98 °F (36.7 °C)   TempSrc: Oral   SpO2: 95%   Weight: 109.7 kg (241 lb 13.5 oz)   Height: 5' 10" (1.778 m)     Body mass index is 34.7 kg/m².  Physical Exam  Vitals and nursing note reviewed.   Constitutional:       Appearance: Normal appearance.   Cardiovascular:      Rate and Rhythm: Normal rate.      Pulses: Normal pulses.      Heart sounds: Normal heart sounds.   Pulmonary:      Effort: Pulmonary effort is normal.      Breath sounds: Normal breath sounds.   Abdominal:      General: Bowel sounds are normal.      Palpations: Abdomen is soft.   Musculoskeletal:         General: Normal range of motion.   Neurological:      Mental Status: He is alert and oriented to person, place, and time.   Psychiatric:         Mood and Affect: Mood normal.         Behavior: Behavior normal.           Diagnoses and health risks identified today and associated recommendations/orders:    1. Encounter for preventive health examination  Pt was seen today for an Annual Wellness visit. Healthcare maintenance and screening recommendations were discussed and updated as indicated. Return in one year for AWV.    Review current opioid prescriptions:n/a  Screen for potential Substance Use Disorders:n/a    2. Abdominal aortic aneurysm (AAA) without rupture, unspecified part  The current " CC: Hematuria and weakness  History of Present Illness:   77 y/o M with PMH [place] syndrome, HLD, prostate cancer s/p prostatectomy, GIOVANNI, GERD, HTN, Afib on Coumadin, and bladder CA on chemotherapy and radiation started 3/13/2023 presented with hematuria and weakness. Pt states that he finished radiation last Friday and may have 1 more round of chemotherapy remaining. He did initially have blood in the urine but it was clear for a while after his treatments. About 3-4 days ago he started noticing increased blood in the urine and small clots. He also has constipation (had a bowel movement yesterday) and swelling of the lower extremities. He has been taking lasix 40 mg PO BID to help with the swelling. He has not has his INR checked in a while. Denies fevers, chills, chest pain, SOB, abdominal pain, N/V, diarrhea.     Priro admission:   - 4/12/23: fall at home -> right 7th rib fracture, sternal fracture, possible T7 fracture     ER course: VSS. Labs: Hb 10.8 -> 10.1, , neutrophils 83%, immature granulocytes 1.6%, INR 8.50, lactate 2.1 -> 1.6, Na 130, K+ 3.4, Cr 2.10 (baseline ~1.0), glucose 110, albumin 2.6, alkaline phosphatase 159, . UA: small leukocyte esterase, large blood, WBC 11-25, few bacteria. EKG: NSR with HR 67 bpm, 1st degree AV block  ms,  ms, no ST segment changes, no T wave inversions (personally reviewed).     Imaging:   - Doppler lower extremity b/l: No evidence of deep venous thrombosis in either lower extremity.  - CXR: no consoldiation, no effusion, no pneumothorax, cardiomegaly (personally reviewed).   - US kidneys/bladder: Limited visualization of the left kidney and urinary bladder with suggestion of mild left hydronephrosis. If clinically indicated further evaluation may be performed with noncontrast abdominal CT.    Pt was given Ceftriaxone, Vitamin K IVPB. He is being admitted placed on med/surg observation for further management.    S:  5/2: Lying in bed, awake, alert, no fever, chills, n, v, still with hematuria.  Edwards in place.  Discussed plan of care.  5/3: Lying in bed, feeling better, denies any specific complaints.  Hematuria slowly improving.  5/4: Persistent hematuria, found to have ENCL-ESBL.  Pt otherwise feeling okay, denies any specific complaints.  5/5: In bed, persistent hematuria, has some abd fullness.  No fever, chills, n, v.       REVIEW OF SYSTEMS: All other review of systems is negative unless indicated above.        PHYSICAL EXAM:    Constitutional: NAD, awake and alert, well-developed  HEENT: PERR, EOMI, Normal Hearing, MMM  Neck: Soft and supple  Respiratory: Breath sounds are clear bilaterally, No wheezing, rales or rhonchi  Cardiovascular: S1 and S2, regular rate and rhythm, no Murmurs, gallops or rubs  Gastrointestinal: Bowel Sounds present, soft, nontender, nondistended, no guarding, no rebound  Extremities: No peripheral edema  : Edwards in place, draining bloody urine  Neurological: A/O x 3, no focal deficits in my limited exam    MEDICATIONS  (STANDING):  cholecalciferol 1000 Unit(s) Oral daily  cyanocobalamin 1000 MICROGram(s) Oral daily  diltiazem    milliGRAM(s) Oral daily  fluticasone propionate 50 MICROgram(s)/spray Nasal Spray 1 Spray(s) Both Nostrils two times a day  meropenem  IVPB 1000 milliGRAM(s) IV Intermittent every 8 hours  metoprolol succinate ER 50 milliGRAM(s) Oral daily  multivitamin 1 Tablet(s) Oral daily  oxybutynin 5 milliGRAM(s) Oral daily  potassium chloride    Tablet ER 10 milliEquivalent(s) Oral daily  senna 2 Tablet(s) Oral at bedtime    MEDICATIONS  (PRN):  acetaminophen     Tablet .. 650 milliGRAM(s) Oral every 6 hours PRN Temp greater or equal to 38C (100.4F), Mild Pain (1 - 3)  aluminum hydroxide/magnesium hydroxide/simethicone Suspension 30 milliLiter(s) Oral every 4 hours PRN Dyspepsia  melatonin 3 milliGRAM(s) Oral at bedtime PRN Insomnia  polyethylene glycol 3350 17 Gram(s) Oral daily PRN Constipation  traMADol 25 milliGRAM(s) Oral every 8 hours PRN Severe Pain (7 - 10)                                9.0    10.45 )-----------( 298      ( 04 May 2023 07:59 )             27.7     05-04    138  |  108  |  22  ----------------------------<  94  3.4<L>   |  29  |  0.98    Ca    8.2<L>      04 May 2023 07:59      CAPILLARY BLOOD GLUCOSE          PT/INR - ( 05 May 2023 06:44 )   PT: 38.0 sec;   INR: 3.24 ratio          Assessment/Plan:  77 y/o M presented with hematuria     1. Hematuria likely secondary to warfarin induced coagulopathy in the setting of bladder cancer and UTI with associated acute blood loss anemia:  - Hx of prostate Ca s/p prostatectomy   - Hb 9.2 --> 8.9  --> 9.0  - INR 8.50, s/p Vitamin K --> 3.5 --> 4.66 --> 4.67 --> 3.24  - Edwards placed in ED, continue edwards care  -UCx Enterobacter Cloacae --> ESBL  -s/p ceftin  -start meropenem per ID day # 2  - uro re-eval pending due to persistent hematuria w/ clots and possible obstruction  - Heme/onc consult appreciated    2. Acute kidney injury likely pre-renal/dehydration/volume loss vs. medication induced (lasix) vs. post-obstructive (mild hydroureteronephrosis): RESOLVING  - Cr 2.1 --> 1.47 --> 1.17 --> 0.98  - lactic acidosis resolved  - s/p IVFs  - US kidneys/bladder: mild left hydronephrosis   - Hyponatremia resolved    3. Abnormal EKG:  -prolonged qtc; first degree AV block  -amiodarone on hold  -c/w CCB  -cardio eval appreciated.       4. History of [place]ing syndrome, HLD, prostate cancer s/p prostatectomy, GIOVANNI, GERD, HTN, Afib on Coumadin, and bladder CA on chemotherapy and radiation started 3/13/2023   - c/w home medications    DVT ppx:  -SCDs    Code status:   -Full   -Emergency contact: Nadia Mccabe (wife [phone])           Assessment and Plan:   Nutritional Assessment:  · Nutritional Assessment	This patient has been assessed with a concern for Malnutrition and has been determined to have a diagnosis/diagnoses of Moderate protein-calorie malnutrition.    This patient is being managed with:   Diet DASH/TLC-  Sodium & Cholesterol Restricted  Entered: May  1 2023 11:21PM         medical regimen is effective;  continue present plan and medications.    3. Type 2 diabetes mellitus with hyperglycemia, without long-term current use of insulin  4. Type 2 diabetes mellitus with diabetic neuropathy, without long-term current use of insulin  Hgb A1C 7.4 on 8/10/22 (trending down). Reports home fasting cbg ranges 116 -147. Denies recent hypoglycemic episodes. Discussed Hgb A1C and home fasting cbg goals, diet, activity. Understanding verbalized. The current medical regimen is effective;  continue present plan and medications.    5. Stage 3b chronic kidney disease  Discussed b/p control, diet, hydration with water.The current medical regimen is effective;  continue present plan and medications.    6. Class 1 obesity due to excess calories with serious comorbidity and body mass index (BMI) of 34.0 to 34.9 in adult  The patient is asked to make an attempt to improve diet and exercise patterns to aid in medical management of this problem.    7. Calcified granuloma of lung  The current medical regimen is effective;  continue present plan and medications.    8. Thoracic aortic aneurysm without rupture, unspecified part  The current medical regimen is effective;  continue present plan and medications.    9. Hypertension, unspecified type  The current medical regimen is effective;  continue present plan and medications.  10. Hyperlipidemia, unspecified hyperlipidemia type  The current medical regimen is effective;  continue present plan and medications.    11. Coronary artery disease involving native coronary artery of native heart without angina pectoris  The current medical regimen is effective;  continue present plan and medications.    12. Adrenal adenoma, unspecified laterality  The current medical regimen is effective;  continue present plan and medications.    13. Bilateral renal cysts  The current medical regimen is effective;  continue present plan and medications.    14. Gastroesophageal reflux  disease, unspecified whether esophagitis present  The current medical regimen is effective;  continue present plan and medications.    15. Personal history of colonic polyps  The current medical regimen is effective;  continue present plan and medications.    16. History of PCI of RCA  The current medical regimen is effective;  continue present plan and medications.    17. S/P CABG x 4  The current medical regimen is effective;  continue present plan and medications.        Provided Jani with a 5-10 year written screening schedule and personal prevention plan. Recommendations were developed using the USPSTF age appropriate recommendations. Education, counseling, and referrals were provided as needed. After Visit Summary printed and given to patient which includes a list of additional screenings\tests needed.    Follow up in about 4 months (around 2/24/2023) with provider.    JESSE Rosas    I offered to discuss advanced care planning, including how to pick a person who would make decisions for you if you were unable to make them for yourself, called a health care power of , and what kind of decisions you might make such as use of life sustaining treatments such as ventilators and tube feeding when faced with a life limiting illness recorded on a living will that they will need to know. (How you want to be cared for as you near the end of your natural life)     X Patient is interested in learning more about how to make advanced directives.  I provided them paperwork and offered to discuss this with them.

## 2023-05-11 ENCOUNTER — OFFICE VISIT (OUTPATIENT)
Dept: UROLOGY | Facility: CLINIC | Age: 78
End: 2023-05-11
Payer: MEDICARE

## 2023-05-11 VITALS — WEIGHT: 247.25 LBS | BODY MASS INDEX: 35.48 KG/M2

## 2023-05-11 DIAGNOSIS — N40.1 BPH WITH URINARY OBSTRUCTION: Primary | ICD-10-CM

## 2023-05-11 DIAGNOSIS — R35.1 NOCTURIA MORE THAN TWICE PER NIGHT: ICD-10-CM

## 2023-05-11 DIAGNOSIS — N13.8 BPH WITH URINARY OBSTRUCTION: Primary | ICD-10-CM

## 2023-05-11 DIAGNOSIS — R33.9 INCOMPLETE EMPTYING OF BLADDER: ICD-10-CM

## 2023-05-11 LAB
BILIRUB SERPL-MCNC: NEGATIVE MG/DL
BLOOD URINE, POC: NEGATIVE
COLOR, POC UA: YELLOW
GLUCOSE UR QL STRIP: NORMAL
KETONES UR QL STRIP: NEGATIVE
LEUKOCYTE ESTERASE URINE, POC: NEGATIVE
NITRITE, POC UA: NEGATIVE
PH, POC UA: 5
PROTEIN, POC: NORMAL
SPECIFIC GRAVITY, POC UA: 1020
UROBILINOGEN, POC UA: NORMAL

## 2023-05-11 PROCEDURE — 81001 URINALYSIS AUTO W/SCOPE: CPT | Mod: PBBFAC | Performed by: UROLOGY

## 2023-05-11 PROCEDURE — 99999 PR PBB SHADOW E&M-EST. PATIENT-LVL III: ICD-10-PCS | Mod: PBBFAC,,, | Performed by: UROLOGY

## 2023-05-11 PROCEDURE — 99213 OFFICE O/P EST LOW 20 MIN: CPT | Mod: PBBFAC | Performed by: UROLOGY

## 2023-05-11 PROCEDURE — 99214 PR OFFICE/OUTPT VISIT, EST, LEVL IV, 30-39 MIN: ICD-10-PCS | Mod: S$PBB,,, | Performed by: UROLOGY

## 2023-05-11 PROCEDURE — 99214 OFFICE O/P EST MOD 30 MIN: CPT | Mod: S$PBB,,, | Performed by: UROLOGY

## 2023-05-11 PROCEDURE — 99999 PR PBB SHADOW E&M-EST. PATIENT-LVL III: CPT | Mod: PBBFAC,,, | Performed by: UROLOGY

## 2023-05-11 NOTE — PROGRESS NOTES
Subjective:       Patient ID: Jani Cha is a 77 y.o. male who was last seen in this office 3/30/2023    Chief Complaint:   Chief Complaint   Patient presents with    Follow-up         History of Kidney Stones  He had an issue with a ureteral stone in Winter 2015.  He did not recall passing the stone but his pain resolved.       3/25/2021  He had right flank pain and hematuria a couple of weeks ago. He brought the stone for analysis a couple of weeks ago.  He denies anymore hematuria, flank pain.  He denies fever.    5/3/2022  He denies pain or hematuria.        Benign Prostatic Hyperplasia  He patient reports nocturia three times a night. He denies frequency, incomplete emptying and intermittency. The patient states symptoms are of moderate severity. Onset of symptoms was several years ago and was gradual in onset.  He has no personal history and no family history of prostate cancer. He reports a history of kidney stones. He denies flank pain, gross hematuria and recurrent UTI.  He is currently taking no prostate medications.  He has noted some frequency with occasional urgency.    8/3/2022  He was to add Flomax last visit.  He took it for no more than 2 weeks.  He stopped it due to retrograde ejaculation and pain in his prostate afterwards.  He did not note any change to his stream.    IPSS Questionnaire (AUA-7):  8    11/17/2022   He is doing okay.  He recently fractured his hip.  His stream is about the same.  He required a urologist to place the Alejandre.  He is not sure why.    He is not interested in trying prostate medications again.    11/29/2022 Orchitis  He went to the ED on 11/24/2022 after developing a fever.  His urine was red at the time.  He was given antibiotics for a UTI.  Two days later he develop pain in his testicles.  He went back to the ED.    12/27/2022  He feels better today.  His testicle is slightly sensitive.      IPSS Questionnaire (AUA-7):  17    03/30/2023  Cystoscopy this month  showed bilateral lobe hypertrophy of the prostate.   He would like to proceed with a procedure on his prostate.     05/11/2023  He is s/p UroLift on 4/24/2023.  He is having a lot of urgency.  Nocturia may be improved.    IPSS Questionnaire (AUA-7):  Over the past month    1)  How often have you had a sensation of not emptying your bladder completely after you finish urinating?  2 - Less than half the time   2)  How often have you had to urinate again less than two hours after you finished urinating? 4 - More than half the time   3)  How often have you found you stopped and started again several times when you urinated?  1 - Less than 1 time in 5   4) How difficult have you found it to postpone urination?  5 - Almost always   5) How often have you had a weak urinary stream?  2 - Less than half the time   6) How often have you had to push or strain to begin urination?  1 - Less than 1 time in 5   7) How many times did you most typically get up to urinate from the time you went to bed until the time you got up in the morning?  4 - 4 times   Total score:  0-7 mildly symptomatic    8-19 moderately symptomatic    20-35 severely symptomatic  20/4        ACTIVE MEDICAL ISSUES:  Patient Active Problem List   Diagnosis    Hyperlipidemia    Primary hypertension    Coronary artery disease involving native coronary artery of native heart without angina pectoris    Sleep apnea    S/P CABG x 4    Type 2 diabetes mellitus with diabetic neuropathy, without long-term current use of insulin    GERD (gastroesophageal reflux disease)    Personal history of colonic polyps    Abdominal aortic aneurysm (AAA) without rupture    Total occlusion of coronary artery, chronic -LCX with collaterals.  No ischemic on PET    Pulmonary nodule    Thoracic aortic aneurysm without rupture    Bilateral renal cysts    Adrenal adenoma    History of PCI of RCA    Class 1 obesity due to excess calories with serious comorbidity in adult    Calcified  granuloma of lung    Type 2 diabetes mellitus with hyperglycemia, without long-term current use of insulin    Lymphedema of both lower extremities    Swelling of lower extremity    Closed displaced fracture of right femoral neck s/p total hip arthroplasty on 10/21/2022    Hip pain    Chronic pain of both knees    Decreased strength, endurance, and mobility    Osteoporosis    Localized osteoporosis of [place]    Other specified disorders of adrenal gland    Congestive heart failure, unspecified HF chronicity, unspecified heart failure type    Diabetes mellitus due to underlying condition with stage 3 chronic kidney disease, without long-term current use of insulin, unspecified whether stage 3a or 3b CKD       ALLERGIES AND MEDICATIONS: updated and reviewed.  Review of patient's allergies indicates:   Allergen Reactions    Penicillins Hives, Itching and Rash    Shellfish containing products      Current Outpatient Medications   Medication Sig    acetaminophen (TYLENOL) 500 MG tablet Take 2 tablets (1,000 mg total) by mouth every 8 (eight) hours as needed (Mild to moderate pain).    aspirin (ECOTRIN) 81 MG EC tablet Take 81 mg by mouth once daily.    atorvastatin (LIPITOR) 80 MG tablet Take 1 tablet by mouth once daily    blood sugar diagnostic Strp 1 strip by Misc.(Non-Drug; Combo Route) route once daily.    carvediloL (COREG) 25 MG tablet TAKE 1 TABLET BY MOUTH TWICE DAILY WITH MEALS    clopidogreL (PLAVIX) 75 mg tablet Take 1 tablet (75 mg total) by mouth once daily.    clotrimazole-betamethasone 1-0.05% (LOTRISONE) cream Apply topically 2 (two) times daily.    dulaglutide (TRULICITY) 1.5 mg/0.5 mL pen injector Inject 1.5 mg into the skin every 7 days.    fluticasone propionate (FLONASE) 50 mcg/actuation nasal spray 1 spray (50 mcg total) by Each Nostril route once daily.    glipiZIDE (GLUCOTROL) 10 MG TR24 Take 1 tablet (10 mg total) by mouth 2 (two) times daily with meals.    HYDROcodone-acetaminophen (NORCO)  5-325 mg per tablet Take 1 tablet by mouth every 6 (six) hours as needed for Pain.    pantoprazole (PROTONIX) 40 MG tablet Take 1 tablet by mouth once daily    valsartan (DIOVAN) 80 MG tablet Take 1 tablet (80 mg total) by mouth once daily.    melatonin (MELATIN) 3 mg tablet Take 2 tablets (6 mg total) by mouth nightly as needed for Insomnia. (Patient not taking: Reported on 5/11/2023)    phenazopyridine (PYRIDIUM) 100 MG tablet Take 2 tablets (200 mg total) by mouth 3 (three) times daily as needed for Pain (Burning). (Patient not taking: Reported on 5/11/2023)    senna-docusate 8.6-50 mg (PERICOLACE) 8.6-50 mg per tablet Take 1 tablet by mouth 2 (two) times daily. (Patient not taking: Reported on 5/11/2023)    TRUEPLUS LANCETS 33 gauge Misc Apply 1 lancet topically once daily. Use to test blood sugar daily; discard lancet after each use     No current facility-administered medications for this visit.       Review of Systems   Constitutional:  Negative for chills and fever.   HENT:  Negative for congestion.    Respiratory:  Negative for chest tightness and shortness of breath.    Cardiovascular:  Negative for chest pain and palpitations.   Gastrointestinal:  Negative for abdominal pain, constipation, diarrhea, nausea and vomiting.   Genitourinary:  Positive for urgency. Negative for difficulty urinating, dysuria, flank pain and hematuria.   Musculoskeletal:  Negative for arthralgias.   Neurological:  Negative for dizziness.   Psychiatric/Behavioral:  Negative for confusion.      Objective:      Vitals:    05/11/23 0815   Weight: 112.1 kg (247 lb 3.9 oz)     Physical Exam  Vitals and nursing note reviewed.   Constitutional:       Appearance: He is well-developed.   HENT:      Head: Normocephalic.   Eyes:      Conjunctiva/sclera: Conjunctivae normal.   Neck:      Thyroid: No thyromegaly.      Trachea: No tracheal deviation.   Cardiovascular:      Rate and Rhythm: Normal rate.      Heart sounds: Normal heart sounds.    Pulmonary:      Effort: Pulmonary effort is normal. No respiratory distress.      Breath sounds: Normal breath sounds. No wheezing.   Abdominal:      General: Bowel sounds are normal.      Palpations: Abdomen is soft.      Tenderness: There is no abdominal tenderness. There is no rebound.      Hernia: No hernia is present.   Musculoskeletal:         General: No tenderness. Normal range of motion.      Cervical back: Normal range of motion and neck supple.   Lymphadenopathy:      Cervical: No cervical adenopathy.   Skin:     General: Skin is warm and dry.      Findings: No erythema or rash.   Neurological:      Mental Status: He is alert and oriented to person, place, and time.   Psychiatric:         Behavior: Behavior normal.         Thought Content: Thought content normal.         Judgment: Judgment normal.       Urine dipstick shows negative for all components.  Micro exam: negative for WBC's or RBC's.    Assessment:       1. BPH with urinary obstruction    2. Nocturia more than twice per night    3. Incomplete emptying of bladder          Plan:       1. BPH with urinary obstruction  Offered anticholinergic, he declined  Reassured, time will help    - POCT urinalysis, dipstick or tablet reag    2. Nocturia more than twice per night  Limit evening fluids    3. Incomplete emptying of bladder  PVR check next time            Follow up in about 6 weeks (around 6/22/2023) for Follow up Established, Follow up PVR, IPSS.

## 2023-05-22 DIAGNOSIS — I10 ESSENTIAL HYPERTENSION: Chronic | ICD-10-CM

## 2023-05-22 RX ORDER — CARVEDILOL 25 MG/1
TABLET ORAL
Qty: 180 TABLET | Refills: 3 | Status: SHIPPED | OUTPATIENT
Start: 2023-05-22

## 2023-05-31 ENCOUNTER — EXTERNAL CHRONIC CARE MANAGEMENT (OUTPATIENT)
Dept: PRIMARY CARE CLINIC | Facility: CLINIC | Age: 78
End: 2023-05-31
Payer: MEDICARE

## 2023-05-31 PROCEDURE — 99490 CHRNC CARE MGMT STAFF 1ST 20: CPT | Mod: PBBFAC,PO | Performed by: FAMILY MEDICINE

## 2023-05-31 PROCEDURE — 99490 PR CHRONIC CARE MGMT, 1ST 20 MIN: ICD-10-PCS | Mod: S$PBB,,, | Performed by: FAMILY MEDICINE

## 2023-05-31 PROCEDURE — 99490 CHRNC CARE MGMT STAFF 1ST 20: CPT | Mod: S$PBB,,, | Performed by: FAMILY MEDICINE

## 2023-06-23 ENCOUNTER — OFFICE VISIT (OUTPATIENT)
Dept: UROLOGY | Facility: CLINIC | Age: 78
End: 2023-06-23
Payer: MEDICARE

## 2023-06-23 VITALS — WEIGHT: 250.75 LBS | BODY MASS INDEX: 35.98 KG/M2

## 2023-06-23 DIAGNOSIS — N13.8 BPH WITH URINARY OBSTRUCTION: Primary | ICD-10-CM

## 2023-06-23 DIAGNOSIS — R35.1 NOCTURIA MORE THAN TWICE PER NIGHT: ICD-10-CM

## 2023-06-23 DIAGNOSIS — R33.9 INCOMPLETE EMPTYING OF BLADDER: ICD-10-CM

## 2023-06-23 DIAGNOSIS — N40.1 BPH WITH URINARY OBSTRUCTION: Primary | ICD-10-CM

## 2023-06-23 PROCEDURE — 99999 PR PBB SHADOW E&M-EST. PATIENT-LVL III: CPT | Mod: PBBFAC,,, | Performed by: UROLOGY

## 2023-06-23 PROCEDURE — 99213 PR OFFICE/OUTPT VISIT, EST, LEVL III, 20-29 MIN: ICD-10-PCS | Mod: S$PBB,,, | Performed by: UROLOGY

## 2023-06-23 PROCEDURE — 99213 OFFICE O/P EST LOW 20 MIN: CPT | Mod: S$PBB,,, | Performed by: UROLOGY

## 2023-06-23 PROCEDURE — 99999 PR PBB SHADOW E&M-EST. PATIENT-LVL III: ICD-10-PCS | Mod: PBBFAC,,, | Performed by: UROLOGY

## 2023-06-23 PROCEDURE — 99213 OFFICE O/P EST LOW 20 MIN: CPT | Mod: PBBFAC | Performed by: UROLOGY

## 2023-06-23 NOTE — PROGRESS NOTES
Subjective:       Patient ID: Jani Cha is a 78 y.o. male The patient's last visit with me was on 5/11/2023.     Chief Complaint:   Chief Complaint   Patient presents with    Follow-up    PVR         History of Kidney Stones  He had an issue with a ureteral stone in Winter 2015.  He did not recall passing the stone but his pain resolved.       3/25/2021  He had right flank pain and hematuria a couple of weeks ago. He brought the stone for analysis a couple of weeks ago.  He denies anymore hematuria, flank pain.  He denies fever.    5/3/2022  He denies pain or hematuria.        Benign Prostatic Hyperplasia  He patient reports nocturia three times a night. He denies frequency, incomplete emptying and intermittency. The patient states symptoms are of moderate severity. Onset of symptoms was several years ago and was gradual in onset.  He has no personal history and no family history of prostate cancer. He reports a history of kidney stones. He denies flank pain, gross hematuria and recurrent UTI.  He is currently taking no prostate medications.  He has noted some frequency with occasional urgency.    8/3/2022  He was to add Flomax last visit.  He took it for no more than 2 weeks.  He stopped it due to retrograde ejaculation and pain in his prostate afterwards.  He did not note any change to his stream.    IPSS Questionnaire (AUA-7):  8    11/17/2022   He is doing okay.  He recently fractured his hip.  His stream is about the same.  He required a urologist to place the Alej[facility]re.  He is not sure why.    He is not interested in trying prostate medications again.    11/29/2022 Orchitis  He went to the ED on 11/24/2022 after developing a fever.  His urine was red at the time.  He was given antibiotics for a UTI.  Two days later he develop pain in his testicles.  He went back to the ED.    12/27/2022  He feels better today.  His testicle is slightly sensitive.      IPSS Questionnaire (AUA-7):   17    03/30/2023  Cystoscopy this month showed bilateral lobe hypertrophy of the prostate.   He would like to proceed with a procedure on his prostate.     5/11/2023  He is s/p UroLift on 4/24/2023.  He is having a lot of urgency.  Nocturia may be improved.    IPSS Questionnaire (AUA-7):  20/4 06/23/2023   He still has some urgency.  His nocturia is improved to once every 2.5 hours.     ACTIVE MEDICAL ISSUES:  Patient Active Problem List   Diagnosis    Hyperlipidemia    Primary hypertension    Coronary artery disease involving native coronary artery of native heart without angina pectoris    Sleep apnea    S/P CABG x 4    Type 2 diabetes mellitus with diabetic neuropathy, without long-term current use of insulin    GERD (gastroesophageal reflux disease)    Personal history of colonic polyps    Abdominal aortic aneurysm (AAA) without rupture    Total occlusion of coronary artery, chronic -LCX with collaterals.  No ischemic on PET    Pulmonary nodule    Thoracic aortic aneurysm without rupture    Bilateral renal cysts    Adrenal adenoma    History of PCI of RCA    Class 1 obesity due to excess calories with serious comorbidity in adult    Calcified granuloma of lung    Type 2 diabetes mellitus with hyperglycemia, without long-term current use of insulin    Lymphedema of both lower extremities    Swelling of lower extremity    Closed displaced fracture of right femoral neck s/p total hip arthroplasty on 10/21/2022    Hip pain    Chronic pain of both knees    Decreased strength, endurance, and mobility    Osteoporosis    Localized osteoporosis of [place]    Other specified disorders of adrenal gland    Congestive heart failure, unspecified HF chronicity, unspecified heart failure type    Diabetes mellitus due to underlying condition with stage 3 chronic kidney disease, without long-term current use of insulin, unspecified whether stage 3a or 3b CKD       ALLERGIES AND MEDICATIONS: updated and reviewed.  Review of  patient's allergies indicates:   Allergen Reactions    Penicillins Hives, Itching and Rash    Shellfish containing products      Current Outpatient Medications   Medication Sig    acetaminophen (TYLENOL) 500 MG tablet Take 2 tablets (1,000 mg total) by mouth every 8 (eight) hours as needed (Mild to moderate pain).    aspirin (ECOTRIN) 81 MG EC tablet Take 81 mg by mouth once daily.    atorvastatin (LIPITOR) 80 MG tablet Take 1 tablet by mouth once daily    blood sugar diagnostic Strp 1 strip by Misc.(Non-Drug; Combo Route) route once daily.    carvediloL (COREG) 25 MG tablet TAKE 1 TABLET BY MOUTH TWICE DAILY WITH MEALS    clopidogreL (PLAVIX) 75 mg tablet Take 1 tablet (75 mg total) by mouth once daily.    clotrimazole-betamethasone 1-0.05% (LOTRISONE) cream Apply topically 2 (two) times daily.    dulaglutide (TRULICITY) 1.5 mg/0.5 mL pen injector Inject 1.5 mg into the skin every 7 days.    glipiZIDE (GLUCOTROL) 10 MG TR24 Take 1 tablet (10 mg total) by mouth 2 (two) times daily with meals.    pantoprazole (PROTONIX) 40 MG tablet Take 1 tablet by mouth once daily    valsartan (DIOVAN) 80 MG tablet Take 1 tablet (80 mg total) by mouth once daily.    fluticasone propionate (FLONASE) 50 mcg/actuation nasal spray 1 spray (50 mcg total) by Each Nostril route once daily. (Patient not taking: Reported on 6/23/2023)    HYDROcodone-acetaminophen (NORCO) 5-325 mg per tablet Take 1 tablet by mouth every 6 (six) hours as needed for Pain. (Patient not taking: Reported on 6/23/2023)    melatonin (MELATIN) 3 mg tablet Take 2 tablets (6 mg total) by mouth nightly as needed for Insomnia. (Patient not taking: Reported on 5/11/2023)    phenazopyridine (PYRIDIUM) 100 MG tablet Take 2 tablets (200 mg total) by mouth 3 (three) times daily as needed for Pain (Burning). (Patient not taking: Reported on 5/11/2023)    senna-docusate 8.6-50 mg (PERICOLACE) 8.6-50 mg per tablet Take 1 tablet by mouth 2 (two) times daily. (Patient not taking:  Reported on 5/11/2023)    TRUEPLUS LANCETS 33 gauge Misc Apply 1 lancet topically once daily. Use to test blood sugar daily; discard lancet after each use     No current facility-administered medications for this visit.       Review of Systems   Constitutional:  Negative for chills and fever.   HENT:  Negative for congestion.    Respiratory:  Negative for chest tightness and shortness of breath.    Cardiovascular:  Negative for chest pain and palpitations.   Gastrointestinal:  Negative for abdominal pain, constipation, diarrhea, nausea and vomiting.   Genitourinary:  Positive for urgency. Negative for difficulty urinating, dysuria, flank pain and hematuria.   Musculoskeletal:  Negative for arthralgias.   Neurological:  Negative for dizziness.   Psychiatric/Behavioral:  Negative for confusion.      Objective:      Vitals:    06/23/23 1313   Weight: 113.7 kg (250 lb 12.4 oz)       Physical Exam  Vitals and nursing note reviewed.   Constitutional:       Appearance: He is well-developed.   HENT:      Head: Normocephalic.   Eyes:      Conjunctiva/sclera: Conjunctivae normal.   Neck:      Thyroid: No thyromegaly.      Trachea: No tracheal deviation.   Cardiovascular:      Rate and Rhythm: Normal rate.      Heart sounds: Normal heart sounds.   Pulmonary:      Effort: Pulmonary effort is normal. No respiratory distress.      Breath sounds: Normal breath sounds. No wheezing.   Abdominal:      General: Bowel sounds are normal.      Palpations: Abdomen is soft.      Tenderness: There is no abdominal tenderness. There is no rebound.      Hernia: No hernia is present.   Musculoskeletal:         General: No tenderness. Normal range of motion.      Cervical back: Normal range of motion and neck supple.   Lymphadenopathy:      Cervical: No cervical adenopathy.   Skin:     General: Skin is warm and dry.      Findings: No erythema or rash.   Neurological:      Mental Status: He is alert and oriented to person, place, and time.    Psychiatric:         Behavior: Behavior normal.         Thought Content: Thought content normal.         Judgment: Judgment normal.       Urine dipstick shows negative for all components.  Micro exam: negative for WBC's or RBC's.    Assessment:       1. BPH with urinary obstruction    2. Nocturia more than twice per night    3. Incomplete emptying of bladder            Plan:       1. BPH with urinary obstruction  S/p UroLift  - Prostate Specific Antigen, Diagnostic; Future    2. Nocturia more than twice per night  Limit evening fluids  Offered anticholinergic medication, he declined    3. Incomplete emptying of bladder  stable             Follow up in about 6 months (around 12/23/2023) for Follow up Established, Review PSA.

## 2023-06-28 ENCOUNTER — TELEPHONE (OUTPATIENT)
Dept: CARDIOLOGY | Facility: CLINIC | Age: 78
End: 2023-06-28
Payer: MEDICARE

## 2023-06-28 NOTE — TELEPHONE ENCOUNTER
"I placed a form in your door from [facility] for a "clearance" and recommendations regarding Plavix hold for 5 days before surgery.    His last visit was 11/25/2023.  I tentatively scheduled him with Cherelle Cassidy 7/14.    Please let me know if he needs to keep this appt or you can provide an optimization/risk stratification statement with recs regarding Plavix from a chart review.      Thanks,  Harini"

## 2023-06-30 ENCOUNTER — EXTERNAL CHRONIC CARE MANAGEMENT (OUTPATIENT)
Dept: PRIMARY CARE CLINIC | Facility: CLINIC | Age: 78
End: 2023-06-30
Payer: MEDICARE

## 2023-06-30 PROCEDURE — 99490 CHRNC CARE MGMT STAFF 1ST 20: CPT | Mod: PBBFAC,PO | Performed by: FAMILY MEDICINE

## 2023-06-30 PROCEDURE — 99490 CHRNC CARE MGMT STAFF 1ST 20: CPT | Mod: S$PBB,,, | Performed by: FAMILY MEDICINE

## 2023-06-30 PROCEDURE — 99490 PR CHRONIC CARE MGMT, 1ST 20 MIN: ICD-10-PCS | Mod: S$PBB,,, | Performed by: FAMILY MEDICINE

## 2023-07-03 ENCOUNTER — TELEPHONE (OUTPATIENT)
Dept: CARDIOLOGY | Facility: CLINIC | Age: 78
End: 2023-07-03
Payer: MEDICARE

## 2023-07-18 ENCOUNTER — OFFICE VISIT (OUTPATIENT)
Dept: PODIATRY | Facility: CLINIC | Age: 78
End: 2023-07-18
Payer: MEDICARE

## 2023-07-18 VITALS — HEIGHT: 70 IN | BODY MASS INDEX: 35.89 KG/M2 | WEIGHT: 250.69 LBS

## 2023-07-18 DIAGNOSIS — B35.1 ONYCHOMYCOSIS DUE TO DERMATOPHYTE: ICD-10-CM

## 2023-07-18 DIAGNOSIS — E11.49 TYPE II DIABETES MELLITUS WITH NEUROLOGICAL MANIFESTATIONS: Primary | ICD-10-CM

## 2023-07-18 PROCEDURE — 11721 PR DEBRIDEMENT OF NAILS, 6 OR MORE: ICD-10-PCS | Mod: Q9,S$PBB,, | Performed by: PODIATRIST

## 2023-07-18 PROCEDURE — 99499 UNLISTED E&M SERVICE: CPT | Mod: S$PBB,,, | Performed by: PODIATRIST

## 2023-07-18 PROCEDURE — 99213 OFFICE O/P EST LOW 20 MIN: CPT | Mod: PBBFAC,PO | Performed by: PODIATRIST

## 2023-07-18 PROCEDURE — 99499 NO LOS: ICD-10-PCS | Mod: S$PBB,,, | Performed by: PODIATRIST

## 2023-07-18 PROCEDURE — 99999 PR PBB SHADOW E&M-EST. PATIENT-LVL III: CPT | Mod: PBBFAC,,, | Performed by: PODIATRIST

## 2023-07-18 PROCEDURE — 11721 DEBRIDE NAIL 6 OR MORE: CPT | Mod: Q9,S$PBB,, | Performed by: PODIATRIST

## 2023-07-18 PROCEDURE — 11721 DEBRIDE NAIL 6 OR MORE: CPT | Mod: PBBFAC,PO | Performed by: PODIATRIST

## 2023-07-18 PROCEDURE — 99999 PR PBB SHADOW E&M-EST. PATIENT-LVL III: ICD-10-PCS | Mod: PBBFAC,,, | Performed by: PODIATRIST

## 2023-07-24 ENCOUNTER — LAB VISIT (OUTPATIENT)
Dept: LAB | Facility: HOSPITAL | Age: 78
End: 2023-07-24
Attending: HOSPITALIST
Payer: MEDICARE

## 2023-07-24 DIAGNOSIS — E11.40 TYPE 2 DIABETES MELLITUS WITH DIABETIC NEUROPATHY, WITHOUT LONG-TERM CURRENT USE OF INSULIN: Chronic | ICD-10-CM

## 2023-07-24 LAB
ANION GAP SERPL CALC-SCNC: 6 MMOL/L (ref 8–16)
BUN SERPL-MCNC: 33 MG/DL (ref 8–23)
CALCIUM SERPL-MCNC: 9.7 MG/DL (ref 8.7–10.5)
CHLORIDE SERPL-SCNC: 108 MMOL/L (ref 95–110)
CO2 SERPL-SCNC: 26 MMOL/L (ref 23–29)
CREAT SERPL-MCNC: 1.6 MG/DL (ref 0.5–1.4)
EST. GFR  (NO RACE VARIABLE): 44.1 ML/MIN/1.73 M^2
ESTIMATED AVG GLUCOSE: 148 MG/DL (ref 68–131)
GLUCOSE SERPL-MCNC: 160 MG/DL (ref 70–110)
HBA1C MFR BLD: 6.8 % (ref 4–5.6)
POTASSIUM SERPL-SCNC: 4.6 MMOL/L (ref 3.5–5.1)
SODIUM SERPL-SCNC: 140 MMOL/L (ref 136–145)

## 2023-07-24 PROCEDURE — 80048 BASIC METABOLIC PNL TOTAL CA: CPT | Performed by: HOSPITALIST

## 2023-07-24 PROCEDURE — 83036 HEMOGLOBIN GLYCOSYLATED A1C: CPT | Performed by: HOSPITALIST

## 2023-07-24 PROCEDURE — 36415 COLL VENOUS BLD VENIPUNCTURE: CPT | Mod: PO | Performed by: HOSPITALIST

## 2023-07-31 ENCOUNTER — OFFICE VISIT (OUTPATIENT)
Dept: ENDOCRINOLOGY | Facility: CLINIC | Age: 78
End: 2023-07-31
Payer: MEDICARE

## 2023-07-31 ENCOUNTER — EXTERNAL CHRONIC CARE MANAGEMENT (OUTPATIENT)
Dept: PRIMARY CARE CLINIC | Facility: CLINIC | Age: 78
End: 2023-07-31
Payer: MEDICARE

## 2023-07-31 VITALS
TEMPERATURE: 99 F | DIASTOLIC BLOOD PRESSURE: 83 MMHG | WEIGHT: 256.19 LBS | HEART RATE: 64 BPM | BODY MASS INDEX: 36.76 KG/M2 | SYSTOLIC BLOOD PRESSURE: 138 MMHG

## 2023-07-31 DIAGNOSIS — E11.65 TYPE 2 DIABETES MELLITUS WITH HYPERGLYCEMIA, WITHOUT LONG-TERM CURRENT USE OF INSULIN: ICD-10-CM

## 2023-07-31 DIAGNOSIS — M80.00XD AGE-RELATED OSTEOPOROSIS WITH CURRENT PATHOLOGICAL FRACTURE WITH ROUTINE HEALING, SUBSEQUENT ENCOUNTER: ICD-10-CM

## 2023-07-31 DIAGNOSIS — E66.09 CLASS 1 OBESITY DUE TO EXCESS CALORIES WITH SERIOUS COMORBIDITY IN ADULT, UNSPECIFIED BMI: ICD-10-CM

## 2023-07-31 DIAGNOSIS — E11.9 CONTROLLED TYPE 2 DIABETES MELLITUS WITHOUT COMPLICATION, WITHOUT LONG-TERM CURRENT USE OF INSULIN: Primary | ICD-10-CM

## 2023-07-31 DIAGNOSIS — E11.40 TYPE 2 DIABETES MELLITUS WITH DIABETIC NEUROPATHY, WITHOUT LONG-TERM CURRENT USE OF INSULIN: Chronic | ICD-10-CM

## 2023-07-31 DIAGNOSIS — E11.65 UNCONTROLLED TYPE 2 DIABETES MELLITUS WITH HYPERGLYCEMIA, WITHOUT LONG-TERM CURRENT USE OF INSULIN: ICD-10-CM

## 2023-07-31 PROCEDURE — 99214 PR OFFICE/OUTPT VISIT, EST, LEVL IV, 30-39 MIN: ICD-10-PCS | Mod: S$PBB,,, | Performed by: HOSPITALIST

## 2023-07-31 PROCEDURE — 99490 CHRNC CARE MGMT STAFF 1ST 20: CPT | Mod: PBBFAC,27,PO | Performed by: FAMILY MEDICINE

## 2023-07-31 PROCEDURE — 99999 PR PBB SHADOW E&M-EST. PATIENT-LVL IV: ICD-10-PCS | Mod: PBBFAC,,, | Performed by: HOSPITALIST

## 2023-07-31 PROCEDURE — 99490 PR CHRONIC CARE MGMT, 1ST 20 MIN: ICD-10-PCS | Mod: S$PBB,,, | Performed by: FAMILY MEDICINE

## 2023-07-31 PROCEDURE — 99490 CHRNC CARE MGMT STAFF 1ST 20: CPT | Mod: S$PBB,,, | Performed by: FAMILY MEDICINE

## 2023-07-31 PROCEDURE — 99214 OFFICE O/P EST MOD 30 MIN: CPT | Mod: S$PBB,,, | Performed by: HOSPITALIST

## 2023-07-31 PROCEDURE — 99214 OFFICE O/P EST MOD 30 MIN: CPT | Mod: PBBFAC | Performed by: HOSPITALIST

## 2023-07-31 PROCEDURE — 99999 PR PBB SHADOW E&M-EST. PATIENT-LVL IV: CPT | Mod: PBBFAC,,, | Performed by: HOSPITALIST

## 2023-07-31 RX ORDER — GLIPIZIDE 10 MG/1
10 TABLET, FILM COATED, EXTENDED RELEASE ORAL 2 TIMES DAILY WITH MEALS
Qty: 180 TABLET | Refills: 3 | Status: SHIPPED | OUTPATIENT
Start: 2023-07-31 | End: 2024-01-04 | Stop reason: SDUPTHER

## 2023-07-31 NOTE — PROGRESS NOTES
Subjective:      Patient ID: Jani Cha is a 78 y.o. male presented to [facility] on 7/31/2023.  Chief Complaint:  Diabetes type 2, Diabetes    History of Present Illness: Jani Cha is a 78 y.o. male here for management of type 2 diabetes and left adrenal incidentaloma  Other significant past medical history:  CKD stage IIIB, CAD, CABG, hypertension, hyperlipidemia    Interval history:  Patient is here for follow-up of type 2 diabetes, A1c improved to 6.8%  Better dietary changes.  Currently still on Trulicity, getting from [facility] without any issue  Report glucose 101, 83, 79, 96, 127, 129, 88, 127  No hypoglycemia  Currently using walker to help with ambulation, planning to get knee replacement in the future  Follow-up with Orthopedic surgery at this time.   Patient on SC Prolia every 6 months, next injection 09/21/2023      1) Diabetes Mellitus Type 2  - Known diabetic complications: nephropathy and cardiovascular disease  - Cardiovascular risk factors: advanced age (older than 55 for men, 65 for women), diabetes mellitus, dyslipidemia, hypertension, male gender, microalbuminuria, obesity (BMI >= 30 kg/m2) and sedentary lifestyle  - Diagnosed w/ DM: in 2006  - Saw Ortho need Left Knee Replacement>>  Need to get A1C <7%  - Patient having issue with high deductible, gap.  Unable to afford multiple diabetes medication    Current meds:   Trulicity 1.5 mg once a week injection, getting it from [facility]  Glipizide 10 mg  twice a day  Previous medication  stop metformin given CKD stage IIIB    Home glucose checks:  Daily, see above  Diet/Exercise:               Eating 2 meals per day , lunch in, large portion dinner, does eat cookies prior to going to bed              Drink:  Coffee with sugar in the morning              Current exercise: none due to knee pain  Weight trend: stable  Diabetes Education: no  Diabetes Related Hospitalization:  no  Hx of pancreatitis, hx of thyroid  "cancer: no  Family history of diabetes: unknow  Occupation: retired  Eye exam current (within one year): yes  Reports cuts or ulcers on feet:  Denies  Statin: Taking, ACE/ARB: Taking    Diabetes lab work  Lab Results   Component Value Date    HGBA1C 6.8 (H) 07/24/2023    HGBA1C 7.1 (H) 03/10/2023    HGBA1C 6.7 (H) 11/10/2022    HGBA1C 6.8 (H) 10/20/2022     No results found for: "CPEPTIDE", "GLUTAMICACID", "ISLETCELLANT"   No results found for: "FRUCTOSAMINE"  Lab Results   Component Value Date    MICALBCREAT 13.2 03/01/2023     No results found for: "DTOLKLKB75"    Diabetes Management Status: Reviewed this office visit  Screening or Prevention Patient's value Goal Complete/Controlled?   Lipid profile : 03/10/2023 Annually Yes     Dilated retinal exam : 04/04/2023 Annually No     Foot exam   Most Recent Foot Exam Date: Not Found Annually Yes         2) Regards to Left adrenal incidentaloma  - Noted on CT chest, left adrenal mass 1.2 cm in size.  - Adrenal lesion imaging characteristics left adrenal nodule, 1.2 cm, Smooth, heterogenous, without calcifications   - Patient does have history of diabetes as above, Morbidly obese, Hypertension but no history of hypertensive emergency   - Pt denies history of paroxysmal symptoms such as syncope, pallor or dizziness  - No palpitations, No history of cancer  - Pt reports no abdominal discomfort  - Work up: DST: WNL, cortisol 2, due to the overweight  - Metanephrines and normetanephrines:  Within acceptable ranges  - Renin/navneet level normal      3) Osteoporosis  - Right femoral neck fracture Recently: 10/25/2022>> patient fell picking up a newspaper.  Sustained right hip fracture requiring surgery.    - Follow-up with Orthopedic surgery at this time.  Recently discharged from rehab  - Currently using walker to help with ambulation  - denies history of other fractures  - no history of cirrhosis, liver disease, cancer  - currently not taking any vitamin supplements including " calcium/vitamin-D  - started on Prolia SC injection per orthopedic surgery, 1st injection 3/17/2023    DXA: 1/19/2023  Lumbar spine (L1-L4):  BMD is 1.248 g/cm2, T-score is 1.4, and Z-score is 2.5.  Total hip:   BMD is 1.005 g/cm2, T-score is -0.2, and Z-score is 0.7.  Femoral neck: BMD is 0.688 g/cm2, T-score is -1.8, and Z-score is -0.4.  Distal 1/3 radius:  Not applicable     FRAX:  11% risk of a major osteoporotic fracture in the next 10 years.  3.2% risk of hip fracture in the next 10 years.     Impression:  *Osteoporosis based on T-score between -1.0 and -2.5 and elevated risk based on FRAX and history of femur fracture.    Reviewed past surgical, medical, family, social history and updated as appropriate.  Review of Systems: see HPI above    Objective:   /83   Pulse 64   Temp 99 °F (37.2 °C) (Oral)   Wt 116.2 kg (256 lb 3.2 oz)   BMI 36.76 kg/m²     Body mass index is 36.76 kg/m².  Vital signs reviewed    Physical Exam  Vitals and nursing note reviewed.   Constitutional:       General: He is not in acute distress.     Appearance: Normal appearance. He is well-developed. He is obese.   Neck:      Thyroid: No thyromegaly.   Cardiovascular:      Rate and Rhythm: Normal rate.      Heart sounds: Normal heart sounds.   Pulmonary:      Effort: Pulmonary effort is normal. No respiratory distress.   Abdominal:      Tenderness: There is no abdominal tenderness.   Musculoskeletal:         General: Normal range of motion.      Cervical back: Neck supple.   Skin:     General: Skin is warm.      Findings: No erythema.   Neurological:      Mental Status: He is alert and oriented to person, place, and time.       Lab Reviewed:   Lab Results   Component Value Date    HGBA1C 6.8 (H) 07/24/2023     Lab Results   Component Value Date    CHOL 103 (L) 03/10/2023    HDL 34 (L) 03/10/2023    LDLCALC 47.8 (L) 03/10/2023    TRIG 106 03/10/2023    CHOLHDL 33.0 03/10/2023     Lab Results   Component Value Date      07/24/2023    K 4.6 07/24/2023     07/24/2023    CO2 26 07/24/2023     (H) 07/24/2023    BUN 33 (H) 07/24/2023    CREATININE 1.6 (H) 07/24/2023    CALCIUM 9.7 07/24/2023    PROT 6.3 12/06/2022    ALBUMIN 2.8 (L) 12/06/2022    BILITOT 0.4 12/06/2022    ALKPHOS 98 12/06/2022    AST 14 12/06/2022    ALT 16 12/06/2022    ANIONGAP 6 (L) 07/24/2023    ESTGFRAFRICA 48.0 (A) 05/10/2022    EGFRNONAA 41.5 (A) 05/10/2022    TSH 2.585 12/06/2022    AOSHEOVT68ST 36 03/10/2023     Assessment     1. Controlled type 2 diabetes mellitus without complication, without long-term current use of insulin  HEMOGLOBIN A1C    RENAL FUNCTION PANEL      2. Uncontrolled type 2 diabetes mellitus with hyperglycemia, without long-term current use of insulin  glipiZIDE (GLUCOTROL) 10 MG TR24      3. Type 2 diabetes mellitus with diabetic neuropathy, without long-term current use of insulin  glipiZIDE (GLUCOTROL) 10 MG TR24    HEMOGLOBIN A1C    RENAL FUNCTION PANEL      4. Type 2 diabetes mellitus with hyperglycemia, without long-term current use of insulin        5. Age-related osteoporosis with current pathological fracture with routine healing, subsequent encounter        6. Class 1 obesity due to excess calories with serious comorbidity in adult, unspecified BMI          Plan     Type 2 diabetes mellitus with hyperglycemia, without long-term current use of insulin  - Diabetes better controlled, improvement  A1c, goal A1C for patient is <7%  - Complicated by hyperglycemia, obesity, hx CAD/CABG, CKD3b, dietary indiscretion.  - still with occasional morning time hyperglycemia  - due to the renal function, medical options limited  - Diabetic supplies/medications, review at every visit to ensure continue refills    Plan  - continue current dose of Trulicity 1.5 mg once a week injection, getting it from [facility]  - continue glipizide to 10 mg b.i.d> denies hypoglycemia  - advised the patient follow with my regimen with close follow-up  as scheduled  - repeat lab work prior to next visit, 4 months    Osteoporosis  - DXA review:  High FRAX score, with fracture, treat as osteoporosis  - right femoral neck fracture 10/21/22  status post fixation  - SC Prolia every 6 months, injection 3/17/2023>> next injection 09/2023  - continue vitamin D3 2000 IU daily  - high risk, with future plans for knee replacement    Class 1 obesity due to excess calories with serious comorbidity in adult  - Body mass index is 36.76 kg/m².  - dietary discussion as above  - continue to monitor weight    Advised patient to follow up with PCP for routine health maintenance care.   RTC in 4-5 months    Malcolm Kent M.D  Endocrinology  [facility]  7/31/2023      Disclaimer: This note has been generated using voice-recognition software. There may be typographical errors that have been missed during proof-reading.

## 2023-07-31 NOTE — ASSESSMENT & PLAN NOTE
- DXA review:  High FRAX score, with fracture, treat as osteoporosis  - right femoral neck fracture 10/21/22  status post fixation  - SC Prolia every 6 months, injection 3/17/2023>> next injection 09/2023  - continue vitamin D3 2000 IU daily  - high risk, with future plans for knee replacement

## 2023-07-31 NOTE — ASSESSMENT & PLAN NOTE
- Diabetes better controlled, improvement  A1c, goal A1C for patient is <7%  - Complicated by hyperglycemia, obesity, hx CAD/CABG, CKD3b, dietary indiscretion.  - still with occasional morning time hyperglycemia  - due to the renal function, medical options limited  - Diabetic supplies/medications, review at every visit to ensure continue refills    Plan  - continue current dose of Trulicity 1.5 mg once a week injection, getting it from [facility]  - continue glipizide to 10 mg b.i.d> denies hypoglycemia  - advised the patient follow with my regimen with close follow-up as scheduled  - repeat lab work prior to next visit, 4 months

## 2023-08-25 ENCOUNTER — OFFICE VISIT (OUTPATIENT)
Dept: FAMILY MEDICINE | Facility: CLINIC | Age: 78
End: 2023-08-25
Payer: MEDICARE

## 2023-08-25 VITALS
TEMPERATURE: 98 F | OXYGEN SATURATION: 94 % | HEART RATE: 66 BPM | HEIGHT: 70 IN | SYSTOLIC BLOOD PRESSURE: 128 MMHG | DIASTOLIC BLOOD PRESSURE: 76 MMHG | BODY MASS INDEX: 36.77 KG/M2 | WEIGHT: 256.81 LBS

## 2023-08-25 DIAGNOSIS — E78.00 PURE HYPERCHOLESTEROLEMIA: Chronic | ICD-10-CM

## 2023-08-25 DIAGNOSIS — E11.40 TYPE 2 DIABETES MELLITUS WITH DIABETIC NEUROPATHY, WITHOUT LONG-TERM CURRENT USE OF INSULIN: Primary | ICD-10-CM

## 2023-08-25 DIAGNOSIS — I71.40 ABDOMINAL AORTIC ANEURYSM (AAA) WITHOUT RUPTURE, UNSPECIFIED PART: ICD-10-CM

## 2023-08-25 DIAGNOSIS — I10 PRIMARY HYPERTENSION: ICD-10-CM

## 2023-08-25 DIAGNOSIS — K21.9 GASTROESOPHAGEAL REFLUX DISEASE, UNSPECIFIED WHETHER ESOPHAGITIS PRESENT: ICD-10-CM

## 2023-08-25 DIAGNOSIS — I25.10 CORONARY ARTERY DISEASE INVOLVING NATIVE CORONARY ARTERY OF NATIVE HEART WITHOUT ANGINA PECTORIS: Chronic | ICD-10-CM

## 2023-08-25 DIAGNOSIS — D35.00 ADRENAL ADENOMA, UNSPECIFIED LATERALITY: ICD-10-CM

## 2023-08-25 PROCEDURE — 99214 OFFICE O/P EST MOD 30 MIN: CPT | Mod: PBBFAC,PO | Performed by: FAMILY MEDICINE

## 2023-08-25 PROCEDURE — 99214 OFFICE O/P EST MOD 30 MIN: CPT | Mod: S$PBB,,, | Performed by: FAMILY MEDICINE

## 2023-08-25 PROCEDURE — 99214 PR OFFICE/OUTPT VISIT, EST, LEVL IV, 30-39 MIN: ICD-10-PCS | Mod: S$PBB,,, | Performed by: FAMILY MEDICINE

## 2023-08-25 PROCEDURE — 99999 PR PBB SHADOW E&M-EST. PATIENT-LVL IV: ICD-10-PCS | Mod: PBBFAC,,, | Performed by: FAMILY MEDICINE

## 2023-08-25 PROCEDURE — 99999 PR PBB SHADOW E&M-EST. PATIENT-LVL IV: CPT | Mod: PBBFAC,,, | Performed by: FAMILY MEDICINE

## 2023-08-25 RX ORDER — FAMOTIDINE 20 MG/1
20 TABLET, FILM COATED ORAL NIGHTLY PRN
Qty: 90 TABLET | Refills: 1 | Status: SHIPPED | OUTPATIENT
Start: 2023-08-25 | End: 2024-08-24
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
## 2023-08-28 ENCOUNTER — IMMUNIZATION (OUTPATIENT)
Dept: INTERNAL MEDICINE | Facility: CLINIC | Age: 78
End: 2023-08-28
Payer: MEDICARE

## 2023-08-28 DIAGNOSIS — Z23 NEED FOR VACCINATION: Primary | ICD-10-CM

## 2023-08-28 PROCEDURE — 0124A COVID-19, MRNA, LNP-S, BIVALENT BOOSTER, PF, 30 MCG/0.3 ML DOSE: CPT | Mod: PBBFAC,CV19

## 2023-08-28 PROCEDURE — 99999PBSHW COVID-19, MRNA, LNP-S, BIVALENT BOOSTER, PF, 30 MCG/0.3 ML DOSE: ICD-10-PCS | Mod: PBBFAC,,,

## 2023-08-28 PROCEDURE — 91312 COVID-19, MRNA, LNP-S, BIVALENT BOOSTER, PF, 30 MCG/0.3 ML DOSE: CPT | Mod: PBBFAC

## 2023-08-28 PROCEDURE — 99999PBSHW COVID-19, MRNA, LNP-S, BIVALENT BOOSTER, PF, 30 MCG/0.3 ML DOSE: Mod: PBBFAC,,,

## 2023-08-30 ENCOUNTER — OFFICE VISIT (OUTPATIENT)
Dept: VASCULAR SURGERY | Facility: CLINIC | Age: 78
End: 2023-08-30
Payer: MEDICARE

## 2023-08-30 VITALS
WEIGHT: 257.25 LBS | SYSTOLIC BLOOD PRESSURE: 140 MMHG | DIASTOLIC BLOOD PRESSURE: 87 MMHG | BODY MASS INDEX: 36.92 KG/M2 | HEART RATE: 63 BPM

## 2023-08-30 DIAGNOSIS — I89.0 LYMPHEDEMA OF BOTH LOWER EXTREMITIES: ICD-10-CM

## 2023-08-30 DIAGNOSIS — I71.60 THORACOABDOMINAL AORTIC ANEURYSM (TAAA) WITHOUT RUPTURE, UNSPECIFIED PART: Primary | ICD-10-CM

## 2023-08-30 PROCEDURE — 99214 OFFICE O/P EST MOD 30 MIN: CPT | Mod: S$PBB,,, | Performed by: SURGERY

## 2023-08-30 PROCEDURE — 99999 PR PBB SHADOW E&M-EST. PATIENT-LVL III: ICD-10-PCS | Mod: PBBFAC,,, | Performed by: SURGERY

## 2023-08-30 PROCEDURE — 99999 PR PBB SHADOW E&M-EST. PATIENT-LVL III: CPT | Mod: PBBFAC,,, | Performed by: SURGERY

## 2023-08-30 PROCEDURE — 99214 PR OFFICE/OUTPT VISIT, EST, LEVL IV, 30-39 MIN: ICD-10-PCS | Mod: S$PBB,,, | Performed by: SURGERY

## 2023-08-30 PROCEDURE — 99213 OFFICE O/P EST LOW 20 MIN: CPT | Mod: PBBFAC | Performed by: SURGERY
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
## 2023-08-31 ENCOUNTER — EXTERNAL CHRONIC CARE MANAGEMENT (OUTPATIENT)
Dept: PRIMARY CARE CLINIC | Facility: CLINIC | Age: 78
End: 2023-08-31
Payer: MEDICARE

## 2023-08-31 PROCEDURE — 99490 CHRNC CARE MGMT STAFF 1ST 20: CPT | Mod: PBBFAC,PO | Performed by: FAMILY MEDICINE

## 2023-08-31 PROCEDURE — 99490 CHRNC CARE MGMT STAFF 1ST 20: CPT | Mod: S$PBB,,, | Performed by: FAMILY MEDICINE

## 2023-08-31 PROCEDURE — 99490 PR CHRONIC CARE MGMT, 1ST 20 MIN: ICD-10-PCS | Mod: S$PBB,,, | Performed by: FAMILY MEDICINE

## 2023-09-19 RX ORDER — ATORVASTATIN CALCIUM 80 MG/1
80 TABLET, FILM COATED ORAL DAILY
Qty: 90 TABLET | Refills: 3 | Status: SHIPPED | OUTPATIENT
Start: 2023-09-19

## 2023-09-21 ENCOUNTER — INFUSION (OUTPATIENT)
Dept: INFECTIOUS DISEASES | Facility: HOSPITAL | Age: 78
End: 2023-09-21
Attending: PHYSICIAN ASSISTANT
Payer: MEDICARE

## 2023-09-21 VITALS
HEART RATE: 62 BPM | SYSTOLIC BLOOD PRESSURE: 133 MMHG | OXYGEN SATURATION: 96 % | TEMPERATURE: 99 F | WEIGHT: 255.75 LBS | HEIGHT: 70 IN | RESPIRATION RATE: 18 BRPM | DIASTOLIC BLOOD PRESSURE: 83 MMHG | BODY MASS INDEX: 36.61 KG/M2

## 2023-09-21 DIAGNOSIS — M81.0 OSTEOPOROSIS, UNSPECIFIED OSTEOPOROSIS TYPE, UNSPECIFIED PATHOLOGICAL FRACTURE PRESENCE: Primary | ICD-10-CM

## 2023-09-21 DIAGNOSIS — M81.6 LOCALIZED OSTEOPOROSIS OF LEQUESNE: ICD-10-CM

## 2023-09-21 DIAGNOSIS — S72.001A CLOSED DISPLACED FRACTURE OF RIGHT FEMORAL NECK: ICD-10-CM

## 2023-09-21 PROCEDURE — 96372 THER/PROPH/DIAG INJ SC/IM: CPT

## 2023-09-21 PROCEDURE — 63600175 PHARM REV CODE 636 W HCPCS: Mod: JZ,JG | Performed by: PHYSICIAN ASSISTANT

## 2023-09-21 RX ADMIN — DENOSUMAB 60 MG: 60 INJECTION SUBCUTANEOUS at 10:09

## 2023-09-21 NOTE — PROGRESS NOTES
Pt received Prolia injection in left arm. Pt currently taking Vit D/Ca+; Pt denies any dental sx in last 3 months;

## 2023-09-23 ENCOUNTER — IMMUNIZATION (OUTPATIENT)
Dept: OBSTETRICS AND GYNECOLOGY | Facility: CLINIC | Age: 78
End: 2023-09-23
Payer: MEDICARE

## 2023-09-23 DIAGNOSIS — Z23 NEED FOR INFLUENZA VACCINATION: Primary | ICD-10-CM

## 2023-09-23 PROCEDURE — 99999PBSHW FLU VACCINE - QUADRIVALENT - ADJUVANTED: ICD-10-PCS | Mod: PBBFAC,,,

## 2023-09-23 PROCEDURE — 90694 VACC AIIV4 NO PRSRV 0.5ML IM: CPT | Mod: PBBFAC

## 2023-09-23 PROCEDURE — 99999PBSHW FLU VACCINE - QUADRIVALENT - ADJUVANTED: Mod: PBBFAC,,,

## 2023-09-30 ENCOUNTER — EXTERNAL CHRONIC CARE MANAGEMENT (OUTPATIENT)
Dept: PRIMARY CARE CLINIC | Facility: CLINIC | Age: 78
End: 2023-09-30
Payer: MEDICARE

## 2023-09-30 PROCEDURE — 99490 CHRNC CARE MGMT STAFF 1ST 20: CPT | Mod: S$PBB,,, | Performed by: FAMILY MEDICINE

## 2023-09-30 PROCEDURE — 99490 CHRNC CARE MGMT STAFF 1ST 20: CPT | Mod: PBBFAC,PO | Performed by: FAMILY MEDICINE

## 2023-09-30 PROCEDURE — 99490 PR CHRONIC CARE MGMT, 1ST 20 MIN: ICD-10-PCS | Mod: S$PBB,,, | Performed by: FAMILY MEDICINE

## 2023-10-16 ENCOUNTER — OFFICE VISIT (OUTPATIENT)
Dept: ORTHOPEDICS | Facility: CLINIC | Age: 78
End: 2023-10-16
Payer: MEDICARE

## 2023-10-16 ENCOUNTER — HOSPITAL ENCOUNTER (OUTPATIENT)
Dept: RADIOLOGY | Facility: HOSPITAL | Age: 78
Discharge: HOME | End: 2023-10-16
Attending: NURSE PRACTITIONER
Payer: MEDICARE

## 2023-10-16 VITALS — HEIGHT: 70 IN | BODY MASS INDEX: 36.61 KG/M2 | WEIGHT: 255.75 LBS

## 2023-10-16 DIAGNOSIS — M25.551 RIGHT HIP PAIN: ICD-10-CM

## 2023-10-16 DIAGNOSIS — S72.001A CLOSED DISPLACED FRACTURE OF RIGHT FEMORAL NECK: Primary | ICD-10-CM

## 2023-10-16 DIAGNOSIS — M17.0 OSTEOARTHRITIS OF BOTH KNEES, UNSPECIFIED OSTEOARTHRITIS TYPE: ICD-10-CM

## 2023-10-16 DIAGNOSIS — M25.551 RIGHT HIP PAIN: Primary | ICD-10-CM

## 2023-10-16 PROCEDURE — 73502 XR HIP WITH PELVIS WHEN PERFORMED, 2 OR 3  VIEWS RIGHT: ICD-10-PCS | Mod: 26,RT,, | Performed by: RADIOLOGY

## 2023-10-16 PROCEDURE — 99999 PR PBB SHADOW E&M-EST. PATIENT-LVL III: CPT | Mod: PBBFAC,,, | Performed by: NURSE PRACTITIONER

## 2023-10-16 PROCEDURE — 99213 OFFICE O/P EST LOW 20 MIN: CPT | Mod: S$PBB,,, | Performed by: NURSE PRACTITIONER

## 2023-10-16 PROCEDURE — 73502 X-RAY EXAM HIP UNI 2-3 VIEWS: CPT | Mod: 26,RT,, | Performed by: RADIOLOGY

## 2023-10-16 PROCEDURE — 99999 PR PBB SHADOW E&M-EST. PATIENT-LVL III: ICD-10-PCS | Mod: PBBFAC,,, | Performed by: NURSE PRACTITIONER

## 2023-10-16 PROCEDURE — 99213 PR OFFICE/OUTPT VISIT, EST, LEVL III, 20-29 MIN: ICD-10-PCS | Mod: S$PBB,,, | Performed by: NURSE PRACTITIONER

## 2023-10-16 PROCEDURE — 73502 X-RAY EXAM HIP UNI 2-3 VIEWS: CPT | Mod: TC,RT

## 2023-10-16 PROCEDURE — 99213 OFFICE O/P EST LOW 20 MIN: CPT | Mod: PBBFAC | Performed by: NURSE PRACTITIONER

## 2023-10-16 NOTE — PROGRESS NOTES
Mr. Cha is here today for a follow up visit after undergoing a right DARNELL by Dr. Paulino on 4/24/2023.    Interval History:  He reports that he is doing ok in relation to his hip, he reports he is still having pain in his knees, L>R.  He reports he is ready to get his knee replacement and would like to see the surgeon to discuss further.  He uses a Rollator when walking long distance because of his knee otherwise he walks without an assistive device.  Currently takes Tylenol for the pain.  He has tried Voltaren gel but reports the Voltaren gel not helpful and the shots previously given did not work either.    Regarding his hip, he reports no pain in his groin.  Endorses intermittent weakness to his thighs but feels this is related to his osteoarthritis.  He denies any falls or injuries since his last visit.  He is using a Rolator for gait assistance.  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  He denies fever, chills, and sweats since the time of the surgery.     Patient is requesting a temporary handicap permit.    Physical exam:  Incision has healed.  Range of motion of his hip is 0-90 degrees.  He has tactile stimulation to their lower leg, he denies calf pain, there is no leg edema and their pedal pulse is palpable x 2. He is able to walk using his rolator without difficulty.    RADS: Right hip x-ray was obtained and personally reviewed by me, hemiarthroplasty is in proper position.  No evidence of hardware failure or new fractures seen.      Assessment:  Follow up visit (12 months)    Plan:    ICD-10-CM ICD-9-CM   1. Closed displaced fracture of right femoral neck s/p total hip arthroplasty on 10/21/2022  S72.001A 820.8   2. Osteoarthritis of both knees, unspecified osteoarthritis type  M17.0 715.96       Current care, treatment plan, precautions, activity level/ modifications, limitations, rehabilitation exercises and proposed future treatment were discussed with the patient. We discussed the need to monitor for  changes in symptoms and condition and report them to the physician.  Discussed importance of compliance with all appointments and follow up examinations.     78-year-old male here for his 1 year follow-up status post right hip arthroplasty by Dr. Paulino.  Currently reports minimal pain to the hip.  Denies any groin pain.  Most of his pain is related to his knee osteoarthritis in his ready to see a surgeon to discuss knee replacement.    I will refer the patient to the joint team to discuss total knee replacement.  He reports the left knee is worse than right.    I will see the patient back annually with repeat x-ray of the right hip, sooner for new or worsening symptoms.    In the meantime I recommend he continue using Tylenol p.r.n. for pain control.  He may also try Aspercreme with lidocaine topically.        - Future Appointments:   Future Appointments   Date Time Provider Department Center   10/17/2023 10:30 AM Karina Vicente DPM [facility] POD [place]   10/19/2023 11:00 AM Rancho Ferrara MD [facility] ORTHO [address] [place]   12/15/2023  9:00 AM LAB, [facility] LAB [facility]   12/22/2023  8:00 AM LAB, [facility] LAB [place]   12/22/2023  1:15 PM Jeanmarie Hanson MD [facility] URO [facility]   1/4/2024  3:30 PM Malcolm Kent MD [facility] ENDOCRN [facility]   3/7/2024  9:00 AM LAB, [facility] LAB [place]   3/14/2024  3:00 PM Laila Bains MD [facility] FAM MED [place]   3/22/2024 10:45 AM INJECTION [facility] AMB INF [facility]           If there are any questions prior to scheduled follow up, the patient was instructed to contact the office

## 2023-10-17 ENCOUNTER — OFFICE VISIT (OUTPATIENT)
Dept: PODIATRY | Facility: CLINIC | Age: 78
End: 2023-10-17
Payer: MEDICARE

## 2023-10-17 VITALS — BODY MASS INDEX: 36.61 KG/M2 | WEIGHT: 255.75 LBS | HEIGHT: 70 IN

## 2023-10-17 DIAGNOSIS — B35.1 ONYCHOMYCOSIS DUE TO DERMATOPHYTE: ICD-10-CM

## 2023-10-17 DIAGNOSIS — E11.49 TYPE II DIABETES MELLITUS WITH NEUROLOGICAL MANIFESTATIONS: Primary | ICD-10-CM

## 2023-10-17 PROCEDURE — 99999 PR PBB SHADOW E&M-EST. PATIENT-LVL III: CPT | Mod: PBBFAC,,, | Performed by: PODIATRIST

## 2023-10-17 PROCEDURE — 11721 PR DEBRIDEMENT OF NAILS, 6 OR MORE: ICD-10-PCS | Mod: Q9,S$PBB,, | Performed by: PODIATRIST

## 2023-10-17 PROCEDURE — 99499 NO LOS: ICD-10-PCS | Mod: S$PBB,,, | Performed by: PODIATRIST

## 2023-10-17 PROCEDURE — 99499 UNLISTED E&M SERVICE: CPT | Mod: S$PBB,,, | Performed by: PODIATRIST

## 2023-10-17 PROCEDURE — 99999 PR PBB SHADOW E&M-EST. PATIENT-LVL III: ICD-10-PCS | Mod: PBBFAC,,, | Performed by: PODIATRIST

## 2023-10-17 PROCEDURE — 11721 DEBRIDE NAIL 6 OR MORE: CPT | Mod: Q9,PBBFAC,PO | Performed by: PODIATRIST

## 2023-10-17 PROCEDURE — 11721 DEBRIDE NAIL 6 OR MORE: CPT | Mod: Q9,S$PBB,, | Performed by: PODIATRIST

## 2023-10-17 PROCEDURE — 99213 OFFICE O/P EST LOW 20 MIN: CPT | Mod: 25,PBBFAC,PO | Performed by: PODIATRIST
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
## 2023-10-19 ENCOUNTER — PATIENT MESSAGE (OUTPATIENT)
Dept: ORTHOPEDICS | Facility: CLINIC | Age: 78
End: 2023-10-19

## 2023-10-19 ENCOUNTER — OFFICE VISIT (OUTPATIENT)
Dept: ORTHOPEDICS | Facility: CLINIC | Age: 78
End: 2023-10-19
Payer: MEDICARE

## 2023-10-19 VITALS — HEIGHT: 70 IN | WEIGHT: 254.44 LBS | BODY MASS INDEX: 36.43 KG/M2

## 2023-10-19 DIAGNOSIS — M17.12 PRIMARY OSTEOARTHRITIS OF LEFT KNEE: Primary | ICD-10-CM

## 2023-10-19 DIAGNOSIS — M17.11 PRIMARY OSTEOARTHRITIS OF RIGHT KNEE: ICD-10-CM

## 2023-10-19 PROCEDURE — 99214 OFFICE O/P EST MOD 30 MIN: CPT | Mod: S$PBB,,, | Performed by: ORTHOPAEDIC SURGERY

## 2023-10-19 PROCEDURE — 99214 PR OFFICE/OUTPT VISIT, EST, LEVL IV, 30-39 MIN: ICD-10-PCS | Mod: S$PBB,,, | Performed by: ORTHOPAEDIC SURGERY

## 2023-10-19 PROCEDURE — 99999 PR PBB SHADOW E&M-EST. PATIENT-LVL III: ICD-10-PCS | Mod: PBBFAC,,, | Performed by: ORTHOPAEDIC SURGERY

## 2023-10-19 PROCEDURE — 99999 PR PBB SHADOW E&M-EST. PATIENT-LVL III: CPT | Mod: PBBFAC,,, | Performed by: ORTHOPAEDIC SURGERY

## 2023-10-19 PROCEDURE — 99213 OFFICE O/P EST LOW 20 MIN: CPT | Mod: PBBFAC | Performed by: ORTHOPAEDIC SURGERY

## 2023-10-20 ENCOUNTER — TELEPHONE (OUTPATIENT)
Dept: ORTHOPEDICS | Facility: CLINIC | Age: 78
End: 2023-10-20
Payer: MEDICARE

## 2023-10-23 ENCOUNTER — E-VISIT (OUTPATIENT)
Dept: FAMILY MEDICINE | Facility: CLINIC | Age: 78
End: 2023-10-23
Payer: MEDICARE

## 2023-10-23 ENCOUNTER — TELEPHONE (OUTPATIENT)
Dept: FAMILY MEDICINE | Facility: CLINIC | Age: 78
End: 2023-10-23
Payer: MEDICARE

## 2023-10-23 ENCOUNTER — TELEPHONE (OUTPATIENT)
Dept: FAMILY MEDICINE | Facility: CLINIC | Age: 78
End: 2023-10-23

## 2023-10-23 DIAGNOSIS — J06.9 UPPER RESPIRATORY TRACT INFECTION, UNSPECIFIED TYPE: Primary | ICD-10-CM

## 2023-10-23 PROCEDURE — 99421 OL DIG E/M SVC 5-10 MIN: CPT | Mod: ,,, | Performed by: FAMILY MEDICINE

## 2023-10-23 PROCEDURE — 99421 PR E&M, ONLINE DIGIT, EST, < 7 DAYS, 5-10 MINS: ICD-10-PCS | Mod: ,,, | Performed by: FAMILY MEDICINE

## 2023-10-23 RX ORDER — BENZONATATE 200 MG/1
200 CAPSULE ORAL 3 TIMES DAILY PRN
Qty: 30 CAPSULE | Refills: 0 | Status: SHIPPED | OUTPATIENT
Start: 2023-10-23 | End: 2023-11-03 | Stop reason: SDUPTHER

## 2023-10-23 RX ORDER — FLUTICASONE PROPIONATE 50 MCG
1 SPRAY, SUSPENSION (ML) NASAL DAILY
Qty: 16 G | Refills: 3 | Status: SHIPPED | OUTPATIENT
Start: 2023-10-23

## 2023-10-23 NOTE — PROGRESS NOTES
Patient ID: Jani Cha is a 78 y.o. male.    Chief Complaint: URI (Entered automatically based on patient selection in Patient Portal.)    The patient initiated a request through [employer] on 10/23/2023 for evaluation and management with a chief complaint of URI (Entered automatically based on patient selection in Patient Portal.)     I evaluated the questionnaire submission on 10/23/2023.    [facility] Peq Evisit Upper Respitatory/Cough Questionnaire    10/23/2023  3:29 PM CDT - Filed by Patient   Do you agree to participate in an E-Visit? Yes   If you have any of the following symptoms, please present to your local ER or call 911:  I acknowledge   What is the main issue that you would like for your doctor to address today? sore thoat cough   Are you able to take your vital signs? No   What symptoms do you currently have?  Cough;  Nasal Congestion;  Runny nose   Describe your cough: Productive (containing mucus)   Describe the mucus: Clear;  White   Have you ever smoked? I have smoked in the past   Have you had a fever? No   When did your symptoms first appear? 10/22/2023   In the last two weeks, have you been in close contact with someone who has COVID-19 or the Flu? No   In the last two weeks, have you worked or volunteered in a healthcare facility or as a ? Healthcare facilities include a hospital, medical or dental clinic, long-term care facility, or nursing home No   Do you live in a long-term care facility, nursing home, group home, or homeless shelter? No   List what you have done or taken to help your symptoms. nothing   How severe are your symptoms? Moderate   Have your symptoms improved since they first appeared? No change   Have you taken an at home Covid test? No   Have you taken a Flu test? No   Have you been fully vaccinated for COVID? (2 Pfizer, 2 Moderna or 1 Yoandy & Yoandy vaccine injections) Yes   Have you received a booster? Yes   Have you recieved a Flu shot? Yes   When did you  recieve your Flu shot? 9/15/2023   Do you have transportation to get tested for COVID if it is indicated and ordered for you at an [facility] location? Yes   Provide any information you feel is important to your history not asked above    Please attach any relevant images or files          Recent Labs Obtained:  No visits with results within 7 Day(s) from this visit.   Latest known visit with results is:   Lab Visit on 2023   Component Date Value Ref Range Status    Hemoglobin A1C 2023 6.8 (H)  4.0 - 5.6 % Final    Comment: ADA Screening Guidelines:  5.7-6.4%  Consistent with prediabetes  >or=6.5%  Consistent with diabetes    High levels of fetal hemoglobin interfere with the HbA1C  assay. Heterozygous hemoglobin variants (HbS, HgC, etc)do  not significantly interfere with this assay.   However, presence of multiple variants may affect accuracy.      Estimated Avg Glucose 2023 148 (H)  68 - 131 mg/dL Final    Sodium 2023 140  136 - 145 mmol/L Final    Potassium 2023 4.6  3.5 - 5.1 mmol/L Final    Chloride 2023 108  95 - 110 mmol/L Final    CO2 2023 26  23 - 29 mmol/L Final    Glucose 2023 160 (H)  70 - 110 mg/dL Final    BUN 2023 33 (H)  8 - 23 mg/dL Final    Creatinine 2023 1.6 (H)  0.5 - 1.4 mg/dL Final    Calcium 2023 9.7  8.7 - 10.5 mg/dL Final    Anion Gap 2023 6 (L)  8 - 16 mmol/L Final    eGFR 2023 44.1 (A)  >60 mL/min/1.73 m^2 Final       Encounter Diagnosis   Name Primary?    Upper respiratory tract infection, unspecified type Yes        No orders of the defined types were placed in this encounter.     Medications Ordered This Encounter   Medications    benzonatate (TESSALON) 200 MG capsule     Sig: Take 1 capsule (200 mg total) by mouth 3 (three) times daily as needed for Cough.     Dispense:  30 capsule     Refill:  0    fluticasone propionate (FLONASE) 50 mcg/actuation nasal spray     Si spray (50 mcg total) by Each Nostril  route once daily.     Dispense:  16 g     Refill:  3      - Antibiotics are not needed at this time  - Usual course of cold symptom is 10-14 days, but longer if patient is a smoker.   - If symptoms have not improved, will consider an antibiotic.   - Symptomatic treatment with over the counter Tylenol / Ibuprofen      Follow up if symptoms worsen or fail to improve.      E-Visit Time Tracking:    Day 1 Time (in minutes): 5     Total Time (in minutes): 5

## 2023-10-23 NOTE — PROGRESS NOTES
Subjective:      Patient ID: Jani Cha is a 78 y.o. male.    Chief Complaint:   Pain of the Left Knee and Pain of the Right Knee    HPI  He comes in for chronic bilateral knee pain, left greater than right, from osteoarthritis.  He has had earlier this year both corticosteroid and gel injections without relief.  He tries Tylenol with partial relief only.  He has had to use a walker since he had his hip fracture about 1 year ago, but is able to use a cane for short trips.  His hip is doing fine, and he is limited now by his left knee pain.  Has daily pain with ADLs and night pain interfering with sleep.  He is interested in surgery.      Objective:        Ortho/SPM Exam    Left knee:  There is is a significant varus deformity, which is partially passively correctable.  Active range of motion is 5-110 degrees.  Anterior crepitus with active extension.  Patellar mobility is limited.  Boggy synovitis without effusion.  Medial joint line tenderness predominates.  No instability to varus/valgus/Lachman's stressing.  No pain in the groin with flexion and internal rotation of the hip which is not limited.  Skin intact.  Distal neurovascular intact.  Flip test negative.        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  Assessment:   End-stage DJD, left knee      Plan:   Left total knee arthroplasty.  This will be done at [facility] due to medical comorbidities.  The surgical process of joint replacement was discussed in detail with the patient including a detailed discussion of the procedure itself (including visual model, x-ray review). The typical perioperative and postoperative course was discussed and perioperative risks were discussed to the patient's satisfaction.  Risks and complications discussed included but were not limited to the risks of anesthetic complications, infection,  bleeding, wound healing complications, aseptic loosening, instability, limb length inequality, neurologic dysfunction including numbness,  DVT, pulmonary embolism, perioperative medical risks (cardiac, pulmonary, renal, neurologic), and death and the patient elects to proceed. We will initiate pre-operative medical evaluation and clearance and set a provisional date for surgical intervention according to the patient's schedule. We discussed surgical options including implant type, and why I believe the implant selected is a good match for the patient's needs. The patient expressed understanding and would like to proceed with surgery.     The patient's pathophysiology was explained in detail with reference to x-rays, models, other visual aids as appropriate.  Treatment options were discussed in detail.  Questions were invited and answered to the patient's satisfaction.      Rancho Ferrara MD  Orthopedic Surgery

## 2023-10-23 NOTE — TELEPHONE ENCOUNTER
Pt states he has a cough and sore throat x 2 days, no appt is available with any provider, asking for advice

## 2023-10-23 NOTE — TELEPHONE ENCOUNTER
----- Message from Damaris Watts sent at 10/23/2023  8:29 AM CDT -----  Type:  Sooner Appointment Request    Patient is requesting a sooner appointment.  Patient declined first available appointment listed as well as another facility and provider .  Patient will not accept being placed on the waitlist and is requesting a message be sent to doctor.    Name of Caller:  self     When is the first available appointment? 4/02/2024    Symptoms:  common cold     Would the patient rather a call back or a response via [facility]? call    Best Call Back Number: .[phone] (home)

## 2023-10-23 NOTE — TELEPHONE ENCOUNTER
----- Message from Patel Olivera MA sent at 10/23/2023  2:46 PM CDT -----  Type: Patient Call Back    Who called: Self    What is the request in detail: pt. States he doesn't have access to a computer nor does he know how to do a E-visit.. he is asking for a nurse to call him please ..     Can the clinic reply by [facility]?No    Would the patient rather a call back or a response via [facility]? Yes, call     Best call back number: [phone]

## 2023-10-23 NOTE — TELEPHONE ENCOUNTER
Patient can complete an e-visit   Please contact Patient and provide him with information and explain to him how to

## 2023-10-24 ENCOUNTER — TELEPHONE (OUTPATIENT)
Dept: PREADMISSION TESTING | Facility: HOSPITAL | Age: 78
End: 2023-10-24
Payer: MEDICARE

## 2023-10-24 DIAGNOSIS — Z01.818 PREOPERATIVE TESTING: Primary | ICD-10-CM

## 2023-10-24 DIAGNOSIS — M79.609 PAIN IN EXTREMITY, UNSPECIFIED EXTREMITY: ICD-10-CM

## 2023-10-24 DIAGNOSIS — E11.40 TYPE 2 DIABETES MELLITUS WITH DIABETIC NEUROPATHY, WITHOUT LONG-TERM CURRENT USE OF INSULIN: Chronic | ICD-10-CM

## 2023-10-24 NOTE — ANESTHESIA PAT ROS NOTE
10/24/2023  Jani Cha is a 78 y.o., male.      Pre-op Assessment          Review of Systems           Anesthesia Assessment: Preoperative EQUATION    Planned Procedure: Procedure(s) (LRB):  ARTHROPLASTY, KNEE, TOTAL: Left: Kenn NexGen: Cemented Tibia (Left)  Requested Anesthesia Type:Regional  Surgeon: Rancho Ferrara MD  Service: Orthopedics  Known or anticipated Date of Surgery:11/15/2023    Surgeon notes: reviewed    Electronic QUestionnaire Assessment completed via nurse interview with patient.        Triage considerations:     The patient has no apparent active cardiac condition (No unstable coronary Syndrome such as severe unstable angina or recent [<1 month] myocardial infarction, decompensated CHF, severe valvular   disease or significant arrhythmia)    Previous anesthesia records:GETA, MAC, and No problems  4/23/23   CYSTOSCOPY, WITH INSERTION OF UROLIFT IMPLANT (Perineum)   Airway:  Mallampati: II   Mouth Opening: Normal  TM Distance: 4 - 6 cm  Tongue: Large    10/21/22   ARTHROPLASTY, HIP, RIGHT (Right: Hip)   Direct laryngoscopy Mask Ventilation: Easy Intubated: Postinduction Blade: Thomson #2 Airway Device Size: 7.0 Cuff Inflation: Minimal occlusive pressure Placement Verified By: Capnometry Complicating Factors: None Findings Post-Intubation: Bilateral breath sounds;Atraumatic/Condition of teeth unchanged Secured at: Lips Complications: None    Last PCP note: within 1 month , within [facility] , Dr. Phan  Subspecialty notes: Cardiology: General and Vascular Medicine, Endocrinology, Ortho, Urology    Other important co-morbidities: DM2, GERD, HLD, HTN, Obesity, JAYSON, and uses CPAP, CKD3, BPH, CAD, CABG x 4 2006, Stent x 1 2020, Plavix Use, AAA (stable), BICA Stenosis (1-39%), EF 50%, Lymphedema       Tests already available:  Available tests,  within [facility] .   7/24/23 BMP, A1C  4/17/23  CBC, EKG  2/2/23 ECHO  9/12/22 CT ABD/PELVIS  6/2/21 US CAROTID SCOTT            Instructions given. (See in Nurse's note)    Optimization:  Anesthesia Preop Clinic Assessment  Indicated POC    Medical Opinion Indicated Cardiology       Sub-specialist consult indicated:   [facility] NP      Plan:    Testing:  A1C, CMP, EKG, Hematology Profile, and PT/INR   Pre-anesthesia  visit       Visit focus: possible regional anesthesia and/or nerve block      Consultation: PCC NP for medical and anesthesia optimization     Patient  has previously scheduled Medical Appointment: 11/2    Navigation: Tests Scheduled.              Consults scheduled.             Results will be tracked by Preop Clinic.      10/27/23 Cardiology (Dr. Oliveira):     As stated in prior visit--Pt has no active cardiac condition (ACS/USA, decompenstated CHF, significant arrhythmias or severe valvular disease) and can easily achieve 4 METS.  Pt does not require any further workup prior to undergoing knee surgery. ASA can be held as needed but should be restarted as soon as possible after surgery is completed.  Pt should remain on beta-blockers throughout the entire marvin-operative time period. The other cardiac meds can be held at Surgerys discretion but should be restarted as soon as safely possible after surgery.  These recommendations follow the most current Guideline on Perioperative Cardiovascular Evaluation and Management of Patients Undergoing Noncardiac Surgery released by the ACC/AHA. (JACC 2014.07.944).               10/30/23 Cardiology (Dr. Oliveira):    Message from Max Oliveira MD sent at 10/30/2023  1:46 PM CDT -----  Regarding: RE: SECOND REQUEST  yes  ----- Message -----  From: Lisa Carpenter RN  Sent: 10/27/2023   3:55 PM CDT  To: Max Oliveira MD; Cody Silverman NP; #  Subject: RE: SECOND REQUEST                                Thank you.  Is it ok to hold Plavix 7 days prior to surgery?     Thanks in advance,  Lisa Carpenter  RN                                                                                [facility] [place] [facility]      11/2/23 Patient is optimized for surgery.      11/6/23: Patient spoke with staff nurse and reports diarrhea has resolved.      Cody Silverman NP  Perioperative Medicine  [facility]

## 2023-10-26 ENCOUNTER — TELEPHONE (OUTPATIENT)
Dept: PREADMISSION TESTING | Facility: HOSPITAL | Age: 78
End: 2023-10-26
Payer: MEDICARE

## 2023-10-26 NOTE — TELEPHONE ENCOUNTER
----- Message from Kaylen Pitt RN sent at 10/24/2023  4:24 PM CDT -----  Lv 11/25/22. Can you clear pt by chart?  ----- Message -----  From: Lisa Carpenter RN  Sent: 10/24/2023  10:48 AM CDT  To: Max Oliveira MD; Cody Silverman NP; #    Dr. Oliveira,    This patient is scheduled for surgery (Total Knee Arthroplasty) on 11/15/23 with Dr. Ferrara. This will last approximately 120 min under anesthesia. Requesting pre-op risk stratification and management prior to this procedure, along with medication (PLAVIX) instructions.  Requesting 7 day hold for Plavix based on current anesthesia guidelines.  Aspirin 81 mg will not need to be held.  Please advise. Will await your response.    Thanks in advance,  Lisa Carpenter RN                                                                                [facility] [place] [facility]

## 2023-10-27 ENCOUNTER — DOCUMENTATION ONLY (OUTPATIENT)
Dept: CARDIOLOGY | Facility: CLINIC | Age: 78
End: 2023-10-27
Payer: MEDICARE

## 2023-10-27 PROBLEM — M17.11 PRIMARY OSTEOARTHRITIS OF RIGHT KNEE: Status: ACTIVE | Noted: 2023-10-27

## 2023-10-27 PROBLEM — M17.12 PRIMARY OSTEOARTHRITIS OF LEFT KNEE: Status: ACTIVE | Noted: 2023-10-27

## 2023-10-27 NOTE — PROGRESS NOTES
As stated in prior visit--Pt has no active cardiac condition (ACS/USA, decompenstated CHF, significant arrhythmias or severe valvular disease) and can easily achieve 4 METS.  Pt does not require any further workup prior to undergoing knee surgery. ASA can be held as needed but should be restarted as soon as possible after surgery is completed.  Pt should remain on beta-blockers throughout the entire marvin-operative time period. The other cardiac meds can be held at Surgerys discretion but should be restarted as soon as safely possible after surgery.  These recommendations follow the most current Guideline on Perioperative Cardiovascular Evaluation and Management of Patients Undergoing Noncardiac Surgery released by the ACC/AHA. (JACC 2014.07.944).

## 2023-10-30 ENCOUNTER — TELEPHONE (OUTPATIENT)
Dept: PREADMISSION TESTING | Facility: HOSPITAL | Age: 78
End: 2023-10-30
Payer: MEDICARE

## 2023-10-30 NOTE — TELEPHONE ENCOUNTER
----- Message from Max Oliveira MD sent at 10/30/2023  1:46 PM CDT -----  Regarding: RE: SECOND REQUEST  yes  ----- Message -----  From: Lisa Carpenter RN  Sent: 10/27/2023   3:55 PM CDT  To: Max Oliveira MD; Cody Silverman NP; #  Subject: RE: SECOND REQUEST                               Thank you.  Is it ok to hold Plavix 7 days prior to surgery?    Thanks in advance,  Lisa Carpenter RN                                                                                [facility]    ----- Message -----  From: Max Oliveira MD  Sent: 10/27/2023   3:44 PM CDT  To: Cody Silverman NP; Emily Flores LPN; #  Subject: RE: SECOND REQUEST                               See chart and last note  ----- Message -----  From: Lisa Carpenter RN  Sent: 10/27/2023  10:02 AM CDT  To: Max Oliveira MD; Cody Silverman NP; #  Subject: SECOND REQUEST                                   Dr. Oliveira,    This patient is scheduled for surgery (Total Knee Arthroplasty) on 11/15/23 with Dr. Ferrara. This will last approximately 120 min under anesthesia. Requesting pre-op risk stratification and management prior to this procedure, along with medication (PLAVIX) instructions.  Requesting 7 day hold for Plavix based on current anesthesia guidelines.  Aspirin 81 mg will not need to be held.  Please advise. Will await your response.     Thanks in advance,  Lisa Carpenter RN                                                                                [facility]

## 2023-10-31 ENCOUNTER — EXTERNAL CHRONIC CARE MANAGEMENT (OUTPATIENT)
Dept: PRIMARY CARE CLINIC | Facility: CLINIC | Age: 78
End: 2023-10-31
Payer: MEDICARE

## 2023-10-31 PROCEDURE — 99490 CHRNC CARE MGMT STAFF 1ST 20: CPT | Mod: PBBFAC,PO | Performed by: FAMILY MEDICINE

## 2023-10-31 PROCEDURE — 99490 PR CHRONIC CARE MGMT, 1ST 20 MIN: ICD-10-PCS | Mod: S$PBB,,, | Performed by: FAMILY MEDICINE

## 2023-10-31 PROCEDURE — 99490 CHRNC CARE MGMT STAFF 1ST 20: CPT | Mod: S$PBB,,, | Performed by: FAMILY MEDICINE

## 2023-11-01 ENCOUNTER — TELEPHONE (OUTPATIENT)
Dept: INTERNAL MEDICINE | Facility: CLINIC | Age: 78
End: 2023-11-01
Payer: MEDICARE

## 2023-11-01 PROBLEM — N40.0 BPH (BENIGN PROSTATIC HYPERPLASIA): Status: ACTIVE | Noted: 2023-11-01

## 2023-11-01 PROBLEM — N18.32 STAGE 3B CHRONIC KIDNEY DISEASE: Status: ACTIVE | Noted: 2023-11-01

## 2023-11-01 NOTE — ASSESSMENT & PLAN NOTE
Followed per Vascular Surgery; uses compression stockings.  I suggest avoidance of added salt and voidance of NSAID's- unless advised or ordered. I recommend limb elevation during perioperative period.

## 2023-11-01 NOTE — ASSESSMENT & PLAN NOTE
Per CT chest 2/4/20  Stable per CT chest and abdomen 4/2021: 1.5 cm  Reports recent cough this is now improving; recent URI

## 2023-11-01 NOTE — ASSESSMENT & PLAN NOTE
Current BP  at goal today.    Taking: valsartan/Coreg- took meds this AM       Lifestyle changes to reduce systolic BP:  exercise 30 minutes per day,  5 days per week or 150 minutes weekly; sodium reduction and avoidance of high salt foods such as processed meats, frozen meals and  fast foods.   Keeping a healthy weight/BMI can help with better BP control    BP acceptable for surgery. I recommend monitoring BP during perioperative period as uncontrolled pain can elevate blood pressure.

## 2023-11-01 NOTE — ASSESSMENT & PLAN NOTE
Currently being treated with Protonix daily and TUMS prn ; controlled.  Continue to  elevate HOB and avoid laying down for 2-3 hours after meals.   Weight loss encouraged as well as dietary changes such as reduction or avoidance of fatty foods, caffeine, spicy foods, and chocolate.

## 2023-11-01 NOTE — ASSESSMENT & PLAN NOTE
"Followed per Vascular Surgery; last seen 8/30/23. Will continue with routine surveillance for now.     Per CT abdomen/pelvis 11/28/22    ..." Grossly stable appearance of the lower thoracic and abdominal aorta with significant noncalcified and calcified atherosclerotic plaque.  Lower thoracic aorta measures approximately 4.4 cm in transverse width at the level of the diaphragm.  Infrarenal aorta measures approximately 4.1 x 3.9 cm in maximum diameter (axial image 80), similar to the prior study by my measurements.  Large eccentric mural thrombus or atherosclerotic plaque again noted in the infrarenal aorta which contains a small hyperdensity within the thrombus which may represent calcifications or contrast (axial image 76).  This small hyperdensity is stable when compared with 09/12/2022."         "

## 2023-11-01 NOTE — ASSESSMENT & PLAN NOTE
Per CT chest/abdomen 4/12/21- left adrenal : 1.2 cm  Followed per Endocrinology; last seen 7/31/23    Per Endocrinology note:    - Work up: DST: WNL, cortisol 2, due to the overweight  - Metanephrines and normetanephrines:  Within acceptable ranges  - Renin/navneet level normal

## 2023-11-01 NOTE — ASSESSMENT & PLAN NOTE
Patient would benefit from weight loss and has not set goals to achieve success.   Lifestyle changes should be made by eating healthy, exercising at least 150 minutes weekly, and avoiding sedentary behavior.

## 2023-11-01 NOTE — ASSESSMENT & PLAN NOTE
Denies MI  S/P CABG x 4  Stent x 1 to RCA 9/2020  Treated with: statin/ASA/Plavix/Coreg  Optimized per Cards (Tee); OK to hold Plavix for 7 days before surgery.     Denies symptoms of  CP/SOB/PND/Orthopnea/Palpitations/Syncope  Encouraged physical activity of at least 30 minutes of moderate aerobic activity 5 days weekly.

## 2023-11-01 NOTE — ASSESSMENT & PLAN NOTE
No changes in urine volume.   Most recent GFR: 41  BUN= 23   Cr = 1.7  Recommend to avoid NSAIDs, reduce salt intake, maintain adequate hydration, and exercise.  Not followed per Nephrology- suggest continued monitoring during perioperative period and with PCP    Tylenol as needed for pain    I  suggest monitoring renal function, input and output status marvin-operatively. I  suggest avoiding nephrotoxic medication including NSAIDs, COX2 inhibitors, intravenous contrast agent,avoiding hypotension to prevent further renal impairment.

## 2023-11-01 NOTE — ASSESSMENT & PLAN NOTE
"CPAP compliance: No. Reason for non-compliance: " I don't want to."    Recommend caution with sedating medication that can cause respiratory depression.   Patients with untreated JAYSON have an increased risk of stroke, HTN, and heart disease.      "

## 2023-11-01 NOTE — ASSESSMENT & PLAN NOTE
Recommend weight loss, healthy diet (DASH/Mediterranean) and exercise.   Patient should exercise 30 minutes at least five times weekly once recovered from surgery.   Treated with: atorvastatin

## 2023-11-01 NOTE — ASSESSMENT & PLAN NOTE
Not currently treated with medication  Followed by: Urology; last seen 6/23/23.  S/P Uro-lift 4/24/23  LUTS: nocturia    There is an increased risk of post-op urinary retention.  Reports difficulty with catheterization for right hip surgery 12/2022- four attempts made- successful after Urology was called.

## 2023-11-01 NOTE — ASSESSMENT & PLAN NOTE
Treated with  Coreg, no diuretic.    Followed per cardiology.         Recent imaging (Echo/Stress)  Echo Saline Bubble? No    Interpretation Summary  · The left ventricle is mildly enlarged with mild concentric hypertrophy and low normal systolic function.  · The estimated ejection fraction is 50%.  · Normal left ventricular diastolic function.  · Normal right ventricular size with normal right ventricular systolic function.  · Mild left atrial enlargement.  · Mild right atrial enlargement.  · Normal central venous pressure (3 mmHg).  · The estimated PA systolic pressure is 10 mmHg.  · There is no pulmonary hypertension.  · The sinuses of Valsalva is moderately dilated. 4.3cm.

## 2023-11-01 NOTE — ASSESSMENT & PLAN NOTE
Currently takes: Trulicity/glipizide    Most recent A1c is 7.0     Denies peripheral neuropathy.   Denies visual changes. Up to date with eye exams.  Micro changes: Denies- Retinopathy, Nephropathy   Macro changes: Denies-   Peripheral disease ; Has carotid/coronary disease    Maintain healthy weight. Exercise at least 150 minutes weekly. Encouraged diet rich in nutrients such as fruits, vegetables, and whole grains; reduce sugar intake from cakes, candy, and sugared drinks.

## 2023-11-01 NOTE — ASSESSMENT & PLAN NOTE
"S/P Colonoscopy earlier this year at [facility], [place], operated by [facility] per patient- " they said everything is OK."  Results not available      "

## 2023-11-02 ENCOUNTER — HOSPITAL ENCOUNTER (OUTPATIENT)
Dept: CARDIOLOGY | Facility: CLINIC | Age: 78
Discharge: HOME | End: 2023-11-02
Payer: MEDICARE

## 2023-11-02 ENCOUNTER — OFFICE VISIT (OUTPATIENT)
Dept: INTERNAL MEDICINE | Facility: CLINIC | Age: 78
End: 2023-11-02
Payer: MEDICARE

## 2023-11-02 VITALS
OXYGEN SATURATION: 97 % | WEIGHT: 249.13 LBS | HEART RATE: 67 BPM | HEIGHT: 70 IN | DIASTOLIC BLOOD PRESSURE: 60 MMHG | TEMPERATURE: 98 F | SYSTOLIC BLOOD PRESSURE: 110 MMHG | BODY MASS INDEX: 35.66 KG/M2

## 2023-11-02 DIAGNOSIS — Z01.818 PREOPERATIVE TESTING: ICD-10-CM

## 2023-11-02 DIAGNOSIS — I10 PRIMARY HYPERTENSION: ICD-10-CM

## 2023-11-02 DIAGNOSIS — Z01.818 PREOPERATIVE EXAMINATION: Primary | ICD-10-CM

## 2023-11-02 DIAGNOSIS — K21.9 GASTROESOPHAGEAL REFLUX DISEASE, UNSPECIFIED WHETHER ESOPHAGITIS PRESENT: ICD-10-CM

## 2023-11-02 DIAGNOSIS — I25.10 CORONARY ARTERY DISEASE INVOLVING NATIVE CORONARY ARTERY OF NATIVE HEART WITHOUT ANGINA PECTORIS: Chronic | ICD-10-CM

## 2023-11-02 DIAGNOSIS — N18.32 STAGE 3B CHRONIC KIDNEY DISEASE: ICD-10-CM

## 2023-11-02 DIAGNOSIS — E66.09 CLASS 1 OBESITY DUE TO EXCESS CALORIES WITH SERIOUS COMORBIDITY IN ADULT, UNSPECIFIED BMI: ICD-10-CM

## 2023-11-02 DIAGNOSIS — I89.0 LYMPHEDEMA OF BOTH LOWER EXTREMITIES: ICD-10-CM

## 2023-11-02 DIAGNOSIS — N40.0 BENIGN PROSTATIC HYPERPLASIA, UNSPECIFIED WHETHER LOWER URINARY TRACT SYMPTOMS PRESENT: ICD-10-CM

## 2023-11-02 DIAGNOSIS — I50.9 CONGESTIVE HEART FAILURE, UNSPECIFIED HF CHRONICITY, UNSPECIFIED HEART FAILURE TYPE: ICD-10-CM

## 2023-11-02 DIAGNOSIS — Z86.010 PERSONAL HISTORY OF COLONIC POLYPS: ICD-10-CM

## 2023-11-02 DIAGNOSIS — R19.7 DIARRHEA, UNSPECIFIED TYPE: ICD-10-CM

## 2023-11-02 DIAGNOSIS — I71.40 ABDOMINAL AORTIC ANEURYSM (AAA) WITHOUT RUPTURE, UNSPECIFIED PART: ICD-10-CM

## 2023-11-02 DIAGNOSIS — D35.00 ADRENAL ADENOMA, UNSPECIFIED LATERALITY: Chronic | ICD-10-CM

## 2023-11-02 DIAGNOSIS — E78.00 PURE HYPERCHOLESTEROLEMIA: Chronic | ICD-10-CM

## 2023-11-02 DIAGNOSIS — E11.40 TYPE 2 DIABETES MELLITUS WITH DIABETIC NEUROPATHY, WITHOUT LONG-TERM CURRENT USE OF INSULIN: Chronic | ICD-10-CM

## 2023-11-02 DIAGNOSIS — R91.1 PULMONARY NODULE: ICD-10-CM

## 2023-11-02 DIAGNOSIS — M17.12 PRIMARY OSTEOARTHRITIS OF LEFT KNEE: ICD-10-CM

## 2023-11-02 DIAGNOSIS — G47.39 OTHER SLEEP APNEA: ICD-10-CM

## 2023-11-02 PROCEDURE — 99215 OFFICE O/P EST HI 40 MIN: CPT | Mod: S$PBB,,, | Performed by: NURSE PRACTITIONER

## 2023-11-02 PROCEDURE — 99214 OFFICE O/P EST MOD 30 MIN: CPT | Mod: PBBFAC | Performed by: NURSE PRACTITIONER

## 2023-11-02 PROCEDURE — 93005 ELECTROCARDIOGRAM TRACING: CPT | Mod: PBBFAC | Performed by: INTERNAL MEDICINE

## 2023-11-02 PROCEDURE — 99417 PR PROLONGED SVC, OUTPT, W/WO DIRECT PT CONTACT,  EA ADDTL 15 MIN: ICD-10-PCS | Mod: S$PBB,,, | Performed by: NURSE PRACTITIONER

## 2023-11-02 PROCEDURE — 99999 PR PBB SHADOW E&M-EST. PATIENT-LVL IV: ICD-10-PCS | Mod: PBBFAC,,, | Performed by: NURSE PRACTITIONER

## 2023-11-02 PROCEDURE — 93010 EKG 12-LEAD: ICD-10-PCS | Mod: S$PBB,,, | Performed by: INTERNAL MEDICINE

## 2023-11-02 PROCEDURE — 99215 PR OFFICE/OUTPT VISIT, EST, LEVL V, 40-54 MIN: ICD-10-PCS | Mod: S$PBB,,, | Performed by: NURSE PRACTITIONER

## 2023-11-02 PROCEDURE — 99417 PROLNG OP E/M EACH 15 MIN: CPT | Mod: S$PBB,,, | Performed by: NURSE PRACTITIONER

## 2023-11-02 PROCEDURE — 99999 PR PBB SHADOW E&M-EST. PATIENT-LVL IV: CPT | Mod: PBBFAC,,, | Performed by: NURSE PRACTITIONER

## 2023-11-02 PROCEDURE — 93010 ELECTROCARDIOGRAM REPORT: CPT | Mod: S$PBB,,, | Performed by: INTERNAL MEDICINE

## 2023-11-02 RX ORDER — DEXTROMETHORPHAN HYDROBROMIDE, GUAIFENESIN 5; 100 MG/5ML; MG/5ML
650 LIQUID ORAL EVERY 8 HOURS
COMMUNITY

## 2023-11-02 RX ORDER — CALCIUM CARBONATE 500(1250)
1 TABLET ORAL DAILY
COMMUNITY

## 2023-11-02 NOTE — PROGRESS NOTES
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 mobility. I recommend to avoid/reduce use of benzodiazepine use (not for patients who take on a regular basis), anticholinergics, Benadryl,  and agents that may cause postoperative serotonin syndrome.  Controlled pain can decrease the risk for postop delirium and since opioids are used for postoperative pain control, I suggest using the lowest dose for the shortest amount of time necessary for pain management.     The patient is at an increased risk for urinary retention due to : possible regional anesthesia and advanced age. I recommend to avoid/decrease the use of benzodiazepines, anticholinergics, and Benadryl in the perioperative period. I also recommend using opioids for the shortest period of time if possible.          This visit was focused on Preoperative evaluation, Perioperative Medical management, complication reduction plans. I suggest that the patient follows up with primary care or relevant sub specialists for ongoing health care.    I appreciate the opportunity to be involved in this patients care. Please feel free to contact me if there were any questions about this consultation.        I spent a total of 86 minutes on the day of the visit.This includes face to face time and non-face to face time preparing  to see the patient (e.g., review of tests), obtaining and/or reviewing separately obtained history, documenting clinical information in the electronic or other health record, independently interpreting results and communicating results to the patient/family/caregiver, or care coordinator.       Patient is optimized for surgery.     11/6/23: Patient spoke with staff nurse and reports diarrhea has resolved.     Cody Silverman NP  Perioperative Medicine  [facility]

## 2023-11-02 NOTE — HPI
This is a 78 y.o. male  who presents today for a preoperative evaluation in preparation for left knee arthroplasty.   Surgery is indicated for  osteoarthritis of left knee .   Patient is new to me.  The history has been obtained by speaking with the patient and reviewing the electronic medical record and/or outside health information. Significant health conditions for the perioperative period are discussed below in assessment and plan.   Patient reports current health status to be Fair.  Denies any new symptoms before surgery.
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
## 2023-11-02 NOTE — ASSESSMENT & PLAN NOTE
Acute- for two days.  Reports symptoms of abdominal cramping and loose stools.  Self treating with Imodium and has loose stools have improved this AM.  Only drinking water, no food  Encouraged electrolyte replacement drinks such as Gatorade and a trial of solid food today. May start off with soup, Smoothie, etc.  Denies fever/chills but reports some fatigue, has resolving URI.   Possible viral- will have nurse check with patient tomorrow to see if symptoms are improving.   Electrolytes look good today  Albumin is low

## 2023-11-03 ENCOUNTER — TELEPHONE (OUTPATIENT)
Dept: PREADMISSION TESTING | Facility: HOSPITAL | Age: 78
End: 2023-11-03
Payer: MEDICARE

## 2023-11-03 DIAGNOSIS — J06.9 UPPER RESPIRATORY TRACT INFECTION, UNSPECIFIED TYPE: ICD-10-CM

## 2023-11-03 RX ORDER — BENZONATATE 200 MG/1
200 CAPSULE ORAL 3 TIMES DAILY PRN
Qty: 30 CAPSULE | Refills: 0 | Status: SHIPPED | OUTPATIENT
Start: 2023-11-03 | End: 2023-11-13

## 2023-11-03 NOTE — TELEPHONE ENCOUNTER
Spoke to patient, reviewed medication and surgery instructions and answered any pertinent questions. Patient verbalized understanding.

## 2023-11-07 ENCOUNTER — OFFICE VISIT (OUTPATIENT)
Dept: ORTHOPEDICS | Facility: CLINIC | Age: 78
End: 2023-11-07
Payer: MEDICARE

## 2023-11-07 ENCOUNTER — HOSPITAL ENCOUNTER (OUTPATIENT)
Dept: RADIOLOGY | Facility: HOSPITAL | Age: 78
Discharge: HOME | End: 2023-11-07
Payer: MEDICARE

## 2023-11-07 VITALS — BODY MASS INDEX: 35.98 KG/M2 | HEIGHT: 70 IN | WEIGHT: 251.31 LBS

## 2023-11-07 DIAGNOSIS — M17.12 PRIMARY OSTEOARTHRITIS OF LEFT KNEE: Primary | ICD-10-CM

## 2023-11-07 DIAGNOSIS — M17.12 PRIMARY OSTEOARTHRITIS OF LEFT KNEE: ICD-10-CM

## 2023-11-07 DIAGNOSIS — Z96.652 S/P TOTAL KNEE REPLACEMENT, LEFT: ICD-10-CM

## 2023-11-07 PROCEDURE — 73560 X-RAY EXAM OF KNEE 1 OR 2: CPT | Mod: TC,LT

## 2023-11-07 PROCEDURE — 73560 XR KNEE 1 OR 2 VIEW LEFT: ICD-10-PCS | Mod: 26,LT,, | Performed by: RADIOLOGY

## 2023-11-07 PROCEDURE — 99999 PR PBB SHADOW E&M-EST. PATIENT-LVL III: CPT | Mod: PBBFAC,,,

## 2023-11-07 PROCEDURE — 99214 OFFICE O/P EST MOD 30 MIN: CPT | Mod: S$PBB,,,

## 2023-11-07 PROCEDURE — 99214 PR OFFICE/OUTPT VISIT, EST, LEVL IV, 30-39 MIN: ICD-10-PCS | Mod: S$PBB,,,

## 2023-11-07 PROCEDURE — 73560 X-RAY EXAM OF KNEE 1 OR 2: CPT | Mod: 26,LT,, | Performed by: RADIOLOGY

## 2023-11-07 PROCEDURE — 99999 PR PBB SHADOW E&M-EST. PATIENT-LVL III: ICD-10-PCS | Mod: PBBFAC,,,

## 2023-11-07 PROCEDURE — 99213 OFFICE O/P EST LOW 20 MIN: CPT | Mod: PBBFAC

## 2023-11-07 RX ORDER — ACETAMINOPHEN 500 MG
1000 TABLET ORAL EVERY 6 HOURS
Status: CANCELLED | OUTPATIENT
Start: 2023-11-07

## 2023-11-07 RX ORDER — SODIUM CHLORIDE 9 MG/ML
INJECTION, SOLUTION INTRAVENOUS CONTINUOUS
Status: CANCELLED | OUTPATIENT
Start: 2023-11-07 | End: 2023-11-08

## 2023-11-07 RX ORDER — PROCHLORPERAZINE EDISYLATE 5 MG/ML
5 INJECTION INTRAMUSCULAR; INTRAVENOUS EVERY 6 HOURS PRN
Status: CANCELLED | OUTPATIENT
Start: 2023-11-07

## 2023-11-07 RX ORDER — FAMOTIDINE 20 MG/1
20 TABLET, FILM COATED ORAL 2 TIMES DAILY
Status: CANCELLED | OUTPATIENT
Start: 2023-11-07

## 2023-11-07 RX ORDER — FENTANYL CITRATE 50 UG/ML
100 INJECTION, SOLUTION INTRAMUSCULAR; INTRAVENOUS
Status: CANCELLED | OUTPATIENT
Start: 2023-11-07 | End: 2023-11-08

## 2023-11-07 RX ORDER — BISACODYL 10 MG
10 SUPPOSITORY, RECTAL RECTAL EVERY 12 HOURS PRN
Status: CANCELLED | OUTPATIENT
Start: 2023-11-07

## 2023-11-07 RX ORDER — SODIUM CHLORIDE 0.9 % (FLUSH) 0.9 %
10 SYRINGE (ML) INJECTION
Status: CANCELLED | OUTPATIENT
Start: 2023-11-07

## 2023-11-07 RX ORDER — FENTANYL CITRATE 50 UG/ML
25 INJECTION, SOLUTION INTRAMUSCULAR; INTRAVENOUS EVERY 5 MIN PRN
Status: CANCELLED | OUTPATIENT
Start: 2023-11-07

## 2023-11-07 RX ORDER — MUPIROCIN 20 MG/G
1 OINTMENT TOPICAL 2 TIMES DAILY
Status: CANCELLED | OUTPATIENT
Start: 2023-11-07 | End: 2023-11-12

## 2023-11-07 RX ORDER — LIDOCAINE HYDROCHLORIDE 10 MG/ML
1 INJECTION, SOLUTION EPIDURAL; INFILTRATION; INTRACAUDAL; PERINEURAL
Status: CANCELLED | OUTPATIENT
Start: 2023-11-07

## 2023-11-07 RX ORDER — PREGABALIN 75 MG/1
75 CAPSULE ORAL NIGHTLY
Status: CANCELLED | OUTPATIENT
Start: 2023-11-07

## 2023-11-07 RX ORDER — TALC
6 POWDER (GRAM) TOPICAL NIGHTLY PRN
Status: CANCELLED | OUTPATIENT
Start: 2023-11-07

## 2023-11-07 RX ORDER — OXYCODONE HYDROCHLORIDE 5 MG/1
5 TABLET ORAL
Status: CANCELLED | OUTPATIENT
Start: 2023-11-07

## 2023-11-07 RX ORDER — MUPIROCIN 20 MG/G
1 OINTMENT TOPICAL
Status: CANCELLED | OUTPATIENT
Start: 2023-11-07

## 2023-11-07 RX ORDER — CEFAZOLIN SODIUM 2 G/50ML
2 SOLUTION INTRAVENOUS
Status: CANCELLED | OUTPATIENT
Start: 2023-11-07 | End: 2023-11-08

## 2023-11-07 RX ORDER — CEFAZOLIN SODIUM 2 G/50ML
2 SOLUTION INTRAVENOUS
Status: CANCELLED | OUTPATIENT
Start: 2023-11-07

## 2023-11-07 RX ORDER — ONDANSETRON 2 MG/ML
4 INJECTION INTRAMUSCULAR; INTRAVENOUS EVERY 8 HOURS PRN
Status: CANCELLED | OUTPATIENT
Start: 2023-11-07

## 2023-11-07 RX ORDER — MORPHINE SULFATE 2 MG/ML
2 INJECTION, SOLUTION INTRAMUSCULAR; INTRAVENOUS
Status: CANCELLED | OUTPATIENT
Start: 2023-11-07

## 2023-11-07 RX ORDER — ACETAMINOPHEN 500 MG
1000 TABLET ORAL
Status: CANCELLED | OUTPATIENT
Start: 2023-11-07

## 2023-11-07 RX ORDER — AMOXICILLIN 250 MG
1 CAPSULE ORAL 2 TIMES DAILY
Status: CANCELLED | OUTPATIENT
Start: 2023-11-07

## 2023-11-07 RX ORDER — OXYCODONE HYDROCHLORIDE 5 MG/1
10 TABLET ORAL
Status: CANCELLED | OUTPATIENT
Start: 2023-11-07

## 2023-11-07 RX ORDER — SODIUM CHLORIDE 9 MG/ML
INJECTION, SOLUTION INTRAVENOUS
Status: CANCELLED | OUTPATIENT
Start: 2023-11-07

## 2023-11-07 RX ORDER — ASPIRIN 81 MG/1
81 TABLET ORAL 2 TIMES DAILY
Status: CANCELLED | OUTPATIENT
Start: 2023-11-07

## 2023-11-07 RX ORDER — POLYETHYLENE GLYCOL 3350 17 G/17G
17 POWDER, FOR SOLUTION ORAL DAILY
Status: CANCELLED | OUTPATIENT
Start: 2023-11-08

## 2023-11-07 RX ORDER — MIDAZOLAM HYDROCHLORIDE 1 MG/ML
4 INJECTION INTRAMUSCULAR; INTRAVENOUS
Status: CANCELLED | OUTPATIENT
Start: 2023-11-07 | End: 2023-11-08

## 2023-11-07 RX ORDER — PREGABALIN 75 MG/1
75 CAPSULE ORAL
Status: CANCELLED | OUTPATIENT
Start: 2023-11-07

## 2023-11-07 RX ORDER — METHOCARBAMOL 750 MG/1
750 TABLET, FILM COATED ORAL 3 TIMES DAILY
Status: CANCELLED | OUTPATIENT
Start: 2023-11-07

## 2023-11-07 RX ORDER — NALOXONE HCL 0.4 MG/ML
0.02 VIAL (ML) INJECTION
Status: CANCELLED | OUTPATIENT
Start: 2023-11-07
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
## 2023-11-08 NOTE — PROGRESS NOTES
Jani Cha is a 78 y.o. year old here today for pre surgery optimization visit  in preparation for a Left total knee arthroplasty to be performed by Dr. Ferrara  on 11/15/23.  he was last seen and treated in the clinic on 10/19/2023. he will be medically optimized by the pre op center. There has been no significant change in medical status since last visit. No fever, chills, malaise, or unexplained weight change.      Allergies, Medications, past medical and surgical history reviewed.    Focused examination performed.    Patient declined to see surgeon today. All questions answered. Patient encouraged to call with questions. Contact information given.     Pre, marvin, and post operative procedures and expectations discussed. Goals of successful surgery reviewed and include manageable pain levels, surgical site free of infection, medication management, and ambulation with PT/OT assistance. Healthy weight management discussed with patient and caregiver who were receptive to eduction of healthy diet and activity. No other necessary lifestyle changes identified. Educated patient about signs and symptoms of infection, medication management, anticoagulation therapy, risk of tobacco and alcohol use, and self-care to promote healing. Surgical guide given for future reference. Hibiclens given to patient with instructions. All questions were answered.     Jani Cha verbalized an understanding to the education and goals. Patient has displayed readiness to engage in care and is ready to proceed with surgery.  Patient reports daughter is able and ready to provide assistance at home after discharge.    Surgical and blood consents signed.    Jani Cha will contact us if there are any questions, concerns, or changes in medical status prior to surgery.       Joint class: 11/7/23    Patient has discussed discharge planning with surgeon. Patient will be discharged to home following surgery.   patient will be  scheduled with [facility]. at the [place] location    30 minutes of time was spent on patient education, review of records, templating, H&P, , appointment scheduling and optimizing patient for surgery.

## 2023-11-10 ENCOUNTER — CLINICAL SUPPORT (OUTPATIENT)
Dept: REHABILITATION | Facility: HOSPITAL | Age: 78
End: 2023-11-10
Attending: ORTHOPAEDIC SURGERY
Payer: MEDICARE

## 2023-11-10 DIAGNOSIS — M17.12 PRIMARY OSTEOARTHRITIS OF LEFT KNEE: ICD-10-CM

## 2023-11-10 DIAGNOSIS — R29.898 WEAKNESS OF LEFT LOWER EXTREMITY: ICD-10-CM

## 2023-11-10 DIAGNOSIS — R26.89 GAIT, ANTALGIC: ICD-10-CM

## 2023-11-10 DIAGNOSIS — G89.29 CHRONIC PAIN OF LEFT KNEE: ICD-10-CM

## 2023-11-10 DIAGNOSIS — M25.662 DECREASED RANGE OF MOTION OF LEFT KNEE: ICD-10-CM

## 2023-11-10 DIAGNOSIS — M25.562 CHRONIC PAIN OF LEFT KNEE: ICD-10-CM

## 2023-11-10 PROCEDURE — 97110 THERAPEUTIC EXERCISES: CPT | Mod: PN

## 2023-11-10 PROCEDURE — 97161 PT EVAL LOW COMPLEX 20 MIN: CPT | Mod: PN
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
## 2023-11-14 ENCOUNTER — ANESTHESIA EVENT (OUTPATIENT)
Dept: SURGERY | Facility: HOSPITAL | Age: 78
End: 2023-11-14
Payer: MEDICARE

## 2023-11-14 ENCOUNTER — TELEPHONE (OUTPATIENT)
Dept: ORTHOPEDICS | Facility: CLINIC | Age: 78
End: 2023-11-14
Payer: MEDICARE

## 2023-11-14 DIAGNOSIS — Z96.652 S/P TOTAL KNEE REPLACEMENT, LEFT: Primary | ICD-10-CM

## 2023-11-14 RX ORDER — AMOXICILLIN 250 MG
1 CAPSULE ORAL DAILY
Qty: 30 TABLET | Refills: 0 | Status: SHIPPED | OUTPATIENT
Start: 2023-11-14

## 2023-11-14 RX ORDER — ASPIRIN 81 MG/1
81 TABLET ORAL 2 TIMES DAILY
Qty: 60 TABLET | Refills: 0 | Status: SHIPPED | OUTPATIENT
Start: 2023-11-14 | End: 2023-12-16

## 2023-11-14 RX ORDER — METHOCARBAMOL 750 MG/1
750 TABLET, FILM COATED ORAL 4 TIMES DAILY PRN
Qty: 40 TABLET | Refills: 0 | Status: SHIPPED | OUTPATIENT
Start: 2023-11-14

## 2023-11-14 RX ORDER — OXYCODONE HYDROCHLORIDE 5 MG/1
TABLET ORAL
Qty: 50 TABLET | Refills: 0 | Status: SHIPPED | OUTPATIENT
Start: 2023-11-14 | End: 2023-11-30 | Stop reason: SDUPTHER

## 2023-11-14 RX ORDER — DEXTROMETHORPHAN HYDROBROMIDE, GUAIFENESIN 5; 100 MG/5ML; MG/5ML
650 LIQUID ORAL EVERY 8 HOURS
Qty: 120 TABLET | Refills: 0 | Status: SHIPPED | OUTPATIENT
Start: 2023-11-14

## 2023-11-14 NOTE — ANESTHESIA PREPROCEDURE EVALUATION
[facility]  Anesthesia Pre-Operative Evaluation         Patient Name: Jani Cha  YOB: 1945  MRN: [id number]    SUBJECTIVE:     Pre-operative evaluation for Procedure(s) (LRB):  ARTHROPLASTY, KNEE, TOTAL: Left: Kenn NexGen: Cemented Tibia (Left)     11/14/2023    Jani Cha is a 78 y.o. male w/ a significant PMHx of CAD s/p 4 v CABG (2006), AAA at level of diaphragm, T2DM (A1c 7), CKD 3, obesity (BMI 36), JAYSON on CPAP, GERD, HTN, and osteoarthritis of left knee. Per cardiology, able to achieve 4 METS.    Patient now presents for the above procedure(s).    TTE (2/2/23):  · The left ventricle is mildly enlarged with mild concentric hypertrophy and low normal systolic function.  · The estimated ejection fraction is 50%.  · Normal left ventricular diastolic function.  · Normal right ventricular size with normal right ventricular systolic function.  · Mild left atrial enlargement.  · Mild right atrial enlargement.  · Normal central venous pressure (3 mmHg).  · The estimated PA systolic pressure is 10 mmHg.  · There is no pulmonary hypertension.  · The sinuses of Valsalva is moderately dilated. 4.3cm.    Prev airway (10/21/22):   Induction:  Intravenous    Intubated:  Postinduction    Mask Ventilation:  Easy mask    Attempts:  1    Attempted By:  CRNA    Method of Intubation:  Direct    Blade:  Thomson 2    Laryngeal View Grade: Grade IIA - cords partially seen      Difficult Airway Encountered?: No      Complications:  None    Airway Device:  Oral endotracheal tube    Airway Device Size:  7.0    Style/Cuff Inflation:  Cuffed (inflated to minimal occlusive pressure)    Tube secured:  24    Secured at:  The lips    Placement Verified By:  Capnometry    Complicating Factors:  None    Findings Post-Intubation:  BS equal bilateral and atraumatic/condition of teeth  unchanged    Patient Active Problem List   Diagnosis    Hyperlipidemia    Primary hypertension    Coronary artery disease involving native coronary artery of native heart without angina pectoris    Sleep apnea    S/P CABG x 4    Type 2 diabetes mellitus with diabetic neuropathy, without long-term current use of insulin    GERD (gastroesophageal reflux disease)    Personal history of colonic polyps    Abdominal aortic aneurysm (AAA) without rupture    Total occlusion of coronary artery, chronic -LCX with collaterals.  No ischemic on PET    Pulmonary nodule    Thoracic aortic aneurysm without rupture    Bilateral renal cysts    Adrenal adenoma    History of PCI of RCA    Class 1 obesity due to excess calories with serious comorbidity in adult    Calcified granuloma of lung    Type 2 diabetes mellitus with hyperglycemia, without long-term current use of insulin    Lymphedema of both lower extremities    Swelling of lower extremity    Closed displaced fracture of right femoral neck s/p total hip arthroplasty on 10/21/2022    Hip pain    Chronic pain of both knees    Decreased strength, endurance, and mobility    Osteoporosis    Localized osteoporosis of [place]    Other specified disorders of adrenal gland    Congestive heart failure, unspecified HF chronicity, unspecified heart failure type    Diabetes mellitus due to underlying condition with stage 3 chronic kidney disease, without long-term current use of insulin, unspecified whether stage 3a or 3b CKD    Primary osteoarthritis of right knee    Primary osteoarthritis of left knee    Stage 3b chronic kidney disease    BPH (benign prostatic hyperplasia)    Diarrhea    Chronic pain of left knee    Decreased range of motion of left knee    Weakness of left lower extremity    Gait, antalgic       Review of patient's allergies indicates:   Allergen Reactions    Penicillins Hives, Itching and Rash    Shellfish containing products        Current Inpatient  Medications:      No current facility-administered medications on file prior to encounter.     Current Outpatient Medications on File Prior to Encounter   Medication Sig Dispense Refill    acetaminophen (TYLENOL) 500 MG tablet Take 2 tablets (1,000 mg total) by mouth every 8 (eight) hours as needed (Mild to moderate pain).  0    aspirin (ECOTRIN) 81 MG EC tablet Take 81 mg by mouth once daily.      atorvastatin (LIPITOR) 80 MG tablet Take 1 tablet (80 mg total) by mouth once daily. 90 tablet 3    blood sugar diagnostic Strp 1 strip by Misc.(Non-Drug; Combo Route) route once daily. 200 strip 11    carvediloL (COREG) 25 MG tablet TAKE 1 TABLET BY MOUTH TWICE DAILY WITH MEALS 180 tablet 3    clopidogreL (PLAVIX) 75 mg tablet Take 1 tablet (75 mg total) by mouth once daily. 90 tablet 3    clotrimazole-betamethasone 1-0.05% (LOTRISONE) cream Apply topically 2 (two) times daily. 45 g 3    dulaglutide (TRULICITY) 1.5 mg/0.5 mL pen injector Inject 1.5 mg into the skin every 7 days. 12 pen 3    famotidine (PEPCID) 20 MG tablet Take 1 tablet (20 mg total) by mouth nightly as needed for Heartburn. 90 tablet 1    glipiZIDE (GLUCOTROL) 10 MG TR24 Take 1 tablet (10 mg total) by mouth 2 (two) times daily with meals. 180 tablet 3    melatonin (MELATIN) 3 mg tablet Take 2 tablets (6 mg total) by mouth nightly as needed for Insomnia.  0    pantoprazole (PROTONIX) 40 MG tablet Take 1 tablet by mouth once daily 90 tablet 3    TRUEPLUS LANCETS 33 gauge Misc Apply 1 lancet topically once daily. Use to test blood sugar daily; discard lancet after each use 100 each 11    valsartan (DIOVAN) 80 MG tablet Take 1 tablet (80 mg total) by mouth once daily. 90 tablet 3       Past Surgical History:   Procedure Laterality Date    AORTOGRAPHY N/A 8/3/2020    Procedure: Aortogram;  Surgeon: Mason Benitez MD;  Location: [facility] CATH LAB;  Service: Cardiology;  Laterality: N/A;    APPENDECTOMY      COLONOSCOPY N/A 12/27/2016    Procedure:  COLONOSCOPY;  Surgeon: Merritt García MD;  Location: [facility] ENDO (4TH FLR);  Service: Endoscopy;  Laterality: N/A;    COLONOSCOPY N/A 7/27/2020    Procedure: COLONOSCOPY;  Surgeon: Mala Lynn MD;  Location: [facility] ENDO;  Service: Endoscopy;  Laterality: N/A;    CORONARY ANGIOGRAPHY N/A 8/17/2020    Procedure: ANGIOGRAM, CORONARY ARTERY;  Surgeon: Mason Benitez MD;  Location: [facility] CATH LAB;  Service: Cardiology;  Laterality: N/A;    CORONARY ANGIOGRAPHY N/A 9/28/2020    Procedure: ANGIOGRAM, CORONARY ARTERY;  Surgeon: Mason Benitez MD;  Location: [facility] CATH LAB;  Service: Cardiology;  Laterality: N/A;    CORONARY ANGIOGRAPHY INCLUDING BYPASS GRAFTS WITH CATHETERIZATION OF LEFT HEART N/A 8/3/2020    Procedure: ANGIOGRAM, CORONARY, INCLUDING BYPASS GRAFT, WITH LEFT HEART CATHETERIZATION;  Surgeon: Mason Benitez MD;  Location: [facility] CATH LAB;  Service: Cardiology;  Laterality: N/A;    CORONARY ARTERY BYPASS GRAFT  05/26/2006     4 vessel    CORONARY BYPASS GRAFT ANGIOGRAPHY  9/28/2020    Procedure: Bypass graft study;  Surgeon: Mason Benitez MD;  Location: [facility] CATH LAB;  Service: Cardiology;;    CYSTOSCOPY WITH INSERTION OF MINIMALLY INVASIVE IMPLANT TO ENLARGE PROSTATIC URETHRA N/A 4/24/2023    Procedure: CYSTOSCOPY, WITH INSERTION OF UROLIFT IMPLANT;  Surgeon: Jeanmarie Hanson MD;  Location: [facility];  Service: Urology;  Laterality: N/A;  ATUL TRACT DARCI [facility] [phone] TEXTED DARCI ON 3/31/2023 @ 3:47PM. DARCI RESPONDED ON 3/31/2023 @ 3:48PM-LO  RN PREOP 4/17/2023    HIP ARTHROPLASTY Right 10/21/2022    Procedure: ARTHROPLASTY, HIP, RIGHT;  Surgeon: Isidro Paulino MD;  Location: [facility] 2ND FLR;  Service: Orthopedics;  Laterality: Right;    LEFT HEART CATHETERIZATION Left 9/28/2020    Procedure: Left heart cath;  Surgeon: Mason Benitez MD;  Location: [facility] CATH LAB;  Service: Cardiology;  Laterality: Left;    PERCUTANEOUS TRANSLUMINAL BALLOON ANGIOPLASTY OF CORONARY ARTERY   2020    Procedure: Angioplasty-coronary;  Surgeon: Mason Benitez MD;  Location: [facility] CATH LAB;  Service: Cardiology;;       Social History     Socioeconomic History    Marital status:    Tobacco Use    Smoking status: Former     Current packs/day: 0.00     Types: Cigarettes     Quit date: 2007     Years since quittin.7    Smokeless tobacco: Never   Substance and Sexual Activity    Alcohol use: Yes     Alcohol/week: 1.0 standard drink of alcohol     Types: 1 Drinks containing 0.5 oz of alcohol per week     Comment: once rarely    Drug use: No    Sexual activity: Not Currently     Social Determinants of Health     Financial Resource Strain: Low Risk  (10/7/2021)    Overall Financial Resource Strain (CARDIA)     Difficulty of Paying Living Expenses: Not hard at all   Food Insecurity: No Food Insecurity (2022)    Hunger Vital Sign     Worried About Running Out of Food in the Last Year: Never true     Ran Out of Food in the Last Year: Never true   Transportation Needs: Unknown (2022)    PRAPARE - Transportation     Lack of Transportation (Medical): No     Lack of Transportation (Non-Medical): Patient refused   Physical Activity: Insufficiently Active (2022)    Exercise Vital Sign     Days of Exercise per Week: 2 days     Minutes of Exercise per Session: 60 min   Stress: No Stress Concern Present (2022)    [place] [employer] - Occupational Stress Questionnaire     Feeling of Stress : Not at all   Social Connections: Unknown (2022)    Social Connection and Isolation Panel [NHANES]     Frequency of Communication with Friends and Family: More than three times a week     Frequency of Social Gatherings with Friends and Family: Twice a week     Active Member of Clubs or Organizations: No     Attends Club or Organization Meetings: 1 to 4 times per year     Marital Status:    Housing Stability: Low Risk  (2022)    Housing Stability Vital  Sign     Unable to Pay for Housing in the Last Year: No     Number of Places Lived in the Last Year: 1     Unstable Housing in the Last Year: No       OBJECTIVE:     Vital Signs Range (Last 24H):         Significant Labs:  Lab Results   Component Value Date    WBC 10.18 11/02/2023    HGB 14.5 11/02/2023    HCT 44.4 11/02/2023     11/02/2023    CHOL 103 (L) 03/10/2023    TRIG 106 03/10/2023    HDL 34 (L) 03/10/2023    ALT 12 11/02/2023    AST 14 11/02/2023     11/02/2023    K 4.1 11/02/2023     11/02/2023    CREATININE 1.7 (H) 11/02/2023    BUN 23 11/02/2023    CO2 23 11/02/2023    TSH 2.585 12/06/2022    PSA 1.4 06/09/2020    INR 1.2 11/02/2023    GLUF 167 (H) 01/25/2021    HGBA1C 7.0 (H) 11/02/2023       Diagnostic Studies: No relevant studies.    EKG:   Results for orders placed or performed during the hospital encounter of 11/02/23   EKG 12-lead    Collection Time: 11/02/23 10:46 AM    Narrative    Test Reason : Z01.818,    Vent. Rate : 070 BPM     Atrial Rate : 070 BPM     P-R Int : 168 ms          QRS Dur : 128 ms      QT Int : 410 ms       P-R-T Axes : 040 031 -16 degrees     QTc Int : 442 ms    Normal sinus rhythm  Nonspecific intraventricular block  Nonspecific T wave abnormality  Abnormal ECG  When compared with ECG of 17-APR-2023 13:34,  Premature atrial complexes are no longer Present  QT has lengthened  Confirmed by Rancho Espinosa MD ([id number]) on 11/2/2023 3:33:50 PM    Referred By: CHRISSY SEWELL           Confirmed By:Rancho Espinosa MD       2D ECHO:  TTE:  Results for orders placed or performed during the hospital encounter of 02/02/23   Echo Saline Bubble? No   Result Value Ref Range    IVC diameter 1.35 cm    Left Ventricular Outflow Tract Mean Velocity 0.55 cm/s    Left Ventricular Outflow Tract Mean Gradient 1.35 mmHg    PV PEAK VELOCITY 1.10 cm/s    LVIDd 5.80 3.5 - 6.0 cm    IVS 1.32 (A) 0.6 - 1.1 cm    Posterior Wall 1.25 (A) 0.6 - 1.1 cm    LVIDs 4.24 (A) 2.1 - 4.0 cm    FS 27  28 - 44 %    STJ 2.75 cm    Ascending aorta 3.45 cm    LV mass 326.13 g    LA size 5.07 cm    RVDD 4.20 cm    Left Ventricle Relative Wall Thickness 0.43 cm    AV Velocity Ratio 0.63     MV valve area p 1/2 method 2.82 cm2    E/A ratio 0.91     E wave deceleration time 268.91 msec    IVRT 91.70 msec    LVOT diameter 2.01 cm    LVOT area 3.2 cm2    LVOT peak shawn 0.80 m/s    LVOT peak VTI 16.70 cm    Ao peak shawn 1.27 m/s    LVOT stroke volume 52.96 cm3    AV peak gradient 6 mmHg    MV Peak E Shawn 0.62 m/s    TR Max Shawn 1.36 m/s    MV stenosis pressure 1/2 time 77.98 ms    MV Peak A Shawn 0.68 m/s    LV Systolic Volume 80.54 mL    LV Diastolic Volume 166.27 mL    RA Major Axis 5.49 cm    Left Atrium Minor Axis 6.23 cm    Left Atrium Major Axis 5.84 cm    Triscuspid Valve Regurgitation Peak Gradient 7 mmHg    TDI SEPTAL 0.07 m/s    LV LATERAL E/E' RATIO 7.75 m/s    LV SEPTAL E/E' RATIO 8.86 m/s    LA WIDTH 5.40 cm    TDI LATERAL 0.08 m/s    LA volume 140.30 cm3    Sinus 4.30 cm    TAPSE 1.70 cm    Mean e' 0.08 m/s    E/E' ratio 8.27 m/s    RA Width 4.20 cm    Right Atrial Pressure (from IVC) 3 mmHg    EF 50 %    TV resting pulmonary artery pressure 10 mmHg    Narrative    · The left ventricle is mildly enlarged with mild concentric hypertrophy   and low normal systolic function.  · The estimated ejection fraction is 50%.  · Normal left ventricular diastolic function.  · Normal right ventricular size with normal right ventricular systolic   function.  · Mild left atrial enlargement.  · Mild right atrial enlargement.  · Normal central venous pressure (3 mmHg).  · The estimated PA systolic pressure is 10 mmHg.  · There is no pulmonary hypertension.  · The sinuses of Valsalva is moderately dilated. 4.3cm.          JODI:  No results found. However, due to the size of the patient record, not all encounters were searched. Please check Results Review for a complete set of results.    ASSESSMENT/PLAN:           Pre-op  Assessment    I have reviewed the Patient Summary Reports.     I have reviewed the Nursing Notes. I have reviewed the NPO Status.   I have reviewed the Medications.     Review of Systems  Anesthesia Hx:  No problems with previous Anesthesia   History of prior surgery of interest to airway management or planning: heart surgery.         Denies Family Hx of Anesthesia complications.    Denies Personal Hx of Anesthesia complications.                    Social:  Former Smoker, Alcohol Use       Hematology/Oncology:       -- Denies Anemia:                                  Cardiovascular:     Hypertension   CAD   CABG/stent     Denies CHF.    hyperlipidemia                             Pulmonary:    Denies COPD.  Denies Asthma.    Sleep Apnea, CPAP                Renal/:   Denies Chronic Renal Disease.                Hepatic/GI:     GERD             Musculoskeletal:  Denies Arthritis.               Neurological:    Denies CVA.    Denies Seizures.                                Endocrine:  Diabetes, type 2         Obesity / BMI > 30  Psych:  Denies Psychiatric History.                     Anesthesia Plan  Type of Anesthesia, risks & benefits discussed:    Anesthesia Type: Gen ETT, Regional  Intra-op Monitoring Plan: Standard ASA Monitors  Post Op Pain Control Plan: multimodal analgesia and IV/PO Opioids PRN  Induction:  IV  Airway Plan: Direct, Post-Induction  Informed Consent: Informed consent signed with the Patient and all parties understand the risks and agree with anesthesia plan.  All questions answered.   ASA Score: 3  Day of Surgery Review of History & Physical: H&P Update referred to the surgeon/provider.    Ready For Surgery From Anesthesia Perspective.     .

## 2023-11-14 NOTE — TELEPHONE ENCOUNTER
I called the patient today regarding surgery on 11/15/2023 with Dr. Rancho Ferrara. I informed the patient that his surgery will be at  [facility] ([address] [place] [id number]). I informed the patient they must arrive at 9:00am and their surgery will start at 11:00am.     I reminded the patient that they cannot eat or drink after midnight, the night before surgery.     I reminded the patient to be careful of their skin over the next few days to make sure they do not get any cuts, scratches or scrapes.    The patient verbalized understanding and has no further questions.   ----- Message from Myles Dodson sent at 11/14/2023 10:11 AM CST -----  Regarding: adv  Contact: @[phone]  Pt calling to speak with someone in providers office . In regards to proc time and instructions ... Pls call and adv@[phone]

## 2023-11-15 ENCOUNTER — ANESTHESIA (OUTPATIENT)
Dept: SURGERY | Facility: HOSPITAL | Age: 78
End: 2023-11-15
Payer: MEDICARE

## 2023-11-15 ENCOUNTER — HOSPITAL ENCOUNTER (OUTPATIENT)
Facility: HOSPITAL | Age: 78
Discharge: HOME | End: 2023-11-16
Attending: ORTHOPAEDIC SURGERY | Admitting: ORTHOPAEDIC SURGERY
Payer: MEDICARE

## 2023-11-15 DIAGNOSIS — Z96.652 S/P TOTAL KNEE REPLACEMENT, LEFT: ICD-10-CM

## 2023-11-15 DIAGNOSIS — M17.12 PRIMARY OSTEOARTHRITIS OF LEFT KNEE: ICD-10-CM

## 2023-11-15 LAB — POCT GLUCOSE: 141 MG/DL (ref 70–110)

## 2023-11-15 PROCEDURE — 63600175 PHARM REV CODE 636 W HCPCS: Performed by: ANESTHESIOLOGY

## 2023-11-15 PROCEDURE — 63600175 PHARM REV CODE 636 W HCPCS: Performed by: STUDENT IN AN ORGANIZED HEALTH CARE EDUCATION/TRAINING PROGRAM

## 2023-11-15 PROCEDURE — 25000003 PHARM REV CODE 250

## 2023-11-15 PROCEDURE — C1713 ANCHOR/SCREW BN/BN,TIS/BN: HCPCS | Performed by: ORTHOPAEDIC SURGERY

## 2023-11-15 PROCEDURE — 63600175 PHARM REV CODE 636 W HCPCS

## 2023-11-15 PROCEDURE — 94761 N-INVAS EAR/PLS OXIMETRY MLT: CPT

## 2023-11-15 PROCEDURE — 27447 TOTAL KNEE ARTHROPLASTY: CPT | Mod: LT,,, | Performed by: ORTHOPAEDIC SURGERY

## 2023-11-15 PROCEDURE — 64448 NJX AA&/STRD FEM NRV NFS IMG: CPT | Mod: 59,LT,, | Performed by: ANESTHESIOLOGY

## 2023-11-15 PROCEDURE — 64448 L ADDUCTOR CANAL CATHETER: ICD-10-PCS | Mod: 59,LT,, | Performed by: ANESTHESIOLOGY

## 2023-11-15 PROCEDURE — 71000033 HC RECOVERY, INTIAL HOUR: Performed by: ORTHOPAEDIC SURGERY

## 2023-11-15 PROCEDURE — 36000711: Performed by: ORTHOPAEDIC SURGERY

## 2023-11-15 PROCEDURE — 71000016 HC POSTOP RECOV ADDL HR: Performed by: ORTHOPAEDIC SURGERY

## 2023-11-15 PROCEDURE — 82962 GLUCOSE BLOOD TEST: CPT | Mod: 91 | Performed by: ORTHOPAEDIC SURGERY

## 2023-11-15 PROCEDURE — 25000003 PHARM REV CODE 250: Performed by: ORTHOPAEDIC SURGERY

## 2023-11-15 PROCEDURE — 37000009 HC ANESTHESIA EA ADD 15 MINS: Performed by: ORTHOPAEDIC SURGERY

## 2023-11-15 PROCEDURE — 96372 THER/PROPH/DIAG INJ SC/IM: CPT | Performed by: STUDENT IN AN ORGANIZED HEALTH CARE EDUCATION/TRAINING PROGRAM

## 2023-11-15 PROCEDURE — 82962 GLUCOSE BLOOD TEST: CPT | Performed by: ORTHOPAEDIC SURGERY

## 2023-11-15 PROCEDURE — 37000008 HC ANESTHESIA 1ST 15 MINUTES: Performed by: ORTHOPAEDIC SURGERY

## 2023-11-15 PROCEDURE — 36000710: Performed by: ORTHOPAEDIC SURGERY

## 2023-11-15 PROCEDURE — 27201423 OPTIME MED/SURG SUP & DEVICES STERILE SUPPLY: Performed by: ORTHOPAEDIC SURGERY

## 2023-11-15 PROCEDURE — 25000003 PHARM REV CODE 250: Performed by: STUDENT IN AN ORGANIZED HEALTH CARE EDUCATION/TRAINING PROGRAM

## 2023-11-15 PROCEDURE — 63600175 PHARM REV CODE 636 W HCPCS: Performed by: ORTHOPAEDIC SURGERY

## 2023-11-15 PROCEDURE — 71000015 HC POSTOP RECOV 1ST HR: Performed by: ORTHOPAEDIC SURGERY

## 2023-11-15 PROCEDURE — D9220A PRA ANESTHESIA: Mod: CRNA,,, | Performed by: NURSE ANESTHETIST, CERTIFIED REGISTERED

## 2023-11-15 PROCEDURE — 63600175 PHARM REV CODE 636 W HCPCS: Performed by: NURSE ANESTHETIST, CERTIFIED REGISTERED

## 2023-11-15 PROCEDURE — C1776 JOINT DEVICE (IMPLANTABLE): HCPCS | Performed by: ORTHOPAEDIC SURGERY

## 2023-11-15 PROCEDURE — 64448 NJX AA&/STRD FEM NRV NFS IMG: CPT | Performed by: STUDENT IN AN ORGANIZED HEALTH CARE EDUCATION/TRAINING PROGRAM

## 2023-11-15 PROCEDURE — D9220A PRA ANESTHESIA: ICD-10-PCS | Mod: CRNA,,, | Performed by: NURSE ANESTHETIST, CERTIFIED REGISTERED

## 2023-11-15 PROCEDURE — 25000003 PHARM REV CODE 250: Performed by: NURSE ANESTHETIST, CERTIFIED REGISTERED

## 2023-11-15 PROCEDURE — 27447 PR TOTAL KNEE ARTHROPLASTY: ICD-10-PCS | Mod: LT,,, | Performed by: ORTHOPAEDIC SURGERY

## 2023-11-15 PROCEDURE — D9220A PRA ANESTHESIA: Mod: ANES,,, | Performed by: ANESTHESIOLOGY

## 2023-11-15 PROCEDURE — D9220A PRA ANESTHESIA: ICD-10-PCS | Mod: ANES,,, | Performed by: ANESTHESIOLOGY

## 2023-11-15 DEVICE — TIBIAL NEXGEN PRECOAT STEM: Type: IMPLANTABLE DEVICE | Site: KNEE | Status: FUNCTIONAL

## 2023-11-15 DEVICE — CEMENT BONE SMPLX HV GENTMYCN: Type: IMPLANTABLE DEVICE | Site: KNEE | Status: FUNCTIONAL

## 2023-11-15 DEVICE — TIBIAL INSERT POST SZ EF 5-6 1: Type: IMPLANTABLE DEVICE | Site: KNEE | Status: FUNCTIONAL

## 2023-11-15 DEVICE — COMPONENT FEMORAL LEFT SIZE F: Type: IMPLANTABLE DEVICE | Site: KNEE | Status: FUNCTIONAL

## 2023-11-15 DEVICE — PLUG TAPER: Type: IMPLANTABLE DEVICE | Site: KNEE | Status: FUNCTIONAL

## 2023-11-15 DEVICE — PSN ALL POLY PAT 35MM: Type: IMPLANTABLE DEVICE | Site: KNEE | Status: FUNCTIONAL

## 2023-11-15 RX ORDER — EPHEDRINE SULFATE 50 MG/ML
INJECTION, SOLUTION INTRAVENOUS
Status: DISCONTINUED | OUTPATIENT
Start: 2023-11-15 | End: 2023-11-15

## 2023-11-15 RX ORDER — ACETAMINOPHEN 500 MG
1000 TABLET ORAL EVERY 6 HOURS
Status: DISCONTINUED | OUTPATIENT
Start: 2023-11-15 | End: 2023-11-15

## 2023-11-15 RX ORDER — OXYCODONE HYDROCHLORIDE 5 MG/1
5 TABLET ORAL
Status: DISCONTINUED | OUTPATIENT
Start: 2023-11-15 | End: 2023-11-16

## 2023-11-15 RX ORDER — NALOXONE HCL 0.4 MG/ML
0.02 VIAL (ML) INJECTION
Status: DISCONTINUED | OUTPATIENT
Start: 2023-11-15 | End: 2023-11-16 | Stop reason: HOSPADM

## 2023-11-15 RX ORDER — MIDAZOLAM HYDROCHLORIDE 1 MG/ML
4 INJECTION INTRAMUSCULAR; INTRAVENOUS
Status: DISCONTINUED | OUTPATIENT
Start: 2023-11-15 | End: 2023-11-15

## 2023-11-15 RX ORDER — INSULIN ASPART 100 [IU]/ML
0-10 INJECTION, SOLUTION INTRAVENOUS; SUBCUTANEOUS
Status: DISCONTINUED | OUTPATIENT
Start: 2023-11-15 | End: 2023-11-16 | Stop reason: HOSPADM

## 2023-11-15 RX ORDER — IBUPROFEN 200 MG
16 TABLET ORAL
Status: DISCONTINUED | OUTPATIENT
Start: 2023-11-15 | End: 2023-11-16 | Stop reason: HOSPADM

## 2023-11-15 RX ORDER — SODIUM CHLORIDE 0.9 % (FLUSH) 0.9 %
10 SYRINGE (ML) INJECTION
Status: DISCONTINUED | OUTPATIENT
Start: 2023-11-15 | End: 2023-11-15 | Stop reason: HOSPADM

## 2023-11-15 RX ORDER — TALC
6 POWDER (GRAM) TOPICAL NIGHTLY PRN
Status: DISCONTINUED | OUTPATIENT
Start: 2023-11-15 | End: 2023-11-16 | Stop reason: HOSPADM

## 2023-11-15 RX ORDER — DEXAMETHASONE SODIUM PHOSPHATE 4 MG/ML
INJECTION, SOLUTION INTRA-ARTICULAR; INTRALESIONAL; INTRAMUSCULAR; INTRAVENOUS; SOFT TISSUE
Status: DISCONTINUED | OUTPATIENT
Start: 2023-11-15 | End: 2023-11-15

## 2023-11-15 RX ORDER — PREGABALIN 75 MG/1
75 CAPSULE ORAL NIGHTLY
Status: DISCONTINUED | OUTPATIENT
Start: 2023-11-15 | End: 2023-11-16 | Stop reason: HOSPADM

## 2023-11-15 RX ORDER — PANTOPRAZOLE SODIUM 40 MG/1
80 TABLET, DELAYED RELEASE ORAL DAILY
Status: DISCONTINUED | OUTPATIENT
Start: 2023-11-15 | End: 2023-11-16 | Stop reason: HOSPADM

## 2023-11-15 RX ORDER — CEFAZOLIN SODIUM 1 G/3ML
INJECTION, POWDER, FOR SOLUTION INTRAMUSCULAR; INTRAVENOUS
Status: DISCONTINUED | OUTPATIENT
Start: 2023-11-15 | End: 2023-11-15

## 2023-11-15 RX ORDER — PROPOFOL 10 MG/ML
INJECTION, EMULSION INTRAVENOUS CONTINUOUS PRN
Status: DISCONTINUED | OUTPATIENT
Start: 2023-11-15 | End: 2023-11-15

## 2023-11-15 RX ORDER — VANCOMYCIN HYDROCHLORIDE 1 G/20ML
INJECTION, POWDER, LYOPHILIZED, FOR SOLUTION INTRAVENOUS
Status: DISCONTINUED | OUTPATIENT
Start: 2023-11-15 | End: 2023-11-15 | Stop reason: HOSPADM

## 2023-11-15 RX ORDER — GLUCAGON 1 MG
1 KIT INJECTION
Status: DISCONTINUED | OUTPATIENT
Start: 2023-11-15 | End: 2023-11-16 | Stop reason: HOSPADM

## 2023-11-15 RX ORDER — ACETAMINOPHEN 500 MG
1000 TABLET ORAL EVERY 6 HOURS
Status: DISCONTINUED | OUTPATIENT
Start: 2023-11-15 | End: 2023-11-16 | Stop reason: HOSPADM

## 2023-11-15 RX ORDER — IBUPROFEN 200 MG
24 TABLET ORAL
Status: DISCONTINUED | OUTPATIENT
Start: 2023-11-15 | End: 2023-11-16 | Stop reason: HOSPADM

## 2023-11-15 RX ORDER — FENTANYL CITRATE 50 UG/ML
25-200 INJECTION, SOLUTION INTRAMUSCULAR; INTRAVENOUS
Status: DISCONTINUED | OUTPATIENT
Start: 2023-11-15 | End: 2023-11-15 | Stop reason: HOSPADM

## 2023-11-15 RX ORDER — LIDOCAINE HYDROCHLORIDE 10 MG/ML
1 INJECTION, SOLUTION EPIDURAL; INFILTRATION; INTRACAUDAL; PERINEURAL
Status: DISCONTINUED | OUTPATIENT
Start: 2023-11-15 | End: 2023-11-15 | Stop reason: HOSPADM

## 2023-11-15 RX ORDER — ROPIVACAINE HYDROCHLORIDE 5 MG/ML
INJECTION, SOLUTION EPIDURAL; INFILTRATION; PERINEURAL
Status: COMPLETED | OUTPATIENT
Start: 2023-11-15 | End: 2023-11-15

## 2023-11-15 RX ORDER — ROPIVACAINE HYDROCHLORIDE 2 MG/ML
0.1 INJECTION, SOLUTION EPIDURAL; INFILTRATION; PERINEURAL CONTINUOUS
Status: DISCONTINUED | OUTPATIENT
Start: 2023-11-15 | End: 2023-11-16 | Stop reason: HOSPADM

## 2023-11-15 RX ORDER — OXYCODONE HYDROCHLORIDE 10 MG/1
10 TABLET ORAL
Status: DISCONTINUED | OUTPATIENT
Start: 2023-11-15 | End: 2023-11-16

## 2023-11-15 RX ORDER — FENTANYL CITRATE 50 UG/ML
25 INJECTION, SOLUTION INTRAMUSCULAR; INTRAVENOUS EVERY 5 MIN PRN
Status: DISCONTINUED | OUTPATIENT
Start: 2023-11-15 | End: 2023-11-15 | Stop reason: HOSPADM

## 2023-11-15 RX ORDER — CEFADROXIL 500 MG/1
500 CAPSULE ORAL EVERY 12 HOURS
Status: DISCONTINUED | OUTPATIENT
Start: 2023-11-16 | End: 2023-11-16 | Stop reason: HOSPADM

## 2023-11-15 RX ORDER — PREGABALIN 75 MG/1
75 CAPSULE ORAL
Status: COMPLETED | OUTPATIENT
Start: 2023-11-15 | End: 2023-11-15

## 2023-11-15 RX ORDER — ROPIVACAINE/EPI/CLONIDINE/KET 2.46-0.005
SYRINGE (ML) INJECTION
Status: DISCONTINUED | OUTPATIENT
Start: 2023-11-15 | End: 2023-11-15 | Stop reason: HOSPADM

## 2023-11-15 RX ORDER — OXYCODONE HYDROCHLORIDE 5 MG/1
5 TABLET ORAL EVERY 4 HOURS PRN
Status: DISCONTINUED | OUTPATIENT
Start: 2023-11-15 | End: 2023-11-16

## 2023-11-15 RX ORDER — MIDAZOLAM HYDROCHLORIDE 1 MG/ML
.5-4 INJECTION INTRAMUSCULAR; INTRAVENOUS
Status: DISCONTINUED | OUTPATIENT
Start: 2023-11-15 | End: 2023-11-15 | Stop reason: HOSPADM

## 2023-11-15 RX ORDER — ONDANSETRON 2 MG/ML
4 INJECTION INTRAMUSCULAR; INTRAVENOUS EVERY 8 HOURS PRN
Status: DISCONTINUED | OUTPATIENT
Start: 2023-11-15 | End: 2023-11-16 | Stop reason: HOSPADM

## 2023-11-15 RX ORDER — MUPIROCIN 20 MG/G
1 OINTMENT TOPICAL
Status: COMPLETED | OUTPATIENT
Start: 2023-11-15 | End: 2023-11-15

## 2023-11-15 RX ORDER — METHOCARBAMOL 500 MG/1
500 TABLET, FILM COATED ORAL 3 TIMES DAILY
Status: DISCONTINUED | OUTPATIENT
Start: 2023-11-15 | End: 2023-11-16 | Stop reason: HOSPADM

## 2023-11-15 RX ORDER — KETAMINE HCL IN 0.9 % NACL 50 MG/5 ML
SYRINGE (ML) INTRAVENOUS
Status: DISCONTINUED | OUTPATIENT
Start: 2023-11-15 | End: 2023-11-15

## 2023-11-15 RX ORDER — GLIPIZIDE 5 MG/1
10 TABLET, FILM COATED, EXTENDED RELEASE ORAL
Status: DISCONTINUED | OUTPATIENT
Start: 2023-11-16 | End: 2023-11-15

## 2023-11-15 RX ORDER — ATORVASTATIN CALCIUM 40 MG/1
80 TABLET, FILM COATED ORAL DAILY
Status: DISCONTINUED | OUTPATIENT
Start: 2023-11-15 | End: 2023-11-16 | Stop reason: HOSPADM

## 2023-11-15 RX ORDER — PROPOFOL 10 MG/ML
VIAL (ML) INTRAVENOUS
Status: DISCONTINUED | OUTPATIENT
Start: 2023-11-15 | End: 2023-11-15

## 2023-11-15 RX ORDER — OXYCODONE HYDROCHLORIDE 10 MG/1
10 TABLET ORAL EVERY 4 HOURS PRN
Status: DISCONTINUED | OUTPATIENT
Start: 2023-11-15 | End: 2023-11-16

## 2023-11-15 RX ORDER — SODIUM CHLORIDE 9 MG/ML
INJECTION, SOLUTION INTRAVENOUS
Status: DISCONTINUED | OUTPATIENT
Start: 2023-11-15 | End: 2023-11-15 | Stop reason: HOSPADM

## 2023-11-15 RX ORDER — PROCHLORPERAZINE EDISYLATE 5 MG/ML
5 INJECTION INTRAMUSCULAR; INTRAVENOUS EVERY 6 HOURS PRN
Status: DISCONTINUED | OUTPATIENT
Start: 2023-11-15 | End: 2023-11-16 | Stop reason: HOSPADM

## 2023-11-15 RX ORDER — TALC
6 POWDER (GRAM) TOPICAL NIGHTLY PRN
Status: DISCONTINUED | OUTPATIENT
Start: 2023-11-15 | End: 2023-11-15 | Stop reason: HOSPADM

## 2023-11-15 RX ORDER — ACETAMINOPHEN 500 MG
1000 TABLET ORAL
Status: COMPLETED | OUTPATIENT
Start: 2023-11-15 | End: 2023-11-15

## 2023-11-15 RX ORDER — SODIUM CHLORIDE 9 MG/ML
INJECTION, SOLUTION INTRAVENOUS CONTINUOUS
Status: ACTIVE | OUTPATIENT
Start: 2023-11-15 | End: 2023-11-16

## 2023-11-15 RX ORDER — MORPHINE SULFATE 2 MG/ML
2 INJECTION, SOLUTION INTRAMUSCULAR; INTRAVENOUS
Status: DISCONTINUED | OUTPATIENT
Start: 2023-11-15 | End: 2023-11-16 | Stop reason: HOSPADM

## 2023-11-15 RX ORDER — CARVEDILOL 25 MG/1
25 TABLET ORAL 2 TIMES DAILY WITH MEALS
Status: DISCONTINUED | OUTPATIENT
Start: 2023-11-15 | End: 2023-11-16

## 2023-11-15 RX ORDER — HYDROMORPHONE HYDROCHLORIDE 1 MG/ML
0.2 INJECTION, SOLUTION INTRAMUSCULAR; INTRAVENOUS; SUBCUTANEOUS EVERY 4 HOURS PRN
Status: DISCONTINUED | OUTPATIENT
Start: 2023-11-15 | End: 2023-11-16

## 2023-11-15 RX ORDER — ONDANSETRON 2 MG/ML
INJECTION INTRAMUSCULAR; INTRAVENOUS
Status: DISCONTINUED | OUTPATIENT
Start: 2023-11-15 | End: 2023-11-15

## 2023-11-15 RX ORDER — FENTANYL CITRATE 50 UG/ML
100 INJECTION, SOLUTION INTRAMUSCULAR; INTRAVENOUS
Status: DISCONTINUED | OUTPATIENT
Start: 2023-11-15 | End: 2023-11-15

## 2023-11-15 RX ORDER — GLIPIZIDE 5 MG/1
10 TABLET, FILM COATED, EXTENDED RELEASE ORAL 2 TIMES DAILY WITH MEALS
Status: DISCONTINUED | OUTPATIENT
Start: 2023-11-15 | End: 2023-11-16 | Stop reason: HOSPADM

## 2023-11-15 RX ORDER — HALOPERIDOL 5 MG/ML
0.5 INJECTION INTRAMUSCULAR EVERY 10 MIN PRN
Status: DISCONTINUED | OUTPATIENT
Start: 2023-11-15 | End: 2023-11-15 | Stop reason: HOSPADM

## 2023-11-15 RX ADMIN — DEXAMETHASONE SODIUM PHOSPHATE 8 MG: 4 INJECTION, SOLUTION INTRAMUSCULAR; INTRAVENOUS at 01:11

## 2023-11-15 RX ADMIN — MEPIVACAINE HYDROCHLORIDE 3 ML: 20 INJECTION, SOLUTION EPIDURAL; INFILTRATION at 01:11

## 2023-11-15 RX ADMIN — ROPIVACAINE HYDROCHLORIDE 0.1 ML/HR: 2 INJECTION, SOLUTION EPIDURAL; INFILTRATION at 04:11

## 2023-11-15 RX ADMIN — METHOCARBAMOL 500 MG: 500 TABLET ORAL at 05:11

## 2023-11-15 RX ADMIN — CEFAZOLIN 2 G: 330 INJECTION, POWDER, FOR SOLUTION INTRAMUSCULAR; INTRAVENOUS at 01:11

## 2023-11-15 RX ADMIN — SODIUM CHLORIDE, SODIUM GLUCONATE, SODIUM ACETATE, POTASSIUM CHLORIDE, MAGNESIUM CHLORIDE, SODIUM PHOSPHATE, DIBASIC, AND POTASSIUM PHOSPHATE: .53; .5; .37; .037; .03; .012; .00082 INJECTION, SOLUTION INTRAVENOUS at 02:11

## 2023-11-15 RX ADMIN — SODIUM CHLORIDE: 0.9 INJECTION, SOLUTION INTRAVENOUS at 01:11

## 2023-11-15 RX ADMIN — ROPIVACAINE HYDROCHLORIDE 15 ML: 5 INJECTION EPIDURAL; INFILTRATION; PERINEURAL at 12:11

## 2023-11-15 RX ADMIN — PROPOFOL 30 MG: 10 INJECTION, EMULSION INTRAVENOUS at 01:11

## 2023-11-15 RX ADMIN — CEFAZOLIN 2 G: 2 INJECTION, POWDER, FOR SOLUTION INTRAMUSCULAR; INTRAVENOUS at 10:11

## 2023-11-15 RX ADMIN — PROPOFOL 80 MCG/KG/MIN: 10 INJECTION, EMULSION INTRAVENOUS at 01:11

## 2023-11-15 RX ADMIN — ONDANSETRON 4 MG: 2 INJECTION INTRAMUSCULAR; INTRAVENOUS at 03:11

## 2023-11-15 RX ADMIN — PANTOPRAZOLE SODIUM 80 MG: 40 TABLET, DELAYED RELEASE ORAL at 05:11

## 2023-11-15 RX ADMIN — ACETAMINOPHEN 1000 MG: 500 TABLET ORAL at 05:11

## 2023-11-15 RX ADMIN — INSULIN ASPART 2 UNITS: 100 INJECTION, SOLUTION INTRAVENOUS; SUBCUTANEOUS at 10:11

## 2023-11-15 RX ADMIN — PREGABALIN 75 MG: 75 CAPSULE ORAL at 09:11

## 2023-11-15 RX ADMIN — SODIUM CHLORIDE: 9 INJECTION, SOLUTION INTRAVENOUS at 04:11

## 2023-11-15 RX ADMIN — ACETAMINOPHEN 1000 MG: 500 TABLET ORAL at 11:11

## 2023-11-15 RX ADMIN — PREGABALIN 75 MG: 75 CAPSULE ORAL at 11:11

## 2023-11-15 RX ADMIN — SODIUM CHLORIDE, SODIUM GLUCONATE, SODIUM ACETATE, POTASSIUM CHLORIDE, MAGNESIUM CHLORIDE, SODIUM PHOSPHATE, DIBASIC, AND POTASSIUM PHOSPHATE: .53; .5; .37; .037; .03; .012; .00082 INJECTION, SOLUTION INTRAVENOUS at 03:11

## 2023-11-15 RX ADMIN — EPHEDRINE SULFATE 10 MG: 50 INJECTION INTRAVENOUS at 03:11

## 2023-11-15 RX ADMIN — Medication 10 MG: at 02:11

## 2023-11-15 RX ADMIN — METHOCARBAMOL 500 MG: 500 TABLET ORAL at 09:11

## 2023-11-15 RX ADMIN — VANCOMYCIN HYDROCHLORIDE 1500 MG: 1.5 INJECTION, POWDER, LYOPHILIZED, FOR SOLUTION INTRAVENOUS at 11:11

## 2023-11-15 RX ADMIN — TRANEXAMIC ACID 1000 MG: 100 INJECTION INTRAVENOUS at 02:11

## 2023-11-15 RX ADMIN — EPHEDRINE SULFATE 5 MG: 50 INJECTION INTRAVENOUS at 02:11

## 2023-11-15 RX ADMIN — TRANEXAMIC ACID 1000 MG: 100 INJECTION INTRAVENOUS at 03:11

## 2023-11-15 RX ADMIN — Medication 30 MG: at 02:11

## 2023-11-15 RX ADMIN — FENTANYL CITRATE 50 MCG: 50 INJECTION INTRAMUSCULAR; INTRAVENOUS at 12:11

## 2023-11-15 RX ADMIN — MUPIROCIN 1 G: 20 OINTMENT TOPICAL at 11:11
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
## 2023-11-15 NOTE — ANESTHESIA PROCEDURE NOTES
Spinal    Diagnosis: left knee pain  Patient location during procedure: OR  Start time: 11/15/2023 1:47 PM  Timeout: 11/15/2023 1:47 PM  End time: 11/15/2023 1:50 PM    Staffing  Authorizing Provider: Ivy Louis MD  Performing Provider: Ivy Louis MD    Staffing  Performed by: Ivy Louis MD  Authorized by: Ivy Louis MD    Preanesthetic Checklist  Completed: patient identified, IV checked, site marked, risks and benefits discussed, surgical consent, monitors and equipment checked, pre-op evaluation and timeout performed  Spinal Block  Patient position: sitting  Prep: ChloraPrep  Patient monitoring: heart rate, continuous pulse ox and frequent blood pressure checks  Approach: midline  Location: L3-4  Injection technique: single shot  CSF Fluid: clear free-flowing CSF  Needle  Needle type: Joaquín   Needle gauge: 25 G  Needle length: 3.5 in  Additional Documentation: incremental injection, negative aspiration for heme and no paresthesia on injection  Needle localization: anatomical landmarks  Assessment  Ease of block: easy  Patient's tolerance of the procedure: comfortable throughout block and no complaints  Medications:    Medications: mepivacaine (CARBOCAINE) injection 20 mg/mL (2%) - Intrathecal   3 mL - 11/15/2023 1:50:00 PM

## 2023-11-15 NOTE — ANESTHESIA PROCEDURE NOTES
L Adductor Canal Catheter    Patient location during procedure: pre-op   Block not for primary anesthetic.  Reason for block: at surgeon's request and post-op pain management   Post-op Pain Location: L Knee Pain   Start time: 11/15/2023 12:10 PM  Timeout: 11/15/2023 12:09 PM   End time: 11/15/2023 12:23 PM    Staffing  Authorizing Provider: Corrie Maxwell MD  Performing Provider: Krystina Mcduffie MD    Staffing  Performed by: Krystina Mcduffie MD  Authorized by: Corrie Maxwell MD    Preanesthetic Checklist  Completed: patient identified, IV checked, site marked, risks and benefits discussed, surgical consent, monitors and equipment checked, pre-op evaluation and timeout performed  Peripheral Block  Patient position: supine  Prep: ChloraPrep and site prepped and draped  Patient monitoring: heart rate, cardiac monitor, continuous pulse ox, continuous capnometry and frequent blood pressure checks  Block type: adductor canal  Laterality: left  Injection technique: continuous  Needle  Needle type: Tuohy   Needle gauge: 17 G  Needle length: 3.5 in  Needle localization: anatomical landmarks and ultrasound guidance  Catheter type: spring wound  Catheter size: 19 G  Test dose: lidocaine 1.5% with Epi 1-to-200,000 and negative   -ultrasound image captured on disc.  Assessment  Injection assessment: negative aspiration, negative parasthesia and local visualized surrounding nerve  Paresthesia pain: none  Heart rate change: no  Slow fractionated injection: yes  Pain Tolerance: comfortable throughout block and no complaints  Medications:    Medications: ropivacaine (NAROPIN) injection 0.5% - Perineural   15 mL - 11/15/2023 12:23:00 PM    Additional Notes  30cc 0.25% Ropivacaine w 1:200k Epi given. VSS.  [facility] RN monitoring vitals throughout procedure.  Patient tolerated procedure well.

## 2023-11-15 NOTE — TRANSFER OF CARE
"Anesthesia Transfer of Care Note    Patient: Jani Cha    Procedure(s) Performed: Procedure(s) (LRB):  ARTHROPLASTY, KNEE, TOTAL: Left: (Left)    Patient location: PACU    Anesthesia Type: general    Transport from OR: Transported from OR on 6-10 L/min O2 by face mask with adequate spontaneous ventilation    Post pain: adequate analgesia    Post assessment: no apparent anesthetic complications    Post vital signs: stable    Level of consciousness: awake    Nausea/Vomiting: no nausea/vomiting    Complications: none    Transfer of care protocol was followed      Last vitals: Visit Vitals  BP (!) 150/87 (BP Location: Left arm, Patient Position: Lying)   Pulse (!) 52   Temp 36.6 °C (97.9 °F) (Temporal)   Resp 16   Ht 5' 10" (1.778 m)   Wt 114 kg (251 lb 5.2 oz)   SpO2 96%   BMI 36.06 kg/m²     "

## 2023-11-15 NOTE — PT/OT/SLP PROGRESS
Physical Therapy      Patient Name:  Jani Cha   MRN:  [id number]    Patient not seen today secondary to  (PT orders received and acknowledged. Patient not resolved by 1700. Will follow up first thing in AM).

## 2023-11-15 NOTE — OP NOTE
[facility] Left TKA  OPERATIVE NOTE    Date of Operation:   11/15/2023    Providers Performing:   Surgeon:  Rancho Ferrara MD  Assistant:  Henri Desouza MD    Operative Procedure:   Left Total Knee Arthroplasty, CPT 01064    Preoperative Diagnosis:   Left Knee Osteoarthritis, ICD-10 M17.12    Postoperative Diagnosis:   SAME    Components Used:     Implant Name Type Inv. Item Serial No.  Lot No. LRB No. Used Action   CEMENT BONE SMPLX HV GENTMYCN - [id number]  CEMENT BONE SMPLX HV GENTMYCN  [employer].  Left 2 Implanted   PSN ALL POLY PAT 35MM - [id number]  PSN ALL POLY PAT 35MM  [employer] [id number] Left 1 Implanted   COMPONENT FEMORAL LEFT SIZE F - [id number]  COMPONENT FEMORAL LEFT SIZE F  [employer] [id number] Left 1 Implanted   TIBIAL NEXGEN PRECOAT STEM - [id number]  TIBIAL NEXGEN PRECOAT STEM  [employer] [id number] Left 1 Implanted   PLUG TAPER - [id number]  PLUG TAPER  [employer] [id number] Left 1 Implanted   TIBIAL INSERT POST SZ EF 5-6 1 - [id number]  TIBIAL INSERT POST SZ EF 5-6 1  [employer] [id number] Left 1 Implanted   14 mm Prolong poly    Anesthesia:   Spinal  ACC  SABINA cocktail: ANTONIO    Estimated Blood Loss:   100 cc    Specimens:   None    Complications:   None    Indications:   The patient has failed non-operative measures including medications, injections and activity modifications for their left knee.  After an explanation of risks and benefits of knee arthroplasty surgery, the patient wishes to proceed with surgery.    Operative Notes:   After the induction of satisfactory anesthesia, the patient's left lower extremity was prepped and draped in the usual sterile fashion with the tourniquet cuff in place. The extremity was exsanguinated with an Esmarch bandage, and the tourniquet inflated to 250 mmHg. An anterior midline incision was made, and the dissection was carried sharply through the subcutaneous tissues and prepatellar bursa layer which was reflected medially. A modified medial  parapatellar arthrotomy was performed, and the patella was subluxated laterally. There was a large amount of clear joint fluid. Most of the fat pad was excised, and the medial release was completed around to the mid-coronal point, subsequently to the posteromedial corner, sparing the semimembranosus and pes anserinus. A drill hole was made in the distal femur, and the canal was suctioned. The sword was placed in 5° of valgus. The distal femoral cut was made. The femur was sized, and the secondary femoral cuts were made in 5° external rotation, increased from the usual rotation because of lateral femoral condylar hypoplasia. The knee was maximally flexed, and a drill hole was made in the footprint of the ACL. The canal was suctioned. An intramedullary alignment senia was placed with good purchase in the isthmus. The upper tibial cut was made perpendicular in both planes.  The flexion gap was checked after removing cruciate and meniscal remnants, and was found to be satisfactory with a spacer block once appropriate releases at the joint level were performed. Flexion and extension gaps were symmetric. The femoral finishing guide was used, and trials were inserted. We used a tibial baseplate template which gave us good coverage. The rotational position of it was marked with the Bovie after ranging the knee.  We had full extension without hyperextension, full flexion, good stability in both. The patella was measured with a caliper and a freehand cut was made such that the post-reconstruction thickness would equal precut thickness. Three holes were drilled for an all-poly patella. The trials were removed, and the tibia was drilled and punched in line with the previous rotational aleksandr. The bone was cleaned with pulsatile lavage and dried thoroughly. The components were impacted in the usual fashion with Simplex HV plus gentamicin cement. Patellar tracking was central without lateral release. A lateral patellaplasty was  performed. The knee was irrigated with Betadine and 3 liters of pulsatile lavage.  Vancomycin powder 1 g was placed in the knee joint. The arthrotomy was repaired with running and interrupted figure-of-eight sutures of #1 Vicryl. The prepatellar bursa tissue was closed with interrupted 0 Vicryl. The skin was closed with interrupted, inverted 2-0 Vicryl, running 3-0 Monocryl, and Dermabond.  An Aquacel and Eckert dressing was applied, and the patient was taken to the PACU in stable condition without apparent orthopedic or anesthetic complications.    Sponge and needle counts were correct.    The first-assistant was critical to all steps of the operation, including retraction and leg stabilization during exposure and bone preparation, as well as the deep and superficial wound closure.  Dr. Ferrara performed and/or supervised the key portions of this surgical procedure, including evaluation of the bone cuts, reshaping of the bony elements, and insertion of the provisional and final components.    Postoperative Plan:  The patient will be anticoagulated with 81mg aspirin twice daily for one month, plus restart preoperative Plavix.   They will receive 24 hours of post-operative antibiotics, +extended oral cefadroxil prophylaxis protocol due to advanced age and diabetes.    Activity will be weightbearing as tolerated.    Signed by: Rancho Ferrara MD

## 2023-11-15 NOTE — PT/OT/SLP PROGRESS
Occupational Therapy      Patient Name:  Jani Cha   MRN:  [id number]    OT orders received and acknowledged. Patient not resolved by 1700. Will follow up first thing in AM.      11/15/2023

## 2023-11-16 VITALS
SYSTOLIC BLOOD PRESSURE: 107 MMHG | BODY MASS INDEX: 39.64 KG/M2 | TEMPERATURE: 98 F | OXYGEN SATURATION: 92 % | HEART RATE: 53 BPM | WEIGHT: 267.63 LBS | HEIGHT: 69 IN | RESPIRATION RATE: 17 BRPM | DIASTOLIC BLOOD PRESSURE: 57 MMHG

## 2023-11-16 LAB
POCT GLUCOSE: 128 MG/DL (ref 70–110)
POCT GLUCOSE: 201 MG/DL (ref 70–110)
POCT GLUCOSE: 203 MG/DL (ref 70–110)
POCT GLUCOSE: 244 MG/DL (ref 70–110)

## 2023-11-16 PROCEDURE — 97116 GAIT TRAINING THERAPY: CPT

## 2023-11-16 PROCEDURE — 25000003 PHARM REV CODE 250

## 2023-11-16 PROCEDURE — 25000003 PHARM REV CODE 250: Performed by: ORTHOPAEDIC SURGERY

## 2023-11-16 PROCEDURE — 25000003 PHARM REV CODE 250: Performed by: ANESTHESIOLOGY

## 2023-11-16 PROCEDURE — 25000003 PHARM REV CODE 250: Performed by: STUDENT IN AN ORGANIZED HEALTH CARE EDUCATION/TRAINING PROGRAM

## 2023-11-16 PROCEDURE — 97535 SELF CARE MNGMENT TRAINING: CPT

## 2023-11-16 PROCEDURE — 25000242 PHARM REV CODE 250 ALT 637 W/ HCPCS: Performed by: ANESTHESIOLOGY

## 2023-11-16 PROCEDURE — 63600175 PHARM REV CODE 636 W HCPCS

## 2023-11-16 PROCEDURE — 99212 PR OFFICE/OUTPT VISIT, EST, LEVL II, 10-19 MIN: ICD-10-PCS | Mod: ,,, | Performed by: ANESTHESIOLOGY

## 2023-11-16 PROCEDURE — 97161 PT EVAL LOW COMPLEX 20 MIN: CPT

## 2023-11-16 PROCEDURE — 99212 OFFICE O/P EST SF 10 MIN: CPT | Mod: ,,, | Performed by: ANESTHESIOLOGY

## 2023-11-16 PROCEDURE — 97165 OT EVAL LOW COMPLEX 30 MIN: CPT

## 2023-11-16 RX ORDER — MUPIROCIN 20 MG/G
1 OINTMENT TOPICAL 2 TIMES DAILY
Status: DISCONTINUED | OUTPATIENT
Start: 2023-11-16 | End: 2023-11-16 | Stop reason: HOSPADM

## 2023-11-16 RX ORDER — AMOXICILLIN 250 MG
1 CAPSULE ORAL 2 TIMES DAILY
Status: DISCONTINUED | OUTPATIENT
Start: 2023-11-16 | End: 2023-11-16 | Stop reason: HOSPADM

## 2023-11-16 RX ORDER — VALSARTAN 80 MG/1
80 TABLET ORAL DAILY
Status: DISCONTINUED | OUTPATIENT
Start: 2023-11-16 | End: 2023-11-16 | Stop reason: HOSPADM

## 2023-11-16 RX ORDER — OXYCODONE HYDROCHLORIDE 5 MG/1
5 TABLET ORAL
Status: DISCONTINUED | OUTPATIENT
Start: 2023-11-16 | End: 2023-11-16 | Stop reason: HOSPADM

## 2023-11-16 RX ORDER — BISACODYL 10 MG
10 SUPPOSITORY, RECTAL RECTAL EVERY 12 HOURS PRN
Status: DISCONTINUED | OUTPATIENT
Start: 2023-11-16 | End: 2023-11-16 | Stop reason: HOSPADM

## 2023-11-16 RX ORDER — CARVEDILOL 25 MG/1
25 TABLET ORAL 2 TIMES DAILY WITH MEALS
Status: DISCONTINUED | OUTPATIENT
Start: 2023-11-16 | End: 2023-11-16 | Stop reason: HOSPADM

## 2023-11-16 RX ORDER — ROPIVACAINE HYDROCHLORIDE 2 MG/ML
INJECTION, SOLUTION EPIDURAL; INFILTRATION; PERINEURAL CONTINUOUS
Status: DISCONTINUED | OUTPATIENT
Start: 2023-11-16 | End: 2023-11-16 | Stop reason: HOSPADM

## 2023-11-16 RX ORDER — CEFADROXIL 500 MG/1
500 CAPSULE ORAL EVERY 12 HOURS
Qty: 14 CAPSULE | Refills: 0 | Status: SHIPPED | OUTPATIENT
Start: 2023-11-16 | End: 2023-11-23

## 2023-11-16 RX ORDER — POLYETHYLENE GLYCOL 3350 17 G/17G
17 POWDER, FOR SOLUTION ORAL DAILY
Status: DISCONTINUED | OUTPATIENT
Start: 2023-11-16 | End: 2023-11-16 | Stop reason: HOSPADM

## 2023-11-16 RX ORDER — METHOCARBAMOL 750 MG/1
750 TABLET, FILM COATED ORAL 3 TIMES DAILY
Status: DISCONTINUED | OUTPATIENT
Start: 2023-11-16 | End: 2023-11-16

## 2023-11-16 RX ORDER — FLUTICASONE PROPIONATE 50 MCG
1 SPRAY, SUSPENSION (ML) NASAL DAILY
Status: DISCONTINUED | OUTPATIENT
Start: 2023-11-16 | End: 2023-11-16 | Stop reason: HOSPADM

## 2023-11-16 RX ORDER — FAMOTIDINE 20 MG/1
20 TABLET, FILM COATED ORAL 2 TIMES DAILY
Status: DISCONTINUED | OUTPATIENT
Start: 2023-11-16 | End: 2023-11-16

## 2023-11-16 RX ORDER — ASPIRIN 81 MG/1
81 TABLET ORAL 2 TIMES DAILY
Status: DISCONTINUED | OUTPATIENT
Start: 2023-11-16 | End: 2023-11-16 | Stop reason: HOSPADM

## 2023-11-16 RX ADMIN — INSULIN ASPART 4 UNITS: 100 INJECTION, SOLUTION INTRAVENOUS; SUBCUTANEOUS at 05:11

## 2023-11-16 RX ADMIN — OXYCODONE HYDROCHLORIDE 5 MG: 5 TABLET ORAL at 09:11

## 2023-11-16 RX ADMIN — ACETAMINOPHEN 1000 MG: 500 TABLET ORAL at 11:11

## 2023-11-16 RX ADMIN — ASPIRIN 81 MG: 81 TABLET, COATED ORAL at 11:11

## 2023-11-16 RX ADMIN — MUPIROCIN 1 G: 20 OINTMENT TOPICAL at 12:11

## 2023-11-16 RX ADMIN — CEFADROXIL 500 MG: 500 CAPSULE ORAL at 09:11

## 2023-11-16 RX ADMIN — FLUTICASONE PROPIONATE 50 MCG: 50 SPRAY, METERED NASAL at 10:11

## 2023-11-16 RX ADMIN — GLIPIZIDE 10 MG: 5 TABLET, FILM COATED, EXTENDED RELEASE ORAL at 09:11

## 2023-11-16 RX ADMIN — ATORVASTATIN CALCIUM 80 MG: 40 TABLET, FILM COATED ORAL at 09:11

## 2023-11-16 RX ADMIN — VALSARTAN 80 MG: 80 TABLET, FILM COATED ORAL at 09:11

## 2023-11-16 RX ADMIN — ACETAMINOPHEN 1000 MG: 500 TABLET ORAL at 05:11

## 2023-11-16 RX ADMIN — CARVEDILOL 25 MG: 25 TABLET, FILM COATED ORAL at 09:11

## 2023-11-16 RX ADMIN — METHOCARBAMOL 500 MG: 500 TABLET ORAL at 09:11

## 2023-11-16 RX ADMIN — OXYCODONE HYDROCHLORIDE 5 MG: 5 TABLET ORAL at 02:11

## 2023-11-16 RX ADMIN — METHOCARBAMOL 500 MG: 500 TABLET ORAL at 02:11

## 2023-11-16 RX ADMIN — INSULIN ASPART 4 UNITS: 100 INJECTION, SOLUTION INTRAVENOUS; SUBCUTANEOUS at 11:11

## 2023-11-16 RX ADMIN — CEFAZOLIN 2 G: 2 INJECTION, POWDER, FOR SOLUTION INTRAMUSCULAR; INTRAVENOUS at 05:11

## 2023-11-16 RX ADMIN — PANTOPRAZOLE SODIUM 80 MG: 40 TABLET, DELAYED RELEASE ORAL at 09:11

## 2023-11-16 NOTE — HOSPITAL COURSE
On 11/15, the patient arrived to the [facility] for proper pre-operative management.  Upon completion of pre-operative preparation, the patient was taken back to the operative theatre. L TKA was performed without complication and the patient was transported to the post anesthesia care unit in stable condition.  After appropriate recovery from the anaesthetic agents used during the surgery, the patient was then transported to the hospital inpatient floor.  The interim of the hospital stay from arrival on the floor up to discharge has been uncomplicated. The patient has tolerated regular diet.  The patient's pain has been controlled using a multimodal approach. Currently, the patient's pain is well controlled on an oral regimen.  The patient has been voiding without difficulty.  The patient began participation in physical therapy after surgery and has progressed throughout the entire hospital stay.  Currently, the patient's progress is sufficient to allow the them to be discharged to home safely.  The patient agrees with this assessment and desires a discharge today.

## 2023-11-16 NOTE — PLAN OF CARE
PT eval completed- see note for details, goals and POC established.     Problem: Physical Therapy  Goal: Physical Therapy Goal  Description: Goals to be met by: 23     Patient will increase functional independence with mobility by performin. Sit to stand transfer with Modified [place]  2. Bed to chair transfer with Modified [place] using Rolling Walker  3. Gait  x 150 feet with Modified [place] using Rolling Walker.   4. Lower extremity exercise program x30 reps per handout, with independence    Outcome: Ongoing, Progressing   2023

## 2023-11-16 NOTE — NURSING
Mild discomfort during the night relief with Ropivacaine, Tylenol, and Robaxin, and polar ice in use; vss, dressing dry and intact to left leg.

## 2023-11-16 NOTE — SUBJECTIVE & OBJECTIVE
"Principal Problem:<principal problem not specified>    Principal Orthopedic Problem: same as above s/p L TKA    Interval History: Pt seen and examined at bedside. NAEON. Pain controlled. Pending PT.  VSS, AF. No other concerns stated. Urinating.      Review of patient's allergies indicates:   Allergen Reactions    Penicillins Hives, Itching and Rash    Shellfish containing products        Current Facility-Administered Medications   Medication    0.9%  NaCl infusion    acetaminophen tablet 1,000 mg    atorvastatin tablet 80 mg    carvediloL tablet 25 mg    cefadroxil capsule 500 mg    ceFAZolin 2 g in dextrose 5 % in water (D5W) 50 mL IVPB (MB+)    dextrose 10% bolus 125 mL 125 mL    dextrose 10% bolus 250 mL 250 mL    glipiZIDE 24 hr tablet 10 mg    glucagon (human recombinant) injection 1 mg    glucose chewable tablet 16 g    glucose chewable tablet 24 g    HYDROmorphone injection 0.2 mg    insulin aspart U-100 pen 0-10 Units    melatonin tablet 6 mg    methocarbamoL tablet 500 mg    morphine injection 2 mg    naloxone 0.4 mg/mL injection 0.02 mg    ondansetron injection 4 mg    oxyCODONE immediate release tablet 5 mg    And    oxyCODONE immediate release tablet Tab 10 mg    oxyCODONE immediate release tablet 5 mg    oxyCODONE immediate release tablet Tab 10 mg    pantoprazole EC tablet 80 mg    pregabalin capsule 75 mg    prochlorperazine injection Soln 5 mg    ROPIvacaine (PF) 2 mg/ml (0.2%) solution     Objective:     Vital Signs (Most Recent):  Temp: 97.4 °F (36.3 °C) (11/16/23 0501)  Pulse: (!) 56 (11/16/23 0501)  Resp: 18 (11/16/23 0501)  BP: (!) 147/77 (11/16/23 0501)  SpO2: 96 % (11/16/23 0501) Vital Signs (24h Range):  Temp:  [97.4 °F (36.3 °C)-98 °F (36.7 °C)] 97.4 °F (36.3 °C)  Pulse:  [50-65] 56  Resp:  [14-20] 18  SpO2:  [93 %-99 %] 96 %  BP: (127-180)/() 147/77     Weight: 121.4 kg (267 lb 10.2 oz)  Height: 5' 9" (175.3 cm)  Body mass index is 39.52 kg/m².      Intake/Output Summary (Last 24 " hours) at 11/16/2023 0552  Last data filed at 11/16/2023 0552  Gross per 24 hour   Intake 2100 ml   Output 600 ml   Net 1500 ml        Ortho/SPM Exam  - Dressing c/d/i  - Quad and hip flexor intact  - Compartments soft and compressible  - ROM full (eversion,inversion,plantar/dorsiflexion)  - TA/EHL/Gastroc/FHL assessed in isolation without deficit  - SILT throughout  - DP and PT palpated  2+  - Capillary Refill <3s        Significant Labs: All pertinent labs within the past 24 hours have been reviewed.    Significant Imaging: I have reviewed and interpreted all pertinent imaging results/findings.
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
## 2023-11-16 NOTE — PLAN OF CARE
Problem: Occupational Therapy  Goal: Occupational Therapy Goal  Description:     Outcome: Met     Pt is performing ADLs & functional mobility/transfers with modified independence and will not require further acute OT services at this time.

## 2023-11-16 NOTE — PLAN OF CARE
Problem: Adult Inpatient Plan of Care  Goal: Plan of Care Review  11/16/2023 0203 by Isa Stevens, RN  Outcome: Ongoing, Progressing  11/16/2023 0203 by Isa Stevens RN  Outcome: Ongoing, Progressing  Goal: Patient-Specific Goal (Individualized)  11/16/2023 0203 by Isa Stevens, RN  Outcome: Ongoing, Progressing  11/16/2023 0203 by Isa Stevens, RN  Outcome: Ongoing, Progressing  Goal: Absence of Hospital-Acquired Illness or Injury  11/16/2023 0203 by Isa Stevens, RN  Outcome: Ongoing, Progressing  11/16/2023 0203 by Isa Stevens, RN  Outcome: Ongoing, Progressing  Goal: Optimal Comfort and Wellbeing  11/16/2023 0203 by Isa Stevens RN  Outcome: Ongoing, Progressing  11/16/2023 0203 by Isa Stevens, LAUREN  Outcome: Ongoing, Progressing  Goal: Readiness for Transition of Care  11/16/2023 0203 by Isa Stevens, LAUREN  Outcome: Ongoing, Progressing  11/16/2023 0203 by Isa Stevens, RN  Outcome: Ongoing, Progressing     Problem: Diabetes Comorbidity  Goal: Blood Glucose Level Within Targeted Range  11/16/2023 0203 by Isa Stevens, RN  Outcome: Ongoing, Progressing  11/16/2023 0203 by Isa Stevens RN  Outcome: Ongoing, Progressing     Problem: Pain Acute  Goal: Acceptable Pain Control and Functional Ability  11/16/2023 0203 by Isa Stevens, LAUREN  Outcome: Ongoing, Progressing  11/16/2023 0203 by Isa Stevens, RN  Outcome: Ongoing, Progressing     Problem: Skin Injury Risk Increased  Goal: Skin Health and Integrity  11/16/2023 0203 by Isa Stevens, RN  Outcome: Ongoing, Progressing  11/16/2023 0203 by Isa Stevens, RN  Outcome: Ongoing, Progressing     Problem: Infection  Goal: Absence of Infection Signs and Symptoms  Outcome: Ongoing, Progressing

## 2023-11-16 NOTE — NURSING
Nurses Note -- 4 Eyes      11/16/2023   1:47 AM      Skin assessed during: Admit      [x] No Altered Skin Integrity Present    []Prevention Measures Documented      [] Yes- Altered Skin Integrity Present or Discovered   [] LDA Added if Not in Epic (Describe Wound)   [] New Altered Skin Integrity was Present on Admit and Documented in LDA   [] Wound Image Taken    Wound Care Consulted? No    Attending Nurse: LAUREN Delgado    Second RN/Staff Member:  WHIT Donald RN

## 2023-11-16 NOTE — ANESTHESIA POSTPROCEDURE EVALUATION
Anesthesia Post Evaluation    Patient: Jani Cha    Procedure(s) Performed: Procedure(s) (LRB):  ARTHROPLASTY, KNEE, TOTAL: Left: (Left)    Final Anesthesia Type: general      Patient location during evaluation: PACU  Patient participation: Yes- Able to Participate  Level of consciousness: awake and alert  Post-procedure vital signs: reviewed and stable  Pain management: adequate  Airway patency: patent    PONV status at discharge: No PONV  Anesthetic complications: no      Cardiovascular status: blood pressure returned to baseline and hemodynamically stable  Respiratory status: unassisted and spontaneous ventilation  Hydration status: euvolemic  Follow-up not needed.          Vitals Value Taken Time   /57 11/16/23 1238   Temp 36.4 °C (97.6 °F) 11/16/23 1238   Pulse 53 11/16/23 1238   Resp 18 11/16/23 1238   SpO2 92 % 11/16/23 1238         Event Time   Out of Recovery 17:00:00         Pain/Good Score: Pain Rating Prior to Med Admin: 1 (11/16/2023 11:50 AM)  Pain Rating Post Med Admin: 0 (11/16/2023 12:22 AM)  Good Score: 9 (11/15/2023  4:30 PM)

## 2023-11-16 NOTE — DISCHARGE SUMMARY
[facility]  Orthopedics  Discharge Summary      Patient Name: Jani Cha  MRN: [id number]  Admission Date: 11/15/2023  Hospital Length of Stay: 0 days  Discharge Date and Time:  11/16/2023 9:21 AM  Attending Physician: Rancho Ferrara MD   Discharging Provider: Rancho Sheppard MD  Primary Care Provider: Laila Bains MD    HPI: Jani Cha is a 78 y.o. male with history of Left knee pain. Pain is worse with activity and weight bearing.  Patient has experienced interference of activities of daily living due to decreased range of motion and an increase in joint pain and swelling.  Patient has failed non-operative treatment including NSAIDs, corticosteroid injections, viscosupplement injections, and activity modification.  Jani Cha currently ambulates using assistive device .      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  Tolerated it well and was discharged home POD 1 after voiding, tolerating diet, ambulating, pain controlled.  Discharge instructions, follow-up appointment, and med rec are below.     Consults (From admission, onward)          Status Ordering Provider     Inpatient consult to Respiratory Care  Once        Provider:  (Not yet assigned)    Acknowledged JEANETTE ALMANZAR            Significant Diagnostic Studies: No pertinent studies.    Pending Diagnostic Studies:       None          Final Active Diagnoses:    Diagnosis Date Noted POA    PRINCIPAL PROBLEM:  Primary osteoarthritis of left knee [M17.12] 10/27/2023 Yes      Problems Resolved During  this Admission:      Discharged Condition: stable    Disposition: Home or Self Care    Follow Up:    Patient Instructions:      Activity as tolerated     Sponge bath only until clinic visit     Keep surgical extremity elevated     Lifting restrictions   Order Comments: No strenuous exercise or lifting of > 10 lbs     Weight bearing as tolerated     No driving, operating heavy equipment or signing legal documents while taking pain medication     Leave dressing on - Keep it clean, dry, and intact until clinic visit   Order Comments: Do not remove surgical dressing for 2 weeks post-op. This will be done only by MD at initial post-op visit. If dressing is completely saturated, replace with identical dressing - silver-impregnated hydrocolloid dressing.     Do not get dressings wet. Do not shower.     If dressing continues to be saturated or there are signs of infection, please call [facility] [phone] for further instructions and to make appt to be seen.     Call MD for:  temperature >100.4     Call MD for:  persistent nausea and vomiting     Call MD for:  severe uncontrolled pain     Call MD for:  difficulty breathing, headache or visual disturbances     Call MD for:  redness, tenderness, or signs of infection (pain, swelling, redness, odor or green/yellow discharge around incision site)     Call MD for:  hives     Call MD for:  persistent dizziness or light-headedness     Call MD for:  extreme fatigue     Medications:  Reconciled Home Medications:      Medication List        START taking these medications      cefadroxil 500 MG Cap  Commonly known as: DURICEF  Take 1 capsule (500 mg total) by mouth every 12 (twelve) hours. for 7 days            CONTINUE taking these medications      * acetaminophen 650 MG Tbsr  Commonly known as: TYLENOL  Take 650 mg by mouth every 8 (eight) hours.     * acetaminophen 500 MG tablet  Commonly known as: TYLENOL  Take 2 tablets (1,000 mg total) by mouth every 8 (eight) hours as  needed (Mild to moderate pain).     * acetaminophen 650 MG Tbsr  Commonly known as: TYLENOL  Take 1 tablet (650 mg total) by mouth every 8 (eight) hours.     * aspirin 81 MG EC tablet  Commonly known as: ECOTRIN  Take 81 mg by mouth once daily.     * aspirin 81 MG EC tablet  Commonly known as: ECOTRIN  Take 1 tablet (81 mg total) by mouth 2 (two) times a day.     atorvastatin 80 MG tablet  Commonly known as: LIPITOR  Take 1 tablet (80 mg total) by mouth once daily.     blood sugar diagnostic Strp  1 strip by Misc.(Non-Drug; Combo Route) route once daily.     calcium carbonate 500 mg calcium (1,250 mg) tablet  Commonly known as: OS-BAILEE  Take 1 tablet by mouth once daily.     carvediloL 25 MG tablet  Commonly known as: COREG  TAKE 1 TABLET BY MOUTH TWICE DAILY WITH MEALS     clopidogreL 75 mg tablet  Commonly known as: PLAVIX  Take 1 tablet (75 mg total) by mouth once daily.     clotrimazole-betamethasone 1-0.05% cream  Commonly known as: LOTRISONE  Apply topically 2 (two) times daily.     dulaglutide 1.5 mg/0.5 mL pen injector  Commonly known as: TRULICITY  Inject 1.5 mg into the skin every 7 days.     famotidine 20 MG tablet  Commonly known as: PEPCID  Take 1 tablet (20 mg total) by mouth nightly as needed for Heartburn.     fluticasone propionate 50 mcg/actuation nasal spray  Commonly known as: FLONASE  1 spray (50 mcg total) by Each Nostril route once daily.     glipiZIDE 10 MG Tr24  Commonly known as: GLUCOTROL  Take 1 tablet (10 mg total) by mouth 2 (two) times daily with meals.     melatonin 3 mg tablet  Commonly known as: MELATIN  Take 2 tablets (6 mg total) by mouth nightly as needed for Insomnia.     methocarbamoL 750 MG Tab  Commonly known as: ROBAXIN  Take 1 tablet (750 mg total) by mouth 4 (four) times daily as needed (for muscle spasms).     oxyCODONE 5 MG immediate release tablet  Commonly known as: ROXICODONE  Take 1-2 tablets by mouth every 4-6 hours as needed for pain     pantoprazole 40 MG  tablet  Commonly known as: PROTONIX  Take 1 tablet by mouth once daily     senna-docusate 8.6-50 mg 8.6-50 mg per tablet  Commonly known as: SENNA WITH DOCUSATE SODIUM  Take 1 tablet by mouth once daily.     TRUEPLUS LANCETS 33 gauge Misc  Generic drug: lancets  Apply 1 lancet topically once daily. Use to test blood sugar daily; discard lancet after each use     valsartan 80 MG tablet  Commonly known as: DIOVAN  Take 1 tablet (80 mg total) by mouth once daily.           * This list has 5 medication(s) that are the same as other medications prescribed for you. Read the directions carefully, and ask your doctor or other care provider to review them with you.                  Rancho Sheppard MD  Orthopedics  [facility] - Surgery

## 2023-11-16 NOTE — ANESTHESIA POST-OP PAIN MANAGEMENT
Acute Pain Service and Perioperative Surgical Home Progress Note    HPI  Jani Cha is a 78 y.o., male,     Interval history    Surgery:  Procedure(s) (LRB):  ARTHROPLASTY, KNEE, TOTAL: Left: (Left)    Post Op Day #:     Catheter type:     Infusion type: Ropivacaine 0.2%     Problem List:    Active Hospital Problems    Diagnosis  POA    *Primary osteoarthritis of left knee [M17.12]  Yes      Resolved Hospital Problems   No resolved problems to display.       Subjective:     General appearance of alert, oriented, no complaints   Pain with rest: 1   Side Effects    1. Pruritis No    2. Nausea No    3. Motor Blockade No, 0=Ability to raise lower extremities off bed    4. Sedation No, 1=awake and alert    Schedule Medications:    acetaminophen  1,000 mg Oral Q6H    aspirin  81 mg Oral BID    atorvastatin  80 mg Oral Daily    carvediloL  25 mg Oral BID WM    cefadroxil  500 mg Oral Q12H    fluticasone propionate  1 spray Each Nostril Daily    glipiZIDE  10 mg Oral BID WM    methocarbamoL  500 mg Oral TID    mupirocin  1 g Nasal BID    pantoprazole  80 mg Oral Daily    polyethylene glycol  17 g Oral Daily    pregabalin  75 mg Oral QHS    senna-docusate 8.6-50 mg  1 tablet Oral BID    valsartan  80 mg Oral Daily        Continuous Infusions:   sodium chloride 0.9% 150 mL/hr at 11/15/23 1634    ROPIvacaine (PF) 2 mg/ml (0.2%) 0.1 mL/hr (11/15/23 1644)    ropivacaine 0.2% Nimbus PainPRO Pump infusion 500 ML          PRN Medications:  bisacodyL, dextrose 10%, dextrose 10%, glucagon (human recombinant), glucose, glucose, insulin aspart U-100, melatonin, morphine, naloxone, ondansetron, oxyCODONE, prochlorperazine       Antibiotics:  Antibiotics (From admission, onward)      Start     Stop Route Frequency Ordered    11/16/23 1000  mupirocin 2 % ointment 1 g         11/21/23 0859 Nasl 2 times daily 11/16/23 0948    11/16/23 0900  cefadroxil capsule 500 mg         -- Oral Every 12 hours 11/15/23 1638          "    Objective:     Catheter site clean, dry, intact    Vital Signs (Most Recent):  Temp: 36.4 °C (97.5 °F) (11/16/23 0819)  Pulse: 67 (11/16/23 0819)  Resp: 17 (11/16/23 0949)  BP: (!) 145/84 (11/16/23 0819)  SpO2: (!) 91 % (11/16/23 0819) Vital Signs Range (Last 24H):  Temp:  [36.3 °C (97.4 °F)-36.7 °C (98 °F)]   Pulse:  [50-67]   Resp:  [14-20]   BP: (127-180)/()   SpO2:  [91 %-99 %]      I & O (Last 24H):  Intake/Output Summary (Last 24 hours) at 11/16/2023 1116  Last data filed at 11/16/2023 1024  Gross per 24 hour   Intake 2400 ml   Output 600 ml   Net 1800 ml       Physical Exam:    GA: Alert, comfortable, no acute distress.   Pulmonary: Clear to auscultation A/P/L. No wheezing, crackles, or rhonchi.  Cardiac: RRR S1 & S2 w/o rubs/murmurs/gallops.   Abdominal:Bowel sounds present. No tenderness to palpation or distension. No appreciable hepatosplenomegaly.   Skin: No jaundice, rashes, or visible lesions.    Laboratory:  CBC: No results for input(s): "WBC", "RBC", "HGB", "HCT", "PLT", "MCV", "MCH", "MCHC" in the last 72 hours.    BMP: No results for input(s): "NA", "K", "CO2", "CL", "BUN", "CREATININE", "GLU", "MG", "PHOS", "CALCIUM" in the last 72 hours.    No results for input(s): "PT", "INR", "PROTIME", "APTT" in the last 72 hours.    Assessment:    Pain control adequate    Plan:    Pain:  Adductor canal perineural catheter in place and infusing. Plan for DC later today, will switch to nimbus  CAD- ASA, statin, BB, resume plavix per ortho on discharge  HTN: home coreg and valsartan  DM2- home glipizide, will resume injectable once discharge. Hyperglycemia downtrending  FEN/GI:  Tolerating liquids, advance diet as tolerated. GERD PPI  Dispo:  Pt working well with PT/OT. Case management and SW for placement. Plan for discharge tomorrow or possibly today if patient works well with PT and meets goals.    "

## 2023-11-16 NOTE — PLAN OF CARE
Pt received bedside medication . Instructed to  antibiotic at his pharmacy . Instructed on copy of discharged papers instructions. Pt denies pain at this time. Pt educated on signs and symptoms of when to call the doctor. All belongings with the patient . Pt verbalized understanding. Instructed to follow up PT eval tomorrow morning and follow up with MD appts. Patient waiting for transport and his ride.

## 2023-11-16 NOTE — PROGRESS NOTES
Pt consent to converting adductor PNC infusion to Nimbus.  Site CDI.  Educated regarding continued pain management, fall risk, signs of complications, continued monitoring, as well as discontinuing catheter on 11/20.    Understanding verbalized.  At patient's request contact information and nimbus video sent to daughter who will not be available until after 4pm today.

## 2023-11-16 NOTE — PLAN OF CARE
[facility]  Initial Discharge Assessment       Primary Care Provider: Laila Bains MD    Admission Diagnosis: Primary osteoarthritis of left knee [M17.12]    Admission Date: 11/15/2023  Expected Discharge Date: 11/16/2023    Transition of Care Barriers: None    Payor: MEDICARE / Plan: MEDICARE PART A & B / Product Type: Government /     Extended Emergency Contact Information  Primary Emergency Contact: Margaret Cha  [address] [place] [address] [facility]  Home Phone: [phone]  Mobile Phone: [phone]  Relation: Spouse  Secondary Emergency Contact: MamtapatelAmelia  Mobile Phone: [phone]  Relation: Daughter  Preferred language: English   needed? No    Discharge Plan A: Home with family  Discharge Plan B: Home Health, Home with family      [facility] [address]  [place] - [address]  [address]  [place] [address]  Phone: [phone] Fax: [phone]    [facility] - [address]  [address] [address]  Phone: [phone] Fax: [phone]    [facility] [address]  [place] - [address] [facility]  [address] [facility]  [place] [address]  Phone: [phone] Fax: [phone]    [facility] [place] [address]  [facility] [address]  Phone: [phone] Fax: [phone]      Initial Assessment (most recent)       Adult Discharge Assessment - 11/16/23 1142          Discharge Assessment    Assessment Type Discharge Planning Assessment     Confirmed/corrected address, phone number and insurance Yes     Confirmed Demographics Correct on Facesheet     Source of Information patient     Communicated SARKIS with patient/caregiver Yes     People in Home spouse     Do you expect to return to your current living situation? Yes     Do you have help at home or someone to help you manage your care at home? Yes     Who are your  caregiver(s) and their phone number(s)? spouse, daughter     Prior to hospitilization cognitive status: Alert/Oriented     Current cognitive status: Alert/Oriented     Home Layout Able to live on 1st floor     Equipment Currently Used at Home walker, rolling;rollator;cane, straight;glucometer     Do you currently have service(s) that help you manage your care at home? No     Do you take prescription medications? Yes     Do you have prescription coverage? Yes     Do you have any problems affording any of your prescribed medications? No     Is the patient taking medications as prescribed? yes     How do you get to doctors appointments? car, drives self;family or friend will provide     Are you on dialysis? No     Do you take coumadin? No     DME Needed Upon Discharge  none     Discharge Plan discussed with: Patient     Transition of Care Barriers None     Discharge Plan A Home with family     Discharge Plan B Home Health;Home with family                   Patient lives with spouse in a single story home with one entry step. Patient plan to attend Outpatient Rehab. His wife and daughter are primary caregivers and will provide assistance with patient care once home. Family will provide transportation home.     Discharge Plan A and Plan B have been determined by review of patient's clinical status, future medical and therapeutic needs, and coverage/benefits for post-acute care in coordination with multidisciplinary team members.

## 2023-11-16 NOTE — ASSESSMENT & PLAN NOTE
78 y.o. male POD1 s/p L TKA    Pain control: MM  PT/OT: WBAT LLE, nothing behind the knee  DVT PPx: ASA 81 BID, FCDs at all times when not ambulating  Abx: postop Ancef, cefadroxil x7d  Labs: None  Drain: none  Alejandre: none    Dispo: Outpatient PT; encourage PT/OT

## 2023-11-17 ENCOUNTER — CLINICAL SUPPORT (OUTPATIENT)
Dept: ORTHOPEDICS | Facility: CLINIC | Age: 78
End: 2023-11-17
Payer: MEDICARE

## 2023-11-17 ENCOUNTER — CLINICAL SUPPORT (OUTPATIENT)
Dept: REHABILITATION | Facility: HOSPITAL | Age: 78
End: 2023-11-17
Attending: ORTHOPAEDIC SURGERY
Payer: MEDICARE

## 2023-11-17 DIAGNOSIS — M17.12 PRIMARY OSTEOARTHRITIS OF LEFT KNEE: ICD-10-CM

## 2023-11-17 DIAGNOSIS — M25.662 DECREASED RANGE OF MOTION OF LEFT KNEE: ICD-10-CM

## 2023-11-17 DIAGNOSIS — R26.89 GAIT, ANTALGIC: ICD-10-CM

## 2023-11-17 DIAGNOSIS — M25.562 CHRONIC PAIN OF LEFT KNEE: Primary | ICD-10-CM

## 2023-11-17 DIAGNOSIS — R29.898 WEAKNESS OF LEFT LOWER EXTREMITY: ICD-10-CM

## 2023-11-17 DIAGNOSIS — Z96.659 STATUS POST KNEE REPLACEMENT, UNSPECIFIED LATERALITY: Primary | ICD-10-CM

## 2023-11-17 DIAGNOSIS — G89.29 CHRONIC PAIN OF LEFT KNEE: Primary | ICD-10-CM

## 2023-11-17 PROCEDURE — 99499 UNLISTED E&M SERVICE: CPT | Mod: 95,,, | Performed by: ORTHOPAEDIC SURGERY

## 2023-11-17 PROCEDURE — 97161 PT EVAL LOW COMPLEX 20 MIN: CPT | Mod: PN

## 2023-11-17 PROCEDURE — 99499 NO LOS: ICD-10-PCS | Mod: 95,,, | Performed by: ORTHOPAEDIC SURGERY

## 2023-11-17 PROCEDURE — 97110 THERAPEUTIC EXERCISES: CPT | Mod: PN
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
## 2023-11-17 NOTE — ANESTHESIA POST-OP PAIN MANAGEMENT
11/17/2023    Called and spoke to Jani Cha about the Nimbus pump and PNC.  Pain has been well controlled.  Denies tinnitus, metallic taste in mouth, weakness in extremity.  Denies erythema, warmth, tenderness, bleeding at site of catheter entry.  Dressing c/d/i.      Srinivasa Feldman DO, PGY4  Department of Anesthesiology

## 2023-11-17 NOTE — PLAN OF CARE
[facility]  Discharge Final Note    Primary Care Provider: Laila Bains MD    Expected Discharge Date: 11/16/2023    Final Discharge Note (most recent)       Final Note - 11/16/23 1643          Final Note    Assessment Type Final Discharge Note     Anticipated Discharge Disposition Home or Self Care     Hospital Resources/Appts/Education Provided Provided patient/caregiver with written discharge plan information;Provided education on problems/symptoms using teachback                   Future Appointments   Date Time Provider Department Center   11/21/2023  5:30 PM Alisa Aquino, PTA [facility] - B   11/22/2023  4:15 PM Philip Talamantes, PT [facility]   11/28/2023  5:30 PM Alisa Aquino, PTA [facility]   11/29/2023  4:15 PM Philip Talamantes, PT [facility] OP [facility] - [facility]   11/30/2023 11:30 AM Carlos Alberto Sosa PA-C [facility] ORTHO [facility]   11/30/2023  5:00 PM Cas Maya, PT [facility] OP [facility] - [facility]   12/5/2023  4:15 PM Alisa Aquino, PTA [facility] - [facility]   12/6/2023  5:00 PM Philip Talamantes, PT [facility] - [facility]   12/7/2023  5:00 PM Cas Maya, PT [facility] - [facility]   12/12/2023  5:30 PM Alisa Aquino, PTA [facility] - [facility]   12/13/2023  5:00 PM Philip Talamantes, PT [facility] - [facility]   12/14/2023  5:00 PM Cas Maya, PT [facility] - [facility]   12/15/2023  9:00 AM LAB, [facility]   12/19/2023  5:30 PM Alisa Aquino, PTA [facility] OP [facility] - B   12/20/2023  5:00 PM Philip Talamantes, PT [facility] - B   12/22/2023  8:00 AM LAB, [facility]   12/22/2023  9:45 AM Rancho Ferrara MD [facility]   12/22/2023  1:15 PM Jeanmarie Hanson MD [facility] Cli   12/26/2023  5:00 PM Wen Ann PTA [facility] B   12/27/2023  5:00 PM Philip Talamantes PT [facility]   1/2/2024  5:00 PM Wen Ann PTA [facility]   1/3/2024  5:00 PM Albin  Philip, PT [facility] OP [facility] B   1/4/2024  3:30 PM Malcolm Kent MD [facility] ENDOCRN [facility] Cli   1/9/2024  5:00 PM Wen Ann PTA [facility] OP [facility] - B   1/16/2024 10:15 AM Karina Vicente DPM [facility] POD [place]   2/1/2024 10:00 AM Rancho Ferrara MD [facility]THO [facility]   3/7/2024  9:00 AM LAB, [facility] LAB [place]   3/14/2024  3:00 PM Laila Bains MD [facility] MED [place]   3/22/2024 10:45 AM INJECTION [facility] AMB INF [facility]

## 2023-11-17 NOTE — NURSING
Nurses Note -- 4 Eyes      11/16/2023   6:44 PM      Skin assessed during: Q Shift Change      [x] No Altered Skin Integrity Present    []Prevention Measures Documented      [] Yes- Altered Skin Integrity Present or Discovered   [] LDA Added if Not in Epic (Describe Wound)   [] New Altered Skin Integrity was Present on Admit and Documented in LDA   [] Wound Image Taken    Wound Care Consulted? No    Attending Nurse:  Araceli Chaparro lpn    Second RN/Staff Member:   Max harrell rn

## 2023-11-19 NOTE — CARE UPDATE
Called to inform patient that nimbus catheter needs to come out tomorrow. Answered questions. No problems with nimbus pump.       Keyur Villatoro  PGY-II

## 2023-11-20 NOTE — PROGRESS NOTES
I called the patient today regarding his surgery with Dr. Rancho Ferrara . The patient had a left TKA on 11/15.     Pain Scale:  12  / 10- reviewed multimodal medications, encouraged pt to stay ahead of the pain.     Any issues with Fever: No.    Any issues with medications (specifically DVT prophylaxis): No. ASA 81 BID    Any issues with bowel movements:  Passing jose d: No.                                                                 Urination: No.                                                                 Constipation: No.    Completing at home exercises: Yes:     Any concerns regarding their dressing/bandage:  Yes:  small area of drainage on top of dressing that started after PT. Pt sent picture, advised to keep am eye on it and call hotline if needed.    Patient confirmed first OP-PT appointment:  [facility] on  11/17 at 0815 am.     Any other concerns: No.        The patient was informed that if they have any urgent issues with their bandage, medications or any other health concerns regarding their surgery to call the 24/7 Orthopedic Post-op Hot Line at [phone]. The patient was reminded that if they have any chest pain or shortness of breath to call 911 or go to the ER.    The patient verbalized understanding and does not have any other questions

## 2023-11-21 ENCOUNTER — CLINICAL SUPPORT (OUTPATIENT)
Dept: REHABILITATION | Facility: HOSPITAL | Age: 78
End: 2023-11-21
Payer: MEDICARE

## 2023-11-21 DIAGNOSIS — G89.29 CHRONIC PAIN OF LEFT KNEE: Primary | ICD-10-CM

## 2023-11-21 DIAGNOSIS — R29.898 WEAKNESS OF LEFT LOWER EXTREMITY: ICD-10-CM

## 2023-11-21 DIAGNOSIS — R26.89 GAIT, ANTALGIC: ICD-10-CM

## 2023-11-21 DIAGNOSIS — M25.562 CHRONIC PAIN OF LEFT KNEE: Primary | ICD-10-CM

## 2023-11-21 DIAGNOSIS — M25.662 DECREASED RANGE OF MOTION OF LEFT KNEE: ICD-10-CM

## 2023-11-21 PROCEDURE — 97530 THERAPEUTIC ACTIVITIES: CPT | Mod: PN,CQ

## 2023-11-21 PROCEDURE — 97140 MANUAL THERAPY 1/> REGIONS: CPT | Mod: PN,CQ

## 2023-11-21 PROCEDURE — 97110 THERAPEUTIC EXERCISES: CPT | Mod: PN,CQ

## 2023-11-21 NOTE — PROGRESS NOTES
"  Physical Therapy Daily Treatment Note     Name: Jani Elizabethmavince  Clinic Number: [id number]    Therapy Diagnosis:   Encounter Diagnoses   Name Primary?    Chronic pain of left knee Yes    Decreased range of motion of left knee     Weakness of left lower extremity     Gait, antalgic      Physician: Rancho Ferrara MD    Visit Date: 11/21/2023    Physician Orders: PT Eval and Treat   Medical Diagnosis from Referral: Primary osteoarthritis of left knee   Evaluation Date: 11/17/2023  Authorization Period Expiration: 12/31/2024   Plan of Care Expiration: 12-29-23  Visit # / Visits authorized: 1/ 20   Progress Note Due: 12-17-23  FOTO: 1/ 1     Precautions:  Standard, CHF, CKD, s/p 4 V CABG, DM, HTN, and hx of L DARNELL      Time In: 1715PM  Time Out: 1800PM  Total Appointment Time (timed & untimed codes): 45  minutes    Subjective     Pt reports: he just had surgery 5 days ago. His knee hurts and it's swollen..  He was compliant with home exercise program.  Response to previous treatment: initial eval   Functional change: ongoing    Pain: 9/10  Location: left knee     Objective     Jani received therapeutic exercises to develop strength, endurance, ROM, and flexibility for 30 minutes including:    Quad sets towel under heel 2x10, 3"  SAQ w/ medium bolster Mod A 10x   Long sitting hamstring stretch 5x20"  Long sitting calf stretch 5x20"  Heel props 2 min    Ankle pumps-HEP    Jani received the following manual therapy techniques: Joint mobilizations were applied to the: right knee for 05 minutes, including:  Gentle Patella Mobs    Jani participated in neuromuscular re-education activities to improve:  for  minutes. The following activities were included:      Jani participated in dynamic functional therapeutic activities to improve functional performance for 10  minutes, including:  Ambulation w/ FWW, CGA, wheelchair follow 450ft     Home Exercises Provided and Patient Education Provided     Education provided:   Cont " to perform HEP as provided.     Written Home Exercises Provided: Patient instructed to cont prior HEP.  Exercises were reviewed and Jani was able to demonstrate them prior to the end of the session.  Jani demonstrated good  understanding of the education provided.     See EMR under Patient Instructions for exercises provided prior visit.    Assessment     Pt tolerated the above tx session well with moderate to severe c/o pain that decreased from a 9/10 at start of session to an 8/10 at the end. TherEx presented an appropriate challenge with no adverse effects  reported. Verbal and tactile cueing required throughout for proper form and technique. Encouraged pt to continue HEP between sessions for optimal therapeutic gains. Will continue working towards established PT goals in future sessions.       Jani Is progressing well towards his goals.   Pt prognosis is Good.     Pt will continue to benefit from skilled outpatient physical therapy to address the deficits listed in the problem list box on initial evaluation, provide pt/family education and to maximize pt's level of independence in the home and community environment.     Pt's spiritual, cultural and educational needs considered and pt agreeable to plan of care and goals.    Anticipated barriers to physical therapy:  Scheduling issues     Goals: Short Term Goals:  4 weeks  1.Report decreased in pain at worse less than  <   / =  5  /10  to increase tolerance for functional mobility. Goal in progress  2. Pt to improve L knee range of motion to 100 deg flexion and L knee extension to -4 deg to allow for improved functional mobility to allow for improvement in IADLs. . Goal in progress  3. Increased L  knee MMT 1/2 grade to increase tolerance for ADL and work activities. Goal in progress  4. Pt to report ambulating without FWW to improve community ambulation. Goal in progress  5. Pt to tolerate HEP to improve ROM and independence with ADL's. Goal in progress      Long Term Goals: 12 weeks  1.Report decreased in pain at worse less than  <   / =  2  /10  to increase tolerance for functional mobility. Goal in progress  2.Pt to improve L: knee range of motion 115 deg or  120 deg flexion and L knee extension to 0 deg to allow for improved functional mobility to allow for improvement in IADLs. Goal in progress  3.Increased L knee MMT 1 grade to increase tolerance for ADL and work activities. Goal in progress  4. Pt will report 80 % on FOTO knee survey  to demonstrate increase in LE function with every day tasks. Goal in progress  5. Pt to be Independent with HEP to improve ROM and independence with ADL's Goal in progress    Plan       Cont skilled PT session towards PT and patient's goals per tolerance to post op protocol.    Alisa Aquino, PTA   11/21/2023

## 2023-11-22 ENCOUNTER — CLINICAL SUPPORT (OUTPATIENT)
Dept: REHABILITATION | Facility: HOSPITAL | Age: 78
End: 2023-11-22
Attending: ORTHOPAEDIC SURGERY
Payer: MEDICARE

## 2023-11-22 DIAGNOSIS — R26.89 GAIT, ANTALGIC: ICD-10-CM

## 2023-11-22 DIAGNOSIS — G89.29 CHRONIC PAIN OF LEFT KNEE: Primary | ICD-10-CM

## 2023-11-22 DIAGNOSIS — R29.898 WEAKNESS OF LEFT LOWER EXTREMITY: ICD-10-CM

## 2023-11-22 DIAGNOSIS — M25.662 DECREASED RANGE OF MOTION OF LEFT KNEE: ICD-10-CM

## 2023-11-22 DIAGNOSIS — M25.562 CHRONIC PAIN OF LEFT KNEE: Primary | ICD-10-CM

## 2023-11-22 PROCEDURE — 97112 NEUROMUSCULAR REEDUCATION: CPT | Mod: PN

## 2023-11-22 PROCEDURE — 97110 THERAPEUTIC EXERCISES: CPT | Mod: PN

## 2023-11-22 PROCEDURE — 97140 MANUAL THERAPY 1/> REGIONS: CPT | Mod: PN

## 2023-11-22 PROCEDURE — 97530 THERAPEUTIC ACTIVITIES: CPT | Mod: PN

## 2023-11-22 NOTE — PROGRESS NOTES
"  Physical Therapy Daily Treatment Note     Name: Jani PAIZ [facility]  Clinic Number: [id number]    Therapy Diagnosis:   Encounter Diagnoses   Name Primary?    Chronic pain of left knee Yes    Decreased range of motion of left knee     Weakness of left lower extremity     Gait, antalgic        Physician: Rancho Ferrara MD    Visit Date: 11/22/2023    Physician Orders: PT Eval and Treat   Medical Diagnosis from Referral: Primary osteoarthritis of left knee   Evaluation Date: 11/17/2023  Authorization Period Expiration: 12/31/2024   Plan of Care Expiration: 12-29-23  Visit # / Visits authorized: 2/ 20   Progress Note Due: 12-17-23  FOTO: 1/ 1     Precautions:  Standard, CHF, CKD, s/p 4 V CABG, DM, HTN, and hx of L DARNELL     DOS: 11-15-23     POW: 1 weeks      Time In: 4:00 pm  Time Out: 4:55 pm  Total Appointment Time (timed & untimed codes): 55  minutes    Subjective     Pt reports: he just had surgery 5 days ago. His knee hurts and it's swollen..  He was compliant with home exercise program.  Response to previous treatment: initial eval   Functional change: ongoing    Pain: 9/10  Location: left knee     Objective     Updated 11-22-23  L knee flexion 64 deg  L knee extension: Lacking 2 def    Jani participated in dynamic functional therapeutic activities to improve functional performance for 8  minutes, including:  Nustep for 8    Jani received therapeutic exercises to develop strength, endurance, ROM, and flexibility for 15 minutes including:    Long sitting hamstring stretch 5x20"  Long sitting calf stretch 5x20"  Heel props 5 min    Jani received the following manual therapy techniques: Joint mobilizations were applied to the: right knee for 10 minutes, including:  Gentle Patella Mobs    Jani participated in neuromuscular re-education activities to improve: quad motor control  for 22  minutes. The following activities were included:    SLR max Ax1  3x10   Quad sets towel under heel 4x10, 3"  Quad sets towel under " knee 4x10  SAQ with strap 3x10      Home Exercises Provided and Patient Education Provided     Education provided:   Cont to perform HEP as provided.     Written Home Exercises Provided: Patient instructed to cont prior HEP.  Exercises were reviewed and Jani was able to demonstrate them prior to the end of the session.  Jani demonstrated good  understanding of the education provided.     See EMR under Patient Instructions for exercises provided prior visit.    Assessment     Pt tolerated the above tx session well with moderate to severe c/o pain. Pt is about 1 week since L TKA. Pt ambulated with antalgic gait pattern decrease heel to toes gait pattern. Pt was able to achieve 64 deg of L knee flexion. Pt is lacking 2 deg of L knee extension. Pt cont decrease quad motor control. Nustep performed to improve functional mobility. Pt had pain during patella mobs. Pain was somewhat limiting factor during therapy. Will continue working towards established PT goals in future sessions.       Jani Is progressing well towards his goals.   Pt prognosis is Good.     Pt will continue to benefit from skilled outpatient physical therapy to address the deficits listed in the problem list box on initial evaluation, provide pt/family education and to maximize pt's level of independence in the home and community environment.     Pt's spiritual, cultural and educational needs considered and pt agreeable to plan of care and goals.    Anticipated barriers to physical therapy:  Scheduling issues     Goals: Short Term Goals:  4 weeks  1.Report decreased in pain at worse less than  <   / =  5  /10  to increase tolerance for functional mobility. Goal in progress  2. Pt to improve L knee range of motion to 100 deg flexion and L knee extension to -4 deg to allow for improved functional mobility to allow for improvement in IADLs. . Goal in progress  3. Increased L  knee MMT 1/2 grade to increase tolerance for ADL and work activities. Goal in  progress  4. Pt to report ambulating without FWW to improve community ambulation. Goal in progress  5. Pt to tolerate HEP to improve ROM and independence with ADL's. Goal in progress     Long Term Goals: 12 weeks  1.Report decreased in pain at worse less than  <   / =  2  /10  to increase tolerance for functional mobility. Goal in progress  2.Pt to improve L: knee range of motion 115 deg or  120 deg flexion and L knee extension to 0 deg to allow for improved functional mobility to allow for improvement in IADLs. Goal in progress  3.Increased L knee MMT 1 grade to increase tolerance for ADL and work activities. Goal in progress  4. Pt will report 80 % on FOTO knee survey  to demonstrate increase in LE function with every day tasks. Goal in progress  5. Pt to be Independent with HEP to improve ROM and independence with ADL's Goal in progress    Plan       Cont skilled PT session towards PT and patient's goals per tolerance to post op protocol.    Philip Talamantes, PT   11/22/2023

## 2023-11-28 ENCOUNTER — CLINICAL SUPPORT (OUTPATIENT)
Dept: REHABILITATION | Facility: HOSPITAL | Age: 78
End: 2023-11-28
Payer: MEDICARE

## 2023-11-28 DIAGNOSIS — G89.29 CHRONIC PAIN OF LEFT KNEE: Primary | ICD-10-CM

## 2023-11-28 DIAGNOSIS — R26.89 GAIT, ANTALGIC: ICD-10-CM

## 2023-11-28 DIAGNOSIS — M25.662 DECREASED RANGE OF MOTION OF LEFT KNEE: ICD-10-CM

## 2023-11-28 DIAGNOSIS — M25.562 CHRONIC PAIN OF LEFT KNEE: Primary | ICD-10-CM

## 2023-11-28 DIAGNOSIS — R29.898 WEAKNESS OF LEFT LOWER EXTREMITY: ICD-10-CM

## 2023-11-28 PROCEDURE — 97530 THERAPEUTIC ACTIVITIES: CPT | Mod: PN,CQ

## 2023-11-28 PROCEDURE — 97110 THERAPEUTIC EXERCISES: CPT | Mod: PN,CQ

## 2023-11-28 NOTE — PROGRESS NOTES
"  Physical Therapy Daily Treatment Note     Name: Jani PAIZ [facility]  Clinic Number: [id number]    Therapy Diagnosis:   Encounter Diagnoses   Name Primary?    Chronic pain of left knee Yes    Decreased range of motion of left knee     Weakness of left lower extremity     Gait, antalgic        Physician: Rancho Ferrara MD    Visit Date: 11/28/2023    Physician Orders: PT Eval and Treat   Medical Diagnosis from Referral: Primary osteoarthritis of left knee   Evaluation Date: 11/17/2023  Authorization Period Expiration: 12/31/2024   Plan of Care Expiration: 12-29-23  Visit # / Visits authorized: 3/ 20   Progress Note Due: 12-17-23  FOTO: 1/ 1     Precautions:  Standard, CHF, CKD, s/p 4 V CABG, DM, HTN, and hx of L DARNELL     DOS: 11-15-23     POW: 1 weeks      Time In: 1735PM  Time Out: 1830PM  Total Appointment Time (timed & untimed codes): 55 minutes    Subjective     Pt reports: he can admit his knee is feeling a little bit better. However, he has been having to take pain meds.    He was compliant with home exercise program.  Response to previous treatment: good  Functional change: ongoing    Pain: 6-7/10  Location: left knee     Objective     Updated 11-22-23  L knee flexion 64 deg  L knee extension: Lacking 2 def    Jani participated in dynamic functional therapeutic activities to improve functional performance for 15 minutes, including:  Nustep for 10 min  +STS Approx. Hi/Lo 24in 3x5     Jani received therapeutic exercises to develop strength, endurance, ROM, and flexibility for 15 minutes including:    Long sitting hamstring stretch 5x20"  Long sitting calf stretch 5x20"  Heel props 5 min    Jani received the following manual therapy techniques: Joint mobilizations were applied to the: right knee for 00 minutes, including:  Gentle Patella Mobs-Resume NV    Jani participated in neuromuscular re-education activities to improve: quad motor control  for 22 minutes. The following activities were included:    SLR max " "Ax1  2x10   Quad sets towel under heel 4x10, 3"  Quad sets towel under knee 4x10  SAQ with strap 3x10      Home Exercises Provided and Patient Education Provided     Education provided:   Cont to perform HEP as provided.     Written Home Exercises Provided: Patient instructed to cont prior HEP.  Exercises were reviewed and Jani was able to demonstrate them prior to the end of the session.  Jani demonstrated good  understanding of the education provided.     See EMR under Patient Instructions for exercises provided prior visit.    Assessment     Pt tolerated the above tx session well with moderate c/o pain. Pt continues to ambulate with an antalgic gait pattern that includes decreased heel strike and knee flexion during swing. Continued emphasis on improving motor control in quads as well as ROM. Will continue working towards established PT goals in future sessions.       Jani Is progressing well towards his goals.   Pt prognosis is Good.     Pt will continue to benefit from skilled outpatient physical therapy to address the deficits listed in the problem list box on initial evaluation, provide pt/family education and to maximize pt's level of independence in the home and community environment.     Pt's spiritual, cultural and educational needs considered and pt agreeable to plan of care and goals.    Anticipated barriers to physical therapy:  Scheduling issues     Goals: Short Term Goals:  4 weeks  1.Report decreased in pain at worse less than  <   / =  5  /10  to increase tolerance for functional mobility. Goal in progress  2. Pt to improve L knee range of motion to 100 deg flexion and L knee extension to -4 deg to allow for improved functional mobility to allow for improvement in IADLs. . Goal in progress  3. Increased L  knee MMT 1/2 grade to increase tolerance for ADL and work activities. Goal in progress  4. Pt to report ambulating without FWW to improve community ambulation. Goal in progress  5. Pt to " tolerate HEP to improve ROM and independence with ADL's. Goal in progress     Long Term Goals: 12 weeks  1.Report decreased in pain at worse less than  <   / =  2  /10  to increase tolerance for functional mobility. Goal in progress  2.Pt to improve L: knee range of motion 115 deg or  120 deg flexion and L knee extension to 0 deg to allow for improved functional mobility to allow for improvement in IADLs. Goal in progress  3.Increased L knee MMT 1 grade to increase tolerance for ADL and work activities. Goal in progress  4. Pt will report 80 % on FOTO knee survey  to demonstrate increase in LE function with every day tasks. Goal in progress  5. Pt to be Independent with HEP to improve ROM and independence with ADL's Goal in progress    Plan       Cont skilled PT session towards PT and patient's goals per tolerance to post op protocol.    Alisa Aquino, PTA   11/28/2023

## 2023-11-29 ENCOUNTER — CLINICAL SUPPORT (OUTPATIENT)
Dept: REHABILITATION | Facility: HOSPITAL | Age: 78
End: 2023-11-29
Payer: MEDICARE

## 2023-11-29 DIAGNOSIS — M25.662 DECREASED RANGE OF MOTION OF LEFT KNEE: ICD-10-CM

## 2023-11-29 DIAGNOSIS — M25.562 CHRONIC PAIN OF LEFT KNEE: Primary | ICD-10-CM

## 2023-11-29 DIAGNOSIS — R29.898 WEAKNESS OF LEFT LOWER EXTREMITY: ICD-10-CM

## 2023-11-29 DIAGNOSIS — R26.89 GAIT, ANTALGIC: ICD-10-CM

## 2023-11-29 DIAGNOSIS — G89.29 CHRONIC PAIN OF LEFT KNEE: Primary | ICD-10-CM

## 2023-11-29 PROCEDURE — 97530 THERAPEUTIC ACTIVITIES: CPT | Mod: PN

## 2023-11-29 PROCEDURE — 97110 THERAPEUTIC EXERCISES: CPT | Mod: PN

## 2023-11-29 PROCEDURE — 97112 NEUROMUSCULAR REEDUCATION: CPT | Mod: PN

## 2023-11-29 PROCEDURE — 97140 MANUAL THERAPY 1/> REGIONS: CPT | Mod: PN

## 2023-11-29 NOTE — PROGRESS NOTES
"  Physical Therapy Daily Treatment Note     Name: Jani Elizabethmavince  Clinic Number: [id number]    Therapy Diagnosis:   No diagnosis found.      Physician: Rancho Ferrara MD    Visit Date: 11/29/2023    Physician Orders: PT Eval and Treat   Medical Diagnosis from Referral: Primary osteoarthritis of left knee   Evaluation Date: 11/17/2023  Authorization Period Expiration: 12/31/2024   Plan of Care Expiration: 12-29-23  Visit # / Visits authorized: 3/ 20   Progress Note Due: 12-17-23  FOTO: 1/ 1     Precautions:  Standard, CHF, CKD, s/p 4 V CABG, DM, HTN, and hx of L DARNELL     DOS: 11-15-23     POW: 2 weeks      Time In: 4:00 pm  Time Out: 4:53 pm  Total Appointment Time (timed & untimed codes): 53 minutes    Subjective     Pt reports: that he is hurting today. He is not doing the HEP because knee hurts too much. He states he will return to M.D tomorrow     He was compliant with home exercise program.  Response to previous treatment: good  Functional change: ongoing    Pain: 6-7/10  Location: left knee     Objective     Updated 11-22-23  L knee flexion AAROM  86 deg  L knee extension: Lacking 2 deg    Jani participated in dynamic functional therapeutic activities to improve functional performance for 10 minutes, including:  Nustep for 10 min    Jani received therapeutic exercises to develop strength, endurance, ROM, and flexibility for 15 minutes including:    Heel slide  2x10   Long sitting hamstring stretch 5x20"  Long sitting calf stretch 5x20"  Heel props 5 min    Jani received the following manual therapy techniques: Joint mobilizations were applied to the: right knee for 8  minutes, including:  Gentle Patella Mobs  PROM knee flexion seated EOB    Jani participated in neuromuscular re-education activities to improve: quad motor control  for 20  minutes. The following activities were included:    SLR max Ax1  3x10   Quad sets towel under heel 4x10, 3"  Quad sets towel under knee 4x10  SAQ with strap " 3x10      Home Exercises Provided and Patient Education Provided     Education provided:   Cont to perform HEP as provided.     Written Home Exercises Provided: Patient instructed to cont prior HEP.  Exercises were reviewed and Jani was able to demonstrate them prior to the end of the session.  Jani demonstrated good  understanding of the education provided.     See EMR under Patient Instructions for exercises provided prior visit.    Assessment     Pt tolerated the above tx session well with moderate c/o pain. Pt presented to therapy for 2 week after s/p L TKA. Pain is limiting factor in therapy. Pt does not have any sign and symptoms of infection. Pt continues to ambulate with an antalgic gait pattern that includes decreased heel strike and knee flexion during swing. Pt was able to achieve 86 deg of L knee flexion AAROM. And 0 deg of knee extension. Continued emphasis on improving motor control in quads as well as ROM. Decrease patella mobility due to pain and apprehension. Pt will return to M.D tomorrow. Will continue working towards established PT goals in future sessions.       Jani Is progressing well towards his goals.   Pt prognosis is Good.     Pt will continue to benefit from skilled outpatient physical therapy to address the deficits listed in the problem list box on initial evaluation, provide pt/family education and to maximize pt's level of independence in the home and community environment.     Pt's spiritual, cultural and educational needs considered and pt agreeable to plan of care and goals.    Anticipated barriers to physical therapy:  Scheduling issues     Goals: Short Term Goals:  4 weeks  1.Report decreased in pain at worse less than  <   / =  5  /10  to increase tolerance for functional mobility. Goal in progress  2. Pt to improve L knee range of motion to 100 deg flexion and L knee extension to -4 deg to allow for improved functional mobility to allow for improvement in IADLs. . Goal in  progress  3. Increased L  knee MMT 1/2 grade to increase tolerance for ADL and work activities. Goal in progress  4. Pt to report ambulating without FWW to improve community ambulation. Goal in progress  5. Pt to tolerate HEP to improve ROM and independence with ADL's. Goal in progress     Long Term Goals: 12 weeks  1.Report decreased in pain at worse less than  <   / =  2  /10  to increase tolerance for functional mobility. Goal in progress  2.Pt to improve L: knee range of motion 115 deg or  120 deg flexion and L knee extension to 0 deg to allow for improved functional mobility to allow for improvement in IADLs. Goal in progress  3.Increased L knee MMT 1 grade to increase tolerance for ADL and work activities. Goal in progress  4. Pt will report 80 % on FOTO knee survey  to demonstrate increase in LE function with every day tasks. Goal in progress  5. Pt to be Independent with HEP to improve ROM and independence with ADL's Goal in progress    Plan       Cont skilled PT session towards PT and patient's goals per tolerance to post op protocol.    Philip Talamantes, PT   11/29/2023

## 2023-11-30 ENCOUNTER — CLINICAL SUPPORT (OUTPATIENT)
Dept: REHABILITATION | Facility: HOSPITAL | Age: 78
End: 2023-11-30
Payer: MEDICARE

## 2023-11-30 ENCOUNTER — EXTERNAL CHRONIC CARE MANAGEMENT (OUTPATIENT)
Dept: PRIMARY CARE CLINIC | Facility: CLINIC | Age: 78
End: 2023-11-30
Payer: MEDICARE

## 2023-11-30 ENCOUNTER — OFFICE VISIT (OUTPATIENT)
Dept: ORTHOPEDICS | Facility: CLINIC | Age: 78
End: 2023-11-30
Payer: MEDICARE

## 2023-11-30 VITALS — HEIGHT: 69 IN | BODY MASS INDEX: 39.51 KG/M2 | WEIGHT: 266.75 LBS

## 2023-11-30 DIAGNOSIS — R68.89 DECREASED STRENGTH, ENDURANCE, AND MOBILITY: ICD-10-CM

## 2023-11-30 DIAGNOSIS — R26.89 GAIT, ANTALGIC: ICD-10-CM

## 2023-11-30 DIAGNOSIS — G89.29 CHRONIC PAIN OF LEFT KNEE: ICD-10-CM

## 2023-11-30 DIAGNOSIS — Z96.652 S/P TOTAL KNEE REPLACEMENT, LEFT: ICD-10-CM

## 2023-11-30 DIAGNOSIS — M25.561 CHRONIC PAIN OF BOTH KNEES: Primary | ICD-10-CM

## 2023-11-30 DIAGNOSIS — G89.29 CHRONIC PAIN OF BOTH KNEES: Primary | ICD-10-CM

## 2023-11-30 DIAGNOSIS — Z74.09 DECREASED STRENGTH, ENDURANCE, AND MOBILITY: ICD-10-CM

## 2023-11-30 DIAGNOSIS — M25.662 DECREASED RANGE OF MOTION OF LEFT KNEE: ICD-10-CM

## 2023-11-30 DIAGNOSIS — M25.562 CHRONIC PAIN OF LEFT KNEE: ICD-10-CM

## 2023-11-30 DIAGNOSIS — M25.562 CHRONIC PAIN OF BOTH KNEES: Primary | ICD-10-CM

## 2023-11-30 DIAGNOSIS — R53.1 DECREASED STRENGTH, ENDURANCE, AND MOBILITY: ICD-10-CM

## 2023-11-30 DIAGNOSIS — R29.898 WEAKNESS OF LEFT LOWER EXTREMITY: ICD-10-CM

## 2023-11-30 PROCEDURE — 99490 CHRNC CARE MGMT STAFF 1ST 20: CPT | Mod: S$PBB,,, | Performed by: FAMILY MEDICINE

## 2023-11-30 PROCEDURE — 99024 POSTOP FOLLOW-UP VISIT: CPT | Mod: POP,,,

## 2023-11-30 PROCEDURE — 97112 NEUROMUSCULAR REEDUCATION: CPT | Mod: PN

## 2023-11-30 PROCEDURE — 97140 MANUAL THERAPY 1/> REGIONS: CPT | Mod: PN

## 2023-11-30 PROCEDURE — 99999 PR PBB SHADOW E&M-EST. PATIENT-LVL II: CPT | Mod: PBBFAC,,,

## 2023-11-30 PROCEDURE — 99999 PR PBB SHADOW E&M-EST. PATIENT-LVL II: ICD-10-PCS | Mod: PBBFAC,,,

## 2023-11-30 PROCEDURE — 99490 PR CHRONIC CARE MGMT, 1ST 20 MIN: ICD-10-PCS | Mod: S$PBB,,, | Performed by: FAMILY MEDICINE

## 2023-11-30 PROCEDURE — 99024 PR POST-OP FOLLOW-UP VISIT: ICD-10-PCS | Mod: POP,,,

## 2023-11-30 PROCEDURE — 99490 CHRNC CARE MGMT STAFF 1ST 20: CPT | Mod: PBBFAC,27,PO | Performed by: FAMILY MEDICINE

## 2023-11-30 PROCEDURE — 99212 OFFICE O/P EST SF 10 MIN: CPT | Mod: PBBFAC,25

## 2023-11-30 RX ORDER — OXYCODONE HYDROCHLORIDE 5 MG/1
TABLET ORAL
Qty: 50 TABLET | Refills: 0 | Status: SHIPPED | OUTPATIENT
Start: 2023-11-30 | End: 2024-03-14

## 2023-11-30 NOTE — PROGRESS NOTES
"  Physical Therapy Daily Treatment Note     Name: Jani PAIZ [facility] Number: [id number]    Therapy Diagnosis:   Encounter Diagnoses   Name Primary?    Chronic pain of both knees Yes    Decreased strength, endurance, and mobility     Chronic pain of left knee     Decreased range of motion of left knee     Weakness of left lower extremity     Gait, antalgic          Physician: Rancho Ferrara MD    Visit Date: 11/30/2023    Physician Orders: PT Eval and Treat   Medical Diagnosis from Referral: Primary osteoarthritis of left knee   Evaluation Date: 11/17/2023  Authorization Period Expiration: 12/31/2024   Plan of Care Expiration: 12-29-23  Visit # / Visits authorized: 3/ 20   Progress Note Due: 12-17-23  FOTO: 1/ 1     Precautions:  Standard, CHF, CKD, s/p 4 V CABG, DM, HTN, and hx of L DARNELL     DOS: 11-15-23     POW: 2 weeks      Time In: 5:00 pm  Time Out: 6:00 pm  Total Appointment Time (timed & untimed codes): 60 minutes (30' 1:1 time)    Subjective     Pt reports: that he is taking a vacation from his HEP. Notes he is tired of being in pain. Notes that he had a fall that led to breaking his hip originally and delayed this knee surgery and expects that it is why the knee is so painful.     He was compliant with home exercise program.  Response to previous treatment: good  Functional change: ongoing    Pain: 6-7/10  Location: left knee     Objective     Updated 11-22-23  L knee flexion AAROM  86 deg  L knee extension: Lacking 2 deg    Jani participated in dynamic functional therapeutic activities to improve functional performance for 10 minutes, including:  Nustep for 10 min    Jani received therapeutic exercises to develop strength, endurance, ROM, and flexibility for 15 minutes including:    Heel slide        3'   Long sitting hamstring stretch    5x20"  Long sitting calf stretch     5x20"  Heel props       5 min    Jani received the following manual therapy techniques: Joint mobilizations were applied to " "the: right knee for 8  minutes, including:  Gentle Patella Mobs  PROM knee flexion seated EOB    Jani participated in neuromuscular re-education activities to improve: quad motor control  for 20  minutes. The following activities were included:    SLR max Ax1  3x10   Supine ball roll 3'   Quad sets towel under heel 4x10, 3"  Quad sets towel under knee 4x10  SAQ with strap 3x10      Home Exercises Provided and Patient Education Provided     Education provided:   Cont to perform HEP as provided.     Written Home Exercises Provided: Patient instructed to cont prior HEP.  Exercises were reviewed and Jani was able to demonstrate them prior to the end of the session.  Jani demonstrated good  understanding of the education provided.     See EMR under Patient Instructions for exercises provided prior visit.    Assessment     Pt tolerated the above tx session well with frequent c/o quad pain. Notable difficulty with discussing importance of HEP compliance. Patient reported to be non-exercising outside therapy. Discussed ability to use ice to help modulate pain with increasing intervals vs mass icing for 6 hours as he has been doing already. He does present with continued gradual improvements but would continue to benefit from increasing frequency of movement and activity.        Jani Is progressing well towards his goals.   Pt prognosis is Good.     Pt will continue to benefit from skilled outpatient physical therapy to address the deficits listed in the problem list box on initial evaluation, provide pt/family education and to maximize pt's level of independence in the home and community environment.     Pt's spiritual, cultural and educational needs considered and pt agreeable to plan of care and goals.    Anticipated barriers to physical therapy:  Scheduling issues     Goals: Short Term Goals:  4 weeks  1.Report decreased in pain at worse less than  <   / =  5  /10  to increase tolerance for functional mobility. Goal " in progress  2. Pt to improve L knee range of motion to 100 deg flexion and L knee extension to -4 deg to allow for improved functional mobility to allow for improvement in IADLs. . Goal in progress  3. Increased L  knee MMT 1/2 grade to increase tolerance for ADL and work activities. Goal in progress  4. Pt to report ambulating without FWW to improve community ambulation. Goal in progress  5. Pt to tolerate HEP to improve ROM and independence with ADL's. Goal in progress     Long Term Goals: 12 weeks  1.Report decreased in pain at worse less than  <   / =  2  /10  to increase tolerance for functional mobility. Goal in progress  2.Pt to improve L: knee range of motion 115 deg or  120 deg flexion and L knee extension to 0 deg to allow for improved functional mobility to allow for improvement in IADLs. Goal in progress  3.Increased L knee MMT 1 grade to increase tolerance for ADL and work activities. Goal in progress  4. Pt will report 80 % on FOTO knee survey  to demonstrate increase in LE function with every day tasks. Goal in progress  5. Pt to be Independent with HEP to improve ROM and independence with ADL's Goal in progress    Plan       Cont skilled PT session towards PT and patient's goals per tolerance to post op protocol.    Cas Maya, PT   11/30/2023

## 2023-11-30 NOTE — PROGRESS NOTES
Cozaar      Last Written Prescription Date:  4.4.23  Last Fill Quantity: #30,   # refills: 0  Last Office Visit: 3.24.23  Future Office visit:       Routing refill request to provider for review/approval because:       Jani Cha presents for initial post-operative visit following a left total knee arthroplasty performed by Dr. Ferrara on 11/15/2023.     Exam:   There were no vitals taken for this visit.   Ambulating well with assistive device.  Incision is clean and dry without drainage or erythema.   ROM:0-90    Initial post-operative radiographs reviewed today revealing a well fixed and aligned prosthesis.    A/P:  2 weeks s/p left total knee replacement    - The patient was advised to keep the incision clean and dry for the next 24 hours after which he may wash the area with antibacterial soap in the shower. Will not submerge incision until one month post op.  - Outpatient PT: Josefina  - Continue aspirin for 1 month post op.  - Pain medication: refilled with robaxin  - Reviewed antibiotic prophylaxis   - Follow up in 4 weeks with surgeon. Pt will call clinic with problems/concerns.

## 2023-12-05 ENCOUNTER — CLINICAL SUPPORT (OUTPATIENT)
Dept: REHABILITATION | Facility: HOSPITAL | Age: 78
End: 2023-12-05
Payer: MEDICARE

## 2023-12-05 DIAGNOSIS — R26.89 GAIT, ANTALGIC: ICD-10-CM

## 2023-12-05 DIAGNOSIS — R29.898 WEAKNESS OF LEFT LOWER EXTREMITY: ICD-10-CM

## 2023-12-05 DIAGNOSIS — M25.662 DECREASED RANGE OF MOTION OF LEFT KNEE: ICD-10-CM

## 2023-12-05 DIAGNOSIS — G89.29 CHRONIC PAIN OF LEFT KNEE: Primary | ICD-10-CM

## 2023-12-05 DIAGNOSIS — M25.562 CHRONIC PAIN OF LEFT KNEE: Primary | ICD-10-CM

## 2023-12-05 PROCEDURE — 97110 THERAPEUTIC EXERCISES: CPT | Mod: PN,CQ

## 2023-12-05 PROCEDURE — 97112 NEUROMUSCULAR REEDUCATION: CPT | Mod: PN,CQ

## 2023-12-05 PROCEDURE — 97530 THERAPEUTIC ACTIVITIES: CPT | Mod: PN,CQ

## 2023-12-05 NOTE — PROGRESS NOTES
"  Physical Therapy Daily Treatment Note     Name: Jani ElizabethBlunt  Clinic Number: [id number]    Therapy Diagnosis:   Encounter Diagnoses   Name Primary?    Chronic pain of left knee Yes    Decreased range of motion of left knee     Weakness of left lower extremity     Gait, antalgic        Physician: Rancho Ferrara MD    Visit Date: 12/5/2023    Physician Orders: PT Eval and Treat   Medical Diagnosis from Referral: Primary osteoarthritis of left knee   Evaluation Date: 11/17/2023  Authorization Period Expiration: 12/31/2024   Plan of Care Expiration: 12-29-23  Visit # / Visits authorized: 6/ 20   Progress Note Due: 12-17-23  FOTO: 1/ 1     Precautions:  Standard, CHF, CKD, s/p 4 V CABG, DM, HTN, and hx of L DARNELL     DOS: 11-15-23     POW: 2 weeks      Time In: 1615PM  Time Out: 1715PM  Total Appointment Time (timed & untimed codes): 60 minutes (30' 1:1 time)    Subjective     Pt reports: he has seen improvements in knee thus far, but the pain is the problem. He needs a new paper with the home exercises on it.     He was compliant with home exercise program.  Response to previous treatment: good  Functional change: ongoing    Pain: 6-7/10; 8/10 while doing TKE nate.  Location: left knee     Objective     Updated 11-22-23  L knee flexion AAROM  86 deg  L knee extension: Lacking 2 deg    Jani participated in dynamic functional therapeutic activities to improve functional performance for 15 minutes, including:  Nustep for 10 min  STS 3x6 Hi/Lo mat    Jani received therapeutic exercises to develop strength, endurance, ROM, and flexibility for 10 minutes including:    Heel slide        3'   Long sitting hamstring stretch    5x20"  Long sitting calf stretch     5x20"  Heel props       5 min    Jani received the following manual therapy techniques: Joint mobilizations were applied to the: right knee for 10 minutes, including:  Gentle Patella Mobs  PROM knee flexion seated EOB    Jani participated in neuromuscular " "re-education activities to improve: quad motor control  for 20 minutes. The following activities were included:    SLR max Ax1  3x10   Supine ball roll 3'   Quad sets towel under heel 4x10, 3"  Quad sets towel under knee 4x10, 3"  SAQ with strap 3x10      Home Exercises Provided and Patient Education Provided     Education provided:   Cont to perform HEP as provided.     Written Home Exercises Provided: Patient instructed to cont prior HEP.  Exercises were reviewed and Jani was able to demonstrate them prior to the end of the session.  Jani demonstrated good  understanding of the education provided.     See EMR under Patient Instructions for exercises provided prior visit.    Assessment     Pt tolerated the above tx session well with frequent c/o quad and medial knee pain. Patient continues to report noncompliance with HEP due to losing the paper. (Made a new HEP for pt, but was unable to provide before he left). Continued working to improve ROM, quad and functional strength this date with verbal and tactile cueing required throughout. Grade 1 pitting edema palpated in lower leg during MT. Pt instructed to don compression stockings at earliest convenience, and to continue performing ankle pumps to avoid DVT.       Jani Is progressing well towards his goals.   Pt prognosis is Good.     Pt will continue to benefit from skilled outpatient physical therapy to address the deficits listed in the problem list box on initial evaluation, provide pt/family education and to maximize pt's level of independence in the home and community environment.     Pt's spiritual, cultural and educational needs considered and pt agreeable to plan of care and goals.    Anticipated barriers to physical therapy:  Scheduling issues     Goals: Short Term Goals:  4 weeks  1.Report decreased in pain at worse less than  <   / =  5  /10  to increase tolerance for functional mobility. Goal in progress  2. Pt to improve L knee range of motion to " 100 deg flexion and L knee extension to -4 deg to allow for improved functional mobility to allow for improvement in IADLs. . Goal in progress  3. Increased L  knee MMT 1/2 grade to increase tolerance for ADL and work activities. Goal in progress  4. Pt to report ambulating without FWW to improve community ambulation. Goal in progress  5. Pt to tolerate HEP to improve ROM and independence with ADL's. Goal in progress     Long Term Goals: 12 weeks  1.Report decreased in pain at worse less than  <   / =  2  /10  to increase tolerance for functional mobility. Goal in progress  2.Pt to improve L: knee range of motion 115 deg or  120 deg flexion and L knee extension to 0 deg to allow for improved functional mobility to allow for improvement in IADLs. Goal in progress  3.Increased L knee MMT 1 grade to increase tolerance for ADL and work activities. Goal in progress  4. Pt will report 80 % on FOTO knee survey  to demonstrate increase in LE function with every day tasks. Goal in progress  5. Pt to be Independent with HEP to improve ROM and independence with ADL's Goal in progress    Plan       Cont skilled PT session towards PT and patient's goals per tolerance to post op protocol.    Alisa Aquino, PTA   12/05/2023

## 2023-12-06 ENCOUNTER — CLINICAL SUPPORT (OUTPATIENT)
Dept: REHABILITATION | Facility: HOSPITAL | Age: 78
End: 2023-12-06
Payer: MEDICARE

## 2023-12-06 DIAGNOSIS — M25.662 DECREASED RANGE OF MOTION OF LEFT KNEE: ICD-10-CM

## 2023-12-06 DIAGNOSIS — R26.89 GAIT, ANTALGIC: ICD-10-CM

## 2023-12-06 DIAGNOSIS — R29.898 WEAKNESS OF LEFT LOWER EXTREMITY: ICD-10-CM

## 2023-12-06 DIAGNOSIS — M25.562 CHRONIC PAIN OF LEFT KNEE: Primary | ICD-10-CM

## 2023-12-06 DIAGNOSIS — G89.29 CHRONIC PAIN OF LEFT KNEE: Primary | ICD-10-CM

## 2023-12-06 PROCEDURE — 97530 THERAPEUTIC ACTIVITIES: CPT | Mod: PN

## 2023-12-06 PROCEDURE — 97112 NEUROMUSCULAR REEDUCATION: CPT | Mod: PN

## 2023-12-06 PROCEDURE — 97140 MANUAL THERAPY 1/> REGIONS: CPT | Mod: PN

## 2023-12-06 NOTE — PROGRESS NOTES
"  Physical Therapy Daily Treatment Note     [place]: [place]  Clinic Number: [id number]    Therapy Diagnosis:   Encounter Diagnoses   Name Primary?    Chronic pain of left knee Yes    Decreased range of motion of left knee     Weakness of left lower extremity     Gait, antalgic          Physician: Rancho Ferrara MD    Visit Date: 12/6/2023    Physician Orders: PT Eval and Treat   Medical Diagnosis from Referral: Primary osteoarthritis of left knee   Evaluation Date: 11/17/2023  Authorization Period Expiration: 12/31/2024   Plan of Care Expiration: 12-29-23  Visit # / Visits authorized: 9/ 20   Progress Note Due: 12-17-23  FOTO: 1/ 1     Precautions:  Standard, CHF, CKD, s/p 4 V CABG, DM, HTN, and hx of L DARNELL     DOS: 11-15-23     POW: 3 weeks      Time In: 4:50 pm  Time Out: 5:45 pm  Total Appointment Time (timed & untimed codes): 40 minutes     Subjective     Pt reports:that he cont with pain.     He was compliant with home exercise program.  Response to previous treatment: good  Functional change: ongoing    Pain: 6/10; 7/10 while doing TKE nate.  Location: left knee     Objective     Updated 12-6-23  L knee flexion AAROM  91 deg  L knee extension: Lacking 0 deg    Jani participated in dynamic functional therapeutic activities to improve functional performance for 15 minutes, including:  Nustep for 10 min  STS 3x6 Hi/Lo mat    Jani received therapeutic exercises to develop strength, endurance, ROM, and flexibility for 10 minutes including:    Heel slide        3'   Long sitting hamstring stretch    5x20"  Long sitting calf stretch     5x20"  Heel props       5 min    R-bike seat level 14 focus knee flexion 5 min    Jani received the following manual therapy techniques: Joint mobilizations were applied to the: right knee for 10 minutes, including:  Gentle Patella Mobs  PROM knee flexion seated EOB    Jani participated in neuromuscular re-education activities to improve: quad motor control  for 20 " "minutes. The following activities were included:    SLR max Ax1  3x10   Quad sets towel under heel 4x10, 3"  Quad sets towel under knee 4x10, 3"  SAQ  3x10      Home Exercises Provided and Patient Education Provided     Education provided:   Cont to perform HEP as provided.     Written Home Exercises Provided: Patient instructed to cont prior HEP.  Exercises were reviewed and Jani was able to demonstrate them prior to the end of the session.  Jani demonstrated good  understanding of the education provided.     See EMR under Patient Instructions for exercises provided prior visit.    Assessment     Pt is about 3 weeks since s/p L TKA. Pt ambulated with FWW. Pt was able to achieve 91 deg of L knee AAROM and 0 deg of L knee extension. Addition of R-bike to improve L knee flexion. Pt con with quad weakness due to decrease motor control. Pt required several rest breaks. Pt also required mod v/c's to perform exercises with proper form and proper muscles activation. Plan to cont work on L knee flexion exercises and quad muscles strength.     Jani Is progressing well towards his goals.   Pt prognosis is Good.     Pt will continue to benefit from skilled outpatient physical therapy to address the deficits listed in the problem list box on initial evaluation, provide pt/family education and to maximize pt's level of independence in the home and community environment.     Pt's spiritual, cultural and educational needs considered and pt agreeable to plan of care and goals.    Anticipated barriers to physical therapy:  Scheduling issues     Goals: Short Term Goals:  4 weeks  1.Report decreased in pain at worse less than  <   / =  5  /10  to increase tolerance for functional mobility. Goal in progress  2. Pt to improve L knee range of motion to 100 deg flexion and L knee extension to -4 deg to allow for improved functional mobility to allow for improvement in IADLs. . Goal in progress  3. Increased L  knee MMT 1/2 grade to " increase tolerance for ADL and work activities. Goal in progress  4. Pt to report ambulating without FWW to improve community ambulation. Goal in progress  5. Pt to tolerate HEP to improve ROM and independence with ADL's. Goal in progress     Long Term Goals: 12 weeks  1.Report decreased in pain at worse less than  <   / =  2  /10  to increase tolerance for functional mobility. Goal in progress  2.Pt to improve L: knee range of motion 115 deg or  120 deg flexion and L knee extension to 0 deg to allow for improved functional mobility to allow for improvement in IADLs. Goal in progress  3.Increased L knee MMT 1 grade to increase tolerance for ADL and work activities. Goal in progress  4. Pt will report 80 % on FOTO knee survey  to demonstrate increase in LE function with every day tasks. Goal in progress  5. Pt to be Independent with HEP to improve ROM and independence with ADL's Goal in progress    Plan       Cont skilled PT session towards PT and patient's goals per tolerance to post op protocol.    Philip Talamantes, PT   12/06/2023

## 2023-12-07 ENCOUNTER — CLINICAL SUPPORT (OUTPATIENT)
Dept: REHABILITATION | Facility: HOSPITAL | Age: 78
End: 2023-12-07
Payer: MEDICARE

## 2023-12-07 DIAGNOSIS — M25.662 DECREASED RANGE OF MOTION OF LEFT KNEE: ICD-10-CM

## 2023-12-07 DIAGNOSIS — M25.562 CHRONIC PAIN OF LEFT KNEE: Primary | ICD-10-CM

## 2023-12-07 DIAGNOSIS — R29.898 WEAKNESS OF LEFT LOWER EXTREMITY: ICD-10-CM

## 2023-12-07 DIAGNOSIS — R26.89 GAIT, ANTALGIC: ICD-10-CM

## 2023-12-07 DIAGNOSIS — G89.29 CHRONIC PAIN OF LEFT KNEE: Primary | ICD-10-CM

## 2023-12-07 PROCEDURE — 97530 THERAPEUTIC ACTIVITIES: CPT | Mod: PN

## 2023-12-07 PROCEDURE — 97110 THERAPEUTIC EXERCISES: CPT | Mod: PN

## 2023-12-07 PROCEDURE — 97140 MANUAL THERAPY 1/> REGIONS: CPT | Mod: PN

## 2023-12-07 PROCEDURE — 97112 NEUROMUSCULAR REEDUCATION: CPT | Mod: PN

## 2023-12-07 NOTE — PROGRESS NOTES
"  Physical Therapy Daily Treatment Note     Name: Jani PAIZ [facility]  Clinic Number: [id number]    Therapy Diagnosis:   Encounter Diagnoses   Name Primary?    Chronic pain of left knee Yes    Decreased range of motion of left knee     Weakness of left lower extremity     Gait, antalgic          Physician: Rancho Ferrara MD    Visit Date: 12/7/2023    Physician Orders: PT Eval and Treat   Medical Diagnosis from Referral: Primary osteoarthritis of left knee   Evaluation Date: 11/17/2023  Authorization Period Expiration: 12/31/2024   Plan of Care Expiration: 12-29-23  Visit # / Visits authorized: 9/ 20   Progress Note Due: 12-17-23  FOTO: 1/ 1     Precautions:  Standard, CHF, CKD, s/p 4 V CABG, DM, HTN, and hx of L DARNELL     DOS: 11-15-23     POW: 3 weeks      Time In: 1700 pm  Time Out: 1800 pm  Total Appointment Time (timed & untimed codes): 40 minutes     Subjective     Pt reports:that he cont with pain. Notes that he does feel like he is very sore and painful after last visit that was 24 hours ago. He notes that he does feel like he overworked it. Notes it is painful in the muscle and front of the knee.     He was not compliant with home exercise program.  Response to previous treatment: good  Functional change: ongoing    Pain: 6/10; 7/10 while doing TKE nate.  Location: left knee     Objective     Updated 12-6-23  L knee flexion AAROM  91 deg  L knee extension: Lacking 0 deg    Jani participated in dynamic functional therapeutic activities to improve functional performance for 15 minutes, including:  Nustep for 10 min  STS 3x6 Hi/Lo mat    Jani received therapeutic exercises to develop strength, endurance, ROM, and flexibility for 15 minutes including:    Heel slide        3'   Long sitting hamstring stretch    5x20"  Long sitting calf stretch     5x20"    Heel props       5 min  Standing knee drivers at stairs    2' on left  Standing left calf stretch    2' on left  Seated HS stretch      2' on left    R-bike " "seat level 14 focus knee flexion 5 min    Jani received the following manual therapy techniques: Joint mobilizations were applied to the: right knee for 10 minutes, including:  Gr II-III Patella Mobs  PROM knee flexion seated EOB  Knee flexion mob and anterior glide Gr I-III  STM of quad, adductors, hamstring and posterior capsule, calf    Jani participated in neuromuscular re-education activities to improve: quad motor control  for 20 minutes. The following activities were included:    SLR max Ax1  3x10   Quad sets towel under heel 4x10, 3"  Quad sets towel under knee 4x10, 3"  SAQ  3x10      Home Exercises Provided and Patient Education Provided     Education provided:   Cont to perform HEP as provided.     Written Home Exercises Provided: Patient instructed to cont prior HEP.  Exercises were reviewed and Jani was able to demonstrate them prior to the end of the session.  Jani demonstrated good  understanding of the education provided.     See EMR under Patient Instructions for exercises provided prior visit.    Assessment     Progressed treatment to include weight bearing movements to begin increasing activity level. Patient demonstrated 104deg of knee flexion during knee drivers with reported increased pain but did not specify scaled number. Did demonstrate good tolerance overall. Patient requested printed HEP during treatment to improve carry over and was provided printed instructions with picture as attached. Patient does appear to be attempting to improve compliance with POC.     Jani Is progressing well towards his goals.   Pt prognosis is Good.     Pt will continue to benefit from skilled outpatient physical therapy to address the deficits listed in the problem list box on initial evaluation, provide pt/family education and to maximize pt's level of independence in the home and community environment.     Pt's spiritual, cultural and educational needs considered and pt agreeable to plan of care and " goals.    Anticipated barriers to physical therapy:  Scheduling issues     Goals: Short Term Goals:  4 weeks  1.Report decreased in pain at worse less than  <   / =  5  /10  to increase tolerance for functional mobility. Goal in progress  2. Pt to improve L knee range of motion to 100 deg flexion and L knee extension to -4 deg to allow for improved functional mobility to allow for improvement in IADLs. . Goal in progress  3. Increased L  knee MMT 1/2 grade to increase tolerance for ADL and work activities. Goal in progress  4. Pt to report ambulating without FWW to improve community ambulation. Goal in progress  5. Pt to tolerate HEP to improve ROM and independence with ADL's. Goal in progress     Long Term Goals: 12 weeks  1.Report decreased in pain at worse less than  <   / =  2  /10  to increase tolerance for functional mobility. Goal in progress  2.Pt to improve L: knee range of motion 115 deg or  120 deg flexion and L knee extension to 0 deg to allow for improved functional mobility to allow for improvement in IADLs. Goal in progress  3.Increased L knee MMT 1 grade to increase tolerance for ADL and work activities. Goal in progress  4. Pt will report 80 % on FOTO knee survey  to demonstrate increase in LE function with every day tasks. Goal in progress  5. Pt to be Independent with HEP to improve ROM and independence with ADL's Goal in progress    Plan       Cont skilled PT session towards PT and patient's goals per tolerance to post op protocol.    Cas Maya, PT   12/07/2023

## 2023-12-13 ENCOUNTER — CLINICAL SUPPORT (OUTPATIENT)
Dept: REHABILITATION | Facility: HOSPITAL | Age: 78
End: 2023-12-13
Payer: MEDICARE

## 2023-12-13 DIAGNOSIS — M25.562 CHRONIC PAIN OF LEFT KNEE: Primary | ICD-10-CM

## 2023-12-13 DIAGNOSIS — R26.89 GAIT, ANTALGIC: ICD-10-CM

## 2023-12-13 DIAGNOSIS — M25.662 DECREASED RANGE OF MOTION OF LEFT KNEE: ICD-10-CM

## 2023-12-13 DIAGNOSIS — R29.898 WEAKNESS OF LEFT LOWER EXTREMITY: ICD-10-CM

## 2023-12-13 DIAGNOSIS — G89.29 CHRONIC PAIN OF LEFT KNEE: Primary | ICD-10-CM

## 2023-12-13 PROCEDURE — 97110 THERAPEUTIC EXERCISES: CPT | Mod: PN

## 2023-12-13 PROCEDURE — 97112 NEUROMUSCULAR REEDUCATION: CPT | Mod: PN

## 2023-12-13 PROCEDURE — 97530 THERAPEUTIC ACTIVITIES: CPT | Mod: PN

## 2023-12-13 NOTE — PROGRESS NOTES
Physical Therapy Daily Treatment Note     [id number]    Therapy Diagnosis:   Encounter Diagnoses   Name Primary?    Chronic pain of left knee Yes    Decreased range of motion of left knee     Weakness of left lower extremity     Gait, antalgic            Physician: Rancho Ferrara MD    Visit Date: 12/13/2023    Physician Orders: PT Eval and Treat   Medical Diagnosis from Referral: Primary osteoarthritis of left knee   Evaluation Date: 11/17/2023  Authorization Period Expiration: 12/31/2024   Plan of Care Expiration: 12-29-23  Visit # / Visits authorized: 10/ 20   Progress Note Due: 1-17-23  FOTO: 1/ 1     Precautions:  Standard, CHF, CKD, s/p 4 V CABG, DM, HTN, and hx of L DARNELL     DOS: 11-15-23     POW: 4 weeks      Time In: 5:00 pm  Time Out: 5:55 pm  Total Appointment Time (timed & untimed codes): 40 minutes     Subjective     Pt reports:that he cont with pain. He states pain gets worse in the end of day. Pt states his pain is about 5/10.     He was not compliant with home exercise program.  Response to previous treatment: good  Functional change: ongoing    Pain: 5/10  Location: left knee     Objective     Updated 12-6-23  L knee flexion AAROM 100 deg (stairs)  L knee extension: Lacking 0 deg    Jani participated in dynamic functional therapeutic activities to improve functional performance for 15 minutes, including:  Nustep for 10 min  STS 2x10 Hi/Lo mat    Jani received therapeutic exercises to develop strength, endurance, ROM, and flexibility for 15 minutes including:    Heel slide        3'   Heel props       5 min  Standing knee drivers at stairs    2' on left  Standing left calf stretch    2' on left  Seated HS stretch      2' on left    Jani received the following manual therapy techniques: Joint mobilizations were applied to the: right knee for 0 minutes, including:  Gr II-III Patella Mobs  PROM knee flexion seated EOB  Knee flexion mob and anterior glide Gr  "I-III  STM of quad, adductors, hamstring and posterior capsule, calf    Jani participated in neuromuscular re-education activities to improve: quad motor control  for 25 minutes. The following activities were included:    SLR max Ax1  3x10   Quad sets towel under heel 4x10, 3"  Quad sets towel under knee 4x10, 3"  SAQ  3x10  LAQ 3x10       Home Exercises Provided and Patient Education Provided     Education provided:   Cont to perform HEP as provided.     Written Home Exercises Provided: Patient instructed to cont prior HEP.  Exercises were reviewed and Jani was able to demonstrate them prior to the end of the session.  Jani demonstrated good  understanding of the education provided.     See EMR under Patient Instructions for exercises provided prior visit.    Assessment     Pt has been 4 weeks since L TKA. Pt ambulated with FWW and decrease heel to toes gait pattern. Pt was able to achieve 100 deg of L knee flexion and 0 deg of L knee extension. Pt cont with decrease L quad motor control and weakness. Pt required mod v/c' to perform exercises with proper form and proper muscles activation. Pt required several rest breaks in therapy.  Plan to cont with protocol.     Pt was re-assessed today. Pt has been improving L knee flexion and extension.  Pt has met 1/5 STGs. Pt cont with L knee pain due to post surgical. Pt is progressing according to protocol.     Jani Is progressing well towards his goals.   Pt prognosis is Good.     Pt will continue to benefit from skilled outpatient physical therapy to address the deficits listed in the problem list box on initial evaluation, provide pt/family education and to maximize pt's level of independence in the home and community environment.     Pt's spiritual, cultural and educational needs considered and pt agreeable to plan of care and goals.    Anticipated barriers to physical therapy:  Scheduling issues     Goals: Short Term Goals:  4 weeks  1.Report decreased in pain at " worse less than  <   / =  5  /10  to increase tolerance for functional mobility. Goal in progress  2. Pt to improve L knee range of motion to 100 deg flexion and L knee extension to -4 deg to allow for improved functional mobility to allow for improvement in IADLs. . Goal in progress  3. Increased L  knee MMT 1/2 grade to increase tolerance for ADL and work activities. Goal in progress  4. Pt to report ambulating without FWW to improve community ambulation. Goal in progress  5. Pt to tolerate HEP to improve ROM and independence with ADL's. Goal met 12-13-23     Long Term Goals: 12 weeks  1.Report decreased in pain at worse less than  <   / =  2  /10  to increase tolerance for functional mobility. Goal in progress  2.Pt to improve L: knee range of motion 115 deg or  120 deg flexion and L knee extension to 0 deg to allow for improved functional mobility to allow for improvement in IADLs. Goal in progress  3.Increased L knee MMT 1 grade to increase tolerance for ADL and work activities. Goal in progress  4. Pt will report 80 % on FOTO knee survey  to demonstrate increase in LE function with every day tasks. Goal in progress  5. Pt to be Independent with HEP to improve ROM and independence with ADL's Goal in progress    Plan       Cont skilled PT session towards PT and patient's goals per tolerance to post op protocol.    Philip Talamantes, PT   12/13/2023

## 2023-12-14 ENCOUNTER — CLINICAL SUPPORT (OUTPATIENT)
Dept: REHABILITATION | Facility: HOSPITAL | Age: 78
End: 2023-12-14
Payer: MEDICARE

## 2023-12-14 DIAGNOSIS — R29.898 WEAKNESS OF LEFT LOWER EXTREMITY: ICD-10-CM

## 2023-12-14 DIAGNOSIS — R26.89 GAIT, ANTALGIC: ICD-10-CM

## 2023-12-14 DIAGNOSIS — G89.29 CHRONIC PAIN OF LEFT KNEE: Primary | ICD-10-CM

## 2023-12-14 DIAGNOSIS — M25.662 DECREASED RANGE OF MOTION OF LEFT KNEE: ICD-10-CM

## 2023-12-14 DIAGNOSIS — M25.562 CHRONIC PAIN OF LEFT KNEE: Primary | ICD-10-CM

## 2023-12-14 PROCEDURE — 97110 THERAPEUTIC EXERCISES: CPT | Mod: PN

## 2023-12-14 PROCEDURE — 97112 NEUROMUSCULAR REEDUCATION: CPT | Mod: PN

## 2023-12-14 PROCEDURE — 97530 THERAPEUTIC ACTIVITIES: CPT | Mod: PN

## 2023-12-14 NOTE — PROGRESS NOTES
Physical Therapy Daily Treatment Note     Name: Jani Elizabethmavince  Clinic Number: [id number]    Therapy Diagnosis:   Encounter Diagnoses   Name Primary?    Chronic pain of left knee Yes    Decreased range of motion of left knee     Weakness of left lower extremity     Gait, antalgic            Physician: Rancho Ferrara MD    Visit Date: 12/14/2023    Physician Orders: PT Eval and Treat   Medical Diagnosis from Referral: Primary osteoarthritis of left knee   Evaluation Date: 11/17/2023  Authorization Period Expiration: 12/31/2024   Plan of Care Expiration: 12-29-23  Visit # / Visits authorized: 10/ 20   Progress Note Due: 1-17-23  FOTO: 1/ 1     Precautions:  Standard, CHF, CKD, s/p 4 V CABG, DM, HTN, and hx of L DARNELL     DOS: 11-15-23     POW: 4 weeks      Time In: 4:55 pm  Time Out: 5:45 pm  Total Appointment Time (timed & untimed codes): 50 minutes     Subjective     Pt reports:that both knees are hurting today. Notes it is the front of both, in a band across them.    He was not compliant with home exercise program.  Response to previous treatment: good  Functional change: ongoing    Pain: 5/10  Location: left knee     Objective     Updated 12-6-23  L knee flexion AAROM 100 deg (stairs)  L knee extension: Lacking 0 deg    Jani participated in dynamic functional therapeutic activities to improve functional performance for 15 minutes, including:  Nustep for 10 min  STS 2x10 Hi/Lo mat    Jani received therapeutic exercises to develop strength, endurance, ROM, and flexibility for 10 minutes including:    Heel slide        3'   Heel props       5 min  Standing knee drivers at stairs    2' on left  Standing left calf stretch    2' on left  Seated HS stretch      2' on left    Jani received the following manual therapy techniques: Joint mobilizations were applied to the: right knee for 0 minutes, including:  Gr II-III Patella Mobs  PROM knee flexion seated EOB  Knee flexion mob and anterior glide Gr I-III  STM of  "quad, adductors, hamstring and posterior capsule, calf    Jani participated in neuromuscular re-education activities to improve: quad motor control  for 25 minutes. The following activities were included:    Airex weight shifts with TKE focus    2' laterals  Quad sets towel under heel     4x10, 3"  Quad sets towel under knee     4x10, 3"  SAQ          3x10  LAQ         3x10       Home Exercises Provided and Patient Education Provided     Education provided:   Cont to perform HEP as provided.     Written Home Exercises Provided: Patient instructed to cont prior HEP.  Exercises were reviewed and Jani was able to demonstrate them prior to the end of the session.  Jani demonstrated good  understanding of the education provided.     See EMR under Patient Instructions for exercises provided prior visit.    Assessment     Patient continues to demonstrate progress in regards to ROM, strength. He does continue to demonstrate reduced strength, balance, and ROM as needed for return to >PLOF. He continues to improve in regards to HEP compliance. Will continue to address and improve impairments at follow up visits. Consider integrating shuttle squat at follow up visits to further address LE strength and ROM.     Pt was re-assessed today. Pt has been improving L knee flexion and extension.  Pt has met 1/5 STGs. Pt cont with L knee pain due to post surgical. Pt is progressing according to protocol.     Jani Is progressing well towards his goals.   Pt prognosis is Good.     Pt will continue to benefit from skilled outpatient physical therapy to address the deficits listed in the problem list box on initial evaluation, provide pt/family education and to maximize pt's level of independence in the home and community environment.     Pt's spiritual, cultural and educational needs considered and pt agreeable to plan of care and goals.    Anticipated barriers to physical therapy:  Scheduling issues     Goals: Short Term Goals:  4 " weeks  1.Report decreased in pain at worse less than  <   / =  5  /10  to increase tolerance for functional mobility. Goal in progress  2. Pt to improve L knee range of motion to 100 deg flexion and L knee extension to -4 deg to allow for improved functional mobility to allow for improvement in IADLs. . MET  3. Increased L  knee MMT 1/2 grade to increase tolerance for ADL and work activities. Goal in progress  4. Pt to report ambulating without FWW to improve community ambulation. Goal in progress  5. Pt to tolerate HEP to improve ROM and independence with ADL's. Goal met 12-13-23     Long Term Goals: 12 weeks  1.Report decreased in pain at worse less than  <   / =  2  /10  to increase tolerance for functional mobility. Goal in progress  2.Pt to improve L: knee range of motion 115 deg or  120 deg flexion and L knee extension to 0 deg to allow for improved functional mobility to allow for improvement in IADLs. Goal in progress  3.Increased L knee MMT 1 grade to increase tolerance for ADL and work activities. Goal in progress  4. Pt will report 80 % on FOTO knee survey  to demonstrate increase in LE function with every day tasks. Goal in progress  5. Pt to be Independent with HEP to improve ROM and independence with ADL's Goal in progress    Plan       Cont skilled PT session towards PT and patient's goals per tolerance to post op protocol.    Cas Maya, PT   12/14/2023

## 2023-12-15 ENCOUNTER — LAB VISIT (OUTPATIENT)
Dept: LAB | Facility: HOSPITAL | Age: 78
End: 2023-12-15
Attending: UROLOGY
Payer: MEDICARE

## 2023-12-15 DIAGNOSIS — N40.1 BPH WITH URINARY OBSTRUCTION: ICD-10-CM

## 2023-12-15 DIAGNOSIS — N13.8 BPH WITH URINARY OBSTRUCTION: ICD-10-CM

## 2023-12-15 LAB — COMPLEXED PSA SERPL-MCNC: 1.2 NG/ML (ref 0–4)

## 2023-12-15 PROCEDURE — 36415 COLL VENOUS BLD VENIPUNCTURE: CPT | Performed by: UROLOGY

## 2023-12-15 PROCEDURE — 84153 ASSAY OF PSA TOTAL: CPT | Performed by: UROLOGY

## 2023-12-18 DIAGNOSIS — Z96.652 S/P TOTAL KNEE REPLACEMENT, LEFT: Primary | ICD-10-CM

## 2023-12-19 ENCOUNTER — CLINICAL SUPPORT (OUTPATIENT)
Dept: REHABILITATION | Facility: HOSPITAL | Age: 78
End: 2023-12-19
Payer: MEDICARE

## 2023-12-19 DIAGNOSIS — R29.898 WEAKNESS OF LEFT LOWER EXTREMITY: ICD-10-CM

## 2023-12-19 DIAGNOSIS — M25.562 CHRONIC PAIN OF LEFT KNEE: Primary | ICD-10-CM

## 2023-12-19 DIAGNOSIS — M25.662 DECREASED RANGE OF MOTION OF LEFT KNEE: ICD-10-CM

## 2023-12-19 DIAGNOSIS — R26.89 GAIT, ANTALGIC: ICD-10-CM

## 2023-12-19 DIAGNOSIS — G89.29 CHRONIC PAIN OF LEFT KNEE: Primary | ICD-10-CM

## 2023-12-19 PROCEDURE — 97110 THERAPEUTIC EXERCISES: CPT | Mod: KX,PN,CQ

## 2023-12-19 PROCEDURE — 97140 MANUAL THERAPY 1/> REGIONS: CPT | Mod: KX,PN,CQ

## 2023-12-19 PROCEDURE — 97530 THERAPEUTIC ACTIVITIES: CPT | Mod: KX,PN,CQ

## 2023-12-19 NOTE — PROGRESS NOTES
"  Physical Therapy Daily Treatment Note     Name: Jani PAIZ [facility]  Clinic Number: [id number]    Therapy Diagnosis:   Encounter Diagnoses   Name Primary?    Chronic pain of left knee Yes    Decreased range of motion of left knee     Weakness of left lower extremity     Gait, antalgic        Physician: Rancho Ferrara MD    Visit Date: 12/19/2023    Physician Orders: PT Eval and Treat   Medical Diagnosis from Referral: Primary osteoarthritis of left knee   Evaluation Date: 11/17/2023  Authorization Period Expiration: 12/31/2024   Plan of Care Expiration: 12-29-23  Visit # / Visits authorized: 11/ 20   Progress Note Due: 1-17-23  FOTO: 1/ 1     Precautions:  Standard, CHF, CKD, s/p 4 V CABG, DM, HTN, and hx of L DARNELL     DOS: 11-15-23     POW: 4 weeks      Time In: 1715PM  Time Out: 1815PM  Total Appointment Time (timed & untimed codes): 45 minutes     Subjective     Pt reports:that both knees are hurting today. Notes it is the front of both, in a band across them.    He was not compliant with home exercise program.  Response to previous treatment: good  Functional change: ongoing    Pain: 5/10  Location: left knee     Objective     Updated 12-6-23  L knee flexion AAROM 100 deg (stairs)  L knee extension: Lacking 0 deg    Jani participated in dynamic functional therapeutic activities to improve functional performance for 20 minutes, including:  Nustep for 10 min  STS 2x10 Hi/Lo mat/24in plyo   +Seated Marching L only 3x10  +Ambulation w/ SPC around therapy gym (approx. 400ft)    Jani received therapeutic exercises to develop strength, endurance, ROM, and flexibility for 15 minutes including:    Heel slide        3'   Heel props       2 min x 2  +Seated Knee Flexion Stretch    30 x 5"  HS Curls w/ RTB      2x10  Standing knee drivers at stairs    2' on left-P! In R  Standing left calf stretch    2' on left-P! In R  Seated HS stretch      2' on left-P! In R    Jani received the following manual therapy techniques: " "Joint mobilizations were applied to the: right knee for 10 minutes, including:  Gr II-III Patella Mobs  PROM knee flexion seated EOB  Knee flexion mob and anterior glide Gr I-III    Jani participated in neuromuscular re-education activities to improve: quad motor control  for 15 minutes. The following activities were included:    Airex weight shifts with TKE focus    2' laterals  Quad sets towel under heel     4x10, 3"  Quad sets towel under knee     4x10, 3"  SAQ 1#        3x10  LAQ, 1#       3x10       Home Exercises Provided and Patient Education Provided     Education provided:   Cont to perform HEP as provided.     Written Home Exercises Provided: Patient instructed to cont prior HEP.  Exercises were reviewed and Jani was able to demonstrate them prior to the end of the session.  Jani demonstrated good  understanding of the education provided.     See EMR under Patient Instructions for exercises provided prior visit.    Assessment     Patient continues to demonstrate progress in regards to ROM and strength. Continued with quad strengthening and functional mobility this date with minimal c/o pain. Standing stretches deferred due to pain in right knee. Verbal cueing required throughout for proper form and technique. Will continue progressing per pt tolerance in future sessions.       Jani Is progressing well towards his goals.   Pt prognosis is Good.     Pt will continue to benefit from skilled outpatient physical therapy to address the deficits listed in the problem list box on initial evaluation, provide pt/family education and to maximize pt's level of independence in the home and community environment.     Pt's spiritual, cultural and educational needs considered and pt agreeable to plan of care and goals.    Anticipated barriers to physical therapy:  Scheduling issues     Goals: Short Term Goals:  4 weeks  1.Report decreased in pain at worse less than  <   / =  5  /10  to increase tolerance for " functional mobility. Goal in progress  2. Pt to improve L knee range of motion to 100 deg flexion and L knee extension to -4 deg to allow for improved functional mobility to allow for improvement in IADLs. . MET  3. Increased L  knee MMT 1/2 grade to increase tolerance for ADL and work activities. Goal in progress  4. Pt to report ambulating without FWW to improve community ambulation. Goal in progress  5. Pt to tolerate HEP to improve ROM and independence with ADL's. Goal met 12-13-23     Long Term Goals: 12 weeks  1.Report decreased in pain at worse less than  <   / =  2  /10  to increase tolerance for functional mobility. Goal in progress  2.Pt to improve L: knee range of motion 115 deg or  120 deg flexion and L knee extension to 0 deg to allow for improved functional mobility to allow for improvement in IADLs. Goal in progress  3.Increased L knee MMT 1 grade to increase tolerance for ADL and work activities. Goal in progress  4. Pt will report 80 % on FOTO knee survey  to demonstrate increase in LE function with every day tasks. Goal in progress  5. Pt to be Independent with HEP to improve ROM and independence with ADL's Goal in progress    Plan       Cont skilled PT session towards PT and patient's goals per tolerance to post op protocol.    Alisa Aquino, PTA   12/19/2023

## 2023-12-20 ENCOUNTER — CLINICAL SUPPORT (OUTPATIENT)
Dept: REHABILITATION | Facility: HOSPITAL | Age: 78
End: 2023-12-20
Payer: MEDICARE

## 2023-12-20 DIAGNOSIS — M25.562 CHRONIC PAIN OF LEFT KNEE: Primary | ICD-10-CM

## 2023-12-20 DIAGNOSIS — G89.29 CHRONIC PAIN OF LEFT KNEE: Primary | ICD-10-CM

## 2023-12-20 DIAGNOSIS — R29.898 WEAKNESS OF LEFT LOWER EXTREMITY: ICD-10-CM

## 2023-12-20 DIAGNOSIS — M25.662 DECREASED RANGE OF MOTION OF LEFT KNEE: ICD-10-CM

## 2023-12-20 DIAGNOSIS — R26.89 GAIT, ANTALGIC: ICD-10-CM

## 2023-12-20 PROCEDURE — 97112 NEUROMUSCULAR REEDUCATION: CPT | Mod: PN

## 2023-12-20 PROCEDURE — 97530 THERAPEUTIC ACTIVITIES: CPT | Mod: PN

## 2023-12-20 PROCEDURE — 97110 THERAPEUTIC EXERCISES: CPT | Mod: PN

## 2023-12-20 NOTE — PROGRESS NOTES
"  Physical Therapy Daily Treatment Note     Name: Jani Elizabethmavince  Clinic Number: [id number]    Therapy Diagnosis:   Encounter Diagnoses   Name Primary?    Chronic pain of left knee Yes    Decreased range of motion of left knee     Weakness of left lower extremity     Gait, antalgic          Physician: Rancho Ferrara MD    Visit Date: 12/20/2023    Physician Orders: PT Eval and Treat   Medical Diagnosis from Referral: Primary osteoarthritis of left knee   Evaluation Date: 11/17/2023  Authorization Period Expiration: 12/31/2024   Plan of Care Expiration: 12-29-23  Visit # / Visits authorized: 12/ 20   Progress Note Due: 1-17-23  FOTO: 1/ 1     Precautions:  Standard, CHF, CKD, s/p 4 V CABG, DM, HTN, and hx of L DARNELL     DOS: 11-15-23     POW: 5 weeks      Time In: 4:40 pm  Time Out: 5:35 pm  Total Appointment Time (timed & untimed codes): 55  minutes     Subjective     Pt reports:that both knees are hurting today. He states sometimes he has sharp pain in both knee. He states he uses the FWW for balance. He states R knee (non-surgical knee) gives out on him at times.     He was not compliant with home exercise program.  Response to previous treatment: good  Functional change: ongoing    Pain: 5/10  Location: left knee     Objective     Updated 12-20-23  L knee flexion AAROM 115 deg (heel slides)  L knee extension: Lacking 0 deg    Jani participated in dynamic functional therapeutic activities to improve functional performance for 20 minutes, including:  Nustep for 10 min  STS 2x10 Hi/Lo mat/24in plyo   +Seated Marching L only 3x10  +Ambulation w/ SPC around therapy gym (approx. 400ft)    Jani received therapeutic exercises to develop strength, endurance, ROM, and flexibility for 25 minutes including:    Heel slide        3'   Heel props       2 min x 2  +Seated Knee Flexion Stretch    30 x 5"  HS Curls w/ RTB      2x10  Shuttle DL squat 1 red band  3x10   Shuttle SL squat  1 red band 3x10    NP due to pain " "  Standing knee drivers at stairs    2' on left-P! In R  Standing left calf stretch    2' on left-P! In R  Seated HS stretch      2' on left-P! In R    Jani received the following manual therapy techniques: Joint mobilizations were applied to the: right knee for 00  minutes, including:      Jani participated in neuromuscular re-education activities to improve: quad motor control  for 15 minutes. The following activities were included:      Quad sets towel under knee     4x10, 3"  SAQ 1#        3x10  LAQ, 1#       3x10   SLR 2x10     Home Exercises Provided and Patient Education Provided     Education provided:   Cont to perform HEP as provided.     Written Home Exercises Provided: Patient instructed to cont prior HEP.  Exercises were reviewed and Jani was able to demonstrate them prior to the end of the session.  Jani demonstrated good  understanding of the education provided.     See EMR under Patient Instructions for exercises provided prior visit.    Assessment     Patient continues to demonstrate progress in regards to ROM and strength. PT was able to achieve 115 deg of L knee flexion AAROM and 0 deg of L knee extension. Pt cont to improve quad strength. Progressing of exercises to shuttle DL squat and shuttle SL squat. Pt required mod v/c's to perform exercises with proper form and proper muscles activation. Pt still have L knee pain due to post surgical. Will continue progressing per pt tolerance in future sessions.       Jani Is progressing well towards his goals.   Pt prognosis is Good.     Pt will continue to benefit from skilled outpatient physical therapy to address the deficits listed in the problem list box on initial evaluation, provide pt/family education and to maximize pt's level of independence in the home and community environment.     Pt's spiritual, cultural and educational needs considered and pt agreeable to plan of care and goals.    Anticipated barriers to physical therapy:  Scheduling " issues     Goals: Short Term Goals:  4 weeks  1.Report decreased in pain at worse less than  <   / =  5  /10  to increase tolerance for functional mobility. Goal in progress  2. Pt to improve L knee range of motion to 100 deg flexion and L knee extension to -4 deg to allow for improved functional mobility to allow for improvement in IADLs. . MET  3. Increased L  knee MMT 1/2 grade to increase tolerance for ADL and work activities. Goal in progress  4. Pt to report ambulating without FWW to improve community ambulation. Goal in progress  5. Pt to tolerate HEP to improve ROM and independence with ADL's. Goal met 12-13-23     Long Term Goals: 12 weeks  1.Report decreased in pain at worse less than  <   / =  2  /10  to increase tolerance for functional mobility. Goal in progress  2.Pt to improve L: knee range of motion 115 deg or  120 deg flexion and L knee extension to 0 deg to allow for improved functional mobility to allow for improvement in IADLs. Goal in progress  3.Increased L knee MMT 1 grade to increase tolerance for ADL and work activities. Goal in progress  4. Pt will report 80 % on FOTO knee survey  to demonstrate increase in LE function with every day tasks. Goal in progress  5. Pt to be Independent with HEP to improve ROM and independence with ADL's Goal in progress    Plan       Cont skilled PT session towards PT and patient's goals per tolerance to post op protocol.    Philip Talamantes, PT   12/20/2023

## 2023-12-22 ENCOUNTER — OFFICE VISIT (OUTPATIENT)
Dept: UROLOGY | Facility: CLINIC | Age: 78
End: 2023-12-22
Payer: MEDICARE

## 2023-12-22 ENCOUNTER — OFFICE VISIT (OUTPATIENT)
Dept: ORTHOPEDICS | Facility: CLINIC | Age: 78
End: 2023-12-22
Payer: MEDICARE

## 2023-12-22 ENCOUNTER — HOSPITAL ENCOUNTER (OUTPATIENT)
Dept: RADIOLOGY | Facility: HOSPITAL | Age: 78
Discharge: HOME | End: 2023-12-22
Attending: ORTHOPAEDIC SURGERY
Payer: MEDICARE

## 2023-12-22 VITALS — HEIGHT: 69 IN | BODY MASS INDEX: 39.51 KG/M2 | WEIGHT: 266.75 LBS

## 2023-12-22 DIAGNOSIS — N40.1 BPH WITH URINARY OBSTRUCTION: ICD-10-CM

## 2023-12-22 DIAGNOSIS — R35.1 NOCTURIA MORE THAN TWICE PER NIGHT: Primary | ICD-10-CM

## 2023-12-22 DIAGNOSIS — R39.15 URINARY URGENCY: ICD-10-CM

## 2023-12-22 DIAGNOSIS — M17.11 PRIMARY OSTEOARTHRITIS OF RIGHT KNEE: Primary | ICD-10-CM

## 2023-12-22 DIAGNOSIS — Z96.652 S/P TOTAL KNEE REPLACEMENT, LEFT: ICD-10-CM

## 2023-12-22 DIAGNOSIS — N13.8 BPH WITH URINARY OBSTRUCTION: ICD-10-CM

## 2023-12-22 DIAGNOSIS — Z96.652 STATUS POST TOTAL KNEE REPLACEMENT, LEFT: ICD-10-CM

## 2023-12-22 PROCEDURE — 99999 PR PBB SHADOW E&M-EST. PATIENT-LVL III: CPT | Mod: PBBFAC,,, | Performed by: UROLOGY

## 2023-12-22 PROCEDURE — 99024 POSTOP FOLLOW-UP VISIT: CPT | Mod: POP,,, | Performed by: ORTHOPAEDIC SURGERY

## 2023-12-22 PROCEDURE — 99213 OFFICE O/P EST LOW 20 MIN: CPT | Mod: PBBFAC,27 | Performed by: UROLOGY

## 2023-12-22 PROCEDURE — 99999 PR PBB SHADOW E&M-EST. PATIENT-LVL IV: ICD-10-PCS | Mod: PBBFAC,,, | Performed by: ORTHOPAEDIC SURGERY

## 2023-12-22 PROCEDURE — 99024 PR POST-OP FOLLOW-UP VISIT: ICD-10-PCS | Mod: POP,,, | Performed by: ORTHOPAEDIC SURGERY

## 2023-12-22 PROCEDURE — 73562 X-RAY EXAM OF KNEE 3: CPT | Mod: 26,LT,, | Performed by: RADIOLOGY

## 2023-12-22 PROCEDURE — 99999 PR PBB SHADOW E&M-EST. PATIENT-LVL III: ICD-10-PCS | Mod: PBBFAC,,, | Performed by: UROLOGY

## 2023-12-22 PROCEDURE — 73562 XR KNEE 3 VIEW LEFT: ICD-10-PCS | Mod: 26,LT,, | Performed by: RADIOLOGY

## 2023-12-22 PROCEDURE — 99214 PR OFFICE/OUTPT VISIT, EST, LEVL IV, 30-39 MIN: ICD-10-PCS | Mod: S$PBB,,, | Performed by: UROLOGY

## 2023-12-22 PROCEDURE — 99214 OFFICE O/P EST MOD 30 MIN: CPT | Mod: PBBFAC,25 | Performed by: ORTHOPAEDIC SURGERY

## 2023-12-22 PROCEDURE — 99214 OFFICE O/P EST MOD 30 MIN: CPT | Mod: S$PBB,,, | Performed by: UROLOGY

## 2023-12-22 PROCEDURE — 73562 X-RAY EXAM OF KNEE 3: CPT | Mod: TC,LT

## 2023-12-22 PROCEDURE — 87086 URINE CULTURE/COLONY COUNT: CPT | Performed by: UROLOGY

## 2023-12-22 PROCEDURE — 99999 PR PBB SHADOW E&M-EST. PATIENT-LVL IV: CPT | Mod: PBBFAC,,, | Performed by: ORTHOPAEDIC SURGERY

## 2023-12-22 RX ORDER — OXYBUTYNIN CHLORIDE 5 MG/1
5 TABLET, EXTENDED RELEASE ORAL DAILY
Qty: 30 TABLET | Refills: 11 | Status: SHIPPED | OUTPATIENT
Start: 2023-12-22 | End: 2024-03-25 | Stop reason: SDUPTHER

## 2023-12-22 NOTE — PROGRESS NOTES
Subjective:       Patient ID: Jani Cha is a 78 y.o. male The patient's last visit with me was on 6/23/2023.     Chief Complaint:   No chief complaint on file.        History of Kidney Stones  He had an issue with a ureteral stone in Winter 2015.  He did not recall passing the stone but his pain resolved.       3/25/2021  He had right flank pain and hematuria a couple of weeks ago. He brought the stone for analysis a couple of weeks ago.  He denies anymore hematuria, flank pain.  He denies fever.    5/3/2022  He denies pain or hematuria.        Benign Prostatic Hyperplasia  He patient reports nocturia three times a night. He denies frequency, incomplete emptying and intermittency. The patient states symptoms are of moderate severity. Onset of symptoms was several years ago and was gradual in onset.  He has no personal history and no family history of prostate cancer. He reports a history of kidney stones. He denies flank pain, gross hematuria and recurrent UTI.  He is currently taking no prostate medications.  He has noted some frequency with occasional urgency.    8/3/2022  He was to add Flomax last visit.  He took it for no more than 2 weeks.  He stopped it due to retrograde ejaculation and pain in his prostate afterwards.  He did not note any change to his stream.    IPSS Questionnaire (AUA-7):  8    11/17/2022   He is doing okay.  He recently fractured his hip.  His stream is about the same.  He required a urologist to place the Alej[facility]re.  He is not sure why.    He is not interested in trying prostate medications again.    11/29/2022 Orchitis  He went to the ED on 11/24/2022 after developing a fever.  His urine was red at the time.  He was given antibiotics for a UTI.  Two days later he develop pain in his testicles.  He went back to the ED.    12/27/2022  He feels better today.  His testicle is slightly sensitive.      IPSS Questionnaire (AUA-7):  17    03/30/2023  Cystoscopy this month showed bilateral  lobe hypertrophy of the prostate.   He would like to proceed with a procedure on his prostate.     5/11/2023  He is s/p UroLift on 4/24/2023.  He is having a lot of urgency.  Nocturia may be improved.    IPSS Questionnaire (AUA-7):  20/4 6/23/2023  He still has some urgency.  His nocturia is improved to once every 2.5 hours.     12/22/2023  His days are variable.  He has noted more nocturia.      ACTIVE MEDICAL ISSUES:  Patient Active Problem List   Diagnosis    Hyperlipidemia    Primary hypertension    Coronary artery disease involving native coronary artery of native heart without angina pectoris    Sleep apnea    S/P CABG x 4    Type 2 diabetes mellitus with diabetic neuropathy, without long-term current use of insulin    GERD (gastroesophageal reflux disease)    Personal history of colonic polyps    Abdominal aortic aneurysm (AAA) without rupture    Total occlusion of coronary artery, chronic -LCX with collaterals.  No ischemic on PET    Pulmonary nodule    Thoracic aortic aneurysm without rupture    Bilateral renal cysts    Adrenal adenoma    History of PCI of RCA    Class 1 obesity due to excess calories with serious comorbidity in adult    Calcified granuloma of lung    Type 2 diabetes mellitus with hyperglycemia, without long-term current use of insulin    Lymphedema of both lower extremities    Swelling of lower extremity    Closed displaced fracture of right femoral neck s/p total hip arthroplasty on 10/21/2022    Hip pain    Chronic pain of both knees    Decreased strength, endurance, and mobility    Osteoporosis    Localized osteoporosis of [place]    Other specified disorders of adrenal gland    Congestive heart failure, unspecified HF chronicity, unspecified heart failure type    Diabetes mellitus due to underlying condition with stage 3 chronic kidney disease, without long-term current use of insulin, unspecified whether stage 3a or 3b CKD    Primary osteoarthritis of right knee    Primary  osteoarthritis of left knee    Stage 3b chronic kidney disease    BPH (benign prostatic hyperplasia)    Diarrhea    Chronic pain of left knee    Decreased range of motion of left knee    Weakness of left lower extremity    Gait, antalgic    Status post total knee replacement, left       ALLERGIES AND MEDICATIONS: updated and reviewed.  Review of patient's allergies indicates:   Allergen Reactions    Penicillins Hives, Itching and Rash    Shellfish containing products      Current Outpatient Medications   Medication Sig    acetaminophen (TYLENOL) 500 MG tablet Take 2 tablets (1,000 mg total) by mouth every 8 (eight) hours as needed (Mild to moderate pain).    acetaminophen (TYLENOL) 650 MG TbSR Take 650 mg by mouth every 8 (eight) hours.    acetaminophen (TYLENOL) 650 MG TbSR Take 1 tablet (650 mg total) by mouth every 8 (eight) hours.    aspirin (ECOTRIN) 81 MG EC tablet Take 81 mg by mouth once daily.    atorvastatin (LIPITOR) 80 MG tablet Take 1 tablet (80 mg total) by mouth once daily.    blood sugar diagnostic Strp 1 strip by Misc.(Non-Drug; Combo Route) route once daily.    calcium carbonate (OS-BAILEE) 500 mg calcium (1,250 mg) tablet Take 1 tablet by mouth once daily.    carvediloL (COREG) 25 MG tablet TAKE 1 TABLET BY MOUTH TWICE DAILY WITH MEALS    clopidogreL (PLAVIX) 75 mg tablet Take 1 tablet (75 mg total) by mouth once daily.    clotrimazole-betamethasone 1-0.05% (LOTRISONE) cream Apply topically 2 (two) times daily.    dulaglutide (TRULICITY) 1.5 mg/0.5 mL pen injector Inject 1.5 mg into the skin every 7 days.    famotidine (PEPCID) 20 MG tablet Take 1 tablet (20 mg total) by mouth nightly as needed for Heartburn.    fluticasone propionate (FLONASE) 50 mcg/actuation nasal spray 1 spray (50 mcg total) by Each Nostril route once daily.    glipiZIDE (GLUCOTROL) 10 MG TR24 Take 1 tablet (10 mg total) by mouth 2 (two) times daily with meals.    melatonin (MELATIN) 3 mg tablet Take 2 tablets (6 mg total) by  mouth nightly as needed for Insomnia.    methocarbamoL (ROBAXIN) 750 MG Tab Take 1 tablet (750 mg total) by mouth 4 (four) times daily as needed (for muscle spasms).    oxybutynin (DITROPAN-XL) 5 MG TR24 Take 1 tablet (5 mg total) by mouth once daily.    oxyCODONE (ROXICODONE) 5 MG immediate release tablet Take 1-2 tablets by mouth every 4-6 hours as needed for pain    pantoprazole (PROTONIX) 40 MG tablet Take 1 tablet by mouth once daily    senna-docusate 8.6-50 mg (SENNA WITH DOCUSATE SODIUM) 8.6-50 mg per tablet Take 1 tablet by mouth once daily.    TRUEPLUS LANCETS 33 gauge Misc Apply 1 lancet topically once daily. Use to test blood sugar daily; discard lancet after each use    valsartan (DIOVAN) 80 MG tablet Take 1 tablet (80 mg total) by mouth once daily.     No current facility-administered medications for this visit.       Review of Systems   Constitutional:  Negative for chills and fever.   HENT:  Negative for congestion.    Respiratory:  Negative for chest tightness and shortness of breath.    Cardiovascular:  Negative for chest pain and palpitations.   Gastrointestinal:  Negative for abdominal pain, constipation, diarrhea, nausea and vomiting.   Genitourinary:  Positive for urgency. Negative for difficulty urinating, dysuria, flank pain and hematuria.   Musculoskeletal:  Negative for arthralgias.   Neurological:  Negative for dizziness.   Psychiatric/Behavioral:  Negative for confusion.        Objective:      There were no vitals filed for this visit.      Physical Exam  Vitals and nursing note reviewed.   Constitutional:       Appearance: He is well-developed.   HENT:      Head: Normocephalic.   Eyes:      Conjunctiva/sclera: Conjunctivae normal.   Neck:      Thyroid: No thyromegaly.      Trachea: No tracheal deviation.   Cardiovascular:      Rate and Rhythm: Normal rate.      Heart sounds: Normal heart sounds.   Pulmonary:      Effort: Pulmonary effort is normal. No respiratory distress.      Breath  sounds: Normal breath sounds. No wheezing.   Abdominal:      General: Bowel sounds are normal.      Palpations: Abdomen is soft.      Tenderness: There is no abdominal tenderness. There is no rebound.      Hernia: No hernia is present.   Musculoskeletal:         General: No tenderness. Normal range of motion.      Cervical back: Normal range of motion and neck supple.   Lymphadenopathy:      Cervical: No cervical adenopathy.   Skin:     General: Skin is warm and dry.      Findings: No erythema or rash.   Neurological:      Mental Status: He is alert and oriented to person, place, and time.   Psychiatric:         Behavior: Behavior normal.         Thought Content: Thought content normal.         Judgment: Judgment normal.         Urine dipstick shows negative for all components.  Micro exam: negative for WBC's or RBC's.    Bladder scan: 100 mL      Component Ref Range & Units 7 d ago 1 yr ago 2 yr ago   PSA Diagnostic 0.00 - 4.00 ng/mL 1.2 1.8 CM 1.8 CM   Comment: The testing method is a chemiluminescent microparticle immunoassay  manufactured by [employer] and performed on the [employer]  or  [employer] system. Values obtained with different assay manufacturers  for  methods may be different and cannot be used interchangeably.  PSA Expected levels:  Hormonal Therapy: <0.05 ng/ml  Prostatectomy: <0.01 ng/ml  Radiation Therapy: <1.00 ng/ml   Resulting Agency  [facility]              Narrative  Performed by: BETHANY  6 months      Specimen Collected: 12/15/23 09:17 CST Last Resulted: 12/15/23 17:26 CST             Assessment:       1. Nocturia more than twice per night    2. Urinary urgency    3. BPH with urinary obstruction              Plan:       1. Nocturia more than twice per night  Add Ditropan    - oxybutynin (DITROPAN-XL) 5 MG TR24; Take 1 tablet (5 mg total) by mouth once daily.  Dispense: 30 tablet; Refill: 11    2. Urinary urgency    - POCT urinalysis, dipstick or tablet reag  - Urine culture    3.  BPH with urinary obstruction  S/p UroLift   - POCT Bladder Scan             Follow up in about 3 months (around 3/22/2024) for Follow up Established.

## 2023-12-22 NOTE — PROGRESS NOTES
Subjective:      Patient ID: Jani Cha is a 78 y.o. male.    Chief Complaint:   Post-op Evaluation of the Left Knee    HPI  He comes in about 6 weeks out from left TKA.  His pain is down to 5/10, and he is generally pleased with his progress.  However, he is still using a rolling walker.  No new symptoms to report.      Objective:        Ortho/SPM Exam    On exam, the scar is well healed without redness or tenderness.  There is no effusion.  Active range of motion is 0-110° without crepitus.  No instability to varus/valgus stress in extension or flexion.  No excessive sagittal plane instability.  Calves soft, nontender.  Distal neurovascular intact.        IMAGING:  X-rays show well-fixed and positioned total knee arthroplasty components without signs of loosening or other complications  Assessment:   Progressing well status post left TKA      Plan:   Pain management and home exercise program reviewed in detail.  We will have him see the pain management specialists at [facility] to see if they can offer interventions for the pain of his other knee.  Return in 6 weeks for 12 week visit.    The patient's pathophysiology was explained in detail with reference to x-rays, models, other visual aids as appropriate.  Treatment options were discussed in detail.  Questions were invited and answered to the patient's satisfaction.      Rancho Ferrara MD  Orthopedic Surgery

## 2023-12-24 LAB — BACTERIA UR CULT: NORMAL

## 2023-12-26 ENCOUNTER — CLINICAL SUPPORT (OUTPATIENT)
Dept: REHABILITATION | Facility: HOSPITAL | Age: 78
End: 2023-12-26
Payer: MEDICARE

## 2023-12-26 DIAGNOSIS — M25.662 DECREASED RANGE OF MOTION OF LEFT KNEE: ICD-10-CM

## 2023-12-26 DIAGNOSIS — M25.562 CHRONIC PAIN OF LEFT KNEE: Primary | ICD-10-CM

## 2023-12-26 DIAGNOSIS — Z96.652 TOTAL KNEE REPLACEMENT STATUS, LEFT: ICD-10-CM

## 2023-12-26 DIAGNOSIS — R26.89 GAIT, ANTALGIC: ICD-10-CM

## 2023-12-26 DIAGNOSIS — R29.898 WEAKNESS OF LEFT LOWER EXTREMITY: ICD-10-CM

## 2023-12-26 DIAGNOSIS — G89.29 CHRONIC PAIN OF LEFT KNEE: Primary | ICD-10-CM

## 2023-12-26 PROCEDURE — 97530 THERAPEUTIC ACTIVITIES: CPT | Mod: PN,CQ

## 2023-12-26 PROCEDURE — 97112 NEUROMUSCULAR REEDUCATION: CPT | Mod: PN,CQ

## 2023-12-26 PROCEDURE — 97110 THERAPEUTIC EXERCISES: CPT | Mod: PN,CQ

## 2023-12-26 NOTE — PROGRESS NOTES
"  Physical Therapy Daily Treatment Note     Name: Jani EliazbethCoalport  Clinic Number: [id number]    Therapy Diagnosis:   Encounter Diagnoses   Name Primary?    Chronic pain of left knee Yes    Decreased range of motion of left knee     Weakness of left lower extremity     Gait, antalgic     Total knee replacement status, left          Physician: Rancho Ferrara MD    Visit Date: 12/26/2023    Physician Orders: PT Eval and Treat   Medical Diagnosis from Referral: Primary osteoarthritis of left knee   Evaluation Date: 11/17/2023  Authorization Period Expiration: 12/31/2024   Plan of Care Expiration: 12-29-23  Visit # / Visits authorized: 13/ 20   Progress Note Due: 1-17-23  FOTO: 1/ 1     Precautions:  Standard, CHF, CKD, s/p 4 V CABG, DM, HTN, and hx of L DARNELL     DOS: 11-15-23     POW: 5 weeks      PTA Visits: 1/5    Time In: 1650pm  Time Out: 1735pm   Total Appointment Time (timed & untimed codes): 45 minutes     Subjective     Pt reports: the left knee is getting better, but now he has a problem with his right knee. It hurts more on right than left. He plans on getting a TKA on right knee in the future.     He was not compliant with home exercise program.  Response to previous treatment: no concerns   Functional change: ongoing    Pain: 0/10 to Left and 5/10 to right   Location: left knee     Objective     Updated 12-20-23  L knee flexion AAROM 115 deg (heel slides)  L knee extension: Lacking 0 deg      Jani participated in dynamic functional therapeutic activities to improve functional performance for 15 minutes, including:    Nustep for 10 min  STS 2x10 Hi/Lo mat/24in plyo   +Seated Marching L only with 1lb, 3x10  +Ambulation w/ SPC around therapy gym (approx. 400ft) - Not performed today     Jani received therapeutic exercises to develop strength, endurance, ROM, and flexibility for 15 minutes including:    Heel slide        3'   Heel props       2 min x 2  +Seated Knee Flexion Stretch    30 x 5" (this causes " "slight increase pain to right knee)   Seated HS Curls w/ RTB     2x10  Shuttle DL squat 1 black band     3x10   Shuttle SL squat  1 black band    3x10    NP due to pain   Standing knee drivers at stairs    2' on left-P! In R  Standing left calf stretch    2' on left-P! In R  Seated HS stretch      2' on left-P! In R    Jani received the following manual therapy techniques: Joint mobilizations were applied to the: right knee for 00  minutes, including:  None performed this visit    Jani participated in neuromuscular re-education activities to improve: quad motor control  for 15 minutes. The following activities were included:    Quad sets towel under knee     4x10, 3"  SAQ 1#        3x10  LAQ, 1#       3x10   SLR         2x10     Home Exercises Provided and Patient Education Provided     Education provided:   Cont to perform HEP as provided.     Written Home Exercises Provided: Patient instructed to cont prior HEP.  Exercises were reviewed and Jani was able to demonstrate them prior to the end of the session.  Jani demonstrated good  understanding of the education provided.     See EMR under Patient Instructions for exercises provided prior visit.    Assessment   Patient has right knee pain (non-surgical knee) more than Left (surgical knee). Focused on restoring knee ROM and quads strength. Patient still demonstrates very weak quads and hip flexors. He states he has difficulty with brining the leg in/out of car. Needs to work on strengthening as well as ROM in order for patient to perform his daily activities without much difficulty. Mild pain in seated knee flexion stretch to right knee. Will be mindful of exercises to prevent increase pain in right knee and prevent compensation in order to support the Left leg.     Jani Is progressing well towards his goals.   Pt prognosis is Good.     Pt will continue to benefit from skilled outpatient physical therapy to address the deficits listed in the problem list box on " initial evaluation, provide pt/family education and to maximize pt's level of independence in the home and community environment.     Pt's spiritual, cultural and educational needs considered and pt agreeable to plan of care and goals.    Anticipated barriers to physical therapy:  Scheduling issues     Goals: Short Term Goals:  4 weeks  1.Report decreased in pain at worse less than  <   / =  5  /10  to increase tolerance for functional mobility. Goal in progress  2. Pt to improve L knee range of motion to 100 deg flexion and L knee extension to -4 deg to allow for improved functional mobility to allow for improvement in IADLs. . MET  3. Increased L  knee MMT 1/2 grade to increase tolerance for ADL and work activities. Goal in progress  4. Pt to report ambulating without FWW to improve community ambulation. Goal in progress  5. Pt to tolerate HEP to improve ROM and independence with ADL's. Goal met 12-13-23     Long Term Goals: 12 weeks  1.Report decreased in pain at worse less than  <   / =  2  /10  to increase tolerance for functional mobility. Goal in progress  2.Pt to improve L: knee range of motion 115 deg or  120 deg flexion and L knee extension to 0 deg to allow for improved functional mobility to allow for improvement in IADLs. Goal in progress  3.Increased L knee MMT 1 grade to increase tolerance for ADL and work activities. Goal in progress  4. Pt will report 80 % on FOTO knee survey  to demonstrate increase in LE function with every day tasks. Goal in progress  5. Pt to be Independent with HEP to improve ROM and independence with ADL's Goal in progress    Plan     Cont skilled PT session towards PT and patient's goals per tolerance to post op protocol.    Wen Ann, PTA   12/26/2023

## 2023-12-27 ENCOUNTER — CLINICAL SUPPORT (OUTPATIENT)
Dept: REHABILITATION | Facility: HOSPITAL | Age: 78
End: 2023-12-27
Payer: MEDICARE

## 2023-12-27 DIAGNOSIS — R29.898 WEAKNESS OF LEFT LOWER EXTREMITY: ICD-10-CM

## 2023-12-27 DIAGNOSIS — G89.29 CHRONIC PAIN OF LEFT KNEE: Primary | ICD-10-CM

## 2023-12-27 DIAGNOSIS — R26.89 GAIT, ANTALGIC: ICD-10-CM

## 2023-12-27 DIAGNOSIS — M25.662 DECREASED RANGE OF MOTION OF LEFT KNEE: ICD-10-CM

## 2023-12-27 DIAGNOSIS — M25.562 CHRONIC PAIN OF LEFT KNEE: Primary | ICD-10-CM

## 2023-12-27 PROCEDURE — 97112 NEUROMUSCULAR REEDUCATION: CPT | Mod: PN

## 2023-12-27 PROCEDURE — 97530 THERAPEUTIC ACTIVITIES: CPT | Mod: PN

## 2023-12-27 NOTE — PROGRESS NOTES
"  Physical Therapy Daily Treatment Note     Name: Jani PAIZ [facility]  Clinic Number: [id number]    Therapy Diagnosis:   Encounter Diagnoses   Name Primary?    Chronic pain of left knee Yes    Decreased range of motion of left knee     Weakness of left lower extremity     Gait, antalgic            Physician: Rancho Ferrara MD    Visit Date: 12/27/2023    Physician Orders: PT Eval and Treat   Medical Diagnosis from Referral: Primary osteoarthritis of left knee   Evaluation Date: 11/17/2023  Authorization Period Expiration: 12/31/2024   Plan of Care Expiration: 12-29-23  Visit # / Visits authorized: 14/ 20   Progress Note Due: 1-17-23  FOTO: 1/ 1     Precautions:  Standard, CHF, CKD, s/p 4 V CABG, DM, HTN, and hx of L DARNELL     DOS: 11-15-23     POW: 6 weeks      PTA Visits: 1/5    Time In:  1:00 pm  Time Out: 1:45 pm   Total Appointment Time (timed & untimed codes): 23 minutes     Subjective     Pt reports: that he is feeling a a little better. He states he was in therapy yesterday. Some muscles soreness.     He was not compliant with home exercise program.  Response to previous treatment: no concerns   Functional change: ongoing    Pain: 0/10 to Left and 5/10 to right   Location: left knee     Objective     Updated 12-20-23  L knee flexion AAROM 115 deg (heel slides)  L knee extension: Lacking 0 deg      Jani participated in dynamic functional therapeutic activities to improve functional performance for 15 minutes, including:    Nustep for 10 min  STS 2x10 Hi/Lo mat/24in plyo   +Seated Marching L only with 1lb, 3x10  +Ambulation w/ SPC around therapy gym (approx. 400ft) - Not performed today     Jani received therapeutic exercises to develop strength, endurance, ROM, and flexibility for 15 minutes including:    Heel slide        3'   Heel props       2 min x 2  +Seated Knee Flexion Stretch    30 x 5" (this causes slight increase pain to right knee)   Seated HS Curls w/ RTB     2x10  Shuttle DL squat 1 black band  " "   3x10   Shuttle SL squat  1 black band    3x10    NP due to pain   Standing knee drivers at stairs    2' on left-P! In R  Standing left calf stretch    2' on left-P! In R  Seated HS stretch      2' on left-P! In R    Jani received the following manual therapy techniques: Joint mobilizations were applied to the: right knee for 00  minutes, including:  None performed this visit    Jani participated in neuromuscular re-education activities to improve: quad motor control  for 15 minutes. The following activities were included:    Quad sets towel under knee     4x10, 3"  SAQ 1#        3x10  LAQ, 1#       3x10   SLR         3x10     Home Exercises Provided and Patient Education Provided     Education provided:   Cont to perform HEP as provided.     Written Home Exercises Provided: Patient instructed to cont prior HEP.  Exercises were reviewed and Jani was able to demonstrate them prior to the end of the session.  Jani demonstrated good  understanding of the education provided.     See EMR under Patient Instructions for exercises provided prior visit.    Assessment     We cont with same exercises as yesterday therapy. Patient has right knee pain (non-surgical knee) more than Left (surgical knee). Focused on restoring knee ROM and quads strength. Patient still demonstrates very weak quads and hip flexors. He states he has difficulty with brining the leg in/out of car. Needs to work on strengthening as well as ROM in order for patient to perform his daily activities without much difficulty. Mild pain in seated knee flexion stretch to right knee. Will be mindful of exercises to prevent increase pain in right knee and prevent compensation in order to support the Left leg.     Jani Is progressing well towards his goals.   Pt prognosis is Good.     Pt will continue to benefit from skilled outpatient physical therapy to address the deficits listed in the problem list box on initial evaluation, provide pt/family education " and to maximize pt's level of independence in the home and community environment.     Pt's spiritual, cultural and educational needs considered and pt agreeable to plan of care and goals.    Anticipated barriers to physical therapy:  Scheduling issues     Goals: Short Term Goals:  4 weeks  1.Report decreased in pain at worse less than  <   / =  5  /10  to increase tolerance for functional mobility. Goal in progress  2. Pt to improve L knee range of motion to 100 deg flexion and L knee extension to -4 deg to allow for improved functional mobility to allow for improvement in IADLs. . MET  3. Increased L  knee MMT 1/2 grade to increase tolerance for ADL and work activities. Goal in progress  4. Pt to report ambulating without FWW to improve community ambulation. Goal in progress  5. Pt to tolerate HEP to improve ROM and independence with ADL's. Goal met 12-13-23     Long Term Goals: 12 weeks  1.Report decreased in pain at worse less than  <   / =  2  /10  to increase tolerance for functional mobility. Goal in progress  2.Pt to improve L: knee range of motion 115 deg or  120 deg flexion and L knee extension to 0 deg to allow for improved functional mobility to allow for improvement in IADLs. Goal in progress  3.Increased L knee MMT 1 grade to increase tolerance for ADL and work activities. Goal in progress  4. Pt will report 80 % on FOTO knee survey  to demonstrate increase in LE function with every day tasks. Goal in progress  5. Pt to be Independent with HEP to improve ROM and independence with ADL's Goal in progress    Plan     Cont skilled PT session towards PT and patient's goals per tolerance to post op protocol.    Philip Talamantes, PT   12/27/2023

## 2023-12-31 ENCOUNTER — EXTERNAL CHRONIC CARE MANAGEMENT (OUTPATIENT)
Dept: PRIMARY CARE CLINIC | Facility: CLINIC | Age: 78
End: 2023-12-31
Payer: MEDICARE

## 2023-12-31 PROCEDURE — 99490 CHRNC CARE MGMT STAFF 1ST 20: CPT | Mod: PBBFAC,PO | Performed by: FAMILY MEDICINE

## 2023-12-31 PROCEDURE — 99490 CHRNC CARE MGMT STAFF 1ST 20: CPT | Mod: S$PBB,,, | Performed by: FAMILY MEDICINE

## 2023-12-31 PROCEDURE — 99439 CHRNC CARE MGMT STAF EA ADDL: CPT | Mod: PBBFAC,27,PO | Performed by: FAMILY MEDICINE

## 2023-12-31 PROCEDURE — 99439 CHRNC CARE MGMT STAF EA ADDL: CPT | Mod: S$PBB,,, | Performed by: FAMILY MEDICINE

## 2024-01-02 ENCOUNTER — CLINICAL SUPPORT (OUTPATIENT)
Dept: REHABILITATION | Facility: HOSPITAL | Age: 79
End: 2024-01-02
Payer: MEDICARE

## 2024-01-02 DIAGNOSIS — R26.89 GAIT, ANTALGIC: ICD-10-CM

## 2024-01-02 DIAGNOSIS — M25.562 CHRONIC PAIN OF LEFT KNEE: Primary | ICD-10-CM

## 2024-01-02 DIAGNOSIS — M25.662 DECREASED RANGE OF MOTION OF LEFT KNEE: ICD-10-CM

## 2024-01-02 DIAGNOSIS — R29.898 WEAKNESS OF LEFT LOWER EXTREMITY: ICD-10-CM

## 2024-01-02 DIAGNOSIS — G89.29 CHRONIC PAIN OF LEFT KNEE: Primary | ICD-10-CM

## 2024-01-02 PROCEDURE — 97110 THERAPEUTIC EXERCISES: CPT | Mod: PN,CQ

## 2024-01-02 PROCEDURE — 97112 NEUROMUSCULAR REEDUCATION: CPT | Mod: PN,CQ

## 2024-01-02 NOTE — PROGRESS NOTES
"  Physical Therapy Daily Treatment Note     Name: Jani ElizabethAbbeville  Clinic Number: [id number]    Therapy Diagnosis:   Encounter Diagnoses   Name Primary?    Chronic pain of left knee Yes    Decreased range of motion of left knee     Weakness of left lower extremity     Gait, antalgic        Physician: Rancho Ferrara MD    Visit Date: 1/2/2024    Physician Orders: PT Eval and Treat   Medical Diagnosis from Referral: Primary osteoarthritis of left knee   Evaluation Date: 11/17/2023  Authorization Period Expiration: 12/31/2024   Plan of Care Expiration: 12-29-23  Visit # / Visits authorized: 17/ 80   Progress Note Due: 1-17-23  FOTO: 1/ 1     Precautions:  Standard, CHF, CKD, s/p 4 V CABG, DM, HTN, and hx of L DARNELL     DOS: 11-15-23     POW: 6 weeks      PTA Visits: 1/5    Time In:  1650pm  Time Out: 1545pm  Total Appointment Time (timed & untimed codes): 30 minutes     Subjective     Pt reports: "Left is not too bad, but it's the right that is hurting".     He was not compliant with home exercise program.  Response to previous treatment: no concerns   Functional change: ongoing    Pain: 0/10 to Left and 5/10 to right   Location: left knee     Objective     Updated 12-20-23  L knee flexion AAROM 115 deg (heel slides)  L knee extension: Lacking 0 deg      Jani participated in dynamic functional therapeutic activities to improve functional performance for 15 minutes, including:    Nustep for 10 min  STS 2x10 Hi/Lo mat/24in plyo   +Seated Marching L only with 2lb, 3x10  +Ambulation w/ SPC around therapy gym (approx. 400ft) - Not performed today     Jani received therapeutic exercises to develop strength, endurance, ROM, and flexibility for 15 minutes including:    Heel slide        3'   Heel props       3 min     Seated HS Curls w/ GTB     2x10  Shuttle DL squat 1 black band     3x10   Shuttle SL squat  1 black band    3x10    NP due to pain   Standing knee drivers at stairs    2' on left-P! In R  Standing left calf " "stretch    2' on left-P! In R  Seated HS stretch      2' on left-P! In R    Jani received the following manual therapy techniques: Joint mobilizations were applied to the: right knee for 00  minutes, including:  None performed this visit    Jani participated in neuromuscular re-education activities to improve: quad motor control  for 15 minutes. The following activities were included:    Quad sets towel under knee     4x10, 3"  SAQ 2#        3x10  LAQ, 2#       3x10   SLR         3x10     Home Exercises Provided and Patient Education Provided     Education provided:   Cont to perform HEP as provided.     Written Home Exercises Provided: Patient instructed to cont prior HEP.  Exercises were reviewed and Jani was able to demonstrate them prior to the end of the session.  Jani demonstrated good  understanding of the education provided.     See EMR under Patient Instructions for exercises provided prior visit.    Assessment   Continued with the prescribed exercises for Left knee strengthening and ROM. Still being mindful of adding new exercises due to patient has increase right knee pain. Otherwise, he is making steadily progress regarding the Left knee ROM and quads strength.     Jani Is progressing well towards his goals.   Pt prognosis is Good.     Pt will continue to benefit from skilled outpatient physical therapy to address the deficits listed in the problem list box on initial evaluation, provide pt/family education and to maximize pt's level of independence in the home and community environment.     Pt's spiritual, cultural and educational needs considered and pt agreeable to plan of care and goals.    Anticipated barriers to physical therapy:  Scheduling issues     Goals: Short Term Goals:  4 weeks  1.Report decreased in pain at worse less than  <   / =  5  /10  to increase tolerance for functional mobility. Goal in progress  2. Pt to improve L knee range of motion to 100 deg flexion and L knee extension " to -4 deg to allow for improved functional mobility to allow for improvement in IADLs. . MET  3. Increased L  knee MMT 1/2 grade to increase tolerance for ADL and work activities. Goal in progress  4. Pt to report ambulating without FWW to improve community ambulation. Goal in progress  5. Pt to tolerate HEP to improve ROM and independence with ADL's. Goal met 12-13-23     Long Term Goals: 12 weeks  1.Report decreased in pain at worse less than  <   / =  2  /10  to increase tolerance for functional mobility. Goal in progress  2.Pt to improve L: knee range of motion 115 deg or  120 deg flexion and L knee extension to 0 deg to allow for improved functional mobility to allow for improvement in IADLs. Goal in progress  3.Increased L knee MMT 1 grade to increase tolerance for ADL and work activities. Goal in progress  4. Pt will report 80 % on FOTO knee survey  to demonstrate increase in LE function with every day tasks. Goal in progress  5. Pt to be Independent with HEP to improve ROM and independence with ADL's Goal in progress    Plan     Cont skilled PT session towards PT and patient's goals per tolerance to post op protocol.    Wen Ann, PTA   01/02/2024

## 2024-01-04 ENCOUNTER — OFFICE VISIT (OUTPATIENT)
Dept: ENDOCRINOLOGY | Facility: CLINIC | Age: 79
End: 2024-01-04
Payer: MEDICARE

## 2024-01-04 VITALS
SYSTOLIC BLOOD PRESSURE: 114 MMHG | DIASTOLIC BLOOD PRESSURE: 66 MMHG | WEIGHT: 248.63 LBS | TEMPERATURE: 99 F | HEART RATE: 69 BPM | BODY MASS INDEX: 36.71 KG/M2

## 2024-01-04 DIAGNOSIS — I25.10 CORONARY ARTERY DISEASE INVOLVING NATIVE CORONARY ARTERY OF NATIVE HEART WITHOUT ANGINA PECTORIS: Chronic | ICD-10-CM

## 2024-01-04 DIAGNOSIS — S72.001A CLOSED DISPLACED FRACTURE OF RIGHT FEMORAL NECK: ICD-10-CM

## 2024-01-04 DIAGNOSIS — M80.00XD AGE-RELATED OSTEOPOROSIS WITH CURRENT PATHOLOGICAL FRACTURE WITH ROUTINE HEALING, SUBSEQUENT ENCOUNTER: ICD-10-CM

## 2024-01-04 DIAGNOSIS — E11.65 UNCONTROLLED TYPE 2 DIABETES MELLITUS WITH HYPERGLYCEMIA, WITHOUT LONG-TERM CURRENT USE OF INSULIN: ICD-10-CM

## 2024-01-04 DIAGNOSIS — D35.02 ADENOMA OF LEFT ADRENAL GLAND: ICD-10-CM

## 2024-01-04 DIAGNOSIS — E11.65 TYPE 2 DIABETES MELLITUS WITH HYPERGLYCEMIA, WITHOUT LONG-TERM CURRENT USE OF INSULIN: Primary | ICD-10-CM

## 2024-01-04 DIAGNOSIS — E66.01 CLASS 2 SEVERE OBESITY DUE TO EXCESS CALORIES WITH SERIOUS COMORBIDITY AND BODY MASS INDEX (BMI) OF 36.0 TO 36.9 IN ADULT: ICD-10-CM

## 2024-01-04 DIAGNOSIS — E11.40 TYPE 2 DIABETES MELLITUS WITH DIABETIC NEUROPATHY, WITHOUT LONG-TERM CURRENT USE OF INSULIN: Chronic | ICD-10-CM

## 2024-01-04 PROCEDURE — 99214 OFFICE O/P EST MOD 30 MIN: CPT | Mod: S$PBB,,, | Performed by: HOSPITALIST

## 2024-01-04 PROCEDURE — 99999 PR PBB SHADOW E&M-EST. PATIENT-LVL IV: CPT | Mod: PBBFAC,,, | Performed by: HOSPITALIST

## 2024-01-04 PROCEDURE — 99214 OFFICE O/P EST MOD 30 MIN: CPT | Mod: PBBFAC | Performed by: HOSPITALIST

## 2024-01-04 RX ORDER — GLIPIZIDE 10 MG/1
10 TABLET, FILM COATED, EXTENDED RELEASE ORAL 2 TIMES DAILY WITH MEALS
Qty: 180 TABLET | Refills: 3 | Status: SHIPPED | OUTPATIENT
Start: 2024-01-04 | End: 2025-01-03

## 2024-01-04 NOTE — PROGRESS NOTES
Subjective:      Patient ID: Jani Cha is a 78 y.o. male presented to [facility] on 1/4/2024.  Chief Complaint:  Diabetes type 2, Diabetes    History of Present Illness: Jani Cha is a 78 y.o. male here for management of type 2 diabetes and left adrenal incidentaloma  Other significant past medical history:  CKD stage IIIB, CAD, CABG, hypertension, hyperlipidemia    Interval history:  Patient is here for follow-up of type 2 diabetes, A1c i day able at 6.9   Patient with recent left knee replacement 11/15/2023.  Still with poor mobility.  Still with occasional pain.    Still with right osteo arthritis of knee with severe pain limiting mobility as well   Glucose log review: 101, 83, 79, 96, 127, 129, 88, 127  Did have some hyperglycemia 160 after surgery  Did have 1 hypoglycemia after surgery: Glucose of 66, asymptomatic.  No further hypoglycemia events  Better dietary changes.  Currently still on Trulicity, getting from [facility] without any issue  Some weight loss: 248, previous 256  Currently using walker to help with ambulation  Follow-up with Orthopedic surgery at this time for evaluation of right knee osteoarthritis.  And the need for continue physical therapy  Patient on SC Prolia every 6 months      1) Diabetes Mellitus Type 2  - Known diabetic complications: nephropathy and cardiovascular disease  - Cardiovascular risk factors: advanced age (older than 55 for men, 65 for women), diabetes mellitus, dyslipidemia, hypertension, male gender, microalbuminuria, obesity (BMI >= 30 kg/m2) and sedentary lifestyle  - Diagnosed w/ DM: in 2006  - Saw Ortho need Left Knee Replacement>>  Need to get A1C <7%  - Patient having issue with high deductible, gap.  Unable to afford multiple diabetes medication    Current meds:   Trulicity 1.5 mg once a week injection, getting it from [facility]  Glipizide 10 mg  twice a day  Previous medication  stop metformin given CKD stage IIIB  Home  "glucose checks:  Daily, see above  Diet/Exercise:               Eating 2 meals per day , lunch in, large portion dinner, does eat cookies prior to going to bed              Drink:  Coffee with sugar in the morning              Current exercise: none due to knee pain  Weight trend: stable  Diabetes Education: no  Diabetes Related Hospitalization:  no  Hx of pancreatitis, hx of thyroid cancer: no  Family history of diabetes: unknow  Occupation: retired  Eye exam current (within one year): yes  Reports cuts or ulcers on feet:  Denies  Statin: Taking, ACE/ARB: Taking    Diabetes lab work  Lab Results   Component Value Date    HGBA1C 6.9 (H) 12/22/2023    HGBA1C 7.0 (H) 11/02/2023    HGBA1C 6.8 (H) 07/24/2023    HGBA1C 7.1 (H) 03/10/2023     No results found for: "CPEPTIDE", "GLUTAMICACID", "ISLETCELLANT"   No results found for: "FRUCTOSAMINE"  Lab Results   Component Value Date    MICALBCREAT 13.2 03/01/2023     No results found for: "VEMPJFCO99"    Diabetes Management Status: Reviewed this office visit  Screening or Prevention Patient's value Goal Complete/Controlled?   Lipid profile : 03/10/2023 Annually Yes     Dilated retinal exam : 04/04/2023 Annually No     Foot exam   Most Recent Foot Exam Date: Not Found Annually Yes         2) Osteoporosis  - Right femoral neck fracture Recently: 10/25/2022>> patient fell picking up a newspaper.  Sustained right hip fracture requiring surgery.    - Follow-up with Orthopedic surgery at this time.  Recently discharged from rehab  - Currently using walker to help with ambulation  - denies history of other fractures  - no history of cirrhosis, liver disease, cancer  - currently not taking any vitamin supplements including calcium/vitamin-D  - started on Prolia SC injection per orthopedic surgery, 1st injection 3/17/2023>> every 6 months>> next injection 3/22/2024      DXA: 1/19/2023  Lumbar spine (L1-L4):  BMD is 1.248 g/cm2, T-score is 1.4, and Z-score is 2.5.  Total hip:   BMD is " 1.005 g/cm2, T-score is -0.2, and Z-score is 0.7.  Femoral neck: BMD is 0.688 g/cm2, T-score is -1.8, and Z-score is -0.4.  Distal 1/3 radius:  Not applicable     FRAX:  11% risk of a major osteoporotic fracture in the next 10 years.  3.2% risk of hip fracture in the next 10 years.     Impression:  *Osteoporosis based on T-score between -1.0 and -2.5 and elevated risk based on FRAX and history of femur fracture.    Lab work reviewed  Lab Results   Component Value Date    PTH 77.3 (H) 03/10/2023    PTH 75.0 12/06/2022    [id number] 36 03/10/2023    [id number] 32 12/06/2022    CALCIUM 9.3 12/22/2023    CALCIUM 8.8 11/02/2023    CALCIUM 9.7 07/24/2023    PHOS 2.8 12/22/2023    PHOS 2.8 10/25/2022    PHOS 2.9 10/24/2022    ALKPHOS 81 11/02/2023    ALKPHOS 98 12/06/2022    ALKPHOS 101 11/28/2022    TSH 2.585 12/06/2022        3) Regards to Left adrenal incidentaloma  - Noted on CT chest, left adrenal mass 1.2 cm in size.  - Adrenal lesion imaging characteristics left adrenal nodule, 1.2 cm, Smooth, heterogenous, without calcifications   - Patient does have history of diabetes as above, Morbidly obese, Hypertension but no history of hypertensive emergency   - Pt denies history of paroxysmal symptoms such as syncope, pallor or dizziness  - No palpitations, No history of cancer  - Pt reports no abdominal discomfort  - Work up: DST: WNL, cortisol 2, due to the overweight  - Metanephrines and normetanephrines:  Within acceptable ranges  - Renin/navneet level normal      Reviewed past surgical, medical, family, social history and updated as appropriate.  Review of Systems: see HPI above    Objective:   /66   Pulse 69   Temp 98.6 °F (37 °C) (Oral)   Wt 112.8 kg (248 lb 9.6 oz)   BMI 36.71 kg/m²     Body mass index is 36.71 kg/m².  Vital signs reviewed    Physical Exam  Vitals and nursing note reviewed.   Constitutional:       General: He is not in acute distress.     Appearance: Normal appearance. He is  well-developed. He is obese.   Neck:      Thyroid: No thyromegaly.   Cardiovascular:      Rate and Rhythm: Normal rate.      Heart sounds: Normal heart sounds.   Pulmonary:      Effort: Pulmonary effort is normal. No respiratory distress.   Abdominal:      Tenderness: There is no abdominal tenderness.   Musculoskeletal:         General: Normal range of motion.      Cervical back: Neck supple.   Skin:     General: Skin is warm.      Findings: No erythema.   Neurological:      Mental Status: He is alert and oriented to person, place, and time.       Lab Reviewed:   Lab Results   Component Value Date    HGBA1C 6.9 (H) 12/22/2023     Lab Results   Component Value Date    CHOL 103 (L) 03/10/2023    HDL 34 (L) 03/10/2023    LDLCALC 47.8 (L) 03/10/2023    TRIG 106 03/10/2023    CHOLHDL 33.0 03/10/2023     Lab Results   Component Value Date     12/22/2023    K 5.0 12/22/2023     12/22/2023    CO2 23 12/22/2023     (H) 12/22/2023    BUN 39 (H) 12/22/2023    CREATININE 1.5 (H) 12/22/2023    CALCIUM 9.3 12/22/2023    PROT 6.3 11/02/2023    ALBUMIN 3.2 (L) 12/22/2023    BILITOT 0.7 11/02/2023    ALKPHOS 81 11/02/2023    AST 14 11/02/2023    ALT 12 11/02/2023    ANIONGAP 10 12/22/2023    ESTGFRAFRICA 48.0 (A) 05/10/2022    EGFRNONAA 41.5 (A) 05/10/2022    TSH 2.585 12/06/2022    [id number] 36 03/10/2023     Assessment     1. Type 2 diabetes mellitus with hyperglycemia, without long-term current use of insulin  HEMOGLOBIN A1C    Basic Metabolic Panel      2. Uncontrolled type 2 diabetes mellitus with hyperglycemia, without long-term current use of insulin  glipiZIDE (GLUCOTROL) 10 MG TR24      3. Type 2 diabetes mellitus with diabetic neuropathy, without long-term current use of insulin  glipiZIDE (GLUCOTROL) 10 MG TR24      4. Age-related osteoporosis with current pathological fracture with routine healing, subsequent encounter        5. Closed displaced fracture of right femoral neck s/p total hip  arthroplasty on 10/21/2022        6. Adenoma of left adrenal gland        7. Coronary artery disease involving native coronary artery of native heart without angina pectoris        8. Class 2 severe obesity due to excess calories with serious comorbidity and body mass index (BMI) of 36.0 to 36.9 in adult          Plan     Type 2 diabetes mellitus with hyperglycemia, without long-term current use of insulin  - Diabetes better controlled, improvement  A1c, goal A1C for patient is <7%  - Complicated by hyperglycemia, obesity, hx CAD/CABG, CKD3b, dietary indiscretion.  - Still with occasional morning time hyperglycemia  - Due to the renal function, medical options limited  - Diabetic supplies/medications, review at every visit to ensure continue refills    Plan  - Continue current dose of Trulicity 1.5 mg once a week injection, getting it from [employer], can consider increase to 3 mg once a week if having hyperglycemia  - Continue glipizide to 10 mg b.i.d> denies hypoglycemia  - Advised the patient follow with my regimen with close follow-up as scheduled  - Repeat lab work prior to next visit, 4 months    Osteoporosis  - DXA review:  High FRAX score, with fracture, treat as osteoporosis  - right femoral neck fracture 10/21/22  status post fixation  - SC Prolia every 6 months, started on 3/17/2023>> every 6 months 3/22/2024  - continue vitamin D3 2000 IU daily  - high risk, with future plans for knee replacement  - monitor lab work    Closed displaced fracture of right femoral neck s/p total hip arthroplasty on 10/21/2022  - right femoral neck fracture 10/21/22  - status post fixation  - continue SC Prolia    Adenoma of left adrenal gland  - Adrenal incidentaloma, Noted on CT chest, left adrenal mass 1.2 cm in size.  - Adrenal lesion imaging characteristics left adrenal nodule, 1.2 cm, Smooth, heterogenous, without calcifications   - DST: WNL, cortisol 2, due to the overweight  - Metanephrines and normetanephrines:   Within acceptable ranges  - Renin/navneet level normal    Coronary artery disease involving native coronary artery of native heart without angina pectoris  - on statin, beta-blocker, aspirin  - Discussed importance of better glycemic control  - continue Trulicity if possible given history of CAD    Class 2 severe obesity due to excess calories with serious comorbidity and body mass index (BMI) of 36.0 to 36.9 in adult  - Body mass index is 36.71 kg/m².  - dietary discussion as above  - continue to monitor weight, currently with some weight loss    Advised patient to follow up with PCP for routine health maintenance care.   RTC in 4 months    Malcolm Kent M.D  Endocrinology  [facility]  1/4/2024      Disclaimer: This note has been generated using voice-recognition software. There may be typographical errors that have been missed during proof-reading.

## 2024-01-04 NOTE — ASSESSMENT & PLAN NOTE
- Body mass index is 36.71 kg/m².  - dietary discussion as above  - continue to monitor weight, currently with some weight loss

## 2024-01-04 NOTE — ASSESSMENT & PLAN NOTE
- Diabetes better controlled, improvement  A1c, goal A1C for patient is <7%  - Complicated by hyperglycemia, obesity, hx CAD/CABG, CKD3b, dietary indiscretion.  - Still with occasional morning time hyperglycemia  - Due to the renal function, medical options limited  - Diabetic supplies/medications, review at every visit to ensure continue refills    Plan  - Continue current dose of Trulicity 1.5 mg once a week injection, getting it from [facility], can consider increase to 3 mg once a week if having hyperglycemia  - Continue glipizide to 10 mg b.i.d> denies hypoglycemia  - Advised the patient follow with my regimen with close follow-up as scheduled  - Repeat lab work prior to next visit, 4 months

## 2024-01-04 NOTE — ASSESSMENT & PLAN NOTE
- DXA review:  High FRAX score, with fracture, treat as osteoporosis  - right femoral neck fracture 10/21/22  status post fixation  - SC Prolia every 6 months, started on 3/17/2023>> every 6 months 3/22/2024  - continue vitamin D3 2000 IU daily  - high risk, with future plans for knee replacement  - monitor lab work

## 2024-01-12 ENCOUNTER — CLINICAL SUPPORT (OUTPATIENT)
Dept: REHABILITATION | Facility: HOSPITAL | Age: 79
End: 2024-01-12
Payer: MEDICARE

## 2024-01-12 DIAGNOSIS — G89.29 CHRONIC PAIN OF LEFT KNEE: Primary | ICD-10-CM

## 2024-01-12 DIAGNOSIS — R29.898 WEAKNESS OF LEFT LOWER EXTREMITY: ICD-10-CM

## 2024-01-12 DIAGNOSIS — R26.89 GAIT, ANTALGIC: ICD-10-CM

## 2024-01-12 DIAGNOSIS — M25.562 CHRONIC PAIN OF LEFT KNEE: Primary | ICD-10-CM

## 2024-01-12 DIAGNOSIS — M25.662 DECREASED RANGE OF MOTION OF LEFT KNEE: ICD-10-CM

## 2024-01-12 PROCEDURE — 97112 NEUROMUSCULAR REEDUCATION: CPT | Mod: PN

## 2024-01-12 PROCEDURE — 97110 THERAPEUTIC EXERCISES: CPT | Mod: PN

## 2024-01-12 PROCEDURE — 97530 THERAPEUTIC ACTIVITIES: CPT | Mod: PN

## 2024-01-12 NOTE — PROGRESS NOTES
Physical Therapy Daily Treatment Note     Name: Jani Elizabethmavince  [facility] Number: [id number]    Therapy Diagnosis:   Encounter Diagnoses   Name Primary?    Chronic pain of left knee Yes    Decreased range of motion of left knee     Weakness of left lower extremity     Gait, antalgic          Physician: Rancho Ferrara MD    Visit Date: 1/12/2024    Physician Orders: PT Eval and Treat   Medical Diagnosis from Referral: Primary osteoarthritis of left knee   Evaluation Date: 11/17/2023  Authorization Period Expiration: 12/31/2024   Plan of Care Expiration: 12-29-23  Visit # / Visits authorized: 17/ 80   Progress Note Due: 1-17-23  FOTO: 1/ 1     Precautions:  Standard, CHF, CKD, s/p 4 V CABG, DM, HTN, and hx of L DARNELL     DOS: 11-15-23     POW: 8 weeks 2 days      PTA Visits: 1/5    Time In:  12:00 pm  Time Out:12:55  pm  Total Appointment Time (timed & untimed codes): 55 minutes     Subjective     Pt reports: that L knee is not too bad. He states the R knee hurts more. He walks with FWW because of R knee pain. He is afraid R knee will give out on him. Pt states he wants to wait to schedule more visits. He wants to return to M.D first.     He was not compliant with home exercise program.  Response to previous treatment: no concerns   Functional change: ongoing    Pain: 4/10 to Left and 5-6/10 to right   Location: left knee     Objective         Updated 12-20-23  L knee flexion AROM 116 deg   L knee extension: Lacking 0 deg    Lower Extremity Strength   Right LE   Left LE     Knee extension: 4/5 Knee extension: 4+/5   Knee flexion: 4/5 Knee flexion: 4+/5   Hip flexion: 4/5 Hip flexion: 4+/5   Hip extension:  NP Hip extension: NP   Hip abduction: 4/5 Hip abduction: 4+/5   Hip adduction: 4/5 Hip adduction 4+/5   Ankle dorsiflexion: 4/5 Ankle dorsiflexion: 4+/5   Ankle plantarflexion: 4/5 Ankle plantarflexion: 4+/5         Special Tests:  5 times sit to stand: 18 sec  TUG test : 15 sec     Jani participated in dynamic  "functional therapeutic activities to improve functional performance for 15 minutes, including:    Nustep for 10 min  STS 2x10 Hi/Lo mat/24in plyo   +Seated Marching L only with 2lb, 3x10  +Ambulation w/ SPC around therapy gym (approx. 400ft) - Not performed today     Jani received therapeutic exercises to develop strength, endurance, ROM, and flexibility for 25 minutes including:    Heel slide        3'   Heel props       3 min     Seated HS Curls w/ GTB     2x10  Shuttle DL squat 2 black band     3x10   Shuttle SL squat  2 black band    3x10    NP due to pain   Standing knee drivers at stairs    2' on left-P! In R  Standing left calf stretch    2' on left-P! In R  Seated HS stretch      2' on left-P! In R    Jani received the following manual therapy techniques: Joint mobilizations were applied to the: right knee for 00  minutes, including:  None performed this visit    Jani participated in neuromuscular re-education activities to improve: quad motor control  for 15 minutes. The following activities were included:    Quad sets towel under knee     4x10, 3"  SAQ 2#        3x10  LAQ, 2#       3x10   SLR         3x10     Home Exercises Provided and Patient Education Provided     Education provided:   Cont to perform HEP as provided.     Written Home Exercises Provided: Patient instructed to cont prior HEP.  Exercises were reviewed and Jani was able to demonstrate them prior to the end of the session.  Jani demonstrated good  understanding of the education provided.     See EMR under Patient Instructions for exercises provided prior visit.    Assessment     Pt was re-assessed today. Pt is 8 weeks and 2 days s/p L TKA. Pt ambulated with FWW due to R knee pain ( non-surgical knee). Pt is doing well with L knee (surgical knee). Pt is able to achieve 116 deg of L knee AROM and 0 deg of L knee extension. Pt has an overall 4+/5 L knee MMT. PT has improved sit to stand and TUG scores since initial eval. Pt has met 3/5 " LTGs. Overall, pt has been improving well L knee surgery, but R knee pain (non-surgical knee) is limiting factor in therapy due to increase pain. Plan to return to M.D for f/u. We will place therapy on hold for now.    Continued with the prescribed exercises for Left knee strengthening and ROM. Still being mindful of adding new exercises due to patient has increase right knee pain. Otherwise, he is making steadily progress regarding the Left knee ROM and quads strength.     Jani Is progressing well towards his goals.   Pt prognosis is Good.     Pt will continue to benefit from skilled outpatient physical therapy to address the deficits listed in the problem list box on initial evaluation, provide pt/family education and to maximize pt's level of independence in the home and community environment.     Pt's spiritual, cultural and educational needs considered and pt agreeable to plan of care and goals.    Anticipated barriers to physical therapy:  Scheduling issues     Goals: Short Term Goals:  4 weeks  1.Report decreased in pain at worse less than  <   / =  5  /10  to increase tolerance for functional mobility. Goal in progress  2. Pt to improve L knee range of motion to 100 deg flexion and L knee extension to -4 deg to allow for improved functional mobility to allow for improvement in IADLs. . MET  3. Increased L  knee MMT 1/2 grade to increase tolerance for ADL and work activities. Goal in progress  4. Pt to report ambulating without FWW to improve community ambulation. Goal in progress  5. Pt to tolerate HEP to improve ROM and independence with ADL's. Goal met 12-13-23     Long Term Goals: 12 weeks  1.Report decreased in pain at worse less than  <   / =  2  /10  to increase tolerance for functional mobility. Goal partial met 1-12-24  2.Pt to improve L: knee range of motion 115 deg or  120 deg flexion and L knee extension to 0 deg to allow for improved functional mobility to allow for improvement in IADLs. Goal  met 1-12-24  3.Increased L knee MMT 1 grade to increase tolerance for ADL and work activities. Goal met 1-12-24  4. Pt will report 80 % on FOTO knee survey  to demonstrate increase in LE function with every day tasks. Goal not  met 1-12-24  5. Pt to be Independent with HEP to improve ROM and independence with ADL's. Goal met 1-12-24    Plan     Plan to place therapy on hold until pt return to ANGELA Talamantes, PT   01/12/2024

## 2024-01-16 DIAGNOSIS — E11.40 TYPE 2 DIABETES MELLITUS WITH DIABETIC NEUROPATHY, WITHOUT LONG-TERM CURRENT USE OF INSULIN: ICD-10-CM

## 2024-01-17 ENCOUNTER — HOSPITAL ENCOUNTER (OUTPATIENT)
Dept: RADIOLOGY | Facility: OTHER | Age: 79
Discharge: HOME | End: 2024-01-17
Attending: STUDENT IN AN ORGANIZED HEALTH CARE EDUCATION/TRAINING PROGRAM
Payer: MEDICARE

## 2024-01-17 ENCOUNTER — OFFICE VISIT (OUTPATIENT)
Dept: SPINE | Facility: CLINIC | Age: 79
End: 2024-01-17
Attending: ANESTHESIOLOGY
Payer: MEDICARE

## 2024-01-17 ENCOUNTER — PATIENT MESSAGE (OUTPATIENT)
Dept: PAIN MEDICINE | Facility: OTHER | Age: 79
End: 2024-01-17
Payer: MEDICARE

## 2024-01-17 VITALS
BODY MASS INDEX: 36.83 KG/M2 | WEIGHT: 248.69 LBS | RESPIRATION RATE: 18 BRPM | SYSTOLIC BLOOD PRESSURE: 146 MMHG | OXYGEN SATURATION: 100 % | HEART RATE: 56 BPM | DIASTOLIC BLOOD PRESSURE: 69 MMHG | HEIGHT: 69 IN

## 2024-01-17 DIAGNOSIS — M25.561 CHRONIC PAIN OF RIGHT KNEE: ICD-10-CM

## 2024-01-17 DIAGNOSIS — G89.29 CHRONIC PAIN OF RIGHT KNEE: ICD-10-CM

## 2024-01-17 DIAGNOSIS — M17.11 PRIMARY OSTEOARTHRITIS OF RIGHT KNEE: Primary | ICD-10-CM

## 2024-01-17 DIAGNOSIS — M17.11 PRIMARY OSTEOARTHRITIS OF RIGHT KNEE: ICD-10-CM

## 2024-01-17 PROCEDURE — 99999 PR PBB SHADOW E&M-EST. PATIENT-LVL V: CPT | Mod: PBBFAC,,, | Performed by: STUDENT IN AN ORGANIZED HEALTH CARE EDUCATION/TRAINING PROGRAM

## 2024-01-17 PROCEDURE — 73564 X-RAY EXAM KNEE 4 OR MORE: CPT | Mod: 26,RT,, | Performed by: RADIOLOGY

## 2024-01-17 PROCEDURE — 99214 OFFICE O/P EST MOD 30 MIN: CPT | Mod: S$PBB,,, | Performed by: STUDENT IN AN ORGANIZED HEALTH CARE EDUCATION/TRAINING PROGRAM

## 2024-01-17 PROCEDURE — 73562 X-RAY EXAM OF KNEE 3: CPT | Mod: TC,FY,LT

## 2024-01-17 PROCEDURE — 73562 X-RAY EXAM OF KNEE 3: CPT | Mod: 26,LT,, | Performed by: RADIOLOGY

## 2024-01-17 PROCEDURE — 99215 OFFICE O/P EST HI 40 MIN: CPT | Mod: PBBFAC | Performed by: STUDENT IN AN ORGANIZED HEALTH CARE EDUCATION/TRAINING PROGRAM
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
## 2024-01-23 ENCOUNTER — OFFICE VISIT (OUTPATIENT)
Dept: PODIATRY | Facility: CLINIC | Age: 79
End: 2024-01-23
Payer: MEDICARE

## 2024-01-23 VITALS — WEIGHT: 248.69 LBS | HEIGHT: 69 IN | BODY MASS INDEX: 36.83 KG/M2

## 2024-01-23 DIAGNOSIS — E11.49 TYPE II DIABETES MELLITUS WITH NEUROLOGICAL MANIFESTATIONS: Primary | ICD-10-CM

## 2024-01-23 DIAGNOSIS — B35.1 ONYCHOMYCOSIS DUE TO DERMATOPHYTE: ICD-10-CM

## 2024-01-23 PROCEDURE — 11721 DEBRIDE NAIL 6 OR MORE: CPT | Mod: S$PBB,Q9,, | Performed by: PODIATRIST

## 2024-01-23 PROCEDURE — 99499 UNLISTED E&M SERVICE: CPT | Mod: S$PBB,,, | Performed by: PODIATRIST

## 2024-01-23 PROCEDURE — 99999 PR PBB SHADOW E&M-EST. PATIENT-LVL III: CPT | Mod: PBBFAC,,, | Performed by: PODIATRIST

## 2024-01-23 PROCEDURE — 99213 OFFICE O/P EST LOW 20 MIN: CPT | Mod: 25,PBBFAC,PO | Performed by: PODIATRIST

## 2024-01-23 PROCEDURE — 11721 DEBRIDE NAIL 6 OR MORE: CPT | Mod: PBBFAC,Q9,PO | Performed by: PODIATRIST
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
## 2024-01-25 ENCOUNTER — PATIENT MESSAGE (OUTPATIENT)
Dept: ORTHOPEDICS | Facility: CLINIC | Age: 79
End: 2024-01-25
Payer: MEDICARE

## 2024-01-25 ENCOUNTER — PATIENT MESSAGE (OUTPATIENT)
Dept: SPINE | Facility: CLINIC | Age: 79
End: 2024-01-25
Payer: MEDICARE

## 2024-01-26 ENCOUNTER — PATIENT MESSAGE (OUTPATIENT)
Dept: PAIN MEDICINE | Facility: OTHER | Age: 79
End: 2024-01-26
Payer: MEDICARE

## 2024-01-31 ENCOUNTER — EXTERNAL CHRONIC CARE MANAGEMENT (OUTPATIENT)
Dept: PRIMARY CARE CLINIC | Facility: CLINIC | Age: 79
End: 2024-01-31
Payer: MEDICARE

## 2024-01-31 PROCEDURE — 99490 CHRNC CARE MGMT STAFF 1ST 20: CPT | Mod: S$PBB,,, | Performed by: FAMILY MEDICINE

## 2024-01-31 PROCEDURE — 99490 CHRNC CARE MGMT STAFF 1ST 20: CPT | Mod: PBBFAC,PO | Performed by: FAMILY MEDICINE

## 2024-02-06 ENCOUNTER — OFFICE VISIT (OUTPATIENT)
Dept: ORTHOPEDICS | Facility: CLINIC | Age: 79
End: 2024-02-06
Payer: MEDICARE

## 2024-02-06 VITALS — HEIGHT: 69 IN | BODY MASS INDEX: 36.58 KG/M2 | WEIGHT: 247 LBS

## 2024-02-06 DIAGNOSIS — M17.11 PRIMARY OSTEOARTHRITIS OF RIGHT KNEE: ICD-10-CM

## 2024-02-06 DIAGNOSIS — Z96.652 STATUS POST TOTAL KNEE REPLACEMENT, LEFT: Primary | ICD-10-CM

## 2024-02-06 PROCEDURE — 99024 POSTOP FOLLOW-UP VISIT: CPT | Mod: POP,,, | Performed by: ORTHOPAEDIC SURGERY

## 2024-02-06 PROCEDURE — 99999 PR PBB SHADOW E&M-EST. PATIENT-LVL III: CPT | Mod: PBBFAC,,, | Performed by: ORTHOPAEDIC SURGERY

## 2024-02-06 PROCEDURE — 99213 OFFICE O/P EST LOW 20 MIN: CPT | Mod: PBBFAC | Performed by: ORTHOPAEDIC SURGERY

## 2024-02-07 ENCOUNTER — TELEPHONE (OUTPATIENT)
Dept: VASCULAR SURGERY | Facility: CLINIC | Age: 79
End: 2024-02-07
Payer: MEDICARE

## 2024-02-07 NOTE — PROGRESS NOTES
Subjective:      Patient ID: Jani Cha is a 78 y.o. male.    Chief Complaint:   Pain of the Left Knee    HPI  He returns about 12 weeks out from left TKA.  His left knee pain is down to 2/10, and he is very pleased with his progress.  His right knee pain is bad, and he is going to have RFA in pain management.  He wanted to discuss this with me.  He only takes Tylenol.  He is fine at rest without significant pain.  He may be calling us to replace the right knee in the summer.  No new symptoms to report.      Objective:        Ortho/SPM Exam  Left knee:  On exam, the scar is well healed without redness or tenderness.  There is no effusion.  Active range of motion is 0-115° without crepitus.  No instability to varus/valgus stress in extension or flexion.  No excessive sagittal plane instability.  Calves soft, nontender.  Distal neurovascular intact.      IMAGING:  None today  Assessment:   Progressing well status post left TKA      Plan:   Anniversary follow-up with x-rays.  I recommended that he proceed with RFA for the right knee.    The patient's pathophysiology was explained in detail with reference to x-rays, models, other visual aids as appropriate.  Treatment options were discussed in detail.  Questions were invited and answered to the patient's satisfaction.      Rancho Ferrara MD  Orthopedic Surgery

## 2024-02-15 ENCOUNTER — HOSPITAL ENCOUNTER (OUTPATIENT)
Facility: OTHER | Age: 79
Discharge: HOME | End: 2024-02-15
Attending: STUDENT IN AN ORGANIZED HEALTH CARE EDUCATION/TRAINING PROGRAM | Admitting: STUDENT IN AN ORGANIZED HEALTH CARE EDUCATION/TRAINING PROGRAM
Payer: MEDICARE

## 2024-02-15 ENCOUNTER — NURSE TRIAGE (OUTPATIENT)
Dept: ADMINISTRATIVE | Facility: CLINIC | Age: 79
End: 2024-02-15
Payer: MEDICARE

## 2024-02-15 VITALS
OXYGEN SATURATION: 94 % | DIASTOLIC BLOOD PRESSURE: 71 MMHG | RESPIRATION RATE: 16 BRPM | HEART RATE: 74 BPM | SYSTOLIC BLOOD PRESSURE: 136 MMHG | HEIGHT: 70 IN | WEIGHT: 246 LBS | BODY MASS INDEX: 35.22 KG/M2 | TEMPERATURE: 98 F

## 2024-02-15 DIAGNOSIS — M17.11 OSTEOARTHRITIS OF RIGHT KNEE, UNSPECIFIED OSTEOARTHRITIS TYPE: ICD-10-CM

## 2024-02-15 DIAGNOSIS — G89.29 CHRONIC PAIN OF RIGHT KNEE: Primary | ICD-10-CM

## 2024-02-15 DIAGNOSIS — M25.561 CHRONIC PAIN OF RIGHT KNEE: Primary | ICD-10-CM

## 2024-02-15 DIAGNOSIS — G89.29 CHRONIC PAIN: ICD-10-CM

## 2024-02-15 LAB — POCT GLUCOSE: 142 MG/DL (ref 70–110)

## 2024-02-15 PROCEDURE — 63600175 PHARM REV CODE 636 W HCPCS: Mod: JZ,JG | Performed by: STUDENT IN AN ORGANIZED HEALTH CARE EDUCATION/TRAINING PROGRAM

## 2024-02-15 PROCEDURE — 64454 NJX AA&/STRD GNCLR NRV BRNCH: CPT | Mod: RT | Performed by: STUDENT IN AN ORGANIZED HEALTH CARE EDUCATION/TRAINING PROGRAM

## 2024-02-15 PROCEDURE — 64454 NJX AA&/STRD GNCLR NRV BRNCH: CPT | Mod: RT,,, | Performed by: STUDENT IN AN ORGANIZED HEALTH CARE EDUCATION/TRAINING PROGRAM

## 2024-02-15 PROCEDURE — 25000003 PHARM REV CODE 250: Performed by: STUDENT IN AN ORGANIZED HEALTH CARE EDUCATION/TRAINING PROGRAM

## 2024-02-15 RX ORDER — SODIUM CHLORIDE 9 MG/ML
INJECTION, SOLUTION INTRAVENOUS CONTINUOUS
Status: DISCONTINUED | OUTPATIENT
Start: 2024-02-15 | End: 2024-02-15 | Stop reason: HOSPADM

## 2024-02-15 RX ORDER — LIDOCAINE HYDROCHLORIDE 20 MG/ML
INJECTION, SOLUTION INFILTRATION; PERINEURAL
Status: DISCONTINUED | OUTPATIENT
Start: 2024-02-15 | End: 2024-02-15 | Stop reason: HOSPADM

## 2024-02-15 RX ORDER — BUPIVACAINE HYDROCHLORIDE 2.5 MG/ML
INJECTION, SOLUTION EPIDURAL; INFILTRATION; INTRACAUDAL
Status: DISCONTINUED | OUTPATIENT
Start: 2024-02-15 | End: 2024-02-15 | Stop reason: HOSPADM

## 2024-02-15 NOTE — INTERVAL H&P NOTE
The patient was examined and no significant changes were noted from the updated H&P or last clinic note.    The risks and benefits of this procedure, including alternative therapies, were discussed with the patient.  The discussion of risks included infection, bleeding, need for additional procedures or surgery, nerve damage, paralysis, adverse medication reaction(s), stroke, and if appropriate for the procedure, death.  Questions regarding the procedure, risks, expected outcome, and possible side effects were solicited and answered to Jani's satisfaction.  Jani Cha wishes to proceed with the injection or procedure as confirmed by written consent.

## 2024-02-15 NOTE — TELEPHONE ENCOUNTER
Called patient to schedule appointment, patient decline appointment at [facility] offered another clinic patient agreed. Next available appointment 2/19 patient declined states its to far out. Patient states he can not wait that long, patient states he will go to the ER.

## 2024-02-15 NOTE — DISCHARGE SUMMARY
Discharge Note  Short Stay      SUMMARY     Admit Date: 2/15/2024    Attending Physician: Thanh Chen MD PhD    Discharge Physician: Thanh Chen      Discharge Date: 2/15/2024 8:20 AM    Procedure(s) (LRB):  BLOCK, RIGHT GENICULAR (Right)    Final Diagnosis: Primary osteoarthritis of right knee [M17.11]  Chronic pain of right knee [M25.561, G89.29]    Disposition: Home or self care    Patient Instructions:   Current Discharge Medication List        CONTINUE these medications which have NOT CHANGED    Details   acetaminophen (TYLENOL) 500 MG tablet Take 2 tablets (1,000 mg total) by mouth every 8 (eight) hours as needed (Mild to moderate pain).  Refills: 0      !! acetaminophen (TYLENOL) 650 MG TbSR Take 650 mg by mouth every 8 (eight) hours.      !! acetaminophen (TYLENOL) 650 MG TbSR Take 1 tablet (650 mg total) by mouth every 8 (eight) hours.  Qty: 120 tablet, Refills: 0    Associated Diagnoses: S/P total knee replacement, left      aspirin (ECOTRIN) 81 MG EC tablet Take 81 mg by mouth once daily.      atorvastatin (LIPITOR) 80 MG tablet Take 1 tablet (80 mg total) by mouth once daily.  Qty: 90 tablet, Refills: 3      blood sugar diagnostic Strp 1 strip by Misc.(Non-Drug; Combo Route) route once daily.  Qty: 200 strip, Refills: 11    Comments: TRUE METTICS  Associated Diagnoses: Type 2 diabetes mellitus with diabetic neuropathy, without long-term current use of insulin      calcium carbonate (OS-BAILEE) 500 mg calcium (1,250 mg) tablet Take 1 tablet by mouth once daily.      carvediloL (COREG) 25 MG tablet TAKE 1 TABLET BY MOUTH TWICE DAILY WITH MEALS  Qty: 180 tablet, Refills: 3    Associated Diagnoses: Essential hypertension      clopidogreL (PLAVIX) 75 mg tablet Take 1 tablet (75 mg total) by mouth once daily.  Qty: 90 tablet, Refills: 3      clotrimazole-betamethasone 1-0.05% (LOTRISONE) cream Apply topically 2 (two) times daily.  Qty: 45 g, Refills: 3    Associated Diagnoses: Tinea cruris      dulaglutide  (TRULICITY) 1.5 mg/0.5 mL pen injector Inject 1.5 mg into the skin every 7 days.  Qty: 12 pen, Refills: 3    Associated Diagnoses: Type 2 diabetes mellitus with hyperglycemia, without long-term current use of insulin      famotidine (PEPCID) 20 MG tablet Take 1 tablet (20 mg total) by mouth nightly as needed for Heartburn.  Qty: 90 tablet, Refills: 1    Associated Diagnoses: Gastroesophageal reflux disease, unspecified whether esophagitis present      fluticasone propionate (FLONASE) 50 mcg/actuation nasal spray 1 spray (50 mcg total) by Each Nostril route once daily.  Qty: 16 g, Refills: 3    Associated Diagnoses: Upper respiratory tract infection, unspecified type      glipiZIDE (GLUCOTROL) 10 MG TR24 Take 1 tablet (10 mg total) by mouth 2 (two) times daily with meals.  Qty: 180 tablet, Refills: 3    Associated Diagnoses: Uncontrolled type 2 diabetes mellitus with hyperglycemia, without long-term current use of insulin; Type 2 diabetes mellitus with diabetic neuropathy, without long-term current use of insulin      melatonin (MELATIN) 3 mg tablet Take 2 tablets (6 mg total) by mouth nightly as needed for Insomnia.  Refills: 0      methocarbamoL (ROBAXIN) 750 MG Tab Take 1 tablet (750 mg total) by mouth 4 (four) times daily as needed (for muscle spasms).  Qty: 40 tablet, Refills: 0    Associated Diagnoses: S/P total knee replacement, left      oxybutynin (DITROPAN-XL) 5 MG TR24 Take 1 tablet (5 mg total) by mouth once daily.  Qty: 30 tablet, Refills: 11    Associated Diagnoses: Nocturia more than twice per night      oxyCODONE (ROXICODONE) 5 MG immediate release tablet Take 1-2 tablets by mouth every 4-6 hours as needed for pain  Qty: 50 tablet, Refills: 0    Comments: Quantity prescribed more than 7 day supply? No  Associated Diagnoses: S/P total knee replacement, left      pantoprazole (PROTONIX) 40 MG tablet Take 1 tablet by mouth once daily  Qty: 90 tablet, Refills: 3      senna-docusate 8.6-50 mg (SENNA WITH  DOCUSATE SODIUM) 8.6-50 mg per tablet Take 1 tablet by mouth once daily.  Qty: 30 tablet, Refills: 0    Associated Diagnoses: S/P total knee replacement, left      TRUEPLUS LANCETS 33 gauge Misc Apply 1 lancet topically once daily. Use to test blood sugar daily; discard lancet after each use  Qty: 100 each, Refills: 11    Associated Diagnoses: Type 2 diabetes mellitus with diabetic neuropathy, without long-term current use of insulin      valsartan (DIOVAN) 80 MG tablet Take 1 tablet (80 mg total) by mouth once daily.  Qty: 90 tablet, Refills: 3       !! - Potential duplicate medications found. Please discuss with provider.              Discharge Diagnosis: Primary osteoarthritis of right knee [M17.11]  Chronic pain of right knee [M25.561, G89.29]  Condition on Discharge: Stable with no complications to procedure   Diet on Discharge: Same as before.  Activity: as per instruction sheet.  Discharge to: Home with a responsible adult.  Follow up: 2-4 weeks       Please call my office or pager at [phone] if experienced any weakness or loss of sensation, fever > 101.5, pain uncontrolled with oral medications, persistent nausea/vomiting/or diarrhea, redness or drainage from the incisions, or any other worrisome concerns. If physician on call was not reached or could not communicate with our office for any reason please go to the nearest emergency department      Thanh Chen MD PhD

## 2024-02-15 NOTE — TELEPHONE ENCOUNTER
OOC LAUREN Palacios in [facility] got him his earring aid.   Patient has ear pain. Right,   is closed up.  Ear pain if he touches.   Got a q-tip to scratch his ear.   Came out with large amount of wax came out.   Put his hearing aid back in,  now it is swollen. Pain  6/10  Care advise is to see in office today.     states no appt for today.  Or tomorrow.  Could not complete the conclustioon paitent disconnected call due to not being able to get appt.    Reason for Disposition   Diabetes mellitus or a weak immune system (e.g., HIV positive, cancer chemotherapy, transplant patient)    Additional Information   Negative: Sounds like a life-threatening emergency to the triager   Negative: Pink or red swelling behind the ear   Negative: Stiff neck (can't touch chin to chest)   Negative: Patient sounds very sick or weak to the triager   Negative: Severe earache pain   Negative: Fever > 103 F (39.4 C)   Negative: Pointed object was inserted into the ear canal (e.g., a pencil, stick, or wire)   Negative: White, yellow, or green discharge    Protocols used: Earache-A-OH

## 2024-02-15 NOTE — PLAN OF CARE
Pt denies pain or discomfort @ this time. Pt states he is ready to be discharged home @ this time.Discharge instructions reviewed with patient, with time allotted for questions. Pt verbalizes understanding of instructions and states they have no further questions @ this time. Pain diary explained and provided. Procedure site is clean, dry and intact. Band-aids in place. Vital signs are stable. No acute distress noted. Staff is at bedside to assist patient.

## 2024-02-15 NOTE — OP NOTE
Diagnostic Genicular Nerve Block under Fluoroscopic Guidance    The procedure, risks, benefits, and options were discussed with the patient. There are no contraindications to the procedure. The patent expressed understanding and agreed to the procedure. Informed written consent was obtained prior to the start of the procedure and can be found in the patient's chart.    PATIENT NAME: Mr. Jani Cha   MRN: [id number]     DATE OF PROCEDURE: 02/15/2024    PROCEDURE: Diagnostic Right Genicular Nerve Block under Fluoroscopic Guidance    PRE-OP DIAGNOSIS: Primary osteoarthritis of right knee [M17.11]  Chronic pain of right knee [M25.561, G89.29]    POST-OP DIAGNOSIS: Same    PHYSICIAN: Thanh Chen MD    ASSISTANTS: Garett Rodas MD     MEDICATIONS INJECTED: Bupivacine 0.25%     LOCAL ANESTHETIC INJECTED: Xylocaine 2%     SEDATION: None    ESTIMATED BLOOD LOSS: None    COMPLICATIONS: None    INTERVAL HISTORY: Patient has clinical findings of chronic knee pain that is not relieved by other therapies.     TECHNIQUE: Time-out was performed to identify the patient and procedure to be performed. With the patient laying in a supine position, the surgical area was prepped and draped in the usual sterile fashion using ChloraPrep and a fenestrated drape. Three target sites including the superior lateral genicular nerve where the lateral femoral shaft meets the epicondyle, the superior medial genicular nerve where the medial femoral shaft meets the epicondyle, and the inferior medial genicular nerve where the medial tibial shaft meets the epicondyle, were determined under fluoroscopy guidance. Skin anesthesia was achieved by injecting Lidocaine 2% over the injection sites. A 25 gauge, 3.5 inch spinal quinke needle was then advanced under fluoroscopy in the AP and lateral views into the positions of the geniculate nerves at each site. Once the needle tip position was confirmed on fluoroscopy, negative pressure was applied to  confirm no intravascular placement.  1 mL of the anesthetic listed above was then slowly injected at each site. The needles were removed and bleeding was nil. A sterile dressing was applied. No specimens collected. The patient tolerated the procedure well.     PRE-PROCEDURE PAIN SCORE: 7/10    POST-PROCEDURE PAIN SCORE: 0/10    The patient was monitored after the procedure in the recovery area. They were given post-procedure and discharge instructions to follow at home. The patient was discharged in a stable condition.    Thanh Chen MD

## 2024-02-15 NOTE — DISCHARGE INSTRUCTIONS

## 2024-02-16 ENCOUNTER — OFFICE VISIT (OUTPATIENT)
Dept: INTERNAL MEDICINE | Facility: CLINIC | Age: 79
End: 2024-02-16
Payer: MEDICARE

## 2024-02-16 ENCOUNTER — IMMUNIZATION (OUTPATIENT)
Dept: INTERNAL MEDICINE | Facility: CLINIC | Age: 79
End: 2024-02-16
Payer: MEDICARE

## 2024-02-16 VITALS
RESPIRATION RATE: 18 BRPM | BODY MASS INDEX: 35.32 KG/M2 | OXYGEN SATURATION: 97 % | WEIGHT: 246.69 LBS | HEIGHT: 70 IN | SYSTOLIC BLOOD PRESSURE: 110 MMHG | HEART RATE: 69 BPM | DIASTOLIC BLOOD PRESSURE: 70 MMHG

## 2024-02-16 DIAGNOSIS — H66.91 RIGHT OTITIS MEDIA, UNSPECIFIED OTITIS MEDIA TYPE: ICD-10-CM

## 2024-02-16 DIAGNOSIS — H60.501 ACUTE OTITIS EXTERNA OF RIGHT EAR, UNSPECIFIED TYPE: Primary | ICD-10-CM

## 2024-02-16 DIAGNOSIS — Z23 NEED FOR VACCINATION: Primary | ICD-10-CM

## 2024-02-16 PROCEDURE — 99999PBSHW COVID-19 VAC, MRNA 2023 (MODERNA)(PF) 50 MCG/0.5 ML IM SUSR (12+): Mod: PBBFAC,,,

## 2024-02-16 PROCEDURE — 91322 SARSCOV2 VAC 50 MCG/0.5ML IM: CPT | Mod: PBBFAC

## 2024-02-16 PROCEDURE — 99214 OFFICE O/P EST MOD 30 MIN: CPT | Mod: S$PBB,,, | Performed by: INTERNAL MEDICINE

## 2024-02-16 PROCEDURE — 99215 OFFICE O/P EST HI 40 MIN: CPT | Mod: PBBFAC,25 | Performed by: INTERNAL MEDICINE

## 2024-02-16 PROCEDURE — 99999 PR PBB SHADOW E&M-EST. PATIENT-LVL V: CPT | Mod: PBBFAC,,, | Performed by: INTERNAL MEDICINE

## 2024-02-16 RX ORDER — DOXYCYCLINE HYCLATE 100 MG
100 TABLET ORAL 2 TIMES DAILY WITH MEALS
Qty: 14 TABLET | Refills: 0 | Status: SHIPPED | OUTPATIENT
Start: 2024-02-16 | End: 2024-02-23

## 2024-02-16 RX ORDER — VALSARTAN 80 MG/1
80 TABLET ORAL
Qty: 90 TABLET | Refills: 3 | Status: SHIPPED | OUTPATIENT
Start: 2024-02-16

## 2024-02-16 RX ORDER — NEOMYCIN SULFATE, POLYMYXIN B SULFATE, HYDROCORTISONE 3.5; 10000; 1 MG/ML; [USP'U]/ML; MG/ML
3 SOLUTION/ DROPS AURICULAR (OTIC) EVERY 6 HOURS
Qty: 10 ML | Refills: 0 | Status: SHIPPED | OUTPATIENT
Start: 2024-02-16 | End: 2024-03-04 | Stop reason: SDUPTHER

## 2024-02-16 NOTE — PROGRESS NOTES
78-year-old male    Presents with pain and blockage involving the right ear.    Two days ago he felt that it within the ear.  He wears hearing aids.  He put a Q-tip in it .  Was able to retrieve a big piece of wax but since in his been hearing reoccurring irritation within the ear, postauricular region and feeling block.    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    Examination   Weight 246   Blood pressure 110/70   Pulse 68  Left ear canal left tympanic membranes normal very minimal cerumen    Right ear canal has a small piece of wax in which was removed.  It was flushed out.  The ear canal is hyperemic and slightly edematous.  Tympanic membrane hyperemic    Impression  Otitis externa   Otitis media    Plan   Cortisporin otic solution 3-4 drops 4 times a day   Doxycycline 100 mg twice a day for 7 days.  Patient is allergic to penicillin

## 2024-02-19 ENCOUNTER — TELEPHONE (OUTPATIENT)
Dept: PAIN MEDICINE | Facility: OTHER | Age: 79
End: 2024-02-19
Payer: MEDICARE

## 2024-02-19 ENCOUNTER — HOSPITAL ENCOUNTER (OUTPATIENT)
Dept: RADIOLOGY | Facility: HOSPITAL | Age: 79
Discharge: HOME | End: 2024-02-19
Attending: SURGERY
Payer: MEDICARE

## 2024-02-19 ENCOUNTER — TELEPHONE (OUTPATIENT)
Dept: SPINE | Facility: CLINIC | Age: 79
End: 2024-02-19
Payer: MEDICARE

## 2024-02-19 DIAGNOSIS — G89.29 CHRONIC PAIN OF RIGHT KNEE: Primary | ICD-10-CM

## 2024-02-19 DIAGNOSIS — M17.11 PRIMARY OSTEOARTHRITIS OF RIGHT KNEE: Primary | ICD-10-CM

## 2024-02-19 DIAGNOSIS — I71.60 THORACOABDOMINAL AORTIC ANEURYSM (TAAA) WITHOUT RUPTURE, UNSPECIFIED PART: ICD-10-CM

## 2024-02-19 DIAGNOSIS — I89.0 LYMPHEDEMA OF BOTH LOWER EXTREMITIES: ICD-10-CM

## 2024-02-19 DIAGNOSIS — E11.40 TYPE 2 DIABETES MELLITUS WITH DIABETIC NEUROPATHY, WITHOUT LONG-TERM CURRENT USE OF INSULIN: ICD-10-CM

## 2024-02-19 DIAGNOSIS — M25.561 CHRONIC PAIN OF RIGHT KNEE: Primary | ICD-10-CM

## 2024-02-19 DIAGNOSIS — M17.11 PRIMARY OSTEOARTHRITIS OF RIGHT KNEE: ICD-10-CM

## 2024-02-19 PROCEDURE — 74176 CT ABD & PELVIS W/O CONTRAST: CPT | Mod: TC

## 2024-02-19 PROCEDURE — 74176 CT ABD & PELVIS W/O CONTRAST: CPT | Mod: 26,,, | Performed by: RADIOLOGY

## 2024-02-19 PROCEDURE — 71250 CT THORAX DX C-: CPT | Mod: TC

## 2024-02-19 PROCEDURE — 71250 CT THORAX DX C-: CPT | Mod: 26,,, | Performed by: RADIOLOGY

## 2024-02-19 RX ORDER — LANCETS 33 GAUGE
EACH MISCELLANEOUS
Qty: 100 EACH | Refills: 3 | Status: SHIPPED | OUTPATIENT
Start: 2024-02-19

## 2024-02-19 NOTE — TELEPHONE ENCOUNTER
Pain Dairy    10 am- 30 % relief /pain score 5  11- 40% relief  / pain score 4  12- 40% relief /pain score 4  1- 40% relief /pain score 4   2- 50% relief  /pain score 4  3- 50% relief /pain score 4    Bed time 30 % relief /pain score 5    24 hr after procedure 0 relief /pain score 5-6

## 2024-02-19 NOTE — TELEPHONE ENCOUNTER
Refill Decision Note   Jani Cha  is requesting a refill authorization.  Brief Assessment and Rationale for Refill:  Approve     Medication Therapy Plan:         Comments:     Note composed:3:41 PM 02/19/2024

## 2024-02-19 NOTE — TELEPHONE ENCOUNTER
----- Message from Max Oliveira MD sent at 2/19/2024 12:18 PM CST -----  Regarding: RE: Request to hold Plavix and Aspirin  Ok to hold ASA and plavix as needed.   ----- Message -----  From: Rain Moise LPN  Sent: 2/19/2024  12:12 PM CST  To: Max Oliveira MD  Subject: Request to hold Plavix and Aspirin               Good afternoon,    Patient will be scheduled to have a procedure with Dr. Chen, Right Knee Radiofrequency Ablation. . Staff is requesting to hold Plavix for 7 days and Aspirin for 5 days, prior to procedure. Please advise.    Thank you,  Rain

## 2024-02-19 NOTE — TELEPHONE ENCOUNTER
No care due was identified.  [facility] Embedded Care Due Messages. Reference number: [id number].   2/19/2024 11:25:39 AM CST

## 2024-02-19 NOTE — TELEPHONE ENCOUNTER
----- Message from Elyssa Doran sent at 2/19/2024 10:19 AM CST -----  Contact: Patient  Type:  Patient Call          Who Called: patient         Does the patient know what this is regarding?: Requesting a call back ;pt darlene;benson to give his test results 10am 30% relief pain score 5 ; 11am 40% pain score 4 ; 12 40% 4 pain score relief ; 1:00 40% 4 pain relief ; pt have more results that he recorded ;please advise           Would the patient rather a call back or a response via [facility]?call           Best Call Back Number: [phone]             Additional Information:

## 2024-02-19 NOTE — TELEPHONE ENCOUNTER
----- Message from Dinh Marques sent at 2/16/2024  3:44 PM CST -----   Name of Who is Calling:     What is the request in detail:  patient request call back in reference to pain diary /patient current pain level is a   5-6  Please contact to further discuss and advise      Can the clinic reply by [facility]:     What Number to Call Back if not in [facility]:   [phone]

## 2024-02-19 NOTE — TELEPHONE ENCOUNTER
Staff left patient a message regarding getting his pain dairy, staff informed patient to contact office at [phone] to discuss.

## 2024-02-20 ENCOUNTER — TELEPHONE (OUTPATIENT)
Dept: INTERNAL MEDICINE | Facility: CLINIC | Age: 79
End: 2024-02-20
Payer: MEDICARE

## 2024-02-20 ENCOUNTER — PATIENT MESSAGE (OUTPATIENT)
Dept: PAIN MEDICINE | Facility: OTHER | Age: 79
End: 2024-02-20
Payer: MEDICARE

## 2024-02-20 ENCOUNTER — PATIENT MESSAGE (OUTPATIENT)
Dept: INTERNAL MEDICINE | Facility: CLINIC | Age: 79
End: 2024-02-20
Payer: MEDICARE

## 2024-02-20 NOTE — TELEPHONE ENCOUNTER
----- Message from Rico Wayne sent at 2/20/2024 10:58 AM CST -----  Contact: [phone]@ [facility]  Good morning [facility] would like a call back to discuss changing one of the patient med neomycin-polymyxin-hydrocortisone (CORTISPORIN) otic solution. Please give them a call back [phone]

## 2024-02-20 NOTE — TELEPHONE ENCOUNTER
----- Message from Cassidy Davis MA sent at 2/20/2024 12:32 PM CST -----  Regarding: Changing the medication  Contact: Amber @ [facility] [phone]  Caller: Amber @ [facility] [phone] (Today, 12:25 PM)  Pharmacy is calling to clarify or change an RX.    RX name:  neomycin-polymyxin-hydrocortisone (CORTISPORIN) otic solution    What do they need to clarify:  Amber at [facility] would like a call back to discuss changing one of the patient med neomycin-polymyxin-hydrocortisone (CORTISPORIN) otic solution. Please call Amber back for advice.      Amber states this is an urgent message due to the pt waiting a very long time for the RX and changing the RX.    ----- Message -----  From: Farzana Murillo  Sent: 2/20/2024  12:28 PM CST  To: Giovanny BACON Staff    Pharmacy is calling to clarify or change an RX.    RX name:  neomycin-polymyxin-hydrocortisone (CORTISPORIN) otic solution    What do they need to clarify:  Amber at [facility] would like a call back to discuss changing one of the patient med neomycin-polymyxin-hydrocortisone (CORTISPORIN) otic solution. Please call Amber back for advice.       Amber states this is an urgent message due to the pt waiting a very long time for the RX and changing the RX.

## 2024-02-20 NOTE — TELEPHONE ENCOUNTER
[phone]@ [facility] (Today, 10:58 AM)  Good morning [facility] would like a call back to discuss changing one of the patient med neomycin-polymyxin-hydrocortisone (CORTISPORIN) otic solution. Please give them a call back [phone]

## 2024-02-20 NOTE — TELEPHONE ENCOUNTER
This ear drop was prescribed on Friday morning. I called his pharmacy because there is no indication in his chart that he needs a PA. When a PA is needed, the request will show up in the prescription. [facility] is preparing to fill now and the cost to him will be 50 dollars. I do not do PA's on the cost on co-payment of prescriptions.

## 2024-02-20 NOTE — TELEPHONE ENCOUNTER
Dr Baltazar the pharmacy could not get the solution but are able to get the suspension. They will change rx to the suspension. Verbal order accepted.

## 2024-02-27 ENCOUNTER — PATIENT OUTREACH (OUTPATIENT)
Dept: ADMINISTRATIVE | Facility: HOSPITAL | Age: 79
End: 2024-02-27
Payer: MEDICARE

## 2024-02-27 DIAGNOSIS — E11.40 TYPE 2 DIABETES MELLITUS WITH DIABETIC NEUROPATHY, WITHOUT LONG-TERM CURRENT USE OF INSULIN: Primary | Chronic | ICD-10-CM

## 2024-02-28 ENCOUNTER — OFFICE VISIT (OUTPATIENT)
Dept: VASCULAR SURGERY | Facility: CLINIC | Age: 79
End: 2024-02-28
Payer: MEDICARE

## 2024-02-28 ENCOUNTER — TELEPHONE (OUTPATIENT)
Dept: OPTOMETRY | Facility: CLINIC | Age: 79
End: 2024-02-28
Payer: MEDICARE

## 2024-02-28 VITALS
DIASTOLIC BLOOD PRESSURE: 85 MMHG | HEART RATE: 67 BPM | BODY MASS INDEX: 35.27 KG/M2 | WEIGHT: 246.38 LBS | HEIGHT: 70 IN | SYSTOLIC BLOOD PRESSURE: 134 MMHG

## 2024-02-28 DIAGNOSIS — I77.810 ASCENDING AORTA DILATATION: ICD-10-CM

## 2024-02-28 DIAGNOSIS — I71.40 ABDOMINAL AORTIC ANEURYSM (AAA) WITHOUT RUPTURE, UNSPECIFIED PART: Primary | ICD-10-CM

## 2024-02-28 PROCEDURE — 99214 OFFICE O/P EST MOD 30 MIN: CPT | Mod: S$PBB,,, | Performed by: SURGERY

## 2024-02-28 PROCEDURE — 99999 PR PBB SHADOW E&M-EST. PATIENT-LVL IV: CPT | Mod: PBBFAC,,, | Performed by: SURGERY

## 2024-02-28 PROCEDURE — 99214 OFFICE O/P EST MOD 30 MIN: CPT | Mod: PBBFAC | Performed by: SURGERY
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
## 2024-02-28 NOTE — TELEPHONE ENCOUNTER
Returned pt call and offered him an appt with Dr Marques at the [place] [facility] for 7/16/24. pt would like to keep appt scheduled in May with Dr Marques at [facility].

## 2024-02-28 NOTE — TELEPHONE ENCOUNTER
----- Message from Vlad Fenton sent at 2/28/2024  3:45 PM CST -----  Consult/Advisory    Name Of Caller:Mrs Cha       Contact Preference:[phone]    Nature of call: Ptn wife called asking if why no appts are in [place] and if the  can get one in [place] please call ptn wife

## 2024-02-29 ENCOUNTER — EXTERNAL CHRONIC CARE MANAGEMENT (OUTPATIENT)
Dept: PRIMARY CARE CLINIC | Facility: CLINIC | Age: 79
End: 2024-02-29
Payer: MEDICARE

## 2024-02-29 PROCEDURE — 99490 CHRNC CARE MGMT STAFF 1ST 20: CPT | Mod: S$PBB,,, | Performed by: FAMILY MEDICINE

## 2024-02-29 PROCEDURE — 99490 CHRNC CARE MGMT STAFF 1ST 20: CPT | Mod: PBBFAC,PO | Performed by: FAMILY MEDICINE

## 2024-03-04 RX ORDER — NEOMYCIN SULFATE, POLYMYXIN B SULFATE, HYDROCORTISONE 3.5; 10000; 1 MG/ML; [USP'U]/ML; MG/ML
3 SOLUTION/ DROPS AURICULAR (OTIC) EVERY 6 HOURS
Qty: 10 ML | Refills: 0 | Status: SHIPPED | OUTPATIENT
Start: 2024-03-04 | End: 2024-03-07

## 2024-03-04 NOTE — TELEPHONE ENCOUNTER
----- Message from Danna Eckert sent at 3/4/2024 10:18 AM CST -----  Contact: [phone]  Pt is requesting a refill on his eardrops.     Pt is using   [facility] [place] - [address]  [address]  [place] [address]  Phone: [phone] Fax: [phone]            Thank you

## 2024-03-07 ENCOUNTER — LAB VISIT (OUTPATIENT)
Dept: LAB | Facility: HOSPITAL | Age: 79
End: 2024-03-07
Attending: FAMILY MEDICINE
Payer: MEDICARE

## 2024-03-07 DIAGNOSIS — E11.40 TYPE 2 DIABETES MELLITUS WITH DIABETIC NEUROPATHY, WITHOUT LONG-TERM CURRENT USE OF INSULIN: ICD-10-CM

## 2024-03-07 LAB
ALBUMIN SERPL BCP-MCNC: 3.3 G/DL (ref 3.5–5.2)
ALP SERPL-CCNC: 82 U/L (ref 55–135)
ALT SERPL W/O P-5'-P-CCNC: 14 U/L (ref 10–44)
ANION GAP SERPL CALC-SCNC: 8 MMOL/L (ref 8–16)
AST SERPL-CCNC: 16 U/L (ref 10–40)
BILIRUB SERPL-MCNC: 0.6 MG/DL (ref 0.1–1)
BUN SERPL-MCNC: 34 MG/DL (ref 8–23)
CALCIUM SERPL-MCNC: 10.6 MG/DL (ref 8.7–10.5)
CHLORIDE SERPL-SCNC: 107 MMOL/L (ref 95–110)
CHOLEST SERPL-MCNC: 116 MG/DL (ref 120–199)
CHOLEST/HDLC SERPL: 3.2 {RATIO} (ref 2–5)
CO2 SERPL-SCNC: 26 MMOL/L (ref 23–29)
CREAT SERPL-MCNC: 2 MG/DL (ref 0.5–1.4)
EST. GFR  (NO RACE VARIABLE): 33.7 ML/MIN/1.73 M^2
GLUCOSE SERPL-MCNC: 141 MG/DL (ref 70–110)
HDLC SERPL-MCNC: 36 MG/DL (ref 40–75)
HDLC SERPL: 31 % (ref 20–50)
LDLC SERPL CALC-MCNC: 57.2 MG/DL (ref 63–159)
NONHDLC SERPL-MCNC: 80 MG/DL
POTASSIUM SERPL-SCNC: 4.5 MMOL/L (ref 3.5–5.1)
PROT SERPL-MCNC: 6.5 G/DL (ref 6–8.4)
SODIUM SERPL-SCNC: 141 MMOL/L (ref 136–145)
TRIGL SERPL-MCNC: 114 MG/DL (ref 30–150)
TSH SERPL DL<=0.005 MIU/L-ACNC: 0.95 UIU/ML (ref 0.4–4)

## 2024-03-07 PROCEDURE — 80061 LIPID PANEL: CPT | Performed by: FAMILY MEDICINE

## 2024-03-07 PROCEDURE — 80053 COMPREHEN METABOLIC PANEL: CPT | Performed by: FAMILY MEDICINE

## 2024-03-07 PROCEDURE — 84443 ASSAY THYROID STIM HORMONE: CPT | Performed by: FAMILY MEDICINE

## 2024-03-07 PROCEDURE — 36415 COLL VENOUS BLD VENIPUNCTURE: CPT | Mod: PO | Performed by: FAMILY MEDICINE

## 2024-03-07 RX ORDER — NEOMYCIN SULFATE, POLYMYXIN B SULFATE AND HYDROCORTISONE 10; 3.5; 1 MG/ML; MG/ML; [USP'U]/ML
3 SUSPENSION/ DROPS AURICULAR (OTIC) 4 TIMES DAILY
Qty: 10 ML | Refills: 0 | Status: SHIPPED | OUTPATIENT
Start: 2024-03-07

## 2024-03-07 RX ORDER — NEOMYCIN SULFATE, POLYMYXIN B SULFATE, HYDROCORTISONE 3.5; 10000; 1 MG/ML; [USP'U]/ML; MG/ML
SOLUTION/ DROPS AURICULAR (OTIC)
Qty: 10 ML | Refills: 0 | Status: SHIPPED | OUTPATIENT
Start: 2024-03-07

## 2024-03-11 RX ORDER — PANTOPRAZOLE SODIUM 40 MG/1
TABLET, DELAYED RELEASE ORAL
Qty: 90 TABLET | Refills: 1 | Status: SHIPPED | OUTPATIENT
Start: 2024-03-11

## 2024-03-11 NOTE — TELEPHONE ENCOUNTER
Refill Decision Note   Jani Cha  is requesting a refill authorization.  Brief Assessment and Rationale for Refill:  Approve     Medication Therapy Plan:        Alert overridden per protocol: Yes   Comments:     Note composed:1:54 PM 03/11/2024

## 2024-03-11 NOTE — TELEPHONE ENCOUNTER
No care due was identified.  [facility] Embedded Care Due Messages. Reference number: [id number].   3/11/2024 10:36:43 AM CDT

## 2024-03-12 RX ORDER — CLOPIDOGREL BISULFATE 75 MG/1
75 TABLET ORAL
Qty: 90 TABLET | Refills: 3 | Status: SHIPPED | OUTPATIENT
Start: 2024-03-12

## 2024-03-14 ENCOUNTER — OFFICE VISIT (OUTPATIENT)
Dept: FAMILY MEDICINE | Facility: CLINIC | Age: 79
End: 2024-03-14
Payer: MEDICARE

## 2024-03-14 ENCOUNTER — TELEPHONE (OUTPATIENT)
Dept: SPINE | Facility: CLINIC | Age: 79
End: 2024-03-14
Payer: MEDICARE

## 2024-03-14 VITALS
DIASTOLIC BLOOD PRESSURE: 74 MMHG | TEMPERATURE: 98 F | HEIGHT: 69 IN | WEIGHT: 246.69 LBS | SYSTOLIC BLOOD PRESSURE: 138 MMHG | BODY MASS INDEX: 36.54 KG/M2 | OXYGEN SATURATION: 95 % | HEART RATE: 70 BPM

## 2024-03-14 DIAGNOSIS — E78.00 PURE HYPERCHOLESTEROLEMIA: Chronic | ICD-10-CM

## 2024-03-14 DIAGNOSIS — I25.10 CORONARY ARTERY DISEASE INVOLVING NATIVE CORONARY ARTERY OF NATIVE HEART WITHOUT ANGINA PECTORIS: Primary | Chronic | ICD-10-CM

## 2024-03-14 DIAGNOSIS — E27.8 ADRENAL INCIDENTALOMA: ICD-10-CM

## 2024-03-14 DIAGNOSIS — J84.10 CALCIFIED GRANULOMA OF LUNG: ICD-10-CM

## 2024-03-14 DIAGNOSIS — N18.32 STAGE 3B CHRONIC KIDNEY DISEASE: ICD-10-CM

## 2024-03-14 DIAGNOSIS — I50.9 CONGESTIVE HEART FAILURE, UNSPECIFIED HF CHRONICITY, UNSPECIFIED HEART FAILURE TYPE: ICD-10-CM

## 2024-03-14 PROCEDURE — 99214 OFFICE O/P EST MOD 30 MIN: CPT | Mod: S$PBB,,, | Performed by: FAMILY MEDICINE

## 2024-03-14 PROCEDURE — 99215 OFFICE O/P EST HI 40 MIN: CPT | Mod: PBBFAC,PO | Performed by: FAMILY MEDICINE

## 2024-03-14 PROCEDURE — 99999 PR PBB SHADOW E&M-EST. PATIENT-LVL V: CPT | Mod: PBBFAC,,, | Performed by: FAMILY MEDICINE

## 2024-03-14 NOTE — PROGRESS NOTES
"Routine Office Visit    Jani Cha  1945  [id number]      Subjective     Jani is a 78 y.o. male who presents today for:    Diabetes follow-up - Patient continues to follow-up with Dr. Kent. Patient is doing well.  A1c decreased from 7.1 to 6.8. Patient is happy with result and is taking Trulicity as prescribed.  Bilateral ear blockage - right worse than left. Patient would like ears evaluated.   S/p hip surgery - after a fall in October 2022. Patient has completed rehab. He continues to work on rehab exercises at home. He is able to walk with a Rolator.  He continues to follow-up with ortho. He has severe knee pain. He is s/p knee replacement on 11/15. He is feeling much better. He states they have planned procedure for right knee.   Osteoporosis - Patient to start on prolia. Next injection is in 1 month.  GERD - On Protonix 40mg. Sx's worse at night. Sx's present regardless of time of meal before bedtime. Sx's improve if pt sleeps with head slightly elevqated.  AAA - continued surveillance with Dr. Echavarria     Objective     Review of Systems   Constitutional:  Negative for chills and fever.   HENT:  Negative for congestion.    Eyes:  Negative for blurred vision.   Respiratory:  Negative for cough.    Cardiovascular:  Negative for chest pain.   Gastrointestinal:  Negative for abdominal pain, constipation, diarrhea, heartburn, nausea and vomiting.   Genitourinary:  Negative for dysuria.   Musculoskeletal:  Positive for back pain and joint pain. Negative for myalgias.   Skin:  Negative for itching and rash.   Neurological:  Negative for dizziness and headaches.   Psychiatric/Behavioral:  Negative for depression.        /74   Pulse 70   Temp 98.3 °F (36.8 °C)   Ht 5' 9" (1.753 m)   Wt 111.9 kg (246 lb 11.1 oz)   SpO2 95%   BMI 36.43 kg/m²   Physical Exam  Constitutional:       Appearance: He is well-developed.   HENT:      Head: Normocephalic and atraumatic.   Eyes:      Conjunctiva/sclera: " Conjunctivae normal.      Pupils: Pupils are equal, round, and reactive to light.   Neck:      Thyroid: No thyromegaly.      Vascular: No JVD.   Cardiovascular:      Rate and Rhythm: Normal rate and regular rhythm.      Heart sounds: Normal heart sounds.   Pulmonary:      Effort: Pulmonary effort is normal.      Breath sounds: Normal breath sounds. No wheezing.   Abdominal:      General: Bowel sounds are normal. There is no distension.      Palpations: Abdomen is soft.      Tenderness: There is no abdominal tenderness. There is no guarding.   Musculoskeletal:         General: Normal range of motion.      Cervical back: Normal range of motion and neck supple.   Lymphadenopathy:      Cervical: No cervical adenopathy.   Skin:     General: Skin is warm and dry.   Neurological:      Mental Status: He is alert and oriented to person, place, and time.   Psychiatric:         Behavior: Behavior normal.             Assessment     Health Maintenance         Date Due Completion Date    Shingles Vaccine (1 of 2) Never done ---    RSV Vaccine (Age 60+ and Pregnant patients) (1 - 1-dose 60+ series) Never done ---    Eye Exam 04/04/2024 4/4/2023    COVID-19 Vaccine (8 - 2023-24 season) 04/12/2024 2/16/2024    Hemoglobin A1c 06/22/2024 12/22/2023    DEXA Scan 01/19/2025 1/19/2023    Diabetes Urine Screening 03/07/2025 3/7/2024    Lipid Panel 03/07/2025 3/7/2024    Aspirin/Antiplatelet Therapy 03/14/2025 3/14/2024    TETANUS VACCINE 02/20/2027 2/20/2017              Problem List Items Addressed This Visit          Pulmonary    Calcified granuloma of lung    Overview     CT 4/12/21  CT ordered             Cardiac/Vascular    Coronary artery disease involving native coronary artery of native heart without angina pectoris - Primary (Chronic)    Overview     s/p 4 V CABG  Cardiologist - Dr. Oliveira  The current medical regimen is effective;  continue present plan and medications.            Hyperlipidemia (Chronic)    Congestive heart  failure, unspecified HF chronicity, unspecified heart failure type  The current medical regimen is effective;  continue present plan and medications.          Renal/    Stage 3b chronic kidney disease  Stable  Continue to monitor          Endocrine    Adrenal incidentaloma    Overview     Patient has been evaluated by endocrine - Dr. Kent   - Noted on CT chest, left adrenal mass 1.2 cm in size.  - negative work-up   - monitor for changes                   Follow up in about 6 months (around 9/14/2024), or if symptoms worsen or fail to improve.

## 2024-03-14 NOTE — TELEPHONE ENCOUNTER
Staff spoke with the patient regarding stopping his medications before procedure he have scheduled with . staff informed patient that he doesn't need to stop his medications for his scheduled procedure.

## 2024-03-14 NOTE — TELEPHONE ENCOUNTER
----- Message from Margaret Cerda sent at 3/14/2024  1:16 PM CDT -----  Regarding: procedure rx question  Name of Who is Calling:pt           What is the request in detail:  Pt is calling asking if he needs to stop taking Plavix before his procedure. Please call to advise         Can the clinic reply by [facility]:          What Number to Call Back if not in [facility]: [phone]

## 2024-03-21 ENCOUNTER — HOSPITAL ENCOUNTER (OUTPATIENT)
Facility: OTHER | Age: 79
Discharge: HOME | End: 2024-03-21
Attending: STUDENT IN AN ORGANIZED HEALTH CARE EDUCATION/TRAINING PROGRAM | Admitting: STUDENT IN AN ORGANIZED HEALTH CARE EDUCATION/TRAINING PROGRAM
Payer: MEDICARE

## 2024-03-21 VITALS
SYSTOLIC BLOOD PRESSURE: 127 MMHG | WEIGHT: 242 LBS | TEMPERATURE: 98 F | BODY MASS INDEX: 35.84 KG/M2 | OXYGEN SATURATION: 91 % | HEIGHT: 69 IN | RESPIRATION RATE: 16 BRPM | DIASTOLIC BLOOD PRESSURE: 69 MMHG | HEART RATE: 68 BPM

## 2024-03-21 DIAGNOSIS — G89.29 CHRONIC PAIN OF RIGHT KNEE: Primary | ICD-10-CM

## 2024-03-21 DIAGNOSIS — G89.29 CHRONIC PAIN: ICD-10-CM

## 2024-03-21 DIAGNOSIS — M25.561 CHRONIC PAIN OF RIGHT KNEE: Primary | ICD-10-CM

## 2024-03-21 LAB — POCT GLUCOSE: 136 MG/DL (ref 70–110)

## 2024-03-21 PROCEDURE — 99153 MOD SED SAME PHYS/QHP EA: CPT | Performed by: STUDENT IN AN ORGANIZED HEALTH CARE EDUCATION/TRAINING PROGRAM

## 2024-03-21 PROCEDURE — 25000003 PHARM REV CODE 250: Performed by: STUDENT IN AN ORGANIZED HEALTH CARE EDUCATION/TRAINING PROGRAM

## 2024-03-21 PROCEDURE — 64624 DSTRJ NULYT AGT GNCLR NRV: CPT | Mod: RT,,, | Performed by: STUDENT IN AN ORGANIZED HEALTH CARE EDUCATION/TRAINING PROGRAM

## 2024-03-21 PROCEDURE — A4649 SURGICAL SUPPLIES: HCPCS | Performed by: STUDENT IN AN ORGANIZED HEALTH CARE EDUCATION/TRAINING PROGRAM

## 2024-03-21 PROCEDURE — 63600175 PHARM REV CODE 636 W HCPCS: Performed by: STUDENT IN AN ORGANIZED HEALTH CARE EDUCATION/TRAINING PROGRAM

## 2024-03-21 PROCEDURE — 64624 DSTRJ NULYT AGT GNCLR NRV: CPT | Mod: RT | Performed by: STUDENT IN AN ORGANIZED HEALTH CARE EDUCATION/TRAINING PROGRAM

## 2024-03-21 PROCEDURE — 99152 MOD SED SAME PHYS/QHP 5/>YRS: CPT | Performed by: STUDENT IN AN ORGANIZED HEALTH CARE EDUCATION/TRAINING PROGRAM

## 2024-03-21 PROCEDURE — 99152 MOD SED SAME PHYS/QHP 5/>YRS: CPT | Mod: ,,, | Performed by: STUDENT IN AN ORGANIZED HEALTH CARE EDUCATION/TRAINING PROGRAM

## 2024-03-21 RX ORDER — BUPIVACAINE HYDROCHLORIDE 2.5 MG/ML
INJECTION, SOLUTION EPIDURAL; INFILTRATION; INTRACAUDAL
Status: DISCONTINUED | OUTPATIENT
Start: 2024-03-21 | End: 2024-03-21 | Stop reason: HOSPADM

## 2024-03-21 RX ORDER — LIDOCAINE HYDROCHLORIDE 20 MG/ML
INJECTION, SOLUTION INFILTRATION; PERINEURAL
Status: DISCONTINUED | OUTPATIENT
Start: 2024-03-21 | End: 2024-03-21 | Stop reason: HOSPADM

## 2024-03-21 RX ORDER — FENTANYL CITRATE 50 UG/ML
INJECTION, SOLUTION INTRAMUSCULAR; INTRAVENOUS
Status: DISCONTINUED | OUTPATIENT
Start: 2024-03-21 | End: 2024-03-21 | Stop reason: HOSPADM

## 2024-03-21 RX ORDER — MIDAZOLAM HYDROCHLORIDE 1 MG/ML
INJECTION INTRAMUSCULAR; INTRAVENOUS
Status: DISCONTINUED | OUTPATIENT
Start: 2024-03-21 | End: 2024-03-21 | Stop reason: HOSPADM

## 2024-03-21 RX ORDER — SODIUM CHLORIDE 9 MG/ML
INJECTION, SOLUTION INTRAVENOUS CONTINUOUS
Status: DISCONTINUED | OUTPATIENT
Start: 2024-03-21 | End: 2024-03-21 | Stop reason: HOSPADM

## 2024-03-21 RX ORDER — TRIAMCINOLONE ACETONIDE 40 MG/ML
INJECTION, SUSPENSION INTRA-ARTICULAR; INTRAMUSCULAR
Status: DISCONTINUED | OUTPATIENT
Start: 2024-03-21 | End: 2024-03-21 | Stop reason: HOSPADM

## 2024-03-21 NOTE — OP NOTE
Therapeutic Genicular Cooled Nerve Radiofrequency Ablation under Fluoroscopy     The procedure, risks, benefits, and options were discussed with the patient. There are no contraindications to the procedure. The patent expressed understanding and agreed to the procedure. Informed written consent was obtained prior to the start of the procedure and can be found in the patient's chart.        PATIENT NAME: Mr. Jani Cha   MRN: [id number]     DATE OF PROCEDURE: 03/21/2024     PROCEDURE: Therapeutic Right Genicular Cooled Nerve Radiofrequency Ablation under Fluoroscopy    PRE-OP DIAGNOSIS: Primary osteoarthritis of right knee [M17.11]    POST-OP DIAGNOSIS: Primary osteoarthritis of right knee [M17.11]    PHYSICIAN: Thanh Chen MD    ASSISTANTS: Stephen Mckeon MD    MEDICATIONS INJECTED:  Preservative-free Kenalog 40mg with 9cc of Bupivicaine 0.25%    LOCAL ANESTHETIC INJECTED:   Xylocaine 2%    SEDATION: Versed 2mg and Fentanyl 75mcg                                                                                                                                                                                     Conscious sedation ordered by M.D. Patient re-evaluation prior to administration of conscious sedation. No changes noted in patient's status from initial evaluation. The patient's vital signs were monitored by RN and patient remained hemodynamically stable throughout the procedure.    Event Time In   Sedation Start 1018   Sedation End 1042       ESTIMATED BLOOD LOSS:  None    COMPLICATIONS:  None     INTERVAL HISTORY: Patient has clinical findings of chronic knee pain. Patients has completed previous diagnostic genicular nerve blocks with at least 80% relief for the expected duration of the local anesthetic utilized.     TECHNIQUE: Time-out was performed to identify the patient and procedure to be performed. With the patient laying in a supine position, the surgical area was prepped and draped in the usual  sterile fashion using ChloraPrep and fenestrated drape. Three target sites including the superior lateral genicular nerve where the lateral femoral shaft meets the epicondyle, the superior medial genicular nerve where the medial femoral shaft meets the epicondyle, and the inferior medial genicular nerve where the medial tibial shaft meets the epicondyle, were determined under fluoroscopic guidance. Skin anesthesia was achieved by injecting Lidocaine 2% over the injection sites. A 17 gauge, 50mm, 10mm active tip needle was then advanced under fluoroscopy in the AP and lateral views into the positions of the geniculate nerves at these levels. This was followed by motor testing at each of the nerves to ensure that there was no dorsiflexion of the foot. After negative aspiration for blood was confirmed, 1 mL of the lidocaine 2% listed above was injected slowly at each site. This was followed by cooled thermal lesioning at 80 degrees celsius for 150 seconds at each site. That was followed by slowly injecting 1.5 mL of the medication mixture listed above at each site. The needles were removed and bleeding was nil. A sterile dressing was applied. No specimens collected. The patient tolerated the procedure well and did not have any procedure related motor deficit at the conclusion of the procedure.    The patient was monitored after the procedure in the recovery area. They were given post-procedure and discharge instructions to follow at home. The patient was discharged in a stable condition.    Thanh Chen MD
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
## 2024-03-21 NOTE — DISCHARGE INSTRUCTIONS

## 2024-03-22 ENCOUNTER — INFUSION (OUTPATIENT)
Dept: INFECTIOUS DISEASES | Facility: HOSPITAL | Age: 79
End: 2024-03-22
Attending: FAMILY MEDICINE
Payer: MEDICARE

## 2024-03-22 VITALS
TEMPERATURE: 98 F | DIASTOLIC BLOOD PRESSURE: 75 MMHG | WEIGHT: 247.38 LBS | HEIGHT: 69 IN | BODY MASS INDEX: 36.64 KG/M2 | RESPIRATION RATE: 20 BRPM | SYSTOLIC BLOOD PRESSURE: 139 MMHG | OXYGEN SATURATION: 92 % | HEART RATE: 81 BPM

## 2024-03-22 DIAGNOSIS — M81.6 LOCALIZED OSTEOPOROSIS OF LEQUESNE: ICD-10-CM

## 2024-03-22 DIAGNOSIS — S72.001A CLOSED DISPLACED FRACTURE OF RIGHT FEMORAL NECK: Primary | ICD-10-CM

## 2024-03-22 DIAGNOSIS — M81.0 OSTEOPOROSIS, UNSPECIFIED OSTEOPOROSIS TYPE, UNSPECIFIED PATHOLOGICAL FRACTURE PRESENCE: ICD-10-CM

## 2024-03-22 PROCEDURE — 96372 THER/PROPH/DIAG INJ SC/IM: CPT

## 2024-03-22 PROCEDURE — 63600175 PHARM REV CODE 636 W HCPCS: Mod: JZ,JG | Performed by: PHYSICIAN ASSISTANT

## 2024-03-22 RX ADMIN — DENOSUMAB 60 MG: 60 INJECTION SUBCUTANEOUS at 11:03

## 2024-03-22 NOTE — PROGRESS NOTES
Patient arrives for Prolia injection - denies dental procedures over past 3 months - administered per guidelines. Patient claim not in Calcium or Vit D supplements at this time.     Return appointment provided to patient.    Limited head-to-toe assessment due to privacy issues and visit reason though the opportunity was given for patient to express any concerns.

## 2024-03-25 ENCOUNTER — OFFICE VISIT (OUTPATIENT)
Dept: UROLOGY | Facility: CLINIC | Age: 79
End: 2024-03-25
Payer: MEDICARE

## 2024-03-25 VITALS — WEIGHT: 246.56 LBS | BODY MASS INDEX: 36.41 KG/M2

## 2024-03-25 DIAGNOSIS — N40.1 BPH WITH URINARY OBSTRUCTION: ICD-10-CM

## 2024-03-25 DIAGNOSIS — R35.1 NOCTURIA MORE THAN TWICE PER NIGHT: Primary | ICD-10-CM

## 2024-03-25 DIAGNOSIS — N13.8 BPH WITH URINARY OBSTRUCTION: ICD-10-CM

## 2024-03-25 DIAGNOSIS — R39.15 URINARY URGENCY: ICD-10-CM

## 2024-03-25 PROCEDURE — 99214 OFFICE O/P EST MOD 30 MIN: CPT | Mod: S$PBB,,, | Performed by: UROLOGY

## 2024-03-25 PROCEDURE — 99213 OFFICE O/P EST LOW 20 MIN: CPT | Mod: PBBFAC | Performed by: UROLOGY

## 2024-03-25 PROCEDURE — 99999 PR PBB SHADOW E&M-EST. PATIENT-LVL III: CPT | Mod: PBBFAC,,, | Performed by: UROLOGY

## 2024-03-25 RX ORDER — OXYBUTYNIN CHLORIDE 5 MG/1
5 TABLET, EXTENDED RELEASE ORAL DAILY
Qty: 30 TABLET | Refills: 11 | Status: SHIPPED | OUTPATIENT
Start: 2024-03-25 | End: 2025-03-25

## 2024-03-25 NOTE — PROGRESS NOTES
Subjective:       Patient ID: Jani Cha is a 78 y.o. male The patient's last visit with me was on 12/22/2023.     Chief Complaint:   No chief complaint on file.        History of Kidney Stones  He had an issue with a ureteral stone in Winter 2015.  He did not recall passing the stone but his pain resolved.       3/25/2021  He had right flank pain and hematuria a couple of weeks ago. He brought the stone for analysis a couple of weeks ago.  He denies anymore hematuria, flank pain.  He denies fever.    5/3/2022  He denies pain or hematuria.      03/25/2024        Benign Prostatic Hyperplasia  He patient reports nocturia three times a night. He denies frequency, incomplete emptying and intermittency. The patient states symptoms are of moderate severity. Onset of symptoms was several years ago and was gradual in onset.  He has no personal history and no family history of prostate cancer. He reports a history of kidney stones. He denies flank pain, gross hematuria and recurrent UTI.  He is currently taking no prostate medications.  He has noted some frequency with occasional urgency.    8/3/2022  He was to add Flomax last visit.  He took it for no more than 2 weeks.  He stopped it due to retrograde ejaculation and pain in his prostate afterwards.  He did not note any change to his stream.    IPSS Questionnaire (AUA-7):  8    11/17/2022   He is doing okay.  He recently fractured his hip.  His stream is about the same.  He required a urologist to place the Alej[facility]re.  He is not sure why.    He is not interested in trying prostate medications again.    11/29/2022 Orchitis  He went to the ED on 11/24/2022 after developing a fever.  His urine was red at the time.  He was given antibiotics for a UTI.  Two days later he develop pain in his testicles.  He went back to the ED.    12/27/2022  He feels better today.  His testicle is slightly sensitive.      IPSS Questionnaire (AUA-7):  17    03/30/2023  Cystoscopy this month  showed bilateral lobe hypertrophy of the prostate.   He would like to proceed with a procedure on his prostate.     5/11/2023  He is s/p UroLift on 4/24/2023.  He is having a lot of urgency.  Nocturia may be improved.    IPSS Questionnaire (AUA-7):  20/4 6/23/2023  He still has some urgency.  His nocturia is improved to once every 2.5 hours.     12/22/2023  His days are variable.  He has noted more nocturia.    03/25/2024  He did not get Oxybutynin so he is about the same as before.  He tells me the pharmacy never got the prescription.      ACTIVE MEDICAL ISSUES:  Patient Active Problem List   Diagnosis    Hyperlipidemia    Primary hypertension    Coronary artery disease involving native coronary artery of native heart without angina pectoris    Sleep apnea    S/P CABG x 4    Type 2 diabetes mellitus with diabetic neuropathy, without long-term current use of insulin    GERD (gastroesophageal reflux disease)    Personal history of colonic polyps    Abdominal aortic aneurysm (AAA) without rupture    Total occlusion of coronary artery, chronic -LCX with collaterals.  No ischemic on PET    Pulmonary nodule    Thoracic aortic aneurysm without rupture    Bilateral renal cysts    Adenoma of left adrenal gland    History of PCI of RCA    Class 2 severe obesity due to excess calories with serious comorbidity and body mass index (BMI) of 36.0 to 36.9 in adult    Calcified granuloma of lung    Type 2 diabetes mellitus with hyperglycemia, without long-term current use of insulin    Lymphedema of both lower extremities    Swelling of lower extremity    Closed displaced fracture of right femoral neck s/p total hip arthroplasty on 10/21/2022    Hip pain    Chronic pain of both knees    Decreased strength, endurance, and mobility    Osteoporosis    Localized osteoporosis of [place]    Adrenal incidentaloma    Congestive heart failure, unspecified HF chronicity, unspecified heart failure type    Diabetes mellitus due to  underlying condition with stage 3 chronic kidney disease, without long-term current use of insulin, unspecified whether stage 3a or 3b CKD    Primary osteoarthritis of right knee    Primary osteoarthritis of left knee    Stage 3b chronic kidney disease    BPH (benign prostatic hyperplasia)    Diarrhea    Chronic pain of left knee    Decreased range of motion of left knee    Weakness of left lower extremity    Gait, antalgic    Status post total knee replacement, left       ALLERGIES AND MEDICATIONS: updated and reviewed.  Review of patient's allergies indicates:   Allergen Reactions    Penicillins Hives, Itching and Rash    Shellfish containing products      Current Outpatient Medications   Medication Sig    acetaminophen (TYLENOL) 500 MG tablet Take 2 tablets (1,000 mg total) by mouth every 8 (eight) hours as needed (Mild to moderate pain).    acetaminophen (TYLENOL) 650 MG TbSR Take 650 mg by mouth every 8 (eight) hours.    acetaminophen (TYLENOL) 650 MG TbSR Take 1 tablet (650 mg total) by mouth every 8 (eight) hours.    aspirin (ECOTRIN) 81 MG EC tablet Take 81 mg by mouth once daily.    atorvastatin (LIPITOR) 80 MG tablet Take 1 tablet (80 mg total) by mouth once daily.    blood sugar diagnostic Strp 1 strip by Misc.(Non-Drug; Combo Route) route once daily.    calcium carbonate (OS-BAILEE) 500 mg calcium (1,250 mg) tablet Take 1 tablet by mouth once daily.    carvediloL (COREG) 25 MG tablet TAKE 1 TABLET BY MOUTH TWICE DAILY WITH MEALS    clopidogreL (PLAVIX) 75 mg tablet Take 1 tablet by mouth once daily    clotrimazole-betamethasone 1-0.05% (LOTRISONE) cream Apply topically 2 (two) times daily.    dulaglutide (TRULICITY) 1.5 mg/0.5 mL pen injector Inject 1.5 mg into the skin every 7 days.    famotidine (PEPCID) 20 MG tablet Take 1 tablet (20 mg total) by mouth nightly as needed for Heartburn.    fluticasone propionate (FLONASE) 50 mcg/actuation nasal spray 1 spray (50 mcg total) by Each Nostril route once daily.     glipiZIDE (GLUCOTROL) 10 MG TR24 Take 1 tablet (10 mg total) by mouth 2 (two) times daily with meals.    melatonin (MELATIN) 3 mg tablet Take 2 tablets (6 mg total) by mouth nightly as needed for Insomnia.    methocarbamoL (ROBAXIN) 750 MG Tab Take 1 tablet (750 mg total) by mouth 4 (four) times daily as needed (for muscle spasms). (Patient not taking: Reported on 2/28/2024)    neomycin-polymyxin-hydrocortisone (CORTISPORIN) 3.5-10,000-1 mg/mL-unit/mL-% otic suspension Place 3 drops into both ears 4 (four) times daily.    neomycin-polymyxin-hydrocortisone (CORTISPORIN) otic solution INSTILL 3 DROPS INTO RIGHT EAR EVERY 6 HOURS    oxybutynin (DITROPAN-XL) 5 MG TR24 Take 1 tablet (5 mg total) by mouth once daily.    pantoprazole (PROTONIX) 40 MG tablet Take 1 tablet by mouth once daily    senna-docusate 8.6-50 mg (SENNA WITH DOCUSATE SODIUM) 8.6-50 mg per tablet Take 1 tablet by mouth once daily.    TRUEPLUS LANCETS 33 gauge Misc Use to test blood sugar daily; discard lancet after each use    valsartan (DIOVAN) 80 MG tablet Take 1 tablet by mouth once daily     No current facility-administered medications for this visit.       Review of Systems   Constitutional:  Negative for chills and fever.   HENT:  Negative for congestion.    Respiratory:  Negative for chest tightness and shortness of breath.    Cardiovascular:  Negative for chest pain and palpitations.   Gastrointestinal:  Negative for abdominal pain, constipation, diarrhea, nausea and vomiting.   Genitourinary:  Positive for urgency. Negative for difficulty urinating, dysuria, flank pain and hematuria.   Musculoskeletal:  Negative for arthralgias.   Neurological:  Negative for dizziness.   Psychiatric/Behavioral:  Negative for confusion.        Objective:      Vitals:    03/25/24 1420   Weight: 111.8 kg (246 lb 9.4 oz)         Physical Exam  Vitals and nursing note reviewed.   Constitutional:       Appearance: He is well-developed.   HENT:      Head:  Normocephalic.   Eyes:      Conjunctiva/sclera: Conjunctivae normal.   Neck:      Thyroid: No thyromegaly.      Trachea: No tracheal deviation.   Cardiovascular:      Rate and Rhythm: Normal rate.      Heart sounds: Normal heart sounds.   Pulmonary:      Effort: Pulmonary effort is normal. No respiratory distress.      Breath sounds: Normal breath sounds. No wheezing.   Abdominal:      General: Bowel sounds are normal.      Palpations: Abdomen is soft.      Tenderness: There is no abdominal tenderness. There is no rebound.      Hernia: No hernia is present.   Musculoskeletal:         General: No tenderness. Normal range of motion.      Cervical back: Normal range of motion and neck supple.   Lymphadenopathy:      Cervical: No cervical adenopathy.   Skin:     General: Skin is warm and dry.      Findings: No erythema or rash.   Neurological:      Mental Status: He is alert and oriented to person, place, and time.   Psychiatric:         Behavior: Behavior normal.         Thought Content: Thought content normal.         Judgment: Judgment normal.         Urine dipstick shows negative for all components.  Micro exam: negative for WBC's or RBC's.    Bladder scan: 100 mL      Component Ref Range & Units 7 d ago 1 yr ago 2 yr ago   PSA Diagnostic 0.00 - 4.00 ng/mL 1.2 1.8 CM 1.8 CM   Comment: The testing method is a chemiluminescent microparticle immunoassay  manufactured by [employer] and performed on the [employer]  or  [place] system. Values obtained with different assay manufacturers  for  methods may be different and cannot be used interchangeably.  PSA Expected levels:  Hormonal Therapy: <0.05 ng/ml  Prostatectomy: <0.01 ng/ml  Radiation Therapy: <1.00 ng/ml   Resulting Agency  [facility]              Narrative  Performed by: BETHANY  6 months      Specimen Collected: 12/15/23 09:17 CST Last Resulted: 12/15/23 17:26 CST             Assessment:       1. Nocturia more than twice per night    2. Urinary  urgency    3. BPH with urinary obstruction                Plan:       1. Nocturia more than twice per night    - oxybutynin (DITROPAN-XL) 5 MG TR24; Take 1 tablet (5 mg total) by mouth once daily.  Dispense: 30 tablet; Refill: 11  - US Retroperitoneal Complete; Future    2. Urinary urgency  As above    3. BPH with urinary obstruction  S/p UroLift           Follow up in about 3 months (around 6/25/2024) for Follow up Established.

## 2024-03-28 ENCOUNTER — LAB VISIT (OUTPATIENT)
Dept: LAB | Facility: HOSPITAL | Age: 79
End: 2024-03-28
Attending: HOSPITALIST
Payer: MEDICARE

## 2024-03-28 DIAGNOSIS — M80.00XD AGE-RELATED OSTEOPOROSIS WITH CURRENT PATHOLOGICAL FRACTURE WITH ROUTINE HEALING, SUBSEQUENT ENCOUNTER: ICD-10-CM

## 2024-03-28 DIAGNOSIS — E11.40 TYPE 2 DIABETES MELLITUS WITH DIABETIC NEUROPATHY, WITHOUT LONG-TERM CURRENT USE OF INSULIN: Chronic | ICD-10-CM

## 2024-03-28 DIAGNOSIS — E11.65 TYPE 2 DIABETES MELLITUS WITH HYPERGLYCEMIA, WITHOUT LONG-TERM CURRENT USE OF INSULIN: ICD-10-CM

## 2024-03-28 DIAGNOSIS — D35.02 ADENOMA OF LEFT ADRENAL GLAND: ICD-10-CM

## 2024-03-28 LAB
ANION GAP SERPL CALC-SCNC: 10 MMOL/L (ref 8–16)
BUN SERPL-MCNC: 39 MG/DL (ref 8–23)
CALCIUM SERPL-MCNC: 9.7 MG/DL (ref 8.7–10.5)
CHLORIDE SERPL-SCNC: 107 MMOL/L (ref 95–110)
CO2 SERPL-SCNC: 24 MMOL/L (ref 23–29)
CREAT SERPL-MCNC: 2.1 MG/DL (ref 0.5–1.4)
EST. GFR  (NO RACE VARIABLE): 31.8 ML/MIN/1.73 M^2
GLUCOSE SERPL-MCNC: 188 MG/DL (ref 70–110)
POTASSIUM SERPL-SCNC: 4.9 MMOL/L (ref 3.5–5.1)
SODIUM SERPL-SCNC: 141 MMOL/L (ref 136–145)
TSH SERPL DL<=0.005 MIU/L-ACNC: 1.2 UIU/ML (ref 0.4–4)

## 2024-03-28 PROCEDURE — 82306 VITAMIN D 25 HYDROXY: CPT | Performed by: HOSPITALIST

## 2024-03-28 PROCEDURE — 80048 BASIC METABOLIC PNL TOTAL CA: CPT | Performed by: HOSPITALIST

## 2024-03-28 PROCEDURE — 84443 ASSAY THYROID STIM HORMONE: CPT | Performed by: HOSPITALIST

## 2024-03-28 PROCEDURE — 83036 HEMOGLOBIN GLYCOSYLATED A1C: CPT | Performed by: HOSPITALIST

## 2024-03-28 PROCEDURE — 36415 COLL VENOUS BLD VENIPUNCTURE: CPT | Mod: PO | Performed by: HOSPITALIST

## 2024-03-29 LAB
25(OH)D3+25(OH)D2 SERPL-MCNC: 35 NG/ML (ref 30–96)
ESTIMATED AVG GLUCOSE: 177 MG/DL (ref 68–131)
HBA1C MFR BLD: 7.8 % (ref 4–5.6)

## 2024-03-31 ENCOUNTER — EXTERNAL CHRONIC CARE MANAGEMENT (OUTPATIENT)
Dept: PRIMARY CARE CLINIC | Facility: CLINIC | Age: 79
End: 2024-03-31
Payer: MEDICARE

## 2024-03-31 PROCEDURE — 99490 CHRNC CARE MGMT STAFF 1ST 20: CPT | Mod: PBBFAC,PO | Performed by: FAMILY MEDICINE

## 2024-03-31 PROCEDURE — 99490 CHRNC CARE MGMT STAFF 1ST 20: CPT | Mod: S$PBB,,, | Performed by: FAMILY MEDICINE

## 2024-04-04 ENCOUNTER — TELEPHONE (OUTPATIENT)
Dept: PHARMACY | Facility: CLINIC | Age: 79
End: 2024-04-04
Payer: MEDICARE

## 2024-04-04 ENCOUNTER — OFFICE VISIT (OUTPATIENT)
Dept: ENDOCRINOLOGY | Facility: CLINIC | Age: 79
End: 2024-04-04
Payer: MEDICARE

## 2024-04-04 ENCOUNTER — PATIENT MESSAGE (OUTPATIENT)
Dept: PHARMACY | Facility: CLINIC | Age: 79
End: 2024-04-04
Payer: MEDICARE

## 2024-04-04 VITALS
BODY MASS INDEX: 36.03 KG/M2 | WEIGHT: 244 LBS | HEART RATE: 77 BPM | SYSTOLIC BLOOD PRESSURE: 118 MMHG | DIASTOLIC BLOOD PRESSURE: 78 MMHG

## 2024-04-04 DIAGNOSIS — E11.65 TYPE 2 DIABETES MELLITUS WITH HYPERGLYCEMIA, WITHOUT LONG-TERM CURRENT USE OF INSULIN: Primary | ICD-10-CM

## 2024-04-04 DIAGNOSIS — E66.01 CLASS 2 SEVERE OBESITY DUE TO EXCESS CALORIES WITH SERIOUS COMORBIDITY AND BODY MASS INDEX (BMI) OF 36.0 TO 36.9 IN ADULT: ICD-10-CM

## 2024-04-04 DIAGNOSIS — E11.65 UNCONTROLLED TYPE 2 DIABETES MELLITUS WITH HYPERGLYCEMIA, WITHOUT LONG-TERM CURRENT USE OF INSULIN: ICD-10-CM

## 2024-04-04 DIAGNOSIS — E11.40 TYPE 2 DIABETES MELLITUS WITH DIABETIC NEUROPATHY, WITHOUT LONG-TERM CURRENT USE OF INSULIN: Chronic | ICD-10-CM

## 2024-04-04 DIAGNOSIS — E27.8 ADRENAL INCIDENTALOMA: ICD-10-CM

## 2024-04-04 DIAGNOSIS — N18.32 STAGE 3B CHRONIC KIDNEY DISEASE: ICD-10-CM

## 2024-04-04 DIAGNOSIS — I25.10 CORONARY ARTERY DISEASE INVOLVING NATIVE CORONARY ARTERY OF NATIVE HEART WITHOUT ANGINA PECTORIS: Chronic | ICD-10-CM

## 2024-04-04 DIAGNOSIS — M80.00XD AGE-RELATED OSTEOPOROSIS WITH CURRENT PATHOLOGICAL FRACTURE WITH ROUTINE HEALING, SUBSEQUENT ENCOUNTER: ICD-10-CM

## 2024-04-04 DIAGNOSIS — S72.001A CLOSED DISPLACED FRACTURE OF RIGHT FEMORAL NECK: ICD-10-CM

## 2024-04-04 PROBLEM — D35.02 ADENOMA OF LEFT ADRENAL GLAND: Status: RESOLVED | Noted: 2021-06-14 | Resolved: 2024-04-04

## 2024-04-04 PROCEDURE — 99214 OFFICE O/P EST MOD 30 MIN: CPT | Mod: S$PBB,,, | Performed by: HOSPITALIST

## 2024-04-04 PROCEDURE — 99213 OFFICE O/P EST LOW 20 MIN: CPT | Mod: PBBFAC | Performed by: HOSPITALIST

## 2024-04-04 PROCEDURE — 99999 PR PBB SHADOW E&M-EST. PATIENT-LVL III: CPT | Mod: PBBFAC,,, | Performed by: HOSPITALIST

## 2024-04-04 PROCEDURE — G2211 COMPLEX E/M VISIT ADD ON: HCPCS | Mod: S$PBB,,, | Performed by: HOSPITALIST

## 2024-04-04 RX ORDER — DULAGLUTIDE 3 MG/.5ML
3 INJECTION, SOLUTION SUBCUTANEOUS
Qty: 12 PEN | Refills: 4
Start: 2024-04-04 | End: 2025-04-04

## 2024-04-04 RX ORDER — GLIPIZIDE 10 MG/1
10 TABLET, FILM COATED, EXTENDED RELEASE ORAL 2 TIMES DAILY WITH MEALS
Qty: 180 TABLET | Refills: 3 | Status: SHIPPED | OUTPATIENT
Start: 2024-04-04 | End: 2025-04-04

## 2024-04-04 NOTE — ASSESSMENT & PLAN NOTE
- on statin, beta-blocker, aspirin  - Discussed importance of better glycemic control  - continue Trulicity given history of CAD

## 2024-04-04 NOTE — ASSESSMENT & PLAN NOTE
- Right femoral neck fracture 10/21/2022>> patient fell picking up a newspaper requiring surgery.    - Follow-up with Orthopedic surgery at this time- Started on Prolia SC injection per orthopedic surgery  - PROLIA 1st injection 3/17/2023>> every 6 months>> 3 injections so far>next injection 9/24/2024  - Plan to have R knee replacement in the future   - DXA: 1/19/2023>> next one in 1/2025  - Denies history of other fractures  - Currently not taking any vitamin supplements including calcium/vitamin-D>> level WNL  - Monitor treatment, would continue PROLIA given hx of CKD3b, defer to Ortho

## 2024-04-04 NOTE — PROGRESS NOTES
Subjective:      Patient ID: Jani Cha is a 78 y.o. male presented to [facility] on 4/4/2024.  Chief Complaint:  Diabetes type 2, Diabetes    History of Present Illness: Jani Cha is a 78 y.o. male here for management of type 2 diabetes and left adrenal incidentaloma  Other significant past medical history:  CKD stage IIIB, CAD, CABG, hypertension, hyperlipidemia      Interval history:  Patient is here for follow-up of type 2 diabetes, A1c mild increase 7.8%  Currently on Trulicity getting it from pharmacy assistant.  Needs paperwork to be done for 2024.    Glucose log review: 106, 141, 147, 154, 154, 155, 141, 153,141,159, 139>> in the a.m.  Hyperglycemia reported.  Had a nerve block 2 times right knee, temporary injection 2/15/2024, permanent nerver block 3/21/2024 possibly that as the cause.      As patient has not had any new medications.  Or changes in diet.  Still with right osteo arthritis of knee with severe pain limiting mobility as well   Patient on SC Prolia every 6 months>next injection 3/22/2024, manage by Ortho for osteoporosis  Some weight loss: 248 lb, previous 256      1) Diabetes Mellitus Type 2  - Known diabetic complications: nephropathy and cardiovascular disease  - Cardiovascular risk factors: advanced age (older than 55 for men, 65 for women), diabetes mellitus, dyslipidemia, hypertension, male gender, microalbuminuria, obesity (BMI >= 30 kg/m2) and sedentary lifestyle  - Diagnosed w/ DM: in 2006  - Saw Ortho need Left Knee Replacement>>  Need to get A1C <7%  - Patient having issue with high deductible, gap.  Unable to afford multiple diabetes medication  - Currently still on Trulicity, getting from [facility] without any issue    Current meds:   Trulicity 1.5 mg once a week injection, getting it from [facility]  Glipizide 10 mg  twice a day  Previous medication  stop metformin given CKD stage IIIB  Home glucose checks:  Daily, see above  Diet/Exercise:       "         Eating 2 meals per day , lunch in, large portion dinner, does eat cookies prior to going to bed              Drink:  Coffee with sugar in the morning              Current exercise: none due to knee pain  Weight trend: stable  Diabetes Education: no  Diabetes Related Hospitalization:  no  Hx of pancreatitis, hx of thyroid cancer: no  Family history of diabetes: unknow  Occupation: retired  Eye exam current (within one year): yes  Reports cuts or ulcers on feet:  Denies  Statin: Taking, ACE/ARB: Taking    Diabetes lab work  Lab Results   Component Value Date    HGBA1C 7.8 (H) 03/28/2024    HGBA1C 6.9 (H) 12/22/2023    HGBA1C 7.0 (H) 11/02/2023    HGBA1C 6.8 (H) 07/24/2023     No results found for: "CPEPTIDE", "GLUTAMICACID", "ISLETCELLANT"   No results found for: "FRUCTOSAMINE"  Lab Results   Component Value Date    MICALBCREAT 61.9 (H) 03/07/2024     No results found for: "[id number]"    Diabetes Management Status: Reviewed this office visit  Screening or Prevention Patient's value Goal Complete/Controlled?   Lipid profile : 03/07/2024 Annually Yes     Dilated retinal exam : 04/04/2023 Annually No     Foot exam   Most Recent Foot Exam Date: Not Found Annually Yes         2) Osteoporosis  - Right femoral neck fracture Recently: 10/25/2022>> patient fell picking up a newspaper.  Sustained right hip fracture requiring surgery.    - Follow-up with Orthopedic surgery at this time.  Recently discharged from rehab  - Currently using walker to help with ambulation  - Denies history of other fractures  - No history of cirrhosis, liver disease, cancer  - Currently not taking any vitamin supplements including calcium/vitamin-D  - Started on Prolia SC injection per orthopedic surgery, 1st injection 3/17/2023>> every 6 months>> 3 injections so far. next injection 9/24/2024  - Plan to have R knee replacement in the future      DXA: 1/19/2023  Lumbar spine (L1-L4):  BMD is 1.248 g/cm2, T-score is 1.4, and Z-score is " 2.5.  Total hip:   BMD is 1.005 g/cm2, T-score is -0.2, and Z-score is 0.7.  Femoral neck: BMD is 0.688 g/cm2, T-score is -1.8, and Z-score is -0.4.  Distal 1/3 radius:  Not applicable     FRAX:  11% risk of a major osteoporotic fracture in the next 10 years.  3.2% risk of hip fracture in the next 10 years.     Impression:  *Osteoporosis based on T-score between -1.0 and -2.5 and elevated risk based on FRAX and history of femur fracture.    Lab work reviewed  Lab Results   Component Value Date    PTH 77.3 (H) 03/10/2023    PTH 75.0 12/06/2022    OMHNUCEL74MT 35 03/28/2024    [id number] 36 03/10/2023    QRPJFHVG18ET 32 12/06/2022    CALCIUM 9.7 03/28/2024    CALCIUM 10.6 (H) 03/07/2024    CALCIUM 9.3 12/22/2023    PHOS 2.8 12/22/2023    PHOS 2.8 10/25/2022    PHOS 2.9 10/24/2022    ALKPHOS 82 03/07/2024    ALKPHOS 81 11/02/2023    ALKPHOS 98 12/06/2022    TSH 1.198 03/28/2024        3) Regards to Left adrenal incidentaloma  - Noted on CT chest, left adrenal mass 1.2 cm in size.  - Adrenal lesion imaging characteristics left adrenal nodule, 1.2 cm, Smooth, heterogenous, without calcifications   - Patient does have history of diabetes as above, Morbidly obese, Hypertension but no history of hypertensive emergency   - Pt denies history of paroxysmal symptoms such as syncope, pallor or dizziness  - No palpitations, No history of cancer  - Pt reports no abdominal discomfort  - Work up: DST: WNL, cortisol >2, due to the overweight  - Metanephrines and normetanephrines:  Within acceptable ranges  - Renin/navneet level normal    Reviewed past surgical, medical, family, social history and updated as appropriate.  Review of Systems: see HPI above    Objective:   /78   Pulse 77   Wt 110.7 kg (244 lb)   BMI 36.03 kg/m²     Body mass index is 36.03 kg/m².  Vital signs reviewed    Physical Exam  Vitals and nursing note reviewed.   Constitutional:       General: He is not in acute distress.     Appearance: Normal  appearance. He is well-developed. He is obese.   Neck:      Thyroid: No thyromegaly.   Cardiovascular:      Rate and Rhythm: Normal rate.      Heart sounds: Normal heart sounds.   Pulmonary:      Effort: Pulmonary effort is normal. No respiratory distress.   Abdominal:      Tenderness: There is no abdominal tenderness.   Musculoskeletal:         General: Normal range of motion.      Cervical back: Neck supple.   Skin:     General: Skin is warm.      Findings: No erythema.   Neurological:      Mental Status: He is alert and oriented to person, place, and time.     Lab Reviewed:  See results in subjective  Lab Results   Component Value Date    HGBA1C 7.8 (H) 03/28/2024     Lab Results   Component Value Date    CHOL 116 (L) 03/07/2024    HDL 36 (L) 03/07/2024    LDLCALC 57.2 (L) 03/07/2024    TRIG 114 03/07/2024    CHOLHDL 31.0 03/07/2024     Lab Results   Component Value Date     03/28/2024    K 4.9 03/28/2024     03/28/2024    CO2 24 03/28/2024     (H) 03/28/2024    BUN 39 (H) 03/28/2024    CREATININE 2.1 (H) 03/28/2024    CALCIUM 9.7 03/28/2024    PHOS 2.8 12/22/2023    PROT 6.5 03/07/2024    ALBUMIN 3.3 (L) 03/07/2024    BILITOT 0.6 03/07/2024    ALKPHOS 82 03/07/2024    AST 16 03/07/2024    ALT 14 03/07/2024    ANIONGAP 10 03/28/2024    EGFRNORACEVR 31.8 (A) 03/28/2024    TSH 1.198 03/28/2024    PTH 77.3 (H) 03/10/2023    [id number] 35 03/28/2024      Assessment     1. Type 2 diabetes mellitus with hyperglycemia, without long-term current use of insulin  glipiZIDE (GLUCOTROL) 10 MG TR24    Ambulatory referral/consult to Pharmacy Assistance    dulaglutide (TRULICITY) 3 mg/0.5 mL pen injector    HEMOGLOBIN A1C    Basic Metabolic Panel      2. Uncontrolled type 2 diabetes mellitus with hyperglycemia, without long-term current use of insulin        3. Type 2 diabetes mellitus with diabetic neuropathy, without long-term current use of insulin        4. Stage 3b chronic kidney disease  Ambulatory  referral/consult to Nephrology      5. Closed displaced fracture of right femoral neck s/p total hip arthroplasty on 10/21/2022        6. Age-related osteoporosis with current pathological fracture with routine healing, subsequent encounter        7. Coronary artery disease involving native coronary artery of native heart without angina pectoris        8. Class 2 severe obesity due to excess calories with serious comorbidity and body mass index (BMI) of 36.0 to 36.9 in adult        9. Adrenal incidentaloma          Plan     Type 2 diabetes mellitus with hyperglycemia, without long-term current use of insulin  - Diabetes not at goal controlled, goal A1C for patient is <7%  - Complicated by hyperglycemia, obesity, hx CAD/CABG, CKD3b, dietary indiscretion.  - Still with occasional morning time hyperglycemia  - Due to the renal function, medical options limited  - Diabetic supplies/medications, review at every visit to ensure continue refills    Plan  - Continue Trulicity, increase from 1.5 mg to 3.0 mg once a week injection> getting it from [employer]   - Continue glipizide to 10 mg b.i.d> denies hypoglycemia. Renal dosed  - Advised the patient follow with my regimen with close follow-up as scheduled  - Repeat lab work prior to next visit, 4 months    Closed displaced fracture of right femoral neck s/p total hip arthroplasty on 10/21/2022  - Right femoral neck fracture 10/21/2022>> patient fell picking up a newspaper requiring surgery.    - Follow-up with Orthopedic surgery at this time- Started on Prolia SC injection per orthopedic surgery  - Follow up with up with Ortho    Osteoporosis  - Right femoral neck fracture 10/21/2022>> patient fell picking up a newspaper requiring surgery.    - Follow-up with Orthopedic surgery at this time- Started on Prolia SC injection per orthopedic surgery  - PROLIA 1st injection 3/17/2023>> every 6 months>> 3 injections so far>next injection 9/24/2024  - Plan to have R knee  replacement in the future   - DXA: 1/19/2023>> next one in 1/2025  - Denies history of other fractures  - Currently not taking any vitamin supplements including calcium/vitamin-D>> level WNL  - Monitor treatment, would continue PROLIA given hx of CKD3b, defer to Ortho    Coronary artery disease involving native coronary artery of native heart without angina pectoris  - on statin, beta-blocker, aspirin  - Discussed importance of better glycemic control  - continue Trulicity given history of CAD    Stage 3b chronic kidney disease  - CKD3b  - nephrology referral placed  - attempt to get danielle glycemic control    Class 2 severe obesity due to excess calories with serious comorbidity and body mass index (BMI) of 36.0 to 36.9 in adult  - Body mass index is 36.03 kg/m².  - Encourage pt to work on healthy diet, increase exercise as tolerated.  - Continue to monitor weight    Adrenal incidentaloma  - Adrenal incidentaloma, Noted on CT chest, left adrenal mass 1.2 cm in size.  - Adrenal lesion imaging characteristics left adrenal nodule, 1.2 cm, Smooth, heterogenous, without calcifications   - DST: WNL, cortisol 2, due to the overweight  - Metanephrines and normetanephrines:  Within acceptable ranges  - Renin/navneet level normal      Advised patient to follow up with PCP for routine health maintenance care.   RTC in 4 months    COMPLEXITY   Visit today included increased complexity associated with the care of the episodic problem: poorly controlled diabetes addressed and managing the longitudinal care of the patient due to the serious and/or complex managed problem(s): longstanding type 2 diabetes, osteoporosis, hypertension, hyperlipidemia, adrenal incidentaloma      Malcolm Kent M.D  Endocrinology  [facility]  4/4/2024      Disclaimer: This note has been generated using voice-recognition software. There may be typographical errors that have been missed during proof-reading.

## 2024-04-04 NOTE — TELEPHONE ENCOUNTER
We have reached out to Jani Cha to inform him of the [facility] Patient Assistance Program re-enrollment process for Trulicity. Patient must provided the following documentation to re-apply for 2024: Proof of household Income( such as social security statement, 1099 form, pension statement or 3 consecutive pay stubs, Copy of all Insurance cards( front and back), and Signed and dated HIPAA /Patient Information Forms

## 2024-04-04 NOTE — ASSESSMENT & PLAN NOTE
- Diabetes not at goal controlled, goal A1C for patient is <7%  - Complicated by hyperglycemia, obesity, hx CAD/CABG, CKD3b, dietary indiscretion.  - Still with occasional morning time hyperglycemia  - Due to the renal function, medical options limited  - Diabetic supplies/medications, review at every visit to ensure continue refills    Plan  - Continue Trulicity, increase from 1.5 mg to 3.0 mg once a week injection> getting it from [facility]   - Continue glipizide to 10 mg b.i.d> denies hypoglycemia. Renal dosed  - Advised the patient follow with my regimen with close follow-up as scheduled  - Repeat lab work prior to next visit, 4 months

## 2024-04-04 NOTE — PATIENT INSTRUCTIONS
Thank you for completing the visit with me    Continue:    Trulicity 3.0 mg once a week  Glipizide 10 mg twice a day    Please check glucose 1 times a day (before breakfast).   Glucose logs given in clinic, please filled out and bring back to next office visit for me to review    We will plan an in-clinic visit in 3 months, with labs prior to that appointment.       Malcolm Kent M.D  [facility] [address] [facility] [address], [address]  [place] [address]    Office:  [phone]  Fax:  [phone]

## 2024-04-04 NOTE — ASSESSMENT & PLAN NOTE
- Right femoral neck fracture 10/21/2022>> patient fell picking up a newspaper requiring surgery.    - Follow-up with Orthopedic surgery at this time- Started on Prolia SC injection per orthopedic surgery  - Follow up with up with Ortho

## 2024-04-04 NOTE — ASSESSMENT & PLAN NOTE
- Body mass index is 36.03 kg/m².  - Encourage pt to work on healthy diet, increase exercise as tolerated.  - Continue to monitor weight

## 2024-04-12 NOTE — TELEPHONE ENCOUNTER
Jani Cha has provided the necessary documents to begin the enrollment process into the [facility] program. The prescription portion of the Trulicity 3 mg application has been sent to the providers office for approval and signature.

## 2024-04-12 NOTE — TELEPHONE ENCOUNTER
A Patient Assistance Application for Jani Cha - MRN  [id number] was faxed to your office @[phone]. Please have Dr. Malcolm Kent  review the application to ensure the prescription is correct. If correct, sign and fax the application back to the Pharmacy Patient Assistance Team @[phone].     Please do not write any corrections on this application.  If changes are needed on this application, please inform the PAP team, and the corrected application will be faxed back to your office for approval and signature.

## 2024-04-15 ENCOUNTER — OFFICE VISIT (OUTPATIENT)
Dept: PAIN MEDICINE | Facility: CLINIC | Age: 79
End: 2024-04-15
Payer: MEDICARE

## 2024-04-15 VITALS
WEIGHT: 245.81 LBS | BODY MASS INDEX: 36.3 KG/M2 | SYSTOLIC BLOOD PRESSURE: 137 MMHG | TEMPERATURE: 98 F | DIASTOLIC BLOOD PRESSURE: 92 MMHG | HEART RATE: 68 BPM

## 2024-04-15 DIAGNOSIS — M17.11 PRIMARY OSTEOARTHRITIS OF RIGHT KNEE: Primary | ICD-10-CM

## 2024-04-15 DIAGNOSIS — G89.29 CHRONIC PAIN OF RIGHT KNEE: ICD-10-CM

## 2024-04-15 DIAGNOSIS — M25.561 CHRONIC PAIN OF RIGHT KNEE: ICD-10-CM

## 2024-04-15 PROCEDURE — 99999 PR PBB SHADOW E&M-EST. PATIENT-LVL IV: CPT | Mod: PBBFAC,,, | Performed by: STUDENT IN AN ORGANIZED HEALTH CARE EDUCATION/TRAINING PROGRAM

## 2024-04-15 PROCEDURE — 99214 OFFICE O/P EST MOD 30 MIN: CPT | Mod: PBBFAC | Performed by: STUDENT IN AN ORGANIZED HEALTH CARE EDUCATION/TRAINING PROGRAM

## 2024-04-15 PROCEDURE — 99213 OFFICE O/P EST LOW 20 MIN: CPT | Mod: S$PBB,,, | Performed by: STUDENT IN AN ORGANIZED HEALTH CARE EDUCATION/TRAINING PROGRAM
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
## 2024-04-16 ENCOUNTER — TELEPHONE (OUTPATIENT)
Dept: ENDOCRINOLOGY | Facility: CLINIC | Age: 79
End: 2024-04-16
Payer: MEDICARE

## 2024-04-16 NOTE — TELEPHONE ENCOUNTER
----- Message from Dominga Haddad sent at 4/16/2024  9:16 AM CDT -----  Regarding: self [phone]  Type: Patient Call Back    Who called: wife     What is the request in detail: Physicians assistant  sent a form over for provider to sign, she is asking for provider to sign and get it sent back to patient can get his medication. (dulaglutide (TRULICITY) 3 mg/0.5 mL pen injection)  She asked for a call back to confirm it has been done.    Can the clinic reply by [facility]? no    Would the patient rather a call back or a response via [facility]?  Call back     Best call back number: [phone]

## 2024-04-16 NOTE — TELEPHONE ENCOUNTER
Pt and wife were informed that paperwork was faxed to pharmacy assistance on yesterday(4/15). Understanding verbalized.

## 2024-04-16 NOTE — TELEPHONE ENCOUNTER
The prescription portion of Mr. Cha 's Trulicity 3 mg application was received from the providers office. The completed patient assistance application has been submitted to  [facility] for consideration of eligibility.

## 2024-04-17 NOTE — TELEPHONE ENCOUNTER
We are pleased to inform you Jani Cha has been approved in the [facility] Patient Assistance Program.  [facility] has shared this information with your patient.   Approval Details  Eligibility start Date: 04/17/2024  Eligibility end date: 12/31/2024 (Updated proof of eligibility required to re-enroll for 2025 calendar year).  Approved Medication: Trulicity 3 mg  Day supply: 120  Allotted Refills: 3 (Please be advised Program allows only 3 refills per eligibility period regardless of changes in strength).   Estimated Shipping: 10-14 business days or longer depending on availability and/or projected refill date  Shipping Location: Patient's Home (You and your patient will receive further communication from an [facility] once Medication is received)                                            Important Note  It is imperative that any changes to strength or discontinuation of this medication be referred to the Pharmacy Patient Assistance Team Pool Via EPIC to prevent Gaps in therapy.

## 2024-04-23 ENCOUNTER — OFFICE VISIT (OUTPATIENT)
Dept: PODIATRY | Facility: CLINIC | Age: 79
End: 2024-04-23
Payer: MEDICARE

## 2024-04-23 VITALS — WEIGHT: 245.81 LBS | HEIGHT: 69 IN | BODY MASS INDEX: 36.41 KG/M2

## 2024-04-23 DIAGNOSIS — B35.1 ONYCHOMYCOSIS DUE TO DERMATOPHYTE: ICD-10-CM

## 2024-04-23 DIAGNOSIS — E11.49 TYPE II DIABETES MELLITUS WITH NEUROLOGICAL MANIFESTATIONS: Primary | ICD-10-CM

## 2024-04-23 PROCEDURE — 11721 DEBRIDE NAIL 6 OR MORE: CPT | Mod: Q9,PBBFAC,PO | Performed by: PODIATRIST

## 2024-04-23 PROCEDURE — 99213 OFFICE O/P EST LOW 20 MIN: CPT | Mod: PBBFAC,PO,25 | Performed by: PODIATRIST

## 2024-04-23 PROCEDURE — 11721 DEBRIDE NAIL 6 OR MORE: CPT | Mod: Q9,S$PBB,, | Performed by: PODIATRIST

## 2024-04-23 PROCEDURE — 99999 PR PBB SHADOW E&M-EST. PATIENT-LVL III: CPT | Mod: PBBFAC,,, | Performed by: PODIATRIST

## 2024-04-23 PROCEDURE — 99499 UNLISTED E&M SERVICE: CPT | Mod: S$PBB,,, | Performed by: PODIATRIST
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
## 2024-04-24 PROBLEM — N18.31 CHRONIC KIDNEY DISEASE, STAGE 3A: Status: ACTIVE | Noted: 2024-04-24
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
## 2024-04-25 ENCOUNTER — OFFICE VISIT (OUTPATIENT)
Dept: NEPHROLOGY | Facility: CLINIC | Age: 79
End: 2024-04-25
Payer: MEDICARE

## 2024-04-25 VITALS
HEIGHT: 69 IN | RESPIRATION RATE: 18 BRPM | BODY MASS INDEX: 36.36 KG/M2 | SYSTOLIC BLOOD PRESSURE: 130 MMHG | OXYGEN SATURATION: 96 % | HEART RATE: 64 BPM | WEIGHT: 245.5 LBS | DIASTOLIC BLOOD PRESSURE: 74 MMHG

## 2024-04-25 DIAGNOSIS — N18.30 DIABETES MELLITUS DUE TO UNDERLYING CONDITION WITH STAGE 3 CHRONIC KIDNEY DISEASE, WITHOUT LONG-TERM CURRENT USE OF INSULIN, UNSPECIFIED WHETHER STAGE 3A OR 3B CKD: ICD-10-CM

## 2024-04-25 DIAGNOSIS — R80.1 PERSISTENT PROTEINURIA: ICD-10-CM

## 2024-04-25 DIAGNOSIS — I10 PRIMARY HYPERTENSION: ICD-10-CM

## 2024-04-25 DIAGNOSIS — E08.22 DIABETES MELLITUS DUE TO UNDERLYING CONDITION WITH STAGE 3 CHRONIC KIDNEY DISEASE, WITHOUT LONG-TERM CURRENT USE OF INSULIN, UNSPECIFIED WHETHER STAGE 3A OR 3B CKD: ICD-10-CM

## 2024-04-25 DIAGNOSIS — N25.81 SECONDARY RENAL HYPERPARATHYROIDISM: ICD-10-CM

## 2024-04-25 DIAGNOSIS — N18.31 CHRONIC KIDNEY DISEASE, STAGE 3A: Primary | ICD-10-CM

## 2024-04-25 PROCEDURE — G2211 COMPLEX E/M VISIT ADD ON: HCPCS | Mod: S$PBB,,, | Performed by: INTERNAL MEDICINE

## 2024-04-25 PROCEDURE — 99999 PR PBB SHADOW E&M-EST. PATIENT-LVL V: CPT | Mod: PBBFAC,,, | Performed by: INTERNAL MEDICINE

## 2024-04-25 PROCEDURE — 99204 OFFICE O/P NEW MOD 45 MIN: CPT | Mod: S$PBB,,, | Performed by: INTERNAL MEDICINE

## 2024-04-25 PROCEDURE — 99215 OFFICE O/P EST HI 40 MIN: CPT | Mod: PBBFAC | Performed by: INTERNAL MEDICINE

## 2024-04-25 NOTE — PATIENT INSTRUCTIONS
"  Date of Discharge:  1/13/2023    Discharge Diagnosis: Class 3 severe obesity due to excess calories with serious comorbidity and body mass index (BMI) of 50.0 to 59.9 in adult ([facility]) [E66.01, Z68.43]  Heartburn [R12]    Presenting Problem/History of Present Illness  Class 3 severe obesity due to excess calories with serious comorbidity and body mass index (BMI) of 50.0 to 59.9 in adult ([facility]) [E66.01, Z68.43]  Heartburn [R12]       Hospital Course  Patient is a 27 y.o. female presented with morbid obesity and is status post laparoscopic sleeve gastrectomy with robotic assist.  Postoperatively the patient is doing well.  She continues to ambulate and utilize her incentive spirometer.  Her diet was advanced to clears and she tolerated this well.  She was discharged home after postoperative instructions and education was provided to the patient and reviewed with the patient prior to discharge.      Procedures Performed  Procedure(s):  GASTRIC SLEEVE LAPAROSCOPIC WITH DAVINCI ROBOT       Consults:   Consults     No orders found for last 30 day(s).            Condition on Discharge:  Stable    Vital Signs  /68 (BP Location: Right arm, Patient Position: Lying)   Pulse 72   Temp 98.7 °F (37.1 °C)   Resp 16   Ht 152 cm (59.84\")   Wt 111 kg (244 lb 7.8 oz)   LMP 12/26/2022   SpO2 99%   BMI 48.00 kg/m²     Physical Exam:  General Appearance: alert, appears stated age and cooperative  Lungs: clear to auscultation, respirations regular, respirations even and respirations unlabored  Heart: regular rhythm & normal rate, normal S1, S2, no murmur, no gallop, no rub and no click  Abdomen: normal bowel sounds, no masses, no hepatomegaly, no splenomegaly, soft non-tender, no guarding and no rebound tenderness  Extremities: moves extremities well, no edema, no cyanosis and no redness    Discharge Disposition  Home or Self Care    Discharge Medications     Discharge Medications      New Medications      Instructions Start " Your kidney function is at 48% today.  The most important thing to keep your kidney function stable is to keep your diabetes well-controlled.  Good water intake and low salt diet also help.  Let's keep an eye out for protein in the urine. You have had this in the past. It is currently being controlled with valsartan. Let's repeat labs in a few months to make sure it stays controlled.    Date   ondansetron ODT 4 MG disintegrating tablet  Commonly known as: ZOFRAN-ODT   4 mg, Translingual, Every 8 Hours PRN      simethicone 80 MG chewable tablet  Commonly known as: Gas-X   40 mg, Oral, Every 6 Hours PRN      traMADol 50 MG tablet  Commonly known as: Ultram   50 mg, Oral, Every 8 Hours PRN         Continue These Medications      Instructions Start Date   pantoprazole 40 MG EC tablet  Commonly known as: PROTONIX   40 mg, Oral, Daily         Stop These Medications    calcium carbonate 500 MG chewable tablet  Commonly known as: TUMS     multivitamin with minerals tablet tablet     sucralfate 1 g tablet  Commonly known as: CARAFATE            Discharge Diet:     Activity at Discharge:     Follow-up Appointments  Future Appointments   Date Time Provider Department Center   1/18/2023  1:15 PM Eloy Dominique MD [facility] None   2/13/2023  9:15 AM Eloy Dominique MD [facility] None   4/11/2023  9:15 AM Eloy Dominique MD [facility] None   7/11/2023  9:00 AM Eloy Dominique MD [facility] None   1/15/2024  9:30 AM Eloy Dominique MD [facility] None         Test Results Pending at Discharge       Eloy Dominique MD  01/13/23  12:52 CST

## 2024-04-26 PROBLEM — R80.1 PERSISTENT PROTEINURIA: Status: ACTIVE | Noted: 2024-04-26

## 2024-04-26 PROBLEM — N25.81 SECONDARY RENAL HYPERPARATHYROIDISM: Status: ACTIVE | Noted: 2024-04-26

## 2024-04-26 NOTE — ASSESSMENT & PLAN NOTE
- intermittently present since at least 2008; likely due to diabetic nephropathy  - UPCR 0.15 today; controlled  - continue valsartan 80mg daily  - if proteinuria recurs, will increase RAAS blockade  - if additional diabetic medication becomes needed, can consider adding SGLT2i

## 2024-04-26 NOTE — ASSESSMENT & PLAN NOTE
- baseline Cr 1.5-1.7 with GFR 41-48% since 2019 with h/o proteinuria; likely due to diabetic nephropathy but cannot rule out contribution from cardiovascular disease/arterionephrosclerosis.   - Cr up to 2.1 on 3/28/24; unclear etiology  - Cr 1.5 today; improved and back to baseline  - UPCR 0.15; continue valsartan 80mg daily. Will increase valsartan dose if proteinuria recurs.   - renal US ordered by Urology; will review results  - discussed importance of improved diabetic control, good water intake, and avoidance of salt

## 2024-04-30 ENCOUNTER — EXTERNAL CHRONIC CARE MANAGEMENT (OUTPATIENT)
Dept: PRIMARY CARE CLINIC | Facility: CLINIC | Age: 79
End: 2024-04-30
Payer: MEDICARE

## 2024-04-30 PROCEDURE — 99490 CHRNC CARE MGMT STAFF 1ST 20: CPT | Mod: PBBFAC,PO | Performed by: FAMILY MEDICINE

## 2024-04-30 PROCEDURE — 99490 CHRNC CARE MGMT STAFF 1ST 20: CPT | Mod: S$PBB,,, | Performed by: FAMILY MEDICINE

## 2024-05-07 ENCOUNTER — TELEPHONE (OUTPATIENT)
Dept: ORTHOPEDICS | Facility: CLINIC | Age: 79
End: 2024-05-07
Payer: MEDICARE

## 2024-05-07 NOTE — TELEPHONE ENCOUNTER
----- Message from Jaydon Little sent at 5/7/2024 11:56 AM CDT -----  Regarding: FW: consult for knee replacement  Contact: Pt @ [phone]  Hey,   He is a cerna patient and might want his other knee done. Can you please call and schedule him an appointment with Dr. Glaser?     Thank you!    Sincerely,   Horacio Little MS, OTC  OR & Clinical Assistant to Dr. Sylvain Glaser III  Phone: [phone]  Fax: [phone]  ----- Message -----  From: Monika Escobedo  Sent: 5/7/2024  11:34 AM CDT  To: [facility] Staff  Subject: consult for knee replacement                     Pt is calling to speak to someone in the office to schedule procedure. Pt is asking for a return call soon.         Type of Procedure: Knee Replacement on (R)

## 2024-05-14 ENCOUNTER — OFFICE VISIT (OUTPATIENT)
Dept: OPTOMETRY | Facility: CLINIC | Age: 79
End: 2024-05-14
Payer: MEDICARE

## 2024-05-14 DIAGNOSIS — H25.13 NUCLEAR SCLEROSIS, BILATERAL: ICD-10-CM

## 2024-05-14 DIAGNOSIS — E11.9 DIABETES MELLITUS TYPE 2 WITHOUT RETINOPATHY: Primary | ICD-10-CM

## 2024-05-14 DIAGNOSIS — H52.4 MYOPIA OF BOTH EYES WITH REGULAR ASTIGMATISM AND PRESBYOPIA: ICD-10-CM

## 2024-05-14 DIAGNOSIS — H02.88B MEIBOMIAN GLAND DYSFUNCTION (MGD), BILATERAL, BOTH UPPER AND LOWER LIDS: ICD-10-CM

## 2024-05-14 DIAGNOSIS — H02.88A MEIBOMIAN GLAND DYSFUNCTION (MGD), BILATERAL, BOTH UPPER AND LOWER LIDS: ICD-10-CM

## 2024-05-14 DIAGNOSIS — Z13.5 GLAUCOMA SCREENING: ICD-10-CM

## 2024-05-14 DIAGNOSIS — H52.223 MYOPIA OF BOTH EYES WITH REGULAR ASTIGMATISM AND PRESBYOPIA: ICD-10-CM

## 2024-05-14 DIAGNOSIS — H52.13 MYOPIA OF BOTH EYES WITH REGULAR ASTIGMATISM AND PRESBYOPIA: ICD-10-CM

## 2024-05-14 PROCEDURE — 92014 COMPRE OPH EXAM EST PT 1/>: CPT | Mod: S$PBB,,, | Performed by: OPTOMETRIST

## 2024-05-14 PROCEDURE — 99213 OFFICE O/P EST LOW 20 MIN: CPT | Mod: PBBFAC | Performed by: OPTOMETRIST

## 2024-05-14 PROCEDURE — 92015 DETERMINE REFRACTIVE STATE: CPT | Mod: ,,, | Performed by: OPTOMETRIST

## 2024-05-14 PROCEDURE — 99999 PR PBB SHADOW E&M-EST. PATIENT-LVL III: CPT | Mod: PBBFAC,,, | Performed by: OPTOMETRIST

## 2024-05-14 NOTE — PROGRESS NOTES
HPI    DLS: 04/04/2023 Dr. Marques     Eye Meds: No gtts    78 y.o. male is here for Diabetic eye exam. Last glucose reading was 145   this morning. H/o Cataracts, both eyes.Occasional itching, bilateral.   Sometimes eyes tear at night. Denies eye pain and flashes. H/o floaters.   Experience blurred vision at distance and near with current glasses rx.   Does have trouble with glare.  Last edited by Max Marques, OD on 5/14/2024  1:21 PM.            Assessment /Plan     For exam results, see Encounter Report.    Diabetes mellitus type 2 without retinopathy  -No retinopathy noted today.  Continued control with primary care physician and annual comprehensive eye exam.    Nuclear sclerosis, bilateral  -Educated patient on presence of cataracts at today's exam, monitor at annual dilated fundus exam. 1-4 years surgical estimate.    Meibomian gland dysfunction (MGD), bilateral, both upper and lower lids  -Systane Complete    Glaucoma screening  -Monitor with annual eye exam and IOP check    Myopia of both eyes with regular astigmatism and presbyopia  Eyeglass Final Rx       Eyeglass Final Rx         Sphere Cylinder Axis Dist VA Add    Right -3.25 +1.25 170 20/30 +2.50    Left -3.00 +1.25 170 20/25 +2.50      Type: PAL    Expiration Date: 5/14/2025                      RTC 1 yr

## 2024-05-17 DIAGNOSIS — I10 ESSENTIAL HYPERTENSION: Chronic | ICD-10-CM

## 2024-05-17 RX ORDER — CARVEDILOL 25 MG/1
TABLET ORAL
Qty: 180 TABLET | Refills: 3 | Status: SHIPPED | OUTPATIENT
Start: 2024-05-17

## 2024-05-31 ENCOUNTER — EXTERNAL CHRONIC CARE MANAGEMENT (OUTPATIENT)
Dept: PRIMARY CARE CLINIC | Facility: CLINIC | Age: 79
End: 2024-05-31
Payer: MEDICARE

## 2024-05-31 PROCEDURE — 99490 CHRNC CARE MGMT STAFF 1ST 20: CPT | Mod: PBBFAC,PO | Performed by: FAMILY MEDICINE

## 2024-05-31 PROCEDURE — 99490 CHRNC CARE MGMT STAFF 1ST 20: CPT | Mod: S$PBB,,, | Performed by: FAMILY MEDICINE

## 2024-06-13 ENCOUNTER — PATIENT MESSAGE (OUTPATIENT)
Dept: ORTHOPEDICS | Facility: CLINIC | Age: 79
End: 2024-06-13
Payer: MEDICARE

## 2024-06-21 ENCOUNTER — HOSPITAL ENCOUNTER (OUTPATIENT)
Dept: RADIOLOGY | Facility: HOSPITAL | Age: 79
Discharge: HOME | End: 2024-06-21
Attending: UROLOGY
Payer: MEDICARE

## 2024-06-21 DIAGNOSIS — R35.1 NOCTURIA MORE THAN TWICE PER NIGHT: ICD-10-CM

## 2024-06-21 PROCEDURE — 76770 US EXAM ABDO BACK WALL COMP: CPT | Mod: TC

## 2024-06-21 PROCEDURE — 76770 US EXAM ABDO BACK WALL COMP: CPT | Mod: 26,,, | Performed by: RADIOLOGY

## 2024-06-25 ENCOUNTER — OFFICE VISIT (OUTPATIENT)
Dept: UROLOGY | Facility: CLINIC | Age: 79
End: 2024-06-25
Payer: MEDICARE

## 2024-06-25 VITALS — WEIGHT: 249.31 LBS | BODY MASS INDEX: 36.82 KG/M2

## 2024-06-25 DIAGNOSIS — N40.1 BPH WITH URINARY OBSTRUCTION: ICD-10-CM

## 2024-06-25 DIAGNOSIS — R35.1 NOCTURIA MORE THAN TWICE PER NIGHT: ICD-10-CM

## 2024-06-25 DIAGNOSIS — R39.15 URINARY URGENCY: Primary | ICD-10-CM

## 2024-06-25 DIAGNOSIS — N13.8 BPH WITH URINARY OBSTRUCTION: ICD-10-CM

## 2024-06-25 PROCEDURE — 99214 OFFICE O/P EST MOD 30 MIN: CPT | Mod: S$PBB,,, | Performed by: UROLOGY

## 2024-06-25 PROCEDURE — 99999 PR PBB SHADOW E&M-EST. PATIENT-LVL III: CPT | Mod: PBBFAC,,, | Performed by: UROLOGY

## 2024-06-25 PROCEDURE — 99213 OFFICE O/P EST LOW 20 MIN: CPT | Mod: PBBFAC | Performed by: UROLOGY

## 2024-06-25 RX ORDER — OXYBUTYNIN CHLORIDE 5 MG/1
5 TABLET, EXTENDED RELEASE ORAL DAILY
Qty: 30 TABLET | Refills: 11 | Status: SHIPPED | OUTPATIENT
Start: 2024-06-25 | End: 2024-06-25 | Stop reason: SDUPTHER

## 2024-06-25 RX ORDER — OXYBUTYNIN CHLORIDE 5 MG/1
5 TABLET, EXTENDED RELEASE ORAL DAILY
Qty: 90 TABLET | Refills: 3 | Status: SHIPPED | OUTPATIENT
Start: 2024-06-25 | End: 2024-06-27 | Stop reason: SDUPTHER

## 2024-06-25 RX ORDER — OXYBUTYNIN CHLORIDE 5 MG/1
5 TABLET, EXTENDED RELEASE ORAL DAILY
Qty: 30 TABLET | Refills: 11 | Status: SHIPPED | OUTPATIENT
Start: 2024-06-25 | End: 2024-06-25

## 2024-06-25 NOTE — PROGRESS NOTES
Subjective:       Patient ID: Jani Cha is a 79 y.o. male The patient's last visit with me was on 3/25/2024.     Chief Complaint:   No chief complaint on file.        History of Kidney Stones  He had an issue with a ureteral stone in Winter 2015.  He did not recall passing the stone but his pain resolved.       3/25/2021  He had right flank pain and hematuria a couple of weeks ago. He brought the stone for analysis a couple of weeks ago.  He denies anymore hematuria, flank pain.  He denies fever.    5/3/2022  He denies pain or hematuria.      06/25/2024        Benign Prostatic Hyperplasia  He patient reports nocturia three times a night. He denies frequency, incomplete emptying and intermittency. The patient states symptoms are of moderate severity. Onset of symptoms was several years ago and was gradual in onset.  He has no personal history and no family history of prostate cancer. He reports a history of kidney stones. He denies flank pain, gross hematuria and recurrent UTI.  He is currently taking no prostate medications.  He has noted some frequency with occasional urgency.    8/3/2022  He was to add Flomax last visit.  He took it for no more than 2 weeks.  He stopped it due to retrograde ejaculation and pain in his prostate afterwards.  He did not note any change to his stream.    IPSS Questionnaire (AUA-7):  8    11/17/2022   He is doing okay.  He recently fractured his hip.  His stream is about the same.  He required a urologist to place the [facility].  He is not sure why.    He is not interested in trying prostate medications again.    11/29/2022 Orchitis  He went to the ED on 11/24/2022 after developing a fever.  His urine was red at the time.  He was given antibiotics for a UTI.  Two days later he develop pain in his testicles.  He went back to the ED.    12/27/2022  He feels better today.  His testicle is slightly sensitive.      IPSS Questionnaire (AUA-7):  17    03/30/2023  Cystoscopy this month  showed bilateral lobe hypertrophy of the prostate.   He would like to proceed with a procedure on his prostate.     5/11/2023  He is s/p UroLift on 4/24/2023.  He is having a lot of urgency.  Nocturia may be improved.    IPSS Questionnaire (AUA-7):  20/4 6/23/2023  He still has some urgency.  His nocturia is improved to once every 2.5 hours.     12/22/2023  His days are variable.  He has noted more nocturia.    3/25/2024  He did not get Oxybutynin so he is about the same as before.  He tells me the pharmacy never got the prescription.    06/25/2024  He did better with Oxybuytunin, but the pharmacy is having supply issues.        ACTIVE MEDICAL ISSUES:  Patient Active Problem List   Diagnosis    Hyperlipidemia    Primary hypertension    Coronary artery disease involving native coronary artery of native heart without angina pectoris    Sleep apnea    S/P CABG x 4    Type 2 diabetes mellitus with diabetic neuropathy, without long-term current use of insulin    GERD (gastroesophageal reflux disease)    Personal history of colonic polyps    Abdominal aortic aneurysm (AAA) without rupture    Total occlusion of coronary artery, chronic -LCX with collaterals.  No ischemic on PET    Pulmonary nodule    Thoracic aortic aneurysm without rupture    Bilateral renal cysts    History of PCI of RCA    Class 2 severe obesity due to excess calories with serious comorbidity and body mass index (BMI) of 36.0 to 36.9 in adult    Calcified granuloma of lung    Type 2 diabetes mellitus with hyperglycemia, without long-term current use of insulin    Lymphedema of both lower extremities    Swelling of lower extremity    Closed displaced fracture of right femoral neck s/p total hip arthroplasty on 10/21/2022    Hip pain    Chronic pain of both knees    Decreased strength, endurance, and mobility    Osteoporosis    Localized osteoporosis of [place]    Adrenal incidentaloma    Congestive heart failure, unspecified HF chronicity,  unspecified heart failure type    Diabetes mellitus due to underlying condition with stage 3 chronic kidney disease, without long-term current use of insulin, unspecified whether stage 3a or 3b CKD    Primary osteoarthritis of right knee    Primary osteoarthritis of left knee    Stage 3b chronic kidney disease    BPH (benign prostatic hyperplasia)    Diarrhea    Chronic pain of left knee    Decreased range of motion of left knee    Weakness of left lower extremity    Gait, antalgic    Status post total knee replacement, left    Chronic kidney disease, stage 3a    Persistent proteinuria    Secondary renal hyperparathyroidism       ALLERGIES AND MEDICATIONS: updated and reviewed.  Review of patient's allergies indicates:   Allergen Reactions    Penicillins Hives, Itching and Rash    Shellfish containing products      Current Outpatient Medications   Medication Sig    acetaminophen (TYLENOL) 500 MG tablet Take 2 tablets (1,000 mg total) by mouth every 8 (eight) hours as needed (Mild to moderate pain).    acetaminophen (TYLENOL) 650 MG TbSR Take 650 mg by mouth every 8 (eight) hours.    acetaminophen (TYLENOL) 650 MG TbSR Take 1 tablet (650 mg total) by mouth every 8 (eight) hours.    aspirin (ECOTRIN) 81 MG EC tablet Take 81 mg by mouth once daily.    atorvastatin (LIPITOR) 80 MG tablet Take 1 tablet (80 mg total) by mouth once daily.    blood sugar diagnostic Strp 1 strip by Misc.(Non-Drug; Combo Route) route once daily.    calcium carbonate (OS-BAILEE) 500 mg calcium (1,250 mg) tablet Take 1 tablet by mouth once daily.    carvediloL (COREG) 25 MG tablet TAKE 1 TABLET BY MOUTH TWICE DAILY WITH MEALS    clopidogreL (PLAVIX) 75 mg tablet Take 1 tablet by mouth once daily    clotrimazole-betamethasone 1-0.05% (LOTRISONE) cream Apply topically 2 (two) times daily.    dulaglutide (TRULICITY) 3 mg/0.5 mL pen injector Inject 3 mg into the skin every 7 days.    famotidine (PEPCID) 20 MG tablet Take 1 tablet (20 mg total) by  mouth nightly as needed for Heartburn.    fluticasone propionate (FLONASE) 50 mcg/actuation nasal spray 1 spray (50 mcg total) by Each Nostril route once daily.    glipiZIDE (GLUCOTROL) 10 MG TR24 Take 1 tablet (10 mg total) by mouth 2 (two) times daily with meals.    melatonin (MELATIN) 3 mg tablet Take 2 tablets (6 mg total) by mouth nightly as needed for Insomnia.    methocarbamoL (ROBAXIN) 750 MG Tab Take 1 tablet (750 mg total) by mouth 4 (four) times daily as needed (for muscle spasms).    neomycin-polymyxin-hydrocortisone (CORTISPORIN) 3.5-10,000-1 mg/mL-unit/mL-% otic suspension Place 3 drops into both ears 4 (four) times daily.    neomycin-polymyxin-hydrocortisone (CORTISPORIN) otic solution INSTILL 3 DROPS INTO RIGHT EAR EVERY 6 HOURS    oxybutynin (DITROPAN-XL) 5 MG TR24 Take 1 tablet (5 mg total) by mouth once daily.    pantoprazole (PROTONIX) 40 MG tablet Take 1 tablet by mouth once daily    senna-docusate 8.6-50 mg (SENNA WITH DOCUSATE SODIUM) 8.6-50 mg per tablet Take 1 tablet by mouth once daily.    TRUEPLUS LANCETS 33 gauge Misc Use to test blood sugar daily; discard lancet after each use    valsartan (DIOVAN) 80 MG tablet Take 1 tablet by mouth once daily     No current facility-administered medications for this visit.       Review of Systems   Constitutional:  Negative for chills and fever.   HENT:  Negative for congestion.    Respiratory:  Negative for chest tightness and shortness of breath.    Cardiovascular:  Negative for chest pain and palpitations.   Gastrointestinal:  Negative for abdominal pain, constipation, diarrhea, nausea and vomiting.   Genitourinary:  Positive for urgency. Negative for difficulty urinating, dysuria, flank pain and hematuria.   Musculoskeletal:  Negative for arthralgias.   Neurological:  Negative for dizziness.   Psychiatric/Behavioral:  Negative for confusion.        Objective:      Vitals:    06/25/24 1345   Weight: 113.1 kg (249 lb 5.4 oz)           Physical  Exam  Vitals and nursing note reviewed.   Constitutional:       Appearance: He is well-developed.   HENT:      Head: Normocephalic.   Eyes:      Conjunctiva/sclera: Conjunctivae normal.   Neck:      Thyroid: No thyromegaly.      Trachea: No tracheal deviation.   Cardiovascular:      Rate and Rhythm: Normal rate.      Heart sounds: Normal heart sounds.   Pulmonary:      Effort: Pulmonary effort is normal. No respiratory distress.      Breath sounds: Normal breath sounds. No wheezing.   Abdominal:      General: Bowel sounds are normal.      Palpations: Abdomen is soft.      Tenderness: There is no abdominal tenderness. There is no rebound.      Hernia: No hernia is present.   Musculoskeletal:         General: No tenderness. Normal range of motion.      Cervical back: Normal range of motion and neck supple.   Lymphadenopathy:      Cervical: No cervical adenopathy.   Skin:     General: Skin is warm and dry.      Findings: No erythema or rash.   Neurological:      Mental Status: He is alert and oriented to person, place, and time.   Psychiatric:         Behavior: Behavior normal.         Thought Content: Thought content normal.         Judgment: Judgment normal.         Urine dipstick shows negative for all components.  Micro exam: negative for WBC's or RBC's.    US Retroperitoneal Complete  Order: [id number]  Status: Final result       Visible to patient: Yes (not seen)       Next appt: Today at 01:30 PM in Urology (Jeanmarie Hanson MD)       Dx: Nocturia more than twice per night    1 Result Note       1 Patient Communication  Details    Reading Physician Reading Date Result Priority   Jareth De Leon MD  [phone] 6/21/2024 Routine     Narrative & Impression  EXAMINATION:  US RETROPERITONEAL COMPLETE     CLINICAL HISTORY:  Nocturia     TECHNIQUE:  Ultrasound of the kidneys and urinary bladder was performed including color flow and Doppler evaluation of the kidneys.     COMPARISON:  CT 02/19/2024      FINDINGS:  The right kidney is normal in length measuring 11.5 cm. The left kidney is normal in length measuring 11.5 cm. Segmental arterial resistive indices are within normal limits. Bilateral renal cysts noted; right upper pole cyst measures 2.0 cm, lower pole cyst measures 1.6 cm, adjacent cyst measures 2.2 cm, left upper pole cyst measures 1.5 cm, lower pole cyst 1.1 cm, adjacent cyst measures 1.3 cm.  Three hyperechoic foci in the left kidney measuring 4 mm, suggestive of calcifications.  No hydronephrosis or renal masses identified.  The bladder is grossly unremarkable.     Bladder volume 44 ml     Post void residual 6 ml     Prostate volume 35 ml     Impression:     Bilateral renal cysts.  No masses or hydronephrosis identified.        Electronically signed by:Jareth De Leon MD  Date:                                            06/21/2024  Time:                                           12:12           Exam Ended: 06/21/24 11:56 CDT                     Assessment:       1. Urinary urgency    2. Nocturia more than twice per night    3. BPH with urinary obstruction                  Plan:       1. Urinary urgency  Oxybutynin    2. Nocturia more than twice per night    - oxybutynin (DITROPAN-XL) 5 MG TR24; Take 1 tablet (5 mg total) by mouth once daily.  Dispense: 30 tablet; Refill: 11    3. BPH with urinary obstruction  S/p UroLift       Follow up in about 6 months (around 12/25/2024) for Follow up Established.

## 2024-06-27 ENCOUNTER — TELEPHONE (OUTPATIENT)
Dept: UROLOGY | Facility: CLINIC | Age: 79
End: 2024-06-27
Payer: MEDICARE

## 2024-06-27 DIAGNOSIS — R35.1 NOCTURIA MORE THAN TWICE PER NIGHT: ICD-10-CM

## 2024-06-27 RX ORDER — OXYBUTYNIN CHLORIDE 5 MG/1
5 TABLET, EXTENDED RELEASE ORAL DAILY
Qty: 90 TABLET | Refills: 3 | Status: SHIPPED | OUTPATIENT
Start: 2024-06-27 | End: 2025-06-27

## 2024-06-27 NOTE — TELEPHONE ENCOUNTER
----- Message from Michell Hernandez MA sent at 6/27/2024 11:02 AM CDT -----  Regarding: FW: Patient call back    ----- Message -----  From: Leslye Covarrubias  Sent: 6/27/2024  11:00 AM CDT  To: [facility] Staff  Subject: Patient call back                                .Type: Patient Call Back    Who called:self     What is the request in detail:calling in regards to oxybutynin (DITROPAN-XL) 5 MG TR24. Caller states he would like a written prescription for medication; states his current pharmacy does not have the medication and he may need to call around to see who has medication in stock. Can come in office on today to  paper prescription     Can the clinic reply by [facility]?no    Would the patient rather a call back or a response via [facility]?call      Best call back number:.[phone]      Additional Information:

## 2024-06-30 ENCOUNTER — EXTERNAL CHRONIC CARE MANAGEMENT (OUTPATIENT)
Dept: PRIMARY CARE CLINIC | Facility: CLINIC | Age: 79
End: 2024-06-30
Payer: MEDICARE

## 2024-06-30 PROCEDURE — 99490 CHRNC CARE MGMT STAFF 1ST 20: CPT | Mod: PBBFAC,PO | Performed by: FAMILY MEDICINE

## 2024-06-30 PROCEDURE — 99490 CHRNC CARE MGMT STAFF 1ST 20: CPT | Mod: S$PBB,,, | Performed by: FAMILY MEDICINE

## 2024-06-30 PROCEDURE — 99439 CHRNC CARE MGMT STAF EA ADDL: CPT | Mod: S$PBB,,, | Performed by: FAMILY MEDICINE

## 2024-06-30 PROCEDURE — 99439 CHRNC CARE MGMT STAF EA ADDL: CPT | Mod: PBBFAC,PO | Performed by: FAMILY MEDICINE

## 2024-07-03 ENCOUNTER — TELEPHONE (OUTPATIENT)
Dept: CARDIOLOGY | Facility: CLINIC | Age: 79
End: 2024-07-03
Payer: MEDICARE

## 2024-07-12 ENCOUNTER — OFFICE VISIT (OUTPATIENT)
Dept: ORTHOPEDICS | Facility: CLINIC | Age: 79
End: 2024-07-12
Payer: MEDICARE

## 2024-07-12 VITALS — WEIGHT: 252.19 LBS | BODY MASS INDEX: 37.35 KG/M2 | HEIGHT: 69 IN

## 2024-07-12 DIAGNOSIS — M17.11 PRIMARY OSTEOARTHRITIS OF RIGHT KNEE: Primary | ICD-10-CM

## 2024-07-12 PROCEDURE — 99999 PR PBB SHADOW E&M-EST. PATIENT-LVL III: CPT | Mod: PBBFAC,,, | Performed by: ORTHOPAEDIC SURGERY

## 2024-07-12 PROCEDURE — 99213 OFFICE O/P EST LOW 20 MIN: CPT | Mod: PBBFAC | Performed by: ORTHOPAEDIC SURGERY
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
## 2024-07-16 DIAGNOSIS — M17.11 PRIMARY OSTEOARTHRITIS OF RIGHT KNEE: Primary | ICD-10-CM

## 2024-07-17 ENCOUNTER — TELEPHONE (OUTPATIENT)
Dept: FAMILY MEDICINE | Facility: CLINIC | Age: 79
End: 2024-07-17
Payer: MEDICARE

## 2024-07-17 ENCOUNTER — OFFICE VISIT (OUTPATIENT)
Dept: FAMILY MEDICINE | Facility: CLINIC | Age: 79
End: 2024-07-17
Payer: MEDICARE

## 2024-07-17 VITALS
HEART RATE: 88 BPM | HEIGHT: 69 IN | OXYGEN SATURATION: 99 % | DIASTOLIC BLOOD PRESSURE: 72 MMHG | SYSTOLIC BLOOD PRESSURE: 128 MMHG | WEIGHT: 248.88 LBS | RESPIRATION RATE: 18 BRPM | BODY MASS INDEX: 36.86 KG/M2

## 2024-07-17 DIAGNOSIS — J32.9 SINOBRONCHITIS: Primary | ICD-10-CM

## 2024-07-17 DIAGNOSIS — J40 SINOBRONCHITIS: Primary | ICD-10-CM

## 2024-07-17 PROBLEM — N18.32 STAGE 3B CHRONIC KIDNEY DISEASE: Status: RESOLVED | Noted: 2023-11-01 | Resolved: 2024-07-17

## 2024-07-17 PROBLEM — Z96.641 PRESENCE OF RIGHT ARTIFICIAL HIP JOINT: Status: ACTIVE | Noted: 2022-10-25

## 2024-07-17 PROCEDURE — 99999 PR PBB SHADOW E&M-EST. PATIENT-LVL V: CPT | Mod: PBBFAC,,, | Performed by: NURSE PRACTITIONER

## 2024-07-17 PROCEDURE — 99215 OFFICE O/P EST HI 40 MIN: CPT | Mod: PBBFAC,PO | Performed by: NURSE PRACTITIONER

## 2024-07-17 PROCEDURE — 99214 OFFICE O/P EST MOD 30 MIN: CPT | Mod: S$PBB,,, | Performed by: NURSE PRACTITIONER

## 2024-07-17 RX ORDER — BENZONATATE 200 MG/1
200 CAPSULE ORAL 3 TIMES DAILY PRN
Qty: 45 CAPSULE | Refills: 1 | Status: SHIPPED | OUTPATIENT
Start: 2024-07-17

## 2024-07-17 RX ORDER — ALBUTEROL SULFATE 90 UG/1
2 AEROSOL, METERED RESPIRATORY (INHALATION) EVERY 6 HOURS PRN
Qty: 18 G | Refills: 0 | Status: SHIPPED | OUTPATIENT
Start: 2024-07-17

## 2024-07-17 RX ORDER — DOXYCYCLINE 100 MG/1
100 CAPSULE ORAL EVERY 12 HOURS
Qty: 14 CAPSULE | Refills: 0 | Status: SHIPPED | OUTPATIENT
Start: 2024-07-17 | End: 2024-07-24

## 2024-07-17 NOTE — TELEPHONE ENCOUNTER
----- Message from Devorah Quijano sent at 7/17/2024  3:20 PM CDT -----  Regarding: pharmacy call  Type:  Pharmacy Calling    Name of Caller: Estefania  Pharmacy Name: [facility]   Prescription Name: albuterol (VENTOLIN HFA) 90 mcg/actuation inhaler   What do they need to clarify?: requesting change to Proventil or Proair   Best Call Back Number: [phone]  Additional Information:

## 2024-07-18 ENCOUNTER — OFFICE VISIT (OUTPATIENT)
Dept: CARDIOLOGY | Facility: CLINIC | Age: 79
End: 2024-07-18
Payer: MEDICARE

## 2024-07-18 VITALS
HEIGHT: 69 IN | DIASTOLIC BLOOD PRESSURE: 78 MMHG | BODY MASS INDEX: 37.09 KG/M2 | SYSTOLIC BLOOD PRESSURE: 120 MMHG | WEIGHT: 250.44 LBS | HEART RATE: 73 BPM | OXYGEN SATURATION: 96 %

## 2024-07-18 DIAGNOSIS — I25.10 CORONARY ARTERY DISEASE INVOLVING NATIVE CORONARY ARTERY OF NATIVE HEART WITHOUT ANGINA PECTORIS: Primary | Chronic | ICD-10-CM

## 2024-07-18 DIAGNOSIS — I71.23 ANEURYSM OF DESCENDING THORACIC AORTA WITHOUT RUPTURE: ICD-10-CM

## 2024-07-18 DIAGNOSIS — Z95.1 S/P CABG X 4: Chronic | ICD-10-CM

## 2024-07-18 DIAGNOSIS — I25.82 TOTAL OCCLUSION OF CORONARY ARTERY, CHRONIC: ICD-10-CM

## 2024-07-18 DIAGNOSIS — Z01.810 PREOP CARDIOVASCULAR EXAM: ICD-10-CM

## 2024-07-18 DIAGNOSIS — Z98.61 HISTORY OF PERCUTANEOUS CORONARY INTERVENTION: ICD-10-CM

## 2024-07-18 DIAGNOSIS — E78.00 PURE HYPERCHOLESTEROLEMIA: Chronic | ICD-10-CM

## 2024-07-18 DIAGNOSIS — I71.40 ABDOMINAL AORTIC ANEURYSM (AAA) WITHOUT RUPTURE, UNSPECIFIED PART: ICD-10-CM

## 2024-07-18 DIAGNOSIS — I10 PRIMARY HYPERTENSION: ICD-10-CM

## 2024-07-18 PROCEDURE — 99999 PR PBB SHADOW E&M-EST. PATIENT-LVL V: CPT | Mod: PBBFAC,,, | Performed by: INTERNAL MEDICINE

## 2024-07-18 PROCEDURE — 99214 OFFICE O/P EST MOD 30 MIN: CPT | Mod: S$PBB,,, | Performed by: INTERNAL MEDICINE

## 2024-07-18 PROCEDURE — 99215 OFFICE O/P EST HI 40 MIN: CPT | Mod: PBBFAC | Performed by: INTERNAL MEDICINE

## 2024-07-18 NOTE — PROGRESS NOTES
Subjective:   Patient ID:  Jani Cha is a 79 y.o. male who presents for follow-up of Follow-up and Coronary Artery Disease      Assessment/Plan:     1. Coronary artery disease involving native coronary artery of native heart without angina pectoris - on DAP and statin.    2. Total occlusion of coronary artery, chronic -LCX with collaterals.  No ischemic on PET    3. 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  followed by Vasc Surg    4. S/P CABG x 4- LIMA-LAD patent.  All other grafts occluded    5. Primary hypertension - controlled.  No smartphone    6. Pure hypercholesterolemia- on statin at goal    7. History of PCI of RCA - 9-2020    8. Abdominal aortic aneurysm (AAA) without rupture, unspecified part -At level of diaphragm.  followed by Vasc Surg    9. Preop cardiovascular exam - knee replacement. See statement below          No orders of the defined types were placed in this encounter.  Pt has no active cardiac condition (ACS/USA, decompenstated CHF, significant arrhythmias or severe valvular disease) and can easily achieve 4 METS.  Pt does not require any further workup prior to undergoing knee surgery. ASA and plavix can be held as needed but should be restarted as soon as possible after surgery is completed.  Pt should remain on beta-blockers throughout the entire marvin-operative time period. The other cardiac meds can be held at Surgerys discretion but should be restarted as soon as safely possible after surgery.  These recommendations follow the most current Guideline on Perioperative Cardiovascular Evaluation and Management of Patients Undergoing Noncardiac Surgery released by the ACC/AHA. (JACC 2014.07.944).       ___________________________________________________________________________________________    HPI:   Pt has CAD s/p CABG in 5-2006 (LIMA-LAD (patent), SVG-OM (occluded), SVG-PDA (occluded)), abd AAA, JAYSON on CPAP, DM, HTN and dyslipidemia.  Pt  "recently had a mechanical fall and broke his hip.  He had surgery and was without any cardiac complications.  He is doing home PT.  He still intends to have his knee replacement once his hip heals.     Mr. Cha denies any exertional CP, BUCK, palpitations, TIA's, syncope or presyncope. He has been having orthopedic (knee issues) recently and will be having knee replacement in the near future.  Currently pt has URI/bronchitis and is on abx.. His only sxs today are associated with the URI (cough, CP w couch    Pt had a syncopal episode in 2020 which prompted a PET scan which confirmed ischemia in the PL distribution.  LHC revealed  RCA  with good PDA collaterals and  LCX (SVG to OM and PDA were occluded).  He subsequently had  RCA PCI that was complicated by mild dissection and thus was staged.  PCI completed in 9-2020.  He remain on DAP and he completed phase 2 of cardiac rehab .     CT scan for renal stones in  demonstrated Infrarenal abdominal aortic aneurysm measuring 3.5-cm and diffuse dilatation of the descending thoracic aorta measuring 3.9-cm. CTA 2018 The distal descending thoracic aorta remains slightly dilated at 4.3 cm, previously 3.9 cm. There is a persistent, infrarenal abdominal aortic aneurysm measuring approximately 3.6 cm, partially thrombosed, previously 3.5 cm. There is significant extensive atherosclerotic plaque throughout the aorta. Celiac, SMA, and ROXANNE are patent.  Now followed by Vasc Surgery.  Last noted 2-2024 "-Asymptomatic 4 cm (4.2 cm) DTA aneurysm (4 cm) at level of the diaphragm - rec continued routine surveillance with CT CAP non contrast  -Asymptomatic 4 cm infrarenal AAA (3.9 cm) - rec continued routine surveillance "    Caroitds 7-2020  There is 0-19% right Internal Carotid Stenosis.  There is 0-19% left Internal Carotid Stenosis.        Results for orders placed during the hospital encounter of 02/02/23    Echo Saline Bubble? No    Interpretation Summary  · The " left ventricle is mildly enlarged with mild concentric hypertrophy and low normal systolic function.  · The estimated ejection fraction is 50%.  · Normal left ventricular diastolic function.  · Normal right ventricular size with normal right ventricular systolic function.  · Mild left atrial enlargement.  · Mild right atrial enlargement.  · Normal central venous pressure (3 mmHg).  · The estimated PA systolic pressure is 10 mmHg.  · There is no pulmonary hypertension.  · The sinuses of Valsalva is moderately dilated. 4.3cm.     Results for orders placed during the hospital encounter of 07/17/20    Cardiac PET Scan Stress    Interpretation Summary    Perfusion defect #1 - There is a small sized, moderate to severe intensity, basal to mid inferior and inferolateral wall reversible perfusion abnormality in the distribution of the PLB involving 10% of the LV myocardium.    Within perfusion abnormality #1, absolute myocardial perfusion (cc/min/gm) averaged 0.67 cc/min/g at rest, 0.49 cc/min/g at stress and CFR was 0.80 cc/min/g, which equates to severely reduced coronary flow capacity in 10% of the myocardium (within the PLB territory) .    Perfusion defect #2 - There is a very small (<5%) sized, mild intensity apical resting perfusion abnormality in the distribution of the distal LAD territory. This defect is unchanged with stress.    Within perfusion abnormality #2, absolute myocardial perfusion (cc/min/gm) averaged 0.73 cc/min/g at rest, 0.75 cc/min/g at stress and CFR was 1.04 cc/min/g, in 3% of the myocardium (within the LAD territory).    Whole heart absolute myocardial perfusion (cc/min/g) averaged 0.76 cc/min/g at rest, which is normal, 0.99 cc/min/g at stress, which is moderately reduced, and 1.36  CFR, which is moderately reduced.    Gated perfusion images showed an ejection fraction of 50% at rest and 40% during stress. Normal ejection fraction is greater than 51%.    There is basal to mid inferolateral wall  "hypokinesis at rest and basal to mid inferolateral wall akinesis at stress.    LV cavity size is normal at rest and stress.    The EKG portion of this study is negative for ischemia.    There were no arrhythmias during stress.    The patient reported no chest pain during the stress test.    When compared to the previous study from 2/1/2017, there are significant changes.  The inferior/inferolateral defect appears more intense in severity. Coronary flow capacity is now severely reduced in this region.       Vitals:    07/18/24 0935   BP: 120/78   Pulse: 73   SpO2: 96%   Weight: 113.6 kg (250 lb 7.1 oz)   Height: 5' 9" (1.753 m)     Body mass index is 36.98 kg/m².  CrCl cannot be calculated (Patient's most recent lab result is older than the maximum 7 days allowed.).    Lab Results   Component Value Date     04/23/2024    K 4.4 04/23/2024     04/23/2024    CO2 25 04/23/2024    BUN 32 (H) 04/23/2024    CREATININE 1.5 (H) 04/23/2024     (H) 04/23/2024    HGBA1C 7.8 (H) 03/28/2024    MG 1.8 10/25/2022    AST 16 03/07/2024    ALT 14 03/07/2024    ALBUMIN 3.2 (L) 04/23/2024    PROT 6.5 03/07/2024    BILITOT 0.6 03/07/2024    WBC 7.68 04/23/2024    HGB 14.5 04/23/2024    HCT 46.1 04/23/2024    MCV 96 04/23/2024     04/23/2024    INR 1.2 11/02/2023    PSA 1.4 06/09/2020    TSH 1.198 03/28/2024    CHOL 116 (L) 03/07/2024    HDL 36 (L) 03/07/2024    LDLCALC 57.2 (L) 03/07/2024    TRIG 114 03/07/2024       Current Outpatient Medications   Medication Sig    acetaminophen (TYLENOL) 500 MG tablet Take 2 tablets (1,000 mg total) by mouth every 8 (eight) hours as needed (Mild to moderate pain).    acetaminophen (TYLENOL) 650 MG TbSR Take 650 mg by mouth every 8 (eight) hours.    acetaminophen (TYLENOL) 650 MG TbSR Take 1 tablet (650 mg total) by mouth every 8 (eight) hours.    albuterol (VENTOLIN HFA) 90 mcg/actuation inhaler Inhale 2 puffs into the lungs every 6 (six) hours as needed for Wheezing or " Shortness of Breath. Rescue    aspirin (ECOTRIN) 81 MG EC tablet Take 81 mg by mouth once daily.    atorvastatin (LIPITOR) 80 MG tablet Take 1 tablet (80 mg total) by mouth once daily.    benzonatate (TESSALON) 200 MG capsule Take 1 capsule (200 mg total) by mouth 3 (three) times daily as needed for Cough.    blood sugar diagnostic Strp 1 strip by Misc.(Non-Drug; Combo Route) route once daily.    calcium carbonate (OS-BAILEE) 500 mg calcium (1,250 mg) tablet Take 1 tablet by mouth once daily. (Patient not taking: Reported on 7/17/2024)    carvediloL (COREG) 25 MG tablet TAKE 1 TABLET BY MOUTH TWICE DAILY WITH MEALS    clopidogreL (PLAVIX) 75 mg tablet Take 1 tablet by mouth once daily    clotrimazole-betamethasone 1-0.05% (LOTRISONE) cream Apply topically 2 (two) times daily.    doxycycline (MONODOX) 100 MG capsule Take 1 capsule (100 mg total) by mouth every 12 (twelve) hours. for 7 days    dulaglutide (TRULICITY) 3 mg/0.5 mL pen injector Inject 3 mg into the skin every 7 days.    famotidine (PEPCID) 20 MG tablet Take 1 tablet (20 mg total) by mouth nightly as needed for Heartburn.    fluticasone propionate (FLONASE) 50 mcg/actuation nasal spray 1 spray (50 mcg total) by Each Nostril route once daily.    glipiZIDE (GLUCOTROL) 10 MG TR24 Take 1 tablet (10 mg total) by mouth 2 (two) times daily with meals.    melatonin (MELATIN) 3 mg tablet Take 2 tablets (6 mg total) by mouth nightly as needed for Insomnia.    methocarbamoL (ROBAXIN) 750 MG Tab Take 1 tablet (750 mg total) by mouth 4 (four) times daily as needed (for muscle spasms). (Patient not taking: Reported on 7/17/2024)    neomycin-polymyxin-hydrocortisone (CORTISPORIN) 3.5-10,000-1 mg/mL-unit/mL-% otic suspension Place 3 drops into both ears 4 (four) times daily.    neomycin-polymyxin-hydrocortisone (CORTISPORIN) otic solution INSTILL 3 DROPS INTO RIGHT EAR EVERY 6 HOURS    oxybutynin (DITROPAN-XL) 5 MG TR24 Take 1 tablet (5 mg total) by mouth once daily.     pantoprazole (PROTONIX) 40 MG tablet Take 1 tablet by mouth once daily    senna-docusate 8.6-50 mg (SENNA WITH DOCUSATE SODIUM) 8.6-50 mg per tablet Take 1 tablet by mouth once daily. (Patient not taking: Reported on 7/17/2024)    TRUEPLUS LANCETS 33 gauge Misc Use to test blood sugar daily; discard lancet after each use    valsartan (DIOVAN) 80 MG tablet Take 1 tablet by mouth once daily     No current facility-administered medications for this visit.       Review of Systems   Constitutional: Negative for decreased appetite, malaise/fatigue, weight gain and weight loss.   Eyes:  Negative for visual disturbance.   Cardiovascular:  Negative for chest pain, claudication, dyspnea on exertion, irregular heartbeat, orthopnea, palpitations, paroxysmal nocturnal dyspnea and syncope.   Respiratory:  Positive for cough. Negative for shortness of breath and snoring.    Skin:  Negative for rash.   Musculoskeletal:  Positive for arthritis. Negative for muscle cramps, muscle weakness and myalgias.   Gastrointestinal:  Negative for abdominal pain, anorexia, change in bowel habit and nausea.   Genitourinary:  Negative for dysuria and frequency.   Neurological:  Negative for excessive daytime sleepiness, dizziness, headaches, loss of balance, numbness and weakness.   Psychiatric/Behavioral:  Negative for depression.        Objective:   Physical Exam  Constitutional:       Appearance: Normal appearance. He is well-developed.   HENT:      Head: Normocephalic and atraumatic.   Cardiovascular:      Rate and Rhythm: Normal rate and regular rhythm.      Pulses: Intact distal pulses.      Heart sounds: Normal heart sounds. No murmur heard.     No friction rub. No gallop.   Pulmonary:      Effort: Pulmonary effort is normal.      Breath sounds: Normal breath sounds.   Neurological:      Mental Status: He is alert and oriented to person, place, and time.   Psychiatric:         Attention and Perception: Attention normal.         Mood and  Affect: Mood and affect normal.         Speech: Speech normal.         Behavior: Behavior normal.         Thought Content: Thought content normal.         Judgment: Judgment normal.

## 2024-07-21 NOTE — PROGRESS NOTES
Subjective:      Patient ID: Jani Cha is a 79 y.o. male.  New to me but seen previously in clinic by a fellow provider. Pt presents to clinic with persistent cough with wheezing that comes and goes, cough treated with tessalon     Cough  This is a recurrent problem. The current episode started in the past 7 days. The problem has been gradually worsening. The problem occurs constantly. The cough is Non-productive. Associated symptoms include ear congestion, myalgias, nasal congestion, postnasal drip, rhinorrhea, a sore throat and wheezing. Pertinent negatives include no chest pain, chills, ear pain, fever, headaches, heartburn, hemoptysis, rash, shortness of breath, sweats or weight loss. The symptoms are aggravated by lying down and stress. He has tried prescription cough suppressant for the symptoms. The treatment provided mild relief. There is no history of asthma, bronchiectasis, bronchitis, COPD, emphysema, environmental allergies or pneumonia.     Review of Systems   Constitutional:  Positive for fatigue. Negative for activity change, appetite change, chills, fever, unexpected weight change and weight loss.   HENT:  Positive for postnasal drip, rhinorrhea, sinus pressure/congestion and sore throat. Negative for nasal congestion, ear pain, sneezing, trouble swallowing and voice change.    Eyes:  Negative for pain and visual disturbance.   Respiratory:  Positive for cough, chest tightness and wheezing. Negative for hemoptysis and shortness of breath.    Cardiovascular:  Negative for chest pain, palpitations and leg swelling.   Gastrointestinal:  Negative for abdominal pain, constipation, diarrhea, heartburn, nausea and vomiting.   Endocrine: Negative.    Genitourinary:  Negative for bladder incontinence and difficulty urinating.   Musculoskeletal:  Positive for myalgias. Negative for arthralgias, back pain and gait problem.   Integumentary:  Negative for rash.   Allergic/Immunologic: Negative.  Negative  "for environmental allergies.   Neurological:  Negative for speech difficulty and headaches.   Hematological: Negative.    Psychiatric/Behavioral: Negative.     All other systems reviewed and are negative.        Objective:     Vitals:    07/17/24 1051   BP: 128/72   BP Location: Right arm   Patient Position: Sitting   BP Method: Large (Manual)   Pulse: 88   Resp: 18   SpO2: 99%   Weight: 112.9 kg (248 lb 14.4 oz)   Height: 5' 9" (1.753 m)     Physical Exam  Vitals and nursing note reviewed.   Constitutional:       General: He is not in acute distress.     Appearance: Normal appearance. He is well-developed and well-groomed. He is not ill-appearing.   HENT:      Head: Normocephalic and atraumatic.      Right Ear: Tympanic membrane, ear canal and external ear normal.      Left Ear: Tympanic membrane, ear canal and external ear normal.      Nose: Nasal tenderness, mucosal edema, congestion and rhinorrhea present.      Right Sinus: Maxillary sinus tenderness and frontal sinus tenderness present.      Left Sinus: Maxillary sinus tenderness and frontal sinus tenderness present.      Mouth/Throat:      Lips: Pink.      Mouth: Mucous membranes are moist.      Pharynx: Oropharynx is clear. Uvula midline.   Eyes:      General: Lids are normal. Vision grossly intact. Gaze aligned appropriately.      Conjunctiva/sclera: Conjunctivae normal.      Pupils: Pupils are equal, round, and reactive to light.   Neck:      Trachea: Phonation normal.   Cardiovascular:      Rate and Rhythm: Normal rate and regular rhythm.      Heart sounds: Normal heart sounds.   Pulmonary:      Effort: Pulmonary effort is normal. No accessory muscle usage, prolonged expiration or respiratory distress.      Breath sounds: Normal air entry. Rhonchi present. No decreased breath sounds, wheezing or rales.   Abdominal:      General: Abdomen is flat. Bowel sounds are normal. There is no distension.      Palpations: Abdomen is soft.      Tenderness: There is no " abdominal tenderness.   Musculoskeletal:      Cervical back: Neck supple.      Right lower leg: No edema.      Left lower leg: No edema.   Skin:     General: Skin is warm and dry.      Findings: No rash.   Neurological:      General: No focal deficit present.      Mental Status: He is alert and oriented to person, place, and time. Mental status is at baseline.      Sensory: Sensation is intact.      Motor: Motor function is intact.      Coordination: Coordination is intact.      Gait: Gait is intact.   Psychiatric:         Attention and Perception: Attention and perception normal.         Mood and Affect: Mood and affect normal.         Speech: Speech normal.         Behavior: Behavior normal. Behavior is cooperative.         Thought Content: Thought content normal.         Cognition and Memory: Cognition and memory normal.         Judgment: Judgment normal.       Assessment and Plan:     1. Sinobronchitis  Discussed distinction between quick-relief and controlled medications.  Discussed medication dosage, use, side effects, and goals of treatment in detail.    Warning signs of respiratory distress were reviewed with the patient.   Discussed avoidance of precipitants.  Personalized, written asthma management plan given.  Discussed technique for using MDIs and/or nebulizer.  Antibiotics per medication orders.  Antitussives per medication orders.  Avoid exposure to tobacco smoke and fumes.  B-agonist inhaler.  Call if shortness of breath worsens, blood in sputum, change in character of cough, development of fever or chills, inability to maintain nutrition and hydration. Avoid exposure to tobacco smoke and fumes.  - albuterol (VENTOLIN HFA) 90 mcg/actuation inhaler; Inhale 2 puffs into the lungs every 6 (six) hours as needed for Wheezing or Shortness of Breath. Rescue  Dispense: 18 g; Refill: 0  - benzonatate (TESSALON) 200 MG capsule; Take 1 capsule (200 mg total) by mouth 3 (three) times daily as needed for Cough.   Dispense: 45 capsule; Refill: 1  - doxycycline (MONODOX) 100 MG capsule; Take 1 capsule (100 mg total) by mouth every 12 (twelve) hours. for 7 days  Dispense: 14 capsule; Refill: 0           IRAIDA Myrick, FNP-C  Family/Internal Medicine  [facility]
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
## 2024-07-25 ENCOUNTER — HOSPITAL ENCOUNTER (OUTPATIENT)
Dept: RADIOLOGY | Facility: HOSPITAL | Age: 79
Discharge: HOME | End: 2024-07-25
Attending: ORTHOPAEDIC SURGERY
Payer: MEDICARE

## 2024-07-25 ENCOUNTER — OFFICE VISIT (OUTPATIENT)
Dept: SPORTS MEDICINE | Facility: CLINIC | Age: 79
End: 2024-07-25
Payer: MEDICARE

## 2024-07-25 ENCOUNTER — TELEPHONE (OUTPATIENT)
Dept: SPORTS MEDICINE | Facility: CLINIC | Age: 79
End: 2024-07-25
Payer: MEDICARE

## 2024-07-25 VITALS
SYSTOLIC BLOOD PRESSURE: 130 MMHG | WEIGHT: 253 LBS | HEART RATE: 59 BPM | DIASTOLIC BLOOD PRESSURE: 72 MMHG | BODY MASS INDEX: 37.47 KG/M2 | HEIGHT: 69 IN

## 2024-07-25 DIAGNOSIS — M75.101 ROTATOR CUFF SYNDROME OF RIGHT SHOULDER: Primary | ICD-10-CM

## 2024-07-25 DIAGNOSIS — M25.511 ACUTE PAIN OF RIGHT SHOULDER: ICD-10-CM

## 2024-07-25 DIAGNOSIS — M19.011 OSTEOARTHRITIS OF RIGHT GLENOHUMERAL JOINT: ICD-10-CM

## 2024-07-25 DIAGNOSIS — M25.511 RIGHT SHOULDER PAIN, UNSPECIFIED CHRONICITY: ICD-10-CM

## 2024-07-25 PROCEDURE — 99999 PR PBB SHADOW E&M-EST. PATIENT-LVL III: CPT | Mod: PBBFAC,,, | Performed by: ORTHOPAEDIC SURGERY

## 2024-07-25 PROCEDURE — 99204 OFFICE O/P NEW MOD 45 MIN: CPT | Mod: S$PBB,,, | Performed by: ORTHOPAEDIC SURGERY

## 2024-07-25 PROCEDURE — 73030 X-RAY EXAM OF SHOULDER: CPT | Mod: TC,RT

## 2024-07-25 PROCEDURE — 99213 OFFICE O/P EST LOW 20 MIN: CPT | Mod: PBBFAC,25 | Performed by: ORTHOPAEDIC SURGERY

## 2024-07-25 PROCEDURE — 73030 X-RAY EXAM OF SHOULDER: CPT | Mod: 26,RT,, | Performed by: RADIOLOGY

## 2024-07-25 NOTE — TELEPHONE ENCOUNTER
Spoke c pt. Informed pt that Dr. Duran will no longer be seeing pts at scheduled appt time on 1430. He will leave his house now & anticipates arriving [phone]. Confirmed appt date, time, location. Pt will call c additional questions/concerns in interim. Pt expressed understanding & was thankful.

## 2024-07-27 ENCOUNTER — LAB VISIT (OUTPATIENT)
Dept: LAB | Facility: HOSPITAL | Age: 79
End: 2024-07-27
Attending: HOSPITALIST
Payer: MEDICARE

## 2024-07-27 DIAGNOSIS — E11.65 TYPE 2 DIABETES MELLITUS WITH HYPERGLYCEMIA, WITHOUT LONG-TERM CURRENT USE OF INSULIN: ICD-10-CM

## 2024-07-27 LAB
ANION GAP SERPL CALC-SCNC: 8 MMOL/L (ref 8–16)
BUN SERPL-MCNC: 31 MG/DL (ref 8–23)
CALCIUM SERPL-MCNC: 9.7 MG/DL (ref 8.7–10.5)
CHLORIDE SERPL-SCNC: 106 MMOL/L (ref 95–110)
CO2 SERPL-SCNC: 27 MMOL/L (ref 23–29)
CREAT SERPL-MCNC: 1.6 MG/DL (ref 0.5–1.4)
EST. GFR  (NO RACE VARIABLE): 44 ML/MIN/1.73 M^2
ESTIMATED AVG GLUCOSE: 171 MG/DL (ref 68–131)
GLUCOSE SERPL-MCNC: 173 MG/DL (ref 70–110)
HBA1C MFR BLD: 7.6 % (ref 4–5.6)
POTASSIUM SERPL-SCNC: 4.4 MMOL/L (ref 3.5–5.1)
SODIUM SERPL-SCNC: 141 MMOL/L (ref 136–145)

## 2024-07-27 PROCEDURE — 83036 HEMOGLOBIN GLYCOSYLATED A1C: CPT | Performed by: HOSPITALIST

## 2024-07-27 PROCEDURE — 80048 BASIC METABOLIC PNL TOTAL CA: CPT | Performed by: HOSPITALIST

## 2024-07-27 PROCEDURE — 36415 COLL VENOUS BLD VENIPUNCTURE: CPT | Performed by: HOSPITALIST

## 2024-07-29 ENCOUNTER — TELEPHONE (OUTPATIENT)
Dept: PREADMISSION TESTING | Facility: HOSPITAL | Age: 79
End: 2024-07-29
Payer: MEDICARE

## 2024-07-29 DIAGNOSIS — Z01.818 PREOPERATIVE TESTING: Primary | ICD-10-CM

## 2024-07-29 DIAGNOSIS — M79.609 PAIN IN EXTREMITY, UNSPECIFIED EXTREMITY: ICD-10-CM

## 2024-07-29 DIAGNOSIS — M17.11 PRIMARY OSTEOARTHRITIS OF RIGHT KNEE: Primary | ICD-10-CM

## 2024-07-29 NOTE — ANESTHESIA PAT ROS NOTE
07/29/2024  Jani Cha is a 79 y.o., male.      Pre-op Assessment          Review of Systems           Anesthesia Assessment: Preoperative EQUATION    Planned Procedure: Procedure(s) (LRB):  ARTHROPLASTY, KNEE, TOTAL, USING [employer] COMPUTER-ASSISTED NAVIGATION: RIGHT: DEPUY - ATTUNE (Right)  Requested Anesthesia Type:Regional  Surgeon: Sylvain Glaser III, MD  Service: Orthopedics  Known or anticipated Date of Surgery:8/15/2024    Surgeon notes: reviewed    Electronic QUestionnaire Assessment completed via nurse interview with patient.        Triage considerations:     The patient has no apparent active cardiac condition (No unstable coronary Syndrome such as severe unstable angina or recent [<1 month] myocardial infarction, decompensated CHF, severe valvular   disease or significant arrhythmia)    Previous anesthesia records:GETA, MAC, CSE, and No problems  11/15/23   ARTHROPLASTY, KNEE, TOTAL: Left: (Left: Knee)     4/24/23   CYSTOSCOPY, WITH INSERTION OF UROLIFT IMPLANT (Perineum)   Airway:  Mallampati: II   Mouth Opening: Normal  TM Distance: 4 - 6 cm  Tongue: Large    10/21/22 ARTHROPLASTY, HIP, RIGHT (Right: Hip)   Direct laryngoscopy Mask Ventilation: Easy Intubated: Postinduction Blade: Thomson #2 Airway Device Size: 7.0 Cuff Inflation: Minimal occlusive pressure Placement Verified By: Capnometry Complicating Factors: None Findings Post-Intubation: Bilateral breath sounds;Atraumatic/Condition of teeth unchanged Secured at: Lips Complications: None       Last PCP note: within 1 month , within [facility]   Subspecialty notes: Cardiology: General and Vascular Medicine, Endocrinology, Ortho, Urology, Pain Medicine, Nephrology     Other important co-morbidities: DM2, GERD, HLD, HTN, Obesity, JAYSON, and ncw CPAP, CKD3, BPH, CAD, CABG x 4 2006, Stent x 1 2020, Plavix Use, AAA (stable), 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   7/27/24 BMP, A1C  4/23/24 CBC  11/2/23 EKG  2/2/23 ECHO  9/12/22 CT ABD/PELVIS  6/2/21 US CAROTID SCOTT            Instructions given. (See in Nurse's note)    Optimization:  Anesthesia Preop Clinic Assessment  Indicated  POC     Medical Opinion Indicated  Cardiology        Sub-specialist consult indicated:   [facility] NP       Plan:    Testing:  CMP, EKG, Hematology Profile, and PT/INR   Pre-anesthesia  visit       Visit focus: possible regional anesthesia and/or nerve block      Consultation:Pre Op Center NP for medical and anesthesia optimization       Patient  has previously scheduled Medical Appointment: 7/31    Navigation: Tests Scheduled.              Consults scheduled.             Results will be tracked by Preop Clinic.                   7/18/24 Cardiology (Dr. Oliveira):  Preop cardiovascular exam - knee replacement. See statement below     No orders of the defined types were placed in this encounter.  Pt has no active cardiac condition (ACS/USA, decompenstated CHF, significant arrhythmias or severe valvular disease) and can easily achieve 4 METS.  Pt does not require any further workup prior to undergoing knee surgery. ASA and plavix can be held as needed but should be restarted as soon as possible after surgery is completed.  Pt should remain on beta-blockers throughout the entire marvin-operative time period. The other cardiac meds can be held at Surgerys discretion but should be restarted as soon as safely possible after surgery.  These recommendations follow the most current Guideline on Perioperative Cardiovascular Evaluation and Management of Patients Undergoing Noncardiac Surgery released by the ACC/AHA. (JACC 2014.07.944).       7/31/24 Patient is ready for surgery.    Cody Silverman NP  Perioperative Medicine  [facility]

## 2024-07-30 ENCOUNTER — TELEPHONE (OUTPATIENT)
Dept: INTERNAL MEDICINE | Facility: CLINIC | Age: 79
End: 2024-07-30
Payer: MEDICARE

## 2024-07-31 ENCOUNTER — HOSPITAL ENCOUNTER (OUTPATIENT)
Dept: RADIOLOGY | Facility: HOSPITAL | Age: 79
Discharge: HOME | End: 2024-07-31
Attending: NURSE PRACTITIONER
Payer: MEDICARE

## 2024-07-31 ENCOUNTER — EXTERNAL CHRONIC CARE MANAGEMENT (OUTPATIENT)
Dept: PRIMARY CARE CLINIC | Facility: CLINIC | Age: 79
End: 2024-07-31
Payer: MEDICARE

## 2024-07-31 ENCOUNTER — HOSPITAL ENCOUNTER (OUTPATIENT)
Dept: CARDIOLOGY | Facility: CLINIC | Age: 79
Discharge: HOME | End: 2024-07-31
Payer: MEDICARE

## 2024-07-31 ENCOUNTER — OFFICE VISIT (OUTPATIENT)
Dept: ORTHOPEDICS | Facility: CLINIC | Age: 79
End: 2024-07-31
Payer: MEDICARE

## 2024-07-31 ENCOUNTER — OFFICE VISIT (OUTPATIENT)
Dept: INTERNAL MEDICINE | Facility: CLINIC | Age: 79
End: 2024-07-31
Payer: MEDICARE

## 2024-07-31 VITALS
SYSTOLIC BLOOD PRESSURE: 120 MMHG | HEART RATE: 48 BPM | WEIGHT: 249.13 LBS | TEMPERATURE: 98 F | DIASTOLIC BLOOD PRESSURE: 64 MMHG | BODY MASS INDEX: 36.9 KG/M2 | OXYGEN SATURATION: 96 % | HEIGHT: 69 IN

## 2024-07-31 VITALS — BODY MASS INDEX: 36.9 KG/M2 | WEIGHT: 249.13 LBS | HEIGHT: 69 IN

## 2024-07-31 DIAGNOSIS — M17.11 PRIMARY OSTEOARTHRITIS OF RIGHT KNEE: ICD-10-CM

## 2024-07-31 DIAGNOSIS — N40.1 BENIGN PROSTATIC HYPERPLASIA WITH URINARY FREQUENCY: ICD-10-CM

## 2024-07-31 DIAGNOSIS — Z01.818 PREOPERATIVE EXAMINATION: Primary | ICD-10-CM

## 2024-07-31 DIAGNOSIS — I71.40 ABDOMINAL AORTIC ANEURYSM (AAA) WITHOUT RUPTURE, UNSPECIFIED PART: ICD-10-CM

## 2024-07-31 DIAGNOSIS — E27.8 ADRENAL INCIDENTALOMA: ICD-10-CM

## 2024-07-31 DIAGNOSIS — I25.10 CORONARY ARTERY DISEASE INVOLVING NATIVE CORONARY ARTERY OF NATIVE HEART WITHOUT ANGINA PECTORIS: Chronic | ICD-10-CM

## 2024-07-31 DIAGNOSIS — N18.31 CHRONIC KIDNEY DISEASE, STAGE 3A: ICD-10-CM

## 2024-07-31 DIAGNOSIS — R35.0 BENIGN PROSTATIC HYPERPLASIA WITH URINARY FREQUENCY: ICD-10-CM

## 2024-07-31 DIAGNOSIS — E78.00 PURE HYPERCHOLESTEROLEMIA: Chronic | ICD-10-CM

## 2024-07-31 DIAGNOSIS — I10 PRIMARY HYPERTENSION: ICD-10-CM

## 2024-07-31 DIAGNOSIS — R91.1 PULMONARY NODULE: ICD-10-CM

## 2024-07-31 DIAGNOSIS — E66.01 CLASS 2 SEVERE OBESITY DUE TO EXCESS CALORIES WITH SERIOUS COMORBIDITY AND BODY MASS INDEX (BMI) OF 36.0 TO 36.9 IN ADULT: ICD-10-CM

## 2024-07-31 DIAGNOSIS — G47.39 OTHER SLEEP APNEA: ICD-10-CM

## 2024-07-31 DIAGNOSIS — Z01.818 PREOPERATIVE TESTING: ICD-10-CM

## 2024-07-31 DIAGNOSIS — I89.0 LYMPHEDEMA OF BOTH LOWER EXTREMITIES: ICD-10-CM

## 2024-07-31 DIAGNOSIS — Z96.651 S/P TOTAL KNEE REPLACEMENT, RIGHT: Primary | ICD-10-CM

## 2024-07-31 DIAGNOSIS — E11.40 TYPE 2 DIABETES MELLITUS WITH DIABETIC NEUROPATHY, WITHOUT LONG-TERM CURRENT USE OF INSULIN: Chronic | ICD-10-CM

## 2024-07-31 LAB
OHS QRS DURATION: 134 MS
OHS QTC CALCULATION: 409 MS

## 2024-07-31 PROCEDURE — 99999 PR PBB SHADOW E&M-EST. PATIENT-LVL IV: CPT | Mod: PBBFAC,,, | Performed by: NURSE PRACTITIONER

## 2024-07-31 PROCEDURE — 99417 PROLNG OP E/M EACH 15 MIN: CPT | Mod: S$PBB,,, | Performed by: NURSE PRACTITIONER

## 2024-07-31 PROCEDURE — 93005 ELECTROCARDIOGRAM TRACING: CPT | Mod: PBBFAC | Performed by: INTERNAL MEDICINE

## 2024-07-31 PROCEDURE — 99490 CHRNC CARE MGMT STAFF 1ST 20: CPT | Mod: S$PBB,,, | Performed by: FAMILY MEDICINE

## 2024-07-31 PROCEDURE — 93010 ELECTROCARDIOGRAM REPORT: CPT | Mod: S$PBB,,, | Performed by: INTERNAL MEDICINE

## 2024-07-31 PROCEDURE — 73560 X-RAY EXAM OF KNEE 1 OR 2: CPT | Mod: 26,RT,, | Performed by: RADIOLOGY

## 2024-07-31 PROCEDURE — 99214 OFFICE O/P EST MOD 30 MIN: CPT | Mod: S$PBB,,, | Performed by: NURSE PRACTITIONER

## 2024-07-31 PROCEDURE — 99490 CHRNC CARE MGMT STAFF 1ST 20: CPT | Mod: PBBFAC,PO | Performed by: FAMILY MEDICINE

## 2024-07-31 PROCEDURE — 99213 OFFICE O/P EST LOW 20 MIN: CPT | Mod: PBBFAC,25 | Performed by: NURSE PRACTITIONER

## 2024-07-31 PROCEDURE — 99439 CHRNC CARE MGMT STAF EA ADDL: CPT | Mod: S$PBB,,, | Performed by: FAMILY MEDICINE

## 2024-07-31 PROCEDURE — 99215 OFFICE O/P EST HI 40 MIN: CPT | Mod: S$PBB,,, | Performed by: NURSE PRACTITIONER

## 2024-07-31 PROCEDURE — 99214 OFFICE O/P EST MOD 30 MIN: CPT | Mod: PBBFAC,25,27 | Performed by: NURSE PRACTITIONER

## 2024-07-31 PROCEDURE — 99439 CHRNC CARE MGMT STAF EA ADDL: CPT | Mod: PBBFAC,PO | Performed by: FAMILY MEDICINE

## 2024-07-31 PROCEDURE — 99999 PR PBB SHADOW E&M-EST. PATIENT-LVL III: CPT | Mod: PBBFAC,,, | Performed by: NURSE PRACTITIONER

## 2024-07-31 PROCEDURE — 73560 X-RAY EXAM OF KNEE 1 OR 2: CPT | Mod: TC,RT

## 2024-07-31 RX ORDER — PREGABALIN 75 MG/1
75 CAPSULE ORAL NIGHTLY
OUTPATIENT
Start: 2024-07-31

## 2024-07-31 RX ORDER — ACETAMINOPHEN 500 MG
1000 TABLET ORAL
OUTPATIENT
Start: 2024-07-31

## 2024-07-31 RX ORDER — MIDAZOLAM HYDROCHLORIDE 1 MG/ML
4 INJECTION, SOLUTION INTRAMUSCULAR; INTRAVENOUS
OUTPATIENT
Start: 2024-07-31 | End: 2024-08-01

## 2024-07-31 RX ORDER — ACETAMINOPHEN 500 MG
1000 TABLET ORAL EVERY 6 HOURS
OUTPATIENT
Start: 2024-07-31

## 2024-07-31 RX ORDER — SODIUM CHLORIDE 9 MG/ML
INJECTION, SOLUTION INTRAVENOUS
OUTPATIENT
Start: 2024-07-31

## 2024-07-31 RX ORDER — NALOXONE HCL 0.4 MG/ML
0.02 VIAL (ML) INJECTION
OUTPATIENT
Start: 2024-07-31

## 2024-07-31 RX ORDER — CEFAZOLIN SODIUM 2 G/50ML
2 SOLUTION INTRAVENOUS
OUTPATIENT
Start: 2024-07-31 | End: 2024-08-01

## 2024-07-31 RX ORDER — METHOCARBAMOL 750 MG/1
750 TABLET, FILM COATED ORAL 3 TIMES DAILY
OUTPATIENT
Start: 2024-07-31

## 2024-07-31 RX ORDER — ONDANSETRON HYDROCHLORIDE 2 MG/ML
4 INJECTION, SOLUTION INTRAVENOUS EVERY 8 HOURS PRN
OUTPATIENT
Start: 2024-07-31

## 2024-07-31 RX ORDER — MORPHINE SULFATE 2 MG/ML
2 INJECTION, SOLUTION INTRAMUSCULAR; INTRAVENOUS
OUTPATIENT
Start: 2024-07-31

## 2024-07-31 RX ORDER — OXYCODONE HYDROCHLORIDE 5 MG/1
5 TABLET ORAL
OUTPATIENT
Start: 2024-07-31

## 2024-07-31 RX ORDER — CEFAZOLIN SODIUM 2 G/50ML
2 SOLUTION INTRAVENOUS
OUTPATIENT
Start: 2024-07-31

## 2024-07-31 RX ORDER — BISACODYL 10 MG/1
10 SUPPOSITORY RECTAL EVERY 12 HOURS PRN
OUTPATIENT
Start: 2024-07-31

## 2024-07-31 RX ORDER — PREGABALIN 75 MG/1
75 CAPSULE ORAL
OUTPATIENT
Start: 2024-07-31

## 2024-07-31 RX ORDER — POLYETHYLENE GLYCOL 3350 17 G/17G
17 POWDER, FOR SOLUTION ORAL DAILY
OUTPATIENT
Start: 2024-08-01

## 2024-07-31 RX ORDER — MUPIROCIN 20 MG/G
1 OINTMENT TOPICAL 2 TIMES DAILY
OUTPATIENT
Start: 2024-07-31 | End: 2024-08-05

## 2024-07-31 RX ORDER — FAMOTIDINE 20 MG/1
20 TABLET, FILM COATED ORAL 2 TIMES DAILY
OUTPATIENT
Start: 2024-07-31

## 2024-07-31 RX ORDER — OXYCODONE HYDROCHLORIDE 5 MG/1
10 TABLET ORAL
OUTPATIENT
Start: 2024-07-31

## 2024-07-31 RX ORDER — FENTANYL CITRATE 50 UG/ML
100 INJECTION, SOLUTION INTRAMUSCULAR; INTRAVENOUS
OUTPATIENT
Start: 2024-07-31 | End: 2024-08-01

## 2024-07-31 RX ORDER — PROCHLORPERAZINE EDISYLATE 5 MG/ML
5 INJECTION INTRAMUSCULAR; INTRAVENOUS EVERY 6 HOURS PRN
OUTPATIENT
Start: 2024-07-31

## 2024-07-31 RX ORDER — ASPIRIN 81 MG/1
81 TABLET ORAL 2 TIMES DAILY
OUTPATIENT
Start: 2024-07-31

## 2024-07-31 RX ORDER — FLUTICASONE PROPIONATE 50 MCG
1 SPRAY, SUSPENSION (ML) NASAL DAILY
COMMUNITY

## 2024-07-31 RX ORDER — MUPIROCIN 20 MG/G
1 OINTMENT TOPICAL
OUTPATIENT
Start: 2024-07-31

## 2024-07-31 RX ORDER — AMOXICILLIN 250 MG
1 CAPSULE ORAL 2 TIMES DAILY
OUTPATIENT
Start: 2024-07-31

## 2024-07-31 RX ORDER — LIDOCAINE HYDROCHLORIDE 10 MG/ML
1 INJECTION, SOLUTION EPIDURAL; INFILTRATION; INTRACAUDAL; PERINEURAL
OUTPATIENT
Start: 2024-07-31

## 2024-07-31 RX ORDER — TALC
6 POWDER (GRAM) TOPICAL NIGHTLY PRN
OUTPATIENT
Start: 2024-07-31

## 2024-07-31 RX ORDER — SODIUM CHLORIDE 0.9 % (FLUSH) 0.9 %
10 SYRINGE (ML) INJECTION
OUTPATIENT
Start: 2024-07-31

## 2024-07-31 RX ORDER — SODIUM CHLORIDE 9 MG/ML
INJECTION, SOLUTION INTRAVENOUS CONTINUOUS
OUTPATIENT
Start: 2024-07-31 | End: 2024-08-01

## 2024-07-31 RX ORDER — FENTANYL CITRATE 50 UG/ML
25 INJECTION, SOLUTION INTRAMUSCULAR; INTRAVENOUS EVERY 5 MIN PRN
OUTPATIENT
Start: 2024-07-31

## 2024-07-31 NOTE — ASSESSMENT & PLAN NOTE
Current BP  at goal today.    Taking: valsartan/Coreg  Lifestyle changes to reduce systolic BP:  exercise 30 minutes per day,  5 days per week or 150 minutes weekly; sodium reduction and avoidance of high salt foods such as processed meats, frozen meals and  fast foods.   Keeping a healthy weight/BMI can help with better BP control    BP acceptable for surgery. I recommend monitoring BP during perioperative period as uncontrolled pain can elevate blood pressure.

## 2024-07-31 NOTE — ASSESSMENT & PLAN NOTE
Please rest at home for the next 1-2 days. Make a follow-up appointment with your primary care doctor. Return to the emergency room with any new or worsening symptoms.    Treated with oxybutynin  Followed by: Urology; last seen 6/25/24  S/P Uro-lift 4/24/23  LUTS: nocturia/frequency     There is an increased risk of post-op urinary retention.  Reports difficulty with catheterization for right hip surgery 12/2022- four attempts made- successful after Urology was called.

## 2024-07-31 NOTE — ASSESSMENT & PLAN NOTE
No changes in urine volume.     Most recent GFR: 40.8  BUN= 32   Cr = 1.7  Encouraged to avoid NSAIDs, reduce salt intake, maintain adequate hydration, and exercise.  Seen by Nephrology 4/25/24    Tylenol as needed for pain    I  suggest monitoring renal function, input and output status marvin-operatively. I  suggest avoiding nephrotoxic medication including NSAIDs, COX2 inhibitors, intravenous contrast agent,avoiding hypotension to prevent further renal impairment.
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
## 2024-07-31 NOTE — PROGRESS NOTES
Jani Cha is a 79 y.o. year old here today for pre surgery optimization visit  in preparation for a Right total knee arthroplasty to be performed by Dr. Glaser  on 8/15/24.  he was last seen and treated in the clinic on 7/12/2024. he will be medically optimized by the pre op center. There has been no significant change in medical status since last visit. No fever, chills, malaise, or unexplained weight change.      Allergies, Medications, past medical and surgical history reviewed.    Focused examination performed.    Patient declined to see surgeon today. All questions answered. Patient encouraged to call with questions. Contact information given.     Pre, marvin, and post operative procedures and expectations discussed. Goals of successful surgery reviewed and include manageable pain levels, surgical site free of infection, medication management, and ambulation with PT/OT assistance. Healthy weight management discussed with patient and caregiver who were receptive to eduction of healthy diet and activity. No other necessary lifestyle changes identified. Educated patient about signs and symptoms of infection, medication management, anticoagulation therapy, risk of tobacco and alcohol use, and self-care to promote healing. Surgical guide given for future reference. Hibiclens given to patient with instructions. All questions were answered.     Jani Cha verbalized an understanding to the education and goals. Patient has displayed readiness to engage in care and is ready to proceed with surgery.  Patient reports his daughter is able and ready to provide assistance at home after discharge.    Surgical and blood consents signed.    DME will be arranged. The mobility limitation cannot be sufficiently resolved by the use of a cane. Patient's functional mobility deficit can be sufficiently resolved with the use of a (Rolling Walker or Walker). Patient's mobility limitation significantly impairs their ability  "to participate in one of more activities of daily living. The use of a (Rolling Walker or Walker) will significantly improve the patient's ability to participate in MRADLS and the patient will use it on regular basis in the home."     Jani Cha will contact us if there are any questions, concerns, or changes in medical status prior to surgery.         Patient has discussed discharge planning with surgeon. Patient will be discharged to home following surgery.   patient will be scheduled with Home Health during hospitalization.      30 minutes of time was spent on patient education, review of records, templating, H&P, , appointment scheduling and optimizing patient for surgery.      "

## 2024-07-31 NOTE — ASSESSMENT & PLAN NOTE
"Followed per Vascular Surgery; last seen 2/28/24  Recommend to continue routine surveillance    Per CT chest/abdomen/pelvis 2/19/24  "Aorta: Ascending thoracic aorta is mildly dilated measuring up to 4.2 cm, previously measuring 4.2 cm. Descending thoracic aorta is dilated measuring up to 4.3 cm, previously measuring 4.3 cm. Mild calcific atherosclerosis."  "

## 2024-07-31 NOTE — PROGRESS NOTES
[facility]    HOME HEALTH ORDERS  FACE TO FACE ENCOUNTER    Patient Name: Jani Cha  YOB: 1945    PCP: Laila Bains MD   PCP Address: [address] / [place] [address]  PCP Phone Number: [phone]  PCP Fax: [phone]    Encounter Date: 07/31/2024    Admit to Home Health    Diagnoses:  There are no hospital problems to display for this patient.      Future Appointments   Date Time Provider Department Center   8/1/2024  9:15 AM Karina Vicente DPM [facility] POD [place]   8/6/2024  1:00 PM Malcolm Kent MD [facility] ENDOCRN [facility]   8/6/2024  5:00 PM Petar Saul, PT [facility]   8/14/2024  9:15 AM LAB, [facility] LAB [place]   8/19/2024 12:00 PM Philip Talamantes, PT [facility]   8/19/2024  2:00 PM TELEMED NURSE, [facility] ORTHO [facility] [address] [facility]   8/30/2024 10:00 AM Thao Sahu NP [facility]O [address] [facility]   9/17/2024 10:40 AM Nancy Booth MD [facility] NEPHRO [facility]   9/17/2024  3:00 PM Laial Bains MD [facility] FAM MED [place]   9/24/2024  2:15 PM INJECTION [facility]B INF [facility]   9/26/2024  4:00 PM Sylvani Glaser III, MD [facility] ORTHO [address] [facility]   11/5/2024  3:45 PM Sylvain Glaser III, MD [facility] ORTHO [address] [facility]   12/27/2024  1:15 PM Jeanmarie Hanson MD [facility] URO [facility]           I have seen and examined this patient face to face today. My clinical findings that support the need for the home health skilled services and home bound status are the following:  Weakness/numbness causing balance and gait disturbance due to Joint Replacement making it taxing to leave home.  Requiring assistive device to leave home due to unsteady gait caused by Joint Replacement.  Medical restrictions requiring assistance of another human to leave home due to  Unstable ambulation and Decreased range of motions in extremities.    Allergies:  Review of patient's allergies indicates:   Allergen Reactions    Penicillins  Hives, Itching and Rash    Shellfish containing products        Diet: diabetic diet: 2000 calorie    Activities: activity as tolerated    Home Health Admitting Clinician:   SN/PT to complete comprehensive assessment including routine vital signs. Instruct on disease process and s/s of complications to report to MD. Follow specific home health arthoplasty protocol. Review/verify medication list sent home with the patient at time of discharge  and instruct patient/caregiver as needed. If coumadin ordered, coumadin clinic to manage INR with INR draws 2x per week with a goal to maintain INR between 1.8 and 2.2. Frequency may be adjusted depending on start of care date.    Notify MD if SBP > 160 or < 90; DBP > 90 or < 50; HR > 120 or < 50; Temp > 101    Home Medical Equipment:  Walker, 3-1 bedside commode, transfer tub bench    CONSULTS:    Physical Therapy may admit if patient not on coumadin, PT to perform comprehensive assessment if performing admit visit and generate therapy plan of care. Evaluate for home safety and equipment needs; Establish/upgrade home exercise program. Perform/instruct on therapeutic exercises, gait training, transfer training, and Range of Motion.      OTHER: (only select if patient needs other therapy disciplines)  Occupational Therapy to evaluate and treat. Evaluate home environment for safety and equipment needs. Perform/Instruct on transfers, ADL training, ROM, and therapeutic exercises.    MISCELLANEOUS CARE:  Dr. Glaser approves of home health in the post op period.    WOUND CARE ORDERS:  Assess Surgical Incision/DSRG each TX  Aquacel AG drsg applied post-op leave on 14 days post op. Call MD if any drainage reaches border to border of drsg horizontally, s/s of infection, temp >101, induration, swelling or redness.  If dressing is removed per MD order, then apply island dressing, change/teach caregiver to perform daily dressing change if island dressing present.    Medications: Review  discharge medications with patient and family and provide education.      Cannot display discharge medications since this is not an admission.      I certify that this patient is confined to his home and needs physical therapy and occupational therapy.

## 2024-07-31 NOTE — Clinical Note
Hi Doc,  DO we worry about PT time for surgery or as long as INR is OK, patient is good? I know that I have asked this question in past, but I honestly don't remember, robert Ross

## 2024-07-31 NOTE — ASSESSMENT & PLAN NOTE
Recommend weight loss, healthy diet (DASH/Mediterranean) and exercise.   Patient should exercise 30 minutes at least five times weekly once recovered from surgery.   Treated with: atorvastatin     Component      Latest Ref Rng 3/7/2024   Cholesterol Total      120 - 199 mg/dL 116 (L)    Triglycerides      30 - 150 mg/dL 114    HDL      40 - 75 mg/dL 36 (L)    LDL Cholesterol      63.0 - 159.0 mg/dL 57.2 (L)    HDL/Cholesterol Ratio      20.0 - 50.0 % 31.0    Total Cholesterol/HDL Ratio      2.0 - 5.0  3.2    Non-HDL Cholesterol      mg/dL 80       Legend:  (L) Low

## 2024-07-31 NOTE — ASSESSMENT & PLAN NOTE
Unchanged per CT chest/abdomen/pelvis 2/19/24  Denies CP/SOB/congestion/wheezing  Reports cough- recent URI with post-nasal drip. Treated per PCP with Doxycycline and Tessalon caps

## 2024-07-31 NOTE — PROGRESS NOTES
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 stockings/pneumatic devices, and/or pharmacologic agents. The benefits should outweigh the risks for pharmacologic prophylaxis in the perioperative period. I also encourage early ambulation if not contraindicated during the post-operative period.    Risk factors for post-operative pulmonary complications include:age > 65 years, congestive heart failure, diabetes mellitus, HTN, and pre-existing renal disease. To reduce the risk of pulmonary complications, prophylactic recommendations include: early ambulation and pain control.      Risk factors for renal complications include: Pre-existing renal disease, age, diabetes mellitus, HTN, and CHF. To reduce the risk of postoperative renal complications, I recommend the patient maintain adequate hydration.  Avoid/reduce NSAIDS and RODRIGUEZ-2 inhibitors use as well as IV contrast for renal protection.    I recommend the use of appropriate prophylactic antibiotics to reduce the risk of surgical site infections.    Delirium risk factors include advanced age. I recommend to avoid/reduce use of benzodiazepine use (not for patients who take on a regular basis), anticholinergics, Benadryl,  and agents that may cause postoperative serotonin syndrome.  Controlled pain can decrease the risk for postop delirium and since opioids are used for postoperative pain control, I suggest using the lowest  dose for the shortest amount of time necessary for pain management.     The patient is at an increased risk for urinary retention due to : BPH/LUTS, possible regional anesthesia, and advanced age. I recommend to avoid/decrease the use of benzodiazepines, anticholinergics, and Benadryl in the perioperative period. I also recommend using opioids for the shortest period of time if possible.          This visit was focused on Preoperative evaluation, Perioperative Medical management, complication reduction plans. I suggest that the patient follows up with primary care or relevant sub specialists for ongoing health care.    I appreciate the opportunity to be involved in this patients care. Please feel free to contact me if there were any questions about this consultation.        I spent a total of 75 minutes on the day of the visit.This includes face to face time and non-face to face time preparing to see the patient (e.g., review of tests), obtaining and/or reviewing separately obtained history, documenting clinical information in the electronic or other health record, independently interpreting results and communicating results to the patient/family/caregiver, or care coordinator.       Patient is ready for surgery.        Cody Silverman NP  Perioperative Medicine  [facility]
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
## 2024-07-31 NOTE — ASSESSMENT & PLAN NOTE
"Per CT chest/abdomen/pelvis 2/19/24:  "Adrenals: Diffuse thickening of the left adrenal gland with a 1.1 cm left adrenal adenoma similar to prior exam. "  Followed per Endocrinology; last seen 4/4/24    Per Endocrinology note:  - Work up: DST: WNL, cortisol >2, due to the overweight  - Metanephrines and normetanephrines:  Within acceptable ranges  - Renin/navneet level normal  "

## 2024-07-31 NOTE — ASSESSMENT & PLAN NOTE
Currently takes:  Trulicity/glipizide   Most recent A1c is 7.6 .      Denies peripheral neuropathy.   Denies visual changes. Up to date with eye exams.  Micro changes: Denies- Retinopathy; has Nephropathy   Macro changes: Denies-   Peripheral disease ; Has carotid/coronary disease
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
## 2024-07-31 NOTE — DISCHARGE INSTRUCTIONS
Your surgery has been scheduled for:_______8/15/24___________________________________    You should report to:  ____[facility], located on the [place] side of the first floor of the           [facility] ([phone])  ____[facility], located on the [facility] side of the            Second floor of the [facility] ([phone])  ____3rd Floor [facility]SCU located on the [facility] side of the [facility] [phone]  _X___[facility]: located at [address] [place] [address]. Building [address].     Please Note   Tell your doctor if you take Aspirin, products containing Aspirin, herbal medications  or blood thinners, such as Coumadin, Ticlid, or Plavix.  (Consult your provider regarding holding or stopping before surgery).  Arrange for someone to drive you home following surgery.  You will not be allowed to leave the surgical facility alone or drive yourself home following sedation and anesthesia.    Before Surgery  Stop taking all herbal medications, vitamins, and supplements 7 days prior to surgery  No Motrin/Advil (Ibuprofen) 7 days before surgery  No Aleve (Naproxen) 7 days before surgery   No Goody's/BC Powder 7 days before surgery  Refrain from drinking alcoholic beverages for 24 hours before and after surgery  Stop or limit smoking at least 24 hours prior to surgery  You may take Tylenol for pain    Night before Surgery  Do not eat or drink after midnight  Take a shower or bath (shower is recommended).  Bathe with Hibiclens soap or an antibacterial soap from the neck down.  If not supplied by your surgeon, hibiclens soap will need to be purchased over the counter in pharmacy.  Rinse soap off thoroughly.  Shampoo your hair with your regular shampoo    The Day of Surgery  Take another bath or shower with hibiclens or any antibacterial soap, to reduce the chance of infection.  Take heart  and blood pressure medications with a small sip of water, as advised by the perioperative team.  Do not take fluid pills  You may brush your teeth and rinse your mouth, but do not swallow any additional water.   Do not apply perfumes, powder, body lotions or deodorant on the day of surgery.  Nail polish should be removed.  Do not wear makeup or moisturizer  Wear comfortable clothes, such as a button front shirt and loose fitting pants.  Leave all jewelry, including body piercings, and valuables at home.    Bring any devices you will need after surgery such as crutches or canes.  If you have sleep apnea, please bring your CPAP machine  In the event that your physical condition changes including the onset of a cold or respiratory illness, or if you have to delay or cancel your surgery, please notify your surgeon.

## 2024-07-31 NOTE — HPI
This is a 79 y.o. male  who presents today for a preoperative evaluation in preparation for right knee arthroplasty.  Surgery is indicated for osteoarthritis of right knee .  I have seen this patient before in November 2023 for preop eval before left knee surgery.  The history has been obtained by speaking with the patient and reviewing the electronic medical record and/or outside health information. Significant health conditions for the perioperative period are discussed below in assessment and plan.   Patient reports current health status to be Fair.  Denies any new symptoms before surgery.

## 2024-08-01 ENCOUNTER — OFFICE VISIT (OUTPATIENT)
Dept: PODIATRY | Facility: CLINIC | Age: 79
End: 2024-08-01
Payer: MEDICARE

## 2024-08-01 VITALS — WEIGHT: 249.13 LBS | HEIGHT: 69 IN | BODY MASS INDEX: 36.9 KG/M2

## 2024-08-01 DIAGNOSIS — E11.49 TYPE II DIABETES MELLITUS WITH NEUROLOGICAL MANIFESTATIONS: Primary | ICD-10-CM

## 2024-08-01 DIAGNOSIS — B35.1 ONYCHOMYCOSIS DUE TO DERMATOPHYTE: ICD-10-CM

## 2024-08-01 PROCEDURE — 99213 OFFICE O/P EST LOW 20 MIN: CPT | Mod: PBBFAC,PO,25 | Performed by: PODIATRIST

## 2024-08-01 PROCEDURE — 11721 DEBRIDE NAIL 6 OR MORE: CPT | Mod: PBBFAC,PO | Performed by: PODIATRIST

## 2024-08-01 PROCEDURE — 99999 PR PBB SHADOW E&M-EST. PATIENT-LVL III: CPT | Mod: PBBFAC,,, | Performed by: PODIATRIST
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
## 2024-08-02 ENCOUNTER — TELEPHONE (OUTPATIENT)
Dept: ENDOCRINOLOGY | Facility: CLINIC | Age: 79
End: 2024-08-02
Payer: MEDICARE

## 2024-08-02 DIAGNOSIS — E11.65 UNCONTROLLED TYPE 2 DIABETES MELLITUS WITH HYPERGLYCEMIA, WITHOUT LONG-TERM CURRENT USE OF INSULIN: Primary | ICD-10-CM

## 2024-08-02 NOTE — TELEPHONE ENCOUNTER
----- Message from Justina Thomson sent at 8/2/2024 10:47 AM CDT -----  Regarding: self    Type: Patient Call Back     Who called:self     What is the request in detail:pt is stating he tried to have his test strips refilled and was informed that the prescription has been canceled, he would like to speak to the office about it. Pt is currently out of test strips     Can the clinic reply by [facility]? No     Would the patient rather a call back or a response via [facility]? Call back     Best call back number: [phone]       Additional Information:pt is stating he uses true metric strips     Thank you.

## 2024-08-06 ENCOUNTER — CLINICAL SUPPORT (OUTPATIENT)
Dept: REHABILITATION | Facility: HOSPITAL | Age: 79
End: 2024-08-06
Attending: ORTHOPAEDIC SURGERY
Payer: MEDICARE

## 2024-08-06 ENCOUNTER — OFFICE VISIT (OUTPATIENT)
Dept: ENDOCRINOLOGY | Facility: CLINIC | Age: 79
End: 2024-08-06
Payer: MEDICARE

## 2024-08-06 VITALS
WEIGHT: 254.63 LBS | HEART RATE: 71 BPM | DIASTOLIC BLOOD PRESSURE: 80 MMHG | SYSTOLIC BLOOD PRESSURE: 140 MMHG | BODY MASS INDEX: 37.6 KG/M2

## 2024-08-06 DIAGNOSIS — S72.001A CLOSED DISPLACED FRACTURE OF RIGHT FEMORAL NECK: ICD-10-CM

## 2024-08-06 DIAGNOSIS — M80.00XD AGE-RELATED OSTEOPOROSIS WITH CURRENT PATHOLOGICAL FRACTURE WITH ROUTINE HEALING, SUBSEQUENT ENCOUNTER: ICD-10-CM

## 2024-08-06 DIAGNOSIS — E11.65 TYPE 2 DIABETES MELLITUS WITH HYPERGLYCEMIA, WITHOUT LONG-TERM CURRENT USE OF INSULIN: Primary | ICD-10-CM

## 2024-08-06 DIAGNOSIS — Z95.1 S/P CABG X 4: Chronic | ICD-10-CM

## 2024-08-06 DIAGNOSIS — E78.00 PURE HYPERCHOLESTEROLEMIA: Chronic | ICD-10-CM

## 2024-08-06 DIAGNOSIS — E66.01 CLASS 2 SEVERE OBESITY DUE TO EXCESS CALORIES WITH SERIOUS COMORBIDITY AND BODY MASS INDEX (BMI) OF 36.0 TO 36.9 IN ADULT: ICD-10-CM

## 2024-08-06 DIAGNOSIS — M25.661 IMPAIRED RANGE OF MOTION OF RIGHT KNEE: Primary | ICD-10-CM

## 2024-08-06 DIAGNOSIS — E11.40 TYPE 2 DIABETES MELLITUS WITH DIABETIC NEUROPATHY, WITHOUT LONG-TERM CURRENT USE OF INSULIN: ICD-10-CM

## 2024-08-06 DIAGNOSIS — M62.81 QUADRICEPS WEAKNESS: ICD-10-CM

## 2024-08-06 DIAGNOSIS — E11.65 UNCONTROLLED TYPE 2 DIABETES MELLITUS WITH HYPERGLYCEMIA, WITHOUT LONG-TERM CURRENT USE OF INSULIN: ICD-10-CM

## 2024-08-06 DIAGNOSIS — I25.10 CORONARY ARTERY DISEASE INVOLVING NATIVE CORONARY ARTERY OF NATIVE HEART WITHOUT ANGINA PECTORIS: Chronic | ICD-10-CM

## 2024-08-06 PROCEDURE — 99214 OFFICE O/P EST MOD 30 MIN: CPT | Mod: S$PBB,,, | Performed by: HOSPITALIST

## 2024-08-06 PROCEDURE — 99213 OFFICE O/P EST LOW 20 MIN: CPT | Mod: PBBFAC | Performed by: HOSPITALIST

## 2024-08-06 PROCEDURE — 99999 PR PBB SHADOW E&M-EST. PATIENT-LVL III: CPT | Mod: PBBFAC,,, | Performed by: HOSPITALIST

## 2024-08-06 PROCEDURE — 97161 PT EVAL LOW COMPLEX 20 MIN: CPT | Mod: PN

## 2024-08-06 PROCEDURE — 97110 THERAPEUTIC EXERCISES: CPT | Mod: PN

## 2024-08-06 RX ORDER — DAPAGLIFLOZIN 5 MG/1
5 TABLET, FILM COATED ORAL DAILY
Qty: 30 TABLET | Refills: 6 | Status: SHIPPED | OUTPATIENT
Start: 2024-08-06

## 2024-08-06 RX ORDER — DULAGLUTIDE 3 MG/.5ML
3 INJECTION, SOLUTION SUBCUTANEOUS
Qty: 12 PEN | Refills: 4 | COMMUNITY
Start: 2024-08-06 | End: 2024-08-06

## 2024-08-07 ENCOUNTER — TELEPHONE (OUTPATIENT)
Dept: PREADMISSION TESTING | Facility: HOSPITAL | Age: 79
End: 2024-08-07
Payer: MEDICARE

## 2024-08-12 DIAGNOSIS — Z96.651 S/P TOTAL KNEE REPLACEMENT, RIGHT: Primary | ICD-10-CM

## 2024-08-12 RX ORDER — ASPIRIN 81 MG/1
81 TABLET ORAL 2 TIMES DAILY
Qty: 14 TABLET | Refills: 0 | Status: SHIPPED | OUTPATIENT
Start: 2024-08-12 | End: 2024-08-23

## 2024-08-12 RX ORDER — AMOXICILLIN 250 MG
1 CAPSULE ORAL DAILY
Qty: 30 TABLET | Refills: 0 | Status: SHIPPED | OUTPATIENT
Start: 2024-08-12

## 2024-08-12 RX ORDER — ARGIN/GLUT/CAHMB/COLLAG/MV-MIN 7G-7G-1.5G
1 POWDER IN PACKET (EA) ORAL DAILY
Qty: 14 PACKET | Refills: 0 | Status: SHIPPED | OUTPATIENT
Start: 2024-08-12

## 2024-08-12 RX ORDER — MULTIVITAMIN
1 TABLET ORAL DAILY
Qty: 14 TABLET | Refills: 0 | Status: SHIPPED | OUTPATIENT
Start: 2024-08-12

## 2024-08-12 RX ORDER — DEXTROMETHORPHAN HYDROBROMIDE, GUAIFENESIN 5; 100 MG/5ML; MG/5ML
650 LIQUID ORAL EVERY 8 HOURS
Qty: 120 TABLET | Refills: 0 | Status: SHIPPED | OUTPATIENT
Start: 2024-08-12

## 2024-08-12 RX ORDER — METHOCARBAMOL 750 MG/1
750 TABLET, FILM COATED ORAL 4 TIMES DAILY PRN
Qty: 40 TABLET | Refills: 0 | Status: SHIPPED | OUTPATIENT
Start: 2024-08-12

## 2024-08-12 RX ORDER — OXYCODONE HYDROCHLORIDE 5 MG/1
TABLET ORAL
Qty: 50 TABLET | Refills: 0 | Status: SHIPPED | OUTPATIENT
Start: 2024-08-12

## 2024-08-12 RX ORDER — ASCORBIC ACID 500 MG
1000 TABLET ORAL 2 TIMES DAILY
Qty: 56 TABLET | Refills: 0 | Status: SHIPPED | OUTPATIENT
Start: 2024-08-12 | End: 2024-08-30

## 2024-08-12 RX ORDER — PANTOPRAZOLE SODIUM 40 MG/1
40 TABLET, DELAYED RELEASE ORAL DAILY
Qty: 30 TABLET | Refills: 0 | Status: ON HOLD | OUTPATIENT
Start: 2024-08-12 | End: 2024-08-15 | Stop reason: HOSPADM

## 2024-08-12 RX ORDER — LACTOSE-REDUCED FOOD
LIQUID (ML) ORAL
Qty: 3318 ML | Refills: 0 | Status: SHIPPED | OUTPATIENT
Start: 2024-08-12

## 2024-08-12 RX ORDER — CEFADROXIL 500 MG/1
500 CAPSULE ORAL EVERY 12 HOURS
Qty: 14 CAPSULE | Refills: 0 | Status: SHIPPED | OUTPATIENT
Start: 2024-08-12 | End: 2024-08-23

## 2024-08-13 ENCOUNTER — PATIENT MESSAGE (OUTPATIENT)
Dept: ORTHOPEDICS | Facility: CLINIC | Age: 79
End: 2024-08-13
Payer: MEDICARE

## 2024-08-13 ENCOUNTER — ANESTHESIA EVENT (OUTPATIENT)
Dept: SURGERY | Facility: HOSPITAL | Age: 79
End: 2024-08-13
Payer: MEDICARE

## 2024-08-13 ENCOUNTER — TELEPHONE (OUTPATIENT)
Dept: ORTHOPEDICS | Facility: CLINIC | Age: 79
End: 2024-08-13
Payer: MEDICARE

## 2024-08-13 NOTE — TELEPHONE ENCOUNTER
I called the patient today regarding surgery on 8/15/2024 with Dr. Sylvain Glaser. I informed the patient that his surgery will be at  [facility] [address] ([facility] [place] [address]). I informed the patient they must arrive at 9:00am and their surgery will start at 11:00am.     Per the [facility] COVID-19 Pre-Procedural Testing Policy (administered 10/26/2022), patients do not need tested for COVID-19 regardless of vaccination status.    I reminded the patient that they cannot eat or drink after midnight, the night before surgery.      I reminded the patient to be careful of their skin over the next few days to make sure they do not get any cuts, scratches or scrapes.    The patient verbalized that they have received their walker, bedside commode and shower chair from [facility].    The patient verbalized understanding and has no further questions.

## 2024-08-14 ENCOUNTER — LAB VISIT (OUTPATIENT)
Dept: LAB | Facility: HOSPITAL | Age: 79
End: 2024-08-14
Attending: INTERNAL MEDICINE
Payer: MEDICARE

## 2024-08-14 DIAGNOSIS — N18.31 CHRONIC KIDNEY DISEASE, STAGE 3A: ICD-10-CM

## 2024-08-14 DIAGNOSIS — E08.22 DIABETES MELLITUS DUE TO UNDERLYING CONDITION WITH STAGE 3 CHRONIC KIDNEY DISEASE, WITHOUT LONG-TERM CURRENT USE OF INSULIN, UNSPECIFIED WHETHER STAGE 3A OR 3B CKD: ICD-10-CM

## 2024-08-14 DIAGNOSIS — R80.1 PERSISTENT PROTEINURIA: ICD-10-CM

## 2024-08-14 DIAGNOSIS — N18.30 DIABETES MELLITUS DUE TO UNDERLYING CONDITION WITH STAGE 3 CHRONIC KIDNEY DISEASE, WITHOUT LONG-TERM CURRENT USE OF INSULIN, UNSPECIFIED WHETHER STAGE 3A OR 3B CKD: ICD-10-CM

## 2024-08-14 LAB
ALBUMIN SERPL BCP-MCNC: 3.3 G/DL (ref 3.5–5.2)
ANION GAP SERPL CALC-SCNC: 11 MMOL/L (ref 8–16)
BACTERIA #/AREA URNS AUTO: NORMAL /HPF
BASOPHILS # BLD AUTO: 0.06 K/UL (ref 0–0.2)
BASOPHILS NFR BLD: 0.7 % (ref 0–1.9)
BILIRUB UR QL STRIP: NEGATIVE
BUN SERPL-MCNC: 34 MG/DL (ref 8–23)
CALCIUM SERPL-MCNC: 9.8 MG/DL (ref 8.7–10.5)
CHLORIDE SERPL-SCNC: 108 MMOL/L (ref 95–110)
CLARITY UR REFRACT.AUTO: CLEAR
CO2 SERPL-SCNC: 22 MMOL/L (ref 23–29)
COLOR UR AUTO: YELLOW
CREAT SERPL-MCNC: 1.7 MG/DL (ref 0.5–1.4)
CREAT UR-MCNC: 137 MG/DL (ref 23–375)
DIFFERENTIAL METHOD BLD: ABNORMAL
EOSINOPHIL # BLD AUTO: 0.6 K/UL (ref 0–0.5)
EOSINOPHIL NFR BLD: 6.3 % (ref 0–8)
ERYTHROCYTE [DISTWIDTH] IN BLOOD BY AUTOMATED COUNT: 13.5 % (ref 11.5–14.5)
EST. GFR  (NO RACE VARIABLE): 40.8 ML/MIN/1.73 M^2
GLUCOSE SERPL-MCNC: 218 MG/DL (ref 70–110)
GLUCOSE UR QL STRIP: ABNORMAL
HCT VFR BLD AUTO: 44.4 % (ref 40–54)
HGB BLD-MCNC: 14.6 G/DL (ref 14–18)
HGB UR QL STRIP: NEGATIVE
HYALINE CASTS UR QL AUTO: 1 /LPF
IMM GRANULOCYTES # BLD AUTO: 0.03 K/UL (ref 0–0.04)
IMM GRANULOCYTES NFR BLD AUTO: 0.3 % (ref 0–0.5)
KETONES UR QL STRIP: NEGATIVE
LEUKOCYTE ESTERASE UR QL STRIP: NEGATIVE
LYMPHOCYTES # BLD AUTO: 1.4 K/UL (ref 1–4.8)
LYMPHOCYTES NFR BLD: 15.3 % (ref 18–48)
MCH RBC QN AUTO: 30.6 PG (ref 27–31)
MCHC RBC AUTO-ENTMCNC: 32.9 G/DL (ref 32–36)
MCV RBC AUTO: 93 FL (ref 82–98)
MICROSCOPIC COMMENT: NORMAL
MONOCYTES # BLD AUTO: 0.8 K/UL (ref 0.3–1)
MONOCYTES NFR BLD: 8.6 % (ref 4–15)
NEUTROPHILS # BLD AUTO: 6.3 K/UL (ref 1.8–7.7)
NEUTROPHILS NFR BLD: 68.8 % (ref 38–73)
NITRITE UR QL STRIP: NEGATIVE
NRBC BLD-RTO: 0 /100 WBC
PH UR STRIP: 6 [PH] (ref 5–8)
PHOSPHATE SERPL-MCNC: 2.9 MG/DL (ref 2.7–4.5)
PLATELET # BLD AUTO: 186 K/UL (ref 150–450)
PMV BLD AUTO: 10.4 FL (ref 9.2–12.9)
POTASSIUM SERPL-SCNC: 4 MMOL/L (ref 3.5–5.1)
PROT UR QL STRIP: ABNORMAL
PROT UR-MCNC: 24 MG/DL (ref 0–15)
PROT/CREAT UR: 0.18 MG/G{CREAT} (ref 0–0.2)
PTH-INTACT SERPL-MCNC: 78.7 PG/ML (ref 9–77)
RBC # BLD AUTO: 4.77 M/UL (ref 4.6–6.2)
RBC #/AREA URNS AUTO: 0 /HPF (ref 0–4)
SODIUM SERPL-SCNC: 141 MMOL/L (ref 136–145)
SP GR UR STRIP: 1.02 (ref 1–1.03)
SQUAMOUS #/AREA URNS AUTO: 0 /HPF
URATE SERPL-MCNC: 7.1 MG/DL (ref 3.4–7)
URN SPEC COLLECT METH UR: ABNORMAL
WBC # BLD AUTO: 9.09 K/UL (ref 3.9–12.7)
WBC #/AREA URNS AUTO: 1 /HPF (ref 0–5)
YEAST UR QL AUTO: NORMAL

## 2024-08-14 PROCEDURE — 36415 COLL VENOUS BLD VENIPUNCTURE: CPT | Mod: PO | Performed by: INTERNAL MEDICINE

## 2024-08-14 PROCEDURE — 84550 ASSAY OF BLOOD/URIC ACID: CPT | Performed by: INTERNAL MEDICINE

## 2024-08-14 PROCEDURE — 81001 URINALYSIS AUTO W/SCOPE: CPT | Performed by: INTERNAL MEDICINE

## 2024-08-14 PROCEDURE — 83970 ASSAY OF PARATHORMONE: CPT | Performed by: INTERNAL MEDICINE

## 2024-08-14 PROCEDURE — 85025 COMPLETE CBC W/AUTO DIFF WBC: CPT | Performed by: INTERNAL MEDICINE

## 2024-08-14 PROCEDURE — 80069 RENAL FUNCTION PANEL: CPT | Performed by: INTERNAL MEDICINE

## 2024-08-14 PROCEDURE — 84156 ASSAY OF PROTEIN URINE: CPT | Performed by: INTERNAL MEDICINE

## 2024-08-15 ENCOUNTER — ANESTHESIA (OUTPATIENT)
Dept: SURGERY | Facility: HOSPITAL | Age: 79
End: 2024-08-15
Payer: MEDICARE

## 2024-08-15 ENCOUNTER — HOSPITAL ENCOUNTER (OUTPATIENT)
Facility: HOSPITAL | Age: 79
Discharge: HOME | End: 2024-08-16
Attending: ORTHOPAEDIC SURGERY | Admitting: ORTHOPAEDIC SURGERY
Payer: MEDICARE

## 2024-08-15 DIAGNOSIS — M17.11 PRIMARY OSTEOARTHRITIS OF RIGHT KNEE: ICD-10-CM

## 2024-08-15 LAB
POCT GLUCOSE: 138 MG/DL (ref 70–110)
POCT GLUCOSE: 163 MG/DL (ref 70–110)
POCT GLUCOSE: 246 MG/DL (ref 70–110)

## 2024-08-15 PROCEDURE — 71000033 HC RECOVERY, INTIAL HOUR: Performed by: ORTHOPAEDIC SURGERY

## 2024-08-15 PROCEDURE — 25000003 PHARM REV CODE 250: Performed by: ORTHOPAEDIC SURGERY

## 2024-08-15 PROCEDURE — 97530 THERAPEUTIC ACTIVITIES: CPT

## 2024-08-15 PROCEDURE — 25000003 PHARM REV CODE 250: Performed by: NURSE PRACTITIONER

## 2024-08-15 PROCEDURE — 63600175 PHARM REV CODE 636 W HCPCS: Performed by: NURSE ANESTHETIST, CERTIFIED REGISTERED

## 2024-08-15 PROCEDURE — 82962 GLUCOSE BLOOD TEST: CPT | Performed by: ORTHOPAEDIC SURGERY

## 2024-08-15 PROCEDURE — 27100025 HC TUBING, SET FLUID WARMER: Performed by: NURSE ANESTHETIST, CERTIFIED REGISTERED

## 2024-08-15 PROCEDURE — 63600175 PHARM REV CODE 636 W HCPCS: Performed by: ANESTHESIOLOGY

## 2024-08-15 PROCEDURE — 63600175 PHARM REV CODE 636 W HCPCS: Performed by: PHYSICIAN ASSISTANT

## 2024-08-15 PROCEDURE — 25000003 PHARM REV CODE 250: Performed by: NURSE ANESTHETIST, CERTIFIED REGISTERED

## 2024-08-15 PROCEDURE — D9220A PRA ANESTHESIA: Mod: ANES,,, | Performed by: ANESTHESIOLOGY

## 2024-08-15 PROCEDURE — 36000710: Performed by: ORTHOPAEDIC SURGERY

## 2024-08-15 PROCEDURE — C1776 JOINT DEVICE (IMPLANTABLE): HCPCS | Performed by: ORTHOPAEDIC SURGERY

## 2024-08-15 PROCEDURE — 99900035 HC TECH TIME PER 15 MIN (STAT)

## 2024-08-15 PROCEDURE — 94761 N-INVAS EAR/PLS OXIMETRY MLT: CPT

## 2024-08-15 PROCEDURE — 37000008 HC ANESTHESIA 1ST 15 MINUTES: Performed by: ORTHOPAEDIC SURGERY

## 2024-08-15 PROCEDURE — 27447 TOTAL KNEE ARTHROPLASTY: CPT | Mod: 22,RT,, | Performed by: ORTHOPAEDIC SURGERY

## 2024-08-15 PROCEDURE — D9220A PRA ANESTHESIA: Mod: CRNA,,, | Performed by: NURSE ANESTHETIST, CERTIFIED REGISTERED

## 2024-08-15 PROCEDURE — 27201423 OPTIME MED/SURG SUP & DEVICES STERILE SUPPLY: Performed by: ORTHOPAEDIC SURGERY

## 2024-08-15 PROCEDURE — 63600175 PHARM REV CODE 636 W HCPCS: Performed by: NURSE PRACTITIONER

## 2024-08-15 PROCEDURE — 97116 GAIT TRAINING THERAPY: CPT

## 2024-08-15 PROCEDURE — 25000003 PHARM REV CODE 250

## 2024-08-15 PROCEDURE — 94799 UNLISTED PULMONARY SVC/PX: CPT

## 2024-08-15 PROCEDURE — 25000003 PHARM REV CODE 250: Performed by: ANESTHESIOLOGY

## 2024-08-15 PROCEDURE — 71000039 HC RECOVERY, EACH ADD'L HOUR: Performed by: ORTHOPAEDIC SURGERY

## 2024-08-15 PROCEDURE — 36000711: Performed by: ORTHOPAEDIC SURGERY

## 2024-08-15 PROCEDURE — C1713 ANCHOR/SCREW BN/BN,TIS/BN: HCPCS | Performed by: ORTHOPAEDIC SURGERY

## 2024-08-15 PROCEDURE — 63600175 PHARM REV CODE 636 W HCPCS: Performed by: ORTHOPAEDIC SURGERY

## 2024-08-15 PROCEDURE — 37000009 HC ANESTHESIA EA ADD 15 MINS: Performed by: ORTHOPAEDIC SURGERY

## 2024-08-15 PROCEDURE — 97162 PT EVAL MOD COMPLEX 30 MIN: CPT

## 2024-08-15 DEVICE — PATELLA ATTUNE MED PAT 38MM: Type: IMPLANTABLE DEVICE | Site: KNEE | Status: FUNCTIONAL

## 2024-08-15 DEVICE — IMPLANTABLE DEVICE: Type: IMPLANTABLE DEVICE | Site: KNEE | Status: FUNCTIONAL

## 2024-08-15 DEVICE — COMP FEM POS ATTUNE SZ7 R: Type: IMPLANTABLE DEVICE | Site: KNEE | Status: FUNCTIONAL

## 2024-08-15 DEVICE — CEMENT BONE SURG SMPLX P RADPQ: Type: IMPLANTABLE DEVICE | Site: KNEE | Status: FUNCTIONAL

## 2024-08-15 RX ORDER — SODIUM CHLORIDE 9 MG/ML
INJECTION, SOLUTION INTRAVENOUS CONTINUOUS
Status: DISCONTINUED | OUTPATIENT
Start: 2024-08-15 | End: 2024-08-16 | Stop reason: HOSPADM

## 2024-08-15 RX ORDER — SODIUM CHLORIDE 0.9 % (FLUSH) 0.9 %
10 SYRINGE (ML) INJECTION
Status: DISCONTINUED | OUTPATIENT
Start: 2024-08-15 | End: 2024-08-15 | Stop reason: HOSPADM

## 2024-08-15 RX ORDER — INSULIN ASPART 100 [IU]/ML
5 INJECTION, SOLUTION INTRAVENOUS; SUBCUTANEOUS
Status: COMPLETED | OUTPATIENT
Start: 2024-08-15 | End: 2024-08-15

## 2024-08-15 RX ORDER — DEXAMETHASONE SODIUM PHOSPHATE 4 MG/ML
INJECTION, SOLUTION INTRA-ARTICULAR; INTRALESIONAL; INTRAMUSCULAR; INTRAVENOUS; SOFT TISSUE
Status: DISCONTINUED | OUTPATIENT
Start: 2024-08-15 | End: 2024-08-15

## 2024-08-15 RX ORDER — FENTANYL CITRATE 50 UG/ML
25 INJECTION, SOLUTION INTRAMUSCULAR; INTRAVENOUS EVERY 5 MIN PRN
Status: COMPLETED | OUTPATIENT
Start: 2024-08-15 | End: 2024-08-15

## 2024-08-15 RX ORDER — ASPIRIN 81 MG/1
81 TABLET ORAL 2 TIMES DAILY
Status: DISCONTINUED | OUTPATIENT
Start: 2024-08-15 | End: 2024-08-16 | Stop reason: HOSPADM

## 2024-08-15 RX ORDER — METHOCARBAMOL 500 MG/1
1000 TABLET, FILM COATED ORAL ONCE AS NEEDED
Status: COMPLETED | OUTPATIENT
Start: 2024-08-15 | End: 2024-08-15

## 2024-08-15 RX ORDER — SODIUM CHLORIDE 9 MG/ML
INJECTION, SOLUTION INTRAVENOUS
Status: COMPLETED | OUTPATIENT
Start: 2024-08-15 | End: 2024-08-15

## 2024-08-15 RX ORDER — ROPIVACAINE HYDROCHLORIDE 5 MG/ML
INJECTION, SOLUTION EPIDURAL; INFILTRATION; PERINEURAL
Status: DISCONTINUED | OUTPATIENT
Start: 2024-08-15 | End: 2024-08-15 | Stop reason: HOSPADM

## 2024-08-15 RX ORDER — IBUPROFEN 200 MG
16 TABLET ORAL
Status: DISCONTINUED | OUTPATIENT
Start: 2024-08-15 | End: 2024-08-16 | Stop reason: HOSPADM

## 2024-08-15 RX ORDER — PREGABALIN 75 MG/1
75 CAPSULE ORAL NIGHTLY
Status: DISCONTINUED | OUTPATIENT
Start: 2024-08-15 | End: 2024-08-16 | Stop reason: HOSPADM

## 2024-08-15 RX ORDER — CARVEDILOL 12.5 MG/1
25 TABLET ORAL 2 TIMES DAILY WITH MEALS
Status: DISCONTINUED | OUTPATIENT
Start: 2024-08-15 | End: 2024-08-16 | Stop reason: HOSPADM

## 2024-08-15 RX ORDER — METHOCARBAMOL 750 MG/1
750 TABLET, FILM COATED ORAL 3 TIMES DAILY
Status: DISCONTINUED | OUTPATIENT
Start: 2024-08-15 | End: 2024-08-15

## 2024-08-15 RX ORDER — MIDAZOLAM HYDROCHLORIDE 1 MG/ML
4 INJECTION, SOLUTION INTRAMUSCULAR; INTRAVENOUS
Status: DISCONTINUED | OUTPATIENT
Start: 2024-08-15 | End: 2024-08-15 | Stop reason: HOSPADM

## 2024-08-15 RX ORDER — BISACODYL 10 MG/1
10 SUPPOSITORY RECTAL EVERY 12 HOURS PRN
Status: DISCONTINUED | OUTPATIENT
Start: 2024-08-15 | End: 2024-08-16 | Stop reason: HOSPADM

## 2024-08-15 RX ORDER — METHYLPREDNISOLONE ACETATE 40 MG/ML
INJECTION, SUSPENSION INTRA-ARTICULAR; INTRALESIONAL; INTRAMUSCULAR; SOFT TISSUE
Status: DISCONTINUED | OUTPATIENT
Start: 2024-08-15 | End: 2024-08-15 | Stop reason: HOSPADM

## 2024-08-15 RX ORDER — AMOXICILLIN 250 MG
1 CAPSULE ORAL 2 TIMES DAILY
Status: DISCONTINUED | OUTPATIENT
Start: 2024-08-15 | End: 2024-08-16 | Stop reason: HOSPADM

## 2024-08-15 RX ORDER — TALC
6 POWDER (GRAM) TOPICAL NIGHTLY PRN
Status: DISCONTINUED | OUTPATIENT
Start: 2024-08-15 | End: 2024-08-15 | Stop reason: HOSPADM

## 2024-08-15 RX ORDER — MUPIROCIN 20 MG/G
1 OINTMENT TOPICAL
Status: COMPLETED | OUTPATIENT
Start: 2024-08-15 | End: 2024-08-15

## 2024-08-15 RX ORDER — NALOXONE HCL 0.4 MG/ML
0.02 VIAL (ML) INJECTION
Status: DISCONTINUED | OUTPATIENT
Start: 2024-08-15 | End: 2024-08-16 | Stop reason: HOSPADM

## 2024-08-15 RX ORDER — GLUCAGON 1 MG
1 KIT INJECTION
Status: DISCONTINUED | OUTPATIENT
Start: 2024-08-15 | End: 2024-08-16 | Stop reason: HOSPADM

## 2024-08-15 RX ORDER — PROCHLORPERAZINE EDISYLATE 5 MG/ML
5 INJECTION INTRAMUSCULAR; INTRAVENOUS EVERY 6 HOURS PRN
Status: DISCONTINUED | OUTPATIENT
Start: 2024-08-15 | End: 2024-08-16 | Stop reason: HOSPADM

## 2024-08-15 RX ORDER — OXYCODONE HYDROCHLORIDE 10 MG/1
10 TABLET ORAL
Status: DISCONTINUED | OUTPATIENT
Start: 2024-08-15 | End: 2024-08-16 | Stop reason: HOSPADM

## 2024-08-15 RX ORDER — OXYCODONE HYDROCHLORIDE 5 MG/1
5 TABLET ORAL
Status: DISCONTINUED | OUTPATIENT
Start: 2024-08-15 | End: 2024-08-16 | Stop reason: HOSPADM

## 2024-08-15 RX ORDER — LIDOCAINE HYDROCHLORIDE 10 MG/ML
1 INJECTION, SOLUTION EPIDURAL; INFILTRATION; INTRACAUDAL; PERINEURAL
Status: DISCONTINUED | OUTPATIENT
Start: 2024-08-15 | End: 2024-08-15 | Stop reason: HOSPADM

## 2024-08-15 RX ORDER — METHOCARBAMOL 750 MG/1
750 TABLET, FILM COATED ORAL 3 TIMES DAILY
Status: DISCONTINUED | OUTPATIENT
Start: 2024-08-15 | End: 2024-08-16 | Stop reason: HOSPADM

## 2024-08-15 RX ORDER — KETAMINE HCL IN 0.9 % NACL 50 MG/5 ML
SYRINGE (ML) INTRAVENOUS
Status: DISCONTINUED | OUTPATIENT
Start: 2024-08-15 | End: 2024-08-15

## 2024-08-15 RX ORDER — INSULIN ASPART 100 [IU]/ML
0-10 INJECTION, SOLUTION INTRAVENOUS; SUBCUTANEOUS
Status: DISCONTINUED | OUTPATIENT
Start: 2024-08-15 | End: 2024-08-16 | Stop reason: HOSPADM

## 2024-08-15 RX ORDER — POLYETHYLENE GLYCOL 3350 17 G/17G
17 POWDER, FOR SOLUTION ORAL DAILY
Status: DISCONTINUED | OUTPATIENT
Start: 2024-08-16 | End: 2024-08-16 | Stop reason: HOSPADM

## 2024-08-15 RX ORDER — TRANEXAMIC ACID 100 MG/ML
INJECTION, SOLUTION INTRAVENOUS
Status: DISCONTINUED | OUTPATIENT
Start: 2024-08-15 | End: 2024-08-15 | Stop reason: HOSPADM

## 2024-08-15 RX ORDER — PHENYLEPHRINE HYDROCHLORIDE 10 MG/ML
INJECTION INTRAVENOUS
Status: DISCONTINUED | OUTPATIENT
Start: 2024-08-15 | End: 2024-08-15

## 2024-08-15 RX ORDER — IBUPROFEN 200 MG
24 TABLET ORAL
Status: DISCONTINUED | OUTPATIENT
Start: 2024-08-15 | End: 2024-08-16 | Stop reason: HOSPADM

## 2024-08-15 RX ORDER — ACETAMINOPHEN 500 MG
1000 TABLET ORAL EVERY 6 HOURS
Status: DISCONTINUED | OUTPATIENT
Start: 2024-08-15 | End: 2024-08-16 | Stop reason: HOSPADM

## 2024-08-15 RX ORDER — BETHANECHOL CHLORIDE 10 MG/1
10 TABLET ORAL
Status: COMPLETED | OUTPATIENT
Start: 2024-08-15 | End: 2024-08-15

## 2024-08-15 RX ORDER — MUPIROCIN 20 MG/G
1 OINTMENT TOPICAL 2 TIMES DAILY
Status: DISCONTINUED | OUTPATIENT
Start: 2024-08-15 | End: 2024-08-16 | Stop reason: HOSPADM

## 2024-08-15 RX ORDER — MORPHINE SULFATE 4 MG/ML
INJECTION, SOLUTION INTRAMUSCULAR; INTRAVENOUS
Status: DISCONTINUED | OUTPATIENT
Start: 2024-08-15 | End: 2024-08-15 | Stop reason: HOSPADM

## 2024-08-15 RX ORDER — ONDANSETRON HYDROCHLORIDE 2 MG/ML
4 INJECTION, SOLUTION INTRAVENOUS EVERY 8 HOURS PRN
Status: DISCONTINUED | OUTPATIENT
Start: 2024-08-15 | End: 2024-08-16 | Stop reason: HOSPADM

## 2024-08-15 RX ORDER — FAMOTIDINE 10 MG/ML
INJECTION INTRAVENOUS
Status: DISCONTINUED | OUTPATIENT
Start: 2024-08-15 | End: 2024-08-15

## 2024-08-15 RX ORDER — MIDAZOLAM HYDROCHLORIDE 1 MG/ML
INJECTION INTRAMUSCULAR; INTRAVENOUS
Status: DISCONTINUED | OUTPATIENT
Start: 2024-08-15 | End: 2024-08-15

## 2024-08-15 RX ORDER — LIDOCAINE HYDROCHLORIDE 20 MG/ML
INJECTION INTRAVENOUS
Status: DISCONTINUED | OUTPATIENT
Start: 2024-08-15 | End: 2024-08-15

## 2024-08-15 RX ORDER — SODIUM CHLORIDE 0.9 % (FLUSH) 0.9 %
3 SYRINGE (ML) INJECTION
Status: DISCONTINUED | OUTPATIENT
Start: 2024-08-15 | End: 2024-08-15 | Stop reason: HOSPADM

## 2024-08-15 RX ORDER — ONDANSETRON HYDROCHLORIDE 2 MG/ML
4 INJECTION, SOLUTION INTRAVENOUS ONCE AS NEEDED
Status: DISCONTINUED | OUTPATIENT
Start: 2024-08-15 | End: 2024-08-15 | Stop reason: HOSPADM

## 2024-08-15 RX ORDER — FENTANYL CITRATE 50 UG/ML
100 INJECTION, SOLUTION INTRAMUSCULAR; INTRAVENOUS
Status: DISCONTINUED | OUTPATIENT
Start: 2024-08-15 | End: 2024-08-15 | Stop reason: HOSPADM

## 2024-08-15 RX ORDER — FAMOTIDINE 20 MG/1
20 TABLET, FILM COATED ORAL DAILY
Status: DISCONTINUED | OUTPATIENT
Start: 2024-08-16 | End: 2024-08-16 | Stop reason: HOSPADM

## 2024-08-15 RX ORDER — MORPHINE SULFATE 2 MG/ML
2 INJECTION, SOLUTION INTRAMUSCULAR; INTRAVENOUS
Status: DISCONTINUED | OUTPATIENT
Start: 2024-08-15 | End: 2024-08-16 | Stop reason: HOSPADM

## 2024-08-15 RX ORDER — PROPOFOL 10 MG/ML
VIAL (ML) INTRAVENOUS CONTINUOUS PRN
Status: DISCONTINUED | OUTPATIENT
Start: 2024-08-15 | End: 2024-08-15

## 2024-08-15 RX ORDER — VANCOMYCIN HYDROCHLORIDE 1 G/20ML
INJECTION, POWDER, LYOPHILIZED, FOR SOLUTION INTRAVENOUS
Status: DISCONTINUED | OUTPATIENT
Start: 2024-08-15 | End: 2024-08-15 | Stop reason: HOSPADM

## 2024-08-15 RX ORDER — PREGABALIN 75 MG/1
75 CAPSULE ORAL
Status: COMPLETED | OUTPATIENT
Start: 2024-08-15 | End: 2024-08-15

## 2024-08-15 RX ORDER — ACETAMINOPHEN 500 MG
1000 TABLET ORAL
Status: COMPLETED | OUTPATIENT
Start: 2024-08-15 | End: 2024-08-15

## 2024-08-15 RX ADMIN — SODIUM CHLORIDE, SODIUM GLUCONATE, SODIUM ACETATE, POTASSIUM CHLORIDE, MAGNESIUM CHLORIDE, SODIUM PHOSPHATE, DIBASIC, AND POTASSIUM PHOSPHATE: .53; .5; .37; .037; .03; .012; .00082 INJECTION, SOLUTION INTRAVENOUS at 01:08

## 2024-08-15 RX ADMIN — MUPIROCIN 1 G: 20 OINTMENT TOPICAL at 08:08

## 2024-08-15 RX ADMIN — CEFAZOLIN 2 G: 2 INJECTION, POWDER, FOR SOLUTION INTRAMUSCULAR; INTRAVENOUS at 12:08

## 2024-08-15 RX ADMIN — BETHANECHOL CHLORIDE 10 MG: 10 TABLET ORAL at 08:08

## 2024-08-15 RX ADMIN — TRANEXAMIC ACID 1000 MG: 100 INJECTION INTRAVENOUS at 02:08

## 2024-08-15 RX ADMIN — CEFAZOLIN 2 G: 2 INJECTION, POWDER, FOR SOLUTION INTRAMUSCULAR; INTRAVENOUS at 08:08

## 2024-08-15 RX ADMIN — PHENYLEPHRINE HYDROCHLORIDE 20 MCG: 10 INJECTION INTRAVENOUS at 01:08

## 2024-08-15 RX ADMIN — DEXAMETHASONE SODIUM PHOSPHATE 8 MG: 4 INJECTION, SOLUTION INTRAMUSCULAR; INTRAVENOUS at 12:08

## 2024-08-15 RX ADMIN — FENTANYL CITRATE 25 MCG: 50 INJECTION INTRAMUSCULAR; INTRAVENOUS at 04:08

## 2024-08-15 RX ADMIN — TRANEXAMIC ACID 2000 MG: 100 INJECTION INTRAVENOUS at 12:08

## 2024-08-15 RX ADMIN — MUPIROCIN 1 G: 20 OINTMENT TOPICAL at 10:08

## 2024-08-15 RX ADMIN — SODIUM CHLORIDE: 9 INJECTION, SOLUTION INTRAVENOUS at 10:08

## 2024-08-15 RX ADMIN — INSULIN ASPART 5 UNITS: 100 INJECTION, SOLUTION INTRAVENOUS; SUBCUTANEOUS at 10:08

## 2024-08-15 RX ADMIN — PREGABALIN 75 MG: 75 CAPSULE ORAL at 10:08

## 2024-08-15 RX ADMIN — FAMOTIDINE 20 MG: 10 INJECTION, SOLUTION INTRAVENOUS at 12:08

## 2024-08-15 RX ADMIN — CARVEDILOL 25 MG: 12.5 TABLET, FILM COATED ORAL at 06:08

## 2024-08-15 RX ADMIN — ACETAMINOPHEN 1000 MG: 500 TABLET ORAL at 06:08

## 2024-08-15 RX ADMIN — SENNOSIDES AND DOCUSATE SODIUM 1 TABLET: 50; 8.6 TABLET ORAL at 08:08

## 2024-08-15 RX ADMIN — BETHANECHOL CHLORIDE 10 MG: 10 TABLET ORAL at 10:08

## 2024-08-15 RX ADMIN — OXYCODONE 5 MG: 5 TABLET ORAL at 06:08

## 2024-08-15 RX ADMIN — PREGABALIN 75 MG: 75 CAPSULE ORAL at 08:08

## 2024-08-15 RX ADMIN — SODIUM CHLORIDE: 0.9 INJECTION, SOLUTION INTRAVENOUS at 12:08

## 2024-08-15 RX ADMIN — SODIUM CHLORIDE: 9 INJECTION, SOLUTION INTRAVENOUS at 04:08

## 2024-08-15 RX ADMIN — FENTANYL CITRATE 25 MCG: 50 INJECTION INTRAMUSCULAR; INTRAVENOUS at 03:08

## 2024-08-15 RX ADMIN — INSULIN ASPART 2 UNITS: 100 INJECTION, SOLUTION INTRAVENOUS; SUBCUTANEOUS at 06:08

## 2024-08-15 RX ADMIN — Medication 25 MG: at 12:08

## 2024-08-15 RX ADMIN — PROPOFOL 50 MCG/KG/MIN: 10 INJECTION, EMULSION INTRAVENOUS at 12:08

## 2024-08-15 RX ADMIN — OXYCODONE 5 MG: 5 TABLET ORAL at 10:08

## 2024-08-15 RX ADMIN — OXYCODONE 5 MG: 5 TABLET ORAL at 03:08

## 2024-08-15 RX ADMIN — ACETAMINOPHEN 1000 MG: 500 TABLET ORAL at 11:08

## 2024-08-15 RX ADMIN — METHOCARBAMOL 750 MG: 750 TABLET ORAL at 08:08

## 2024-08-15 RX ADMIN — BETHANECHOL CHLORIDE 10 MG: 10 TABLET ORAL at 07:08

## 2024-08-15 RX ADMIN — METHOCARBAMOL 1000 MG: 500 TABLET ORAL at 03:08

## 2024-08-15 RX ADMIN — MEPIVACAINE HYDROCHLORIDE 3.5 ML: 15 INJECTION, SOLUTION EPIDURAL; INFILTRATION at 12:08

## 2024-08-15 RX ADMIN — ASPIRIN 81 MG: 81 TABLET, COATED ORAL at 08:08

## 2024-08-15 RX ADMIN — MIDAZOLAM HYDROCHLORIDE 1 MG: 2 INJECTION, SOLUTION INTRAMUSCULAR; INTRAVENOUS at 12:08

## 2024-08-15 RX ADMIN — ACETAMINOPHEN 1000 MG: 500 TABLET ORAL at 10:08

## 2024-08-15 RX ADMIN — LIDOCAINE HYDROCHLORIDE 40 MG: 20 INJECTION INTRAVENOUS at 12:08

## 2024-08-15 RX ADMIN — VANCOMYCIN HYDROCHLORIDE 1500 MG: 1.5 INJECTION, POWDER, LYOPHILIZED, FOR SOLUTION INTRAVENOUS at 10:08

## 2024-08-15 NOTE — HPI
Jani Cha is a 79 y.o. male with history of Right knee pain. Pain is worse with activity and weight bearing.  Patient has experienced interference of activities of daily living due to decreased range of motion and an increase in joint pain and swelling.  Patient has failed non-operative treatment including NSAIDs, corticosteroid injections, viscosupplement injections, and activity modification.  Jani Cha currently ambulates independently.      Relevant medical conditions of significance in perioperative period:  DM- on medication managed by pcp  CAD with 4 vessel bypass and stent  HTN- on medication managed by cardiologist

## 2024-08-15 NOTE — ANESTHESIA PREPROCEDURE EVALUATION
08/15/2024  Pre-operative evaluation for Procedure(s) (LRB):  ARTHROPLASTY, KNEE, TOTAL, USING [employer] COMPUTER-ASSISTED NAVIGATION: RIGHT: [facility] (Right)    Jani Cha is a 79 y.o. male     Patient Active Problem List   Diagnosis    Hyperlipidemia    Primary hypertension    Coronary artery disease involving native coronary artery of native heart without angina pectoris    Sleep apnea    S/P CABG x 4    Type 2 diabetes mellitus with diabetic neuropathy, without long-term current use of insulin    GERD (gastroesophageal reflux disease)    Personal history of colonic polyps    Abdominal aortic aneurysm (AAA) without rupture    Total occlusion of coronary artery, chronic -LCX with collaterals.  No ischemic on PET    Pulmonary nodule    Thoracic aortic aneurysm without rupture    Bilateral renal cysts    History of PCI of RCA    Class 2 severe obesity due to excess calories with serious comorbidity and body mass index (BMI) of 36.0 to 36.9 in adult    Calcified granuloma of lung    Type 2 diabetes mellitus with hyperglycemia, without long-term current use of insulin    Lymphedema of both lower extremities    Swelling of lower extremity    Closed displaced fracture of right femoral neck s/p total hip arthroplasty on 10/21/2022    Hip pain    Chronic pain of both knees    Decreased strength, endurance, and mobility    Osteoporosis    Localized osteoporosis of [place]    Adrenal incidentaloma    Diabetes mellitus due to underlying condition with stage 3 chronic kidney disease, without long-term current use of insulin, unspecified whether stage 3a or 3b CKD    Primary osteoarthritis of right knee    Primary osteoarthritis of left knee    BPH (benign prostatic hyperplasia)    Diarrhea    Chronic pain of left knee    Decreased range of motion of left knee    Weakness of left lower extremity    Gait,  antalgic    Status post total knee replacement, left    Chronic kidney disease, stage 3a    Persistent proteinuria    Secondary renal hyperparathyroidism    Presence of right artificial hip joint    Impaired range of motion of right knee    Quadriceps weakness       Review of patient's allergies indicates:   Allergen Reactions    Penicillins Hives, Itching and Rash    Shellfish containing products        No current facility-administered medications on file prior to encounter.     Current Outpatient Medications on File Prior to Encounter   Medication Sig Dispense Refill    acetaminophen (TYLENOL) 650 MG TbSR Take 650 mg by mouth every 8 (eight) hours.      aspirin (ECOTRIN) 81 MG EC tablet Take 81 mg by mouth once daily.      atorvastatin (LIPITOR) 80 MG tablet Take 1 tablet (80 mg total) by mouth once daily. 90 tablet 3    carvediloL (COREG) 25 MG tablet TAKE 1 TABLET BY MOUTH TWICE DAILY WITH MEALS 180 tablet 3    clopidogreL (PLAVIX) 75 mg tablet Take 1 tablet by mouth once daily 90 tablet 3    glipiZIDE (GLUCOTROL) 10 MG TR24 Take 1 tablet (10 mg total) by mouth 2 (two) times daily with meals. 180 tablet 3    oxybutynin (DITROPAN-XL) 5 MG TR24 Take 1 tablet (5 mg total) by mouth once daily. 90 tablet 3    pantoprazole (PROTONIX) 40 MG tablet Take 1 tablet by mouth once daily 90 tablet 1    valsartan (DIOVAN) 80 MG tablet Take 1 tablet by mouth once daily 90 tablet 3       Past Surgical History:   Procedure Laterality Date    AORTOGRAPHY N/A 8/3/2020    Procedure: Aortogram;  Surgeon: Mason Benitez MD;  Location: [facility] CATH LAB;  Service: Cardiology;  Laterality: N/A;    APPENDECTOMY      COLONOSCOPY N/A 12/27/2016    Procedure: COLONOSCOPY;  Surgeon: Merritt García MD;  Location: [facility] ENDO ([address] [place] [address]);  Service: Endoscopy;  Laterality: N/A;    COLONOSCOPY N/A 7/27/2020    Procedure: COLONOSCOPY;  Surgeon: Mala Lynn MD;  Location: [facility] ENDO;  Service: Endoscopy;  Laterality: N/A;    CORONARY ANGIOGRAPHY N/A  8/17/2020    Procedure: ANGIOGRAM, CORONARY ARTERY;  Surgeon: Mason Benitez MD;  Location: [facility] CATH LAB;  Service: Cardiology;  Laterality: N/A;    CORONARY ANGIOGRAPHY N/A 9/28/2020    Procedure: ANGIOGRAM, CORONARY ARTERY;  Surgeon: Mason Benitez MD;  Location: [facility] CATH LAB;  Service: Cardiology;  Laterality: N/A;    CORONARY ANGIOGRAPHY INCLUDING BYPASS GRAFTS WITH CATHETERIZATION OF LEFT HEART N/A 8/3/2020    Procedure: ANGIOGRAM, CORONARY, INCLUDING BYPASS GRAFT, WITH LEFT HEART CATHETERIZATION;  Surgeon: Mason Benitez MD;  Location: [facility] CATH LAB;  Service: Cardiology;  Laterality: N/A;    CORONARY ARTERY BYPASS GRAFT  05/26/2006     4 vessel    CORONARY BYPASS GRAFT ANGIOGRAPHY  9/28/2020    Procedure: Bypass graft study;  Surgeon: Mason Benitez MD;  Location: [facility] CATH LAB;  Service: Cardiology;;    CYSTOSCOPY WITH INSERTION OF MINIMALLY INVASIVE IMPLANT TO ENLARGE PROSTATIC URETHRA N/A 4/24/2023    Procedure: CYSTOSCOPY, WITH INSERTION OF UROLIFT IMPLANT;  Surgeon: Jeanmarie Hanson MD;  Location: [facility] OR;  Service: Urology;  Laterality: N/A;  ATUL TRACT DARCI MAZIN [phone] TEXTED DARCI ON 3/31/2023 @ 3:47PM. DARCI RESPONDED ON 3/31/2023 @ 3:48PM-LO  RN PREOP 4/17/2023    HIP ARTHROPLASTY Right 10/21/2022    Procedure: ARTHROPLASTY, HIP, RIGHT;  Surgeon: Isidro Paulino MD;  Location: [address];  Service: Orthopedics;  Laterality: Right;    INJECTION OF ANESTHETIC AGENT AROUND NERVE Right 2/15/2024    Procedure: BLOCK, RIGHT GENICULAR;  Surgeon: Thanh Chen MD;  Location: [facility] PAIN MGT;  Service: Pain Management;  Laterality: Right;  [phone]    LEFT HEART CATHETERIZATION Left 9/28/2020    Procedure: Left heart cath;  Surgeon: Mason Benitez MD;  Location: [facility] CATH LAB;  Service: Cardiology;  Laterality: Left;    PERCUTANEOUS TRANSLUMINAL BALLOON ANGIOPLASTY OF CORONARY ARTERY  8/17/2020    Procedure: Angioplasty-coronary;  Surgeon: Mason Don  MD Emmanuel;  Location: [facility] CATH LAB;  Service: Cardiology;;    RADIOFREQUENCY ABLATION Right 3/21/2024    Procedure: RADIOFREQUENCY ABLATION RIGHT GENICULAR *REP CONFIRMED* *PLAVIX AND ASPIRIN CLEARANCE IN CHART*;  Surgeon: Thanh Chen MD;  Location: [facility] PAIN MGT;  Service: Pain Management;  Laterality: Right;  [phone]  *SCHEDULE ON 3/21 @ 10:00 AM    TOTAL KNEE ARTHROPLASTY Left 11/15/2023    Procedure: ARTHROPLASTY, KNEE, TOTAL: Left:;  Surgeon: Rancho Ferrara MD;  Location: [facility] OR [facility] FLR;  Service: Orthopedics;  Laterality: Left;       Social History     Socioeconomic History    Marital status:    Tobacco Use    Smoking status: Former     Current packs/day: 0.00     Types: Cigarettes     Quit date: 2007     Years since quittin.5    Smokeless tobacco: Never   Substance and Sexual Activity    Alcohol use: Yes     Alcohol/week: 1.0 standard drink of alcohol     Types: 1 Drinks containing 0.5 oz of alcohol per week     Comment: once rarely    Drug use: No    Sexual activity: Not Currently     Social Determinants of Health     Financial Resource Strain: Low Risk  (2/15/2024)    Overall Financial Resource Strain (CARDIA)     Difficulty of Paying Living Expenses: Not hard at all   Food Insecurity: No Food Insecurity (2/15/2024)    Hunger Vital Sign     Worried About Running Out of Food in the Last Year: Never true     Ran Out of Food in the Last Year: Never true   Transportation Needs: No Transportation Needs (2/15/2024)    PRAPARE - Transportation     Lack of Transportation (Medical): No     Lack of Transportation (Non-Medical): No   Physical Activity: Unknown (2/15/2024)    Exercise Vital Sign     Days of Exercise per Week: 0 days   Stress: No Stress Concern Present (2/15/2024)    [place] [employer] - Occupational Stress Questionnaire     Feeling of Stress : Not at all   Housing Stability: Low Risk  (2/15/2024)    Housing Stability Vital Sign     Unable to Pay for  Housing in the Last Year: No     Number of Places Lived in the Last Year: 1     Unstable Housing in the Last Year: No         CBC:   Recent Labs     08/14/24  0841   WBC 9.09   RBC 4.77   HGB 14.6   HCT 44.4      MCV 93   MCH 30.6   MCHC 32.9       CMP:   Recent Labs     08/14/24  0841      K 4.0      CO2 22*   BUN 34*   CREATININE 1.7*   *   PHOS 2.9   CALCIUM 9.8   ALBUMIN 3.3*       EKG:  Sinus bradycardia with Premature atrial complexes   Nonspecific intraventricular block   Minimal voltage criteria for LVH, may be normal variant ( [place] product )   Nonspecific T wave abnormality   Abnormal ECG   When compared with ECG of 02-NOV-2023 10:46,   Premature atrial complexes are now Present   T wave inversion no longer evident in Inferior leads   Confirmed by Darion Chi MD ([id number]) on 7/31/2024 5:04:27 PM     2D Echo:  The left ventricle is mildly enlarged with mild concentric hypertrophy and low normal systolic function.  The estimated ejection fraction is 50%.  Normal left ventricular diastolic function.  Normal right ventricular size with normal right ventricular systolic function.  Mild left atrial enlargement.  Mild right atrial enlargement.  Normal central venous pressure (3 mmHg).  The estimated PA systolic pressure is 10 mmHg.  There is no pulmonary hypertension.  The sinuses of Valsalva is moderately dilated. 4.3cm.      Pre-op Assessment    I have reviewed the Patient Summary Reports.     I have reviewed the Nursing Notes. I have reviewed the NPO Status.   I have reviewed the Medications.     Review of Systems  Anesthesia Hx:             Denies Family Hx of Anesthesia complications.    Denies Personal Hx of Anesthesia complications.                    Cardiovascular:  Exercise tolerance: good   Hypertension   CAD                                        Pulmonary:    Denies COPD.  Denies Asthma.    Denies Recent URI. Sleep Apnea                Renal/:  Chronic Renal Disease,  CKD                Hepatic/GI:     GERD             Musculoskeletal:  Arthritis               Neurological:    Denies CVA.    Denies Seizures.                                Endocrine:  Diabetes, poorly controlled, type 2               Physical Exam  General: Well nourished, Cooperative, Alert and Oriented    Airway:  Mallampati: II   Mouth Opening: Normal  TM Distance: 4 - 6 cm  Tongue: Large    Dental:  Intact        Anesthesia Plan  Type of Anesthesia, risks & benefits discussed:    Anesthesia Type: Gen Natural Airway, Spinal, CSE  Intra-op Monitoring Plan: Standard ASA Monitors  Post Op Pain Control Plan: multimodal analgesia  Induction:  IV  Informed Consent: Informed consent signed with the Patient and all parties understand the risks and agree with anesthesia plan.  All questions answered. Patient consented to blood products? Yes  ASA Score: 3  Day of Surgery Review of History & Physical: H&P Update referred to the surgeon/provider.    Ready For Surgery From Anesthesia Perspective.     .

## 2024-08-15 NOTE — ANESTHESIA PROCEDURE NOTES
Spinal    Diagnosis: knee pain  Patient location during procedure: OR  Start time: 8/15/2024 12:40 PM  Timeout: 8/15/2024 12:40 PM  End time: 8/15/2024 12:54 PM    Staffing  Authorizing Provider: Behzad Santana MD  Performing Provider: Meg Franklin CRNA    Staffing  Performed by: Meg Franklin CRNA  Authorized by: Behzad Santana MD    Preanesthetic Checklist  Completed: patient identified, IV checked, site marked, risks and benefits discussed, surgical consent, monitors and equipment checked, pre-op evaluation and timeout performed  Spinal Block  Patient position: sitting  Prep: ChloraPrep  Patient monitoring: heart rate, continuous pulse ox and frequent blood pressure checks  Approach: midline  Location: L4-5  Injection technique: single shot  CSF Fluid: clear free-flowing CSF  Needle  Needle type: Joaquín   Needle gauge: 25 G  Needle length: 3.5 in  Additional Documentation: incremental injection, no paresthesia on injection and negative aspiration for heme  Needle localization: anatomical landmarks  Assessment  Sensory level: T8   Dermatomal levels determined by pinch or prick  Ease of block: easy  Patient's tolerance of the procedure: comfortable throughout block  Medications:    Medications: mepivacaine (CARBOCAINE) injection 15 mg/mL (1.5%) - Other   3.5 mL - 8/15/2024 12:45:00 PM

## 2024-08-15 NOTE — TRANSFER OF CARE
"Anesthesia Transfer of Care Note    Patient: Jani Cha    Procedure(s) Performed: Procedure(s) (LRB):  ARTHROPLASTY, KNEE, TOTAL (Right)    Patient location: PACU    Anesthesia Type: spinal    Transport from OR: Transported from OR on 6-10 L/min O2 by face mask with adequate spontaneous ventilation    Post pain: adequate analgesia    Post assessment: no apparent anesthetic complications and tolerated procedure well    Post vital signs: stable    Level of consciousness: awake, alert and oriented    Nausea/Vomiting: no nausea/vomiting    Complications: none    Transfer of care protocol was followed      Last vitals: Visit Vitals  /69   Pulse 61   Temp 36.7 °C (98.1 °F) (Temporal)   Resp 14   Ht 5' 9" (1.753 m)   Wt 115.2 kg (254 lb)   SpO2 100%   BMI 37.51 kg/m²     "
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
## 2024-08-15 NOTE — PLAN OF CARE
Problem: Physical Therapy  Goal: Physical Therapy Goal  Description: Goals to be met by: 24     Patient will increase functional independence with mobility by performin. Supine to sit with Supervision  2. Sit to stand transfer with Supervision  3. Gait  x 150 feet with Stand-by Assistance using Rolling Walker.   4. Ascend/Descend 6 inch curb step with Contact Guard Assistance using Rolling Walker.  5. Lower extremity TKA home exercise program x 10 reps per handout, with supervision    Patient demonstrates a mobility limitation that significantly impairs their ability to participate in one or more mobility related activities of daily living. Patient's mobility limitation cannot be sufficiently resolved with the use of a cane, but can be sufficiently resolved with the use of a rolling walker.The use of a rolling walker will considerably improve their ability to participate in MRADLs. Patient will use the walker on a regular basis at home.       Outcome: Progressing

## 2024-08-15 NOTE — OP NOTE
[facility] Right TKA  OPERATIVE NOTE    Date of Operation:   8/15/2024    Providers Performing:   Surgeon(s):  Sylvain Glaser III, MD  Assistant: PATRICIA Olsen, I certify that the services for which payment is claimed were medically necessary, and that no qualified resident was available to perform the services.       Operative Procedure:   Right Total Knee Arthroplasty, CPT 73816    -22 modifier for CDC class 2 or higher obesity (BMI 37) and fixed varus deformity requiring prolonged operative time and increased case complexity and difficulty      Preoperative Diagnosis:   Right Knee Osteoarthritis, ICD-10 M17.11    Postoperative Diagnosis:   SAME    Components Used:   Depuy  Femur: Attune PS Size 7  Tibia: Attune all poly AOX PS Size 7 x  6mm  Patella: Attune Medialized Dome 38 mm    2 packs cement: Simplex P    Implant Name Type Inv. Item Serial No.  Lot No. LRB No. Used Action   CEMENT BONE SURG SMPLX P RADPQ - [id number]  CEMENT BONE SURG SMPLX P RADPQ  [employer]Fulfillment GLENN. [id number] Right 2 Implanted   PATELLA ATTUNE MED PAT 38MM - [id number]  PATELLA ATTUNE MED PAT 38MM  [employer] [id number] Right 1 Implanted   COMP FEM POS ATTUNE SZ7 R - [id number]  COMP FEM POS ATTUNE SZ7 R  [employer] [id number] Right 1 Implanted   ATTUNE KNEE SYSTEM ALLPOLY TIBIAL IMPLANT POSTERIOR STABILIZED CEMENTED SIZE 7 6MM AOX    [employer] [id number] Right 1 Implanted       Anesthesia:   Spinal    SABINA cocktail: Glaser Block, no toradol    Estimated Blood Loss:   350 cc    Specimens:   None    Complications:   None    Indications:   The patient has failed non-operative measures including medications, injections and activity modifications for their knee.  After an explanation of risks and benefits of knee arthroplasty surgery, the patient wishes to proceed with surgery.    Operative Notes:   The patient was greeted in the pre-op holding area.  The patients knee was marked with a surgical marker by the surgeon.  Spinal-Epidural  anesthesia was administered by the anesthesia team.  The patient was placed in the supine position on the OR table with all bony prominences padded.  A tourniquet was not used.  IV antibiotics were administered.  The leg was prepped and draped in the usual sterile fashion.  A timeout was performed and the correct patient, site and procedure were identified.    A midline incision was made with a standard medical parapatellar arthrotomy.  The standard medial capsular release was performed along with the lateral gutter.  The patella was mobilized from the lateral parapatellar ligament and bony osteophytes were removed.  The intercondylar notch was cleared of the ACL/PCL.    The extramedullary tibial alignment guide was placed in the appropriate slope and varus/valgus orientation and the proximal cutting block secured by pins.  Measured resection with a 8mm cut was accounted for from the high side of the plateau, perpendicular to the ankle.  The cut was made with an oscillating saw.  We then obtained access to the femoral canal with the stepped drill.  The intramedullary senia was placed in 4 degrees of valgus with a 10+2 recut = 12mm planned resection.  Our distal femoral cuts were made.  The knee was placed in extension and the medical and lateral meniscal remnants removed.  A spacer block was placed with the knee in extension checking for alignment and balance which we were happy with.    The knee was then brought into flexion and the distal femoral gap assessed for appropriate rotation.  Our distal femoral sizing guide was placed and sized appropriately.  Guide pins were placed in the appropriate external rotation.  This was assessed with the 4 in 1 cutting block and the flexion gap sizing block which was appropriate.  The distal femoral preparation was completed after the anterior, posterior and chamfer cuts were made.  The box cutting guide was placed and assessed.  The box cut was made.    At this time the trial  implants were placed and knee assessed for stability, and flexion arc. The patella was prepared using free-hand technique and the three-holed lug drill guide was sized and appropriately assessed. Patella tracking was found to be acceptable. The tibial component rotation was marked. The trials were removed. The tibial component was positioned and the keel was drilled and punched.    The wound was copiously irrigated with pulsitile lavage and the bony surfaces dried.  Cement was mixed on the back table and applied to all components.  Cementation of the femoral, tibial and patellar components was performed sequentially with removal of all excess cement. While the cement dried a 0.35% betadine solution was left to soak in the surgical field.     After the cement was dry hemostasis was obtained with bovie electrocautery.  The knee was again copiously irrigated with pulsatile lavage.     1 gram of vancomycin powder was placed in the knee. The arthrotomy was closed with interrupted #1 vicryl suture and #2 quill, the subcuticular layer was closed with 2-0 vicryl suture and a running 4-0 monocryl was used to closed the skin surface.  Surgicel sealant was placed over the top of the incision and sterile dressing placed over the wound. Appropriately sized TEDs hose were placed for edema control.     Sponge and needle counts were correct.    The first-assistant was critical to all steps of the operation, including retraction and leg stabilization during exposure and bone preparation, as well as the deep and superficial wound closure.  Dr. Glaser performed and/or supervised the key portions of this surgical procedure, including evaluation of the bone cuts, reshaping of the bony elements, and insertion of the provisional and final components.    Postoperative Plan:  The patient will be anticoagulated with 81mg aspirin twice daily for one week then resume daily ASA + Plavix  They will receive 24 hours of post-operative antibiotics.     Activity will be weight bearing as tolerated.    No nsaids  Flomax pre-op, Pour/bethanechol  Nutrition support    PT    Due patient and or surgical factors the patient will receive an Rx for cefadroxil 500mg BID x7 days after discharge per [place] data:   Dianna MM, Concepcion JE, Neli M, Denton RM. [facility] Clinical Research Award: Extended Oral Antibiotics Prevent Periprosthetic Joint Infection in High-Risk Cases: 3855 Patients With 1-Year Follow-Up. J Arthroplasty. 2021 Jul;36(7S):S18-S25. doi: 10.1016/j.arth.2021.01.051. Epub 2021 Jan 23. PMID: [id number].          Signed by: Sylvain Glaser III, MD

## 2024-08-15 NOTE — PLAN OF CARE
Pt A&OX4 with NAD noted. Plantar/dorsi flexion noted with pt reporting sensation to feet bilaterally. Tolerating PO intake with no c/o N&V at this time. Daughter at bedside update don room assignment. Report called. Pt transported via hospital bed with all belongings.

## 2024-08-15 NOTE — HOSPITAL COURSE
On 8/15, the patient arrived to the [facility] pre-operative holding area for proper pre-operative management.  Upon completion of pre-operative preparation, the patient was taken back to the operative theatre. R TKA was performed without complication and the patient was transported to the post anesthesia care unit in stable condition.  After appropriate recovery from the anaesthetic agents used during the surgery, the patient was then transported to the hospital inpatient floor.  The interim of the hospital stay from arrival on the floor up to discharge has been uncomplicated. The patient has tolerated regular diet.  The patient's pain has been controlled using a multimodal approach. Currently, the patient's pain is well controlled on an oral regimen.  The patient has been voiding without difficulty.  The patient began participation in physical therapy after surgery and has progressed throughout the entire hospital stay.  Currently, the patient's progress is sufficient to allow the them to be discharged to home safely.  The patient agrees with this assessment and desires a discharge today.

## 2024-08-15 NOTE — PLAN OF CARE
Need site marked, updated h&p and anesthesia consent.    Belongings to be locked in a locker. Daughter had to go to run an errand but is available by phone if needed.    Hard of hearing with bilateral hearing aids.  Bed in lowest, locked position with call light within reach. Side rails up X2. Questions answered. No apparent distress noted. Ongoing monitoring.

## 2024-08-15 NOTE — PT/OT/SLP PROGRESS
Occupational Therapy      Patient Name:  Jani Cha   MRN:  [id number]    Patient not seen today secondary to pt working c PT. Will follow-up tomorrow.    8/15/2024

## 2024-08-15 NOTE — ASSESSMENT & PLAN NOTE
Assessment:   Jani Cha is a 79 y.o. male s/p R TKA on 8/15/24.    Plan:     - PT/OT prior to DC   - ASA 81 mg for 7 days then resume ASA + Plavix  - Multimodal pain control   - Regular diet  - Dressing to remain in place until clinic f/u   - WBAT   - Home health  - Cefadroxil x7 days    Dispo: Discharge to home today after PT

## 2024-08-15 NOTE — PROGRESS NOTES
Pharmacist Renal Dose Adjustment Note    Jani Cha is a 79 y.o. male being treated with the medication famotidine.     Patient Data:    Vital Signs (Most Recent):  Temp: 98 °F (36.7 °C) (08/15/24 1703)  Pulse: 69 (08/15/24 1703)  Resp: 16 (08/15/24 1703)  BP: (!) 156/87 (08/15/24 1703)  SpO2: 96 % (08/15/24 1703) Vital Signs (72h Range):  Temp:  [98 °F (36.7 °C)-98.3 °F (36.8 °C)]   Pulse:  [61-69]   Resp:  [13-18]   BP: (112-164)/(69-87)   SpO2:  [96 %-100 %]      Recent Labs   Lab 08/14/24  0841   CREATININE 1.7*     Serum creatinine: 1.7 mg/dL (H) 08/14/24 0841  Estimated creatinine clearance: 44.8 mL/min (A)    Famotidine 20 mg twice daily was changed to famotidine daily per pharmacy renal adjustment policy.     Pharmacist's Name: Ghazala Norman  Pharmacist's Extension: [phone]

## 2024-08-15 NOTE — NURSING TRANSFER
Nursing Transfer Note      8/15/2024   5:09 PM    Nurse giving handoff:tate porter   Nurse receiving handoff:mattie porter     Reason patient is being transferred: admit     Transfer To: [id number]    Transfer via bed    Transfer with IV fluids    Transported by RN & PCT      Patient belongings transferred with patient: Yes    Chart send with patient: Yes    Notified: daughter    Patient reassessed at: 08/15/24@ 1620   Upon arrival to floor: patient oriented to room, call bell in reach, and bed in lowest position

## 2024-08-15 NOTE — SUBJECTIVE & OBJECTIVE
"Principal Problem:Osteoarthritis of right knee    Principal Orthopedic Problem: S/p R TKA 8/15/24    Interval History: Patient was seen and examined at bedside s/p R DARNELL. NEY. Hypertensive overnight.  He reports that pain is well controlled. Tolerating PO. Ambulated 25 feet x 2 with PT. Patient states that He is ready for discharge today.       Review of patient's allergies indicates:   Allergen Reactions    Penicillins Hives, Itching and Rash    Shellfish containing products        Current Facility-Administered Medications   Medication    0.9%  NaCl infusion    acetaminophen tablet 1,000 mg    aspirin EC tablet 81 mg    bisacodyL suppository 10 mg    carvediloL tablet 25 mg    ceFAZolin 2 g in 0.9% NaCl 50 mL IVPB (MB+)    dextrose 10% bolus 125 mL 125 mL    dextrose 10% bolus 250 mL 250 mL    [START ON 8/16/2024] famotidine tablet 20 mg    glucagon (human recombinant) injection 1 mg    glucose chewable tablet 16 g    glucose chewable tablet 24 g    insulin aspart U-100 pen 0-10 Units    methocarbamoL tablet 750 mg    morphine injection 2 mg    mupirocin 2 % ointment 1 g    naloxone 0.4 mg/mL injection 0.02 mg    ondansetron injection 4 mg    oxyCODONE immediate release tablet 5 mg    oxyCODONE immediate release tablet Tab 10 mg    [START ON 8/16/2024] polyethylene glycol packet 17 g    pregabalin capsule 75 mg    prochlorperazine injection Soln 5 mg    senna-docusate 8.6-50 mg per tablet 1 tablet     Objective:     Vital Signs (Most Recent):  Temp: 98 °F (36.7 °C) (08/15/24 1703)  Pulse: 69 (08/15/24 1703)  Resp: 16 (08/15/24 1703)  BP: (!) 156/87 (08/15/24 1703)  SpO2: 96 % (08/15/24 1703) Vital Signs (24h Range):  Temp:  [98 °F (36.7 °C)-98.3 °F (36.8 °C)] 98 °F (36.7 °C)  Pulse:  [61-69] 69  Resp:  [13-18] 16  SpO2:  [96 %-100 %] 96 %  BP: (112-164)/(69-87) 156/87     Weight: 115.2 kg (254 lb)  Height: 5' 9" (175.3 cm)  Body mass index is 37.51 kg/m².      Intake/Output Summary (Last 24 hours) at 8/15/2024 " 1826  Last data filed at 8/15/2024 1725  Gross per 24 hour   Intake 2000 ml   Output 325 ml   Net 1675 ml        Ortho/SPM Exam  Ortho/SPM Exam  AAOx4  NAD  Reg rate  No increased WOB     RLE:  Dressing c/d/i  SILT T/SP/DP/[place]/Sa  Motor intact T/SP/DP  WWP extremities  FCDs in place and functioning      Significant Labs: All pertinent labs within the past 24 hours have been reviewed.    Significant Imaging: I have reviewed and interpreted all pertinent imaging results/findings.

## 2024-08-15 NOTE — PLAN OF CARE
Problem: Adult Inpatient Plan of Care  Goal: Plan of Care Review  8/15/2024 1845 by Niru Alfred RN  Outcome: Progressing  Flowsheets (Taken 8/15/2024 1845)  Plan of Care Reviewed With:   patient   child     Problem: Adult Inpatient Plan of Care  Goal: Patient-Specific Goal (Individualized)  8/15/2024 1845 by Niru Alfred RN  Outcome: Progressing  Flowsheets (Taken 8/15/2024 1845)  Individualized Care Needs: Pain management     Problem: Diabetes Comorbidity  Goal: Blood Glucose Level Within Targeted Range  Intervention: Monitor and Manage Glycemia  8/15/2024 1845 by Niru Alfred RN  Flowsheets (Taken 8/15/2024 1845)  Glycemic Management:   blood glucose monitored   oral hydration promoted  8/15/2024 1844 by Niru Alfred RN  Flowsheets (Taken 8/15/2024 1844)  Glycemic Management:   blood glucose monitored   oral hydration promoted     Problem: Knee Arthroplasty  Goal: Absence of Bleeding  Intervention: Monitor and Manage Bleeding  Flowsheets (Taken 8/15/2024 1845)  Bleeding Management: dressing monitored     Problem: Knee Arthroplasty  Goal: Nausea and Vomiting Relief  Intervention: Prevent or Manage Nausea and Vomiting  Flowsheets (Taken 8/15/2024 1845)  Nausea/Vomiting Interventions:   nausea triggers minimized   stimuli minimized     Problem: Comorbidity Management  Goal: Blood Pressure in Desired Range  Intervention: Maintain Blood Pressure Management  Flowsheets (Taken 8/15/2024 1845)  Medication Review/Management:   medications reviewed   high-risk medications identified     Problem: Fall Injury Risk  Goal: Absence of Fall and Fall-Related Injury  Intervention: Identify and Manage Contributors  Flowsheets (Taken 8/15/2024 1845)  Medication Review/Management:   medications reviewed   high-risk medications identified     Problem: Fall Injury Risk  Goal: Absence of Fall and Fall-Related Injury  Intervention: Promote Injury-Free Environment  Flowsheets (Taken 8/15/2024 1845)  Safety  Promotion/Fall Prevention:   assistive device/personal item within reach   Fall Risk reviewed with patient/family   high risk medications identified   instructed to call staff for mobility   lighting adjusted   medications reviewed   nonskid shoes/socks when out of bed   side rails raised x 2   Supervised toileting - stay within arms reach   family expresses understanding of fall risk and prevention     Plan of care reviewed with patient, verbalized understanding. Medications reviewed and given as ordered. Rounding for safety and patient care per policy. Safety precautions maintained. Call light within reach, bed wheels locked, bed in lowest position, side rails up x2, patient instructed to call for assistance, DME at bedside.

## 2024-08-15 NOTE — INTERVAL H&P NOTE
The patient has been examined and the H&P has been reviewed:    I concur with the findings and no changes have occurred since H+P was written    Surgery risks, benefits and alternative options discussed and understood by patient/family.    Of note: velys TKA has is not functioning today  Gave patient options of manual TKA today v waiting for robot to be fixed, discussed risks/benefits/alternatives  Patient and family express desire to move forward with manual TKA today

## 2024-08-15 NOTE — NURSING
Patient arrived to unit AAOx4, In hospital bed from PACU. VSS and IVF infusing. Dressing to Right knee, CDI. See assessment. Patient oriented to room. Bed in lowest position, side rails up x2, call light within reach, patient instructed to call for assistance, verbalized understanding. Will continue to monitor.     Nurses Note -- 4 Eyes      8/15/2024   6:44 PM      Skin assessed during: Admit      [x] No Altered Skin Integrity Present    []Prevention Measures Documented      [] Yes- Altered Skin Integrity Present or Discovered   [] LDA Added if Not in Epic (Describe Wound)   [] New Altered Skin Integrity was Present on Admit and Documented in LDA   [] Wound Image Taken    Wound Care Consulted? No    Attending Nurse:  Niru Almeida RN/Staff Member:   Usha          unable to assess

## 2024-08-16 VITALS
OXYGEN SATURATION: 98 % | HEART RATE: 61 BPM | BODY MASS INDEX: 37.62 KG/M2 | WEIGHT: 254 LBS | TEMPERATURE: 98 F | SYSTOLIC BLOOD PRESSURE: 134 MMHG | HEIGHT: 69 IN | DIASTOLIC BLOOD PRESSURE: 75 MMHG | RESPIRATION RATE: 17 BRPM

## 2024-08-16 LAB
POCT GLUCOSE: 132 MG/DL (ref 70–110)
POCT GLUCOSE: 178 MG/DL (ref 70–110)

## 2024-08-16 PROCEDURE — 63600175 PHARM REV CODE 636 W HCPCS: Performed by: NURSE PRACTITIONER

## 2024-08-16 PROCEDURE — 25000003 PHARM REV CODE 250

## 2024-08-16 PROCEDURE — 97116 GAIT TRAINING THERAPY: CPT

## 2024-08-16 PROCEDURE — 25000003 PHARM REV CODE 250: Performed by: ORTHOPAEDIC SURGERY

## 2024-08-16 PROCEDURE — 97165 OT EVAL LOW COMPLEX 30 MIN: CPT

## 2024-08-16 PROCEDURE — 94761 N-INVAS EAR/PLS OXIMETRY MLT: CPT

## 2024-08-16 PROCEDURE — 97530 THERAPEUTIC ACTIVITIES: CPT

## 2024-08-16 PROCEDURE — 97535 SELF CARE MNGMENT TRAINING: CPT

## 2024-08-16 PROCEDURE — 97110 THERAPEUTIC EXERCISES: CPT

## 2024-08-16 PROCEDURE — 25000003 PHARM REV CODE 250: Performed by: NURSE PRACTITIONER

## 2024-08-16 PROCEDURE — 99900035 HC TECH TIME PER 15 MIN (STAT)

## 2024-08-16 RX ADMIN — SENNOSIDES AND DOCUSATE SODIUM 1 TABLET: 50; 8.6 TABLET ORAL at 08:08

## 2024-08-16 RX ADMIN — POLYETHYLENE GLYCOL 3350 17 G: 17 POWDER, FOR SOLUTION ORAL at 07:08

## 2024-08-16 RX ADMIN — OXYCODONE 5 MG: 5 TABLET ORAL at 04:08

## 2024-08-16 RX ADMIN — MUPIROCIN 1 G: 20 OINTMENT TOPICAL at 08:08

## 2024-08-16 RX ADMIN — OXYCODONE HYDROCHLORIDE 10 MG: 10 TABLET ORAL at 08:08

## 2024-08-16 RX ADMIN — ASPIRIN 81 MG: 81 TABLET, COATED ORAL at 08:08

## 2024-08-16 RX ADMIN — ACETAMINOPHEN 1000 MG: 500 TABLET ORAL at 11:08

## 2024-08-16 RX ADMIN — ACETAMINOPHEN 1000 MG: 500 TABLET ORAL at 05:08

## 2024-08-16 RX ADMIN — FAMOTIDINE 20 MG: 20 TABLET, FILM COATED ORAL at 08:08

## 2024-08-16 RX ADMIN — INSULIN ASPART 2 UNITS: 100 INJECTION, SOLUTION INTRAVENOUS; SUBCUTANEOUS at 05:08

## 2024-08-16 RX ADMIN — CARVEDILOL 25 MG: 12.5 TABLET, FILM COATED ORAL at 08:08

## 2024-08-16 RX ADMIN — CEFAZOLIN 2 G: 2 INJECTION, POWDER, FOR SOLUTION INTRAMUSCULAR; INTRAVENOUS at 04:08

## 2024-08-16 RX ADMIN — OXYCODONE HYDROCHLORIDE 10 MG: 10 TABLET ORAL at 11:08

## 2024-08-16 RX ADMIN — METHOCARBAMOL 750 MG: 750 TABLET ORAL at 08:08

## 2024-08-16 NOTE — PLAN OF CARE
Problem: Occupational Therapy  Goal: Occupational Therapy Goal  Description: Goals to be met by: 8/16/24     Patient will increase functional independence with ADLs by performing:    UE Dressing with Modified [place].  LE Dressing with Modified [place] and Assistive Devices as needed.  Grooming while standing at sink with Modified [place].  Toileting from bedside commode with Modified [place] for hygiene and clothing management.   Bathing from  shower chair/bench with Modified [place].  Toilet transfer to bedside commode with Modified [place].    Outcome: Progressing

## 2024-08-16 NOTE — PLAN OF CARE
Plan of care reviewed with patient; verbalized understanding.   Medications reviewed and administered as ordered.  Rounding for safety and patient care per policy.   Safety precautions maintained. Patient working appropriately with PT/OT.  DME at bedside.  Call light within reach, bed wheels locked, bed in lowest position, side rails ^x2, safety maintained. NADN, Will continue monitor.      Problem: Adult Inpatient Plan of Care  Goal: Absence of Hospital-Acquired Illness or Injury  Outcome: Progressing  Intervention: Identify and Manage Fall Risk  Flowsheets (Taken 8/16/2024 0301)  Safety Promotion/Fall Prevention:   assistive device/personal item within reach   Fall Risk reviewed with patient/family   instructed to call staff for mobility   nonskid shoes/socks when out of bed   lighting adjusted   medications reviewed   side rails raised x 2  Intervention: Prevent and Manage VTE (Venous Thromboembolism) Risk  Flowsheets (Taken 8/16/2024 0301)  VTE Prevention/Management:   remove, assess skin, and reapply sequential compression device   fluids promoted   dorsiflexion/plantar flexion performed  Intervention: Prevent Infection  Flowsheets (Taken 8/16/2024 0301)  Infection Prevention:   rest/sleep promoted   single patient room provided   hand hygiene promoted

## 2024-08-16 NOTE — PLAN OF CARE
Problem: Adult Inpatient Plan of Care  Goal: Readiness for Transition of Care  Outcome: Progressing     Problem: Diabetes Comorbidity  Goal: Blood Glucose Level Within Targeted Range  Intervention: Monitor and Manage Glycemia  Flowsheets (Taken 8/16/2024 1000)  Glycemic Management: blood glucose monitored     Problem: Pain Acute  Goal: Optimal Pain Control and Function  Intervention: Develop Pain Management Plan  Flowsheets (Taken 8/16/2024 1000)  Pain Management Interventions: around-the-clock dosing utilized     Problem: Wound  Goal: Absence of Infection Signs and Symptoms  Intervention: Prevent or Manage Infection  Flowsheets (Taken 8/16/2024 1000)  Fever Reduction/Comfort Measures: ice pack(s) applied

## 2024-08-16 NOTE — PT/OT/SLP PROGRESS
"Physical Therapy Treatment and Discharge    Patient Name:  Jani Cha   MRN:  [id number]    Recommendations:     Discharge Recommendations: Low Intensity Therapy  Discharge Equipment Recommendations: none  Barriers to discharge: None    Assessment:     Jani Cha is a 79 y.o. male admitted with a medical diagnosis of Osteoarthritis of right knee.  He presents with the following impairments/functional limitations: weakness, impaired endurance, impaired self care skills, impaired functional mobility, gait instability, impaired balance, decreased lower extremity function, pain, decreased ROM, edema. Patient tolerated PT session well. Patient ambulated 100ft and 20ft with RW and supervision. No LOB or SOB noted. Patient ascended/descended 6" step with RW and contact guard assistance. Patient performed R LE therex x10 reps. Patient ready to discharge home from PT standpoint.      Rehab Prognosis: Good; patient would benefit from acute skilled PT services to address these deficits and reach maximum level of function.    Recent Surgery: Procedure(s) (LRB):  ARTHROPLASTY, KNEE, TOTAL (Right) 1 Day Post-Op    Plan:     During this hospitalization, patient to be seen daily to address the identified rehab impairments via gait training, therapeutic activities, therapeutic exercises, neuromuscular re-education and progress toward the following goals:    Plan of Care Expires:  08/16/24    Subjective     Chief Complaint: Right knee pain.   Patient/Family Comments/goals: To get back to driving and hunting.   Pain/Comfort:  Pain Rating 1: 6/10  Location - Side 1: Right  Location 1: knee  Pain Addressed 1: Pre-medicate for activity, Reposition, Distraction      Objective:     Communicated with RN prior to session. Patient found up in chair with cryotherapy upon PT entry to room.     General Precautions: Standard, fall  Orthopedic Precautions: RLE weight bearing as tolerated  Braces: N/A  Respiratory Status: Room air   " "  Functional Mobility:  Mat Mobility:     Supine to Sit: supervision  Sit to Supine: supervision  Transfers:     Sit to Stand:  supervision with rolling walker x2 from bedside chair and x1 from mat  Gait: Patient ambulated 100ft and 20ft with Rolling Walker and supervision using 3-point gait. Patient demonstrated decreased bipin and decreased step length during gait due to pain, decreased ROM, and decreased strength.  Stairs: Patient ascended/descended 6" step with RW and contact guard assistance.    AM-PAC 6 CLICK MOBILITY  Turning over in bed (including adjusting bedclothes, sheets and blankets)?: 4  Sitting down on and standing up from a chair with arms (e.g., wheelchair, bedside commode, etc.): 4  Moving from lying on back to sitting on the side of the bed?: 4  Moving to and from a bed to a chair (including a wheelchair)?: 4  Need to walk in hospital room?: 4  Climbing 3-5 steps with a railing?: 3  Basic Mobility Total Score: 23     Treatment & Education:  Patient educated in and performed R LE exercises x10 reps for ankle pumps, quad set, glute set, SAQ over bolster, heel slides, and LAQ.     Patient educated in:  -PT role and POC  -safety with transfers including hand placement  -gait sequencing and RW management  -OOB activity to maximize recovery including ambulating at home to prevent DVT   -SUV transfer  -curb training  -HEP for therex at home with handout provided     Patient left up in chair with all lines intact, call button in reach, and RN notified.    GOALS:   Multidisciplinary Problems       Physical Therapy Goals       Not on file              Multidisciplinary Problems (Resolved)          Problem: Physical Therapy    Goal Priority Disciplines Outcome Goal Variances Interventions   Physical Therapy Goal   (Resolved)     PT, PT/OT Met     Description: Goals to be met by: 24     Patient will increase functional independence with mobility by performin. Supine to sit with Supervision  2. " Sit to stand transfer with Supervision  3. Gait  x 150 feet with Stand-by Assistance using Rolling Walker.   4. Ascend/Descend 6 inch curb step with Contact Guard Assistance using Rolling Walker.  5. Lower extremity TKA home exercise program x 10 reps per handout, with supervision    Patient demonstrates a mobility limitation that significantly impairs their ability to participate in one or more mobility related activities of daily living. Patient's mobility limitation cannot be sufficiently resolved with the use of a cane, but can be sufficiently resolved with the use of a rolling walker.The use of a rolling walker will considerably improve their ability to participate in MRADLs. Patient will use the walker on a regular basis at home.                            Time Tracking:     PT Received On: 08/16/24  PT Start Time: 1059     PT Stop Time: 1129  PT Total Time (min): 30 min     Billable Minutes: Gait Training 15 and Therapeutic Exercise 15    Treatment Type: Treatment  PT/PTA: PT     Number of PTA visits since last PT visit: 0     08/16/2024

## 2024-08-16 NOTE — PROGRESS NOTES
Acute Pain Service and Perioperative Surgical Home Progress Note    Interval history  Jani Cha is a 79 y.o., male, status post arthroplasty of the right knee with no acute events overnight. Pour protocol, patient given bethanechol, voiding well overnight.  Pain well controlled, on exam good plantar and dorsiflexion, good sensation.  Closely monitoring blood pressure and blood sugar overnight.    Surgery:  Procedure(s) (LRB):  ARTHROPLASTY, KNEE, TOTAL (Right)    Post Op Day #: 1    Problem List:    Active Hospital Problems    Diagnosis  POA    *Osteoarthritis of right knee [M17.11]  Yes      Resolved Hospital Problems   No resolved problems to display.       Subjective:       General appearance of alert, oriented, no complaints   Pain with rest: 2    Numbers   Pain with movement: 3    Numbers   Side Effects    1. Pruritis No    2. Nausea No    3. Motor Blockade No, 0=Ability to raise lower extremities off bed    4. Sedation No, 1=awake and alert    Schedule Medications:    acetaminophen  1,000 mg Oral Q6H    aspirin  81 mg Oral BID    carvediloL  25 mg Oral BID WM    famotidine  20 mg Oral Daily    methocarbamoL  750 mg Oral TID    mupirocin  1 g Nasal BID    polyethylene glycol  17 g Oral Daily    pregabalin  75 mg Oral QHS    senna-docusate 8.6-50 mg  1 tablet Oral BID        Continuous Infusions:   0.9% NaCl   Intravenous Continuous 150 mL/hr at 08/15/24 2255 New Bag at 08/15/24 2255        PRN Medications:    Current Facility-Administered Medications:     bisacodyL, 10 mg, Rectal, Q12H PRN    dextrose 10%, 12.5 g, Intravenous, PRN    dextrose 10%, 25 g, Intravenous, PRN    glucagon (human recombinant), 1 mg, Intramuscular, PRN    glucose, 16 g, Oral, PRN    glucose, 24 g, Oral, PRN    insulin aspart U-100, 0-10 Units, Subcutaneous, QID (AC + HS) PRN    morphine, 2 mg, Intravenous, Q3H PRN    naloxone, 0.02 mg, Intravenous, PRN    ondansetron, 4 mg, Intravenous, Q8H PRN    oxyCODONE, 5 mg, Oral, Q3H  "PRN    oxyCODONE, 10 mg, Oral, Q3H PRN    prochlorperazine, 5 mg, Intravenous, Q6H PRN       Antibiotics:  Antibiotics (From admission, onward)      Start     Stop Route Frequency Ordered    08/15/24 2100  mupirocin 2 % ointment 1 g         08/20/24 2059 Nasl 2 times daily 08/15/24 1735               Objective:         Vital Signs (Most Recent):  Temp: 97.1 °F (36.2 °C) (08/16/24 0437)  Pulse: 75 (08/16/24 0437)  Resp: 18 (08/16/24 0437)  BP: (!) 140/77 (08/16/24 0437)  SpO2: (!) 92 % (08/16/24 0437) Vital Signs Range (Last 24H):  Temp:  [96.8 °F (36 °C)-98.3 °F (36.8 °C)]   Pulse:  [60-84]   Resp:  [13-18]   BP: (112-164)/(69-87)   SpO2:  [92 %-100 %]          I & O (Last 24H):  Intake/Output Summary (Last 24 hours) at 8/16/2024 0537  Last data filed at 8/16/2024 0200  Gross per 24 hour   Intake 2000 ml   Output 1025 ml   Net 975 ml       Physical Exam:    GA: Alert, comfortable, no acute distress.   Pulmonary: Clear to auscultation . Normal respiratory ratei.  Cardiac: regular rate and rhythm.          Laboratory: reviewed in chart  CBC:   Recent Labs     08/14/24  0841   WBC 9.09   RBC 4.77   HGB 14.6   HCT 44.4      MCV 93   MCH 30.6   MCHC 32.9       BMP:   Recent Labs     08/14/24  0841      K 4.0   CO2 22*      BUN 34*   CREATININE 1.7*   *   PHOS 2.9   CALCIUM 9.8       No results for input(s): "PT", "INR", "PROTIME", "APTT" in the last 72 hours.          Assessment:         Pain control adequate    Plan:  1) Pain: Multimodal pain regimen ordered which includes acetaminophen, celecoxib, pregabalin, and prn oxycodone.  Well controlled on current regimen.  Will continue to monitor.    2) diabetes, continue sliding scale insulin   3) hypertension and hyperlipidemia, continue current regimen   4) FEN/GI: Tolerating regular diet.     5) Dispo: Pt working well with PT/OT. Case management and SW following along for setting up home health and physical therapy. Plan to discharge home this " am.              Evaluator Sterling James PA-C
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
## 2024-08-16 NOTE — DISCHARGE SUMMARY
[facility])  Orthopedics  Discharge Summary      Patient Name: Jani Cha  MRN: [id number]  Admission Date: 8/15/2024  Hospital Length of Stay: 0 days  Discharge Date and Time: No discharge date for patient encounter.  Attending Physician: Sylvain Glaser III, MD   Discharging Provider: WENDY Carvajal MD  Primary Care Provider: Laila Bains MD    HPI:   Jani Cha is a 79 y.o. male with history of Right knee pain. Pain is worse with activity and weight bearing.  Patient has experienced interference of activities of daily living due to decreased range of motion and an increase in joint pain and swelling.  Patient has failed non-operative treatment including NSAIDs, corticosteroid injections, viscosupplement injections, and activity modification.  Jani Cha currently ambulates independently.      Relevant medical conditions of significance in perioperative period:  DM- on medication managed by pcp  CAD with 4 vessel bypass and stent  HTN- on medication managed by cardiologist    Procedure(s) (LRB):  ARTHROPLASTY, KNEE, TOTAL (Right)      Hospital Course:  On 8/15, the patient arrived to the [facility] pre-operative holding area for proper pre-operative management.  Upon completion of pre-operative preparation, the patient was taken back to the operative theatre. R TKA was performed without complication and the patient was transported to the post anesthesia care unit in stable condition.  After appropriate recovery from the anaesthetic agents used during the surgery, the patient was then transported to the hospital inpatient floor.  The interim of the hospital stay from arrival on the floor up to discharge has been uncomplicated. The patient has tolerated regular diet.  The patient's pain has been controlled using a multimodal approach. Currently, the patient's pain is well controlled on an oral regimen.  The patient has been voiding without difficulty.  The patient began  participation in physical therapy after surgery and has progressed throughout the entire hospital stay.  Currently, the patient's progress is sufficient to allow the them to be discharged to home safely.  The patient agrees with this assessment and desires a discharge today.      Goals of Care Treatment Preferences:  Code Status: Full Code      Consults (From admission, onward)          Status Ordering Provider     Inpatient consult to Social Work  Once        Provider:  (Not yet assigned)    Acknowledged SERGIO ANTONIO     Inpatient consult to Pain Management  Once        Provider:  (Not yet assigned)    Acknowledged SERGIO ANTONIO     Inpatient consult to Respiratory Care  Once        Provider:  (Not yet assigned)    Acknowledged SERGIO ANTONIO            Significant Diagnostic Studies: No pertinent studies.    Pending Diagnostic Studies:       None          Final Active Diagnoses:    Diagnosis Date Noted POA    PRINCIPAL PROBLEM:  Osteoarthritis of right knee [M17.11] 10/27/2023 Yes      Problems Resolved During this Admission:      Discharged Condition: good    Disposition: Home or Self Care    Follow Up:    Patient Instructions:      Activity as tolerated     Sponge bath only until clinic visit     Keep surgical extremity elevated     Lifting restrictions   Order Comments: No strenuous exercise or lifting of > 10 lbs     Weight bearing as tolerated     No driving, operating heavy equipment or signing legal documents while taking pain medication     Leave dressing on - Keep it clean, dry, and intact until clinic visit   Order Comments: Do not remove surgical dressing for 2 weeks post-op. This will be done only by MD at initial post-op visit. If dressing is completely saturated, replace with identical dressing - silver-impregnated hydrocolloid dressing.     Do not get dressings wet. Do not shower.     If dressing continues to be saturated or there are signs of infection, please call Ortho Clinic  [phone] for further instructions and to make appt to be seen.     Call MD for:  temperature >100.4     Call MD for:  persistent nausea and vomiting     Call MD for:  severe uncontrolled pain     Call MD for:  difficulty breathing, headache or visual disturbances     Call MD for:  redness, tenderness, or signs of infection (pain, swelling, redness, odor or green/yellow discharge around incision site)     Call MD for:  hives     Call MD for:  persistent dizziness or light-headedness     Call MD for:  extreme fatigue     Medications:  Reconciled Home Medications:      Medication List        CHANGE how you take these medications      acetaminophen 650 MG Tbsr  Commonly known as: TYLENOL  Take 1 tablet (650 mg total) by mouth every 8 (eight) hours.  What changed: Another medication with the same name was removed. Continue taking this medication, and follow the directions you see here.     aspirin 81 MG EC tablet  Commonly known as: ECOTRIN  Take 1 tablet (81 mg total) by mouth 2 (two) times a day. After finishing this prescription, resume taking your daily aspirin and Plavix. for 7 days  What changed: Another medication with the same name was removed. Continue taking this medication, and follow the directions you see here.     pantoprazole 40 MG tablet  Commonly known as: PROTONIX  Take 1 tablet by mouth once daily  What changed: Another medication with the same name was removed. Continue taking this medication, and follow the directions you see here.            CONTINUE taking these medications      ascorbic acid (vitamin C) 500 MG tablet  Commonly known as: VITAMIN C  Take 2 tablets (1,000 mg total) by mouth 2 (two) times daily. for 14 days     atorvastatin 80 MG tablet  Commonly known as: LIPITOR  Take 1 tablet (80 mg total) by mouth once daily.     benzonatate 200 MG capsule  Commonly known as: TESSALON  Take 1 capsule (200 mg total) by mouth 3 (three) times daily as needed for Cough.     * blood sugar  diagnostic Strp  1 strip by Misc.(Non-Drug; Combo Route) route once daily.     * blood sugar diagnostic Strp  Dispense: True Metrix test strip, to check glucose 1 times a day, ICD-10: E11.65, compatible with insurance/glucometer     carvediloL 25 MG tablet  Commonly known as: COREG  TAKE 1 TABLET BY MOUTH TWICE DAILY WITH MEALS     cefadroxil 500 MG Cap  Commonly known as: DURICEF  Take 1 capsule (500 mg total) by mouth every 12 (twelve) hours. for 7 days     clopidogreL 75 mg tablet  Commonly known as: PLAVIX  Take 1 tablet by mouth once daily     ENSURE  Generic drug: food supplemt, lactose-reduced  Drink one bottle daily for 14 days.     FARXIGA 5 mg Tab tablet  Generic drug: dapagliflozin propanediol  Take 1 tablet (5 mg total) by mouth once daily.     fluticasone propionate 50 mcg/actuation nasal spray  Commonly known as: FLONASE  1 spray by Each Nostril route once daily.     glipiZIDE 10 MG Tr24  Commonly known as: GLUCOTROL  Take 1 tablet (10 mg total) by mouth 2 (two) times daily with meals.     RAMÓN (WITH COLLAGEN) 7-7-1.5 gram Pwpk  Generic drug: uukdy-kjfe-KsSES-collag-mv-min  Take 1 each by mouth once daily.     methocarbamoL 750 MG Tab  Commonly known as: ROBAXIN  Take 1 tablet (750 mg total) by mouth 4 (four) times daily as needed (for muscle spasms).     multivitamin per tablet  Commonly known as: THERAGRAN  Take 1 tablet by mouth once daily.     oxybutynin 5 MG Tr24  Commonly known as: DITROPAN-XL  Take 1 tablet (5 mg total) by mouth once daily.     oxyCODONE 5 MG immediate release tablet  Commonly known as: ROXICODONE  Take 1-2 tablets every 4-6 hours as needed for pain     senna-docusate 8.6-50 mg 8.6-50 mg per tablet  Commonly known as: SENNA WITH DOCUSATE SODIUM  Take 1 tablet by mouth once daily.           * This list has 2 medication(s) that are the same as other medications prescribed for you. Read the directions carefully, and ask your doctor or other care provider to review them with you.                 STOP taking these medications      valsartan 80 MG tablet  Commonly known as: BARRINGTON Carvajal MD  Orthopedics  [facility])

## 2024-08-16 NOTE — PROGRESS NOTES
[facility])  Orthopedics  Progress Note    Patient Name: Jani Cha  MRN: [id number]  Admission Date: 8/15/2024  Hospital Length of Stay: 0 days  Attending Provider: Sylvain Glaser III, MD  Primary Care Provider: Laila Bains MD  Follow-up For: Procedure(s) (LRB):  ARTHROPLASTY, KNEE, TOTAL (Right)    Post-Operative Day: 1 Day Post-Op  Subjective:     Principal Problem:Osteoarthritis of right knee    Principal Orthopedic Problem: S/p R TKA 8/15/24    Interval History: Patient was seen and examined at bedside s/p R DARNELL. NAEON. Hypertensive overnight.  He reports that pain is well controlled. Tolerating PO. Ambulated 25 feet x 2 with PT. Patient states that He is ready for discharge today.       Review of patient's allergies indicates:   Allergen Reactions    Penicillins Hives, Itching and Rash    Shellfish containing products        Current Facility-Administered Medications   Medication    0.9%  NaCl infusion    acetaminophen tablet 1,000 mg    aspirin EC tablet 81 mg    bisacodyL suppository 10 mg    carvediloL tablet 25 mg    ceFAZolin 2 g in 0.9% NaCl 50 mL IVPB (MB+)    dextrose 10% bolus 125 mL 125 mL    dextrose 10% bolus 250 mL 250 mL    [START ON 8/16/2024] famotidine tablet 20 mg    glucagon (human recombinant) injection 1 mg    glucose chewable tablet 16 g    glucose chewable tablet 24 g    insulin aspart U-100 pen 0-10 Units    methocarbamoL tablet 750 mg    morphine injection 2 mg    mupirocin 2 % ointment 1 g    naloxone 0.4 mg/mL injection 0.02 mg    ondansetron injection 4 mg    oxyCODONE immediate release tablet 5 mg    oxyCODONE immediate release tablet Tab 10 mg    [START ON 8/16/2024] polyethylene glycol packet 17 g    pregabalin capsule 75 mg    prochlorperazine injection Soln 5 mg    senna-docusate 8.6-50 mg per tablet 1 tablet     Objective:     Vital Signs (Most Recent):  Temp: 98 °F (36.7 °C) (08/15/24 1703)  Pulse: 69 (08/15/24 1703)  Resp: 16 (08/15/24 1703)  BP:  "(!) 156/87 (08/15/24 1703)  SpO2: 96 % (08/15/24 1703) Vital Signs (24h Range):  Temp:  [98 °F (36.7 °C)-98.3 °F (36.8 °C)] 98 °F (36.7 °C)  Pulse:  [61-69] 69  Resp:  [13-18] 16  SpO2:  [96 %-100 %] 96 %  BP: (112-164)/(69-87) 156/87     Weight: 115.2 kg (254 lb)  Height: 5' 9" (175.3 cm)  Body mass index is 37.51 kg/m².      Intake/Output Summary (Last 24 hours) at 8/15/2024 1826  Last data filed at 8/15/2024 1725  Gross per 24 hour   Intake 2000 ml   Output 325 ml   Net 1675 ml        Ortho/SPM Exam  Ortho/SPM Exam  AAOx4  NAD  Reg rate  No increased WOB     RLE:  Dressing c/d/i  SILT T/SP/DP/[place]/Sa  Motor intact T/SP/DP  WWP extremities  FCDs in place and functioning      Significant Labs: All pertinent labs within the past 24 hours have been reviewed.    Significant Imaging: I have reviewed and interpreted all pertinent imaging results/findings.  Assessment/Plan:     * Osteoarthritis of right knee  Assessment:   Jani Cha is a 79 y.o. male s/p R TKA on 8/15/24.    Plan:     - PT/OT prior to DC   - ASA 81 mg for 7 days then resume ASA + Plavix  - Multimodal pain control   - Regular diet  - Dressing to remain in place until clinic f/u   - WBAT   - Home health  - Cefadroxil x7 days    Dispo: Discharge to home today after PT            WENDY Carvajal MD  Orthopedics  [facility] ([facility])    "

## 2024-08-16 NOTE — NURSING
Patient discharged from unit. Pt A/Ox4, VSS, no s/s of distress noted. Pt denies pain at this time. Dressing to right knee remains C/D/I. Discharge instructions verbally given to pt and placed in DC folder; pt verbalized understanding. Home meds and F/U appointments reviewed as well. VS obtained; IV removed from right hand with catheter intact. No redness or swelling to area. Meds and equipment delivered to bedside prior to discharge. Pt left unit via w/c and was accompanied to front of hospital to meet ride. All personal belongings sent with pt. Blue bracelet applied to pt's wrist and instructions given to call number on bracelet with any surgery-related issues.

## 2024-08-17 PROCEDURE — G0180 MD CERTIFICATION HHA PATIENT: HCPCS | Mod: ,,, | Performed by: ORTHOPAEDIC SURGERY

## 2024-08-19 ENCOUNTER — OFFICE VISIT (OUTPATIENT)
Dept: ORTHOPEDICS | Facility: CLINIC | Age: 79
End: 2024-08-19
Payer: MEDICARE

## 2024-08-19 DIAGNOSIS — Z96.651 S/P TOTAL KNEE REPLACEMENT, RIGHT: Primary | ICD-10-CM

## 2024-08-19 PROCEDURE — 99024 POSTOP FOLLOW-UP VISIT: CPT | Mod: 95,POP,, | Performed by: NURSE PRACTITIONER

## 2024-08-19 NOTE — PROGRESS NOTES
Established Patient - Audio Only Telehealth Visit     The patient location is: his home in [place], [place]  The chief complaint leading to consultation is: s/p right knee replacement  Visit type: Virtual visit with audio only (telephone)  Total time spent with patient: 5 minutes       The reason for the audio only service rather than synchronous audio and video virtual visit was related to technical difficulties or patient preference/necessity.     Each patient to whom I provide medical services by telemedicine is:  (1) informed of the relationship between the physician and patient and the respective role of any other health care provider with respect to management of the patient; and (2) notified that they may decline to receive medical services by telemedicine and may withdraw from such care at any time. Patient verbally consented to receive this service via voice-only telephone call.    Pt had a right knee replacement by Dr. Glaser 8/15. He reports that he's doing well. His pain is well controlled. He says that pain medication doesn't really do much for him, but he's taking 2 prior to doing his exercises. Home health PT started on Saturday. He has not had a bowel movement yet, but he says that he doesn't feel constipated and he is passing air. He will take miralax or a suppository if needed. His dressing is intact and he has his blue wristband and will call with any needs                        This service was not originating from a related E/M service provided within the previous 7 days nor will  to an E/M service or procedure within the next 24 hours or my soonest available appointment.  Prevailing standard of care was able to be met in this audio-only visit.

## 2024-08-19 NOTE — ANESTHESIA POSTPROCEDURE EVALUATION
Anesthesia Post Evaluation    Patient: Jani Cha    Procedure(s) Performed: Procedure(s) (LRB):  ARTHROPLASTY, KNEE, TOTAL (Right)    Final Anesthesia Type: spinal      Patient location during evaluation: PACU  Patient participation: Yes- Able to Participate  Level of consciousness: awake and alert and oriented  Post-procedure vital signs: reviewed and stable  Pain management: adequate  Airway patency: patent    PONV status at discharge: No PONV  Anesthetic complications: no      Cardiovascular status: blood pressure returned to baseline and hemodynamically stable  Respiratory status: unassisted, room air and spontaneous ventilation  Hydration status: euvolemic  Follow-up not needed.              Vitals Value Taken Time   /75 08/16/24 1134   Temp 36.6 °C (97.8 °F) 08/16/24 1134   Pulse 61 08/16/24 1134   Resp 17 08/16/24 1134   SpO2 98 % 08/16/24 1134         Event Time   Out of Recovery 17:00:00         Pain/Good Score: No data recorded

## 2024-08-29 NOTE — PLAN OF CARE
08/15/24 1803   GIL Message   Medicare Outpatient and Observation Notification regarding financial responsibility Given to patient/caregiver;Explained to patient/caregiver;Signed/date by patient/caregiver   Date GIL was signed 08/15/24   Time GIL was signed 1803     Per Jose livingston

## 2024-08-29 NOTE — PLAN OF CARE
[facility])  Discharge Final Note    Primary Care Provider: Laila Bains MD    Expected Discharge Date: 8/16/2024    Final Discharge Note (most recent)       Final Note - 08/16/24 1205          Final Note    Assessment Type Final Discharge Note     Anticipated Discharge Disposition Home-Health Care [facility] Resources/Appts/Education Provided Provided patient/caregiver with written discharge plan information;Provided education on problems/symptoms using teachback                   Future Appointments   Date Time Provider Department Center   8/30/2024 10:00 AM Thao Sahu NP [facility] ORTHO [address] [facility]   9/11/2024 11:00 AM Yunior Holder, PT [facility] OP RHB [facility]   9/17/2024  3:00 PM Laila Bains MD [facility] FAM MED [place]   9/24/2024  2:15 PM INJECTION [facility] AMB INF [facility]   9/26/2024  3:45 PM [facility] [id number] 485 LB LIMIT [facility] XRAYORT [facility]   9/26/2024  4:00 PM Sylvain Glaser III, MD [facility] ORTHO [facility]   10/2/2024 11:00 AM Nancy Booth MD [facility] NEPHRO [facility] Cli   10/31/2024  9:15 AM Karina Vicente DPM [facility] POD [place]   11/5/2024  3:45 PM Sylvain Glaser III, MD [facility] ORTHO [facility]   12/11/2024 10:00 AM LAB, [facility] LAB [place]   12/18/2024  1:00 PM Malcolm Kent MD [facility] ENDOCRN [facility] Cli   12/27/2024  1:15 PM Jeanmarie Hanson MD [facility] URO [facility] Cli

## 2024-08-30 ENCOUNTER — OFFICE VISIT (OUTPATIENT)
Dept: ORTHOPEDICS | Facility: CLINIC | Age: 79
End: 2024-08-30
Payer: MEDICARE

## 2024-08-30 VITALS — WEIGHT: 253.5 LBS | HEIGHT: 69 IN | BODY MASS INDEX: 37.55 KG/M2

## 2024-08-30 DIAGNOSIS — Z96.651 S/P TOTAL KNEE REPLACEMENT, RIGHT: Primary | ICD-10-CM

## 2024-08-30 PROCEDURE — 99999 PR PBB SHADOW E&M-EST. PATIENT-LVL III: CPT | Mod: PBBFAC,,, | Performed by: NURSE PRACTITIONER

## 2024-08-30 PROCEDURE — 99213 OFFICE O/P EST LOW 20 MIN: CPT | Mod: PBBFAC | Performed by: NURSE PRACTITIONER

## 2024-08-30 RX ORDER — DAPAGLIFLOZIN 5 MG/1
1 TABLET, FILM COATED ORAL DAILY
COMMUNITY
Start: 2024-08-06

## 2024-08-30 NOTE — PROGRESS NOTES
"Jani Cha presents for initial post-operative visit following a right total knee arthroplasty performed by Dr. Glaser on 8/15/2024.      Exam:   Height 5' 9" (1.753 m), weight 115 kg (253 lb 8.5 oz).   Ambulating well with assistive device.  Incision is clean and dry without drainage or erythema.   ROM:0-90    Initial post-operative radiographs reviewed today revealing a well fixed and aligned prosthesis.    A/P:  2 weeks s/p right total knee replacement    - The patient was advised to keep the incision clean and dry for the next 24 hours after which he may wash the area with antibacterial soap in the shower. Will not submerge incision until one month post op.  - Outpatient PT: set to start at [facility] 9/11  - Continue aspirin for 1 month post op.  - Pain medication refill not needed  - Reviewed antibiotic prophylaxis   - Follow up in 4 weeks with surgeon. Pt will call clinic with problems/concerns.      "

## 2024-08-31 ENCOUNTER — EXTERNAL CHRONIC CARE MANAGEMENT (OUTPATIENT)
Dept: PRIMARY CARE CLINIC | Facility: CLINIC | Age: 79
End: 2024-08-31
Payer: MEDICARE

## 2024-08-31 PROCEDURE — 99490 CHRNC CARE MGMT STAFF 1ST 20: CPT | Mod: PBBFAC,PO | Performed by: FAMILY MEDICINE

## 2024-08-31 PROCEDURE — 99490 CHRNC CARE MGMT STAFF 1ST 20: CPT | Mod: S$PBB,,, | Performed by: FAMILY MEDICINE

## 2024-09-04 ENCOUNTER — TELEPHONE (OUTPATIENT)
Dept: NEPHROLOGY | Facility: CLINIC | Age: 79
End: 2024-09-04
Payer: MEDICARE

## 2024-09-04 NOTE — TELEPHONE ENCOUNTER
Patient was called in reference of rescheduled visit with Dr. Booth. Offered a sooner availability; however, patient can not have appointments early in the morning. I informed patient that I have placed him on the waitlist, and will keep him in mind.

## 2024-09-13 ENCOUNTER — EXTERNAL HOME HEALTH (OUTPATIENT)
Dept: HOME HEALTH SERVICES | Facility: HOSPITAL | Age: 79
End: 2024-09-13
Payer: MEDICARE

## 2024-09-13 RX ORDER — PANTOPRAZOLE SODIUM 40 MG/1
TABLET, DELAYED RELEASE ORAL
Qty: 90 TABLET | Refills: 1 | Status: SHIPPED | OUTPATIENT
Start: 2024-09-13

## 2024-09-13 NOTE — TELEPHONE ENCOUNTER
No care due was identified.  [facility] Embedded Care Due Messages. Reference number: [id number].   9/13/2024 11:24:57 AM CDT

## 2024-09-14 NOTE — TELEPHONE ENCOUNTER
Refill Decision Note   Jani Cha  is requesting a refill authorization.  Brief Assessment and Rationale for Refill:  Approve     Medication Therapy Plan:         Comments:     Note composed:10:57 PM 09/13/2024

## 2024-09-16 RX ORDER — ATORVASTATIN CALCIUM 80 MG/1
80 TABLET, FILM COATED ORAL DAILY
Qty: 90 TABLET | Refills: 3 | Status: SHIPPED | OUTPATIENT
Start: 2024-09-16

## 2024-09-17 ENCOUNTER — OFFICE VISIT (OUTPATIENT)
Dept: FAMILY MEDICINE | Facility: CLINIC | Age: 79
End: 2024-09-17
Payer: MEDICARE

## 2024-09-17 VITALS
TEMPERATURE: 98 F | WEIGHT: 241.19 LBS | DIASTOLIC BLOOD PRESSURE: 72 MMHG | OXYGEN SATURATION: 96 % | BODY MASS INDEX: 35.72 KG/M2 | HEART RATE: 73 BPM | SYSTOLIC BLOOD PRESSURE: 110 MMHG | HEIGHT: 69 IN

## 2024-09-17 DIAGNOSIS — I50.9 CONGESTIVE HEART FAILURE, UNSPECIFIED HF CHRONICITY, UNSPECIFIED HEART FAILURE TYPE: ICD-10-CM

## 2024-09-17 DIAGNOSIS — K21.9 GASTROESOPHAGEAL REFLUX DISEASE, UNSPECIFIED WHETHER ESOPHAGITIS PRESENT: ICD-10-CM

## 2024-09-17 DIAGNOSIS — Z99.89 WALKER AS AMBULATION AID: ICD-10-CM

## 2024-09-17 DIAGNOSIS — I71.40 ABDOMINAL AORTIC ANEURYSM (AAA) WITHOUT RUPTURE, UNSPECIFIED PART: ICD-10-CM

## 2024-09-17 DIAGNOSIS — E11.40 TYPE 2 DIABETES MELLITUS WITH DIABETIC NEUROPATHY, WITHOUT LONG-TERM CURRENT USE OF INSULIN: Primary | ICD-10-CM

## 2024-09-17 DIAGNOSIS — I25.10 CORONARY ARTERY DISEASE INVOLVING NATIVE CORONARY ARTERY OF NATIVE HEART WITHOUT ANGINA PECTORIS: Chronic | ICD-10-CM

## 2024-09-17 DIAGNOSIS — E27.8 ADRENAL INCIDENTALOMA: ICD-10-CM

## 2024-09-17 DIAGNOSIS — E78.00 PURE HYPERCHOLESTEROLEMIA: Chronic | ICD-10-CM

## 2024-09-17 PROCEDURE — 99214 OFFICE O/P EST MOD 30 MIN: CPT | Mod: PBBFAC,PO | Performed by: FAMILY MEDICINE

## 2024-09-17 PROCEDURE — 99999 PR PBB SHADOW E&M-EST. PATIENT-LVL IV: CPT | Mod: PBBFAC,,, | Performed by: FAMILY MEDICINE

## 2024-09-17 PROCEDURE — G2211 COMPLEX E/M VISIT ADD ON: HCPCS | Mod: S$PBB,,, | Performed by: FAMILY MEDICINE

## 2024-09-17 PROCEDURE — 99214 OFFICE O/P EST MOD 30 MIN: CPT | Mod: S$PBB,,, | Performed by: FAMILY MEDICINE

## 2024-09-17 RX ORDER — LANCETS
1 EACH MISCELLANEOUS DAILY
Qty: 100 EACH | Refills: 11 | Status: SHIPPED | OUTPATIENT
Start: 2024-09-17

## 2024-09-18 ENCOUNTER — PATIENT MESSAGE (OUTPATIENT)
Dept: ORTHOPEDICS | Facility: CLINIC | Age: 79
End: 2024-09-18
Payer: MEDICARE

## 2024-09-18 DIAGNOSIS — Z96.651 S/P TOTAL KNEE REPLACEMENT, RIGHT: Primary | ICD-10-CM
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
## 2024-09-19 PROBLEM — Z99.89 WALKER AS AMBULATION AID: Status: ACTIVE | Noted: 2024-09-19

## 2024-09-19 PROBLEM — I50.9 CONGESTIVE HEART FAILURE: Status: ACTIVE | Noted: 2024-09-19

## 2024-09-23 DIAGNOSIS — E11.40 TYPE 2 DIABETES MELLITUS WITH DIABETIC NEUROPATHY, WITHOUT LONG-TERM CURRENT USE OF INSULIN: ICD-10-CM

## 2024-09-23 DIAGNOSIS — E11.65 TYPE 2 DIABETES MELLITUS WITH HYPERGLYCEMIA, WITHOUT LONG-TERM CURRENT USE OF INSULIN: ICD-10-CM

## 2024-09-23 RX ORDER — DAPAGLIFLOZIN 5 MG/1
5 TABLET, FILM COATED ORAL DAILY
Qty: 30 TABLET | Refills: 6 | Status: SHIPPED | OUTPATIENT
Start: 2024-09-23 | End: 2024-09-24

## 2024-09-23 NOTE — TELEPHONE ENCOUNTER
----- Message from Bridgette Bustos sent at 9/23/2024 12:01 PM CDT -----  .Type: RX Refill Request    Who Called: Self     Have you contacted your pharmacy: No     Refill or New Rx: Refill     RX Name and Strength:FARXIGA 5 mg Tab tablet    Preferred Pharmacy with phone number:.  [facility] [address] [place], [place] [address] [facility] [address] [facility]  [address] [facility] [place] [address]  Phone: [phone] Fax: [phone]    Local or Mail Order: Local     Ordering Provider: Malcolm Kent     Would the patient rather a call back or a response via [facility]? Call Back     Best Call Back Number: .[phone] (home)       Additional Information:

## 2024-09-24 ENCOUNTER — TELEPHONE (OUTPATIENT)
Dept: ENDOCRINOLOGY | Facility: CLINIC | Age: 79
End: 2024-09-24
Payer: MEDICARE

## 2024-09-24 DIAGNOSIS — E11.65 TYPE 2 DIABETES MELLITUS WITH HYPERGLYCEMIA, WITHOUT LONG-TERM CURRENT USE OF INSULIN: ICD-10-CM

## 2024-09-24 DIAGNOSIS — E11.40 TYPE 2 DIABETES MELLITUS WITH DIABETIC NEUROPATHY, WITHOUT LONG-TERM CURRENT USE OF INSULIN: ICD-10-CM

## 2024-09-24 RX ORDER — DAPAGLIFLOZIN 5 MG/1
5 TABLET, FILM COATED ORAL DAILY
Qty: 90 TABLET | Refills: 3 | Status: SHIPPED | OUTPATIENT
Start: 2024-09-24

## 2024-09-24 NOTE — TELEPHONE ENCOUNTER
----- Message from Patel Olivera MA sent at 9/24/2024  9:41 AM CDT -----  Type: Patient Call Back    Who called: Self    What is the request in detail: pt. States he asked he wanted a 90 day refill of this medication and a 30 day was sent to the pharmacy.. he's asking for a call please ..     FARXIGA 5 mg Tab tablet    Can the clinic reply by [facility]?NO    Would the patient rather a call back or a response via [facility]? Yes, call     Best call back number: [phone]

## 2024-09-26 ENCOUNTER — HOSPITAL ENCOUNTER (OUTPATIENT)
Dept: RADIOLOGY | Facility: HOSPITAL | Age: 79
Discharge: HOME | End: 2024-09-26
Payer: MEDICARE

## 2024-09-26 ENCOUNTER — OFFICE VISIT (OUTPATIENT)
Dept: ORTHOPEDICS | Facility: CLINIC | Age: 79
End: 2024-09-26
Payer: MEDICARE

## 2024-09-26 DIAGNOSIS — Z96.651 S/P TOTAL KNEE REPLACEMENT, RIGHT: Primary | ICD-10-CM

## 2024-09-26 DIAGNOSIS — Z96.651 S/P TOTAL KNEE REPLACEMENT, RIGHT: ICD-10-CM

## 2024-09-26 PROCEDURE — 99024 POSTOP FOLLOW-UP VISIT: CPT | Mod: POP,,,

## 2024-09-26 PROCEDURE — 73562 X-RAY EXAM OF KNEE 3: CPT | Mod: 26,RT,, | Performed by: RADIOLOGY

## 2024-09-26 PROCEDURE — 99213 OFFICE O/P EST LOW 20 MIN: CPT | Mod: PBBFAC,25

## 2024-09-26 PROCEDURE — 73562 X-RAY EXAM OF KNEE 3: CPT | Mod: TC,RT

## 2024-09-26 PROCEDURE — 99999 PR PBB SHADOW E&M-EST. PATIENT-LVL III: CPT | Mod: PBBFAC,,,
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
## 2024-09-27 ENCOUNTER — CLINICAL SUPPORT (OUTPATIENT)
Dept: REHABILITATION | Facility: HOSPITAL | Age: 79
End: 2024-09-27
Attending: ORTHOPAEDIC SURGERY
Payer: MEDICARE

## 2024-09-27 DIAGNOSIS — M25.661 IMPAIRED RANGE OF MOTION OF RIGHT KNEE: Primary | ICD-10-CM

## 2024-09-27 DIAGNOSIS — M62.81 QUADRICEPS WEAKNESS: ICD-10-CM

## 2024-09-27 PROCEDURE — 97110 THERAPEUTIC EXERCISES: CPT | Mod: KX,PN

## 2024-09-27 PROCEDURE — 97140 MANUAL THERAPY 1/> REGIONS: CPT | Mod: KX,PN

## 2024-09-27 PROCEDURE — 97161 PT EVAL LOW COMPLEX 20 MIN: CPT | Mod: KX,PN
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
## 2024-09-30 ENCOUNTER — EXTERNAL CHRONIC CARE MANAGEMENT (OUTPATIENT)
Dept: PRIMARY CARE CLINIC | Facility: CLINIC | Age: 79
End: 2024-09-30
Payer: MEDICARE

## 2024-09-30 PROCEDURE — 99490 CHRNC CARE MGMT STAFF 1ST 20: CPT | Mod: PBBFAC,PO | Performed by: FAMILY MEDICINE

## 2024-09-30 PROCEDURE — 99490 CHRNC CARE MGMT STAFF 1ST 20: CPT | Mod: S$PBB,,, | Performed by: FAMILY MEDICINE

## 2024-10-02 ENCOUNTER — OFFICE VISIT (OUTPATIENT)
Dept: NEPHROLOGY | Facility: CLINIC | Age: 79
End: 2024-10-02
Payer: MEDICARE

## 2024-10-02 ENCOUNTER — CLINICAL SUPPORT (OUTPATIENT)
Dept: REHABILITATION | Facility: HOSPITAL | Age: 79
End: 2024-10-02
Payer: MEDICARE

## 2024-10-02 VITALS
DIASTOLIC BLOOD PRESSURE: 74 MMHG | HEIGHT: 69 IN | HEART RATE: 81 BPM | OXYGEN SATURATION: 96 % | SYSTOLIC BLOOD PRESSURE: 124 MMHG | RESPIRATION RATE: 18 BRPM | WEIGHT: 240.88 LBS | BODY MASS INDEX: 35.68 KG/M2

## 2024-10-02 DIAGNOSIS — I10 PRIMARY HYPERTENSION: ICD-10-CM

## 2024-10-02 DIAGNOSIS — N18.31 CHRONIC KIDNEY DISEASE, STAGE 3A: Primary | ICD-10-CM

## 2024-10-02 DIAGNOSIS — N28.1 RENAL CYST: ICD-10-CM

## 2024-10-02 DIAGNOSIS — M25.661 DECREASED RANGE OF MOTION (ROM) OF RIGHT KNEE: Primary | ICD-10-CM

## 2024-10-02 DIAGNOSIS — M10.9 GOUT, UNSPECIFIED CAUSE, UNSPECIFIED CHRONICITY, UNSPECIFIED SITE: ICD-10-CM

## 2024-10-02 DIAGNOSIS — R80.1 PERSISTENT PROTEINURIA: ICD-10-CM

## 2024-10-02 DIAGNOSIS — M62.81 WEAKNESS OF RIGHT QUADRICEPS MUSCLE: ICD-10-CM

## 2024-10-02 DIAGNOSIS — N25.81 SECONDARY RENAL HYPERPARATHYROIDISM: ICD-10-CM

## 2024-10-02 PROCEDURE — 99215 OFFICE O/P EST HI 40 MIN: CPT | Mod: PBBFAC | Performed by: INTERNAL MEDICINE

## 2024-10-02 PROCEDURE — 99999 PR PBB SHADOW E&M-EST. PATIENT-LVL V: CPT | Mod: PBBFAC,,, | Performed by: INTERNAL MEDICINE

## 2024-10-02 PROCEDURE — 97530 THERAPEUTIC ACTIVITIES: CPT | Mod: CK,PN

## 2024-10-02 PROCEDURE — 97110 THERAPEUTIC EXERCISES: CPT | Mod: KX,PN

## 2024-10-02 NOTE — PROGRESS NOTES
[facility]  [address] [facility], [address]  [place]  [phone]    PCP: Laila Bains MD  Endocrinologist: Yoli  Urologist: Valentin  Vascular: [place]      HPI: Mr. Jani Cha is a 79 y.o. male with HTN, T2DM, OA, CAD s/p CABG, and AAA who is here for established patient evaluation of Chronic Kidney Disease  Initial visit 4/25/24. His baseline Cr has been 1.5-1.7 with GFR 41-48% since 3/2019. His Cr briana to 2.0 on 3/7/24 and 2.1 on 3/28/24 which prompted this referral. Cr has since improved back to baseline. Urine studies with proteinuria since at least 2008; UPCR negative today.   He reports he was diagnosed with T2DM in 1960. He was diagnosed with HTN prior to DM. No h/o hospitalizations for DKA or hypertensive emergency. He reports prior kidney stones; last episode > 10-12 years ago. He also has a h/o gout. He denies NSAID use. No family h/o kidney disease.   In regards to his elevated Cr in March; he reports undergoing radiofrequency ablation for knee arthritis that month; as well as receiving steroids.   He notes occasional BLE swelling.       Interval Hx:   LV 4/25/24: no medication changes.  Today he reports to be doing okay. Valsartan was discontinued 8/15/24 during hospitalization for knee surgery. BP controlled at home. Not interested in restarting valsartan at this time. Blood glucose 218 on our labs; he reports glucose 100 this AM. He is currently on Farxiga 5mg daily; it cost ~$170 for 3 months. We discussed increasing this to the renal dose; hopefully the cost will be the same.  No recent gout flares. Avoids shellfish.       ROS:  Complete ROS otherwise negative except as indicated above.         Current Outpatient Medications:     acetaminophen (TYLENOL) 650 MG TbSR, Take 1 tablet (650 mg total) by mouth every 8 (eight) hours., Disp: 120 tablet, Rfl: 0    aspirin (ECOTRIN) 81 MG EC tablet, Take 1 tablet (81 mg total) by mouth 2 (two) times a day. After finishing this prescription,  resume taking your daily aspirin and Plavix. for 7 days, Disp: 14 tablet, Rfl: 0    atorvastatin (LIPITOR) 80 MG tablet, Take 1 tablet (80 mg total) by mouth once daily., Disp: 90 tablet, Rfl: 3    benzonatate (TESSALON) 200 MG capsule, Take 1 capsule (200 mg total) by mouth 3 (three) times daily as needed for Cough., Disp: 45 capsule, Rfl: 1    blood sugar diagnostic Strp, 1 strip by Misc.(Non-Drug; Combo Route) route once daily., Disp: 200 strip, Rfl: 11    blood sugar diagnostic Strp, Dispense: True Metrix test strip, to check glucose 1 times a day, ICD-10: E11.65, compatible with insurance/glucometer, Disp: 100 each, Rfl: 5    carvediloL (COREG) 25 MG tablet, TAKE 1 TABLET BY MOUTH TWICE DAILY WITH MEALS, Disp: 180 tablet, Rfl: 3    clopidogreL (PLAVIX) 75 mg tablet, Take 1 tablet by mouth once daily, Disp: 90 tablet, Rfl: 3    FARXIGA 5 mg Tab tablet, Take 1 tablet (5 mg total) by mouth once daily., Disp: 90 tablet, Rfl: 3    fluticasone propionate (FLONASE) 50 mcg/actuation nasal spray, 1 spray by Each Nostril route once daily., Disp: , Rfl:     glipiZIDE (GLUCOTROL) 10 MG TR24, Take 1 tablet (10 mg total) by mouth 2 (two) times daily with meals., Disp: 180 tablet, Rfl: 3    lancets Misc, 1 lancet  by Misc.(Non-Drug; Combo Route) route Daily., Disp: 100 each, Rfl: 11    oxybutynin (DITROPAN-XL) 5 MG TR24, Take 1 tablet (5 mg total) by mouth once daily., Disp: 90 tablet, Rfl: 3    pantoprazole (PROTONIX) 40 MG tablet, Take 1 tablet by mouth once daily, Disp: 90 tablet, Rfl: 1    ecgnr-ntzh-DrHEV-collag-mv-min (RAMÓN, WITH COLLAGEN,) 7-7-1.5 gram PwPk, Take 1 each by mouth once daily., Disp: 14 packet, Rfl: 0    food supplemt, lactose-reduced (ENSURE) Liqd, Drink one bottle daily for 14 days. (Patient not taking: Reported on 10/2/2024), Disp: 3318 mL, Rfl: 0    multivitamin (THERAGRAN) per tablet, Take 1 tablet by mouth once daily. (Patient not taking: Reported on 10/2/2024), Disp: 14 tablet, Rfl: 0    oxyCODONE  "(ROXICODONE) 5 MG immediate release tablet, Take 1-2 tablets every 4-6 hours as needed for pain (Patient not taking: Reported on 10/2/2024), Disp: 50 tablet, Rfl: 0    senna-docusate 8.6-50 mg (SENNA WITH DOCUSATE SODIUM) 8.6-50 mg per tablet, Take 1 tablet by mouth once daily. (Patient not taking: Reported on 10/2/2024), Disp: 30 tablet, Rfl: 0      Vitals: Blood pressure 124/74, pulse 81, resp. rate 18, height 5' 9" (1.753 m), weight 109.2 kg (240 lb 13.6 oz), SpO2 96%. Body mass index is 35.57 kg/m².    Physical Exam  Vitals reviewed.   Constitutional:       General: He is awake. He is not in acute distress.     Appearance: Normal appearance. He is well-developed.   HENT:      Head: Normocephalic and atraumatic.      Nose: Nose normal.      Mouth/Throat:      Mouth: Mucous membranes are moist.   Eyes:      Extraocular Movements: Extraocular movements intact.      Conjunctiva/sclera: Conjunctivae normal.   Pulmonary:      Effort: Pulmonary effort is normal.   Musculoskeletal:         General: No tenderness or signs of injury.      Right lower leg: No edema.      Left lower leg: No edema.   Skin:     General: Skin is warm and dry.      Findings: No erythema or rash.   Neurological:      General: No focal deficit present.      Mental Status: He is alert. Mental status is at baseline.   Psychiatric:         Mood and Affect: Affect is flat.         Behavior: Behavior normal.         Labs/Imaging:  Sodium   Date Value Ref Range Status   08/14/2024 141 136 - 145 mmol/L Final   07/31/2024 143 136 - 145 mmol/L Final   07/27/2024 141 136 - 145 mmol/L Final     Potassium   Date Value Ref Range Status   08/14/2024 4.0 3.5 - 5.1 mmol/L Final   07/31/2024 4.2 3.5 - 5.1 mmol/L Final   07/27/2024 4.4 3.5 - 5.1 mmol/L Final     Chloride   Date Value Ref Range Status   08/14/2024 108 95 - 110 mmol/L Final   07/31/2024 108 95 - 110 mmol/L Final   07/27/2024 106 95 - 110 mmol/L Final     CO2   Date Value Ref Range Status "   08/14/2024 22 (L) 23 - 29 mmol/L Final   07/31/2024 28 23 - 29 mmol/L Final   07/27/2024 27 23 - 29 mmol/L Final     BUN   Date Value Ref Range Status   08/14/2024 34 (H) 8 - 23 mg/dL Final   07/31/2024 32 (H) 8 - 23 mg/dL Final   07/27/2024 31 (H) 8 - 23 mg/dL Final     Creatinine   Date Value Ref Range Status   08/14/2024 1.7 (H) 0.5 - 1.4 mg/dL Final   07/31/2024 1.7 (H) 0.5 - 1.4 mg/dL Final   07/27/2024 1.6 (H) 0.5 - 1.4 mg/dL Final     eGFR   Date Value Ref Range Status   08/14/2024 40.8 (A) >60 mL/min/1.73 m^2 Final   07/31/2024 40.8 (A) >60 mL/min/1.73 m^2 Final   07/27/2024 44 (A) >60 mL/min/1.73 m^2 Final     Calcium   Date Value Ref Range Status   08/14/2024 9.8 8.7 - 10.5 mg/dL Final   07/31/2024 9.6 8.7 - 10.5 mg/dL Final   07/27/2024 9.7 8.7 - 10.5 mg/dL Final     Phosphorus   Date Value Ref Range Status   08/14/2024 2.9 2.7 - 4.5 mg/dL Final   04/23/2024 2.7 2.7 - 4.5 mg/dL Final   12/22/2023 2.8 2.7 - 4.5 mg/dL Final     Albumin   Date Value Ref Range Status   08/14/2024 3.3 (L) 3.5 - 5.2 g/dL Final   07/31/2024 3.3 (L) 3.5 - 5.2 g/dL Final   04/23/2024 3.2 (L) 3.5 - 5.2 g/dL Final       PTH, Intact   Date Value Ref Range Status   08/14/2024 78.7 (H) 9.0 - 77.0 pg/mL Final   04/23/2024 102.9 (H) 9.0 - 77.0 pg/mL Final   03/10/2023 77.3 (H) 9.0 - 77.0 pg/mL Final     Vit D, 25-Hydroxy   Date Value Ref Range Status   03/28/2024 35 30 - 96 ng/mL Final     Comment:     Vitamin D deficiency.........<10 ng/mL                              Vitamin D insufficiency......10-29 ng/mL       Vitamin D sufficiency........> or equal to 30 ng/mL  Vitamin D toxicity............>100 ng/mL       Uric Acid   Date Value Ref Range Status   08/14/2024 7.1 (H) 3.4 - 7.0 mg/dL Final   04/23/2024 6.2 3.4 - 7.0 mg/dL Final   12/06/2021 7.6 (H) 3.4 - 7.0 mg/dL Final       Hemoglobin   Date Value Ref Range Status   08/14/2024 14.6 14.0 - 18.0 g/dL Final   07/31/2024 13.7 (L) 14.0 - 18.0 g/dL Final   04/23/2024 14.5 14.0 - 18.0  g/dL Final       Prot/Creat Ratio, Urine   Date Value Ref Range Status   08/14/2024 0.18 0.00 - 0.20 Final   04/23/2024 0.15 0.00 - 0.20 Final           Renal US: 6/21/24:   The right kidney is normal in length measuring 11.5 cm. The left kidney is normal in length measuring 11.5 cm. Segmental arterial resistive indices are within normal limits. Bilateral renal cysts noted; right upper pole cyst measures 2.0 cm, lower pole cyst measures 1.6 cm, adjacent cyst measures 2.2 cm, left upper pole cyst measures 1.5 cm, lower pole cyst 1.1 cm, adjacent cyst measures 1.3 cm.  Three hyperechoic foci in the left kidney measuring 4 mm, suggestive of calcifications.  No hydronephrosis or renal masses identified.  The bladder is grossly unremarkable.          10/22/22:   Right kidney: The right kidney measures 11 cm. There is mild cortical thinning. No loss of corticomedullary distinction. Resistive index measures 0.77.  No mass. No renal stone. No hydronephrosis.     Left kidney: The left kidney measures 11.5 cm. There is mild cortical thinning. No loss of corticomedullary distinction. Resistive index measures 0.77.  No solid mass.  There is a simple cyst measuring 8 x 8 x 8 mm.  No renal stone. No hydronephrosis.      Impression/Plan:      Chronic kidney disease, stage 3a  - baseline Cr 1.5-1.7 with GFR 41-48% since 2019 with h/o proteinuria; likely due to diabetic nephropathy but cannot rule out contribution from cardiovascular disease/arterionephrosclerosis.   - Cr 1.5 --> 1.7 today; stable and at baseline  - UPCR 0.18; remains negative despite discontinuation of valsartan 80mg daily. Currently on Farxiga 5mg daily per Endo; will see if dose can be increased to 10mg (renal protective dose)  - discussed importance of improved diabetic control, good water intake, and avoidance of salt     Persistent proteinuria  - intermittently present since at least 2008; likely due to diabetic nephropathy  - UPCR 0.15 --> 0.18 today;  negative  - no longer on RAAS blockade  - recommend increasing Farxiga to 10mg daily     Secondary renal hyperparathyroidism  -  --> 79; controlled. CCa and phos normal. Will trend.      Primary hypertension  - controlled on current regimen  - low threshold to restart valsartan dose if additional BP control becomes needed    Renal cyst  - being followed by Urology      Gout  - no recent flares; controlled with diet  - uric acid 7.7  - will trend        Visit today included increased complexity associated with the care of the episodic problem HTN addressed and managing the longitudinal care of the patient due to the serious and/or complex managed problem(s) CKD.      Treatment options and plan were discussed with the patient and/or caregiver.   RTC 3 months.       Nancy Booth MD  [facility]  [phone]

## 2024-10-02 NOTE — PROGRESS NOTES
Physical Therapy Daily Treatment Note     Name: Jani PAIZ [facility] [address] Number: [id number]    Therapy Diagnosis:   Encounter Diagnoses   Name Primary?    Decreased range of motion (ROM) of right knee Yes    Weakness of right quadriceps muscle      Physician: Sylvain Glaser III, *    Visit Date: 10/2/2024    Physician Orders: PT Eval and Treat   Medical Diagnosis from Referral: M17.11 (ICD-10-CM) - Primary osteoarthritis of right knee   Evaluation Date: 9/27/2024  Authorization Period Expiration: 12/31/24  Plan of Care Expiration: 12/30/24  Progress Note Due: 10/27/24  Visit # / Visits authorized: 1/ 20     Time In: 1220  Time Out: 1315    Total Billable Time: 38 minutes    Precautions: Standard    Subjective     Pt reports: the knee is improving.  He was compliant with home exercise program.  Response to previous treatment: none  Functional change: increase in walking    Pain: 3/10  Location: right knee      Objective     Jani received the treatments listed below:       therapeutic exercises to develop strength, endurance, ROM, and flexibility for 30 minutes including:    Heel prop 2 min x 2  LAQ 3 x `10 2lbs  Quad set 2 x 10  Towel stretch 20 sec x 4  SLR 2 x 10  Heel raises 3 x 10  TKE ball vs wall x 30  Gastroc stretch on wedge 30 sec x 4    Pt received therapeutic activities to improve functional performance for 12  minutes, including:  Nu step x 8 min   6 in step up 3 x 10     manual therapy techniques:  were applied to the:  right knee for 8 minutes, including:  Patellar mobilization, passive ext    Education provided:   - HEP compliance    Written Home Exercises Provided: Patient instructed to cont prior HEP.  Exercises were reviewed and Jani was able to demonstrate them prior to the end of the session.  Jani demonstrated good  understanding of the education provided.     See EMR under Patient Instructions for exercises provided 10/2/2024.    Assessment     Pt presents ambulating with a continued  antalgic gait, decreased stance on right LE, folded FWW for unilateral AD.  Progressed ROM emphasis with good performance. Continues to lack TKE, lacking 4 deg extension.  Good response to exercise progression per protocol.   Jani is progressing well towards his goals.   Pt prognosis is Good.     Pt will continue to benefit from skilled outpatient physical therapy to address the deficits listed in the problem list box on initial evaluation, provide pt/family education and to maximize pt's level of independence in the home and community environment.     Pt's spiritual, cultural and educational needs considered and pt agreeable to plan of care and goals.     Anticipated barriers to physical therapy: none    Short Term Goals (4 Weeks):     1.Pt to increase strength by a 1/2 grade of muscles test to allow for improvement in functional activities such as performing chores.  2.Pt to improve range of motion by 25% to allow for improved functional mobility to allow for improvement in IADLs.   3.Pt to report compliance with HEP and demonstrate proper exercise technique to PT to show competence with self management of condition.  4.Decrease pain by 25% during functional activities.    Long Term Goals (12 Weeks):     1. Increase ROM to allow improved joint biomechanics during functional activities.   2.Increase trunk and lower extremity strength to within normal limits during functional activities.   3. Independent with home exercise program.   4. Full return to functional activities with manageable complaints.  5. Patient to demonstrate improved posture and body mechanics.  6. Decrease pain by 75% during functional activities.    Plan       Continue with established  PT Plan of Care towards patient goals.      Petar Saul, PT

## 2024-10-07 ENCOUNTER — CLINICAL SUPPORT (OUTPATIENT)
Dept: REHABILITATION | Facility: HOSPITAL | Age: 79
End: 2024-10-07
Payer: MEDICARE

## 2024-10-07 DIAGNOSIS — M25.661 DECREASED RANGE OF MOTION (ROM) OF RIGHT KNEE: Primary | ICD-10-CM

## 2024-10-07 DIAGNOSIS — M62.81 WEAKNESS OF RIGHT QUADRICEPS MUSCLE: ICD-10-CM

## 2024-10-07 PROCEDURE — 97530 THERAPEUTIC ACTIVITIES: CPT | Mod: KX,PN

## 2024-10-07 PROCEDURE — 97140 MANUAL THERAPY 1/> REGIONS: CPT | Mod: KX,PN

## 2024-10-07 PROCEDURE — 97110 THERAPEUTIC EXERCISES: CPT | Mod: KX,PN

## 2024-10-07 NOTE — PROGRESS NOTES
Physical Therapy Daily Treatment Note     Name: Jani PAIZ [facility] Number: [id number]    Therapy Diagnosis:   Encounter Diagnoses   Name Primary?    Decreased range of motion (ROM) of right knee Yes    Weakness of right quadriceps muscle      Physician: Sylvain Glaser III, *    Visit Date: 10/7/2024    Physician Orders: PT Eval and Treat   Medical Diagnosis from Referral: M17.11 (ICD-10-CM) - Primary osteoarthritis of right knee   Evaluation Date: 9/27/2024  Authorization Period Expiration: 12/31/24  Plan of Care Expiration: 12/30/24  Progress Note Due: 10/27/24  Visit # / Visits authorized: 1/ 20     Time In: 1225  Time Out: 1320    Total Billable Time: 53 minutes    Precautions: Standard    Subjective     Pt reports: the knee is improving states he is walking more.   He was compliant with home exercise program.  Response to previous treatment: none  Functional change: increase in walking    Pain: 3/10  Location: right knee      Objective     Jani received the treatments listed below:       therapeutic exercises to develop strength, endurance, ROM, and flexibility for 30 minutes including:    Heel prop 2 min x 2  LAQ 3 x `10 3lbs  Quad set 2 x 10  Towel stretch 20 sec x 4  SLR 2 x 10  Heel raises 3 x 10  TKE ball vs wall x 30  Gastroc stretch on wedge 30 sec x 4  Matrix hip abd 3 x 10 25lbs    Pt received therapeutic activities to improve functional performance for 15  minutes, including:  Nu step x 8 min   6 in step up 3 x 10  Shuttle DBL 2 black straps 3 x 10     manual therapy techniques:  were applied to the:  right knee for 8 minutes, including:  Patellar mobilization, passive ext    Education provided:   - HEP compliance    Written Home Exercises Provided: Patient instructed to cont prior HEP.  Exercises were reviewed and Jani was able to demonstrate them prior to the end of the session.  Jani demonstrated good  understanding of the education provided.     See EMR under Patient Instructions for  exercises provided 10/7/2024.    Assessment     Pt presents ambulating with a continued antalgic gait, decreased stance on right LE with a FWW.  Progressed ROM and stability exercises with good performance. Continues to lack TKE, improved to -2 deg extension.  Good response to exercise progression per protocol.   Jani is progressing well towards his goals.     Pt prognosis is Good.     Pt will continue to benefit from skilled outpatient physical therapy to address the deficits listed in the problem list box on initial evaluation, provide pt/family education and to maximize pt's level of independence in the home and community environment.     Pt's spiritual, cultural and educational needs considered and pt agreeable to plan of care and goals.     Anticipated barriers to physical therapy: none    Short Term Goals (4 Weeks):     1.Pt to increase strength by a 1/2 grade of muscles test to allow for improvement in functional activities such as performing chores.  2.Pt to improve range of motion by 25% to allow for improved functional mobility to allow for improvement in IADLs.   3.Pt to report compliance with HEP and demonstrate proper exercise technique to PT to show competence with self management of condition.  4.Decrease pain by 25% during functional activities.    Long Term Goals (12 Weeks):     1. Increase ROM to allow improved joint biomechanics during functional activities.   2.Increase trunk and lower extremity strength to within normal limits during functional activities.   3. Independent with home exercise program.   4. Full return to functional activities with manageable complaints.  5. Patient to demonstrate improved posture and body mechanics.  6. Decrease pain by 75% during functional activities.    Plan     Continue with established  PT Plan of Care towards patient goals.      Petar Saul, PT

## 2024-10-10 ENCOUNTER — CLINICAL SUPPORT (OUTPATIENT)
Dept: REHABILITATION | Facility: HOSPITAL | Age: 79
End: 2024-10-10
Payer: MEDICARE

## 2024-10-10 DIAGNOSIS — M62.81 WEAKNESS OF RIGHT QUADRICEPS MUSCLE: ICD-10-CM

## 2024-10-10 DIAGNOSIS — M25.661 DECREASED RANGE OF MOTION (ROM) OF RIGHT KNEE: Primary | ICD-10-CM

## 2024-10-10 PROCEDURE — 97530 THERAPEUTIC ACTIVITIES: CPT | Mod: KX,PN,CQ

## 2024-10-10 PROCEDURE — 97140 MANUAL THERAPY 1/> REGIONS: CPT | Mod: KX,PN,CQ

## 2024-10-10 NOTE — PROGRESS NOTES
Physical Therapy Daily Treatment Note     Name: Jani PAIZ [facility] Number: [id number]    Therapy Diagnosis:   Encounter Diagnoses   Name Primary?    Decreased range of motion (ROM) of right knee Yes    Weakness of right quadriceps muscle      Physician: Sylvain Glaser III, *    Visit Date: 10/10/2024    Physician Orders: PT Eval and Treat   Medical Diagnosis from Referral: M17.11 (ICD-10-CM) - Primary osteoarthritis of right knee   Evaluation Date: 9/27/2024  Authorization Period Expiration: 12/31/24  Plan of Care Expiration: 12/30/24  Progress Note Due: 10/27/24  Visit # / Visits authorized: 3/ 20     PTA visit: 1/5    Time In: 1500  Time Out: 1555  Total Billable Time: 30 minutes with PTA  Total Time: 55 minutes    Precautions: Standard    s/p RTKA  DOS: 8/15/2024,  8 weeks post-op    Subjective     Pt reports: not a lot of pain, he is doing ok.   He was compliant with home exercise program.  Response to previous treatment: none  Functional change: increase in walking    Pain: 3/10 with movements and 0/10 at rest  Location: right knee      Objective         AROM  PROM  Knee Flexion  110  120  Knee Extension -2  -1/0        Jani received the treatments listed below:       therapeutic exercises to develop strength, endurance, ROM, and flexibility for 30 minutes including:    Heel prop 2 min x 2  LAQ 3 x `10 3lbs  Quad set 2 x 10  Towel stretch 20 sec x 4  SLR 2 x 10  Heel raises 3 x 10  TKE ball vs wall x 30  Gastroc stretch on wedge 30 sec x 4  Matrix hip abd 3 x 10 25lbs    Pt received therapeutic activities to improve functional performance for 15  minutes, including:  Nu step x 10 min   6 in step up 3 x 10  Shuttle DBL 2 black straps 3 x 10     manual therapy techniques:  were applied to the:  right knee for 10 minutes, including:  Patellar mobilization, passive ext, STM around incision    Education provided:   - HEP compliance    Written Home Exercises Provided: Patient instructed to cont prior  HEP.  Exercises were reviewed and Jani was able to demonstrate them prior to the end of the session.  Jani demonstrated good  understanding of the education provided.     Assessment     Jani presents ambulating with a continued antalgic gait, decreased stance on right LE with a FWW.  We continue with the same exercises from last visit, he tolerated well. Focus on improving flexibility/ROM/muscles strength for functional mobility and pain relief. Verbal and tactile instructions to ensure patient perform all exercises with good form, proper technique, and muscle activation. Instructed him to cont to work on his HEP, he verbalized understanding. Good response to exercise progression per protocol. Will continue to progress per patient tolerance.    Jani is progressing well towards his goals.     Pt prognosis is Good.     Pt will continue to benefit from skilled outpatient physical therapy to address the deficits listed in the problem list box on initial evaluation, provide pt/family education and to maximize pt's level of independence in the home and community environment.     Pt's spiritual, cultural and educational needs considered and pt agreeable to plan of care and goals.     Anticipated barriers to physical therapy: none    Short Term Goals (4 Weeks):     1.Pt to increase strength by a 1/2 grade of muscles test to allow for improvement in functional activities such as performing chores.  2.Pt to improve range of motion by 25% to allow for improved functional mobility to allow for improvement in IADLs.   3.Pt to report compliance with HEP and demonstrate proper exercise technique to PT to show competence with self management of condition.  4.Decrease pain by 25% during functional activities.    Long Term Goals (12 Weeks):     1. Increase ROM to allow improved joint biomechanics during functional activities.   2.Increase trunk and lower extremity strength to within normal limits during functional activities.    3. Independent with home exercise program.   4. Full return to functional activities with manageable complaints.  5. Patient to demonstrate improved posture and body mechanics.  6. Decrease pain by 75% during functional activities.    Plan     Continue with established  PT Plan of Care towards patient goals.      Rolly To, PTA    10/10/2024

## 2024-10-15 ENCOUNTER — INFUSION (OUTPATIENT)
Dept: INFECTIOUS DISEASES | Facility: HOSPITAL | Age: 79
End: 2024-10-15
Attending: FAMILY MEDICINE
Payer: MEDICARE

## 2024-10-15 ENCOUNTER — CLINICAL SUPPORT (OUTPATIENT)
Dept: REHABILITATION | Facility: HOSPITAL | Age: 79
End: 2024-10-15
Payer: MEDICARE

## 2024-10-15 VITALS
TEMPERATURE: 98 F | BODY MASS INDEX: 35.74 KG/M2 | WEIGHT: 241.31 LBS | DIASTOLIC BLOOD PRESSURE: 73 MMHG | HEIGHT: 69 IN | OXYGEN SATURATION: 95 % | RESPIRATION RATE: 20 BRPM | HEART RATE: 73 BPM | SYSTOLIC BLOOD PRESSURE: 127 MMHG

## 2024-10-15 DIAGNOSIS — S72.001A CLOSED DISPLACED FRACTURE OF RIGHT FEMORAL NECK: Primary | ICD-10-CM

## 2024-10-15 DIAGNOSIS — M81.6 LOCALIZED OSTEOPOROSIS OF LEQUESNE: ICD-10-CM

## 2024-10-15 DIAGNOSIS — M81.0 OSTEOPOROSIS, UNSPECIFIED OSTEOPOROSIS TYPE, UNSPECIFIED PATHOLOGICAL FRACTURE PRESENCE: ICD-10-CM

## 2024-10-15 DIAGNOSIS — M25.661 DECREASED RANGE OF MOTION (ROM) OF RIGHT KNEE: Primary | ICD-10-CM

## 2024-10-15 DIAGNOSIS — M62.81 WEAKNESS OF RIGHT QUADRICEPS MUSCLE: ICD-10-CM

## 2024-10-15 PROCEDURE — 63600175 PHARM REV CODE 636 W HCPCS: Mod: JZ,JG | Performed by: PHYSICIAN ASSISTANT

## 2024-10-15 PROCEDURE — 97110 THERAPEUTIC EXERCISES: CPT | Mod: KX,PN

## 2024-10-15 PROCEDURE — 97530 THERAPEUTIC ACTIVITIES: CPT | Mod: KX,PN

## 2024-10-15 PROCEDURE — 96372 THER/PROPH/DIAG INJ SC/IM: CPT

## 2024-10-15 RX ADMIN — DENOSUMAB 60 MG: 60 INJECTION SUBCUTANEOUS at 01:10

## 2024-10-15 NOTE — PROGRESS NOTES
Patient received Prolia injection - denies dental procedures over past 3 months - administered per guidelines. Patient claim not in Calcium or Vit D supplements at this time.     Next appointment scheduled and patient made aware.    Limited head-to-toe assessment due to privacy issues and visit reason though the opportunity was given for patient to express any concerns.

## 2024-10-15 NOTE — PROGRESS NOTES
Physical Therapy Daily Treatment Note     Name: Jani PAIZ [facility] Number: [id number]    Therapy Diagnosis:   Encounter Diagnoses   Name Primary?    Decreased range of motion (ROM) of right knee Yes    Weakness of right quadriceps muscle      Physician: Sylvain Glaser III, *    Visit Date: 10/15/2024    Physician Orders: PT Eval and Treat   Medical Diagnosis from Referral: M17.11 (ICD-10-CM) - Primary osteoarthritis of right knee   Evaluation Date: 9/27/2024  Authorization Period Expiration: 12/31/24  Plan of Care Expiration: 12/30/24  Progress Note Due: 10/27/24  Visit # / Visits authorized: 3/ 20     PTA visit: 1/5    Time In: 1500  Time Out: 1555  Total Billable Time: 40 minutes     Precautions: Standard    s/p RTKA  DOS: 8/15/2024,  8 weeks post-op    Subjective     Pt reports: the knee continues to improve with therapy.   He was compliant with home exercise program.  Response to previous treatment: none  Functional change: increase in walking    Pain: 3/10 with movements and 0/10 at rest  Location: right knee      Objective      Jani received the treatments listed below:       therapeutic exercises to develop strength, endurance, ROM, and flexibility for 30 minutes including:    Heel prop 2 min x 2  LAQ 3 x `10 3lbs  Quad set 2 x 10  Towel stretch 20 sec x 4  SLR 2 x 10  Heel raises 3 x 10  TKE ball vs wall x 30  Gastroc stretch on wedge 30 sec x 4  Matrix hip abd 3 x 10 25lbs    Pt received therapeutic activities to improve functional performance for 15  minutes, including:  Nu step x 10 min   6 in step up 3 x 10  Shuttle DBL 2 black straps 3 x 10     manual therapy techniques:  were applied to the:  right knee for 5 minutes, including:  Patellar mobilization, passive ext     Education provided:   - HEP compliance    Written Home Exercises Provided: Patient instructed to cont prior HEP.  Exercises were reviewed and Jani was able to demonstrate them prior to the end of the session.  Jani  demonstrated good  understanding of the education provided.     Assessment     Pt presents ambulating with a continued antalgic gait, decreased stance on right LE with a FWW.  Progressed ROM and stability exercises with good performance. Continues to lack TKE, improved to -1 deg extension.  Good response to exercise progression per protocol. Jani is progressing well towards his goals.     Pt prognosis is Good.     Pt will continue to benefit from skilled outpatient physical therapy to address the deficits listed in the problem list box on initial evaluation, provide pt/family education and to maximize pt's level of independence in the home and community environment.     Pt's spiritual, cultural and educational needs considered and pt agreeable to plan of care and goals.     Anticipated barriers to physical therapy: none    Short Term Goals (4 Weeks):     1.Pt to increase strength by a 1/2 grade of muscles test to allow for improvement in functional activities such as performing chores.  2.Pt to improve range of motion by 25% to allow for improved functional mobility to allow for improvement in IADLs.   3.Pt to report compliance with HEP and demonstrate proper exercise technique to PT to show competence with self management of condition.  4.Decrease pain by 25% during functional activities.    Long Term Goals (12 Weeks):     1. Increase ROM to allow improved joint biomechanics during functional activities.   2.Increase trunk and lower extremity strength to within normal limits during functional activities.   3. Independent with home exercise program.   4. Full return to functional activities with manageable complaints.  5. Patient to demonstrate improved posture and body mechanics.  6. Decrease pain by 75% during functional activities.    Plan     Continue with established  PT Plan of Care towards patient goals.      Petar Saul, PT    10/10/2024

## 2024-10-17 ENCOUNTER — CLINICAL SUPPORT (OUTPATIENT)
Dept: REHABILITATION | Facility: HOSPITAL | Age: 79
End: 2024-10-17
Payer: MEDICARE

## 2024-10-17 ENCOUNTER — IMMUNIZATION (OUTPATIENT)
Dept: FAMILY MEDICINE | Facility: CLINIC | Age: 79
End: 2024-10-17
Payer: MEDICARE

## 2024-10-17 DIAGNOSIS — M62.81 WEAKNESS OF RIGHT QUADRICEPS MUSCLE: ICD-10-CM

## 2024-10-17 DIAGNOSIS — Z23 NEED FOR INFLUENZA VACCINATION: Primary | ICD-10-CM

## 2024-10-17 DIAGNOSIS — M25.661 DECREASED RANGE OF MOTION (ROM) OF RIGHT KNEE: Primary | ICD-10-CM

## 2024-10-17 PROCEDURE — 97110 THERAPEUTIC EXERCISES: CPT | Mod: KX,PN,CQ

## 2024-10-17 PROCEDURE — 97530 THERAPEUTIC ACTIVITIES: CPT | Mod: KX,PN,CQ

## 2024-10-17 PROCEDURE — 99999PBSHW PR PBB SHADOW TECHNICAL ONLY FILED TO HB: Mod: PBBFAC,,,

## 2024-10-17 PROCEDURE — G0008 ADMIN INFLUENZA VIRUS VAC: HCPCS | Mod: PBBFAC,PN

## 2024-10-17 PROCEDURE — 90653 IIV ADJUVANT VACCINE IM: CPT | Mod: PBBFAC,PN

## 2024-10-17 RX ADMIN — INFLUENZA A VIRUS A/VICTORIA/4897/2022 IVR-238 (H1N1) ANTIGEN (FORMALDEHYDE INACTIVATED), INFLUENZA A VIRUS A/THAILAND/8/2022 IVR-237 (H3N2) ANTIGEN (FORMALDEHYDE INACTIVATED), INFLUENZA B VIRUS B/AUSTRIA/1359417/2021 BVR-26 ANTIGEN (FORMALDEHYDE INACTIVATED) 0.5 ML: 15; 15; 15 INJECTION, SUSPENSION INTRAMUSCULAR at 02:10

## 2024-10-17 NOTE — PROGRESS NOTES
Physical Therapy Daily Treatment Note     Name: Jani PAIZ [facility] Number: [id number]    Therapy Diagnosis:   Encounter Diagnoses   Name Primary?    Decreased range of motion (ROM) of right knee Yes    Weakness of right quadriceps muscle      Physician: Sylvain Glaser III, *    Visit Date: 10/17/2024    Physician Orders: PT Eval and Treat   Medical Diagnosis from Referral: M17.11 (ICD-10-CM) - Primary osteoarthritis of right knee   Evaluation Date: 9/27/2024  Authorization Period Expiration: 12/31/24  Plan of Care Expiration: 12/30/24  Progress Note Due: 10/27/24  Visit # / Visits authorized: 5/ 20     PTA visit: 1/5    Time In: 1300  Time Out: 1355  Total Billable Time: 30 minutes with PTA  Total Time: 55 minutes    Precautions: Standard    s/p RTKA  DOS: 8/15/2024,  9 weeks post-op    Subjective     Pt reports: the knee continues to improve with therapy.   He was compliant with home exercise program.  Response to previous treatment: none  Functional change: increase in walking    Pain: 3/10 with movements and 0/10 at rest  Location: right knee      Objective      Jani received the treatments listed below:       therapeutic exercises to develop strength, endurance, ROM, and flexibility for 35 minutes including:    Heel prop 2 min x 2  LAQ 3 x `10 3lbs  Quad set 2 x 10  Towel stretch 20 sec x 4  SLR with 2# 2 x 10  Heel raises 3 x 10  TKE ball vs wall x 30  Gastroc stretch on wedge 30 sec x 4  Matrix hip abd 3 x 10 25lbs  +Matrix hip abd 3 x 10 25lbs    Pt received therapeutic activities to improve functional performance for 20  minutes, including:  Nu step x 10 min   6 in step up 3 x 10  Shuttle DBL 2 black straps 3 x 10     manual therapy techniques:  were applied to the:  right knee for 00 minutes, including:  Patellar mobilization, passive ext     Education provided:   - HEP compliance    Written Home Exercises Provided: Patient instructed to cont prior HEP.  Exercises were reviewed and Jani was able  to demonstrate them prior to the end of the session.  Jani demonstrated good  understanding of the education provided.     Assessment     Pt presents ambulating with min antalgic gait using RW.  Added Matrix Hip Add this visit, Progressed ROM and stability exercises with good performance. He is progressing well with Knee Extension ROM, AROM -1 and AAROM 0 this visit; Knee Flexion 115-120 degree. Will continue to work on RLE strengthening for functional mobility. Good response to exercise progression per protocol. Verbal and tactile instructions to ensure patient perform all exercises with good form, proper technique, and muscle activation. Instructed him to cont to work on his HEP, he verbalized understanding. Will continue to progress per patient tolerance.     Jani is progressing well towards his goals.     Pt prognosis is Good.     Pt will continue to benefit from skilled outpatient physical therapy to address the deficits listed in the problem list box on initial evaluation, provide pt/family education and to maximize pt's level of independence in the home and community environment.     Pt's spiritual, cultural and educational needs considered and pt agreeable to plan of care and goals.     Anticipated barriers to physical therapy: none    Short Term Goals (4 Weeks):     1.Pt to increase strength by a 1/2 grade of muscles test to allow for improvement in functional activities such as performing chores.  2.Pt to improve range of motion by 25% to allow for improved functional mobility to allow for improvement in IADLs.   3.Pt to report compliance with HEP and demonstrate proper exercise technique to PT to show competence with self management of condition.  4.Decrease pain by 25% during functional activities.    Long Term Goals (12 Weeks):     1. Increase ROM to allow improved joint biomechanics during functional activities.   2.Increase trunk and lower extremity strength to within normal limits during  functional activities.   3. Independent with home exercise program.   4. Full return to functional activities with manageable complaints.  5. Patient to demonstrate improved posture and body mechanics.  6. Decrease pain by 75% during functional activities.    Plan     Continue with established  PT Plan of Care towards patient goals.      Rolly To, PTA    10/17/2024

## 2024-10-17 NOTE — PROGRESS NOTES
IM flu vaccine injection administered as ordered. Patient tolerated well without difficulty. Vis dated 8/6/21 provided to patient.

## 2024-10-18 ENCOUNTER — IMMUNIZATION (OUTPATIENT)
Dept: INTERNAL MEDICINE | Facility: CLINIC | Age: 79
End: 2024-10-18
Payer: MEDICARE

## 2024-10-18 DIAGNOSIS — Z23 NEED FOR VACCINATION: Primary | ICD-10-CM

## 2024-10-21 ENCOUNTER — CLINICAL SUPPORT (OUTPATIENT)
Dept: REHABILITATION | Facility: HOSPITAL | Age: 79
End: 2024-10-21
Payer: MEDICARE

## 2024-10-21 DIAGNOSIS — M62.81 WEAKNESS OF RIGHT QUADRICEPS MUSCLE: ICD-10-CM

## 2024-10-21 DIAGNOSIS — M25.661 DECREASED RANGE OF MOTION (ROM) OF RIGHT KNEE: Primary | ICD-10-CM

## 2024-10-21 PROCEDURE — 97530 THERAPEUTIC ACTIVITIES: CPT | Mod: KX,PN

## 2024-10-21 PROCEDURE — 97110 THERAPEUTIC EXERCISES: CPT | Mod: KX,PN

## 2024-10-21 NOTE — PROGRESS NOTES
Physical Therapy Daily Treatment Note     Name: Jani PAIZ [facility] Number: [id number]    Therapy Diagnosis:   Encounter Diagnoses   Name Primary?    Decreased range of motion (ROM) of right knee Yes    Weakness of right quadriceps muscle      Physician: Sylvain Glaser III, *    Visit Date: 10/21/2024    Physician Orders: PT Eval and Treat   Medical Diagnosis from Referral: M17.11 (ICD-10-CM) - Primary osteoarthritis of right knee   Evaluation Date: 9/27/2024  Authorization Period Expiration: 12/31/24  Plan of Care Expiration: 12/30/24  Progress Note Due: 10/27/24  Visit # / Visits authorized: 5/ 20     PTA visit: 1/5    Time In: 1235  Time Out: 1335  Total Billable Time: 58 minutes      Precautions: Standard    s/p RTKA  DOS: 8/15/2024,  9 weeks post-op    Subjective     Pt reports: increased pain over the weekend due to change in the weather.   He was compliant with home exercise program.  Response to previous treatment: none  Functional change: increase in walking    Pain: 3/10 with movements and 0/10 at rest  Location: right knee      Objective      Jani received the treatments listed below:       therapeutic exercises to develop strength, endurance, ROM, and flexibility for 30 minutes including:    Heel prop 2 min x 2  LAQ 3 x `10 3lbs  Quad set 2 x 10  Towel stretch 20 sec x 4  SLR with 2# 2 x 10  Heel raises 3 x 10  TKE ball vs wall x 30  Gastroc stretch on wedge 30 sec x 4  Matrix hip abd 3 x 10 25lbs  Matrix hip abd 3 x 10 25lbs    Pt received therapeutic activities to improve functional performance for 23  minutes, including:  Nu step x 10 min   6 in step up 3 x 10  Shuttle DBL 2 black straps 3 x 10     manual therapy techniques:  were applied to the:  right knee for 05 minutes, including:  Patellar mobilization, passive ext     Education provided:   - HEP compliance    Written Home Exercises Provided: Patient instructed to cont prior HEP.  Exercises were reviewed and Jani was able to demonstrate  them prior to the end of the session.  Jani demonstrated good  understanding of the education provided.     Assessment     Pt continues to ambulate with a FWW due to instability post therapy.   Progressed ROM and stability exercises with good performance. Continues to lack TKE, -2 deg extension.  Good response to exercise progression per protocol. Jani is progressing well towards his goals.   Jani is progressing well towards his goals.     Pt prognosis is Good.     Pt will continue to benefit from skilled outpatient physical therapy to address the deficits listed in the problem list box on initial evaluation, provide pt/family education and to maximize pt's level of independence in the home and community environment.     Pt's spiritual, cultural and educational needs considered and pt agreeable to plan of care and goals.     Anticipated barriers to physical therapy: none    Short Term Goals (4 Weeks):     1.Pt to increase strength by a 1/2 grade of muscles test to allow for improvement in functional activities such as performing chores.  2.Pt to improve range of motion by 25% to allow for improved functional mobility to allow for improvement in IADLs.   3.Pt to report compliance with HEP and demonstrate proper exercise technique to PT to show competence with self management of condition.  4.Decrease pain by 25% during functional activities.    Long Term Goals (12 Weeks):     1. Increase ROM to allow improved joint biomechanics during functional activities.   2.Increase trunk and lower extremity strength to within normal limits during functional activities.   3. Independent with home exercise program.   4. Full return to functional activities with manageable complaints.  5. Patient to demonstrate improved posture and body mechanics.  6. Decrease pain by 75% during functional activities.    Plan     Continue with established  PT Plan of Care towards patient goals.      Petar Saul, PT    10/17/2024

## 2024-10-23 ENCOUNTER — CLINICAL SUPPORT (OUTPATIENT)
Dept: REHABILITATION | Facility: HOSPITAL | Age: 79
End: 2024-10-23
Payer: MEDICARE

## 2024-10-23 DIAGNOSIS — M62.81 WEAKNESS OF RIGHT QUADRICEPS MUSCLE: ICD-10-CM

## 2024-10-23 DIAGNOSIS — M25.661 DECREASED RANGE OF MOTION (ROM) OF RIGHT KNEE: Primary | ICD-10-CM

## 2024-10-23 PROCEDURE — 97110 THERAPEUTIC EXERCISES: CPT | Mod: KX,PN

## 2024-10-23 PROCEDURE — 97530 THERAPEUTIC ACTIVITIES: CPT | Mod: KX,PN

## 2024-10-23 NOTE — PROGRESS NOTES
Physical Therapy Daily Treatment Note     Name: Jani PAIZ [facility] Number: [id number]    Therapy Diagnosis:   Encounter Diagnoses   Name Primary?    Decreased range of motion (ROM) of right knee Yes    Weakness of right quadriceps muscle      Physician: Sylvain Glaser III, *    Visit Date: 10/23/2024    Physician Orders: PT Eval and Treat   Medical Diagnosis from Referral: M17.11 (ICD-10-CM) - Primary osteoarthritis of right knee   Evaluation Date: 9/27/2024  Authorization Period Expiration: 12/31/24  Plan of Care Expiration: 12/30/24  Progress Note Due: 10/27/24  Visit # / Visits authorized: 5/ 20     PTA visit: 1/5    Time In: 1200  Time Out: 1300  Total Billable Time: 45 minutes    Precautions: Standard    s/p RTKA  DOS: 8/15/2024,  10 wks post op    Subjective     Pt reports: the knee continues to improve with therapy.   He was compliant with home exercise program.  Response to previous treatment: none  Functional change: increase in walking    Pain: 3/10 with movements and 0/10 at rest  Location: right knee      Objective      Jani received the treatments listed below:       therapeutic exercises to develop strength, endurance, ROM, and flexibility for 40 minutes including:    Heel prop 2 min x 2  Towel stretch 20 sec x 4  SLR with 2# 2 x 10  Heel raises 3 x 10  TKE ball vs wall x 30  Gastroc stretch on wedge 30 sec x 4  Matrix hip abd 3 x 10 25lbs  Matrix hip abd 3 x 10 25lbs  Matrix ham curl 3 x 10 30lbs  Matrix knee ext 10lbs 3 x 10    Pt received therapeutic activities to improve functional performance for 15  minutes, including:  Nu step x 10 min   6 in step up 3 x 10  Shuttle DBL 2 black straps 3 x 10     manual therapy techniques:  were applied to the:  right knee for 05 minutes, including: NP  Patellar mobilization, passive ext     Education provided:   - HEP compliance    Written Home Exercises Provided: Patient instructed to cont prior HEP.  Exercises were reviewed and Jani was able to  demonstrate them prior to the end of the session.  Jani demonstrated good  understanding of the education provided.     Assessment     Pt continues to ambulate with a FWW due to instability post therapy.   Progressed ROM and stability exercises with good performance. Continues to lack TKE, -2 deg extension. AA knee flexion improved to 122 deg.  Good response to exercise progression per protocol.   Jani is progressing well towards his goals.     Pt prognosis is Good.     Pt will continue to benefit from skilled outpatient physical therapy to address the deficits listed in the problem list box on initial evaluation, provide pt/family education and to maximize pt's level of independence in the home and community environment.     Pt's spiritual, cultural and educational needs considered and pt agreeable to plan of care and goals.     Anticipated barriers to physical therapy: none    Short Term Goals (4 Weeks):     1.Pt to increase strength by a 1/2 grade of muscles test to allow for improvement in functional activities such as performing chores.  2.Pt to improve range of motion by 25% to allow for improved functional mobility to allow for improvement in IADLs.   3.Pt to report compliance with HEP and demonstrate proper exercise technique to PT to show competence with self management of condition.  4.Decrease pain by 25% during functional activities.    Long Term Goals (12 Weeks):     1. Increase ROM to allow improved joint biomechanics during functional activities.   2.Increase trunk and lower extremity strength to within normal limits during functional activities.   3. Independent with home exercise program.   4. Full return to functional activities with manageable complaints.  5. Patient to demonstrate improved posture and body mechanics.  6. Decrease pain by 75% during functional activities.    Plan     Continue with established  PT Plan of Care towards patient goals.      Petar Saul, PT     10/17/2024

## 2024-10-28 ENCOUNTER — CLINICAL SUPPORT (OUTPATIENT)
Dept: REHABILITATION | Facility: HOSPITAL | Age: 79
End: 2024-10-28
Payer: MEDICARE

## 2024-10-28 DIAGNOSIS — M62.81 WEAKNESS OF RIGHT QUADRICEPS MUSCLE: ICD-10-CM

## 2024-10-28 DIAGNOSIS — M25.661 DECREASED RANGE OF MOTION (ROM) OF RIGHT KNEE: Primary | ICD-10-CM

## 2024-10-28 PROCEDURE — 97110 THERAPEUTIC EXERCISES: CPT | Mod: KX,PN

## 2024-10-28 PROCEDURE — 97530 THERAPEUTIC ACTIVITIES: CPT | Mod: KX,PN

## 2024-10-30 ENCOUNTER — CLINICAL SUPPORT (OUTPATIENT)
Dept: REHABILITATION | Facility: HOSPITAL | Age: 79
End: 2024-10-30
Payer: MEDICARE

## 2024-10-30 DIAGNOSIS — M25.661 DECREASED RANGE OF MOTION (ROM) OF RIGHT KNEE: Primary | ICD-10-CM

## 2024-10-30 DIAGNOSIS — M62.81 WEAKNESS OF RIGHT QUADRICEPS MUSCLE: ICD-10-CM

## 2024-10-30 PROCEDURE — 97110 THERAPEUTIC EXERCISES: CPT | Mod: KX,PN

## 2024-10-30 PROCEDURE — 97530 THERAPEUTIC ACTIVITIES: CPT | Mod: KX,PN

## 2024-10-31 ENCOUNTER — OFFICE VISIT (OUTPATIENT)
Dept: PODIATRY | Facility: CLINIC | Age: 79
End: 2024-10-31
Payer: MEDICARE

## 2024-10-31 ENCOUNTER — EXTERNAL CHRONIC CARE MANAGEMENT (OUTPATIENT)
Dept: PRIMARY CARE CLINIC | Facility: CLINIC | Age: 79
End: 2024-10-31
Payer: MEDICARE

## 2024-10-31 VITALS
BODY MASS INDEX: 35.68 KG/M2 | OXYGEN SATURATION: 95 % | RESPIRATION RATE: 18 BRPM | WEIGHT: 240.88 LBS | SYSTOLIC BLOOD PRESSURE: 129 MMHG | DIASTOLIC BLOOD PRESSURE: 71 MMHG | HEIGHT: 69 IN | HEART RATE: 67 BPM

## 2024-10-31 DIAGNOSIS — B35.1 ONYCHOMYCOSIS DUE TO DERMATOPHYTE: ICD-10-CM

## 2024-10-31 DIAGNOSIS — E11.49 TYPE II DIABETES MELLITUS WITH NEUROLOGICAL MANIFESTATIONS: Primary | ICD-10-CM

## 2024-10-31 PROCEDURE — 99490 CHRNC CARE MGMT STAFF 1ST 20: CPT | Mod: S$PBB,,, | Performed by: FAMILY MEDICINE

## 2024-10-31 PROCEDURE — 99999 PR PBB SHADOW E&M-EST. PATIENT-LVL III: CPT | Mod: PBBFAC,,, | Performed by: PODIATRIST

## 2024-10-31 PROCEDURE — 99213 OFFICE O/P EST LOW 20 MIN: CPT | Mod: PBBFAC,PO,25 | Performed by: PODIATRIST

## 2024-10-31 PROCEDURE — 11721 DEBRIDE NAIL 6 OR MORE: CPT | Mod: PBBFAC,PO | Performed by: PODIATRIST

## 2024-10-31 PROCEDURE — 99490 CHRNC CARE MGMT STAFF 1ST 20: CPT | Mod: PBBFAC,PO | Performed by: FAMILY MEDICINE

## 2024-11-05 ENCOUNTER — OFFICE VISIT (OUTPATIENT)
Dept: ORTHOPEDICS | Facility: CLINIC | Age: 79
End: 2024-11-05
Payer: MEDICARE

## 2024-11-05 VITALS — BODY MASS INDEX: 35.98 KG/M2 | HEIGHT: 69 IN | WEIGHT: 242.94 LBS

## 2024-11-05 DIAGNOSIS — Z96.651 S/P TOTAL KNEE REPLACEMENT, RIGHT: Primary | ICD-10-CM

## 2024-11-05 PROCEDURE — 99024 POSTOP FOLLOW-UP VISIT: CPT | Mod: POP,,, | Performed by: ORTHOPAEDIC SURGERY

## 2024-11-05 PROCEDURE — 99213 OFFICE O/P EST LOW 20 MIN: CPT | Mod: PBBFAC | Performed by: ORTHOPAEDIC SURGERY

## 2024-11-05 PROCEDURE — 99999 PR PBB SHADOW E&M-EST. PATIENT-LVL III: CPT | Mod: PBBFAC,,, | Performed by: ORTHOPAEDIC SURGERY
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
## 2024-11-11 ENCOUNTER — TELEPHONE (OUTPATIENT)
Dept: CARDIOLOGY | Facility: CLINIC | Age: 79
End: 2024-11-11
Payer: MEDICARE

## 2024-11-12 ENCOUNTER — OFFICE VISIT (OUTPATIENT)
Dept: CARDIOLOGY | Facility: CLINIC | Age: 79
End: 2024-11-12
Payer: MEDICARE

## 2024-11-12 VITALS
SYSTOLIC BLOOD PRESSURE: 139 MMHG | WEIGHT: 243.38 LBS | BODY MASS INDEX: 36.05 KG/M2 | DIASTOLIC BLOOD PRESSURE: 71 MMHG | HEIGHT: 69 IN | HEART RATE: 72 BPM | OXYGEN SATURATION: 98 %

## 2024-11-12 DIAGNOSIS — N18.30 DIABETES MELLITUS DUE TO UNDERLYING CONDITION WITH STAGE 3 CHRONIC KIDNEY DISEASE, WITHOUT LONG-TERM CURRENT USE OF INSULIN, UNSPECIFIED WHETHER STAGE 3A OR 3B CKD: ICD-10-CM

## 2024-11-12 DIAGNOSIS — E78.00 PURE HYPERCHOLESTEROLEMIA: Chronic | ICD-10-CM

## 2024-11-12 DIAGNOSIS — E08.22 DIABETES MELLITUS DUE TO UNDERLYING CONDITION WITH STAGE 3 CHRONIC KIDNEY DISEASE, WITHOUT LONG-TERM CURRENT USE OF INSULIN, UNSPECIFIED WHETHER STAGE 3A OR 3B CKD: ICD-10-CM

## 2024-11-12 DIAGNOSIS — Z98.61 HISTORY OF PERCUTANEOUS CORONARY INTERVENTION: ICD-10-CM

## 2024-11-12 DIAGNOSIS — Z95.1 S/P CABG X 4: Chronic | ICD-10-CM

## 2024-11-12 DIAGNOSIS — N18.31 CHRONIC KIDNEY DISEASE, STAGE 3A: ICD-10-CM

## 2024-11-12 DIAGNOSIS — Z01.810 PRE-OPERATIVE CARDIOVASCULAR EXAMINATION: Primary | ICD-10-CM

## 2024-11-12 DIAGNOSIS — I71.40 ABDOMINAL AORTIC ANEURYSM (AAA) WITHOUT RUPTURE, UNSPECIFIED PART: ICD-10-CM

## 2024-11-12 DIAGNOSIS — I10 PRIMARY HYPERTENSION: ICD-10-CM

## 2024-11-12 DIAGNOSIS — I25.10 CORONARY ARTERY DISEASE INVOLVING NATIVE CORONARY ARTERY OF NATIVE HEART WITHOUT ANGINA PECTORIS: Chronic | ICD-10-CM

## 2024-11-12 PROCEDURE — 99214 OFFICE O/P EST MOD 30 MIN: CPT | Mod: PBBFAC | Performed by: PHYSICIAN ASSISTANT

## 2024-11-12 PROCEDURE — 99999 PR PBB SHADOW E&M-EST. PATIENT-LVL IV: CPT | Mod: PBBFAC,,, | Performed by: PHYSICIAN ASSISTANT
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
## 2024-11-13 ENCOUNTER — TELEPHONE (OUTPATIENT)
Dept: CARDIOLOGY | Facility: CLINIC | Age: 79
End: 2024-11-13
Payer: MEDICARE

## 2024-11-13 NOTE — TELEPHONE ENCOUNTER
Ms. Bailey preop evaluation office notes from 11/12 faxed to [phone] [facility].  Email received for successful fax.

## 2024-11-25 LAB — CRC RECOMMENDATION EXT: NORMAL

## 2024-11-30 ENCOUNTER — EXTERNAL CHRONIC CARE MANAGEMENT (OUTPATIENT)
Dept: PRIMARY CARE CLINIC | Facility: CLINIC | Age: 79
End: 2024-11-30
Payer: MEDICARE

## 2024-11-30 PROCEDURE — 99490 CHRNC CARE MGMT STAFF 1ST 20: CPT | Mod: PBBFAC,PO | Performed by: FAMILY MEDICINE

## 2024-11-30 PROCEDURE — 99490 CHRNC CARE MGMT STAFF 1ST 20: CPT | Mod: S$PBB,,, | Performed by: FAMILY MEDICINE

## 2024-12-05 ENCOUNTER — PATIENT MESSAGE (OUTPATIENT)
Dept: PHARMACY | Facility: CLINIC | Age: 79
End: 2024-12-05
Payer: MEDICARE

## 2024-12-05 ENCOUNTER — TELEPHONE (OUTPATIENT)
Dept: PHARMACY | Facility: CLINIC | Age: 79
End: 2024-12-05
Payer: MEDICARE

## 2024-12-05 DIAGNOSIS — E11.65 TYPE 2 DIABETES MELLITUS WITH HYPERGLYCEMIA, WITHOUT LONG-TERM CURRENT USE OF INSULIN: Primary | ICD-10-CM

## 2024-12-05 RX ORDER — DULAGLUTIDE 4.5 MG/.5ML
4.5 INJECTION, SOLUTION SUBCUTANEOUS
Start: 2024-12-05

## 2024-12-05 NOTE — TELEPHONE ENCOUNTER
We have reached out to Jani Cha to inform him of the [employer] and [facility] Patient Assistance Program re-enrollment process for Farxiga and Trulicity 3 mg. Patient must provided the following documentation to re-apply for 2025: Proof of household Income( such as social security statement, 1099 form, pension statement or 3 consecutive pay stubs, Copy of all Insurance cards( front and back), and Completed Medication Access Center Authorization Forms         Bozena Abdi  Pharmacy Patient Assistance

## 2024-12-05 NOTE — TELEPHONE ENCOUNTER
A [employer](Farxiga) & [employer](Trulicity 4.5) Patient Assistance Application for Jani Cha   - MRN  [id number] was faxed to your office @ [phone]. Please have Dr. Malcolm Kent   review the application to ensure the prescription is correct. If correct, sign and fax the application back to the Pharmacy Patient Assistance Team @[phone]. Do not fax to the Manufacture Program    Please do not write any corrections on this application.  If changes are needed, let me know ASAP and the corrected application will be re-faxed to your office for Provider signature.      Bozena Abdi

## 2024-12-11 ENCOUNTER — LAB VISIT (OUTPATIENT)
Dept: LAB | Facility: HOSPITAL | Age: 79
End: 2024-12-11
Attending: HOSPITALIST
Payer: MEDICARE

## 2024-12-11 DIAGNOSIS — E11.65 TYPE 2 DIABETES MELLITUS WITH HYPERGLYCEMIA, WITHOUT LONG-TERM CURRENT USE OF INSULIN: ICD-10-CM

## 2024-12-11 LAB
ANION GAP SERPL CALC-SCNC: 7 MMOL/L (ref 8–16)
BUN SERPL-MCNC: 35 MG/DL (ref 8–23)
CALCIUM SERPL-MCNC: 9.3 MG/DL (ref 8.7–10.5)
CHLORIDE SERPL-SCNC: 110 MMOL/L (ref 95–110)
CO2 SERPL-SCNC: 25 MMOL/L (ref 23–29)
CREAT SERPL-MCNC: 1.7 MG/DL (ref 0.5–1.4)
EST. GFR  (NO RACE VARIABLE): 40.8 ML/MIN/1.73 M^2
ESTIMATED AVG GLUCOSE: 154 MG/DL (ref 68–131)
GLUCOSE SERPL-MCNC: 203 MG/DL (ref 70–110)
HBA1C MFR BLD: 7 % (ref 4–5.6)
POTASSIUM SERPL-SCNC: 4.6 MMOL/L (ref 3.5–5.1)
SODIUM SERPL-SCNC: 142 MMOL/L (ref 136–145)

## 2024-12-11 PROCEDURE — 80048 BASIC METABOLIC PNL TOTAL CA: CPT | Performed by: HOSPITALIST

## 2024-12-11 PROCEDURE — 83036 HEMOGLOBIN GLYCOSYLATED A1C: CPT | Performed by: HOSPITALIST

## 2024-12-11 PROCEDURE — 36415 COLL VENOUS BLD VENIPUNCTURE: CPT | Mod: PO | Performed by: HOSPITALIST

## 2024-12-12 ENCOUNTER — PATIENT MESSAGE (OUTPATIENT)
Dept: ENDOCRINOLOGY | Facility: CLINIC | Age: 79
End: 2024-12-12
Payer: MEDICARE

## 2024-12-12 ENCOUNTER — TELEPHONE (OUTPATIENT)
Dept: ENDOCRINOLOGY | Facility: CLINIC | Age: 79
End: 2024-12-12
Payer: MEDICARE

## 2024-12-12 NOTE — TELEPHONE ENCOUNTER
----- Message from Med Assistant Miracle sent at 12/12/2024  4:23 PM CST -----  Patel Olivera, [facility] Staff  Caller: Unspecified (Today,  1:49 PM)  Type:  Patient Returning Call    Who Called: Spouse - Margaret    Who Left Message for Patient: Miracle GRIER.    Does the patient know what this is regarding?:No    Would the patient rather a call back or a response via My [facility]? Yes, call    Best Call Back Number:[phone]    Move pt once I review schedules

## 2024-12-12 NOTE — TELEPHONE ENCOUNTER
----- Message from Tech Leslye sent at 12/12/2024 10:48 AM CST -----  Regarding: Sooner appointment request  .Type:  Sooner Appointment Request    Patient is requesting a sooner appointment.  Patient declined first available appointment listed as well as another facility and provider .  Patient will not accept being placed on the waitlist and is requesting a message be sent to doctor.    Name of Caller: spouse-Margaret    When is the first available appointment? 05/2025    Symptoms: reschedule appointment on 12/18/2024-caller is upset about next available appointment    Would the patient rather a call back or a response via [facility]? Call     Best Call Back Number: .[phone] or [phone]      Additional Information:

## 2024-12-16 ENCOUNTER — PATIENT OUTREACH (OUTPATIENT)
Dept: ADMINISTRATIVE | Facility: HOSPITAL | Age: 79
End: 2024-12-16
Payer: MEDICARE

## 2024-12-18 ENCOUNTER — TELEPHONE (OUTPATIENT)
Dept: ENDOCRINOLOGY | Facility: CLINIC | Age: 79
End: 2024-12-18

## 2024-12-18 ENCOUNTER — OFFICE VISIT (OUTPATIENT)
Dept: ENDOCRINOLOGY | Facility: CLINIC | Age: 79
End: 2024-12-18
Payer: MEDICARE

## 2024-12-18 VITALS
BODY MASS INDEX: 35.68 KG/M2 | HEART RATE: 65 BPM | WEIGHT: 241.63 LBS | DIASTOLIC BLOOD PRESSURE: 70 MMHG | SYSTOLIC BLOOD PRESSURE: 102 MMHG

## 2024-12-18 DIAGNOSIS — E66.812 CLASS 2 SEVERE OBESITY DUE TO EXCESS CALORIES WITH SERIOUS COMORBIDITY AND BODY MASS INDEX (BMI) OF 36.0 TO 36.9 IN ADULT: ICD-10-CM

## 2024-12-18 DIAGNOSIS — M80.00XD AGE-RELATED OSTEOPOROSIS WITH CURRENT PATHOLOGICAL FRACTURE WITH ROUTINE HEALING, SUBSEQUENT ENCOUNTER: ICD-10-CM

## 2024-12-18 DIAGNOSIS — N18.31 CHRONIC KIDNEY DISEASE, STAGE 3A: ICD-10-CM

## 2024-12-18 DIAGNOSIS — S72.001A CLOSED DISPLACED FRACTURE OF RIGHT FEMORAL NECK: ICD-10-CM

## 2024-12-18 DIAGNOSIS — E11.40 TYPE 2 DIABETES MELLITUS WITH DIABETIC NEUROPATHY, WITHOUT LONG-TERM CURRENT USE OF INSULIN: ICD-10-CM

## 2024-12-18 DIAGNOSIS — E66.01 CLASS 2 SEVERE OBESITY DUE TO EXCESS CALORIES WITH SERIOUS COMORBIDITY AND BODY MASS INDEX (BMI) OF 36.0 TO 36.9 IN ADULT: ICD-10-CM

## 2024-12-18 DIAGNOSIS — E11.65 TYPE 2 DIABETES MELLITUS WITH HYPERGLYCEMIA, WITHOUT LONG-TERM CURRENT USE OF INSULIN: Primary | ICD-10-CM

## 2024-12-18 DIAGNOSIS — I25.10 CORONARY ARTERY DISEASE INVOLVING NATIVE CORONARY ARTERY OF NATIVE HEART WITHOUT ANGINA PECTORIS: Chronic | ICD-10-CM

## 2024-12-18 PROCEDURE — 99214 OFFICE O/P EST MOD 30 MIN: CPT | Mod: PBBFAC | Performed by: HOSPITALIST

## 2024-12-18 PROCEDURE — 99999 PR PBB SHADOW E&M-EST. PATIENT-LVL IV: CPT | Mod: PBBFAC,,, | Performed by: HOSPITALIST

## 2024-12-18 RX ORDER — DAPAGLIFLOZIN 5 MG/1
5 TABLET, FILM COATED ORAL DAILY
Qty: 90 TABLET | Refills: 3 | Status: SHIPPED | OUTPATIENT
Start: 2024-12-18

## 2024-12-18 RX ORDER — GLIPIZIDE 10 MG/1
10 TABLET, FILM COATED, EXTENDED RELEASE ORAL 2 TIMES DAILY WITH MEALS
Qty: 180 TABLET | Refills: 3 | Status: SHIPPED | OUTPATIENT
Start: 2024-12-18 | End: 2025-12-18

## 2024-12-18 NOTE — ASSESSMENT & PLAN NOTE
- Body mass index is 35.68 kg/m².  - Encourage pt to work on healthy diet, increase exercise as tolerated.  - Continue to monitor weight

## 2024-12-18 NOTE — ASSESSMENT & PLAN NOTE
- Right femoral neck fracture 10/21/2022>> patient fell picking up a newspaper requiring surgery.    - Follow-up with Orthopedic surgery at this time- Started on Prolia SC injection per orthopedic surgery  - PROLIA 1st injection 3/17/2023>> every 6 months>> 3 injections so far>next injection 9/24/2024  - Plan to have R knee replacement 8/15/2024  - DXA: 1/19/2023>> next one in 2025  - Denies history of other fractures  - Currently not taking any vitamin supplements including calcium/vitamin-D>> level WNL  - Monitor treatment, would continue PROLIA given hx of CKD3b, defer to Ortho

## 2024-12-18 NOTE — ASSESSMENT & PLAN NOTE
- Diabetes is NOT at goal, but improving given the most recent A1C reviewed 12/18/2024  - Reviewed goals of therapy: achieve the best control possible without hypoglycemia, with a target A1C <7%.  - Reviewed diabetic supplies and medications to ensure continued refills and compliance.   - Advised the patient to get periodic eye exams and proper foot care monitoring.  - Reviewed routine maintenance: lipid management (statin use) and urine protein/creatinine yearly.  - patient having issues with affording cost of Trulicity   - Complicated by hyperglycemia, obesity, hx CAD/CABG, CKD3b, dietary indiscretion.  - pharmacy assistant program for 2025    Plan  - Medication changes:  Increase Trulicity 4.5 mg once a week injection, getting it from pharmacy assistant  - Continue max dose glipizide XR 10 mg twice a day  - Continue Farxiga 5 mg daily given CKD3b, CAD and diabetes.    - Will attempt to get pharmacy assistant for Trulicity and Farxiga  - Encourage dietary modifications, portion size control, and decreasing carbohydrate intake to help manage diabetes.  - Clear written instructions given on AVS.  - Follow up as scheduled with lab work prior. 4 months in clinic follow-up.

## 2024-12-18 NOTE — PROGRESS NOTES
Subjective:      Patient ID: Jani Cha is a 79 y.o. male presented to [facility] on 12/18/2024.  Chief Complaint:  Diabetes type 2, Diabetes    History of Present Illness: Jani Cha is a 79 y.o. male here for management of type 2 diabetes and left adrenal incidentaloma  Other significant past medical history:  CKD stage IIIB, CAD, CABG, hypertension, hyperlipidemia      Interval history:  Patient is here for follow-up of type 2 diabetes, A1c stable 7.0%  Currently on Trulicity getting it from pharmacy assistant.  Currently getting 3.0 mg once a week injection, recent shipment   Patient also taking glipizide 10 mg twice a day.    Having issues with getting supplies: Ran out of test strips, has not been checking his glucose at home.  Unclear why pharmacy is not approving new prescription sent by me recently.  Needs to contact pharmacy assistant for 2025 paperwork  Current in clinic weight:  241 lb, previous in clinic weight: 254, 244    Osteoporosis: Patient on SC Prolia every 6 months>next injection 3/22/2024, manage by Ortho for osteoporosis so far has gotten 3 injections.  Next injection 09/2024  Right total knee replacement 8/15/2023 >> did well. Some pain  Also has plan for possible cortisone injection of his shoulder in the future.        1) Diabetes Mellitus Type 2  - Known diabetic complications: nephropathy and cardiovascular disease  - Cardiovascular risk factors: advanced age (older than 55 for men, 65 for women), diabetes mellitus, dyslipidemia, hypertension, male gender, microalbuminuria, obesity (BMI >= 30 kg/m2) and sedentary lifestyle  - Diagnosed w/ DM: in 2006  - Saw Ortho need Left Knee Replacement>>  Need to get A1C <7%  - Patient having issue with high deductible, gap.  Unable to afford multiple diabetes medication  - Currently still on Trulicity, getting from [facility] without any issue  - history of hyperglycemia due to steroid injections of his knees, and  "possible shoulder injection in the future: 2024. Also with plan for R TKA 8/15/2023    Current meds:   Trulicity 3.0 mg once a week injection, getting it from pharmacy assistant Lesly Berg  Glipizide XR 10 mg twice a day  Farxiga 5 mg daily   Previous medication  stop metformin given CKD stage IIIB  Home glucose checks:  Daily, see above   145  157  115  133  105  130  124  126  100  155  136  120  113  101    Diet/Exercise:               Eating 2 meals per day , lunch in, large portion dinner, does eat cookies prior to going to bed              Drink:  Coffee with sugar in the morning              Current exercise: none due to knee pain  Weight trend: stable  Diabetes Education: no  Diabetes Related Hospitalization:  no  Hx of pancreatitis, hx of thyroid cancer: no  Family history of diabetes: unknow  Occupation: retired  Eye exam current (within one year): yes  Reports cuts or ulcers on feet:  Denies  Statin: Taking, ACE/ARB: Taking    Diabetes lab work  Lab Results   Component Value Date    HGBA1C 7.0 (H) 12/11/2024    HGBA1C 7.6 (H) 07/27/2024    HGBA1C 7.8 (H) 03/28/2024    HGBA1C 6.9 (H) 12/22/2023     No results found for: "CPEPTIDE", "GLUTAMICACID", "ISLETCELLANT"   No results found for: "FRUCTOSAMINE"  Lab Results   Component Value Date    MICALBCREAT 61.9 (H) 03/07/2024     No results found for: "VYZGGARE48"    Diabetes Management Status: Reviewed this office visit  Screening or Prevention Patient's value Goal Complete/Controlled?   Lipid profile : 03/07/2024 Annually Yes     Dilated retinal exam : 05/14/2024 Annually No     Foot exam   Most Recent Foot Exam Date: Not Found Annually Yes         2) Osteoporosis  - Right femoral neck fracture Recently: 10/25/2022>> patient fell picking up a newspaper.  Sustained right hip fracture requiring surgery.    - Follow-up with Orthopedic surgery at this time.  Recently discharged from rehab  - Currently using walker to help with ambulation  - Denies history of " other fractures  - No history of cirrhosis, liver disease, cancer  - Currently not taking any vitamin supplements including calcium/vitamin-D  - Started on Prolia SC injection per orthopedic surgery, 1st injection 3/17/2023>> every 6 months>> 3 injections so far. next injection 9/24/2024    DXA: 1/19/2023  Lumbar spine (L1-L4):  BMD is 1.248 g/cm2, T-score is 1.4, and Z-score is 2.5.  Total hip:   BMD is 1.005 g/cm2, T-score is -0.2, and Z-score is 0.7.  Femoral neck: BMD is 0.688 g/cm2, T-score is -1.8, and Z-score is -0.4.  Distal 1/3 radius:  Not applicable     FRAX:  11% risk of a major osteoporotic fracture in the next 10 years.  3.2% risk of hip fracture in the next 10 years.     Impression:  *Osteoporosis based on T-score between -1.0 and -2.5 and elevated risk based on FRAX and history of femur fracture.    Lab work reviewed  Lab Results   Component Value Date    PTH 78.7 (H) 08/14/2024    .9 (H) 04/23/2024    PTH 77.3 (H) 03/10/2023    CFBQFSDC83ZK 35 03/28/2024    ILXDDCAT85TP 36 03/10/2023    KBHINJNB92GU 32 12/06/2022    CALCIUM 9.3 12/11/2024    CALCIUM 9.8 08/14/2024    CALCIUM 9.6 07/31/2024    PHOS 2.9 08/14/2024    PHOS 2.7 04/23/2024    PHOS 2.8 12/22/2023    ALKPHOS 81 07/31/2024    ALKPHOS 82 03/07/2024    ALKPHOS 81 11/02/2023    TSH 1.198 03/28/2024        3) Regards to Left adrenal incidentaloma  - Noted on CT chest, left adrenal mass 1.2 cm in size.  - Adrenal lesion imaging characteristics left adrenal nodule, 1.2 cm, Smooth, heterogenous, without calcifications   - Patient does have history of diabetes as above, Morbidly obese, Hypertension but no history of hypertensive emergency   - Pt denies history of paroxysmal symptoms such as syncope, pallor or dizziness  - No palpitations, No history of cancer  - Pt reports no abdominal discomfort  - Work up: DST: WNL, cortisol >2, due to the overweight  - Metanephrines and normetanephrines:  Within acceptable ranges  - Renin/navneet level  normal    Reviewed past surgical, medical, family, social history and updated as appropriate.  Review of Systems: see HPI above    Objective:   /70   Pulse 65   Wt 109.6 kg (241 lb 9.6 oz)   BMI 35.68 kg/m²     Body mass index is 35.68 kg/m².  Vital signs reviewed    Physical Exam  Vitals and nursing note reviewed.   Constitutional:       General: He is not in acute distress.     Appearance: Normal appearance. He is well-developed. He is obese.   Neck:      Thyroid: No thyromegaly.   Cardiovascular:      Rate and Rhythm: Normal rate.      Heart sounds: Normal heart sounds.   Pulmonary:      Effort: Pulmonary effort is normal. No respiratory distress.   Abdominal:      Tenderness: There is no abdominal tenderness.   Musculoskeletal:         General: Normal range of motion.      Cervical back: Neck supple.   Skin:     General: Skin is warm.      Findings: No erythema.   Neurological:      Mental Status: He is alert and oriented to person, place, and time.       Lab Reviewed:  See results in subjective  Lab Results   Component Value Date    HGBA1C 7.0 (H) 12/11/2024     Lab Results   Component Value Date    CHOL 116 (L) 03/07/2024    HDL 36 (L) 03/07/2024    LDLCALC 57.2 (L) 03/07/2024    TRIG 114 03/07/2024    CHOLHDL 31.0 03/07/2024     Lab Results   Component Value Date     12/11/2024    K 4.6 12/11/2024     12/11/2024    CO2 25 12/11/2024     (H) 12/11/2024    BUN 35 (H) 12/11/2024    CREATININE 1.7 (H) 12/11/2024    CALCIUM 9.3 12/11/2024    PHOS 2.9 08/14/2024    PROT 6.2 07/31/2024    ALBUMIN 3.3 (L) 08/14/2024    BILITOT 0.6 07/31/2024    ALKPHOS 81 07/31/2024    AST 13 07/31/2024    ALT 15 07/31/2024    ANIONGAP 7 (L) 12/11/2024    EGFRNORACEVR 40.8 (A) 12/11/2024    TSH 1.198 03/28/2024    PTH 78.7 (H) 08/14/2024    BHPIIUTM91AZ 35 03/28/2024     Assessment     1. Type 2 diabetes mellitus with hyperglycemia, without long-term current use of insulin  Basic Metabolic Panel     Hemoglobin A1C    FARXIGA 5 mg Tab tablet    glipiZIDE (GLUCOTROL) 10 MG TR24    Microalbumin/Creatinine Ratio, Urine      2. Type 2 diabetes mellitus with diabetic neuropathy, without long-term current use of insulin  FARXIGA 5 mg Tab tablet      3. Closed displaced fracture of right femoral neck s/p total hip arthroplasty on 10/21/2022        4. Age-related osteoporosis with current pathological fracture with routine healing, subsequent encounter        5. Coronary artery disease involving native coronary artery of native heart without angina pectoris        6. Class 2 severe obesity due to excess calories with serious comorbidity and body mass index (BMI) of 36.0 to 36.9 in adult        7. Chronic kidney disease, stage 3a          Plan     Type 2 diabetes mellitus with hyperglycemia, without long-term current use of insulin  - Diabetes is NOT at goal, but improving given the most recent A1C reviewed 12/18/2024  - Reviewed goals of therapy: achieve the best control possible without hypoglycemia, with a target A1C <7%.  - Reviewed diabetic supplies and medications to ensure continued refills and compliance.   - Advised the patient to get periodic eye exams and proper foot care monitoring.  - Reviewed routine maintenance: lipid management (statin use) and urine protein/creatinine yearly.  - patient having issues with affording cost of Trulicity   - Complicated by hyperglycemia, obesity, hx CAD/CABG, CKD3b, dietary indiscretion.  - pharmacy assistant program for 2025    Plan  - Medication changes:  Increase Trulicity 4.5 mg once a week injection, getting it from pharmacy assistant  - Continue max dose glipizide XR 10 mg twice a day  - Continue Farxiga 5 mg daily given CKD3b, CAD and diabetes.    - Will attempt to get pharmacy assistant for Trulicity and Farxiga  - Encourage dietary modifications, portion size control, and decreasing carbohydrate intake to help manage diabetes.  - Clear written instructions given on  AVS.  - Follow up as scheduled with lab work prior. 4 months in clinic follow-up.    Closed displaced fracture of right femoral neck s/p total hip arthroplasty on 10/21/2022  - Right femoral neck fracture 10/21/2022>> patient fell picking up a newspaper requiring surgery.    - Follow-up with Orthopedic surgery at this time- Started on Prolia SC injection per orthopedic surgery>> so far has gotten 3 injection, next injection 09/2024, recommend continuing  - Follow up with up with Ortho    Osteoporosis  - Right femoral neck fracture 10/21/2022>> patient fell picking up a newspaper requiring surgery.    - Follow-up with Orthopedic surgery at this time- Started on Prolia SC injection per orthopedic surgery  - PROLIA 1st injection 3/17/2023>> every 6 months>> 3 injections so far>next injection 9/24/2024  - Plan to have R knee replacement 8/15/2024  - DXA: 1/19/2023>> next one in 2025  - Denies history of other fractures  - Currently not taking any vitamin supplements including calcium/vitamin-D>> level WNL  - Monitor treatment, would continue PROLIA given hx of CKD3b, defer to Ortho    Coronary artery disease involving native coronary artery of native heart without angina pectoris  - continue benefit of Farxiga and Trulicity    Class 2 severe obesity due to excess calories with serious comorbidity and body mass index (BMI) of 36.0 to 36.9 in adult  - Body mass index is 35.68 kg/m².  - Encourage pt to work on healthy diet, increase exercise as tolerated.  - Continue to monitor weight    Chronic kidney disease, stage 3a  - continue use of SGLT2  - monitor renal function      Advised patient to follow up with PCP for routine health maintenance care.   RTC in 4 months    Visit today included increased complexity associated with the care of the episodic problem addressed and managing the longitudinal care of the patient due to the serious and/or complex managed problem(s).   Including: Type 2 diabetes, obesity,  osteoporosis    Malcolm Kent M.D  Endocrinology  [facility]  12/18/2024      Disclaimer: This note has been generated using voice-recognition software. There may be typographical errors that have been missed during proof-reading.

## 2024-12-18 NOTE — PATIENT INSTRUCTIONS
Thank you for completing the visit with me    Continue:    Trulicity 4.5 mg once a week  Glipizide 10 mg twice a day   CAN decrease if having low glucose to daily (1 pill a day)  Farixga 5 mg daily      Please contact Pharmacy Patient Assistance:Trulicity and Farixga     Please contact: Bozena Abdi   Phone: [phone] (ask  for Bozena Abdi)  Shipping Location: [facility] - [phone]  (CALL THEM FOR REFILLS)    Email: [email or web address] or Email: [email or web address]    Fax: [phone]    We will plan an in-clinic visit in 4 months, with labs prior to that appointment.       Malcolm Kent M.D  [facility] [address] [facility] [address], [address]  [place] [address]    Office:  [phone]  Fax:  [phone]

## 2024-12-18 NOTE — ASSESSMENT & PLAN NOTE
- Right femoral neck fracture 10/21/2022>> patient fell picking up a newspaper requiring surgery.    - Follow-up with Orthopedic surgery at this time- Started on Prolia SC injection per orthopedic surgery>> so far has gotten 3 injection, next injection 09/2024, recommend continuing  - Follow up with up with Ortho

## 2024-12-18 NOTE — TELEPHONE ENCOUNTER
----- Message from Elvira sent at 12/18/2024  2:50 PM CST -----  Regarding: self  Who called: self    What is the request in detail: pt is calling about trulicity. Paperwork was sent to provider 12/5/24 and that is needs to be signed and sent back     Can the clinic reply by [facility]? No    Would the patient rather a call back or a response via [facility]? Call back    Best call back number: [phone]      Additional Information:    Thank you.

## 2024-12-19 ENCOUNTER — TELEPHONE (OUTPATIENT)
Dept: ENDOCRINOLOGY | Facility: CLINIC | Age: 79
End: 2024-12-19
Payer: MEDICARE

## 2024-12-19 NOTE — TELEPHONE ENCOUNTER
Wife asked isn't the pt farxiga supposed to be with pharmacy assistance. Informed them I will reach out to Bozena Abdi and Dr Kent. Understanding verbalized

## 2024-12-19 NOTE — TELEPHONE ENCOUNTER
----- Message from Michell sent at 12/19/2024 10:20 AM CST -----  Regarding: Refill Request  Type: RX Refill Request      Who Called:  Self       Refill or New Rx: refill       RX Name and Strength:  Disp Refills Start End DEQUAN  FARXIGA 5 mg Tab tablet           Preferred Pharmacy with phone number:  Pharmacy      [id number] - [place], [place] - [address]          Would the patient rather a call back or a response via [facility]? Call       Best Call Back Number: .[phone]

## 2024-12-27 ENCOUNTER — OFFICE VISIT (OUTPATIENT)
Dept: UROLOGY | Facility: CLINIC | Age: 79
End: 2024-12-27
Payer: MEDICARE

## 2024-12-27 VITALS — WEIGHT: 243.06 LBS | BODY MASS INDEX: 35.89 KG/M2

## 2024-12-27 DIAGNOSIS — R35.1 NOCTURIA MORE THAN TWICE PER NIGHT: ICD-10-CM

## 2024-12-27 DIAGNOSIS — R39.15 URINARY URGENCY: ICD-10-CM

## 2024-12-27 DIAGNOSIS — N13.8 BPH WITH URINARY OBSTRUCTION: Primary | ICD-10-CM

## 2024-12-27 DIAGNOSIS — N40.1 BPH WITH URINARY OBSTRUCTION: Primary | ICD-10-CM

## 2024-12-27 PROCEDURE — 99213 OFFICE O/P EST LOW 20 MIN: CPT | Mod: PBBFAC | Performed by: UROLOGY

## 2024-12-27 PROCEDURE — 99999 PR PBB SHADOW E&M-EST. PATIENT-LVL III: CPT | Mod: PBBFAC,,, | Performed by: UROLOGY

## 2024-12-27 NOTE — PROGRESS NOTES
Subjective:       Patient ID: Jani Cha is a 79 y.o. male The patient's last visit with me was on 6/25/2024.     Chief Complaint:   Chief Complaint   Patient presents with    Follow-up     6m         History of Kidney Stones  He had an issue with a ureteral stone in Winter 2015.  He did not recall passing the stone but his pain resolved.       3/25/2021  He had right flank pain and hematuria a couple of weeks ago. He brought the stone for analysis a couple of weeks ago.  He denies anymore hematuria, flank pain.  He denies fever.    5/3/2022  He denies pain or hematuria.      12/27/2024        Benign Prostatic Hyperplasia  He patient reports nocturia three times a night. He denies frequency, incomplete emptying and intermittency. The patient states symptoms are of moderate severity. Onset of symptoms was several years ago and was gradual in onset.  He has no personal history and no family history of prostate cancer. He reports a history of kidney stones. He denies flank pain, gross hematuria and recurrent UTI.  He is currently taking no prostate medications.  He has noted some frequency with occasional urgency.    8/3/2022  He was to add Flomax last visit.  He took it for no more than 2 weeks.  He stopped it due to retrograde ejaculation and pain in his prostate afterwards.  He did not note any change to his stream.    IPSS Questionnaire (AUA-7):  8    11/17/2022   He is doing okay.  He recently fractured his hip.  His stream is about the same.  He required a urologist to place the [facility].  He is not sure why.    He is not interested in trying prostate medications again.    11/29/2022 Orchitis  He went to the ED on 11/24/2022 after developing a fever.  His urine was red at the time.  He was given antibiotics for a UTI.  Two days later he develop pain in his testicles.  He went back to the ED.    12/27/2022  He feels better today.  His testicle is slightly sensitive.      IPSS Questionnaire (AUA-7):   17    03/30/2023  Cystoscopy this month showed bilateral lobe hypertrophy of the prostate.   He would like to proceed with a procedure on his prostate.     5/11/2023  He is s/p UroLift on 4/24/2023.  He is having a lot of urgency.  Nocturia may be improved.    IPSS Questionnaire (AUA-7):  20/4 6/23/2023  He still has some urgency.  His nocturia is improved to once every 2.5 hours.     12/22/2023  His days are variable.  He has noted more nocturia.    3/25/2024  He did not get Oxybutynin so he is about the same as before.  He tells me the pharmacy never got the prescription.    6/25/2024  He did better with Oxybuytunin, but the pharmacy is having supply issues.    12/27/2024  He is doing okay with Oxybutynin, he can go 2-3 hours.  He does better when he sleeps in his chair.         ACTIVE MEDICAL ISSUES:  Patient Active Problem List   Diagnosis    Hyperlipidemia    Primary hypertension    Coronary artery disease involving native coronary artery of native heart without angina pectoris    Sleep apnea    S/P CABG x 4    Type 2 diabetes mellitus with diabetic neuropathy, without long-term current use of insulin    GERD (gastroesophageal reflux disease)    Personal history of colonic polyps    Abdominal aortic aneurysm (AAA) without rupture    Total occlusion of coronary artery, chronic -LCX with collaterals.  No ischemic on PET    Pulmonary nodule    Thoracic aortic aneurysm without rupture    Bilateral renal cysts    History of PCI of RCA    Class 2 severe obesity due to excess calories with serious comorbidity and body mass index (BMI) of 36.0 to 36.9 in adult    Calcified granuloma of lung    Type 2 diabetes mellitus with hyperglycemia, without long-term current use of insulin    Lymphedema of both lower extremities    Swelling of lower extremity    Closed displaced fracture of right femoral neck s/p total hip arthroplasty on 10/21/2022    Hip pain    Chronic pain of both knees    Decreased strength, endurance, and  mobility    Osteoporosis    Localized osteoporosis of [place]    Adrenal incidentaloma    Diabetes mellitus due to underlying condition with stage 3 chronic kidney disease, without long-term current use of insulin, unspecified whether stage 3a or 3b CKD    Osteoarthritis of right knee    Primary osteoarthritis of left knee    BPH (benign prostatic hyperplasia)    Diarrhea    Chronic pain of left knee    Decreased range of motion of left knee    Weakness of left lower extremity    Gait, antalgic    Status post total knee replacement, left    Chronic kidney disease, stage 3a    Persistent proteinuria    Secondary renal hyperparathyroidism    Presence of right artificial hip joint    Impaired range of motion of right knee    Quadriceps weakness    Walker as ambulation aid    Congestive heart failure    Decreased range of motion (ROM) of right knee    Weakness of right quadriceps muscle       ALLERGIES AND MEDICATIONS: updated and reviewed.  Review of patient's allergies indicates:   Allergen Reactions    Penicillins Hives, Itching and Rash    Shellfish containing products      Current Outpatient Medications   Medication Sig    acetaminophen (TYLENOL) 650 MG TbSR Take 1 tablet (650 mg total) by mouth every 8 (eight) hours.    rfoxj-qnif-EhAHC-collag-mv-min (RAMÓN, WITH COLLAGEN,) 7-7-1.5 gram PwPk Take 1 each by mouth once daily.    aspirin (ECOTRIN) 81 MG EC tablet Take 1 tablet (81 mg total) by mouth 2 (two) times a day. After finishing this prescription, resume taking your daily aspirin and Plavix. for 7 days    atorvastatin (LIPITOR) 80 MG tablet Take 1 tablet (80 mg total) by mouth once daily.    benzonatate (TESSALON) 200 MG capsule Take 1 capsule (200 mg total) by mouth 3 (three) times daily as needed for Cough.    blood sugar diagnostic Strp 1 strip by Misc.(Non-Drug; Combo Route) route once daily.    blood sugar diagnostic Strp Dispense: True Metrix test strip, to check glucose 1 times a day, ICD-10: E11.65,  compatible with insurance/glucometer    carvediloL (COREG) 25 MG tablet TAKE 1 TABLET BY MOUTH TWICE DAILY WITH MEALS    clopidogreL (PLAVIX) 75 mg tablet Take 1 tablet by mouth once daily    dulaglutide (TRULICITY) 4.5 mg/0.5 mL pen injector Inject 4.5 mg into the skin every 7 days.    FARXIGA 5 mg Tab tablet Take 1 tablet (5 mg total) by mouth once daily.    fluticasone propionate (FLONASE) 50 mcg/actuation nasal spray 1 spray by Each Nostril route once daily.    food supplemt, lactose-reduced (ENSURE) Liqd Drink one bottle daily for 14 days.    glipiZIDE (GLUCOTROL) 10 MG TR24 Take 1 tablet (10 mg total) by mouth 2 (two) times daily with meals.    lancets Misc 1 lancet  by Misc.(Non-Drug; Combo Route) route Daily.    multivitamin (THERAGRAN) per tablet Take 1 tablet by mouth once daily.    oxybutynin (DITROPAN-XL) 5 MG TR24 Take 1 tablet (5 mg total) by mouth once daily.    oxyCODONE (ROXICODONE) 5 MG immediate release tablet Take 1-2 tablets every 4-6 hours as needed for pain    pantoprazole (PROTONIX) 40 MG tablet Take 1 tablet by mouth once daily    senna-docusate 8.6-50 mg (SENNA WITH DOCUSATE SODIUM) 8.6-50 mg per tablet Take 1 tablet by mouth once daily.     No current facility-administered medications for this visit.       Review of Systems   Constitutional:  Negative for chills and fever.   HENT:  Negative for congestion.    Respiratory:  Negative for chest tightness and shortness of breath.    Cardiovascular:  Negative for chest pain and palpitations.   Gastrointestinal:  Negative for abdominal pain, constipation, diarrhea, nausea and vomiting.   Genitourinary:  Positive for urgency. Negative for difficulty urinating, dysuria, flank pain and hematuria.   Musculoskeletal:  Negative for arthralgias.   Neurological:  Negative for dizziness.   Psychiatric/Behavioral:  Negative for confusion.        Objective:      Vitals:    12/27/24 1302   Weight: 110.2 kg (243 lb 0.9 oz)             Physical Exam  Vitals  and nursing note reviewed.   Constitutional:       Appearance: He is well-developed.   HENT:      Head: Normocephalic.   Eyes:      Conjunctiva/sclera: Conjunctivae normal.   Neck:      Thyroid: No thyromegaly.      Trachea: No tracheal deviation.   Cardiovascular:      Rate and Rhythm: Normal rate.      Heart sounds: Normal heart sounds.   Pulmonary:      Effort: Pulmonary effort is normal. No respiratory distress.      Breath sounds: Normal breath sounds. No wheezing.   Abdominal:      General: Bowel sounds are normal.      Palpations: Abdomen is soft.      Tenderness: There is no abdominal tenderness. There is no rebound.      Hernia: No hernia is present.   Musculoskeletal:         General: No tenderness. Normal range of motion.      Cervical back: Normal range of motion and neck supple.   Lymphadenopathy:      Cervical: No cervical adenopathy.   Skin:     General: Skin is warm and dry.      Findings: No erythema or rash.   Neurological:      Mental Status: He is alert and oriented to person, place, and time.   Psychiatric:         Behavior: Behavior normal.         Thought Content: Thought content normal.         Judgment: Judgment normal.         Urine dipstick shows negative for all components.  Micro exam: negative for WBC's or RBC's.    US Retroperitoneal Complete  Order: [id number]  Status: Final result       Visible to patient: Yes (not seen)       Next appt: Today at 01:30 PM in Urology (Jeanmarie Hanson MD)       Dx: Nocturia more than twice per night    1 Result Note       1 Patient Communication  Details    Reading Physician Reading Date Result Priority   Jareth De Leon MD  [phone] 6/21/2024 Routine     Narrative & Impression  EXAMINATION:  US RETROPERITONEAL COMPLETE     CLINICAL HISTORY:  Nocturia     TECHNIQUE:  Ultrasound of the kidneys and urinary bladder was performed including color flow and Doppler evaluation of the kidneys.     COMPARISON:  CT 02/19/2024     FINDINGS:  The right  kidney is normal in length measuring 11.5 cm. The left kidney is normal in length measuring 11.5 cm. Segmental arterial resistive indices are within normal limits. Bilateral renal cysts noted; right upper pole cyst measures 2.0 cm, lower pole cyst measures 1.6 cm, adjacent cyst measures 2.2 cm, left upper pole cyst measures 1.5 cm, lower pole cyst 1.1 cm, adjacent cyst measures 1.3 cm.  Three hyperechoic foci in the left kidney measuring 4 mm, suggestive of calcifications.  No hydronephrosis or renal masses identified.  The bladder is grossly unremarkable.     Bladder volume 44 ml     Post void residual 6 ml     Prostate volume 35 ml     Impression:     Bilateral renal cysts.  No masses or hydronephrosis identified.        Electronically signed by:Jareth De Leon MD  Date:                                            06/21/2024  Time:                                           12:12           Exam Ended: 06/21/24 11:56 CDT                     Assessment:       1. BPH with urinary obstruction    2. Nocturia more than twice per night    3. Urinary urgency                    Plan:       1. Nocturia more than twice per night  Oxybutynin    2. Urinary urgency  stable    3. BPH with urinary obstruction (Primary)    - Prostate Specific Antigen, Diagnostic; Future  - Prostate Specific Antigen, Diagnostic; Future       Follow up in about 1 year (around 12/27/2025) for Follow up Established, Review PSA.

## 2024-12-30 ENCOUNTER — TELEPHONE (OUTPATIENT)
Dept: FAMILY MEDICINE | Facility: CLINIC | Age: 79
End: 2024-12-30
Payer: MEDICARE

## 2024-12-31 ENCOUNTER — EXTERNAL CHRONIC CARE MANAGEMENT (OUTPATIENT)
Dept: PRIMARY CARE CLINIC | Facility: CLINIC | Age: 79
End: 2024-12-31
Payer: MEDICARE

## 2024-12-31 PROCEDURE — 99439 CHRNC CARE MGMT STAF EA ADDL: CPT | Mod: PBBFAC,PO | Performed by: FAMILY MEDICINE

## 2024-12-31 PROCEDURE — 99490 CHRNC CARE MGMT STAFF 1ST 20: CPT | Mod: PBBFAC,PO | Performed by: FAMILY MEDICINE

## 2025-01-03 ENCOUNTER — PATIENT MESSAGE (OUTPATIENT)
Dept: PHARMACY | Facility: CLINIC | Age: 80
End: 2025-01-03
Payer: MEDICARE

## 2025-01-07 ENCOUNTER — PATIENT OUTREACH (OUTPATIENT)
Dept: ADMINISTRATIVE | Facility: HOSPITAL | Age: 80
End: 2025-01-07
Payer: MEDICARE

## 2025-01-07 ENCOUNTER — LAB VISIT (OUTPATIENT)
Dept: LAB | Facility: HOSPITAL | Age: 80
End: 2025-01-07
Attending: INTERNAL MEDICINE
Payer: MEDICARE

## 2025-01-07 DIAGNOSIS — N18.31 CHRONIC KIDNEY DISEASE, STAGE 3A: ICD-10-CM

## 2025-01-07 DIAGNOSIS — R80.1 PERSISTENT PROTEINURIA: ICD-10-CM

## 2025-01-07 DIAGNOSIS — I10 PRIMARY HYPERTENSION: ICD-10-CM

## 2025-01-07 DIAGNOSIS — N25.81 SECONDARY RENAL HYPERPARATHYROIDISM: ICD-10-CM

## 2025-01-07 LAB
BACTERIA #/AREA URNS AUTO: NORMAL /HPF
BILIRUB UR QL STRIP: NEGATIVE
CLARITY UR REFRACT.AUTO: CLEAR
COLOR UR AUTO: YELLOW
CREAT UR-MCNC: 59 MG/DL (ref 23–375)
GLUCOSE UR QL STRIP: ABNORMAL
HGB UR QL STRIP: NEGATIVE
KETONES UR QL STRIP: NEGATIVE
LEUKOCYTE ESTERASE UR QL STRIP: NEGATIVE
MICROSCOPIC COMMENT: NORMAL
NITRITE UR QL STRIP: NEGATIVE
PH UR STRIP: 7 [PH] (ref 5–8)
PROT UR QL STRIP: NEGATIVE
PROT UR-MCNC: 10 MG/DL (ref 0–15)
PROT/CREAT UR: 0.17 MG/G{CREAT} (ref 0–0.2)
RBC #/AREA URNS AUTO: 1 /HPF (ref 0–4)
SP GR UR STRIP: 1.02 (ref 1–1.03)
URN SPEC COLLECT METH UR: ABNORMAL
WBC #/AREA URNS AUTO: 0 /HPF (ref 0–5)
YEAST UR QL AUTO: NORMAL

## 2025-01-07 PROCEDURE — 81001 URINALYSIS AUTO W/SCOPE: CPT | Performed by: INTERNAL MEDICINE

## 2025-01-07 PROCEDURE — 84156 ASSAY OF PROTEIN URINE: CPT | Performed by: INTERNAL MEDICINE

## 2025-01-08 PROBLEM — M10.9 GOUT: Status: ACTIVE | Noted: 2025-01-08

## 2025-01-08 NOTE — PROGRESS NOTES
[facility]  [address] [facility], [address]  [place]  [phone]    PCP: Laila Bains MD  Endocrinologist: Yoli  Urologist: Valentin  Vascular: [place]      HPI: Mr. Jnai Cha is a 79 y.o. male with HTN, T2DM, OA, CAD s/p CABG, and AAA who is here for established patient evaluation of Chronic Kidney Disease  Initial visit 4/25/24. His baseline Cr has been 1.5-1.7 with GFR 41-48% since 3/2019. His Cr briana to 2.0 on 3/7/24 and 2.1 on 3/28/24 which prompted this referral. Cr has since improved back to baseline. Urine studies with proteinuria since at least 2008; UPCR negative today.   He reports he was diagnosed with T2DM in 1960. He was diagnosed with HTN prior to DM. No h/o hospitalizations for DKA or hypertensive emergency. He reports prior kidney stones; last episode > 10-12 years ago. He also has a h/o gout. He denies NSAID use. No family h/o kidney disease.   In regards to his elevated Cr in March; he reports undergoing radiofrequency ablation for knee arthritis that month; as well as receiving steroids.   He notes occasional BLE swelling; more so in his LLE s/p vein harvesting for CABG.      4/25/24: no medication changes.      Interval Hx:   LV 10/2/24: valsartan discontinued 8/2024 during hospitalization for knee surgery; continued holding. Recommended increasing Farxiga from 5mg to 10mg daily.   Today he reports to be doing well. He doesn't check his BP at home. He denies any light-headedness or dizziness.  this AM; highest is 130-140s. Chronic LLE edema; not bothersome.   No recent gout flares; avoids shellfish.   He has great-grandchildren in [place] and a new one in [place]. His granddaughter is a physician in [place].       ROS:  Complete ROS otherwise negative except as indicated above.         Current Outpatient Medications:     acetaminophen (TYLENOL) 650 MG TbSR, Take 1 tablet (650 mg total) by mouth every 8 (eight) hours., Disp: 120 tablet, Rfl: 0     qzipg-tipe-AkOBZ-collag-mv-min (RAMÓN, WITH COLLAGEN,) 7-7-1.5 gram PwPk, Take 1 each by mouth once daily., Disp: 14 packet, Rfl: 0    aspirin (ECOTRIN) 81 MG EC tablet, Take 1 tablet (81 mg total) by mouth 2 (two) times a day. After finishing this prescription, resume taking your daily aspirin and Plavix. for 7 days, Disp: 14 tablet, Rfl: 0    atorvastatin (LIPITOR) 80 MG tablet, Take 1 tablet (80 mg total) by mouth once daily., Disp: 90 tablet, Rfl: 3    blood sugar diagnostic Strp, 1 strip by Misc.(Non-Drug; Combo Route) route once daily., Disp: 200 strip, Rfl: 11    blood sugar diagnostic Strp, Dispense: True Metrix test strip, to check glucose 1 times a day, ICD-10: E11.65, compatible with insurance/glucometer, Disp: 100 each, Rfl: 5    carvediloL (COREG) 25 MG tablet, TAKE 1 TABLET BY MOUTH TWICE DAILY WITH MEALS, Disp: 180 tablet, Rfl: 3    clopidogreL (PLAVIX) 75 mg tablet, Take 1 tablet by mouth once daily, Disp: 90 tablet, Rfl: 3    dulaglutide (TRULICITY) 4.5 mg/0.5 mL pen injector, Inject 4.5 mg into the skin every 7 days., Disp: , Rfl:     FARXIGA 5 mg Tab tablet, Take 1 tablet (5 mg total) by mouth once daily., Disp: 90 tablet, Rfl: 3    fluticasone propionate (FLONASE) 50 mcg/actuation nasal spray, 1 spray by Each Nostril route once daily., Disp: , Rfl:     glipiZIDE (GLUCOTROL) 10 MG TR24, Take 1 tablet (10 mg total) by mouth 2 (two) times daily with meals., Disp: 180 tablet, Rfl: 3    lancets Misc, 1 lancet  by Misc.(Non-Drug; Combo Route) route Daily., Disp: 100 each, Rfl: 11    oxybutynin (DITROPAN-XL) 5 MG TR24, Take 1 tablet (5 mg total) by mouth once daily., Disp: 90 tablet, Rfl: 3    pantoprazole (PROTONIX) 40 MG tablet, Take 1 tablet by mouth once daily, Disp: 90 tablet, Rfl: 1    valsartan (DIOVAN) 80 MG tablet, Take 80 mg by mouth once daily., Disp: , Rfl:     benzonatate (TESSALON) 200 MG capsule, Take 1 capsule (200 mg total) by mouth 3 (three) times daily as needed for Cough. (Patient  "not taking: Reported on 1/9/2025), Disp: 45 capsule, Rfl: 1    food supplemt, lactose-reduced (ENSURE) Liqd, Drink one bottle daily for 14 days. (Patient not taking: Reported on 1/9/2025), Disp: 3318 mL, Rfl: 0    multivitamin (THERAGRAN) per tablet, Take 1 tablet by mouth once daily. (Patient not taking: Reported on 1/9/2025), Disp: 14 tablet, Rfl: 0    oxyCODONE (ROXICODONE) 5 MG immediate release tablet, Take 1-2 tablets every 4-6 hours as needed for pain (Patient not taking: Reported on 1/9/2025), Disp: 50 tablet, Rfl: 0    senna-docusate 8.6-50 mg (SENNA WITH DOCUSATE SODIUM) 8.6-50 mg per tablet, Take 1 tablet by mouth once daily. (Patient not taking: Reported on 1/9/2025), Disp: 30 tablet, Rfl: 0      Vitals: Blood pressure 122/68, pulse 68, resp. rate 18, height 5' 9" (1.753 m), weight 111.7 kg (246 lb 4.1 oz), SpO2 98%. Body mass index is 36.37 kg/m².    Physical Exam  Vitals reviewed.   Constitutional:       General: He is awake. He is not in acute distress.     Appearance: Normal appearance. He is well-developed.   HENT:      Head: Normocephalic and atraumatic.      Nose: Nose normal.      Mouth/Throat:      Mouth: Mucous membranes are moist.   Eyes:      Extraocular Movements: Extraocular movements intact.      Conjunctiva/sclera: Conjunctivae normal.   Pulmonary:      Effort: Pulmonary effort is normal.   Musculoskeletal:         General: No tenderness or signs of injury.      Right lower leg: No edema.      Left lower leg: Edema (trace pitting) present.   Skin:     General: Skin is warm and dry.      Findings: No erythema or rash.   Neurological:      General: No focal deficit present.      Mental Status: He is alert. Mental status is at baseline.   Psychiatric:         Mood and Affect: Mood normal.         Behavior: Behavior normal.           Labs/Imaging:  Sodium   Date Value Ref Range Status   01/07/2025 140 136 - 145 mmol/L Final   12/11/2024 142 136 - 145 mmol/L Final   08/14/2024 141 136 - 145 " mmol/L Final     Potassium   Date Value Ref Range Status   01/07/2025 4.3 3.5 - 5.1 mmol/L Final   12/11/2024 4.6 3.5 - 5.1 mmol/L Final   08/14/2024 4.0 3.5 - 5.1 mmol/L Final     Chloride   Date Value Ref Range Status   01/07/2025 104 95 - 110 mmol/L Final   12/11/2024 110 95 - 110 mmol/L Final   08/14/2024 108 95 - 110 mmol/L Final     CO2   Date Value Ref Range Status   01/07/2025 27 23 - 29 mmol/L Final   12/11/2024 25 23 - 29 mmol/L Final   08/14/2024 22 (L) 23 - 29 mmol/L Final     BUN   Date Value Ref Range Status   01/07/2025 28 (H) 8 - 23 mg/dL Final   12/11/2024 35 (H) 8 - 23 mg/dL Final   08/14/2024 34 (H) 8 - 23 mg/dL Final     Creatinine   Date Value Ref Range Status   01/07/2025 1.5 (H) 0.5 - 1.4 mg/dL Final   12/11/2024 1.7 (H) 0.5 - 1.4 mg/dL Final   08/14/2024 1.7 (H) 0.5 - 1.4 mg/dL Final     eGFR   Date Value Ref Range Status   01/07/2025 47.4 (A) >60 mL/min/1.73 m^2 Final   12/11/2024 40.8 (A) >60 mL/min/1.73 m^2 Final   08/14/2024 40.8 (A) >60 mL/min/1.73 m^2 Final     Calcium   Date Value Ref Range Status   01/07/2025 9.2 8.7 - 10.5 mg/dL Final   12/11/2024 9.3 8.7 - 10.5 mg/dL Final   08/14/2024 9.8 8.7 - 10.5 mg/dL Final     Phosphorus   Date Value Ref Range Status   01/07/2025 2.8 2.7 - 4.5 mg/dL Final   08/14/2024 2.9 2.7 - 4.5 mg/dL Final   04/23/2024 2.7 2.7 - 4.5 mg/dL Final     Albumin   Date Value Ref Range Status   01/07/2025 3.4 (L) 3.5 - 5.2 g/dL Final   08/14/2024 3.3 (L) 3.5 - 5.2 g/dL Final   07/31/2024 3.3 (L) 3.5 - 5.2 g/dL Final       PTH, Intact   Date Value Ref Range Status   01/07/2025 123.0 (H) 9.0 - 77.0 pg/mL Final   08/14/2024 78.7 (H) 9.0 - 77.0 pg/mL Final   04/23/2024 102.9 (H) 9.0 - 77.0 pg/mL Final     Vit D, 25-Hydroxy   Date Value Ref Range Status   03/28/2024 35 30 - 96 ng/mL Final     Comment:     Vitamin D deficiency.........<10 ng/mL                              Vitamin D insufficiency......10-29 ng/mL       Vitamin D sufficiency........> or equal to 30  ng/mL  Vitamin D toxicity............>100 ng/mL       Uric Acid   Date Value Ref Range Status   01/07/2025 5.6 3.4 - 7.0 mg/dL Final   08/14/2024 7.1 (H) 3.4 - 7.0 mg/dL Final   04/23/2024 6.2 3.4 - 7.0 mg/dL Final       Hemoglobin   Date Value Ref Range Status   01/07/2025 14.6 14.0 - 18.0 g/dL Final   08/14/2024 14.6 14.0 - 18.0 g/dL Final   07/31/2024 13.7 (L) 14.0 - 18.0 g/dL Final       Prot/Creat Ratio, Urine   Date Value Ref Range Status   01/07/2025 0.17 0.00 - 0.20 Final   08/14/2024 0.18 0.00 - 0.20 Final   04/23/2024 0.15 0.00 - 0.20 Final         Renal US: 6/21/24:   The right kidney is normal in length measuring 11.5 cm. The left kidney is normal in length measuring 11.5 cm. Segmental arterial resistive indices are within normal limits. Bilateral renal cysts noted; right upper pole cyst measures 2.0 cm, lower pole cyst measures 1.6 cm, adjacent cyst measures 2.2 cm, left upper pole cyst measures 1.5 cm, lower pole cyst 1.1 cm, adjacent cyst measures 1.3 cm.  Three hyperechoic foci in the left kidney measuring 4 mm, suggestive of calcifications.  No hydronephrosis or renal masses identified.  The bladder is grossly unremarkable.        Impression/Plan:    Chronic kidney disease, stage 3a  - baseline Cr 1.5-1.7 with GFR 41-48% since 2019 with h/o proteinuria; likely due to diabetic nephropathy but cannot rule out contribution from cardiovascular disease/arterionephrosclerosis.   - Cr 1.7 --> 1.5 today; stable and at baseline  - UPCR 0.18 --> 0.17; remains negative despite absence of RAAS blockade. Currently on Farxiga 5mg daily; recommend increasing to 10mg daily for renal protective benefit  - patient requested f/u in 1 year but I'd like to check his UPCR sooner than that. Labs in 6 months; labs/visit in 1 year.    Secondary renal hyperparathyroidism  - PTH 79 --> 123; controlled. CCa and phos normal. Will trend.     Gout  - no recent flares; controlled with diet  - uric acid 7.7 --> 5.6; improved  -  will trend     Primary hypertension  - controlled on current regimen  - low threshold to restart valsartan dose if additional BP control becomes needed or proteinuria develops     Renal cyst  - being followed by Urology          Visit today included increased complexity associated with the care of the episodic problem gout addressed and managing the longitudinal care of the patient due to the serious and/or complex managed problem(s) CKD.      Treatment options and plan were discussed with the patient and/or caregiver.   Labs in 6 months. RTC 1 year.       Nancy Booth MD  [facility]  [phone]

## 2025-01-09 ENCOUNTER — OFFICE VISIT (OUTPATIENT)
Dept: NEPHROLOGY | Facility: CLINIC | Age: 80
End: 2025-01-09
Payer: MEDICARE

## 2025-01-09 VITALS
DIASTOLIC BLOOD PRESSURE: 68 MMHG | OXYGEN SATURATION: 98 % | SYSTOLIC BLOOD PRESSURE: 122 MMHG | BODY MASS INDEX: 36.47 KG/M2 | HEIGHT: 69 IN | RESPIRATION RATE: 18 BRPM | WEIGHT: 246.25 LBS | HEART RATE: 68 BPM

## 2025-01-09 DIAGNOSIS — N18.31 CHRONIC KIDNEY DISEASE, STAGE 3A: Primary | ICD-10-CM

## 2025-01-09 DIAGNOSIS — N25.81 SECONDARY RENAL HYPERPARATHYROIDISM: ICD-10-CM

## 2025-01-09 DIAGNOSIS — M10.9 GOUT, UNSPECIFIED CAUSE, UNSPECIFIED CHRONICITY, UNSPECIFIED SITE: ICD-10-CM

## 2025-01-09 DIAGNOSIS — I10 PRIMARY HYPERTENSION: ICD-10-CM

## 2025-01-09 PROCEDURE — 99215 OFFICE O/P EST HI 40 MIN: CPT | Mod: PBBFAC | Performed by: INTERNAL MEDICINE

## 2025-01-09 PROCEDURE — 99999 PR PBB SHADOW E&M-EST. PATIENT-LVL V: CPT | Mod: PBBFAC,,, | Performed by: INTERNAL MEDICINE

## 2025-01-09 RX ORDER — VALSARTAN 80 MG/1
80 TABLET ORAL DAILY
COMMUNITY

## 2025-01-09 NOTE — ASSESSMENT & PLAN NOTE
- baseline Cr 1.5-1.7 with GFR 41-48% since 2019 with h/o proteinuria; likely due to diabetic nephropathy but cannot rule out contribution from cardiovascular disease/arterionephrosclerosis.   - Cr 1.7 --> 1.5 today; stable and at baseline  - UPCR 0.18 --> 0.17; remains negative despite absence of RAAS blockade. Currently on Farxiga 5mg daily; recommend increasing to 10mg daily for renal protective benefit  - patient requested f/u in 1 year but I'd like to check his UPCR sooner than that. Labs in 6 months; labs/visit in 1 year.

## 2025-01-09 NOTE — ASSESSMENT & PLAN NOTE
- controlled on current regimen  - low threshold to restart valsartan dose if additional BP control becomes needed or proteinuria develops

## 2025-01-31 ENCOUNTER — EXTERNAL CHRONIC CARE MANAGEMENT (OUTPATIENT)
Dept: PRIMARY CARE CLINIC | Facility: CLINIC | Age: 80
End: 2025-01-31
Payer: MEDICARE

## 2025-01-31 PROCEDURE — 99490 CHRNC CARE MGMT STAFF 1ST 20: CPT | Mod: PBBFAC,PO | Performed by: FAMILY MEDICINE

## 2025-01-31 PROCEDURE — 99490 CHRNC CARE MGMT STAFF 1ST 20: CPT | Mod: S$PBB,,, | Performed by: FAMILY MEDICINE

## 2025-02-03 ENCOUNTER — OFFICE VISIT (OUTPATIENT)
Dept: PODIATRY | Facility: CLINIC | Age: 80
End: 2025-02-03
Payer: MEDICARE

## 2025-02-03 VITALS — BODY MASS INDEX: 36.47 KG/M2 | HEIGHT: 69 IN | WEIGHT: 246.25 LBS

## 2025-02-03 DIAGNOSIS — B35.1 ONYCHOMYCOSIS DUE TO DERMATOPHYTE: ICD-10-CM

## 2025-02-03 DIAGNOSIS — E11.49 TYPE II DIABETES MELLITUS WITH NEUROLOGICAL MANIFESTATIONS: Primary | ICD-10-CM

## 2025-02-03 PROCEDURE — 11721 DEBRIDE NAIL 6 OR MORE: CPT | Mod: Q9,S$PBB,, | Performed by: PODIATRIST

## 2025-02-03 PROCEDURE — 99213 OFFICE O/P EST LOW 20 MIN: CPT | Mod: PBBFAC,PO | Performed by: PODIATRIST

## 2025-02-03 PROCEDURE — 99999 PR PBB SHADOW E&M-EST. PATIENT-LVL III: CPT | Mod: PBBFAC,,, | Performed by: PODIATRIST

## 2025-02-03 PROCEDURE — 99499 UNLISTED E&M SERVICE: CPT | Mod: S$PBB,,, | Performed by: PODIATRIST

## 2025-02-03 PROCEDURE — 11721 DEBRIDE NAIL 6 OR MORE: CPT | Mod: Q9,PBBFAC,PO | Performed by: PODIATRIST
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
## 2025-02-04 ENCOUNTER — OFFICE VISIT (OUTPATIENT)
Dept: FAMILY MEDICINE | Facility: CLINIC | Age: 80
End: 2025-02-04
Payer: MEDICARE

## 2025-02-04 VITALS
TEMPERATURE: 98 F | WEIGHT: 243.06 LBS | SYSTOLIC BLOOD PRESSURE: 116 MMHG | DIASTOLIC BLOOD PRESSURE: 66 MMHG | OXYGEN SATURATION: 94 % | HEART RATE: 60 BPM | HEIGHT: 69 IN | BODY MASS INDEX: 36 KG/M2

## 2025-02-04 DIAGNOSIS — Z74.09 OTHER REDUCED MOBILITY: ICD-10-CM

## 2025-02-04 DIAGNOSIS — N40.1 BENIGN PROSTATIC HYPERPLASIA WITH URINARY FREQUENCY: ICD-10-CM

## 2025-02-04 DIAGNOSIS — R26.9 ABNORMALITY OF GAIT AND MOBILITY: ICD-10-CM

## 2025-02-04 DIAGNOSIS — Z71.89 ADVANCED DIRECTIVES, COUNSELING/DISCUSSION: ICD-10-CM

## 2025-02-04 DIAGNOSIS — N25.81 SECONDARY RENAL HYPERPARATHYROIDISM: ICD-10-CM

## 2025-02-04 DIAGNOSIS — I10 PRIMARY HYPERTENSION: ICD-10-CM

## 2025-02-04 DIAGNOSIS — J98.4 CALCIFIED GRANULOMA OF LUNG: ICD-10-CM

## 2025-02-04 DIAGNOSIS — I50.9 CONGESTIVE HEART FAILURE, UNSPECIFIED HF CHRONICITY, UNSPECIFIED HEART FAILURE TYPE: ICD-10-CM

## 2025-02-04 DIAGNOSIS — E78.00 PURE HYPERCHOLESTEROLEMIA: Chronic | ICD-10-CM

## 2025-02-04 DIAGNOSIS — M80.00XD AGE-RELATED OSTEOPOROSIS WITH CURRENT PATHOLOGICAL FRACTURE WITH ROUTINE HEALING, SUBSEQUENT ENCOUNTER: ICD-10-CM

## 2025-02-04 DIAGNOSIS — N18.31 CHRONIC KIDNEY DISEASE, STAGE 3A: ICD-10-CM

## 2025-02-04 DIAGNOSIS — Z99.89 USE OF CANE AS AMBULATORY AID: ICD-10-CM

## 2025-02-04 DIAGNOSIS — D69.2 SENILE PURPURA: ICD-10-CM

## 2025-02-04 DIAGNOSIS — E11.65 TYPE 2 DIABETES MELLITUS WITH HYPERGLYCEMIA, WITHOUT LONG-TERM CURRENT USE OF INSULIN: ICD-10-CM

## 2025-02-04 DIAGNOSIS — R35.0 BENIGN PROSTATIC HYPERPLASIA WITH URINARY FREQUENCY: ICD-10-CM

## 2025-02-04 DIAGNOSIS — I71.40 ABDOMINAL AORTIC ANEURYSM (AAA) WITHOUT RUPTURE, UNSPECIFIED PART: ICD-10-CM

## 2025-02-04 DIAGNOSIS — Z00.00 ENCOUNTER FOR MEDICARE ANNUAL WELLNESS EXAM: Primary | ICD-10-CM

## 2025-02-04 DIAGNOSIS — R91.8 PULMONARY NODULES: ICD-10-CM

## 2025-02-04 DIAGNOSIS — I71.23 ANEURYSM OF DESCENDING THORACIC AORTA WITHOUT RUPTURE: ICD-10-CM

## 2025-02-04 PROBLEM — E66.01 CLASS 2 SEVERE OBESITY DUE TO EXCESS CALORIES WITH SERIOUS COMORBIDITY AND BODY MASS INDEX (BMI) OF 36.0 TO 36.9 IN ADULT: Status: RESOLVED | Noted: 2021-11-12 | Resolved: 2025-02-04

## 2025-02-04 PROBLEM — E66.812 CLASS 2 SEVERE OBESITY DUE TO EXCESS CALORIES WITH SERIOUS COMORBIDITY AND BODY MASS INDEX (BMI) OF 36.0 TO 36.9 IN ADULT: Status: RESOLVED | Noted: 2021-11-12 | Resolved: 2025-02-04

## 2025-02-04 PROCEDURE — G0439 PPPS, SUBSEQ VISIT: HCPCS | Mod: ,,,

## 2025-02-04 PROCEDURE — 99215 OFFICE O/P EST HI 40 MIN: CPT | Mod: PBBFAC,PO

## 2025-02-04 PROCEDURE — 99999 PR PBB SHADOW E&M-EST. PATIENT-LVL V: CPT | Mod: PBBFAC,,,

## 2025-02-04 NOTE — ASSESSMENT & PLAN NOTE
Stable, asymptomatic chronic condition.  Will continue to maximize risk factor reduction and adjust medication as needed. Continue statin.

## 2025-02-04 NOTE — ASSESSMENT & PLAN NOTE
Lab Results   Component Value Date    HGBA1C 7.0 (H) 12/11/2024     Glucose/A1C stable. The current medical regimen is effective. Continue Trulicity, glipizide as prescribed.      Consent 2/Introductory Paragraph: Mohs surgery was explained to the patient and consent was obtained. The risks, benefits and alternatives to therapy were discussed in detail. Specifically, the risks of infection, scarring, bleeding, prolonged wound healing, incomplete removal, allergy to anesthesia, nerve injury and recurrence were addressed. Prior to the procedure, the treatment site was clearly identified and confirmed by the patient. All components of Universal Protocol/PAUSE Rule completed.

## 2025-02-04 NOTE — ASSESSMENT & PLAN NOTE
F/b Dr. Echavarria. Stable, asymptomatic chronic condition.  Will continue to maximize risk factor reduction and adjust medication as needed

## 2025-02-04 NOTE — PATIENT INSTRUCTIONS
Counseling and Referral of Other Preventative  (Italic type indicates deductible and co-insurance are waived)    Patient Name: Jani Cha  Today's Date: 2/4/2025    Health Maintenance       Date Due Completion Date    Shingles Vaccine (1 of 2) Never done ---    RSV Vaccine (Age 60+ and Pregnant patients) (1 - 1-dose 75+ series) Never done ---    DEXA Scan 01/19/2025 1/19/2023    Diabetes Urine Screening 03/07/2025 3/7/2024    Lipid Panel 03/07/2025 3/7/2024    Diabetic Eye Exam 05/14/2025 5/14/2024    Hemoglobin A1c 06/11/2025 12/11/2024    Aspirin/Antiplatelet Therapy 02/04/2026 2/4/2025    TETANUS VACCINE 02/20/2027 2/20/2017        No orders of the defined types were placed in this encounter.      The following information is provided to all patients.  This information is to help you find resources for any of the problems found today that may be affecting your health:                  Living healthy guide: www.[facility].[place].gov      Understanding Diabetes: www.diabetes.org      Eating healthy: www.cdc.gov/healthyweight      [facility] home safety checklist: www.cdc.gov/steadi/patient.html      Agency on Aging: www.goea.[place].gov      Alcoholics anonymous (AA): www.aa.org      Physical Activity: www.wing.nih.gov/yn2xeia      Tobacco use: www.quitwithusla.org

## 2025-02-04 NOTE — ASSESSMENT & PLAN NOTE
Denies worsening chest pain or SOB. Stable, asymptomatic chronic condition.  Will continue to maximize risk factor reduction and adjust medication as needed

## 2025-02-04 NOTE — ASSESSMENT & PLAN NOTE
BMP  Lab Results   Component Value Date     01/07/2025    K 4.3 01/07/2025     01/07/2025    CO2 27 01/07/2025    BUN 28 (H) 01/07/2025    CREATININE 1.5 (H) 01/07/2025    CALCIUM 9.2 01/07/2025    ANIONGAP 9 01/07/2025    EGFRNORACEVR 47.4 (A) 01/07/2025     Stable CKD. Farxiga, valsartan. Continue sodium restriction, and avoidance of nephrotoxic agents.

## 2025-02-04 NOTE — PROGRESS NOTES
"  Jani Cha presented for a follow-up Medicare AWV today. The following components were reviewed and updated:    Medical history  Family History  Social history  Allergies and Current Medications  Health Risk Assessment  Health Maintenance  Care Team    **See Completed Assessments for Annual Wellness visit with in the encounter summary    The following assessments were completed:  Depression Screening  Cognitive function Screening  Timed Get Up Test  Whisper Test    Clock:    Opioid documentation:      Patient does not have a current opioid prescription.          Vitals:    02/04/25 1014   BP: 116/66   Pulse: 60   Temp: 97.8 °F (36.6 °C)   TempSrc: Oral   SpO2: (!) 94%   Weight: 110.2 kg (243 lb 0.9 oz)   Height: 5' 9" (1.753 m)     Body mass index is 35.89 kg/m².       Physical Exam  Constitutional:       General: He is not in acute distress.     Appearance: He is obese. He is not toxic-appearing.   Cardiovascular:      Rate and Rhythm: Normal rate and regular rhythm.      Pulses: Normal pulses.      Heart sounds: No murmur heard.  Pulmonary:      Effort: Pulmonary effort is normal. No respiratory distress.   Abdominal:      General: There is no abdominal bruit.   Musculoskeletal:      Cervical back: Normal range of motion.   Skin:     Findings: Bruising present.   Neurological:      General: No focal deficit present.      Mental Status: He is alert and oriented to person, place, and time.   Psychiatric:         Mood and Affect: Mood normal.           Diagnoses and health risks identified today and associated recommendations/orders:  1. Encounter for Medicare annual wellness exam  Pt was seen today for an Annual Wellness visit. Healthcare maintenance and screening recommendations were discussed and updated as indicated. Return in one year for AWV.      2. Pure hypercholesterolemia  Assessment & Plan:  Stable, asymptomatic chronic condition.  Will continue to maximize risk factor reduction and adjust " medication as needed. Continue statin.     3. Primary hypertension  Assessment & Plan:  BP stable. The current medical regimen is effective. Continue Valsartan as prescribed.       4. Abdominal aortic aneurysm (AAA) without rupture, unspecified part  Overview:  Infrarenal abdominal aortic aneurysm measuring 3.5-cm in diffuse dilatation of the descending thoracic aorta measuring 3.9-cm. CT 12/2015      5. Congestive heart failure, unspecified HF chronicity, unspecified heart failure type  F/b Dr. Oliveira. Stable, asymptomatic chronic condition.  Will continue to maximize risk factor reduction and adjust medication as needed. Statin. Plavix/ASA      6. Chronic kidney disease, stage 3a  Assessment & Plan:  BMP  Lab Results   Component Value Date     01/07/2025    K 4.3 01/07/2025     01/07/2025    CO2 27 01/07/2025    BUN 28 (H) 01/07/2025    CREATININE 1.5 (H) 01/07/2025    CALCIUM 9.2 01/07/2025    ANIONGAP 9 01/07/2025    EGFRNORACEVR 47.4 (A) 01/07/2025     Stable CKD. Farxiga, valsartan. Continue sodium restriction, and avoidance of nephrotoxic agents.        7. Benign prostatic hyperplasia with urinary frequency  F/b urology. The current medical regimen is effective;  continue present plan and medications. Oxybutynin    8. Type 2 diabetes mellitus with hyperglycemia, without long-term current use of insulin  Assessment & Plan:  Lab Results   Component Value Date    HGBA1C 7.0 (H) 12/11/2024     Glucose/A1C stable. The current medical regimen is effective. Continue Trulicity, glipizide as prescribed.         9. Pulmonary nodules  Overview:  4/2020 - Stable appearance of bilateral pulmonary nodules measuring up to 1.5 cm.    Ct CHEST 2024: Lungs: Trachea and bronchi are patent. Mild centrilobular emphysema. Bronchiectasis with volume loss of the right middle lobe. A few scattered pulmonary micro nodules. Noncalcified pleural plaques the lower hemidiaphragm bilaterally measuring up to 1.3 cm on the right  and 1.2 cm on the left, unchanged.     Assessment & Plan:  Denies worsening chest pain or SOB. Stable, asymptomatic chronic condition.  Will continue to maximize risk factor reduction and adjust medication as needed        10. Abnormality of gait and mobility  Ambulatory with cane.     11. Other reduced mobility  Ambulatory with cane. Denies recent fall.   12. Senile purpura  Plavix/ASA. Reviewed recent labs. Noted to bilateral forearms.     13. Advanced directives, counseling/discussion    14. Calcified granuloma of lung  Overview:  CT 4/12/21    Assessment & Plan:  Stable, asymptomatic chronic condition.  Will continue to maximize risk factor reduction and adjust medication as needed        15. Aneurysm of descending thoracic aorta without rupture  Overview:  4/2020 - Fusiform aneurysmal dilatation of the descending thoracic aorta as well as the infrarenal abdominal aorta    Follow-up with Dr. Echavarria     CT 2024: Aorta: Ascending thoracic aorta is mildly dilated measuring up to 4.2 cm, previously measuring 4.2 cm. Descending thoracic aorta is dilated measuring up to 4.3 cm, previously measuring 4.3 cm. Mild calcific atherosclerosis.     Assessment & Plan:  F/b Dr. Echavarria. Stable, asymptomatic chronic condition.  Will continue to maximize risk factor reduction and adjust medication as needed        16. Age-related osteoporosis with current pathological fracture with routine healing, subsequent encounter  Overview:  DEXA 2023:      *Osteoporosis based on T-score between -1.0 and -2.5 and elevated risk based on FRAX and history of femur fracture.  *Fracture risk is very high due to recent fragility fracture (within the past 12 months).    RECOMMENDATIONS:  *Daily calcium intake 6271-8186 mg, dietary sources preferred; Vitamin D 9719-5875 IU daily.  *Weight bearing exercise and fall precautions.  *Recommend medical therapy for osteoporosis. Consider bisphosphonates (alendronate, risedronate, zoledronic acid),  denosumab, teriparatide, abaloparatide or romosozumab.  *Repeat BMD in 2 years.       Assessment & Plan:  Prolia. Continue weight bearing exercises.         17. Use of cane as ambulatory aid  Assessment & Plan:  Denies recent fall but fell prior to hip fracture.       18. Secondary renal hyperparathyroidism  Lab Results   Component Value Date    .0 (H) 01/07/2025    CALCIUM 9.2 01/07/2025    PHOS 2.8 01/07/2025     Secondary to CKD/DM. The current medical regimen is effective;  continue present plan and medications.              Provided Jani with a 5-10 year written screening schedule and personal prevention plan. Recommendations were developed using the USPSTF age appropriate recommendations. Education, counseling, and referrals were provided as needed.  After Visit Summary printed and given to patient which includes a list of additional screenings\tests needed.    No follow-ups on file.      ANTIONE POWELL NP  I offered to discuss advanced care planning, including how to pick a person who would make decisions for you if you were unable to make them for yourself, called a health care power of , and what kind of decisions you might make such as use of life sustaining treatments such as ventilators and tube feeding when faced with a life limiting illness recorded on a living will that they will need to know. (How you want to be cared for as you near the end of your natural life)     X Patient is interested in learning more about how to make advanced directives.  I provided them paperwork and offered to discuss this with them.

## 2025-02-28 ENCOUNTER — EXTERNAL CHRONIC CARE MANAGEMENT (OUTPATIENT)
Dept: PRIMARY CARE CLINIC | Facility: CLINIC | Age: 80
End: 2025-02-28
Payer: MEDICARE

## 2025-02-28 PROCEDURE — 99490 CHRNC CARE MGMT STAFF 1ST 20: CPT | Mod: PBBFAC,PO | Performed by: FAMILY MEDICINE

## 2025-02-28 RX ORDER — PANTOPRAZOLE SODIUM 40 MG/1
40 TABLET, DELAYED RELEASE ORAL
Qty: 90 TABLET | Refills: 1 | Status: SHIPPED | OUTPATIENT
Start: 2025-02-28

## 2025-02-28 NOTE — TELEPHONE ENCOUNTER
Refill Decision Note   Jani Cha  is requesting a refill authorization.  Brief Assessment and Rationale for Refill:  Approve     Medication Therapy Plan:         Comments:     Note composed:2:28 PM 02/28/2025

## 2025-02-28 NOTE — TELEPHONE ENCOUNTER
No care due was identified.  [facility] Embedded Care Due Messages. Reference number: [id number].   2/28/2025 2:04:57 PM CST

## 2025-03-05 ENCOUNTER — TELEPHONE (OUTPATIENT)
Dept: FAMILY MEDICINE | Facility: CLINIC | Age: 80
End: 2025-03-05
Payer: MEDICARE

## 2025-03-05 NOTE — TELEPHONE ENCOUNTER
Spoke with patient and he informed me that he had watery diarrhea this morning. He took over the counter 2 capsule of Imodium and has not had any diarrhea episode since. Will reach out to provider office if having any complication with the diarrhea.

## 2025-03-10 RX ORDER — CLOPIDOGREL BISULFATE 75 MG/1
75 TABLET ORAL DAILY
Qty: 90 TABLET | Refills: 3 | Status: SHIPPED | OUTPATIENT
Start: 2025-03-10

## 2025-03-11 ENCOUNTER — HOSPITAL ENCOUNTER (OUTPATIENT)
Dept: RADIOLOGY | Facility: HOSPITAL | Age: 80
Discharge: HOME | End: 2025-03-11
Attending: SURGERY
Payer: MEDICARE

## 2025-03-11 ENCOUNTER — OFFICE VISIT (OUTPATIENT)
Dept: VASCULAR SURGERY | Facility: CLINIC | Age: 80
End: 2025-03-11
Payer: MEDICARE

## 2025-03-11 VITALS
DIASTOLIC BLOOD PRESSURE: 76 MMHG | HEIGHT: 69 IN | HEART RATE: 79 BPM | WEIGHT: 242.81 LBS | SYSTOLIC BLOOD PRESSURE: 118 MMHG | BODY MASS INDEX: 35.96 KG/M2

## 2025-03-11 DIAGNOSIS — I77.810 ASCENDING AORTA DILATATION: ICD-10-CM

## 2025-03-11 DIAGNOSIS — I71.60 THORACOABDOMINAL AORTIC ANEURYSM (TAAA) WITHOUT RUPTURE, UNSPECIFIED PART: ICD-10-CM

## 2025-03-11 DIAGNOSIS — I71.40 ABDOMINAL AORTIC ANEURYSM (AAA) WITHOUT RUPTURE, UNSPECIFIED PART: Primary | ICD-10-CM

## 2025-03-11 DIAGNOSIS — I71.40 ABDOMINAL AORTIC ANEURYSM (AAA) WITHOUT RUPTURE, UNSPECIFIED PART: ICD-10-CM

## 2025-03-11 PROCEDURE — 71250 CT THORAX DX C-: CPT | Mod: 26,,, | Performed by: STUDENT IN AN ORGANIZED HEALTH CARE EDUCATION/TRAINING PROGRAM

## 2025-03-11 PROCEDURE — 74176 CT ABD & PELVIS W/O CONTRAST: CPT | Mod: 26,,, | Performed by: STUDENT IN AN ORGANIZED HEALTH CARE EDUCATION/TRAINING PROGRAM

## 2025-03-11 PROCEDURE — 74176 CT ABD & PELVIS W/O CONTRAST: CPT | Mod: TC

## 2025-03-11 PROCEDURE — 99213 OFFICE O/P EST LOW 20 MIN: CPT | Mod: PBBFAC | Performed by: SURGERY

## 2025-03-11 PROCEDURE — 99999 PR PBB SHADOW E&M-EST. PATIENT-LVL III: CPT | Mod: PBBFAC,,, | Performed by: SURGERY
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
## 2025-03-18 ENCOUNTER — OFFICE VISIT (OUTPATIENT)
Dept: OPTOMETRY | Facility: CLINIC | Age: 80
End: 2025-03-18
Payer: MEDICARE

## 2025-03-18 DIAGNOSIS — H52.223 MYOPIA OF BOTH EYES WITH REGULAR ASTIGMATISM AND PRESBYOPIA: ICD-10-CM

## 2025-03-18 DIAGNOSIS — H52.13 MYOPIA OF BOTH EYES WITH REGULAR ASTIGMATISM AND PRESBYOPIA: ICD-10-CM

## 2025-03-18 DIAGNOSIS — H52.4 MYOPIA OF BOTH EYES WITH REGULAR ASTIGMATISM AND PRESBYOPIA: ICD-10-CM

## 2025-03-18 DIAGNOSIS — Z01.00 EYE EXAM, ROUTINE: Primary | ICD-10-CM

## 2025-03-18 PROCEDURE — 99999 PR PBB SHADOW E&M-EST. PATIENT-LVL II: CPT | Mod: PBBFAC,,, | Performed by: OPTOMETRIST

## 2025-03-18 PROCEDURE — 92015 DETERMINE REFRACTIVE STATE: CPT | Mod: ,,, | Performed by: OPTOMETRIST

## 2025-03-18 PROCEDURE — 92014 COMPRE OPH EXAM EST PT 1/>: CPT | Mod: S$PBB,,, | Performed by: OPTOMETRIST

## 2025-03-18 PROCEDURE — 99212 OFFICE O/P EST SF 10 MIN: CPT | Mod: PBBFAC,PO | Performed by: OPTOMETRIST

## 2025-03-18 NOTE — PROGRESS NOTES
HPI    Here for DM eye exam     Eye meds: none    79 year old male states some days he sees well with glasses, but other   days he struggles. States he gets crust in the eyes which cause him to   itch. Denies flashes, floaters or diplopia. Denies washing lids with   anything other than warm water. Pt says last DFE eye exam was May 2024  Last edited by Radha Louis on 3/18/2025 12:39 PM.            Assessment /Plan     For exam results, see Encounter Report.    Eye exam, routine  -.Juan Diego  -No retinopathy noted today.  Continued control with primary care physician and annual comprehensive eye exam.  -Nightly lid scrubs using Ocusoft. Systane Complete PF as needed.  -reviewed importance of nightly lid cleaning    Myopia of both eyes with regular astigmatism and presbyopia  Eyeglass Final Rx       Eyeglass Final Rx         Sphere Cylinder Axis Dist VA Add    Right -3.25 +1.25 170 20/25 +2.50    Left -3.00 +1.25 170 20/25 +2.50      Type: PAL    Expiration Date: 3/18/2026                      RTC 1 yr

## 2025-03-24 ENCOUNTER — TELEPHONE (OUTPATIENT)
Dept: ENDOCRINOLOGY | Facility: CLINIC | Age: 80
End: 2025-03-24
Payer: MEDICARE

## 2025-03-24 ENCOUNTER — OFFICE VISIT (OUTPATIENT)
Dept: FAMILY MEDICINE | Facility: CLINIC | Age: 80
End: 2025-03-24
Payer: MEDICARE

## 2025-03-24 VITALS
HEIGHT: 69 IN | WEIGHT: 241.88 LBS | HEART RATE: 52 BPM | OXYGEN SATURATION: 97 % | SYSTOLIC BLOOD PRESSURE: 130 MMHG | BODY MASS INDEX: 35.82 KG/M2 | DIASTOLIC BLOOD PRESSURE: 82 MMHG

## 2025-03-24 DIAGNOSIS — E11.65 TYPE 2 DIABETES MELLITUS WITH HYPERGLYCEMIA, WITHOUT LONG-TERM CURRENT USE OF INSULIN: ICD-10-CM

## 2025-03-24 DIAGNOSIS — M80.00XD AGE-RELATED OSTEOPOROSIS WITH CURRENT PATHOLOGICAL FRACTURE WITH ROUTINE HEALING, SUBSEQUENT ENCOUNTER: ICD-10-CM

## 2025-03-24 DIAGNOSIS — K59.1 FUNCTIONAL DIARRHEA: ICD-10-CM

## 2025-03-24 DIAGNOSIS — N18.31 CHRONIC KIDNEY DISEASE, STAGE 3A: ICD-10-CM

## 2025-03-24 DIAGNOSIS — I10 PRIMARY HYPERTENSION: Primary | ICD-10-CM

## 2025-03-24 DIAGNOSIS — I71.40 ABDOMINAL AORTIC ANEURYSM (AAA) WITHOUT RUPTURE, UNSPECIFIED PART: ICD-10-CM

## 2025-03-24 DIAGNOSIS — I50.9 CONGESTIVE HEART FAILURE, UNSPECIFIED HF CHRONICITY, UNSPECIFIED HEART FAILURE TYPE: ICD-10-CM

## 2025-03-24 PROCEDURE — G2211 COMPLEX E/M VISIT ADD ON: HCPCS | Mod: S$PBB,,, | Performed by: FAMILY MEDICINE

## 2025-03-24 PROCEDURE — 99214 OFFICE O/P EST MOD 30 MIN: CPT | Mod: S$PBB,,, | Performed by: FAMILY MEDICINE

## 2025-03-24 PROCEDURE — 99999 PR PBB SHADOW E&M-EST. PATIENT-LVL IV: CPT | Mod: PBBFAC,,, | Performed by: FAMILY MEDICINE

## 2025-03-24 PROCEDURE — 99214 OFFICE O/P EST MOD 30 MIN: CPT | Mod: PBBFAC,PO | Performed by: FAMILY MEDICINE

## 2025-03-24 NOTE — TELEPHONE ENCOUNTER
----- Message from [employer] sent at 3/24/2025 11:33 AM CDT -----  Who called:selfWhat is the request in detail: think that the Trulicity is giving him diarrhea Can the clinic reply by [facility]?no Would the patient rather a call back or a response via [facility]:.[phone] Information:

## 2025-03-24 NOTE — TELEPHONE ENCOUNTER
Pt states diarrhea has been going on the the last 3-4 weeks every Sunday or Monday, pt takes his Trulicity on Fridays. Pt was asked does he take imodium for his diarrhea episodes, pt replied yes he does. Message will be sent to dr pierre for further review, pt verbalized understanding.

## 2025-03-25 ENCOUNTER — TELEPHONE (OUTPATIENT)
Dept: ENDOCRINOLOGY | Facility: CLINIC | Age: 80
End: 2025-03-25
Payer: MEDICARE

## 2025-03-25 NOTE — TELEPHONE ENCOUNTER
After speaking with provider pt was advised before we stop the medication he should try skipping a week. Since he took it last Friday he will skip this Friday coming and see if he still has those symptoms. Pt asked once he takes his shot next week can he call us if the diarrhea happens, yes that fine. Pt verbalized understanding.

## 2025-03-25 NOTE — TELEPHONE ENCOUNTER
----- Message from Elvira sent at 3/25/2025  9:27 AM CDT -----  Regarding: self  Who called: selfWhat is the request in detail: pt is wanting to speak with office. States he was returning their call but it is not noted in chartCan the clinic reply by [facility]? NoWould the patient rather a call back or a response via [facility]? Call [facility] call back number: [phone] Information:Thank you.

## 2025-03-26 NOTE — PROGRESS NOTES
"Routine Office Visit     Patient Name: Jani Cha    : 1945  MRN: [id number]    Subjective     History of Present Illness    CHIEF COMPLAINT:    Diarrhea   Diabetes follow-up - Patient continues to follow-up with Dr. Kent. Trulicity recently increased to 4.5mg weekly   Osteoporosis - Patient on prolia.   GERD - On Protonix 40mg  CKD - continues f/u with Dr. Booth   Hypertension / hyperlipidemia / CHF - waiting to f/u with new cardiologist. Previous MD left   AAA - continued surveillance with Dr. Echavarria     GASTROINTESTINAL:  He reports a cycling pattern of diarrhea that started 3-4 weeks ago, consisting of two normal bowel movements in one day, followed by 2-3 days of diarrhea. After diarrhea resolves, he experiences 3 days without any bowel movements before the cycle restarts. He takes Imodium for symptom management. He denies fever, chills, or abdominal pain. His regular diet includes salads, pears, and apples, with no recent dietary changes.    DIABETES MANAGEMENT:  He takes Trulicity injections on  and Farxiga. His Trulicity dose was increased 4-5 weeks ago.        ROS:  General: no fever, no chills, no fatigue, no weight gain, no weight loss  Eyes: no vision changes, no redness, no discharge  ENT: no ear pain, no nasal congestion, no sore throat  Cardiovascular: no chest pain, no palpitations, +lower extremity edema  Respiratory: no cough, no shortness of breath  Gastrointestinal: no abdominal pain, no nausea, no vomiting, +diarrhea, no constipation, no blood in stool  Genitourinary: no dysuria, no hematuria, no frequency  Musculoskeletal: no joint pain, no muscle pain  Skin: no rash, no lesion  Neurological: no headache, no dizziness, no numbness, no tingling  Psychiatric: no anxiety, no depression, no sleep difficulty           Objective     /82 (BP Location: Left arm, Patient Position: Sitting)   Pulse (!) 52   Ht 5' 9" (1.753 m)   Wt 109.7 kg (241 lb 13.5 oz)   SpO2 " 97%   BMI 35.71 kg/m²   Physical Exam  Constitutional:       Appearance: He is well-developed.   HENT:      Head: Normocephalic and atraumatic.   Eyes:      Conjunctiva/sclera: Conjunctivae normal.      Pupils: Pupils are equal, round, and reactive to light.   Neck:      Thyroid: No thyromegaly.      Vascular: No JVD.   Cardiovascular:      Rate and Rhythm: Normal rate and regular rhythm.      Heart sounds: Normal heart sounds.   Pulmonary:      Effort: Pulmonary effort is normal.      Breath sounds: Normal breath sounds. No wheezing.   Abdominal:      General: Bowel sounds are normal. There is no distension.      Palpations: Abdomen is soft.      Tenderness: There is no abdominal tenderness. There is no guarding.   Musculoskeletal:         General: Normal range of motion.      Cervical back: Normal range of motion and neck supple.   Lymphadenopathy:      Cervical: No cervical adenopathy.   Skin:     General: Skin is warm and dry.   Neurological:      Mental Status: He is alert and oriented to person, place, and time.   Psychiatric:         Behavior: Behavior normal.           Assessment     Assessment & Plan      FOLLOW-UP:   Scheduled follow up in 6 months.         Problem List Items Addressed This Visit          Cardiac/Vascular    Abdominal aortic aneurysm (AAA) without rupture    Overview   Infrarenal abdominal aortic aneurysm measuring 3.5-cm in diffuse dilatation of the descending thoracic aorta measuring 3.9-cm. CT 12/2015         Congestive heart failure   Noted that the patient is being followed by a cardiologist, previously Dr. Crocker who left, and is now seeing ESTEFANÍA Bailey.   Performed a cardiac exam and found the heart to be functioning normally.   Suggested bgetting established with a new cardiologist.      Primary hypertension - Primary  The current medical regimen is effective;  continue present plan and medications.         Renal/    Chronic kidney disease, stage 3a  Continue f/u with nephrology           Endocrine    Osteoporosis    Overview   DEXA 2023:      *Osteoporosis based on T-score between -1.0 and -2.5 and elevated risk based on FRAX and history of femur fracture.  *Fracture risk is very high due to recent fragility fracture (within the past 12 months).    RECOMMENDATIONS:  *Daily calcium intake 9973-2292 mg, dietary sources preferred; Vitamin D 1950-4361 IU daily.  *Weight bearing exercise and fall precautions.  *Recommend medical therapy for osteoporosis. Consider bisphosphonates (alendronate, risedronate, zoledronic acid), denosumab, teriparatide, abaloparatide or romosozumab.  *Repeat BMD in 2 years.            Type 2 diabetes mellitus with hyperglycemia, without long-term current use of insulin   Reviewed most recent labs    Continued Trulicity injections and Farxiga for diabetes management.   Noted improvement in diabetes control based on recent labs.   Recommend discussing with Dr. Kent about potentially decreasing Trulicity dose and increasing Farxiga if diarrhea persists.   Continued Trulicity injections for diabetes management, taken on Friday nights.   Continued Farxiga for diabetes management.         GI    Functional Diarrhea   Suspected recent diarrhea might be related to Trulicity medication, especially since the dose was recently increased.   Assessed reported cyclic diarrhea pattern, suspecting relation to recent Trulicity dose increase.   Evaluated timing of symptoms in relation to weekly Trulicity injections, noting symptom onset coinciding with dose increase 3-4 weeks ago.   Confirmed absence of fever, chills, or abdominal pain associated with the diarrhea.   Explained potential connection between Trulicity dose increase and cyclic diarrhea, discussing how Trulicity can cause both constipation and diarrhea as side effects and the typical cycle of Trulicity's effects on bowel movements throughout the week.   Recommend OTC Imodium as needed for diarrhea.   Advised discussing  persistent diarrhea with Dr. Kent at next appointment.           This note was generated with the assistance of ambient listening technology. Verbal consent was obtained by the patient and accompanying visitor(s) for the recording of patient appointment to facilitate this note. I attest to having reviewed and edited the generated note for accuracy, though some syntax or spelling errors may persist. Please contact the author of this note for any clarification.

## 2025-03-31 ENCOUNTER — EXTERNAL CHRONIC CARE MANAGEMENT (OUTPATIENT)
Dept: PRIMARY CARE CLINIC | Facility: CLINIC | Age: 80
End: 2025-03-31
Payer: MEDICARE

## 2025-03-31 PROCEDURE — 99490 CHRNC CARE MGMT STAFF 1ST 20: CPT | Mod: PBBFAC,PO | Performed by: FAMILY MEDICINE

## 2025-03-31 PROCEDURE — 99490 CHRNC CARE MGMT STAFF 1ST 20: CPT | Mod: S$PBB,,, | Performed by: FAMILY MEDICINE

## 2025-03-31 PROCEDURE — 99439 CHRNC CARE MGMT STAF EA ADDL: CPT | Mod: PBBFAC,PO | Performed by: FAMILY MEDICINE

## 2025-03-31 PROCEDURE — 99439 CHRNC CARE MGMT STAF EA ADDL: CPT | Mod: S$PBB,,, | Performed by: FAMILY MEDICINE

## 2025-04-04 ENCOUNTER — TELEPHONE (OUTPATIENT)
Dept: ENDOCRINOLOGY | Facility: CLINIC | Age: 80
End: 2025-04-04
Payer: MEDICARE

## 2025-04-04 NOTE — TELEPHONE ENCOUNTER
----- Message from Jennifer sent at 4/4/2025 10:37 AM CDT -----  Regarding: Self  Type: Patient Call Back What is the request in detail: pt stated he has no diarrhea this week Can the clinic reply by [facility]? No Would the patient rather a call back or a response via [facility]? Call [facility] call back number: .[phone] Information:Thank you.

## 2025-04-04 NOTE — TELEPHONE ENCOUNTER
Lvm for pt to give office a call if he needs to. We received his previous message regarding no symptoms.

## 2025-04-09 ENCOUNTER — TELEPHONE (OUTPATIENT)
Dept: ENDOCRINOLOGY | Facility: CLINIC | Age: 80
End: 2025-04-09
Payer: MEDICARE

## 2025-04-09 NOTE — TELEPHONE ENCOUNTER
----- Message from Elvira sent at 4/9/2025 10:58 AM CDT -----  Regarding: self  Who called: selfWhat is the request in detail: pt was told to stop trulicity and has not had diarrhea. But would like a call for what he should doCan the clinic reply by [facility]? NoWould the patient rather a call back or a response via [facility]? Call [facility] call back number: [phone] Information:Thank you.

## 2025-04-09 NOTE — TELEPHONE ENCOUNTER
Pt was advised not to stop the Trulicity, he was instructed to do ever other week. Pt says he did skip a Friday but wanted to know should he continue skipping 1 week. Spoke to dr pierre and it was understood that pt can continue Trulicity every other Friday for now. Pt verbalized understanding.

## 2025-04-15 ENCOUNTER — INFUSION (OUTPATIENT)
Dept: INFECTIOUS DISEASES | Facility: HOSPITAL | Age: 80
End: 2025-04-15
Payer: MEDICARE

## 2025-04-15 NOTE — PROGRESS NOTES
"Pt was unable to received Prolia injection 4/15/25. Had dental extraction "about 2 or 3 weeks ago." Pt educated on need to delay injection, verbalized understanding. Rescheduled for 6/25/25. Pt provided clinic number to call if any other dental procedures are planned.   "

## 2025-04-17 NOTE — ASSESSMENT & PLAN NOTE
Detail Level: Generalized Pt with creatinine of 1.5 and eGFR ~46  Will attempt to minimize contrast load during procedure  Pt to receive ivf before and following procedure

## 2025-04-29 ENCOUNTER — HOSPITAL ENCOUNTER (OUTPATIENT)
Dept: RADIOLOGY | Facility: HOSPITAL | Age: 80
Discharge: HOME | End: 2025-04-29
Attending: SURGERY
Payer: MEDICARE

## 2025-04-29 DIAGNOSIS — I71.40 ABDOMINAL AORTIC ANEURYSM (AAA) WITHOUT RUPTURE, UNSPECIFIED PART: ICD-10-CM

## 2025-04-29 DIAGNOSIS — I71.60 THORACOABDOMINAL AORTIC ANEURYSM (TAAA) WITHOUT RUPTURE, UNSPECIFIED PART: ICD-10-CM

## 2025-04-29 DIAGNOSIS — I77.810 ASCENDING AORTA DILATATION: ICD-10-CM

## 2025-04-29 PROCEDURE — 74176 CT ABD & PELVIS W/O CONTRAST: CPT | Mod: 26,,, | Performed by: RADIOLOGY

## 2025-04-29 PROCEDURE — 71250 CT THORAX DX C-: CPT | Mod: 26,,, | Performed by: RADIOLOGY

## 2025-04-29 PROCEDURE — 71250 CT THORAX DX C-: CPT | Mod: TC

## 2025-04-30 ENCOUNTER — EXTERNAL CHRONIC CARE MANAGEMENT (OUTPATIENT)
Dept: PRIMARY CARE CLINIC | Facility: CLINIC | Age: 80
End: 2025-04-30
Payer: MEDICARE

## 2025-04-30 PROCEDURE — 99490 CHRNC CARE MGMT STAFF 1ST 20: CPT | Mod: PBBFAC,PO | Performed by: FAMILY MEDICINE

## 2025-05-05 ENCOUNTER — OFFICE VISIT (OUTPATIENT)
Dept: PODIATRY | Facility: CLINIC | Age: 80
End: 2025-05-05
Payer: MEDICARE

## 2025-05-05 VITALS — BODY MASS INDEX: 35.82 KG/M2 | HEIGHT: 69 IN | WEIGHT: 241.88 LBS

## 2025-05-05 DIAGNOSIS — B35.1 ONYCHOMYCOSIS DUE TO DERMATOPHYTE: ICD-10-CM

## 2025-05-05 DIAGNOSIS — E11.49 TYPE II DIABETES MELLITUS WITH NEUROLOGICAL MANIFESTATIONS: Primary | ICD-10-CM

## 2025-05-05 PROCEDURE — 11721 DEBRIDE NAIL 6 OR MORE: CPT | Mod: PBBFAC,PO | Performed by: PODIATRIST

## 2025-05-05 PROCEDURE — 99999 PR PBB SHADOW E&M-EST. PATIENT-LVL III: CPT | Mod: PBBFAC,,, | Performed by: PODIATRIST

## 2025-05-05 PROCEDURE — 99213 OFFICE O/P EST LOW 20 MIN: CPT | Mod: PBBFAC,PO | Performed by: PODIATRIST
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
## 2025-05-09 ENCOUNTER — LAB VISIT (OUTPATIENT)
Dept: LAB | Facility: HOSPITAL | Age: 80
End: 2025-05-09
Attending: HOSPITALIST
Payer: MEDICARE

## 2025-05-09 DIAGNOSIS — E11.65 TYPE 2 DIABETES MELLITUS WITH HYPERGLYCEMIA, WITHOUT LONG-TERM CURRENT USE OF INSULIN: ICD-10-CM

## 2025-05-09 LAB
ALBUMIN/CREAT UR: 58.2 UG/MG
CREAT UR-MCNC: 67 MG/DL (ref 23–375)
MICROALBUMIN UR-MCNC: 39 UG/ML (ref ?–5000)

## 2025-05-09 PROCEDURE — 82570 ASSAY OF URINE CREATININE: CPT

## 2025-05-11 NOTE — ASSESSMENT & PLAN NOTE
- adrenal incidentaloma, Noted on CT chest, left adrenal mass 1.2 cm in size.  - Adrenal lesion imaging characteristics left adrenal nodule, 1.2 cm, Smooth, heterogenous, without calcifications   - DST: WNL, cortisol 2, due to the overweight  - Metanephrines and normetanephrines:  Within acceptable ranges  - Renin/navneet level normal  - repeat DST    You can access the FollowMyHealth Patient Portal offered by [facility] by registering at the following website: [email or web address]. By joining [employer]’s FollowMyHealth portal, you will also be able to view your health information using other applications (apps) compatible with our system.

## 2025-05-16 ENCOUNTER — TELEPHONE (OUTPATIENT)
Dept: PHARMACY | Facility: CLINIC | Age: 80
End: 2025-05-16
Payer: MEDICARE

## 2025-05-16 ENCOUNTER — OFFICE VISIT (OUTPATIENT)
Dept: ENDOCRINOLOGY | Facility: CLINIC | Age: 80
End: 2025-05-16
Payer: MEDICARE

## 2025-05-16 ENCOUNTER — RESULTS FOLLOW-UP (OUTPATIENT)
Dept: PHARMACY | Facility: CLINIC | Age: 80
End: 2025-05-16

## 2025-05-16 VITALS
HEART RATE: 61 BPM | SYSTOLIC BLOOD PRESSURE: 117 MMHG | WEIGHT: 239.63 LBS | BODY MASS INDEX: 35.38 KG/M2 | DIASTOLIC BLOOD PRESSURE: 66 MMHG

## 2025-05-16 DIAGNOSIS — E78.00 PURE HYPERCHOLESTEROLEMIA: Chronic | ICD-10-CM

## 2025-05-16 DIAGNOSIS — M80.00XD AGE-RELATED OSTEOPOROSIS WITH CURRENT PATHOLOGICAL FRACTURE WITH ROUTINE HEALING, SUBSEQUENT ENCOUNTER: ICD-10-CM

## 2025-05-16 DIAGNOSIS — M81.0 OSTEOPOROSIS, UNSPECIFIED OSTEOPOROSIS TYPE, UNSPECIFIED PATHOLOGICAL FRACTURE PRESENCE: ICD-10-CM

## 2025-05-16 DIAGNOSIS — E11.65 TYPE 2 DIABETES MELLITUS WITH HYPERGLYCEMIA, WITHOUT LONG-TERM CURRENT USE OF INSULIN: Primary | ICD-10-CM

## 2025-05-16 DIAGNOSIS — N18.31 CHRONIC KIDNEY DISEASE, STAGE 3A: ICD-10-CM

## 2025-05-16 DIAGNOSIS — S72.001A CLOSED DISPLACED FRACTURE OF RIGHT FEMORAL NECK: ICD-10-CM

## 2025-05-16 DIAGNOSIS — E11.40 TYPE 2 DIABETES MELLITUS WITH DIABETIC NEUROPATHY, WITHOUT LONG-TERM CURRENT USE OF INSULIN: ICD-10-CM

## 2025-05-16 DIAGNOSIS — I25.10 CORONARY ARTERY DISEASE INVOLVING NATIVE CORONARY ARTERY OF NATIVE HEART WITHOUT ANGINA PECTORIS: Chronic | ICD-10-CM

## 2025-05-16 DIAGNOSIS — E27.8 ADRENAL INCIDENTALOMA: ICD-10-CM

## 2025-05-16 PROCEDURE — 99999 PR PBB SHADOW E&M-EST. PATIENT-LVL IV: CPT | Mod: PBBFAC,,, | Performed by: HOSPITALIST

## 2025-05-16 PROCEDURE — 99214 OFFICE O/P EST MOD 30 MIN: CPT | Mod: PBBFAC | Performed by: HOSPITALIST

## 2025-05-16 RX ORDER — GLIPIZIDE 10 MG/1
10 TABLET, FILM COATED, EXTENDED RELEASE ORAL 2 TIMES DAILY WITH MEALS
Qty: 180 TABLET | Refills: 3 | Status: SHIPPED | OUTPATIENT
Start: 2025-05-16 | End: 2026-05-16

## 2025-05-16 RX ORDER — DULAGLUTIDE 3 MG/.5ML
3 INJECTION, SOLUTION SUBCUTANEOUS
Start: 2025-05-16

## 2025-05-16 NOTE — TELEPHONE ENCOUNTER
----- Message from Malcolm Kent MD sent at 5/16/2025  1:38 PM CDT -----  He is getting pharmacy assistance for 4.5 mg but I need the  Trulicity back to 3.0mg due to GI issue, please help him with this, thanks

## 2025-05-16 NOTE — Clinical Note
He is getting pharmacy assistance for 4.5 mg but I need the  Trulicity back to 3.0mg due to GI issue, please help him with this, thanks

## 2025-05-16 NOTE — TELEPHONE ENCOUNTER
Dose change request has been received for Trulicity 3 mg.  You may receive further communication from Bozena Abdi, who is assigned to the case.     Please be advised The Pharmacy Patient Assistance Team can not guarantee [facility] will fulfill this request prior to scheduled (estimated) refill date on 05/12/2025.    Bozena Abdi  Pharmacy Patient Assistance Team

## 2025-05-16 NOTE — ASSESSMENT & PLAN NOTE
- Diabetes is AT GOAL, given the most recent A1C reviewed 5/16/2025  - Reviewed goals of therapy: achieve the best control possible without hypoglycemia, with a target A1C <7%.  - Reviewed diabetic supplies and medications to ensure continued refills and compliance.   - Advised the patient to get periodic eye exams and proper foot care monitoring.  - Reviewed routine maintenance: lipid management (statin use) and urine protein/creatinine yearly.  - patient having issues with affording cost of Trulicity   - Complicated by hyperglycemia, obesity, hx CAD/CABG, CKD3b, dietary indiscretion.  - pharmacy assistant program for 2025:  Trulicity and Farxiga    Plan  - Given frequent diarrhea with higher dose Trulicity, decrease Trulicity back to Trulicity 3 mg once a week injection (message sent for pharmacy pharmacy to get lower dose in the next shipment)  - Continue max dose glipizide XR 10 mg twice a day  - Continue Farxiga 5 mg daily given CKD3b, CAD and diabetes.    - Encourage dietary modifications, portion size control, and decreasing carbohydrate intake to help manage diabetes.  - Clear written instructions given on AVS.  - Follow up as scheduled with lab work prior. 4 months in clinic follow-up.

## 2025-05-16 NOTE — TELEPHONE ENCOUNTER
Trulicity 3 mg dose change request has been received and submitted for review. Please be advised The Pharmacy Patient Assistance Team can not guarantee [facility] will fulfill this request prior to The Patients next scheduled refill.

## 2025-05-16 NOTE — TELEPHONE ENCOUNTER
----- Message from Violette Peraltaitha sent at 5/16/2025  2:36 PM CDT -----  Regarding: Order for ELLEN STRATTON,     Mr. Stratton's case has been assigned to Bozena Abdi @[phone].       What happens next? Assigned advocate will review your patients chart and research available options.  Patient may be asked to provide specific documentation to help determine eligibility. Failure to provide the requested documentation will delay assistance.    Please note all requests are subject to program availability and patient eligibility verification.   Please note each program has it's own unique eligibility criteria (e.g., income limits, insurance status medical condition, residency).Therefore eligibility is determined by the specific program   being applied to not by the Pharmacy Patient Assistance Team.  Please note epic chart must reflect a current order for the requested medication written by an [facility] provider to begin PAP process.   Provider may review progress notes by typing pharmacy patient assistance in Epic search box.       Thank you,   [facility]  [id number]  [place] [address]  Fax: [phone]  Email: [email or web address]      ----- Message -----  From: Malcolm Kent MD  Sent: 5/16/2025   1:35 PM CDT  To: Pharmacy Patient Assistance Team  Subject: Order for ELLEN STRATTON

## 2025-05-16 NOTE — PROGRESS NOTES
Subjective:      Patient ID: Jani Cha is a 79 y.o. male presented to [facility] on 5/16/2025.  Chief Complaint:  Diabetes type 2, No chief complaint on file.    History of Present Illness: Jani Cha is a 79 y.o. male here for management of type 2 diabetes and left adrenal incidentaloma  Other significant past medical history:  CKD stage IIIB, CAD, CABG, hypertension, hyperlipidemia      Interval history:  Patient is here for follow-up of type 2 diabetes, A1c stable 7.0%  Reportedly started to have frequent diarrhea. Diarrhea> occur 1-3 days after injection only with the higher dose 4.5 mg, did not have this issue at 3 mg  Trulicity was advise to be given every other week, now without diarrhea but glucose has been tend to 15 points higher on glucose log.  Current in clinic weight: 239 lb, previous in clinic weight:  241, 254, 244  Currently on Trulicity and Farxiga due pharmacy assistant, 2025  Patient also taking glipizide 10 mg twice a day.    Has test strips and lancets    Osteoporosis: Patient on SC Prolia every 6 months>next injection 3/22/2024, manage by Ortho for osteoporosis so far has gotten 4 injections, next injection moved to 6/25/2025  Prolia injection was rescheduled to 6/25/2025 due to recent dental extraction in 04/2025  Bilateral knee replacement doing well no falls no fracture, doing well.  Good ambulation   No recent falls, no recent fracture   Does not take calcium/vitamin-D supplements at this time         1) Diabetes Mellitus Type 2  - Known diabetic complications: nephropathy and cardiovascular disease  - Cardiovascular risk factors: advanced age (older than 55 for men, 65 for women), diabetes mellitus, dyslipidemia, hypertension, male gender, microalbuminuria, obesity (BMI >= 30 kg/m2) and sedentary lifestyle  - Diagnosed w/ DM: in 2006  - Saw Ortho need Left Knee Replacement>>  R TKA 8/15/202  - Patient having issue with high deductible, gap.  Unable to  "afford multiple diabetes medication Currently still on Trulicity, getting from [facility] without any issue. Farxiga pharmacy assistance as well  - Trulicity dose increase to 4.5 mg 12/18/2024, this lead to diarrhea    Current meds:   Trulicity 4.5 mg once a week injection, getting it from pharmacy assistant [facility]  Glipizide XR 10 mg twice a day  Farxiga 5 mg daily   Previous medication  stop metformin given CKD stage IIIB  Home glucose checks:  Daily, see above  149  139  117  145  107  140  145  146  160  Diet/Exercise:               Eating 2 meals per day , lunch in, large portion dinner, does eat cookies prior to going to bed              Drink:  Coffee with sugar in the morning              Current exercise: none due to knee pain  Weight trend: stable  Diabetes Education: no  Diabetes Related Hospitalization:  no  Hx of pancreatitis, hx of thyroid cancer: no  Family history of diabetes: unknow  Occupation: retired  Eye exam current (within one year): yes  Reports cuts or ulcers on feet:  Denies  Statin: Taking, ACE/ARB: Taking    Diabetes lab work  Lab Results   Component Value Date    HGBA1C 7.0 (H) 05/09/2025    HGBA1C 7.0 (H) 12/11/2024    HGBA1C 7.6 (H) 07/27/2024     No results found for: "CPEPTIDE", "HXR06BH", "GLUTAMICACID", "ISLETCELLANT", "INSABSS", "IA2ABS"   Random serum glucose:   Lab Results   Component Value Date     (H) 05/09/2025     (H) 01/07/2025     No results found for: "FRUCTOSAMINE", "GLYCOMARKTM"  Lab Results   Component Value Date    LABMICR 39.0 05/09/2025    LABMICR 99.0 03/07/2024    MICALBCREAT 58.2 (H) 05/09/2025    MICALBCREAT 61.9 (H) 03/07/2024    UTPCR 0.17 01/07/2025    UTPCR 0.18 08/14/2024     Diabetes Management Status: Reviewed this office visit  Screening or Prevention Patient's value Goal Complete/Controlled?   Lipid profile : 03/07/2024 Annually No   Dilated retinal exam : 03/18/2025 Annually Yes   Foot exam   Most Recent Foot Exam Date: Not Found " Annually Yes        2) Osteoporosis  - Right femoral neck fracture Recently: 10/25/2022>> patient fell picking up a newspaper.  Sustained right hip fracture requiring surgery.    - Follow-up with Orthopedic surgery at this time.  Recently discharged from rehab  - Currently using walker to help with ambulation  - Denies history of other fractures  - No history of cirrhosis, liver disease, cancer  - Currently not taking any vitamin supplements including calcium/vitamin-D  - Bilateral knee replacement: 8/2023 and 8/2024> doing well no falls no fracture. No recent falls, no recent fracture   - Does not take calcium/vitamin-D supplements at this time     - Started on Prolia SC injection per orthopedic surgery, 1st injection 3/17/2023>> every 6 months>so far has gotten 4 injections, next injection moved to 6/25/2025  - Prolia injection was rescheduled to 6/25/2025 due to recent dental extraction in 04/2025  - due for bone density to be done again 2025      DXA: 1/19/2023  Lumbar spine (L1-L4):  BMD is 1.248 g/cm2, T-score is 1.4, and Z-score is 2.5.  Total hip:   BMD is 1.005 g/cm2, T-score is -0.2, and Z-score is 0.7.  Femoral neck: BMD is 0.688 g/cm2, T-score is -1.8, and Z-score is -0.4.  Distal 1/3 radius:  Not applicable     FRAX:  11% risk of a major osteoporotic fracture in the next 10 years.  3.2% risk of hip fracture in the next 10 years.     Impression:  *Osteoporosis based on T-score between -1.0 and -2.5 and elevated risk based on FRAX and history of femur fracture.    Lab work reviewed  Lab Results   Component Value Date    .0 (H) 01/07/2025    PTH 78.7 (H) 08/14/2024    .9 (H) 04/23/2024    TCPLACDF02ES 35 03/28/2024    DTQOXIQH26AY 36 03/10/2023    SNEQTOCV82WZ 32 12/06/2022    CALCIUM 9.9 05/09/2025    CALCIUM 9.2 01/07/2025    CALCIUM 9.3 12/11/2024    PHOS 2.8 01/07/2025    PHOS 2.9 08/14/2024    PHOS 2.7 04/23/2024    ALKPHOS 81 07/31/2024    ALKPHOS 82 03/07/2024    ALKPHOS 81 11/02/2023     TSH 1.198 03/28/2024        3) Regards to Left adrenal incidentaloma  - Noted on CT chest, left adrenal mass 1.2 cm in size.  - Adrenal lesion imaging characteristics left adrenal nodule, 1.2 cm, Smooth, heterogenous, without calcifications   - Patient does have history of diabetes as above, Morbidly obese, Hypertension but no history of hypertensive emergency   - Pt denies history of paroxysmal symptoms such as syncope, pallor or dizziness  - No palpitations, No history of cancer  - Pt reports no abdominal discomfort  - Work up: DST: WNL, cortisol >2, due to the overweight  - Metanephrines and normetanephrines:  Within acceptable ranges  - Renin/navneet level normal    Lab Results   Component Value Date    LABCORT 2.00 (L) 08/24/2021    ACTH <5 08/24/2021     Lab Results   Component Value Date    ALDOSTERONE 16.9 08/24/2021    LABRENI 0.7 08/24/2021     Lab Results   Component Value Date    METANEPHRINE 139 08/24/2021       Reviewed past surgical, medical, family, social history and updated as appropriate.  Review of Systems: see HPI above    Objective:   /66   Pulse 61   Wt 108.7 kg (239 lb 9.6 oz)   BMI 35.38 kg/m²     Body mass index is 35.38 kg/m².  Vital signs reviewed    Physical Exam  Vitals and nursing note reviewed.   Constitutional:       General: He is not in acute distress.     Appearance: Normal appearance. He is well-developed. He is obese.   Neck:      Thyroid: No thyromegaly.   Cardiovascular:      Rate and Rhythm: Normal rate.      Heart sounds: Normal heart sounds.   Pulmonary:      Effort: Pulmonary effort is normal. No respiratory distress.   Abdominal:      Tenderness: There is no abdominal tenderness.   Musculoskeletal:         General: Normal range of motion.      Cervical back: Neck supple.   Skin:     General: Skin is warm.      Findings: No erythema.   Neurological:      Mental Status: He is alert and oriented to person, place, and time.     Lab Reviewed:  See results in  subjective  Lab Results   Component Value Date    HGBA1C 7.0 (H) 05/09/2025     Lab Results   Component Value Date    CHOL 116 (L) 03/07/2024    HDL 36 (L) 03/07/2024    LDLCALC 57.2 (L) 03/07/2024    TRIG 114 03/07/2024    CHOLHDL 31.0 03/07/2024     Lab Results   Component Value Date     05/09/2025    K 4.5 05/09/2025     05/09/2025    CO2 27 05/09/2025     (H) 05/09/2025    BUN 36 (H) 05/09/2025    CREATININE 1.5 (H) 05/09/2025    CALCIUM 9.9 05/09/2025    PHOS 2.8 01/07/2025    PROT 6.2 07/31/2024    ALBUMIN 3.4 (L) 01/07/2025    BILITOT 0.6 07/31/2024    ALKPHOS 81 07/31/2024    AST 13 07/31/2024    ALT 15 07/31/2024    ANIONGAP 10 05/09/2025    EGFRNORACEVR 47 (L) 05/09/2025    TSH 1.198 03/28/2024    .0 (H) 01/07/2025    WZXWKAFP43BG 35 03/28/2024     Assessment     1. Type 2 diabetes mellitus with hyperglycemia, without long-term current use of insulin  TRULICITY 3 mg/0.5 mL pen injector    Ambulatory referral/consult to Pharmacy Assistance    glipiZIDE (GLUCOTROL) 10 MG TR24    Hemoglobin A1C    Renal Function Panel      2. Type 2 diabetes mellitus with diabetic neuropathy, without long-term current use of insulin        3. Closed displaced fracture of right femoral neck        4. Age-related osteoporosis with current pathological fracture with routine healing, subsequent encounter        5. Osteoporosis, unspecified osteoporosis type, unspecified pathological fracture presence  DXA Bone Density Axial Skeleton 1 or more sites      6. Coronary artery disease involving native coronary artery of native heart without angina pectoris        7. Pure hypercholesterolemia        8. Adrenal incidentaloma        9. Chronic kidney disease, stage 3a            Plan     Type 2 diabetes mellitus with hyperglycemia, without long-term current use of insulin  - Diabetes is AT GOAL, given the most recent A1C reviewed 5/16/2025  - Reviewed goals of therapy: achieve the best control possible without  hypoglycemia, with a target A1C <7%.  - Reviewed diabetic supplies and medications to ensure continued refills and compliance.   - Advised the patient to get periodic eye exams and proper foot care monitoring.  - Reviewed routine maintenance: lipid management (statin use) and urine protein/creatinine yearly.  - patient having issues with affording cost of Trulicity   - Complicated by hyperglycemia, obesity, hx CAD/CABG, CKD3b, dietary indiscretion.  - pharmacy assistant program for 2025:  Trulicity and Farxiga    Plan  - Given frequent diarrhea with higher dose Trulicity, decrease Trulicity back to Trulicity 3 mg once a week injection (message sent for pharmacy pharmacy to get lower dose in the next shipment)  - Continue max dose glipizide XR 10 mg twice a day  - Continue Farxiga 5 mg daily given CKD3b, CAD and diabetes.    - Encourage dietary modifications, portion size control, and decreasing carbohydrate intake to help manage diabetes.  - Clear written instructions given on AVS.  - Follow up as scheduled with lab work prior. 4 months in clinic follow-up.    Closed displaced fracture of right femoral neck s/p total hip arthroplasty on 10/21/2022  - Right femoral neck fracture 10/21/2022>> patient fell picking up a newspaper requiring surgery.    - Follow-up with Orthopedic surgery at this time- Started on Prolia SC injection per orthopedic surgery  - Follow up with up with Ortho    Osteoporosis  - Right femoral neck fracture 10/21/2022>> patient fell picking up a newspaper requiring surgery.    - Follow-up with Orthopedic surgery at this time- Started on Prolia SC injection per orthopedic surgery  - Bilateral knee replacement: 8/2023 and 8/2024> doing well no falls no fracture. No recent falls, no recent fracture   - Started on Prolia SC injection per orthopedic surgery, 1st injection 3/17/2023>> every 6 months>so far has gotten 4 injections, next injection moved to 6/25/2025  - Prolia injection was rescheduled to  6/25/2025 due to recent dental extraction in 04/2025  - CHECK DXA for 2025 to review and discuss next office visit  - Denies history of other fractures  - Currently not taking any vitamin supplements including calcium/vitamin-D>> level WNL  - Monitor treatment, would continue PROLIA given hx of CKD3b    Coronary artery disease involving native coronary artery of native heart without angina pectoris  - continue benefit of Farxiga and Trulicity    Hyperlipidemia  - lipid panel review today  - ASCVD Risk below: Statin: Taking  The ASCVD Risk score (Eyad OGLESBY, et al., 2019) failed to calculate for the following reasons:    The valid total cholesterol range is 130 to 320 mg/dL    Adrenal incidentaloma  - Adrenal incidentaloma, Noted on CT chest, left adrenal mass 1.2 cm in size.  - Adrenal lesion imaging characteristics left adrenal nodule, 1.2 cm, Smooth, heterogenous, without calcifications   - DST: WNL, cortisol 2, due to the overweight  - Metanephrines and normetanephrines:  Within acceptable ranges  - Renin/navneet level normal    Chronic kidney disease, stage 3a  - continue use of SGLT2  - monitor renal function    Advised patient to follow up with PCP for routine health maintenance care.   RTC in 4 months    Visit today included increased complexity associated with the care of the episodic problem addressed and managing the longitudinal care of the patient due to the serious and/or complex managed problem(s).   Including: Type 2 diabetes, obesity, osteoporosis    Malcolm Kent M.D  Endocrinology  [facility]  5/16/2025      Disclaimer: This note has been generated using voice-recognition software. There may be typographical errors that have been missed during proof-reading.

## 2025-05-16 NOTE — TELEPHONE ENCOUNTER
Duplicate referral. Last referral received 05/16/2025    I received your message about dosage increase to Trulicity 3mg   Please moving forward you can send all referrals to our Epic Pool your help is greatly appreciated.      Any follow-up questions or concerns may be directed to Mr. Cha's  assigned advocate Bozena Abdi @[phone].     Thank You  Pharmacy Patient Assistance Team

## 2025-05-16 NOTE — ASSESSMENT & PLAN NOTE
- Right femoral neck fracture 10/21/2022>> patient fell picking up a newspaper requiring surgery.    - Follow-up with Orthopedic surgery at this time- Started on Prolia SC injection per orthopedic surgery  - Bilateral knee replacement: 8/2023 and 8/2024> doing well no falls no fracture. No recent falls, no recent fracture   - Started on Prolia SC injection per orthopedic surgery, 1st injection 3/17/2023>> every 6 months>so far has gotten 4 injections, next injection moved to 6/25/2025  - Prolia injection was rescheduled to 6/25/2025 due to recent dental extraction in 04/2025  - CHECK DXA for 2025 to review and discuss next office visit  - Denies history of other fractures  - Currently not taking any vitamin supplements including calcium/vitamin-D>> level WNL  - Monitor treatment, would continue PROLIA given hx of CKD3b

## 2025-05-23 NOTE — TELEPHONE ENCOUNTER
----- Message from Rylie Ortiz RN sent at 7/9/2020  2:31 PM CDT -----  Regarding: syncope  Hello,  Patient was seen in IM Monday for syncope/fall. Patient contacted cardiology to make Dr. Oliveira aware. I was going to help him schedule the tests ordered at his visit as well as stress test from Dr. Oliveira, but I think the carotid US needs to be ordered under CV Ultrasound Bilateral Doppler Carotid. Is it ok if I go ahead and reorder this way so that the schedule will populate for [facility]?    Thank you     Intubation    Date/Time: 5/23/2025 8:27 AM    Performed by: Vincenzo Doss CRNA  Authorized by: Sigifredo Montero MD    Intubation:     Induction:  Intravenous    Intubated:  Postinduction    Mask Ventilation:  Not attempted    Attempts:  1    Attempted By:  CRNA    Method of Intubation:  Video laryngoscopy    Blade:  [place] 3    Laryngeal View Grade: Grade I - full view of cords      Difficult Airway Encountered?: No      Complications:  None    Airway Device:  Oral endotracheal tube    Airway Device Size:  7.0    Style/Cuff Inflation:  Cuffed (inflated to minimal occlusive pressure)    Tube secured:  21    Secured at:  The lips    Placement Verified By:  Capnometry    Complicating Factors:  None    Findings Post-Intubation:  BS equal bilateral and atraumatic/condition of teeth unchanged

## 2025-05-26 DIAGNOSIS — I10 ESSENTIAL HYPERTENSION: Chronic | ICD-10-CM

## 2025-05-26 RX ORDER — CARVEDILOL 25 MG/1
25 TABLET ORAL 2 TIMES DAILY WITH MEALS
Qty: 180 TABLET | Refills: 3 | Status: SHIPPED | OUTPATIENT
Start: 2025-05-26

## 2025-05-28 DIAGNOSIS — E11.65 TYPE 2 DIABETES MELLITUS WITH HYPERGLYCEMIA, WITHOUT LONG-TERM CURRENT USE OF INSULIN: ICD-10-CM

## 2025-05-31 ENCOUNTER — EXTERNAL CHRONIC CARE MANAGEMENT (OUTPATIENT)
Dept: PRIMARY CARE CLINIC | Facility: CLINIC | Age: 80
End: 2025-05-31
Payer: MEDICARE

## 2025-05-31 PROCEDURE — 99490 CHRNC CARE MGMT STAFF 1ST 20: CPT | Mod: PBBFAC,PO | Performed by: FAMILY MEDICINE

## 2025-05-31 PROCEDURE — 99490 CHRNC CARE MGMT STAFF 1ST 20: CPT | Mod: S$PBB,,, | Performed by: FAMILY MEDICINE

## 2025-06-30 ENCOUNTER — EXTERNAL CHRONIC CARE MANAGEMENT (OUTPATIENT)
Dept: PRIMARY CARE CLINIC | Facility: CLINIC | Age: 80
End: 2025-06-30
Payer: MEDICARE

## 2025-06-30 DIAGNOSIS — E11.40 TYPE 2 DIABETES MELLITUS WITH DIABETIC NEUROPATHY, WITHOUT LONG-TERM CURRENT USE OF INSULIN: ICD-10-CM

## 2025-06-30 DIAGNOSIS — E11.65 TYPE 2 DIABETES MELLITUS WITH HYPERGLYCEMIA, WITHOUT LONG-TERM CURRENT USE OF INSULIN: ICD-10-CM

## 2025-06-30 PROCEDURE — 99490 CHRNC CARE MGMT STAFF 1ST 20: CPT | Mod: PBBFAC,PO | Performed by: FAMILY MEDICINE

## 2025-06-30 PROCEDURE — 99490 CHRNC CARE MGMT STAFF 1ST 20: CPT | Mod: S$PBB,,, | Performed by: FAMILY MEDICINE

## 2025-06-30 NOTE — TELEPHONE ENCOUNTER
Spoke to wife to confirm message regarding faxing Farxiga Rx to [facility]. Wife stated they called her and stated fr us to fax a Rx so they can put in his folder for future refills. Understanding verbalized.

## 2025-06-30 NOTE — TELEPHONE ENCOUNTER
Copied from CRM #[id number]. Topic: General Inquiry - Patient Advice  >> Jun 30, 2025 10:54 AM Bridgette wrote:  Type: Patient Call Back    Who called: Margaret - wife     What is the request in detail: Need a new prescription for FARXIGA 5 mg Tab [phone] ([facility]). Ask that the nurse give her a call.     Can the clinic reply by [facility]? No     Would the patient rather a call back or a response via [facility]? Call Back    Best call back number: .[phone] (home)      Additional Information:

## 2025-07-01 RX ORDER — DAPAGLIFLOZIN 5 MG/1
5 TABLET, FILM COATED ORAL DAILY
Qty: 90 TABLET | Refills: 3 | Status: SHIPPED | OUTPATIENT
Start: 2025-07-01

## 2025-07-07 ENCOUNTER — INFUSION (OUTPATIENT)
Dept: INFECTIOUS DISEASES | Facility: HOSPITAL | Age: 80
End: 2025-07-07
Payer: MEDICARE

## 2025-07-07 VITALS
TEMPERATURE: 98 F | DIASTOLIC BLOOD PRESSURE: 77 MMHG | BODY MASS INDEX: 36.41 KG/M2 | HEIGHT: 69 IN | SYSTOLIC BLOOD PRESSURE: 143 MMHG | RESPIRATION RATE: 19 BRPM | OXYGEN SATURATION: 95 % | HEART RATE: 65 BPM | WEIGHT: 245.81 LBS

## 2025-07-07 DIAGNOSIS — M81.6 LOCALIZED OSTEOPOROSIS OF LEQUESNE: ICD-10-CM

## 2025-07-07 DIAGNOSIS — M81.0 OSTEOPOROSIS, UNSPECIFIED OSTEOPOROSIS TYPE, UNSPECIFIED PATHOLOGICAL FRACTURE PRESENCE: ICD-10-CM

## 2025-07-07 DIAGNOSIS — S72.001A CLOSED DISPLACED FRACTURE OF RIGHT FEMORAL NECK: Primary | ICD-10-CM

## 2025-07-07 PROCEDURE — 63600175 PHARM REV CODE 636 W HCPCS: Mod: JZ,TB | Performed by: HOSPITALIST

## 2025-07-07 PROCEDURE — 96372 THER/PROPH/DIAG INJ SC/IM: CPT

## 2025-07-07 RX ADMIN — DENOSUMAB 60 MG: 60 INJECTION SUBCUTANEOUS at 03:07

## 2025-07-07 NOTE — PROGRESS NOTES
Patient arrives for Prolia injection - reports ,not taking vitamin D and calcium (will talk with provider) and denies dental procedures over past 3 months - administered per guidelines    Limited head-to-toe assessment due to privacy issues and visit reason though the opportunity was given for patient to express any concerns

## 2025-07-11 DIAGNOSIS — N18.31 CHRONIC KIDNEY DISEASE, STAGE 3A: Primary | ICD-10-CM

## 2025-07-16 ENCOUNTER — LAB VISIT (OUTPATIENT)
Dept: LAB | Facility: HOSPITAL | Age: 80
End: 2025-07-16
Payer: MEDICARE

## 2025-07-16 DIAGNOSIS — I10 PRIMARY HYPERTENSION: ICD-10-CM

## 2025-07-16 DIAGNOSIS — N25.81 SECONDARY RENAL HYPERPARATHYROIDISM: ICD-10-CM

## 2025-07-16 DIAGNOSIS — N18.31 CHRONIC KIDNEY DISEASE, STAGE 3A: ICD-10-CM

## 2025-07-16 DIAGNOSIS — M10.9 GOUT, UNSPECIFIED CAUSE, UNSPECIFIED CHRONICITY, UNSPECIFIED SITE: ICD-10-CM

## 2025-07-16 LAB
ABSOLUTE EOSINOPHIL (OHS): 0.7 K/UL
ABSOLUTE MONOCYTE (OHS): 0.74 K/UL (ref 0.3–1)
ABSOLUTE NEUTROPHIL COUNT (OHS): 5.25 K/UL (ref 1.8–7.7)
ALBUMIN SERPL BCP-MCNC: 3.4 G/DL (ref 3.5–5.2)
ANION GAP (OHS): 8 MMOL/L (ref 8–16)
BASOPHILS # BLD AUTO: 0.07 K/UL
BASOPHILS NFR BLD AUTO: 0.8 %
BUN SERPL-MCNC: 25 MG/DL (ref 8–23)
CALCIUM SERPL-MCNC: 8.6 MG/DL (ref 8.7–10.5)
CHLORIDE SERPL-SCNC: 110 MMOL/L (ref 95–110)
CO2 SERPL-SCNC: 24 MMOL/L (ref 23–29)
CREAT SERPL-MCNC: 1.6 MG/DL (ref 0.5–1.4)
ERYTHROCYTE [DISTWIDTH] IN BLOOD BY AUTOMATED COUNT: 14.1 % (ref 11.5–14.5)
GFR SERPLBLD CREATININE-BSD FMLA CKD-EPI: 44 ML/MIN/1.73/M2
GLUCOSE SERPL-MCNC: 133 MG/DL (ref 70–110)
HCT VFR BLD AUTO: 46.7 % (ref 40–54)
HGB BLD-MCNC: 14.9 GM/DL (ref 14–18)
IMM GRANULOCYTES # BLD AUTO: 0.03 K/UL (ref 0–0.04)
IMM GRANULOCYTES NFR BLD AUTO: 0.3 % (ref 0–0.5)
LYMPHOCYTES # BLD AUTO: 1.84 K/UL (ref 1–4.8)
MCH RBC QN AUTO: 30.5 PG (ref 27–31)
MCHC RBC AUTO-ENTMCNC: 31.9 G/DL (ref 32–36)
MCV RBC AUTO: 96 FL (ref 82–98)
NUCLEATED RBC (/100WBC) (OHS): 0 /100 WBC
PHOSPHATE SERPL-MCNC: 2.5 MG/DL (ref 2.7–4.5)
PLATELET # BLD AUTO: 182 K/UL (ref 150–450)
PMV BLD AUTO: 10.5 FL (ref 9.2–12.9)
POTASSIUM SERPL-SCNC: 4.2 MMOL/L (ref 3.5–5.1)
PTH-INTACT SERPL-MCNC: 221.4 PG/ML (ref 9–77)
RBC # BLD AUTO: 4.89 M/UL (ref 4.6–6.2)
RELATIVE EOSINOPHIL (OHS): 8.1 %
RELATIVE LYMPHOCYTE (OHS): 21.3 % (ref 18–48)
RELATIVE MONOCYTE (OHS): 8.6 % (ref 4–15)
RELATIVE NEUTROPHIL (OHS): 60.9 % (ref 38–73)
SODIUM SERPL-SCNC: 142 MMOL/L (ref 136–145)
URATE SERPL-MCNC: 6.4 MG/DL (ref 3.4–7)
WBC # BLD AUTO: 8.63 K/UL (ref 3.9–12.7)

## 2025-07-16 PROCEDURE — 85025 COMPLETE CBC W/AUTO DIFF WBC: CPT

## 2025-07-16 PROCEDURE — 36415 COLL VENOUS BLD VENIPUNCTURE: CPT | Mod: PO

## 2025-07-16 PROCEDURE — 84550 ASSAY OF BLOOD/URIC ACID: CPT

## 2025-07-16 PROCEDURE — 80069 RENAL FUNCTION PANEL: CPT

## 2025-07-16 PROCEDURE — 83970 ASSAY OF PARATHORMONE: CPT

## 2025-07-21 ENCOUNTER — TELEPHONE (OUTPATIENT)
Dept: NEPHROLOGY | Facility: CLINIC | Age: 80
End: 2025-07-21
Payer: MEDICARE

## 2025-07-21 NOTE — TELEPHONE ENCOUNTER
Patient returned call to the office in reference of labs viewed per Dr. Booth. Also, assisted with scheduling labs and follow up for next visit. Patient appreciative of the call.

## 2025-07-31 ENCOUNTER — EXTERNAL CHRONIC CARE MANAGEMENT (OUTPATIENT)
Dept: PRIMARY CARE CLINIC | Facility: CLINIC | Age: 80
End: 2025-07-31
Payer: MEDICARE

## 2025-07-31 PROCEDURE — 99490 CHRNC CARE MGMT STAFF 1ST 20: CPT | Mod: S$PBB,,, | Performed by: FAMILY MEDICINE

## 2025-07-31 PROCEDURE — 99490 CHRNC CARE MGMT STAFF 1ST 20: CPT | Mod: PBBFAC,PO | Performed by: FAMILY MEDICINE

## 2025-08-01 ENCOUNTER — TELEPHONE (OUTPATIENT)
Dept: FAMILY MEDICINE | Facility: CLINIC | Age: 80
End: 2025-08-01
Payer: MEDICARE

## 2025-08-01 ENCOUNTER — HOSPITAL ENCOUNTER (OUTPATIENT)
Dept: RADIOLOGY | Facility: HOSPITAL | Age: 80
Discharge: HOME | End: 2025-08-01
Payer: MEDICARE

## 2025-08-01 ENCOUNTER — OFFICE VISIT (OUTPATIENT)
Dept: FAMILY MEDICINE | Facility: CLINIC | Age: 80
End: 2025-08-01
Payer: MEDICARE

## 2025-08-01 VITALS
TEMPERATURE: 99 F | BODY MASS INDEX: 36.08 KG/M2 | WEIGHT: 243.63 LBS | OXYGEN SATURATION: 95 % | SYSTOLIC BLOOD PRESSURE: 130 MMHG | HEIGHT: 69 IN | DIASTOLIC BLOOD PRESSURE: 76 MMHG | HEART RATE: 72 BPM

## 2025-08-01 DIAGNOSIS — J40 SINOBRONCHITIS: ICD-10-CM

## 2025-08-01 DIAGNOSIS — R05.9 COUGH, UNSPECIFIED TYPE: Primary | ICD-10-CM

## 2025-08-01 DIAGNOSIS — I10 PRIMARY HYPERTENSION: ICD-10-CM

## 2025-08-01 DIAGNOSIS — E11.65 TYPE 2 DIABETES MELLITUS WITH HYPERGLYCEMIA, WITHOUT LONG-TERM CURRENT USE OF INSULIN: ICD-10-CM

## 2025-08-01 DIAGNOSIS — J32.9 SINOBRONCHITIS: ICD-10-CM

## 2025-08-01 DIAGNOSIS — R05.9 COUGH, UNSPECIFIED TYPE: ICD-10-CM

## 2025-08-01 LAB — SARS-COV-2 RNA RESP QL NAA+PROBE: NEGATIVE

## 2025-08-01 PROCEDURE — 99215 OFFICE O/P EST HI 40 MIN: CPT | Mod: PBBFAC,25,PO

## 2025-08-01 PROCEDURE — 71046 X-RAY EXAM CHEST 2 VIEWS: CPT | Mod: TC,PO

## 2025-08-01 PROCEDURE — 99999 PR PBB SHADOW E&M-EST. PATIENT-LVL V: CPT | Mod: PBBFAC,,,

## 2025-08-01 PROCEDURE — 87635 SARS-COV-2 COVID-19 AMP PRB: CPT

## 2025-08-01 PROCEDURE — 71046 X-RAY EXAM CHEST 2 VIEWS: CPT | Mod: 26,,, | Performed by: RADIOLOGY

## 2025-08-01 NOTE — TELEPHONE ENCOUNTER
Incoming call from patient via phone room staff , patient c/o cough and congestion times 3 days. Patient denies of SOB when asked , patient scheduled at this time with NP Herr for today.
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
## 2025-08-05 ENCOUNTER — OFFICE VISIT (OUTPATIENT)
Dept: PODIATRY | Facility: CLINIC | Age: 80
End: 2025-08-05
Payer: MEDICARE

## 2025-08-05 VITALS — WEIGHT: 243.63 LBS | HEIGHT: 69 IN | BODY MASS INDEX: 36.08 KG/M2

## 2025-08-05 DIAGNOSIS — E11.49 TYPE II DIABETES MELLITUS WITH NEUROLOGICAL MANIFESTATIONS: Primary | ICD-10-CM

## 2025-08-05 DIAGNOSIS — B35.1 ONYCHOMYCOSIS DUE TO DERMATOPHYTE: ICD-10-CM

## 2025-08-05 PROCEDURE — 99999 PR PBB SHADOW E&M-EST. PATIENT-LVL III: CPT | Mod: PBBFAC,,, | Performed by: PODIATRIST

## 2025-08-05 PROCEDURE — 99213 OFFICE O/P EST LOW 20 MIN: CPT | Mod: PBBFAC,PO,25 | Performed by: PODIATRIST

## 2025-08-05 PROCEDURE — 11721 DEBRIDE NAIL 6 OR MORE: CPT | Mod: PBBFAC,PO | Performed by: PODIATRIST
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
## 2025-08-25 DIAGNOSIS — R35.1 NOCTURIA MORE THAN TWICE PER NIGHT: ICD-10-CM

## 2025-08-25 RX ORDER — OXYBUTYNIN CHLORIDE 5 MG/1
5 TABLET, EXTENDED RELEASE ORAL
Qty: 90 TABLET | Refills: 0 | Status: SHIPPED | OUTPATIENT
Start: 2025-08-25

## 2025-08-25 RX ORDER — PANTOPRAZOLE SODIUM 40 MG/1
40 TABLET, DELAYED RELEASE ORAL
Qty: 90 TABLET | Refills: 1 | Status: SHIPPED | OUTPATIENT
Start: 2025-08-25

## 2025-09-05 RX ORDER — ATORVASTATIN CALCIUM 80 MG/1
80 TABLET, FILM COATED ORAL
Qty: 90 TABLET | Refills: 0 | Status: SHIPPED | OUTPATIENT
Start: 2025-09-05

## (undated) DEVICE — BLADE DUAL CUT SAG 35X64X.89MM

## (undated) DEVICE — SEE MEDLINE ITEM 157187

## (undated) DEVICE — GUIDEWIRE CHOICE PT  XS 182CM

## (undated) DEVICE — TUBE SUCTION FRAZIER VENT 10FR

## (undated) DEVICE — KIT GLIDESHEATH SLEND 6FR 10CM

## (undated) DEVICE — SUT 2/0 36IN COATED VICRYL

## (undated) DEVICE — CUBE COLD THERAPY POLAR CARE

## (undated) DEVICE — PRESSURIZER HI VAC HIP KIT

## (undated) DEVICE — OMNIPAQUE 350 200ML

## (undated) DEVICE — WIRE PILOT .014X190

## (undated) DEVICE — KIT CO-PILOT

## (undated) DEVICE — SOL IRRIGATION WATER 3000ML

## (undated) DEVICE — SYS KNEE EPAK PIN ATTUNE
Type: IMPLANTABLE DEVICE | Site: KNEE | Status: NON-FUNCTIONAL
Removed: 2024-08-15

## (undated) DEVICE — ADHESIVE DERMABOND ADVANCED

## (undated) DEVICE — HOOD T7 W/ PEEL AWAY LENS

## (undated) DEVICE — TUBE SUCTION KAMVAC MINI 20/BX

## (undated) DEVICE — BRUSH SCRUB HIBICLENS 4%

## (undated) DEVICE — SUT VICRYL PLUS 2-0

## (undated) DEVICE — MAT QUICK 40X30 FLOOR FLUID LF

## (undated) DEVICE — DRAPE THREE-QTR REINF 53X77IN

## (undated) DEVICE — Device

## (undated) DEVICE — BLADE RECIP DS OFST 70X1X12.5

## (undated) DEVICE — WIRE GUIDE SAFE-T-J .035 260CM

## (undated) DEVICE — KIT TOTAL KNEE TKOFG OMC

## (undated) DEVICE — TIP IRR FEM CANAL CO-AXIAL

## (undated) DEVICE — HEMOSTAT VASC BAND LONG 27CM

## (undated) DEVICE — GLOVE GAMMEX SURG LF PI SZ 7.5

## (undated) DEVICE — CATH GUIDE AL75 7FR 100CM

## (undated) DEVICE — SOL BETADINE 5%

## (undated) DEVICE — GUIDEWIRE MIRACLEBROS 6 180CM

## (undated) DEVICE — CATH TREK RX 3.0MM X 20MM

## (undated) DEVICE — NDL HYPO STD REG BVL 18GX1.5IN

## (undated) DEVICE — BLADE SAG 18.0X1.27X100

## (undated) DEVICE — SHEATH INTRODUCER 6FR 11CM

## (undated) DEVICE — DRAPE T EXTRM SURG 121X128X90

## (undated) DEVICE — GUIDEWIRE GAIA THIRD 190CM

## (undated) DEVICE — DRESSING AQUACEL AG ADV 3.5X12

## (undated) DEVICE — ELECTRODE REM PLYHSV RETURN 9

## (undated) DEVICE — CATH BLLN FG APEX MR 2.00X40MM

## (undated) DEVICE — CUP MEDICINE STERILE 2OZ

## (undated) DEVICE — PAD CAST SPECIALIST STRL 4

## (undated) DEVICE — PROTECTION STATION PLUS

## (undated) DEVICE — DEVICE PERCLOSE SUT CLSR 6FR

## (undated) DEVICE — SOL IRR NACL .9% 3000ML

## (undated) DEVICE — TAPE SURG DURAPORE 2 X10YD

## (undated) DEVICE — GLOVE BIOGEL ECLIPSE SZ 7

## (undated) DEVICE — PUMP COLD THERAPY

## (undated) DEVICE — SEE MEDLINE ITEM 156894

## (undated) DEVICE — DRAPE INCISE IOBAN 2 23X33IN

## (undated) DEVICE — DRAPE STERI-DRAPE 1000 17X11IN

## (undated) DEVICE — SUT VICRYL PLUS 0 CT1 18IN

## (undated) DEVICE — CATH DXTERITY JL40 100CM 6FR

## (undated) DEVICE — BANDAGE ACE DOUBLE STER 6IN

## (undated) DEVICE — WIRE BENTSON 035/180

## (undated) DEVICE — DRESSING AQUACEL EXTRA 10X12.5

## (undated) DEVICE — SUT 1 36IN COATED VICRYL UN

## (undated) DEVICE — KIT UROLIFT 2 CARTRIDGE HANDLE

## (undated) DEVICE — KIT MICROINTRO 4F .018X40X7CM

## (undated) DEVICE — SYR 50CC LL

## (undated) DEVICE — KIT IRR SUCTION HND PIECE

## (undated) DEVICE — CATH BLLN FG APEX MR 2.50X40MM

## (undated) DEVICE — DRAPE STERI INSTRUMENT 1018

## (undated) DEVICE — GUIDE VISTA 6FR XB RCA

## (undated) DEVICE — DRAPE SURG W/TWL 17 5/8X23

## (undated) DEVICE — COVER MAYO STND XL 30X57IN

## (undated) DEVICE — PAD KNEE POLAR XL

## (undated) DEVICE — KIT TOTAL HIP HPOFH OMC

## (undated) DEVICE — MARKER SKIN RULER STERILE

## (undated) DEVICE — DRESSING AQUACEL FOAM 4 X 12

## (undated) DEVICE — PENCIL VALLEYLAB TELSCP SMK

## (undated) DEVICE — COVER PROXIMA MAYO STAND

## (undated) DEVICE — ELECTRODE BLD 1 INCH TEFLON

## (undated) DEVICE — SUT MONOCRYL 3-0 PS-2 UND

## (undated) DEVICE — SET CEMENT (SCULP)

## (undated) DEVICE — SUT STRATAFIX PDS 1 CTX 18IN

## (undated) DEVICE — GAUZE SPONGE 4X4 12PLY

## (undated) DEVICE — GUIDEWIRE EMERALD 150CM PTFE

## (undated) DEVICE — PULSAVAC ZIMMER

## (undated) DEVICE — KIT CUSTOM MANIFOLD

## (undated) DEVICE — COVER LIGHT HANDLE 80/CA

## (undated) DEVICE — PENCIL SMK EVAC CONNECTOR 10FT

## (undated) DEVICE — MARKER SKIN STND TIP BLUE BARR

## (undated) DEVICE — DRESSING AQUACEL AG RBBN 2X45

## (undated) DEVICE — INFLATOR ENCORE 26 BLLN INFL

## (undated) DEVICE — DRESSING TRANS 4X4 TEGADERM

## (undated) DEVICE — TOURNIQUET SB QC DP 34X4IN

## (undated) DEVICE — ALCOHOL 70% ANTISEPTIC ISO 4OZ

## (undated) DEVICE — SPONGE GAUZE 16PLY 4X4

## (undated) DEVICE — MASK FLYTE HOOD PEEL AWAY

## (undated) DEVICE — COVER SNAP KAP 26IN

## (undated) DEVICE — RETRIEVER SUTURE HEWSON DISP

## (undated) DEVICE — SUT VICRYL 3-0 27 CT-1

## (undated) DEVICE — KIT GLIDESHEATH SLEND 7FR 10CM

## (undated) DEVICE — TOWEL OR XRAY WHITE 17X26IN

## (undated) DEVICE — PAD COLD THERAPY KNEE WRAP ON

## (undated) DEVICE — SUT VICRYL BR 1 GEN 27 CT-1

## (undated) DEVICE — CATH EAGLE EYE PLATINUM

## (undated) DEVICE — BLADE SAGITTAL 18 X 1.27 X 90M

## (undated) DEVICE — CATH TWIN-PASS TOURQUE 3.5FR

## (undated) DEVICE — BOWL CEMENT

## (undated) DEVICE — GUIDEWIRE SUPRA CORE 035 190CM

## (undated) DEVICE — SOL NACL IRR 3000ML

## (undated) DEVICE — CATH DXTERITY JR40 100CM 6FR

## (undated) DEVICE — SUT VICRYL+ 1 CT1 18IN

## (undated) DEVICE — GLOVE SENSICARE PI GRN 9

## (undated) DEVICE — GOWN POLY REINF BRTH SLV 3XL

## (undated) DEVICE — CATH TURNPIKE LP 135CM

## (undated) DEVICE — GLOVE BIOGEL SKINSENSE PI 8.0

## (undated) DEVICE — SUT FIBERWIRE #5

## (undated) DEVICE — SYS CLSR DERMABOND PRINEO 22CM

## (undated) DEVICE — SPIKE CONTRAST CONTROLLER

## (undated) DEVICE — SYS REVOLUTION CEMENT MIXING

## (undated) DEVICE — SUT VICRYL 2-0 36 CT-1

## (undated) DEVICE — DRESSING TELFA N ADH 3X8

## (undated) DEVICE — GUIDEWIRE FIELDER XT.014X190CM

## (undated) DEVICE — BRUSH INTRAMEDULLARY

## (undated) DEVICE — SYR ONLY LUER LOCK 20CC

## (undated) DEVICE — GLOVE BIOGEL ECLIPSE SZ 7.5

## (undated) DEVICE — SOL NACL IRR 1000ML BTL

## (undated) DEVICE — UNDERGLOVES BIOGEL PI SIZE 8.5

## (undated) DEVICE — CATH DXTERITY MPASHA 110CM 6FR

## (undated) DEVICE — SPONGE COTTON TRAY 4X4IN

## (undated) DEVICE — TUBING HPCIL ROT M/F ADPT 10IN

## (undated) DEVICE — INTRODUCER CATH 4F 11CM

## (undated) DEVICE — CATH IMA INFINITI 4FRX100CM

## (undated) DEVICE — WIRE SPORT 014X300

## (undated) DEVICE — SUT VICRYL PLUS 2-0 CT1 18

## (undated) DEVICE — GLOVE SENSICARE PI SURG 8

## (undated) DEVICE — SUT MCRYL PLUS 4-0 PS2 27IN

## (undated) DEVICE — GUIDEWIRE PROWATER .014X180CM

## (undated) DEVICE — GLIDESHEATH SLENDER SS 5FR10CM

## (undated) DEVICE — CATH NC QUANTUM APEX MR 4.5X20

## (undated) DEVICE — TAPE SILK 3IN

## (undated) DEVICE — GOWN SMARTGOWN 3XL XLONG

## (undated) DEVICE — BANDAGE ESMARK 6X12

## (undated) DEVICE — NEPTUNE 4 PORT MANIFOLD

## (undated) DEVICE — DRAPE TOP 53X102IN